# Patient Record
Sex: FEMALE | Race: WHITE | NOT HISPANIC OR LATINO | Employment: OTHER | URBAN - NONMETROPOLITAN AREA
[De-identification: names, ages, dates, MRNs, and addresses within clinical notes are randomized per-mention and may not be internally consistent; named-entity substitution may affect disease eponyms.]

---

## 2017-02-21 ENCOUNTER — TRANSCRIBE ORDERS (OUTPATIENT)
Dept: ADMINISTRATIVE | Facility: HOSPITAL | Age: 57
End: 2017-02-21

## 2017-02-21 DIAGNOSIS — R10.10 UPPER ABDOMINAL PAIN: ICD-10-CM

## 2017-02-21 DIAGNOSIS — R93.2 ABNORMAL LIVER SCAN: Primary | ICD-10-CM

## 2017-02-27 ENCOUNTER — HOSPITAL ENCOUNTER (OUTPATIENT)
Dept: CT IMAGING | Facility: HOSPITAL | Age: 57
Discharge: HOME | End: 2017-02-27
Admitting: NURSE PRACTITIONER

## 2017-02-27 DIAGNOSIS — R10.10 UPPER ABDOMINAL PAIN: ICD-10-CM

## 2017-02-27 DIAGNOSIS — R93.2 ABNORMAL LIVER SCAN: ICD-10-CM

## 2017-02-27 LAB — CREAT BLDA-MCNC: 0.7 MG/DL (ref 0.6–1.3)

## 2017-02-27 PROCEDURE — 74170 CT ABD WO CNTRST FLWD CNTRST: CPT

## 2017-02-27 PROCEDURE — 82565 ASSAY OF CREATININE: CPT

## 2017-02-27 PROCEDURE — 0 IOVERSOL 68 % SOLUTION: Performed by: NURSE PRACTITIONER

## 2017-02-27 PROCEDURE — 74170 CT ABD WO CNTRST FLWD CNTRST: CPT | Performed by: RADIOLOGY

## 2017-02-27 RX ADMIN — IOVERSOL 100 ML: 678 INJECTION INTRA-ARTERIAL; INTRAVENOUS at 09:15

## 2017-04-12 ENCOUNTER — APPOINTMENT (OUTPATIENT)
Dept: CT IMAGING | Facility: HOSPITAL | Age: 57
End: 2017-04-12

## 2017-04-12 ENCOUNTER — APPOINTMENT (OUTPATIENT)
Dept: GENERAL RADIOLOGY | Facility: HOSPITAL | Age: 57
End: 2017-04-12

## 2017-04-12 ENCOUNTER — HOSPITAL ENCOUNTER (OUTPATIENT)
Facility: HOSPITAL | Age: 57
Setting detail: OBSERVATION
Discharge: HOME | End: 2017-04-14
Attending: FAMILY MEDICINE | Admitting: FAMILY MEDICINE

## 2017-04-12 PROBLEM — R07.9 CHEST PAIN: Status: ACTIVE | Noted: 2017-04-12

## 2017-04-12 LAB
ALBUMIN SERPL-MCNC: 3.6 G/DL (ref 3.5–5)
ALBUMIN SERPL-MCNC: 3.6 G/DL (ref 3.5–5)
ALBUMIN/GLOB SERPL: 1.5 G/DL (ref 1.5–2.5)
ALP SERPL-CCNC: 135 U/L (ref 35–104)
ALP SERPL-CCNC: 135 U/L (ref 35–104)
ALT SERPL W P-5'-P-CCNC: 23 U/L (ref 10–36)
ALT SERPL W P-5'-P-CCNC: 23 U/L (ref 10–36)
ANION GAP SERPL CALCULATED.3IONS-SCNC: 3.9 MMOL/L (ref 3.6–11.2)
ANION GAP SERPL CALCULATED.3IONS-SCNC: 5.6 MMOL/L (ref 3.6–11.2)
AST SERPL-CCNC: 20 U/L (ref 10–30)
AST SERPL-CCNC: 20 U/L (ref 10–30)
BASOPHILS # BLD AUTO: 0.03 10*3/MM3 (ref 0–0.3)
BASOPHILS NFR BLD AUTO: 0.5 % (ref 0–2)
BILIRUB CONJ SERPL-MCNC: 0.1 MG/DL (ref 0–0.2)
BILIRUB INDIRECT SERPL-MCNC: 0.2 MG/DL
BILIRUB SERPL-MCNC: 0.3 MG/DL (ref 0.2–1.8)
BILIRUB SERPL-MCNC: 0.3 MG/DL (ref 0.2–1.8)
BUN BLD-MCNC: 12 MG/DL (ref 7–21)
BUN BLD-MCNC: 12 MG/DL (ref 7–21)
BUN/CREAT SERPL: 19.7 (ref 7–25)
BUN/CREAT SERPL: 19.7 (ref 7–25)
CALCIUM SPEC-SCNC: 9 MG/DL (ref 7.7–10)
CALCIUM SPEC-SCNC: 9 MG/DL (ref 7.7–10)
CHLORIDE SERPL-SCNC: 103 MMOL/L (ref 99–112)
CHLORIDE SERPL-SCNC: 104 MMOL/L (ref 99–112)
CK MB SERPL-CCNC: 0.37 NG/ML (ref 0–5)
CK MB SERPL-RTO: 0.9 % (ref 0–3)
CK SERPL-CCNC: 40 U/L (ref 24–173)
CO2 SERPL-SCNC: 26.4 MMOL/L (ref 24.3–31.9)
CO2 SERPL-SCNC: 27.1 MMOL/L (ref 24.3–31.9)
CREAT BLD-MCNC: 0.61 MG/DL (ref 0.43–1.29)
CREAT BLD-MCNC: 0.61 MG/DL (ref 0.43–1.29)
D DIMER PPP FEU-MCNC: 0.76 MG/L (FEU) (ref 0–0.5)
DEPRECATED RDW RBC AUTO: 49.8 FL (ref 37–54)
EOSINOPHIL # BLD AUTO: 0.16 10*3/MM3 (ref 0–0.7)
EOSINOPHIL NFR BLD AUTO: 2.9 % (ref 0–5)
ERYTHROCYTE [DISTWIDTH] IN BLOOD BY AUTOMATED COUNT: 14.6 % (ref 11.5–14.5)
GFR SERPL CREATININE-BSD FRML MDRD: 102 ML/MIN/1.73
GFR SERPL CREATININE-BSD FRML MDRD: 102 ML/MIN/1.73
GLOBULIN UR ELPH-MCNC: 2.4 GM/DL
GLUCOSE BLD-MCNC: 89 MG/DL (ref 70–110)
GLUCOSE BLD-MCNC: 89 MG/DL (ref 70–110)
GLUCOSE BLDC GLUCOMTR-MCNC: 63 MG/DL (ref 70–130)
GLUCOSE BLDC GLUCOMTR-MCNC: 92 MG/DL (ref 70–130)
GLUCOSE BLDC GLUCOMTR-MCNC: 98 MG/DL (ref 70–130)
HCT VFR BLD AUTO: 35.3 % (ref 37–47)
HGB BLD-MCNC: 12 G/DL (ref 12–16)
IMM GRANULOCYTES # BLD: 0.01 10*3/MM3 (ref 0–0.03)
IMM GRANULOCYTES NFR BLD: 0.2 % (ref 0–0.5)
LYMPHOCYTES # BLD AUTO: 1.78 10*3/MM3 (ref 1–3)
LYMPHOCYTES NFR BLD AUTO: 32.1 % (ref 21–51)
MCH RBC QN AUTO: 32.1 PG (ref 27–33)
MCHC RBC AUTO-ENTMCNC: 34 G/DL (ref 33–37)
MCV RBC AUTO: 94.4 FL (ref 80–94)
MONOCYTES # BLD AUTO: 0.47 10*3/MM3 (ref 0.1–0.9)
MONOCYTES NFR BLD AUTO: 8.5 % (ref 0–10)
NEUTROPHILS # BLD AUTO: 3.1 10*3/MM3 (ref 1.4–6.5)
NEUTROPHILS NFR BLD AUTO: 55.8 % (ref 30–70)
OSMOLALITY SERPL CALC.SUM OF ELEC: 269.3 MOSM/KG (ref 273–305)
PLATELET # BLD AUTO: 256 10*3/MM3 (ref 130–400)
PMV BLD AUTO: 9.4 FL (ref 6–10)
POTASSIUM BLD-SCNC: 4.1 MMOL/L (ref 3.5–5.3)
POTASSIUM BLD-SCNC: 4.2 MMOL/L (ref 3.5–5.3)
PROT SERPL-MCNC: 6 G/DL (ref 6–8)
PROT SERPL-MCNC: 6 G/DL (ref 6–8)
RBC # BLD AUTO: 3.74 10*6/MM3 (ref 4.2–5.4)
SODIUM BLD-SCNC: 135 MMOL/L (ref 135–153)
SODIUM BLD-SCNC: 135 MMOL/L (ref 135–153)
TROPONIN I SERPL-MCNC: <0.006 NG/ML
TROPONIN I SERPL-MCNC: <0.006 NG/ML
WBC NRBC COR # BLD: 5.55 10*3/MM3 (ref 4.5–12.5)

## 2017-04-12 PROCEDURE — 0 IOPAMIDOL PER 1 ML: Performed by: FAMILY MEDICINE

## 2017-04-12 PROCEDURE — 25010000002 ENOXAPARIN PER 10 MG: Performed by: FAMILY MEDICINE

## 2017-04-12 PROCEDURE — G0378 HOSPITAL OBSERVATION PER HR: HCPCS

## 2017-04-12 PROCEDURE — 80076 HEPATIC FUNCTION PANEL: CPT | Performed by: FAMILY MEDICINE

## 2017-04-12 PROCEDURE — 71275 CT ANGIOGRAPHY CHEST: CPT

## 2017-04-12 PROCEDURE — 82553 CREATINE MB FRACTION: CPT | Performed by: FAMILY MEDICINE

## 2017-04-12 PROCEDURE — 82550 ASSAY OF CK (CPK): CPT | Performed by: FAMILY MEDICINE

## 2017-04-12 PROCEDURE — 84484 ASSAY OF TROPONIN QUANT: CPT | Performed by: FAMILY MEDICINE

## 2017-04-12 PROCEDURE — 71275 CT ANGIOGRAPHY CHEST: CPT | Performed by: RADIOLOGY

## 2017-04-12 PROCEDURE — 93005 ELECTROCARDIOGRAM TRACING: CPT | Performed by: FAMILY MEDICINE

## 2017-04-12 PROCEDURE — 71020 HC CHEST PA AND LATERAL: CPT

## 2017-04-12 PROCEDURE — 85379 FIBRIN DEGRADATION QUANT: CPT | Performed by: FAMILY MEDICINE

## 2017-04-12 PROCEDURE — 85025 COMPLETE CBC W/AUTO DIFF WBC: CPT | Performed by: FAMILY MEDICINE

## 2017-04-12 PROCEDURE — 96372 THER/PROPH/DIAG INJ SC/IM: CPT

## 2017-04-12 PROCEDURE — 82962 GLUCOSE BLOOD TEST: CPT

## 2017-04-12 PROCEDURE — 71020 XR CHEST PA AND LATERAL: CPT | Performed by: RADIOLOGY

## 2017-04-12 PROCEDURE — 80053 COMPREHEN METABOLIC PANEL: CPT | Performed by: FAMILY MEDICINE

## 2017-04-12 RX ORDER — ASPIRIN 81 MG/1
81 TABLET ORAL DAILY
Status: CANCELLED | OUTPATIENT
Start: 2017-04-13

## 2017-04-12 RX ORDER — PRIMIDONE 50 MG/1
50 TABLET ORAL DAILY
Status: DISCONTINUED | OUTPATIENT
Start: 2017-04-12 | End: 2017-04-14

## 2017-04-12 RX ORDER — CELECOXIB 200 MG/1
200 CAPSULE ORAL DAILY
Status: DISCONTINUED | OUTPATIENT
Start: 2017-04-13 | End: 2017-04-14 | Stop reason: HOSPADM

## 2017-04-12 RX ORDER — NITROGLYCERIN 0.4 MG/1
0.4 TABLET SUBLINGUAL
Status: DISCONTINUED | OUTPATIENT
Start: 2017-04-12 | End: 2017-04-14 | Stop reason: HOSPADM

## 2017-04-12 RX ORDER — DULOXETIN HYDROCHLORIDE 60 MG/1
60 CAPSULE, DELAYED RELEASE ORAL EVERY 12 HOURS SCHEDULED
Status: DISCONTINUED | OUTPATIENT
Start: 2017-04-12 | End: 2017-04-14 | Stop reason: HOSPADM

## 2017-04-12 RX ORDER — DULOXETIN HYDROCHLORIDE 60 MG/1
60 CAPSULE, DELAYED RELEASE ORAL 2 TIMES DAILY
Status: CANCELLED | OUTPATIENT
Start: 2017-04-12

## 2017-04-12 RX ORDER — DEXLANSOPRAZOLE 60 MG/1
60 CAPSULE, DELAYED RELEASE ORAL DAILY
Status: CANCELLED | OUTPATIENT
Start: 2017-04-13

## 2017-04-12 RX ORDER — FOLIC ACID 1 MG/1
1 TABLET ORAL DAILY
Status: DISCONTINUED | OUTPATIENT
Start: 2017-04-13 | End: 2017-04-14 | Stop reason: HOSPADM

## 2017-04-12 RX ORDER — ASPIRIN 81 MG/1
81 TABLET ORAL DAILY
Status: DISCONTINUED | OUTPATIENT
Start: 2017-04-13 | End: 2017-04-14 | Stop reason: HOSPADM

## 2017-04-12 RX ORDER — ASCORBIC ACID 500 MG
1000 TABLET ORAL EVERY EVENING
COMMUNITY

## 2017-04-12 RX ORDER — AMITRIPTYLINE HYDROCHLORIDE 25 MG/1
25 TABLET, FILM COATED ORAL DAILY
Status: CANCELLED | OUTPATIENT
Start: 2017-04-12

## 2017-04-12 RX ORDER — MULTIVITAMIN
1 TABLET ORAL EVERY MORNING
Status: CANCELLED | OUTPATIENT
Start: 2017-04-12

## 2017-04-12 RX ORDER — NICOTINE POLACRILEX 4 MG
15 LOZENGE BUCCAL
Status: DISCONTINUED | OUTPATIENT
Start: 2017-04-12 | End: 2017-04-14 | Stop reason: HOSPADM

## 2017-04-12 RX ORDER — CYANOCOBALAMIN 1000 UG/ML
1000 INJECTION, SOLUTION INTRAMUSCULAR; SUBCUTANEOUS
COMMUNITY

## 2017-04-12 RX ORDER — CETIRIZINE HYDROCHLORIDE 10 MG/1
10 TABLET ORAL DAILY
Status: CANCELLED | OUTPATIENT
Start: 2017-04-12

## 2017-04-12 RX ORDER — LORATADINE 10 MG/1
10 TABLET ORAL NIGHTLY
COMMUNITY

## 2017-04-12 RX ORDER — CELECOXIB 200 MG/1
200 CAPSULE ORAL DAILY
Status: CANCELLED | OUTPATIENT
Start: 2017-04-12

## 2017-04-12 RX ORDER — MULTIVIT WITH MINERALS/LUTEIN
1000 TABLET ORAL DAILY
Status: DISCONTINUED | OUTPATIENT
Start: 2017-04-12 | End: 2017-04-12 | Stop reason: CLARIF

## 2017-04-12 RX ORDER — CYCLOBENZAPRINE HCL 10 MG
10 TABLET ORAL DAILY
Status: CANCELLED | OUTPATIENT
Start: 2017-04-12

## 2017-04-12 RX ORDER — DIAZEPAM 2 MG/1
2 TABLET ORAL 2 TIMES DAILY
Status: CANCELLED | OUTPATIENT
Start: 2017-04-12

## 2017-04-12 RX ORDER — LEVOTHYROXINE SODIUM 0.15 MG/1
150 TABLET ORAL
Status: DISCONTINUED | OUTPATIENT
Start: 2017-04-13 | End: 2017-04-14 | Stop reason: HOSPADM

## 2017-04-12 RX ORDER — METOPROLOL SUCCINATE 50 MG/1
50 TABLET, EXTENDED RELEASE ORAL DAILY
Status: CANCELLED | OUTPATIENT
Start: 2017-04-13

## 2017-04-12 RX ORDER — MULTIVITAMIN
1 TABLET ORAL DAILY
Status: DISCONTINUED | OUTPATIENT
Start: 2017-04-12 | End: 2017-04-14 | Stop reason: HOSPADM

## 2017-04-12 RX ORDER — FERROUS SULFATE 325(65) MG
325 TABLET ORAL
Status: DISCONTINUED | OUTPATIENT
Start: 2017-04-12 | End: 2017-04-14 | Stop reason: HOSPADM

## 2017-04-12 RX ORDER — LEVOTHYROXINE SODIUM 0.15 MG/1
150 TABLET ORAL DAILY
Status: CANCELLED | OUTPATIENT
Start: 2017-04-13

## 2017-04-12 RX ORDER — ALUMINA, MAGNESIA, AND SIMETHICONE 2400; 2400; 240 MG/30ML; MG/30ML; MG/30ML
15 SUSPENSION ORAL EVERY 6 HOURS PRN
Status: DISCONTINUED | OUTPATIENT
Start: 2017-04-12 | End: 2017-04-14 | Stop reason: HOSPADM

## 2017-04-12 RX ORDER — BUTALBITAL, ACETAMINOPHEN AND CAFFEINE 50; 325; 40 MG/1; MG/1; MG/1
1 TABLET ORAL DAILY PRN
Status: DISCONTINUED | OUTPATIENT
Start: 2017-04-12 | End: 2017-04-14 | Stop reason: HOSPADM

## 2017-04-12 RX ORDER — PANTOPRAZOLE SODIUM 40 MG/1
40 TABLET, DELAYED RELEASE ORAL
Status: DISCONTINUED | OUTPATIENT
Start: 2017-04-13 | End: 2017-04-14 | Stop reason: HOSPADM

## 2017-04-12 RX ORDER — ONDANSETRON 4 MG/1
4 TABLET, FILM COATED ORAL EVERY 6 HOURS PRN
Status: DISCONTINUED | OUTPATIENT
Start: 2017-04-12 | End: 2017-04-14 | Stop reason: HOSPADM

## 2017-04-12 RX ORDER — ONDANSETRON 2 MG/ML
4 INJECTION INTRAMUSCULAR; INTRAVENOUS EVERY 6 HOURS PRN
Status: DISCONTINUED | OUTPATIENT
Start: 2017-04-12 | End: 2017-04-14 | Stop reason: HOSPADM

## 2017-04-12 RX ORDER — FERROUS SULFATE 325(65) MG
325 TABLET ORAL DAILY
Status: CANCELLED | OUTPATIENT
Start: 2017-04-12

## 2017-04-12 RX ORDER — ONDANSETRON 4 MG/1
4 TABLET, ORALLY DISINTEGRATING ORAL EVERY 6 HOURS PRN
Status: DISCONTINUED | OUTPATIENT
Start: 2017-04-12 | End: 2017-04-14 | Stop reason: HOSPADM

## 2017-04-12 RX ORDER — ASCORBIC ACID 500 MG
1000 TABLET ORAL DAILY
Status: CANCELLED | OUTPATIENT
Start: 2017-04-12

## 2017-04-12 RX ORDER — GABAPENTIN 300 MG/1
600 CAPSULE ORAL 4 TIMES DAILY
Status: CANCELLED | OUTPATIENT
Start: 2017-04-12

## 2017-04-12 RX ORDER — OMEGA-3S/DHA/EPA/FISH OIL/D3 300MG-1000
1000 CAPSULE ORAL DAILY
Status: DISCONTINUED | OUTPATIENT
Start: 2017-04-12 | End: 2017-04-14 | Stop reason: HOSPADM

## 2017-04-12 RX ORDER — ASCORBIC ACID 500 MG
1000 TABLET ORAL DAILY
Status: DISCONTINUED | OUTPATIENT
Start: 2017-04-12 | End: 2017-04-14 | Stop reason: HOSPADM

## 2017-04-12 RX ORDER — POLYETHYLENE GLYCOL 3350 17 G/17G
17 POWDER, FOR SOLUTION ORAL DAILY
Status: DISCONTINUED | OUTPATIENT
Start: 2017-04-12 | End: 2017-04-14 | Stop reason: HOSPADM

## 2017-04-12 RX ORDER — DEXTROSE MONOHYDRATE 25 G/50ML
25 INJECTION, SOLUTION INTRAVENOUS
Status: DISCONTINUED | OUTPATIENT
Start: 2017-04-12 | End: 2017-04-14 | Stop reason: HOSPADM

## 2017-04-12 RX ORDER — VITAMIN E 268 MG
800 CAPSULE ORAL DAILY
Status: DISCONTINUED | OUTPATIENT
Start: 2017-04-12 | End: 2017-04-14 | Stop reason: HOSPADM

## 2017-04-12 RX ORDER — TOPIRAMATE 25 MG/1
50 TABLET ORAL NIGHTLY
COMMUNITY
End: 2019-10-25 | Stop reason: ALTCHOICE

## 2017-04-12 RX ORDER — TRAMADOL HYDROCHLORIDE 50 MG/1
50 TABLET ORAL EVERY 12 HOURS SCHEDULED
Status: DISCONTINUED | OUTPATIENT
Start: 2017-04-12 | End: 2017-04-14 | Stop reason: HOSPADM

## 2017-04-12 RX ORDER — DIAZEPAM 5 MG/1
2.5 TABLET ORAL EVERY 12 HOURS SCHEDULED
Status: DISCONTINUED | OUTPATIENT
Start: 2017-04-12 | End: 2017-04-14 | Stop reason: HOSPADM

## 2017-04-12 RX ORDER — SODIUM CHLORIDE 9 MG/ML
100 INJECTION, SOLUTION INTRAVENOUS CONTINUOUS
Status: DISCONTINUED | OUTPATIENT
Start: 2017-04-12 | End: 2017-04-14 | Stop reason: HOSPADM

## 2017-04-12 RX ORDER — GABAPENTIN 300 MG/1
600 CAPSULE ORAL EVERY 6 HOURS SCHEDULED
Status: DISCONTINUED | OUTPATIENT
Start: 2017-04-12 | End: 2017-04-14 | Stop reason: HOSPADM

## 2017-04-12 RX ORDER — DIAZEPAM 2 MG/1
2 TABLET ORAL EVERY 12 HOURS SCHEDULED
Status: DISCONTINUED | OUTPATIENT
Start: 2017-04-12 | End: 2017-04-12

## 2017-04-12 RX ORDER — FOLIC ACID 1 MG/1
1 TABLET ORAL DAILY
Status: CANCELLED | OUTPATIENT
Start: 2017-04-13

## 2017-04-12 RX ORDER — FAMOTIDINE 20 MG/1
20 TABLET, FILM COATED ORAL 2 TIMES DAILY
Status: CANCELLED | OUTPATIENT
Start: 2017-04-12

## 2017-04-12 RX ORDER — CETIRIZINE HYDROCHLORIDE 10 MG/1
10 TABLET ORAL DAILY
Status: DISCONTINUED | OUTPATIENT
Start: 2017-04-12 | End: 2017-04-14 | Stop reason: HOSPADM

## 2017-04-12 RX ORDER — SODIUM CHLORIDE 0.9 % (FLUSH) 0.9 %
1-10 SYRINGE (ML) INJECTION AS NEEDED
Status: DISCONTINUED | OUTPATIENT
Start: 2017-04-12 | End: 2017-04-14 | Stop reason: HOSPADM

## 2017-04-12 RX ORDER — BUTALBITAL, ACETAMINOPHEN AND CAFFEINE 50; 325; 40 MG/1; MG/1; MG/1
1 TABLET ORAL AS NEEDED
Status: CANCELLED | OUTPATIENT
Start: 2017-04-12

## 2017-04-12 RX ORDER — TRAMADOL HYDROCHLORIDE 50 MG/1
50 TABLET ORAL 2 TIMES DAILY
Status: CANCELLED | OUTPATIENT
Start: 2017-04-12

## 2017-04-12 RX ORDER — PRIMIDONE 50 MG/1
50 TABLET ORAL DAILY
Status: CANCELLED | OUTPATIENT
Start: 2017-04-12

## 2017-04-12 RX ORDER — OMEGA-3S/DHA/EPA/FISH OIL/D3 300MG-1000
1000 CAPSULE ORAL DAILY
Status: CANCELLED | OUTPATIENT
Start: 2017-04-12

## 2017-04-12 RX ORDER — CYCLOBENZAPRINE HCL 10 MG
10 TABLET ORAL DAILY
Status: DISCONTINUED | OUTPATIENT
Start: 2017-04-12 | End: 2017-04-14

## 2017-04-12 RX ORDER — AMITRIPTYLINE HYDROCHLORIDE 25 MG/1
25 TABLET, FILM COATED ORAL DAILY
Status: DISCONTINUED | OUTPATIENT
Start: 2017-04-12 | End: 2017-04-14

## 2017-04-12 RX ORDER — ACETAMINOPHEN 325 MG/1
650 TABLET ORAL EVERY 4 HOURS PRN
Status: DISCONTINUED | OUTPATIENT
Start: 2017-04-12 | End: 2017-04-14 | Stop reason: HOSPADM

## 2017-04-12 RX ORDER — MEDICAL SUPPLY, MISCELLANEOUS
EACH MISCELLANEOUS AS NEEDED
Status: DISCONTINUED | OUTPATIENT
Start: 2017-04-12 | End: 2017-04-14 | Stop reason: HOSPADM

## 2017-04-12 RX ORDER — METOPROLOL SUCCINATE 50 MG/1
50 TABLET, EXTENDED RELEASE ORAL DAILY
Status: DISCONTINUED | OUTPATIENT
Start: 2017-04-13 | End: 2017-04-14 | Stop reason: HOSPADM

## 2017-04-12 RX ORDER — MULTIVIT WITH MINERALS/LUTEIN
1000 TABLET ORAL DAILY
Status: CANCELLED | OUTPATIENT
Start: 2017-04-12

## 2017-04-12 RX ORDER — POLYETHYLENE GLYCOL 3350 17 G/17G
17 POWDER, FOR SOLUTION ORAL DAILY
Status: CANCELLED | OUTPATIENT
Start: 2017-04-12

## 2017-04-12 RX ORDER — TOPIRAMATE 25 MG/1
50 TABLET ORAL NIGHTLY
Status: DISCONTINUED | OUTPATIENT
Start: 2017-04-12 | End: 2017-04-14 | Stop reason: HOSPADM

## 2017-04-12 RX ORDER — TOPIRAMATE 25 MG/1
50 TABLET ORAL NIGHTLY
Status: CANCELLED | OUTPATIENT
Start: 2017-04-12

## 2017-04-12 RX ORDER — GABAPENTIN 600 MG/1
600 TABLET ORAL 3 TIMES DAILY
COMMUNITY

## 2017-04-12 RX ADMIN — PRIMIDONE 50 MG: 50 TABLET ORAL at 17:04

## 2017-04-12 RX ADMIN — Medication 1 TABLET: at 17:04

## 2017-04-12 RX ADMIN — SODIUM CHLORIDE 100 ML/HR: 9 INJECTION, SOLUTION INTRAVENOUS at 20:38

## 2017-04-12 RX ADMIN — DIAZEPAM 2.5 MG: 5 TABLET ORAL at 21:27

## 2017-04-12 RX ADMIN — CHOLECALCIFEROL TAB 10 MCG (400 UNIT) 1000 UNITS: 10 TAB at 17:04

## 2017-04-12 RX ADMIN — OXYCODONE HYDROCHLORIDE AND ACETAMINOPHEN 1000 MG: 500 TABLET ORAL at 17:03

## 2017-04-12 RX ADMIN — CYCLOBENZAPRINE HYDROCHLORIDE 10 MG: 10 TABLET, FILM COATED ORAL at 17:04

## 2017-04-12 RX ADMIN — TOPIRAMATE 50 MG: 25 TABLET, FILM COATED ORAL at 20:38

## 2017-04-12 RX ADMIN — TRAMADOL HYDROCHLORIDE 50 MG: 50 TABLET, FILM COATED ORAL at 20:38

## 2017-04-12 RX ADMIN — ENOXAPARIN SODIUM 40 MG: 40 INJECTION SUBCUTANEOUS at 17:03

## 2017-04-12 RX ADMIN — CETIRIZINE HYDROCHLORIDE 10 MG: 10 TABLET ORAL at 17:04

## 2017-04-12 RX ADMIN — VITAMIN E CAP 400 UNIT 800 UNITS: 400 CAP at 17:45

## 2017-04-12 RX ADMIN — FERROUS SULFATE TAB 325 MG (65 MG ELEMENTAL FE) 325 MG: 325 (65 FE) TAB at 17:04

## 2017-04-12 RX ADMIN — AMITRIPTYLINE HYDROCHLORIDE 25 MG: 25 TABLET, FILM COATED ORAL at 17:04

## 2017-04-12 RX ADMIN — SODIUM CHLORIDE 100 ML/HR: 9 INJECTION, SOLUTION INTRAVENOUS at 19:00

## 2017-04-12 RX ADMIN — POLYETHYLENE GLYCOL (3350) 17 G: 17 POWDER, FOR SOLUTION ORAL at 17:03

## 2017-04-12 RX ADMIN — CALCIUM CARBONATE-VITAMIN D TAB 500 MG-200 UNIT 500 MG: 500-200 TAB at 17:04

## 2017-04-12 RX ADMIN — IOPAMIDOL 50 ML: 755 INJECTION, SOLUTION INTRAVENOUS at 15:30

## 2017-04-12 RX ADMIN — GABAPENTIN 600 MG: 300 CAPSULE ORAL at 17:03

## 2017-04-12 RX ADMIN — DULOXETINE HYDROCHLORIDE 60 MG: 60 CAPSULE, DELAYED RELEASE ORAL at 20:38

## 2017-04-12 NOTE — H&P
Chief complaint  Chest pain / SOA    Subjective     Patient is a 56 y.o. female presents with 2 days of SOA with exertion with chest pain when takes a deep breath.  No productive cough.  No fevers / chills but does have body aches.  She c/o pain in central chest.  It comes and goes, worse with inspiration.  No hypoxia.  No swelling or PND or orthopnea.  She feels like she's not eating and drinking good. No low sugars.  Pain is an aching pain with occasional sharpness.  Nonradiating.  Nothing has made it better.      Review of Systems   On review of systems the patient denies the following unless noted above:     Constitutional:  Fevers, chills, weight change, fatigue     Eyes: Vision changes, headache, double vision, loss of vision     ENT: Runny nose, nose bleeds, ringing in ears, pain with swallowing, sore throat     Cardiovascular: Chest pains, palpitations, PND, orthopnea     Respiratory: Cough, wheezing, SOA, hemoptysis     GI:  Abdominal pain, diarrhea, constipation, change in stool caliber,    Rectal bleeding, vomiting or nausea     : Difficulty voiding, dysuria, hematuria     Musculoskeletal: Changes of any chronic joint pain, swelling     Skin: Rash, itching, easy bruisability     Neurological: Unilateral weakness, new onset numbness, speech difficulties     Psychiatric: Sadness, tearfulness, feelings of hopelessness, racing thoughts     Endocrine:  Heat or cold intolerance, mood swings, polydipsia, polyphagia,    recent hypoglycemia      History  Past Medical History:   Diagnosis Date   • Cancer    • Degenerative disc disease, lumbar    • Dupuytren's disease    • Essential hypertension    • Family history of coronary artery disease    • Fibromyalgia    • H. pylori infection    • Hiatal hernia    • Hypothyroidism    • Multiple sclerosis    • Osteoarthritis    • Psoriasis    • Psoriatic arthritis    • RA (rheumatoid arthritis)    • Sjogren's syndrome    • TMJ arthritis    • Type 2 diabetes mellitus       Past Surgical History:   Procedure Laterality Date   • BREAST LUMPECTOMY Left 11/1993   • CARDIAC CATHETERIZATION Left 09/21/2009    Normal    • CARPAL TUNNEL RELEASE  03/2012   • CERVICAL POLYPECTOMY  12/2015   • CHOLECYSTECTOMY  05/2007   • GASTRIC BYPASS  09/2007   • KNEE ARTHROPLASTY     • LUMBAR DISC SURGERY      C5-6   • REPLACEMENT TOTAL KNEE Right 06/2016   • SACRAL NERVE STIMULATOR PLACEMENT  09/2014   • THYROIDECTOMY  03/2016     Family History   Problem Relation Age of Onset   • Diabetes Mother    • Stroke Mother    • Hypertension Mother    • Heart attack Father      First one was in his 20's second 30's.    • Heart failure Father      Social History   Substance Use Topics   • Smoking status: Never Smoker   • Smokeless tobacco: Never Used   • Alcohol use No     Prescriptions Prior to Admission   Medication Sig Dispense Refill Last Dose   • amitriptyline (ELAVIL) 25 MG tablet Take 25 mg by mouth daily.   4/11/2017 at Unknown time   • aspirin 81 MG EC tablet Take 81 mg by mouth daily.   4/12/2017 at 730   • butalbital-acetaminophen-caffeine (FIORICET, ESGIC) -40 MG per tablet Take 1 tablet by mouth as needed for headaches.   Past Month at Unknown time   • calcium citrate-vitamin d (CALCITRATE) 315-250 MG-UNIT tablet tablet Take 1 tablet by mouth daily.   4/11/2017 at Unknown time   • celecoxib (CeleBREX) 200 MG capsule Take 200 mg by mouth daily.   4/12/2017 at 0730   • Certolizumab Pegol (CIMZIA) 2 X 200 MG kit Inject 400 mg under the skin Every 30 (Thirty) Days.   3/28/2017   • cholecalciferol (VITAMIN D3) 1000 UNITS tablet Take 1,000 Units by mouth daily.   4/11/2017 at Unknown time   • cyanocobalamin 1000 MCG/ML injection Inject 1,000 mcg into the shoulder, thigh, or buttocks Every 28 (Twenty-Eight) Days.   4/5/2017   • cyclobenzaprine (FLEXERIL) 10 MG tablet Take 10 mg by mouth daily.   4/11/2017 at Unknown time   • dexlansoprazole (DEXILANT) 60 MG capsule Take 60 mg by mouth daily.    4/12/2017 at 0730   • diazePAM (VALIUM) 2 MG tablet Take 2 mg by mouth 2 (Two) Times a Day.   4/12/2017 at 0730   • DULoxetine (CYMBALTA) 60 MG capsule Take 60 mg by mouth 2 (two) times a day.   4/12/2017 at 0730   • ferrous sulfate 325 (65 FE) MG tablet Take 325 mg by mouth daily.   4/11/2017 at Unknown time   • folic acid (FOLVITE) 1 MG tablet Take 1 mg by mouth daily.   4/12/2017 at 0730   • gabapentin (NEURONTIN) 600 MG tablet Take 600 mg by mouth 4 (Four) Times a Day.   4/12/2017 at 0730   • insulin detemir (LEVEMIR) 100 UNIT/ML injection Inject 25 Units under the skin Daily.   4/12/2017 at 0730   • levothyroxine (SYNTHROID, LEVOTHROID) 150 MCG tablet Take 150 mcg by mouth daily.   4/12/2017 at 0730   • linaclotide (LINZESS) 290 MCG capsule capsule Take 290 mcg by mouth every morning before breakfast.   4/12/2017 at 0730   • loratadine (CLARITIN) 10 MG tablet Take 10 mg by mouth Every Night.   4/11/2017 at Unknown time   • metoprolol succinate XL (TOPROL-XL) 50 MG 24 hr tablet Take 1 tablet by mouth Daily. 90 tablet 1 4/12/2017 at 0730   • Multiple Vitamin (MULTI VITAMIN DAILY PO) Take 1 tablet by mouth Every Morning.   4/11/2017 at Unknown time   • polyethylene glycol (MIRALAX) packet Take 17 g by mouth daily.   4/11/2017 at Unknown time   • primidone (MYSOLINE) 50 MG tablet Take 50 mg by mouth daily.   4/11/2017 at Unknown time   • ranitidine (ZANTAC) 150 MG tablet Take 150 mg by mouth 2 (two) times a day.   4/11/2017 at Unknown time   • topiramate (TOPAMAX) 25 MG tablet Take 50 mg by mouth Every Night.   4/11/2017 at Unknown time   • traMADol (ULTRAM) 50 MG tablet Take 50 mg by mouth 2 (two) times a day.   4/12/2017 at 0730   • vitamin C (ASCORBIC ACID) 500 MG tablet Take 1,000 mg by mouth Daily.   4/11/2017 at Unknown time   • vitamin E 1000 UNIT capsule Take 1,000 Units by mouth daily.   4/11/2017 at Unknown time   • methotrexate 2.5 MG tablet Take 25 mg by mouth 1 (One) Time Per Week. Takes 10 tablets 1  "day per week on Saturday 4/8/2017     Allergies:  Beta adrenergic blockers; Penicillins; Sulfa antibiotics; Codeine; Imuran [azathioprine]; and Januvia [sitagliptin]    Scheduled Meds:  amitriptyline 25 mg Oral Daily   [START ON 4/13/2017] aspirin 81 mg Oral Daily   calcium-vitamin D 500 mg Oral Daily   [START ON 4/13/2017] celecoxib 200 mg Oral Daily   cetirizine 10 mg Oral Daily   cholecalciferol 1,000 Units Oral Daily   cyclobenzaprine 10 mg Oral Daily   diazePAM 2 mg Oral Q12H   DULoxetine 60 mg Oral Q12H   enoxaparin 40 mg Subcutaneous Daily   ferrous sulfate 325 mg Oral Daily With Breakfast   [START ON 4/13/2017] folic acid 1 mg Oral Daily   gabapentin 600 mg Oral Q6H   insulin aspart 0-9 Units Subcutaneous 4x Daily AC & at Bedtime   [START ON 4/13/2017] insulin detemir 25 Units Subcutaneous Daily   [START ON 4/13/2017] levothyroxine 150 mcg Oral Q AM   [START ON 4/13/2017] linaclotide 290 mcg Oral QAM AC   [START ON 4/15/2017] methotrexate 25 mg Oral Weekly   [START ON 4/13/2017] metoprolol succinate XL 50 mg Oral Daily   multivitamin 1 tablet Oral Daily   [START ON 4/13/2017] pantoprazole 40 mg Oral Q AM   Pharmacy Meds to Bed Consult  Does not apply Daily   polyethylene glycol 17 g Oral Daily   primidone 50 mg Oral Daily   topiramate 50 mg Oral Nightly   traMADol 50 mg Oral Q12H   vitamin C 1,000 mg Oral Daily   vitamin E 800 Units Oral Daily     Continuous Infusions:   PRN Meds:.•  acetaminophen  •  aluminum-magnesium hydroxide-simethicone  •  butalbital-acetaminophen-caffeine  •  dextrose  •  dextrose  •  glucagon (human recombinant)  •  nitroglycerin  •  ondansetron **OR** ondansetron ODT **OR** ondansetron  •  sodium chloride  •  tablet cutter          Objective     Vital Signs    /59 (BP Location: Right arm, Patient Position: Sitting)  Pulse 82  Temp 98.2 °F (36.8 °C) (Oral)   Resp 18  Ht 65\" (165.1 cm)  Wt 180 lb 14.4 oz (82.1 kg)  SpO2 99%  BMI 30.1 kg/m2        Physical " Exam:   General Appearance: alert, pleasant, appears stated age, interactive and   Cooperative. Chronically ill in appearance.     Head: normocephalic, without obvious abnormality and atraumatic   Eyes: lids and lashes normal, conjunctivae and sclerae normal, no icterus, no   pallor, corneas clear and PERRLA   Ears: ears appear intact with no abnormalities noted   Nose: nares normal, septum midline, mucosa normal and no drainage   Throat: no oral lesions, no thrush, oral mucosa moist and oopharynx normal   Neck: no adenopathy, supple, trachea midline, no thyromegaly, no carotid bruit   and no JVD   Back: no kyphosis present, no scoliosis present, no skin lesions, erythema, or   scars, no tenderness to percussion or palpation and range of motion normal   Lungs: clear to auscultation, respirations regular, respirations even and    respirations unlabored. No accessory muscle use.    Heart:: regular rhythm & normal rate, normal S1, S2, no murmur, no gallop, no   rub and no click.  Chest wall with no abnormalities observed. PMI nondisplaced   Abdomen: normal bowel sounds in all quadrants, no masses, no hepatomegaly,   no splenomegaly, soft non-tender, no guarding and no rebound tenderness   Extremities: no edema, no cyanosis, no redness, no tenderness, no clubbing   Musculoskeletal: joints with full range of motion and joints, no effusion.  Pedal   pulses palpable and equal bilaterally   Skin: no bleeding, bruising or rash and no lesions noted   Lymph Nodes: no palpable adenopathy   Neurologic: Mental Status orientated to person, place, time and situation.    Speech is intelligible, Nonlabored.  Alertness alert and awake and mood/affect   normal, Cranial Nerves 2 - 12 grossly intact as examined   Sensory intact in BLE and BUE.   Motor strength  LUE is 5/5 proximally, 5/5 distally      RUE is 5/5 proximally, 5/5 distally      LLE is 5/5 @ hip flexors, quads as well as       dorsiflexion / plantar flexion      RLE is 5/5 @  hip flexors, quads as well as        dosriflexion / plantar flexion   Reflexes: Right:  2+ biceps, 2+ brachioradialis      2+ patella, 2+ achilles     Left: 2+ biceps, 2+ brachioradialis      2+ patella, 2+ achilles    Results Review:   Lab Results (last 24 hours)     Procedure Component Value Units Date/Time    CBC & Differential [[id number]] Collected:  04/12/17 1321    Specimen:  Blood Updated:  04/12/17 1329    Narrative:       The following orders were created for panel order CBC & Differential.  Procedure                               Abnormality         Status                     ---------                               -----------         ------                     CBC Auto Differential[[id number]]         Abnormal            Final result                 Please view results for these tests on the individual orders.    CBC Auto Differential [[id number]]  (Abnormal) Collected:  04/12/17 1321    Specimen:  Blood Updated:  04/12/17 1329     WBC 5.55 10*3/mm3      RBC 3.74 (L) 10*6/mm3      Hemoglobin 12.0 g/dL      Hematocrit 35.3 (L) %      MCV 94.4 (H) fL      MCH 32.1 pg      MCHC 34.0 g/dL      RDW 14.6 (H) %      RDW-SD 49.8 fl      MPV 9.4 fL      Platelets 256 10*3/mm3      Neutrophil % 55.8 %      Lymphocyte % 32.1 %      Monocyte % 8.5 %      Eosinophil % 2.9 %      Basophil % 0.5 %      Immature Grans % 0.2 %      Neutrophils, Absolute 3.10 10*3/mm3      Lymphocytes, Absolute 1.78 10*3/mm3      Monocytes, Absolute 0.47 10*3/mm3      Eosinophils, Absolute 0.16 10*3/mm3      Basophils, Absolute 0.03 10*3/mm3      Immature Grans, Absolute 0.01 10*3/mm3     D-dimer, Quantitative [[id number]]  (Abnormal) Collected:  04/12/17 1321    Specimen:  Blood Updated:  04/12/17 1346     D-Dimer, Quantitative 0.76 (C) mg/L (FEU)     Narrative:       d-Dimer assay result is to be used in conjunction with a non-high clinical pretest probability (PTP) assessment tool to exclude PE and aid in diagnosis of VTE with cutoff of 0.5  mg/L FEU.    Comprehensive Metabolic Panel [[id number]]  (Abnormal) Collected:  04/12/17 1321    Specimen:  Blood Updated:  04/12/17 1355     Glucose 89 mg/dL      BUN 12 mg/dL      Creatinine 0.61 mg/dL      Sodium 135 mmol/L      Potassium 4.2 mmol/L      Chloride 104 mmol/L      CO2 27.1 mmol/L      Calcium 9.0 mg/dL      Total Protein 6.0 g/dL      Albumin 3.60 g/dL      ALT (SGPT) 23 U/L      AST (SGOT) 20 U/L      Alkaline Phosphatase 135 (H) U/L       Note New Reference Ranges        Total Bilirubin 0.3 mg/dL      eGFR Non African Amer 102 mL/min/1.73      Globulin 2.4 gm/dL      A/G Ratio 1.5 g/dL      BUN/Creatinine Ratio 19.7     Anion Gap 3.9 mmol/L     Hepatic Function Panel [[id number]]  (Abnormal) Collected:  04/12/17 1321    Specimen:  Blood Updated:  04/12/17 1355     Total Protein 6.0 g/dL      Albumin 3.60 g/dL      ALT (SGPT) 23 U/L      AST (SGOT) 20 U/L      Alkaline Phosphatase 135 (H) U/L       Note New Reference Ranges        Total Bilirubin 0.3 mg/dL      Bilirubin, Direct 0.1 mg/dL      Bilirubin, Indirect 0.2 mg/dL     CK [[id number]]  (Normal) Collected:  04/12/17 1321    Specimen:  Blood Updated:  04/12/17 1357     Creatine Kinase 40 U/L     Osmolality, Calculated [[id number]]  (Abnormal) Collected:  04/12/17 1321    Specimen:  Blood Updated:  04/12/17 1357     Osmolality Calc 269.3 (L) mOsm/kg     CK-MB [[id number]]  (Normal) Collected:  04/12/17 1321    Specimen:  Blood Updated:  04/12/17 1403     CKMB 0.37 ng/mL     Troponin [[id number]]  (Normal) Collected:  04/12/17 1321    Specimen:  Blood Updated:  04/12/17 1403     Troponin I <0.006 ng/mL     Narrative:       Ultra Troponin I Reference Range:         <=0.039 ng/mL: Negative    0.04-0.779 ng/mL: Indeterminate Range. Suspicious of MI.  Clinical correlation required.       >=0.78  ng/mL: Consistent with myocardial injury.  Clinical correlation required.    CK-MB Index [[id number]]  (Normal) Collected:  04/12/17 1321    Specimen:  Blood Updated:   04/12/17 1403     CK-MB Index 0.9 %     Basic Metabolic Panel [[id number]]  (Normal) Collected:  04/12/17 1321    Specimen:  Blood Updated:  04/12/17 1405     Glucose 89 mg/dL      BUN 12 mg/dL      Creatinine 0.61 mg/dL      Sodium 135 mmol/L      Potassium 4.1 mmol/L      Chloride 103 mmol/L      CO2 26.4 mmol/L      Calcium 9.0 mg/dL      eGFR Non African Amer 102 mL/min/1.73      BUN/Creatinine Ratio 19.7     Anion Gap 5.6 mmol/L     Narrative:       GFR Normal >60  Chronic Kidney Disease <60  Kidney Failure <15    POC Glucose Fingerstick [[id number]]  (Abnormal) Collected:  04/12/17 1403    Specimen:  Blood Updated:  04/12/17 1406     Glucose 63 (L) mg/dL     Narrative:       Meter: [id number] : [id number] [employer]    POC Glucose Fingerstick [[id number]]  (Normal) Collected:  04/12/17 1546    Specimen:  Blood Updated:  04/12/17 1549     Glucose 98 mg/dL     Narrative:       Meter: [id number] : [id number] [employer]        Imaging Results (last 24 hours)     Procedure Component Value Units Date/Time    XR Chest PA & Lateral [[id number]] Collected:  04/12/17 1431     Updated:  04/12/17 1434    Narrative:       XR CHEST PA AND LATERAL-     REASON FOR EXAM:  chest pain     FINDINGS: The cardiac silhouette and mediastinum are normal in size and  configuration. The lungs are both well-aerated and clear. There are no  pleural effusions in the lung bases. No consolidated areas of pneumonia  are identified. The bones and soft tissues are unremarkable.       Impression:       No source for the patient's chest pain was demonstrated  radiographically.     This report was finalized on 4/12/2017 2:32 PM by Dr. David Farias II, MD.       CT Chest Pulmonary Embolism With Contrast [[id number]] Collected:  04/12/17 1508     Updated:  04/12/17 1521    Narrative:       CT CHEST PULMONARY EMBOLISM W CONTRAST-     REASON FOR EXAM: Elevated D-dimer; SOA; chest pain.     TECHNIQUE: Contrast was injected in a bolus fashion  and timed for  maximum opacification of the pulmonary arteries.  Spiral scans were  obtained from the aortic arch and extended into the lung bases.  Images  reconstructed sagittally and coronally through the pulmonary arteries.   3-D maximum intensity projection images were also acquired.     The pulmonary arteries were densely opacified during the bolus injection  of contrast. The opacification of the pulmonary arteries was  homogeneous. There were no filling defects seen in the pulmonary  arteries to suggest embolus. The aorta is normal in caliber. The lungs  were both well-aerated and clear.       Impression:       No CT angiographic findings of pulmonary embolus.         The radiation dose reduction device was utilized for each scan per the  ALARA (as low as reasonably achievable) protocol.     This report was finalized on 4/12/2017 3:18 PM by Dr. David Farias II, MD.             Assessment/Plan     Active Problems:    Chest pain    Type II Diabetes requiring insulin    Hypertension    Multiple sclerosis        Had negative stress test in October. Check D Dimer.  It's elavated. Stat CTA of pulmonary arteries.  R/o AMI.  Lovenox for DVT prophylaxis.      SS insulin. Resume home dose    Diabetic diet    Resume home blood pressure medications    IVF    CXR      Samuel Duane Kreis, MD  04/12/17  6:22 PM

## 2017-04-13 LAB
ANION GAP SERPL CALCULATED.3IONS-SCNC: 3.1 MMOL/L (ref 3.6–11.2)
BUN BLD-MCNC: 9 MG/DL (ref 7–21)
BUN/CREAT SERPL: 13.8 (ref 7–25)
CALCIUM SPEC-SCNC: 9.5 MG/DL (ref 7.7–10)
CHLORIDE SERPL-SCNC: 108 MMOL/L (ref 99–112)
CO2 SERPL-SCNC: 27.9 MMOL/L (ref 24.3–31.9)
CREAT BLD-MCNC: 0.65 MG/DL (ref 0.43–1.29)
GFR SERPL CREATININE-BSD FRML MDRD: 95 ML/MIN/1.73
GLUCOSE BLD-MCNC: 78 MG/DL (ref 70–110)
GLUCOSE BLDC GLUCOMTR-MCNC: 113 MG/DL (ref 70–130)
GLUCOSE BLDC GLUCOMTR-MCNC: 144 MG/DL (ref 70–130)
GLUCOSE BLDC GLUCOMTR-MCNC: 90 MG/DL (ref 70–130)
GLUCOSE BLDC GLUCOMTR-MCNC: 95 MG/DL (ref 70–130)
OSMOLALITY SERPL CALC.SUM OF ELEC: 275.1 MOSM/KG (ref 273–305)
POTASSIUM BLD-SCNC: 3.8 MMOL/L (ref 3.5–5.3)
SODIUM BLD-SCNC: 139 MMOL/L (ref 135–153)
TROPONIN I SERPL-MCNC: <0.006 NG/ML

## 2017-04-13 PROCEDURE — 96372 THER/PROPH/DIAG INJ SC/IM: CPT

## 2017-04-13 PROCEDURE — G0378 HOSPITAL OBSERVATION PER HR: HCPCS

## 2017-04-13 PROCEDURE — 82962 GLUCOSE BLOOD TEST: CPT

## 2017-04-13 PROCEDURE — 84484 ASSAY OF TROPONIN QUANT: CPT | Performed by: FAMILY MEDICINE

## 2017-04-13 PROCEDURE — 25010000002 ENOXAPARIN PER 10 MG: Performed by: FAMILY MEDICINE

## 2017-04-13 PROCEDURE — 99214 OFFICE O/P EST MOD 30 MIN: CPT | Performed by: INTERNAL MEDICINE

## 2017-04-13 PROCEDURE — 80048 BASIC METABOLIC PNL TOTAL CA: CPT | Performed by: FAMILY MEDICINE

## 2017-04-13 RX ADMIN — CYCLOBENZAPRINE HYDROCHLORIDE 10 MG: 10 TABLET, FILM COATED ORAL at 08:38

## 2017-04-13 RX ADMIN — GABAPENTIN 600 MG: 300 CAPSULE ORAL at 06:09

## 2017-04-13 RX ADMIN — SODIUM CHLORIDE 100 ML/HR: 9 INJECTION, SOLUTION INTRAVENOUS at 16:54

## 2017-04-13 RX ADMIN — SODIUM CHLORIDE 100 ML/HR: 9 INJECTION, SOLUTION INTRAVENOUS at 06:09

## 2017-04-13 RX ADMIN — GABAPENTIN 600 MG: 300 CAPSULE ORAL at 23:50

## 2017-04-13 RX ADMIN — CETIRIZINE HYDROCHLORIDE 10 MG: 10 TABLET ORAL at 08:38

## 2017-04-13 RX ADMIN — ASPIRIN 81 MG: 81 TABLET ORAL at 08:39

## 2017-04-13 RX ADMIN — TRAMADOL HYDROCHLORIDE 50 MG: 50 TABLET, FILM COATED ORAL at 20:33

## 2017-04-13 RX ADMIN — OXYCODONE HYDROCHLORIDE AND ACETAMINOPHEN 1000 MG: 500 TABLET ORAL at 08:38

## 2017-04-13 RX ADMIN — GABAPENTIN 600 MG: 300 CAPSULE ORAL at 12:27

## 2017-04-13 RX ADMIN — TRAMADOL HYDROCHLORIDE 50 MG: 50 TABLET, FILM COATED ORAL at 08:38

## 2017-04-13 RX ADMIN — PRIMIDONE 50 MG: 50 TABLET ORAL at 08:38

## 2017-04-13 RX ADMIN — DULOXETINE HYDROCHLORIDE 60 MG: 60 CAPSULE, DELAYED RELEASE ORAL at 08:39

## 2017-04-13 RX ADMIN — FERROUS SULFATE TAB 325 MG (65 MG ELEMENTAL FE) 325 MG: 325 (65 FE) TAB at 08:38

## 2017-04-13 RX ADMIN — Medication 1 TABLET: at 08:38

## 2017-04-13 RX ADMIN — DIAZEPAM 2.5 MG: 5 TABLET ORAL at 20:33

## 2017-04-13 RX ADMIN — TOPIRAMATE 50 MG: 25 TABLET, FILM COATED ORAL at 20:33

## 2017-04-13 RX ADMIN — POLYETHYLENE GLYCOL (3350) 17 G: 17 POWDER, FOR SOLUTION ORAL at 08:42

## 2017-04-13 RX ADMIN — METOPROLOL SUCCINATE 50 MG: 50 TABLET, FILM COATED, EXTENDED RELEASE ORAL at 08:38

## 2017-04-13 RX ADMIN — Medication: at 08:00

## 2017-04-13 RX ADMIN — CHOLECALCIFEROL TAB 10 MCG (400 UNIT) 1000 UNITS: 10 TAB at 08:39

## 2017-04-13 RX ADMIN — PANTOPRAZOLE SODIUM 40 MG: 40 TABLET, DELAYED RELEASE ORAL at 06:08

## 2017-04-13 RX ADMIN — FOLIC ACID 1 MG: 1 TABLET ORAL at 08:38

## 2017-04-13 RX ADMIN — DIAZEPAM 2.5 MG: 5 TABLET ORAL at 08:37

## 2017-04-13 RX ADMIN — ENOXAPARIN SODIUM 40 MG: 40 INJECTION SUBCUTANEOUS at 08:42

## 2017-04-13 RX ADMIN — DULOXETINE HYDROCHLORIDE 60 MG: 60 CAPSULE, DELAYED RELEASE ORAL at 20:33

## 2017-04-13 RX ADMIN — CELECOXIB 200 MG: 200 CAPSULE ORAL at 08:38

## 2017-04-13 RX ADMIN — AMITRIPTYLINE HYDROCHLORIDE 25 MG: 25 TABLET, FILM COATED ORAL at 08:39

## 2017-04-13 RX ADMIN — LEVOTHYROXINE SODIUM 150 MCG: 150 TABLET ORAL at 06:08

## 2017-04-13 RX ADMIN — GABAPENTIN 600 MG: 300 CAPSULE ORAL at 16:53

## 2017-04-13 RX ADMIN — CALCIUM CARBONATE-VITAMIN D TAB 500 MG-200 UNIT 500 MG: 500-200 TAB at 08:39

## 2017-04-13 RX ADMIN — VITAMIN E CAP 400 UNIT 800 UNITS: 400 CAP at 08:38

## 2017-04-13 NOTE — PROGRESS NOTES
Discharge Planning Assessment   [facility]     Patient Name: Lashae Benitez  MRN: [id number]  Today's Date: 4/13/2017    Admit Date: 4/12/2017          Discharge Needs Assessment       04/13/17 1350    Living Environment    Lives With spouse   She lives at home with her  Khanh.     Living Arrangements house    Quality Of Family Relationships supportive;involved;helpful    Able to Return to Prior Living Arrangements yes    Discharge Needs Assessment    Concerns To Be Addressed no discharge needs identified;denies needs/concerns at this time    Readmission Within The Last 30 Days no previous admission in last 30 days    Anticipated Changes Related to Illness none    Equipment Currently Used at Home --   She has a W/C, BSC, rolling walker, straight cane, quad cane, and shower chair but she does not currently use any of it. She also has a sacral nerve stimulator implanted.     Equipment Needed After Discharge none    Discharge Disposition home or self-care            Discharge Plan       04/13/17 2460    Case Management/Social Work Plan    Plan She lives at home with her  Khanh and plans to return home at d/c.  She has DME but does not currently use any of it.  She does not utilize HH and doesn't think she needs any.  CM will follow.     Patient/Family In Agreement With Plan yes    Additional Comments Observation with dx of chest pain.  Cardiology consulted, continue monitoring and workup.         Discharge Placement     No information found                Demographic Summary       04/13/17 1347    Referral Information    Admission Type observation    Arrived From home or self-care    Referral Source admission list    Reason For Consult discharge planning    Record Reviewed medical record    Referral Information Comments Discussion with pt. and her  Khanh.    Contact Information    Permission Granted to Share Information With ;family/designee    Primary Care Physician Information    Name   Norais            Functional Status       04/13/17 1348    Functional Status Current    Current Functional Level Comment She is up in the chair at the time of my visit.     Change in Functional Status Since Onset of Current Illness/Injury no    Functional Status Prior    Prior Functional Level Comment She is usually independent with ADL's at home.     Activity Tolerance    Current Activity Limitations none    Usual Activity Tolerance good    Current Activity Tolerance good    Cognitive/Perceptual/Developmental    Current Mental Status/Cognitive Functioning no deficits noted    Recent Changes in Mental Status/Cognitive Functioning no changes    Employment/Financial    Employment/Finance Comments She has Medicare and [employer] and denies any problems with insurance.  Gets Rx at [facility] in [place].          Legal       04/13/17 1350    Legal    Legal Comments She does not have POA/AD and requests information and pamphlet given.              Monica Morrow RN

## 2017-04-13 NOTE — CONSULTS
Referring Provider: Dr Araujo    Reason for Consultation: Chest pain    Patient Care Team:  Samuel Duane Kreis, MD as PCP - General  Samuel Duane Kreis, MD as PCP - Family Medicine  BRIANDA Dyer III as PCP - Claims Attributed - PLEASE DO NOT REMOVE    Chief complaint: chest pain    Subjective .     History of present illness:     The patient is a 56-year-old white female with a history of Sjogren's disease, multiple sclerosis, hypertension, rheumatoid arthritis, fibromyalgia, hypothyroidism and diabetes mellitus type 2 who comes to the hospital for an episode of chest pain yesterday.  According to the patient she was in her usual state of health until yesterday when she started having chest pain that she describes as lower sternal, oppressive, lasting approximately 30 minutes and resolving spontaneously.  She states this was associated with shortness of breath.  She states her pain has been occurring on and off for the last 2 days since that time with similar characteristics.  She states the intensity is 8/10 on the pain scale.  She denies any ameliorating or exacerbating factors.  She also reports frequent migraines over the last few weeks.  She is complaining of 1 at this point which is being addressed by the primary team.    Review of Systems    Review of Systems   Constitution: Positive for malaise/fatigue. Negative for chills, diaphoresis and fever.   HENT: Negative for congestion, ear pain, hearing loss and nosebleeds.    Cardiovascular: Positive for chest pain. Negative for leg swelling, orthopnea, palpitations and paroxysmal nocturnal dyspnea.   Respiratory: Positive for shortness of breath. Negative for cough, hemoptysis, sputum production and wheezing.    Endocrine: Negative for cold intolerance and heat intolerance.   Hematologic/Lymphatic: Does not bruise/bleed easily.   Skin: Negative for rash.   Musculoskeletal: Positive for back pain, joint pain and stiffness. Negative for arthritis and  myalgias.   Gastrointestinal: Negative for abdominal pain, constipation, diarrhea, hematemesis, hematochezia, melena, nausea and vomiting.   Genitourinary: Negative for dysuria and hematuria.   Neurological: Negative for dizziness, focal weakness and numbness.   Psychiatric/Behavioral: Negative for depression. The patient is not nervous/anxious.        History    Past Medical History:   Diagnosis Date   • Cancer    • Degenerative disc disease, lumbar    • Dupuytren's disease    • Essential hypertension    • Family history of coronary artery disease    • Fibromyalgia    • H. pylori infection    • Hiatal hernia    • Hypothyroidism    • Multiple sclerosis    • Osteoarthritis    • Psoriasis    • Psoriatic arthritis    • RA (rheumatoid arthritis)    • Sjogren's syndrome    • TMJ arthritis    • Type 2 diabetes mellitus         Past Surgical History:   Procedure Laterality Date   • BREAST LUMPECTOMY Left 11/1993   • CARDIAC CATHETERIZATION Left 09/21/2009    Normal    • CARPAL TUNNEL RELEASE  03/2012   • CERVICAL POLYPECTOMY  12/2015   • CHOLECYSTECTOMY  05/2007   • GASTRIC BYPASS  09/2007   • KNEE ARTHROPLASTY     • LUMBAR DISC SURGERY      C5-6   • REPLACEMENT TOTAL KNEE Right 06/2016   • SACRAL NERVE STIMULATOR PLACEMENT  09/2014   • THYROIDECTOMY  03/2016       Family History   Problem Relation Age of Onset   • Diabetes Mother    • Stroke Mother    • Hypertension Mother    • Heart attack Father      First one was in his 20's second 30's.    • Heart failure Father         Social History   Substance Use Topics   • Smoking status: Never Smoker   • Smokeless tobacco: Never Used   • Alcohol use No       Prescriptions Prior to Admission   Medication Sig Dispense Refill Last Dose   • amitriptyline (ELAVIL) 25 MG tablet Take 25 mg by mouth daily.   4/11/2017 at Unknown time   • aspirin 81 MG EC tablet Take 81 mg by mouth daily.   4/12/2017 at 730   • butalbital-acetaminophen-caffeine (FIORICET, ESGIC) -40 MG per tablet  Take 1 tablet by mouth as needed for headaches.   Past Month at Unknown time   • calcium citrate-vitamin d (CALCITRATE) 315-250 MG-UNIT tablet tablet Take 1 tablet by mouth daily.   4/11/2017 at Unknown time   • celecoxib (CeleBREX) 200 MG capsule Take 200 mg by mouth daily.   4/12/2017 at 0730   • Certolizumab Pegol (CIMZIA) 2 X 200 MG kit Inject 400 mg under the skin Every 30 (Thirty) Days.   3/28/2017   • cholecalciferol (VITAMIN D3) 1000 UNITS tablet Take 1,000 Units by mouth daily.   4/11/2017 at Unknown time   • cyanocobalamin 1000 MCG/ML injection Inject 1,000 mcg into the shoulder, thigh, or buttocks Every 28 (Twenty-Eight) Days.   4/5/2017   • cyclobenzaprine (FLEXERIL) 10 MG tablet Take 10 mg by mouth daily.   4/11/2017 at Unknown time   • dexlansoprazole (DEXILANT) 60 MG capsule Take 60 mg by mouth daily.   4/12/2017 at 0730   • diazePAM (VALIUM) 2 MG tablet Take 2 mg by mouth 2 (Two) Times a Day.   4/12/2017 at 0730   • DULoxetine (CYMBALTA) 60 MG capsule Take 60 mg by mouth 2 (two) times a day.   4/12/2017 at 0730   • ferrous sulfate 325 (65 FE) MG tablet Take 325 mg by mouth daily.   4/11/2017 at Unknown time   • folic acid (FOLVITE) 1 MG tablet Take 1 mg by mouth daily.   4/12/2017 at 0730   • gabapentin (NEURONTIN) 600 MG tablet Take 600 mg by mouth 4 (Four) Times a Day.   4/12/2017 at 0730   • insulin detemir (LEVEMIR) 100 UNIT/ML injection Inject 25 Units under the skin Daily.   4/12/2017 at 0730   • levothyroxine (SYNTHROID, LEVOTHROID) 150 MCG tablet Take 150 mcg by mouth daily.   4/12/2017 at 0730   • linaclotide (LINZESS) 290 MCG capsule capsule Take 290 mcg by mouth every morning before breakfast.   4/12/2017 at 0730   • loratadine (CLARITIN) 10 MG tablet Take 10 mg by mouth Every Night.   4/11/2017 at Unknown time   • metoprolol succinate XL (TOPROL-XL) 50 MG 24 hr tablet Take 1 tablet by mouth Daily. 90 tablet 1 4/12/2017 at 0730   • Multiple Vitamin (MULTI VITAMIN DAILY PO) Take 1 tablet by  mouth Every Morning.   4/11/2017 at Unknown time   • polyethylene glycol (MIRALAX) packet Take 17 g by mouth daily.   4/11/2017 at Unknown time   • primidone (MYSOLINE) 50 MG tablet Take 50 mg by mouth daily.   4/11/2017 at Unknown time   • ranitidine (ZANTAC) 150 MG tablet Take 150 mg by mouth 2 (two) times a day.   4/11/2017 at Unknown time   • topiramate (TOPAMAX) 25 MG tablet Take 50 mg by mouth Every Night.   4/11/2017 at Unknown time   • traMADol (ULTRAM) 50 MG tablet Take 50 mg by mouth 2 (two) times a day.   4/12/2017 at 0730   • vitamin C (ASCORBIC ACID) 500 MG tablet Take 1,000 mg by mouth Daily.   4/11/2017 at Unknown time   • vitamin E 1000 UNIT capsule Take 1,000 Units by mouth daily.   4/11/2017 at Unknown time   • methotrexate 2.5 MG tablet Take 25 mg by mouth 1 (One) Time Per Week. Takes 10 tablets 1 day per week on Saturday 4/8/2017         Scheduled Meds:        amitriptyline 25 mg Oral Daily   aspirin 81 mg Oral Daily   calcium-vitamin D 500 mg Oral Daily   celecoxib 200 mg Oral Daily   cetirizine 10 mg Oral Daily   cholecalciferol 1,000 Units Oral Daily   cyclobenzaprine 10 mg Oral Daily   diazePAM 2.5 mg Oral Q12H   DULoxetine 60 mg Oral Q12H   enoxaparin 40 mg Subcutaneous Daily   ferrous sulfate 325 mg Oral Daily With Breakfast   folic acid 1 mg Oral Daily   gabapentin 600 mg Oral Q6H   insulin aspart 0-9 Units Subcutaneous 4x Daily AC & at Bedtime   insulin detemir 25 Units Subcutaneous Daily   levothyroxine 150 mcg Oral Q AM   linaclotide 290 mcg Oral QAM AC   [START ON 4/15/2017] methotrexate 25 mg Oral Weekly   metoprolol succinate XL 50 mg Oral Daily   multivitamin 1 tablet Oral Daily   pantoprazole 40 mg Oral Q AM   Pharmacy Meds to Bed Consult  Does not apply Daily   polyethylene glycol 17 g Oral Daily   primidone 50 mg Oral Daily   topiramate 50 mg Oral Nightly   traMADol 50 mg Oral Q12H   vitamin C 1,000 mg Oral Daily   vitamin E 800 Units Oral Daily   , Continuous Infusions:   "  sodium chloride 100 mL/hr Last Rate: 100 mL/hr (04/13/17 1531)   , PRN Meds:      •  acetaminophen  •  aluminum-magnesium hydroxide-simethicone  •  butalbital-acetaminophen-caffeine  •  dextrose  •  dextrose  •  glucagon (human recombinant)  •  nitroglycerin  •  ondansetron **OR** ondansetron ODT **OR** ondansetron  •  sodium chloride  •  tablet cutter and Allergies:  Beta adrenergic blockers; Penicillins; Sulfa antibiotics; Codeine; Imuran [azathioprine]; and Januvia [sitagliptin]    Objective     Vital Sign Min/Max for last 24 hours  Temp  Min: 97.6 °F (36.4 °C)  Max: 98.9 °F (37.2 °C)   BP  Min: 103/64  Max: 125/77   Pulse  Min: 67  Max: 84   Resp  Min: 18  Max: 20   SpO2  Min: 97 %  Max: 99 %   No Data Recorded   Weight  Min: 180 lb (81.6 kg)  Max: 180 lb (81.6 kg)     Flowsheet Rows         First Filed Value    Admission Height  65\" (165.1 cm) Documented at 04/12/2017 1150    Admission Weight  180 lb 14.4 oz (82.1 kg) Documented at 04/12/2017 1150             Ejection Fraction  Lab Results   Component Value Date    EF 93 10/27/2016       Echo EF Estimated  Lab Results   Component Value Date    ECHOEFEST 65 08/29/2016       Nuclear Stress Ejection Fraction  No components found for: NUCEF    Cath Ejection Fraction Quantitative  No results found for: CATHEF    Physical Exam   Constitutional: She is oriented to person, place, and time. She appears well-developed and well-nourished.   White female laying comfortably on bed.  She is currently wearing sunglasses.   HENT:   Mouth/Throat: Oropharynx is clear and moist.   Eyes: EOM are normal. Pupils are equal, round, and reactive to light.   Neck: Neck supple. No JVD present. No tracheal deviation present. No thyromegaly present.   Cardiovascular: Normal rate, regular rhythm, S1 normal and S2 normal.  Exam reveals no gallop and no friction rub.    No murmur heard.  Pulmonary/Chest: Effort normal and breath sounds normal. No respiratory distress. She has no wheezes. " She has no rales.   Abdominal: Soft. Bowel sounds are normal. She exhibits no mass. There is tenderness (In the epigastrium).   Musculoskeletal: Normal range of motion. She exhibits no edema.   Lymphadenopathy:     She has no cervical adenopathy.   Neurological: She is alert and oriented to person, place, and time.   Skin: Skin is warm and dry. No rash noted.   Psychiatric: She has a normal mood and affect.       Results Review:   I reviewed the patient's new clinical results.  I reviewed the patient's new imaging results and agree with the interpretation.  I reviewed the patient's other test results and agree with the interpretation  I personally viewed and interpreted the patient's EKG/Telemetry data      Assessment/Plan     Active Problems:    Chest pain    1.  Chest pain: Patient with chest pain of unclear if the allergy.  She did not have any fevers or chills.  She has no white count elevation.  She has no obvious evidence of infection or dysuria.  All of this could definitely represent a viral illness I'm concerned that it may be an atypical presentation for coronary artery disease.  Especially since she has been evaluated in the past for recurrent chest pains.  Another option and said this is GI related due to heartburn or esophageal spasm.  At this point she has rule out for myocardial infarction.    2.  Hypertension: Patient with a history of hypertension that appears to be well-controlled on current regimen.  At this point will continue.    3.  Diabetes mellitus type 2: Patient with history of diabetes mellitus type 2 currently being managed by primary team.    Plan:    1.  Chest pain: Since it has now been 6 months since the previous stress test we will proceed with repeat stress this to evaluate for possible coronary obstruction.  The patient was started on PPI by the primary team.  We'll monitor for improvement.  If stress test is within normal limits she can continue workup as an outpatient.    I  discussed the patients findings and my recommendations with patient and family    Larry Cobso MD  04/13/17  3:42 PM

## 2017-04-13 NOTE — PROGRESS NOTES
"     LOS: 1 day     Chief Complaint: Chest Pain    Subjective     Interval History:   Patient states that she has continued to have chest pain at this point.  Patient states that this does not worsen with exertion however does worsen with positional movements.  Patient states that this pain radiates through and into her back.  She denies any nausea or vomiting.  She states that she has developed migraine headache overnight.  She is having no consistent shortness of breath or diaphoresis.  CTA of the chest was negative.  Cardiac enzymes are negative.      Objective     Vital Signs  /64 (BP Location: Right arm, Patient Position: Lying)  Pulse 84  Temp 98.1 °F (36.7 °C) (Oral)   Resp 18  Ht 65\" (165.1 cm)  Wt 180 lb (81.6 kg)  SpO2 99%  BMI 29.95 kg/m2  Intake & Output (last day)       04/12 0701 - 04/13 0700 04/13 0701 - 04/14 0700    P.O. 840     I.V. (mL/kg) 990 (12.1)     Total Intake(mL/kg) 1830 (22.4)     Urine (mL/kg/hr) 300     Total Output 300      Net +1530                    Physical Exam:     General Appearance:    Alert, cooperative, in no acute distress   Head:    Normocephalic, without obvious abnormality, atraumatic   Eyes:            Lids and lashes normal, conjunctivae and sclerae normal, no   icterus, no pallor, corneas clear, PERRLA   Ears:    Ears appear intact with no abnormalities noted   Throat:   No oral lesions, no thrush, oral mucosa moist   Neck:   No adenopathy, supple, trachea midline, no thyromegaly, no   carotid bruit, no JVD   Lungs:     Clear to auscultation,respirations regular, even and                  unlabored    Heart:    Regular rhythm and normal rate, normal S1 and S2, no            murmur, no gallop, no rub, no click   Chest Wall:    No abnormalities observed   Abdomen:     Normal bowel sounds, no masses, no organomegaly, soft        non-tender, non-distended, no guarding, no rebound                tenderness   Extremities:   Moves all extremities well, no edema, " no cyanosis, no             redness   Pulses:   Pulses palpable and equal bilaterally   Skin:   No bleeding, bruising or rash   Lymph nodes:   No palpable adenopathy   Neurologic:   Cranial nerves 2 - 12 grossly intact, sensation intact, DTR       present and equal bilaterally        Results Review:    Lab Results   Component Value Date    WBC 5.55 04/12/2017    HGB 12.0 04/12/2017    HCT 35.3 (L) 04/12/2017    MCV 94.4 (H) 04/12/2017     04/12/2017       Lab Results   Component Value Date    GLUCOSE 78 04/13/2017    BUN 9 04/13/2017    CREATININE 0.65 04/13/2017    EGFRIFNONA 95 04/13/2017    EGFRIFAFRI  09/22/2016      Comment:      <15 Indicative of kidney failure.    BCR 13.8 04/13/2017     04/13/2017    K 3.8 04/13/2017     04/13/2017    CO2 27.9 04/13/2017    CALCIUM 9.5 04/13/2017    ALBUMIN 3.60 04/12/2017    ALBUMIN 3.60 04/12/2017    LABIL2 1.5 04/12/2017    AST 20 04/12/2017    AST 20 04/12/2017    ALT 23 04/12/2017    ALT 23 04/12/2017     Lab Results   Component Value Date    INR 0.93 07/20/2015       Glucose   Date Value Ref Range Status   04/12/2017 92 70 - 130 mg/dL Final   04/12/2017 98 70 - 130 mg/dL Final   04/12/2017 63 (L) 70 - 130 mg/dL Final          Medication Review:     Current Facility-Administered Medications:   •  acetaminophen (TYLENOL) tablet 650 mg, 650 mg, Oral, Q4H PRN, Samuel Duane Kreis, MD  •  aluminum-magnesium hydroxide-simethicone (MAALOX MAX) 400-400-40 MG/5ML suspension 15 mL, 15 mL, Oral, Q6H PRN, Samuel Duane Kreis, MD  •  amitriptyline (ELAVIL) tablet 25 mg, 25 mg, Oral, Daily, Rylie Rodríguez [facility], 25 mg at 04/12/17 1704  •  aspirin EC tablet 81 mg, 81 mg, Oral, Daily, Rylie Rodríguez [facility]  •  butalbital-acetaminophen-caffeine (FIORICET, ESGIC) -40 MG per tablet 1 tablet, 1 tablet, Oral, Daily PRN, Rylie Rodríguez [facility]  •  calcium-vitamin D 500-200 MG-UNIT tablet 500 mg, 500 mg, Oral, Daily, Rylie Rodríguez [facility], 500 mg at 04/12/17  1704  •  celecoxib (CeleBREX) capsule 200 mg, 200 mg, Oral, Daily, Rylie L Anne, [facility]  •  cetirizine (zyrTEC) tablet 10 mg, 10 mg, Oral, Daily, Rylie L Anne, [facility], 10 mg at 04/12/17 1704  •  cholecalciferol (VITAMIN D3) tablet 1,000 Units, 1,000 Units, Oral, Daily, Rylie [facility], [facility], 1,000 Units at 04/12/17 1704  •  cyclobenzaprine (FLEXERIL) tablet 10 mg, 10 mg, Oral, Daily, Rylie L Anne, [facility], 10 mg at 04/12/17 1704  •  dextrose (D50W) solution 25 g, 25 g, Intravenous, Q15 Min PRN, Samuel Duane Kreis, MD  •  dextrose (GLUTOSE) oral gel 15 g, 15 g, Oral, Q15 Min PRN, Samuel Duane Kreis, MD  •  diazePAM (VALIUM) tablet 2.5 mg, 2.5 mg, Oral, Q12H, Samuel Duane Kreis, MD, 2.5 mg at 04/12/17 2127  •  DULoxetine (CYMBALTA) DR capsule 60 mg, 60 mg, Oral, Q12H, Rylie L Anne, [facility], 60 mg at 04/12/17 2038  •  enoxaparin (LOVENOX) syringe 40 mg, 40 mg, Subcutaneous, Daily, Samuel Duane Kreis, MD, 40 mg at 04/12/17 1703  •  ferrous sulfate tablet 325 mg, 325 mg, Oral, Daily With Breakfast, Rylie L Anne, [facility], 325 mg at 04/12/17 1704  •  folic acid (FOLVITE) tablet 1 mg, 1 mg, Oral, Daily, Rylie L Anne, [facility]  •  gabapentin (NEURONTIN) capsule 600 mg, 600 mg, Oral, Q6H, Rylie L Anne, [facility], 600 mg at 04/13/17 0609  •  glucagon (human recombinant) (GLUCAGEN DIAGNOSTIC) injection 1 mg, 1 mg, Subcutaneous, Q15 Min PRN, Samuel Duane Kreis, MD  •  insulin aspart (novoLOG) injection 0-9 Units, 0-9 Units, Subcutaneous, 4x Daily AC & at Bedtime, Samuel Duane Kreis, MD  •  insulin detemir (LEVEMIR) injection 25 Units, 25 Units, Subcutaneous, Daily, Rylie Rodríguez RPH  •  levothyroxine (SYNTHROID, LEVOTHROID) tablet 150 mcg, 150 mcg, Oral, Q AM, Rylie Rodríguez [facility], 150 mcg at 04/13/17 0608  •  linaclotide (LINZESS) capsule 290 mcg, 290 mcg, Oral, QAM AC, Rylie Rodríguez [facility]  •  [START ON 4/15/2017] methotrexate tablet 25 mg, 25 mg, Oral, Weekly, Rylie Rodríguez RPH  •  metoprolol succinate XL  (TOPROL-XL) 24 hr tablet 50 mg, 50 mg, Oral, Daily, Rylie Rodríguez, [facility]  •  multivitamin (DAILY MICHAEL) tablet 1 tablet, 1 tablet, Oral, Daily, Rylie Rodríguez [facility], 1 tablet at 04/12/17 1704  •  nitroglycerin (NITROSTAT) SL tablet 0.4 mg, 0.4 mg, Sublingual, Q5 Min PRN, Samuel Duane Kreis, MD  •  ondansetron (ZOFRAN) tablet 4 mg, 4 mg, Oral, Q6H PRN **OR** ondansetron ODT (ZOFRAN-ODT) disintegrating tablet 4 mg, 4 mg, Oral, Q6H PRN **OR** ondansetron (ZOFRAN) injection 4 mg, 4 mg, Intravenous, Q6H PRN, Samuel Duane Kreis, MD  •  pantoprazole (PROTONIX) EC tablet 40 mg, 40 mg, Oral, Q AM, Rylie Rodríguez, [facility], 40 mg at 04/13/17 0608  •  Pharmacy Meds to Bed Consult, , Does not apply, Daily, Rylie Rodríguez, [facility]  •  polyethylene glycol (MIRALAX) powder 17 g, 17 g, Oral, Daily, Rylie Rodríguez, [facility], 17 g at 04/12/17 1703  •  primidone (MYSOLINE) tablet 50 mg, 50 mg, Oral, Daily, Rylie Rodríguez, RPH, 50 mg at 04/12/17 1704  •  sodium chloride 0.9 % flush 1-10 mL, 1-10 mL, Intravenous, PRN, Samuel Duane Kreis, MD  •  sodium chloride 0.9 % infusion, 100 mL/hr, Intravenous, Continuous, Samuel Duane Kreis, MD, Last Rate: 100 mL/hr at 04/13/17 0609, 100 mL/hr at 04/13/17 0609  •  tablet cutter misc, , Does not apply, PRN, Samuel Duane Kreis, MD  •  topiramate (TOPAMAX) tablet 50 mg, 50 mg, Oral, Nightly, Rylie Rodríguez RP, 50 mg at 04/12/17 2038  •  traMADol (ULTRAM) tablet 50 mg, 50 mg, Oral, Q12H, Rylie [place], [facility], 50 mg at 04/12/17 2038  •  vitamin C (ASCORBIC ACID) tablet 1,000 mg, 1,000 mg, Oral, Daily, Rylie [place], [facility], 1,000 mg at 04/12/17 1703  •  vitamin E capsule 800 Units, 800 Units, Oral, Daily, Klarissa Landa, [facility], 800 Units at 04/12/17 1745      Assessment/Plan   Chest Pain with Atypical Features of Angina  Migraine Headache  DMII  HTN  Multiple Sclerosis  Rheumatoid Arthritis    Continue with IVF @ 100 ml/hr    Consult Cardiology    Levemir QHS + S/S insulin    Continue with  tramadol q 12 hours prn pain    CBC, BMP in the AM    BRIANDA Ko  04/13/17  7:23 AM     This patient is seen this morning by me.  She had an elevated d-dimer yesterday.  CTA of the pulmonary arteries revealed no pulmonary embolism.  She continues to have some discomfort in her chest as well as having a headache and body aches and just feeling bad all over.  Cardiac enzymes are negative.  She did have a negative stress test in October.  Her lungs are clear.  Cardiac exam reveals regular rate and regular rhythm with no murmurs, rubs or gallops.  Abdominal exam is benign.  Extremities no cyanosis, clubbing or edema.  Blood glucose less than 100.  At this point her examination and history are really nonspecific.  Because she has had some atypical chest discomfort going to ask cardiology to evaluate her.  However, I don't think the discomfort is cardiac in etiology.  I really think she has a viral illness.  If felt to be okay for discharge by cardiology will plan discharge to home today with outpatient follow-up    Samuel Duane Kreis, MD  04/13/17  8:11 AM

## 2017-04-14 ENCOUNTER — APPOINTMENT (OUTPATIENT)
Dept: NUCLEAR MEDICINE | Facility: HOSPITAL | Age: 57
End: 2017-04-14
Attending: INTERNAL MEDICINE

## 2017-04-14 ENCOUNTER — APPOINTMENT (OUTPATIENT)
Dept: CARDIOLOGY | Facility: HOSPITAL | Age: 57
End: 2017-04-14
Attending: INTERNAL MEDICINE

## 2017-04-14 VITALS
HEIGHT: 65 IN | HEART RATE: 76 BPM | TEMPERATURE: 98.1 F | RESPIRATION RATE: 18 BRPM | WEIGHT: 181.6 LBS | DIASTOLIC BLOOD PRESSURE: 80 MMHG | BODY MASS INDEX: 30.26 KG/M2 | OXYGEN SATURATION: 99 % | SYSTOLIC BLOOD PRESSURE: 148 MMHG

## 2017-04-14 LAB
ANION GAP SERPL CALCULATED.3IONS-SCNC: 4.4 MMOL/L (ref 3.6–11.2)
BH CV NUCLEAR PRIOR STUDY: 3
BH CV STRESS BP STAGE 1: NORMAL
BH CV STRESS BP STAGE 2: NORMAL
BH CV STRESS COMMENTS STAGE 1: NORMAL
BH CV STRESS COMMENTS STAGE 2: NORMAL
BH CV STRESS DOSE REGADENOSON STAGE 1: 0.4
BH CV STRESS DURATION MIN STAGE 1: 0
BH CV STRESS DURATION MIN STAGE 2: 4
BH CV STRESS DURATION SEC STAGE 1: 15
BH CV STRESS DURATION SEC STAGE 2: 0
BH CV STRESS HR STAGE 1: 119
BH CV STRESS HR STAGE 2: 86
BH CV STRESS PROTOCOL 1: NORMAL
BH CV STRESS RECOVERY BP: NORMAL MMHG
BH CV STRESS RECOVERY HR: 82 BPM
BH CV STRESS STAGE 1: 1
BH CV STRESS STAGE 2: 2
BUN BLD-MCNC: 9 MG/DL (ref 7–21)
BUN/CREAT SERPL: 18.4 (ref 7–25)
CALCIUM SPEC-SCNC: 8.8 MG/DL (ref 7.7–10)
CHLORIDE SERPL-SCNC: 108 MMOL/L (ref 99–112)
CO2 SERPL-SCNC: 26.6 MMOL/L (ref 24.3–31.9)
CREAT BLD-MCNC: 0.49 MG/DL (ref 0.43–1.29)
GFR SERPL CREATININE-BSD FRML MDRD: 131 ML/MIN/1.73
GLUCOSE BLD-MCNC: 99 MG/DL (ref 70–110)
GLUCOSE BLDC GLUCOMTR-MCNC: 93 MG/DL (ref 70–130)
GLUCOSE BLDC GLUCOMTR-MCNC: 96 MG/DL (ref 70–130)
LV EF NUC BP: 82 %
MAXIMAL PREDICTED HEART RATE: 165 BPM
OSMOLALITY SERPL CALC.SUM OF ELEC: 276.3 MOSM/KG (ref 273–305)
PERCENT MAX PREDICTED HR: 73.33 %
POTASSIUM BLD-SCNC: 3.4 MMOL/L (ref 3.5–5.3)
SODIUM BLD-SCNC: 139 MMOL/L (ref 135–153)
STRESS BASELINE BP: NORMAL MMHG
STRESS BASELINE HR: 76 BPM
STRESS PERCENT HR: 86 %
STRESS POST PEAK BP: NORMAL MMHG
STRESS POST PEAK HR: 121 BPM
STRESS TARGET HR: 140 BPM

## 2017-04-14 PROCEDURE — 78452 HT MUSCLE IMAGE SPECT MULT: CPT | Performed by: INTERNAL MEDICINE

## 2017-04-14 PROCEDURE — 96372 THER/PROPH/DIAG INJ SC/IM: CPT

## 2017-04-14 PROCEDURE — 93018 CV STRESS TEST I&R ONLY: CPT | Performed by: INTERNAL MEDICINE

## 2017-04-14 PROCEDURE — A9500 TC99M SESTAMIBI: HCPCS | Performed by: FAMILY MEDICINE

## 2017-04-14 PROCEDURE — 80048 BASIC METABOLIC PNL TOTAL CA: CPT | Performed by: FAMILY MEDICINE

## 2017-04-14 PROCEDURE — G0378 HOSPITAL OBSERVATION PER HR: HCPCS

## 2017-04-14 PROCEDURE — 25010000002 ENOXAPARIN PER 10 MG: Performed by: FAMILY MEDICINE

## 2017-04-14 PROCEDURE — 99213 OFFICE O/P EST LOW 20 MIN: CPT | Performed by: INTERNAL MEDICINE

## 2017-04-14 PROCEDURE — 25010000002 AMINOPHYLLINE PER 250 MG: Performed by: FAMILY MEDICINE

## 2017-04-14 PROCEDURE — 0 TECHNETIUM SESTAMIBI: Performed by: FAMILY MEDICINE

## 2017-04-14 PROCEDURE — 78451 HT MUSCLE IMAGE SPECT SING: CPT

## 2017-04-14 PROCEDURE — 94799 UNLISTED PULMONARY SVC/PX: CPT

## 2017-04-14 PROCEDURE — 25010000002 REGADENOSON 0.4 MG/5ML SOLUTION: Performed by: FAMILY MEDICINE

## 2017-04-14 PROCEDURE — 93017 CV STRESS TEST TRACING ONLY: CPT

## 2017-04-14 PROCEDURE — 82962 GLUCOSE BLOOD TEST: CPT

## 2017-04-14 RX ORDER — LISINOPRIL 2.5 MG/1
5 TABLET ORAL
Status: DISCONTINUED | OUTPATIENT
Start: 2017-04-14 | End: 2017-04-14 | Stop reason: HOSPADM

## 2017-04-14 RX ORDER — AMINOPHYLLINE DIHYDRATE 25 MG/ML
125 INJECTION, SOLUTION INTRAVENOUS ONCE AS NEEDED
Status: COMPLETED | OUTPATIENT
Start: 2017-04-14 | End: 2017-04-14

## 2017-04-14 RX ORDER — PRIMIDONE 50 MG/1
50 TABLET ORAL NIGHTLY
Status: DISCONTINUED | OUTPATIENT
Start: 2017-04-14 | End: 2017-04-14 | Stop reason: HOSPADM

## 2017-04-14 RX ORDER — CYCLOBENZAPRINE HCL 10 MG
10 TABLET ORAL NIGHTLY
Status: DISCONTINUED | OUTPATIENT
Start: 2017-04-14 | End: 2017-04-14 | Stop reason: HOSPADM

## 2017-04-14 RX ORDER — AMITRIPTYLINE HYDROCHLORIDE 25 MG/1
25 TABLET, FILM COATED ORAL NIGHTLY
Status: DISCONTINUED | OUTPATIENT
Start: 2017-04-14 | End: 2017-04-14 | Stop reason: HOSPADM

## 2017-04-14 RX ADMIN — REGADENOSON 0.4 MG: 0.08 INJECTION, SOLUTION INTRAVENOUS at 10:18

## 2017-04-14 RX ADMIN — OXYCODONE HYDROCHLORIDE AND ACETAMINOPHEN 1000 MG: 500 TABLET ORAL at 13:54

## 2017-04-14 RX ADMIN — METOPROLOL SUCCINATE 50 MG: 50 TABLET, FILM COATED, EXTENDED RELEASE ORAL at 14:07

## 2017-04-14 RX ADMIN — CELECOXIB 200 MG: 200 CAPSULE ORAL at 13:55

## 2017-04-14 RX ADMIN — CHOLECALCIFEROL TAB 10 MCG (400 UNIT) 1000 UNITS: 10 TAB at 13:58

## 2017-04-14 RX ADMIN — VITAMIN E CAP 400 UNIT 800 UNITS: 400 CAP at 13:54

## 2017-04-14 RX ADMIN — LEVOTHYROXINE SODIUM 150 MCG: 150 TABLET ORAL at 06:00

## 2017-04-14 RX ADMIN — POLYETHYLENE GLYCOL (3350) 17 G: 17 POWDER, FOR SOLUTION ORAL at 13:59

## 2017-04-14 RX ADMIN — Medication 1 TABLET: at 13:54

## 2017-04-14 RX ADMIN — FOLIC ACID 1 MG: 1 TABLET ORAL at 13:55

## 2017-04-14 RX ADMIN — ASPIRIN 81 MG: 81 TABLET ORAL at 13:55

## 2017-04-14 RX ADMIN — CETIRIZINE HYDROCHLORIDE 10 MG: 10 TABLET ORAL at 13:55

## 2017-04-14 RX ADMIN — CALCIUM CARBONATE-VITAMIN D TAB 500 MG-200 UNIT 500 MG: 500-200 TAB at 13:54

## 2017-04-14 RX ADMIN — FERROUS SULFATE TAB 325 MG (65 MG ELEMENTAL FE) 325 MG: 325 (65 FE) TAB at 13:56

## 2017-04-14 RX ADMIN — Medication 1 DOSE: at 08:40

## 2017-04-14 RX ADMIN — DULOXETINE HYDROCHLORIDE 60 MG: 60 CAPSULE, DELAYED RELEASE ORAL at 13:55

## 2017-04-14 RX ADMIN — DIAZEPAM 2.5 MG: 5 TABLET ORAL at 14:22

## 2017-04-14 RX ADMIN — AMINOPHYLLINE 125 MG: 25 INJECTION, SOLUTION INTRAVENOUS at 10:22

## 2017-04-14 RX ADMIN — Medication 1 DOSE: at 10:18

## 2017-04-14 RX ADMIN — GABAPENTIN 600 MG: 300 CAPSULE ORAL at 14:08

## 2017-04-14 RX ADMIN — ENOXAPARIN SODIUM 40 MG: 40 INJECTION SUBCUTANEOUS at 13:59

## 2017-04-14 RX ADMIN — SODIUM CHLORIDE 100 ML/HR: 9 INJECTION, SOLUTION INTRAVENOUS at 02:01

## 2017-04-14 NOTE — PROGRESS NOTES
LOS: 1 day   Patient Care Team:  Samuel Duane Kreis, MD as PCP - General  Samuel Duane Kreis, MD as PCP - Family Medicine  BRIANDA Dyer III as PCP - Claims Attributed - PLEASE DO NOT REMOVE      Subjective     Admission information:    The patient is a 56-year-old white female with a history of Sjogren's disease, multiple sclerosis, hypertension, rheumatoid arthritis, fibromyalgia, hypothyroidism and diabetes mellitus type 2 who comes to the hospital for an episode of chest pain yesterday. According to the patient she was in her usual state of health until yesterday when she started having chest pain that she describes as lower sternal, oppressive, lasting approximately 30 minutes and resolving spontaneously. She states this was associated with shortness of breath. She states her pain has been occurring on and off for the last 2 days since that time with similar characteristics. She states the intensity is 8/10 on the pain scale. She denies any ameliorating or exacerbating factors. She also reports frequent migraines over the last few weeks. She is complaining of 1 at this point which is being addressed by the primary team.    Interval History:     According to the patient she is doing better today.  She still complains of lower sternal/epigastric pain that appears to be more GI related.     History taken from: patient chart family    Vital Signs  Temp:  [97.2 °F (36.2 °C)-98.2 °F (36.8 °C)] 98.1 °F (36.7 °C)  Heart Rate:  [72-85] 76  Resp:  [16-18] 18  BP: (108-148)/(69-80) 148/80    Physical Exam:     Physical Exam   Constitutional: She is oriented to person, place, and time. She appears well-developed and well-nourished.   Obese white female laying comfortably on bed.   HENT:   Mouth/Throat: Oropharynx is clear and moist.   Eyes: EOM are normal. Pupils are equal, round, and reactive to light.   Neck: Neck supple. No JVD present. No tracheal deviation present. No thyromegaly present.   Cardiovascular:  Normal rate, regular rhythm, S1 normal and S2 normal.  Exam reveals no gallop and no friction rub.    No murmur heard.  Pulmonary/Chest: Effort normal and breath sounds normal. No respiratory distress. She has no wheezes. She has no rales.   Abdominal: Soft. Bowel sounds are normal. She exhibits no mass. There is tenderness.   Musculoskeletal: Normal range of motion. She exhibits no edema.   Lymphadenopathy:     She has no cervical adenopathy.   Neurological: She is alert and oriented to person, place, and time.   Skin: Skin is warm and dry. No rash noted.   Psychiatric: She has a normal mood and affect.       Results Review:     I reviewed the patient's new clinical results.  I reviewed the patient's new imaging results and agree with the interpretation.  I reviewed the patient's other test results and agree with the interpretation  I personally viewed and interpreted the patient's EKG/Telemetry data    Medication:  Scheduled Meds:  amitriptyline 25 mg Oral Nightly   aspirin 81 mg Oral Daily   calcium-vitamin D 500 mg Oral Daily   celecoxib 200 mg Oral Daily   cetirizine 10 mg Oral Daily   cholecalciferol 1,000 Units Oral Daily   cyclobenzaprine 10 mg Oral Nightly   diazePAM 2.5 mg Oral Q12H   DULoxetine 60 mg Oral Q12H   enoxaparin 40 mg Subcutaneous Daily   ferrous sulfate 325 mg Oral Daily With Breakfast   folic acid 1 mg Oral Daily   gabapentin 600 mg Oral Q6H   insulin aspart 0-9 Units Subcutaneous 4x Daily AC & at Bedtime   insulin detemir 25 Units Subcutaneous Daily   levothyroxine 150 mcg Oral Q AM   linaclotide 290 mcg Oral QAM AC   [START ON 4/15/2017] methotrexate 25 mg Oral Weekly   metoprolol succinate XL 50 mg Oral Daily   multivitamin 1 tablet Oral Daily   pantoprazole 40 mg Oral Q AM   Pharmacy Meds to Bed Consult  Does not apply Daily   polyethylene glycol 17 g Oral Daily   primidone 50 mg Oral Nightly   topiramate 50 mg Oral Nightly   traMADol 50 mg Oral Q12H   vitamin C 1,000 mg Oral Daily    vitamin E 800 Units Oral Daily     Continuous Infusions:  sodium chloride 100 mL/hr Last Rate: 100 mL/hr (04/14/17 0201)     PRN Meds:.•  acetaminophen  •  aluminum-magnesium hydroxide-simethicone  •  butalbital-acetaminophen-caffeine  •  dextrose  •  dextrose  •  glucagon (human recombinant)  •  nitroglycerin  •  ondansetron **OR** ondansetron ODT **OR** ondansetron  •  sodium chloride  •  tablet cutter    Telemetry: Sinus rhythm in the 60s to 80s.      Assessment/Plan     1. Chest pain: Patient with chest pain of unclear if the allergy. She did not have any fevers or chills. She has no white count elevation. She has no obvious evidence of infection or dysuria. All of this could definitely represent a viral illness I'm concerned that it may be an atypical presentation for coronary artery disease. Especially since she has been evaluated in the past for recurrent chest pains. Another option and said this is GI related due to heartburn or esophageal spasm. At this point she has rule out for myocardial infarction.     2. Hypertension: Patient with a history of hypertension which is mildly elevated today.     3. Diabetes mellitus type 2: Patient with history of diabetes mellitus type 2 currently being managed by primary team.     Plan:     1. Chest pain: Patient with persistent chest pain that appears to be noncardiac in nature.  She has now undergone a second stress test which noted no ischemia.  At this point her renal appears to be noncardiac in nature.  Would further evaluate for possible GI causes for other etiologies.    2.  Hypertension: Patient with mild hypertension current regimen.  At this point we'll start lisinopril 5 mg daily.      Larry Cobos MD  04/14/17  3:54 PM

## 2017-04-21 NOTE — DISCHARGE SUMMARY
Date of Admission: 4/12/2017  Date of Discharge:  4/14/2017    Discharge Diagnosis:   Chest pain with atypical features of angina  Migraine headache  Acute viral syndrome  Type 2 diabetes mellitus  Hypertension  Multiple sclerosis  Rheumatoid arthritis  Hypokalemia    Hospital Course  Patient is a 56 y.o. female presented with chest pain and feeling bad.  Ruled out for acute MI.  Evaluated by cardiology.  Underwent stress test that revealed no evidence of ischemia.  Discharge to home with outpatient follow-up     Pertinent Test Results:   Lab Results   Component Value Date    WBC 5.55 04/12/2017    HGB 12.0 04/12/2017    HCT 35.3 (L) 04/12/2017    MCV 94.4 (H) 04/12/2017     04/12/2017     Lab Results   Component Value Date    GLUCOSE 99 04/14/2017    CALCIUM 8.8 04/14/2017     04/14/2017    K 3.4 (L) 04/14/2017    CO2 26.6 04/14/2017     04/14/2017    BUN 9 04/14/2017    CREATININE 0.49 04/14/2017    EGFRIFAFRI  09/22/2016      Comment:      <15 Indicative of kidney failure.    EGFRIFNONA 131 04/14/2017    BCR 18.4 04/14/2017    ANIONGAP 4.4 04/14/2017     Lab Results   Component Value Date    ALT 23 04/12/2017    ALT 23 04/12/2017    AST 20 04/12/2017    AST 20 04/12/2017       Lab Results   Component Value Date    INR 0.93 07/20/2015                                                    Physical Exam:     General Appearance:    Alert, cooperative, in no acute distress   Head:    Normocephalic, without obvious abnormality, atraumatic   Eyes:            Lids and lashes normal, conjunctivae and sclerae normal, no   icterus, no pallor, corneas clear, PERRLA   Ears:    Ears appear intact with no abnormalities noted   Throat:   No oral lesions, no thrush, oral mucosa moist   Neck:   No adenopathy, supple, trachea midline, no thyromegaly, no   carotid bruit, no JVD   Back:     No kyphosis present, no scoliosis present, no skin lesions,      erythema or scars, no tenderness to percussion or                    palpation,   range of motion normal   Lungs:     Clear to auscultation,respirations regular, even and                  unlabored    Heart:    Regular rhythm and normal rate, normal S1 and S2, no            murmur, no gallop, no rub, no click   Chest Wall:    No abnormalities observed   Abdomen:     Normal bowel sounds, no masses, no organomegaly, soft        non-tender, non-distended, no guarding, no rebound                tenderness   Rectal:   Deferred   Extremities:   Moves all extremities well, no edema, no cyanosis, no             redness   Pulses:   Pulses palpable and equal bilaterally   Skin:   No bleeding, bruising or rash   Lymph nodes:   No palpable adenopathy   Neurologic:   Cranial nerves 2 - 12 grossly intact, sensation intact, DTR       present and equal bilaterally       Discharge Disposition  Home or Self Care    Discharge Medications   Lashae Bentiez   Home Medication Instructions MJ:[id number]    Printed on:04/21/17 1037   Medication Information                      amitriptyline (ELAVIL) 25 MG tablet  Take 25 mg by mouth daily.             aspirin 81 MG EC tablet  Take 81 mg by mouth daily.             butalbital-acetaminophen-caffeine (FIORICET, ESGIC) -40 MG per tablet  Take 1 tablet by mouth as needed for headaches.             calcium citrate-vitamin d (CALCITRATE) 315-250 MG-UNIT tablet tablet  Take 1 tablet by mouth daily.             celecoxib (CeleBREX) 200 MG capsule  Take 200 mg by mouth daily.             Certolizumab Pegol (CIMZIA) 2 X 200 MG kit  Inject 400 mg under the skin Every 30 (Thirty) Days.             cholecalciferol (VITAMIN D3) 1000 UNITS tablet  Take 1,000 Units by mouth daily.             cyanocobalamin 1000 MCG/ML injection  Inject 1,000 mcg into the shoulder, thigh, or buttocks Every 28 (Twenty-Eight) Days.             cyclobenzaprine (FLEXERIL) 10 MG tablet  Take 10 mg by mouth daily.             dexlansoprazole (DEXILANT) 60 MG capsule  Take 60 mg by mouth  daily.             diazePAM (VALIUM) 2 MG tablet  Take 2 mg by mouth 2 (Two) Times a Day.             DULoxetine (CYMBALTA) 60 MG capsule  Take 60 mg by mouth 2 (two) times a day.             ferrous sulfate 325 (65 FE) MG tablet  Take 325 mg by mouth daily.             folic acid (FOLVITE) 1 MG tablet  Take 1 mg by mouth daily.             gabapentin (NEURONTIN) 600 MG tablet  Take 600 mg by mouth 4 (Four) Times a Day.             insulin detemir (LEVEMIR) 100 UNIT/ML injection  Inject 25 Units under the skin Daily.             levothyroxine (SYNTHROID, LEVOTHROID) 150 MCG tablet  Take 150 mcg by mouth daily.             linaclotide (LINZESS) 290 MCG capsule capsule  Take 290 mcg by mouth every morning before breakfast.             loratadine (CLARITIN) 10 MG tablet  Take 10 mg by mouth Every Night.             methotrexate 2.5 MG tablet  Take 25 mg by mouth 1 (One) Time Per Week. Takes 10 tablets 1 day per week on Saturday             metoprolol succinate XL (TOPROL-XL) 50 MG 24 hr tablet  Take 1 tablet by mouth Daily.             Multiple Vitamin (MULTI VITAMIN DAILY PO)  Take 1 tablet by mouth Every Morning.             polyethylene glycol (MIRALAX) packet  Take 17 g by mouth daily.             primidone (MYSOLINE) 50 MG tablet  Take 50 mg by mouth daily.             ranitidine (ZANTAC) 150 MG tablet  Take 150 mg by mouth 2 (two) times a day.             topiramate (TOPAMAX) 25 MG tablet  Take 50 mg by mouth Every Night.             traMADol (ULTRAM) 50 MG tablet  Take 50 mg by mouth 2 (two) times a day.             vitamin C (ASCORBIC ACID) 500 MG tablet  Take 1,000 mg by mouth Daily.             vitamin E 1000 UNIT capsule  Take 1,000 Units by mouth daily.                   Discharge Diet:    Diet Orders (active)     None          Follow-up Appointments  Your Scheduled Appointments     Jun 20, 2017  8:20 AM EDT   Follow Up with Larry Cobos MD   [facility] (--)     [address] [place] [address]   [phone]           Arrive 15 minutes prior to appointment.            Additional instructions:      GAYLE 4/21/17 11:00                Additional Instructions for the Follow-ups that You Need to Schedule     Discharge Follow-Up With Specified Provider    As directed    To:  Gayle   Follow Up:  1 Week                  Samuel Duane Kreis, MD  04/21/17  10:37 AM

## 2017-06-12 RX ORDER — METOPROLOL SUCCINATE 50 MG
TABLET, EXTENDED RELEASE 24 HR ORAL
Qty: 90 TABLET | Refills: 0 | Status: SHIPPED | OUTPATIENT
Start: 2017-06-12 | End: 2017-06-20

## 2017-06-20 ENCOUNTER — OFFICE VISIT (OUTPATIENT)
Dept: CARDIOLOGY | Facility: CLINIC | Age: 57
End: 2017-06-20

## 2017-06-20 VITALS
WEIGHT: 171 LBS | BODY MASS INDEX: 28.49 KG/M2 | DIASTOLIC BLOOD PRESSURE: 67 MMHG | HEIGHT: 65 IN | SYSTOLIC BLOOD PRESSURE: 106 MMHG | RESPIRATION RATE: 16 BRPM | HEART RATE: 81 BPM

## 2017-06-20 DIAGNOSIS — I10 ESSENTIAL HYPERTENSION: ICD-10-CM

## 2017-06-20 DIAGNOSIS — R07.2 PRECORDIAL PAIN: Primary | ICD-10-CM

## 2017-06-20 PROCEDURE — 99213 OFFICE O/P EST LOW 20 MIN: CPT | Performed by: INTERNAL MEDICINE

## 2017-06-20 PROCEDURE — 93000 ELECTROCARDIOGRAM COMPLETE: CPT | Performed by: INTERNAL MEDICINE

## 2017-06-20 RX ORDER — METOPROLOL TARTRATE 50 MG/1
50 TABLET, FILM COATED ORAL 2 TIMES DAILY
Qty: 60 TABLET | Refills: 11 | Status: SHIPPED | OUTPATIENT
Start: 2017-06-20 | End: 2017-07-11 | Stop reason: SDUPTHER

## 2017-06-20 NOTE — PROGRESS NOTES
Samuel Duane Kreis, MD  Lashae Benitez  1960  12/15/2016    Patient Active Problem List   Diagnosis   • Hiatal hernia   • Essential hypertension   • Sjogren's syndrome   • Multiple sclerosis   • Psoriasis   • Fibromyalgia   • Osteoarthritis   • Psoriatic arthritis   • RA (rheumatoid arthritis)   • Degenerative disc disease, lumbar   • TMJ arthritis   • Dupuytren's disease   • H. pylori infection   • Family history of coronary artery disease   • Type 2 diabetes mellitus   • SOB (shortness of breath)   • Dizziness   • Edema   • Precordial pain   • Chest pain       Dear Samuel Duane Kreis, MD:    Subjective     Lashae Benitez is a 56 y.o. female with the above medical problems who is here today for follow-up. According to the patient she has been doing relatively well since her last visit.  She is once again complaining of intermittent chest pain that she believes is related to her fibromyalgia.  She recently underwent a stress test which showed no evidence of ischemia.  As a history of hypertension which appears to be well-controlled on current regimen.  She appears to complain of palpitations that appear to be resolved at this point.    Current Outpatient Prescriptions:   •  amitriptyline (ELAVIL) 25 MG tablet, Take 25 mg by mouth daily., Disp: , Rfl:   •  aspirin 81 MG EC tablet, Take 81 mg by mouth daily., Disp: , Rfl:   •  butalbital-acetaminophen-caffeine (FIORICET, ESGIC) -40 MG per tablet, Take 1 tablet by mouth as needed for headaches., Disp: , Rfl:   •  calcium citrate-vitamin d (CALCITRATE) 315-250 MG-UNIT tablet tablet, Take 1 tablet by mouth daily., Disp: , Rfl:   •  celecoxib (CeleBREX) 200 MG capsule, Take 200 mg by mouth daily., Disp: , Rfl:   •  Certolizumab Pegol (CIMZIA) 2 X 200 MG kit, Inject 400 mg under the skin Every 30 (Thirty) Days., Disp: , Rfl:   •  cholecalciferol (VITAMIN D3) 1000 UNITS tablet, Take 1,000 Units by mouth daily., Disp: , Rfl:   •  cyanocobalamin 1000 MCG/ML injection,  Inject 1,000 mcg into the shoulder, thigh, or buttocks Every 28 (Twenty-Eight) Days., Disp: , Rfl:   •  cyclobenzaprine (FLEXERIL) 10 MG tablet, Take 10 mg by mouth daily., Disp: , Rfl:   •  dexlansoprazole (DEXILANT) 60 MG capsule, Take 60 mg by mouth daily., Disp: , Rfl:   •  diazePAM (VALIUM) 2 MG tablet, Take 2 mg by mouth 2 (Two) Times a Day., Disp: , Rfl:   •  DULoxetine (CYMBALTA) 60 MG capsule, Take 60 mg by mouth 2 (two) times a day., Disp: , Rfl:   •  ferrous sulfate 325 (65 FE) MG tablet, Take 325 mg by mouth daily., Disp: , Rfl:   •  folic acid (FOLVITE) 1 MG tablet, Take 1 mg by mouth daily., Disp: , Rfl:   •  gabapentin (NEURONTIN) 600 MG tablet, Take 600 mg by mouth 4 (Four) Times a Day., Disp: , Rfl:   •  insulin detemir (LEVEMIR) 100 UNIT/ML injection, Inject 25 Units under the skin Daily., Disp: , Rfl:   •  levothyroxine (SYNTHROID, LEVOTHROID) 150 MCG tablet, Take 150 mcg by mouth daily., Disp: , Rfl:   •  linaclotide (LINZESS) 290 MCG capsule capsule, Take 290 mcg by mouth every morning before breakfast., Disp: , Rfl:   •  loratadine (CLARITIN) 10 MG tablet, Take 10 mg by mouth Every Night., Disp: , Rfl:   •  methotrexate 2.5 MG tablet, Take 25 mg by mouth 1 (One) Time Per Week. Takes 10 tablets 1 day per week on Saturday, Disp: , Rfl:   •  Multiple Vitamin (MULTI VITAMIN DAILY PO), Take 1 tablet by mouth Every Morning., Disp: , Rfl:   •  polyethylene glycol (MIRALAX) packet, Take 17 g by mouth daily., Disp: , Rfl:   •  primidone (MYSOLINE) 50 MG tablet, Take 50 mg by mouth daily., Disp: , Rfl:   •  ranitidine (ZANTAC) 150 MG tablet, Take 150 mg by mouth 2 (two) times a day., Disp: , Rfl:   •  topiramate (TOPAMAX) 25 MG tablet, Take 50 mg by mouth Every Night., Disp: , Rfl:   •  traMADol (ULTRAM) 50 MG tablet, Take 50 mg by mouth 2 (two) times a day., Disp: , Rfl:   •  vitamin C (ASCORBIC ACID) 500 MG tablet, Take 1,000 mg by mouth Daily., Disp: , Rfl:   •  vitamin E 1000 UNIT capsule, Take 1,000  "Units by mouth daily., Disp: , Rfl:   •  metoprolol tartrate (LOPRESSOR) 50 MG tablet, Take 1 tablet by mouth 2 (Two) Times a Day., Disp: 60 tablet, Rfl: 11    The following portions of the patient's history were reviewed and updated as appropriate: allergies, current medications, past family history, past medical history, past social history, past surgical history and problem list.    Review of Systems   Constitution: Negative for chills, diaphoresis and fever.   HENT: Negative for congestion, ear pain and hearing loss.    Eyes: Negative for blurred vision, pain and redness.   Cardiovascular: Positive for dyspnea on exertion and leg swelling. Negative for chest pain, cyanosis, irregular heartbeat, near-syncope, orthopnea, palpitations and paroxysmal nocturnal dyspnea.   Respiratory: Negative for cough, hemoptysis and shortness of breath.    Endocrine: Negative for cold intolerance and heat intolerance.   Hematologic/Lymphatic: Does not bruise/bleed easily.   Skin: Negative for rash.   Musculoskeletal: Positive for arthritis, back pain, joint pain, joint swelling and stiffness. Negative for myalgias.   Gastrointestinal: Negative for abdominal pain, constipation, diarrhea, hematemesis, hematochezia, melena, nausea and vomiting.   Genitourinary: Negative for dysuria and hematuria.   Neurological: Negative for dizziness, focal weakness, light-headedness and numbness.   Psychiatric/Behavioral: Positive for depression. The patient is nervous/anxious.        Objective   Blood pressure 106/67, pulse 81, resp. rate 16, height 65\" (165.1 cm), weight 171 lb (77.6 kg).    Physical Exam   Constitutional: She is oriented to person, place, and time. She appears well-developed and well-nourished.   WF sitting comfortably on chair.   HENT:   Mouth/Throat: Oropharynx is clear and moist.   Eyes: EOM are normal. Pupils are equal, round, and reactive to light.   Neck: Neck supple. No JVD present. No tracheal deviation present. No " thyromegaly present.   Cardiovascular: Normal rate, regular rhythm, S1 normal and S2 normal.  Exam reveals no gallop and no friction rub.    No murmur heard.  Pulmonary/Chest: Effort normal and breath sounds normal. No respiratory distress. She has no wheezes. She has no rales.   Abdominal: Soft. Bowel sounds are normal. She exhibits no mass. There is tenderness.   Musculoskeletal: Normal range of motion. She exhibits no edema.   Lymphadenopathy:     She has no cervical adenopathy.   Neurological: She is alert and oriented to person, place, and time.   Skin: Skin is warm and dry. No rash noted.   Psychiatric: She has a normal mood and affect.         ECG 12 Lead  Date/Time: 6/20/2017 9:04 AM  Performed by: JESS KEITH  Authorized by: JESS KEITH   Comparison: compared with previous ECG from 4/17/2017  Similar to previous ECG  Rhythm: sinus rhythm  Rate: normal  BPM: 70  Conduction: LAFB  ST Segments: ST segments normal  T Waves: T waves normal  QRS axis: left  Other: no other findings  Clinical impression: non-specific ECG            Assessment/Plan     1.  Hypertension: Patient with history of hypertension which currently well controlled on current regimen.  At this point will continue.    2.  Chest pain: Patient with chest pain that appears resolved at this point.  Stress is an endocardial showed no evidence of coronary artery disease.  At this point we'll continue current regimen and monitor.  We'll discontinue nitroglycerin as the patient's chest pain appears to be noncoronary in nature.        Diagnosis Plan   1. Precordial pain  ECG 12 Lead   2. Essential hypertension              Return in about 6 months (around 12/20/2017).    I appreciate the opportunity to participate in this patient's cardiovascular care.    Best Regards    Jess Linares

## 2017-07-11 RX ORDER — METOPROLOL TARTRATE 50 MG/1
50 TABLET, FILM COATED ORAL 2 TIMES DAILY
Qty: 180 TABLET | Refills: 0 | Status: SHIPPED | OUTPATIENT
Start: 2017-07-11 | End: 2017-09-24 | Stop reason: SDUPTHER

## 2017-09-25 RX ORDER — METOPROLOL TARTRATE 50 MG/1
TABLET, FILM COATED ORAL
Qty: 180 TABLET | Refills: 0 | Status: SHIPPED | OUTPATIENT
Start: 2017-09-25 | End: 2017-12-28 | Stop reason: SDUPTHER

## 2017-11-28 ENCOUNTER — EPISODE CHANGES (OUTPATIENT)
Dept: CASE MANAGEMENT | Facility: OTHER | Age: 57
End: 2017-11-28

## 2017-12-05 ENCOUNTER — TRANSCRIBE ORDERS (OUTPATIENT)
Dept: ADMINISTRATIVE | Facility: HOSPITAL | Age: 57
End: 2017-12-05

## 2017-12-05 DIAGNOSIS — K76.9 LIVER LESION: Primary | ICD-10-CM

## 2017-12-13 ENCOUNTER — HOSPITAL ENCOUNTER (OUTPATIENT)
Dept: CT IMAGING | Facility: HOSPITAL | Age: 57
Discharge: HOME | End: 2017-12-13
Admitting: NURSE PRACTITIONER

## 2017-12-13 DIAGNOSIS — K76.9 LIVER LESION: ICD-10-CM

## 2017-12-13 LAB — CREAT BLDA-MCNC: 0.7 MG/DL (ref 0.6–1.3)

## 2017-12-13 PROCEDURE — 74178 CT ABD&PLV WO CNTR FLWD CNTR: CPT

## 2017-12-13 PROCEDURE — 82565 ASSAY OF CREATININE: CPT

## 2017-12-13 PROCEDURE — 0 IOPAMIDOL 61 % SOLUTION: Performed by: NURSE PRACTITIONER

## 2017-12-13 PROCEDURE — 74178 CT ABD&PLV WO CNTR FLWD CNTR: CPT | Performed by: RADIOLOGY

## 2017-12-13 RX ADMIN — IOPAMIDOL 100 ML: 612 INJECTION, SOLUTION INTRAVENOUS at 13:45

## 2017-12-19 ENCOUNTER — OFFICE VISIT (OUTPATIENT)
Dept: CARDIOLOGY | Facility: CLINIC | Age: 57
End: 2017-12-19

## 2017-12-19 VITALS
HEIGHT: 65 IN | DIASTOLIC BLOOD PRESSURE: 76 MMHG | WEIGHT: 178 LBS | SYSTOLIC BLOOD PRESSURE: 116 MMHG | BODY MASS INDEX: 29.66 KG/M2 | RESPIRATION RATE: 16 BRPM | HEART RATE: 71 BPM

## 2017-12-19 DIAGNOSIS — R07.2 PRECORDIAL PAIN: Primary | ICD-10-CM

## 2017-12-19 DIAGNOSIS — I10 ESSENTIAL HYPERTENSION: ICD-10-CM

## 2017-12-19 DIAGNOSIS — R00.2 PALPITATIONS: ICD-10-CM

## 2017-12-19 PROCEDURE — 99213 OFFICE O/P EST LOW 20 MIN: CPT | Performed by: INTERNAL MEDICINE

## 2017-12-19 PROCEDURE — 93000 ELECTROCARDIOGRAM COMPLETE: CPT | Performed by: INTERNAL MEDICINE

## 2017-12-19 NOTE — PROGRESS NOTES
Samuel Duane Kreis, MD  Lashae Benitez  1960  12/15/2016    Patient Active Problem List   Diagnosis   • Hiatal hernia   • Essential hypertension   • Sjogren's syndrome   • Multiple sclerosis   • Psoriasis   • Fibromyalgia   • Osteoarthritis   • Psoriatic arthritis   • RA (rheumatoid arthritis)   • Degenerative disc disease, lumbar   • TMJ arthritis   • Dupuytren's disease   • H. pylori infection   • Family history of coronary artery disease   • Type 2 diabetes mellitus   • SOB (shortness of breath)   • Dizziness   • Edema   • Precordial pain   • Chest pain   • Palpitations       Dear Samuel Duane Kreis, MD:    Subjective     Lashae Benitez is a 57 y.o. female with the above medical problems who is here today for follow-up. The patient states she has been doing fairly well since her last visit.  She states she continues to have intermittent chest pains and she believes are related to her fibromyalgia.  There are unchanged from previous.  She recently underwent a stress test which showed no evidence of ischemia.  She has a history of hypertension that appears to be well-controlled on this regimen.  She stays and the previously described palpitations are now resolved.    Current Outpatient Prescriptions:   •  amitriptyline (ELAVIL) 25 MG tablet, Take 25 mg by mouth daily., Disp: , Rfl:   •  aspirin 81 MG EC tablet, Take 81 mg by mouth daily., Disp: , Rfl:   •  butalbital-acetaminophen-caffeine (FIORICET, ESGIC) -40 MG per tablet, Take 1 tablet by mouth as needed for headaches., Disp: , Rfl:   •  calcium citrate-vitamin d (CALCITRATE) 315-250 MG-UNIT tablet tablet, Take 1 tablet by mouth daily., Disp: , Rfl:   •  celecoxib (CeleBREX) 200 MG capsule, Take 200 mg by mouth daily., Disp: , Rfl:   •  cyanocobalamin 1000 MCG/ML injection, Inject 1,000 mcg into the shoulder, thigh, or buttocks Every 28 (Twenty-Eight) Days., Disp: , Rfl:   •  cyclobenzaprine (FLEXERIL) 10 MG tablet, Take 10 mg by mouth daily., Disp: , Rfl:    •  dexlansoprazole (DEXILANT) 60 MG capsule, Take 60 mg by mouth daily., Disp: , Rfl:   •  diazePAM (VALIUM) 2 MG tablet, Take 2 mg by mouth 2 (Two) Times a Day., Disp: , Rfl:   •  DULoxetine (CYMBALTA) 60 MG capsule, Take 60 mg by mouth 2 (two) times a day., Disp: , Rfl:   •  ferrous sulfate 325 (65 FE) MG tablet, Take 325 mg by mouth daily., Disp: , Rfl:   •  folic acid (FOLVITE) 1 MG tablet, Take 1 mg by mouth daily., Disp: , Rfl:   •  gabapentin (NEURONTIN) 600 MG tablet, Take 600 mg by mouth 4 (Four) Times a Day., Disp: , Rfl:   •  InFLIXimab-dyyb (INFLECTRA IV), Infuse  into a venous catheter., Disp: , Rfl:   •  insulin detemir (LEVEMIR) 100 UNIT/ML injection, Inject 25 Units under the skin Daily., Disp: , Rfl:   •  levothyroxine (SYNTHROID, LEVOTHROID) 150 MCG tablet, Take 112 mcg by mouth 2 (Two) Times a Week., Disp: , Rfl:   •  linaclotide (LINZESS) 290 MCG capsule capsule, Take 290 mcg by mouth every morning before breakfast., Disp: , Rfl:   •  loratadine (CLARITIN) 10 MG tablet, Take 10 mg by mouth Every Night., Disp: , Rfl:   •  methotrexate 2.5 MG tablet, Take 25 mg by mouth 1 (One) Time Per Week. Takes 10 tablets 1 day per week on Saturday, Disp: , Rfl:   •  metoprolol tartrate (LOPRESSOR) 50 MG tablet, TAKE 1 TABLET TWICE A DAY, Disp: 180 tablet, Rfl: 0  •  Multiple Vitamin (MULTI VITAMIN DAILY PO), Take 1 tablet by mouth Every Morning., Disp: , Rfl:   •  polyethylene glycol (MIRALAX) packet, Take 17 g by mouth daily., Disp: , Rfl:   •  primidone (MYSOLINE) 50 MG tablet, Take 50 mg by mouth daily., Disp: , Rfl:   •  ranitidine (ZANTAC) 150 MG tablet, Take 150 mg by mouth 2 (two) times a day., Disp: , Rfl:   •  topiramate (TOPAMAX) 25 MG tablet, Take 50 mg by mouth Every Night., Disp: , Rfl:   •  traMADol (ULTRAM) 50 MG tablet, Take 50 mg by mouth 2 (two) times a day., Disp: , Rfl:   •  vitamin C (ASCORBIC ACID) 500 MG tablet, Take 1,000 mg by mouth Daily., Disp: , Rfl:   •  vitamin E 1000 UNIT  "capsule, Take 1,000 Units by mouth daily., Disp: , Rfl:   •  Certolizumab Pegol (CIMZIA) 2 X 200 MG kit, Inject 400 mg under the skin Every 30 (Thirty) Days., Disp: , Rfl:   •  cholecalciferol (VITAMIN D3) 1000 UNITS tablet, Take 1,000 Units by mouth daily., Disp: , Rfl:     The following portions of the patient's history were reviewed and updated as appropriate: allergies, current medications, past family history, past medical history, past social history, past surgical history and problem list.    Review of Systems   Constitution: Positive for chills. Negative for diaphoresis and fever.   HENT: Negative for congestion, ear pain and sore throat.    Eyes: Negative for blurred vision, pain and redness.   Cardiovascular: Positive for chest pain and leg swelling. Negative for orthopnea, palpitations, paroxysmal nocturnal dyspnea and syncope.   Respiratory: Negative for cough, hemoptysis and shortness of breath.    Endocrine: Negative for cold intolerance and heat intolerance.   Hematologic/Lymphatic: Does not bruise/bleed easily.   Skin: Negative for rash.   Musculoskeletal: Positive for arthritis, back pain, joint pain, joint swelling and stiffness. Negative for myalgias.   Gastrointestinal: Negative for abdominal pain, constipation, diarrhea, nausea and vomiting.   Genitourinary: Negative for dysuria and hematuria.   Neurological: Negative for dizziness, focal weakness and numbness.   Psychiatric/Behavioral: Positive for depression. The patient is nervous/anxious.        Objective   Blood pressure 116/76, pulse 71, resp. rate 16, height 165.1 cm (65\"), weight 80.7 kg (178 lb).    Physical Exam   Constitutional: She is oriented to person, place, and time. She appears well-developed and well-nourished.   WF sitting comfortably on chair.   HENT:   Mouth/Throat: Oropharynx is clear and moist.   Eyes: EOM are normal. Pupils are equal, round, and reactive to light.   Neck: Neck supple. No JVD present. No tracheal deviation " present. No thyromegaly present.   Cardiovascular: Normal rate, regular rhythm, S1 normal and S2 normal.  Exam reveals no gallop and no friction rub.    No murmur heard.  Pulmonary/Chest: Effort normal and breath sounds normal. No respiratory distress. She has no wheezes. She has no rales.   Abdominal: Soft. Bowel sounds are normal. She exhibits no mass. There is tenderness.   Musculoskeletal: Normal range of motion. She exhibits no edema.   Lymphadenopathy:     She has no cervical adenopathy.   Neurological: She is alert and oriented to person, place, and time.   Skin: Skin is warm and dry. No rash noted.   Psychiatric: She has a normal mood and affect.         ECG 12 Lead  Date/Time: 12/19/2017 9:13 AM  Performed by: JESS KEITH  Authorized by: JESS KEITH   Comparison: compared with previous ECG from 6/20/2017  Similar to previous ECG  Rhythm: sinus rhythm  Rate: normal  BPM: 68  Conduction: 1st degree  ST Segments: ST segments normal  T Waves: T waves normal  QRS axis: left  Other: no other findings  Clinical impression: non-specific ECG            Assessment/Plan     1.  Chest pain: Patient with history of chest pain that appears to be noncardiac in nature.  It is unchanged from previous visit.  Appears to be related to history of fibromyalgia and muscle spasms.  At this point we'll continue to monitor.    2.  Hypertension: Patient with history of hypertension which currently well controlled on current regimen.  At this point will continue.    4.  Palpitations: Patient with a history of palpitations appear to be resolved at this point.  We'll continue to monitor for recurrence.     Diagnosis Plan   1. Precordial pain  ECG 12 Lead   2. Essential hypertension     3. Palpitations              Return in about 6 months (around 6/19/2018).    I appreciate the opportunity to participate in this patient's cardiovascular care.    Best Regards    Jess Linares

## 2017-12-28 RX ORDER — METOPROLOL TARTRATE 50 MG/1
TABLET, FILM COATED ORAL
Qty: 180 TABLET | Refills: 0 | Status: SHIPPED | OUTPATIENT
Start: 2017-12-28 | End: 2018-03-28 | Stop reason: SDUPTHER

## 2017-12-29 ENCOUNTER — OFFICE VISIT (OUTPATIENT)
Dept: RETAIL CLINIC | Facility: CLINIC | Age: 57
End: 2017-12-29

## 2017-12-29 VITALS
BODY MASS INDEX: 29.75 KG/M2 | WEIGHT: 178.8 LBS | RESPIRATION RATE: 20 BRPM | TEMPERATURE: 98.3 F | HEART RATE: 88 BPM | OXYGEN SATURATION: 99 %

## 2017-12-29 DIAGNOSIS — H57.12 ACUTE LEFT EYE PAIN: Primary | ICD-10-CM

## 2017-12-29 PROCEDURE — 99212 OFFICE O/P EST SF 10 MIN: CPT | Performed by: NURSE PRACTITIONER

## 2017-12-29 NOTE — PROGRESS NOTES
Subjective   Lashae Benitez is a 57 y.o. female.   Chief Complaint   Patient presents with   • eye problems      HPI Comments: 56 yo w/f, accompanied by father, presents with complaint of awoke by severe left eye pain at 4:30 this am.  She reports pain is 8/10, that it now feels like sinus area around eye is hurting.  She reports flushing with natural tears which was painful.  Eye movement causes increased pain.       The following portions of the patient's history were reviewed and updated as appropriate: allergies, current medications, past family history, past medical history, past social history, past surgical history and problem list.    Current Outpatient Prescriptions:   •  ALLERGY SERUM INJECTION, Inject  under the skin Every 14 (Fourteen) Days., Disp: , Rfl:   •  amitriptyline (ELAVIL) 25 MG tablet, Take 25 mg by mouth daily., Disp: , Rfl:   •  aspirin 81 MG EC tablet, Take 81 mg by mouth daily., Disp: , Rfl:   •  butalbital-acetaminophen-caffeine (FIORICET, ESGIC) -40 MG per tablet, Take 1 tablet by mouth as needed for headaches., Disp: , Rfl:   •  calcium citrate-vitamin d (CALCITRATE) 315-250 MG-UNIT tablet tablet, Take 1 tablet by mouth daily., Disp: , Rfl:   •  celecoxib (CeleBREX) 200 MG capsule, Take 200 mg by mouth daily., Disp: , Rfl:   •  cholecalciferol (VITAMIN D3) 1000 UNITS tablet, Take 1,000 Units by mouth daily., Disp: , Rfl:   •  cyanocobalamin 1000 MCG/ML injection, Inject 1,000 mcg into the shoulder, thigh, or buttocks Every 28 (Twenty-Eight) Days., Disp: , Rfl:   •  cyclobenzaprine (FLEXERIL) 10 MG tablet, Take 10 mg by mouth daily., Disp: , Rfl:   •  dexlansoprazole (DEXILANT) 60 MG capsule, Take 60 mg by mouth daily., Disp: , Rfl:   •  diazePAM (VALIUM) 2 MG tablet, Take 2 mg by mouth 2 (Two) Times a Day., Disp: , Rfl:   •  DULoxetine (CYMBALTA) 60 MG capsule, Take 60 mg by mouth 2 (two) times a day., Disp: , Rfl:   •  ferrous sulfate 325 (65 FE) MG tablet, Take 325 mg by mouth  daily., Disp: , Rfl:   •  folic acid (FOLVITE) 1 MG tablet, Take 1 mg by mouth daily., Disp: , Rfl:   •  gabapentin (NEURONTIN) 600 MG tablet, Take 600 mg by mouth 4 (Four) Times a Day., Disp: , Rfl:   •  InFLIXimab-dyyb (INFLECTRA IV), Infuse  into a venous catheter., Disp: , Rfl:   •  insulin detemir (LEVEMIR) 100 UNIT/ML injection, Inject 25 Units under the skin Daily., Disp: , Rfl:   •  levothyroxine (SYNTHROID, LEVOTHROID) 150 MCG tablet, Take 112 mcg by mouth 2 (Two) Times a Week., Disp: , Rfl:   •  linaclotide (LINZESS) 290 MCG capsule capsule, Take 290 mcg by mouth every morning before breakfast., Disp: , Rfl:   •  loratadine (CLARITIN) 10 MG tablet, Take 10 mg by mouth Every Night., Disp: , Rfl:   •  methotrexate 2.5 MG tablet, Take 25 mg by mouth 1 (One) Time Per Week. Takes 10 tablets 1 day per week on Saturday, Disp: , Rfl:   •  metoprolol tartrate (LOPRESSOR) 50 MG tablet, TAKE 1 TABLET TWICE A DAY, Disp: 180 tablet, Rfl: 0  •  Multiple Vitamin (MULTI VITAMIN DAILY PO), Take 1 tablet by mouth Every Morning., Disp: , Rfl:   •  OnabotulinumtoxinA (BOTOX IM), Inject  into the shoulder, thigh, or buttocks Every 3 (Three) Months., Disp: , Rfl:   •  polyethylene glycol (MIRALAX) packet, Take 17 g by mouth daily., Disp: , Rfl:   •  primidone (MYSOLINE) 50 MG tablet, Take 50 mg by mouth daily., Disp: , Rfl:   •  ranitidine (ZANTAC) 150 MG tablet, Take 150 mg by mouth 2 (two) times a day., Disp: , Rfl:   •  topiramate (TOPAMAX) 25 MG tablet, Take 50 mg by mouth Every Night., Disp: , Rfl:   •  traMADol (ULTRAM) 50 MG tablet, Take 50 mg by mouth 2 (two) times a day., Disp: , Rfl:   •  vitamin C (ASCORBIC ACID) 500 MG tablet, Take 1,000 mg by mouth Daily., Disp: , Rfl:   •  vitamin E 1000 UNIT capsule, Take 1,000 Units by mouth daily., Disp: , Rfl:     Review of Systems   Constitutional: Negative.    HENT: Negative.    Eyes: Positive for pain.   Respiratory: Negative.    Cardiovascular: Negative.     Gastrointestinal: Negative.      Pulse 88  Temp 98.3 °F (36.8 °C) (Temporal Artery )   Resp 20  Wt 81.1 kg (178 lb 12.8 oz)  SpO2 99%  BMI 29.75 kg/m2    Objective   Allergies   Allergen Reactions   • Beta Adrenergic Blockers    • Penicillins    • Sulfa Antibiotics    • Codeine    • Imuran [Azathioprine]    • Januvia [Sitagliptin]        Physical Exam   Constitutional: She appears well-developed and well-nourished.   Eyes: Conjunctivae and EOM are normal. Pupils are equal, round, and reactive to light. Right eye exhibits no discharge. Left eye exhibits no discharge.   Cardiovascular: Normal rate, regular rhythm and normal heart sounds.    Pulmonary/Chest: Effort normal and breath sounds normal.   Psychiatric: She has a normal mood and affect. Her behavior is normal.   Vitals reviewed.      Assessment/Plan   Lashae was seen today for eye problems.    Diagnoses and all orders for this visit:    Acute left eye pain      Referred to optometrist  Pt and  agree with treatment plan..         December 29, 2017 1:50 PM

## 2018-01-21 ENCOUNTER — APPOINTMENT (OUTPATIENT)
Dept: GENERAL RADIOLOGY | Facility: HOSPITAL | Age: 58
End: 2018-01-21

## 2018-01-21 ENCOUNTER — APPOINTMENT (OUTPATIENT)
Dept: CT IMAGING | Facility: HOSPITAL | Age: 58
End: 2018-01-21

## 2018-01-21 ENCOUNTER — HOSPITAL ENCOUNTER (EMERGENCY)
Facility: HOSPITAL | Age: 58
Discharge: HOME | End: 2018-01-21
Attending: EMERGENCY MEDICINE | Admitting: EMERGENCY MEDICINE

## 2018-01-21 VITALS
WEIGHT: 175 LBS | OXYGEN SATURATION: 100 % | RESPIRATION RATE: 16 BRPM | DIASTOLIC BLOOD PRESSURE: 66 MMHG | TEMPERATURE: 97.6 F | HEIGHT: 65 IN | SYSTOLIC BLOOD PRESSURE: 121 MMHG | BODY MASS INDEX: 29.16 KG/M2 | HEART RATE: 70 BPM

## 2018-01-21 DIAGNOSIS — R10.13 EPIGASTRIC ABDOMINAL PAIN: Primary | ICD-10-CM

## 2018-01-21 LAB
ALBUMIN SERPL-MCNC: 4 G/DL (ref 3.5–5)
ALBUMIN/GLOB SERPL: 1.6 G/DL (ref 1.5–2.5)
ALP SERPL-CCNC: 143 U/L (ref 35–104)
ALT SERPL W P-5'-P-CCNC: 40 U/L (ref 10–36)
ANION GAP SERPL CALCULATED.3IONS-SCNC: 4.4 MMOL/L (ref 3.6–11.2)
AST SERPL-CCNC: 26 U/L (ref 10–30)
B-HCG UR QL: NEGATIVE
BACTERIA UR QL AUTO: ABNORMAL /HPF
BASOPHILS # BLD AUTO: 0.03 10*3/MM3 (ref 0–0.3)
BASOPHILS NFR BLD AUTO: 0.5 % (ref 0–2)
BILIRUB SERPL-MCNC: 0.4 MG/DL (ref 0.2–1.8)
BILIRUB UR QL STRIP: NEGATIVE
BUN BLD-MCNC: 14 MG/DL (ref 7–21)
BUN/CREAT SERPL: 20.6 (ref 7–25)
CALCIUM SPEC-SCNC: 9.2 MG/DL (ref 7.7–10)
CHLORIDE SERPL-SCNC: 102 MMOL/L (ref 99–112)
CLARITY UR: CLEAR
CO2 SERPL-SCNC: 27.6 MMOL/L (ref 24.3–31.9)
COLOR UR: YELLOW
CREAT BLD-MCNC: 0.68 MG/DL (ref 0.43–1.29)
DEPRECATED RDW RBC AUTO: 51.6 FL (ref 37–54)
EOSINOPHIL # BLD AUTO: 0.23 10*3/MM3 (ref 0–0.7)
EOSINOPHIL NFR BLD AUTO: 4 % (ref 0–5)
ERYTHROCYTE [DISTWIDTH] IN BLOOD BY AUTOMATED COUNT: 14.4 % (ref 11.5–14.5)
GFR SERPL CREATININE-BSD FRML MDRD: 90 ML/MIN/1.73
GLOBULIN UR ELPH-MCNC: 2.5 GM/DL
GLUCOSE BLD-MCNC: 183 MG/DL (ref 70–110)
GLUCOSE UR STRIP-MCNC: NEGATIVE MG/DL
HCT VFR BLD AUTO: 39.2 % (ref 37–47)
HGB BLD-MCNC: 12.8 G/DL (ref 12–16)
HGB UR QL STRIP.AUTO: NEGATIVE
HOLD SPECIMEN: NORMAL
HOLD SPECIMEN: NORMAL
HYALINE CASTS UR QL AUTO: ABNORMAL /LPF
IMM GRANULOCYTES # BLD: 0 10*3/MM3 (ref 0–0.03)
IMM GRANULOCYTES NFR BLD: 0 % (ref 0–0.5)
KETONES UR QL STRIP: NEGATIVE
LEUKOCYTE ESTERASE UR QL STRIP.AUTO: ABNORMAL
LIPASE SERPL-CCNC: 18 U/L (ref 13–60)
LYMPHOCYTES # BLD AUTO: 1.56 10*3/MM3 (ref 1–3)
LYMPHOCYTES NFR BLD AUTO: 26.9 % (ref 21–51)
MCH RBC QN AUTO: 33 PG (ref 27–33)
MCHC RBC AUTO-ENTMCNC: 32.7 G/DL (ref 33–37)
MCV RBC AUTO: 101 FL (ref 80–94)
MONOCYTES # BLD AUTO: 0.4 10*3/MM3 (ref 0.1–0.9)
MONOCYTES NFR BLD AUTO: 6.9 % (ref 0–10)
NEUTROPHILS # BLD AUTO: 3.59 10*3/MM3 (ref 1.4–6.5)
NEUTROPHILS NFR BLD AUTO: 61.7 % (ref 30–70)
NITRITE UR QL STRIP: NEGATIVE
OSMOLALITY SERPL CALC.SUM OF ELEC: 273.4 MOSM/KG (ref 273–305)
PH UR STRIP.AUTO: 6 [PH] (ref 5–8)
PLATELET # BLD AUTO: 278 10*3/MM3 (ref 130–400)
PMV BLD AUTO: 9.4 FL (ref 6–10)
POTASSIUM BLD-SCNC: 3.7 MMOL/L (ref 3.5–5.3)
PROT SERPL-MCNC: 6.5 G/DL (ref 6–8)
PROT UR QL STRIP: NEGATIVE
RBC # BLD AUTO: 3.88 10*6/MM3 (ref 4.2–5.4)
RBC # UR: ABNORMAL /HPF
REF LAB TEST METHOD: ABNORMAL
SODIUM BLD-SCNC: 134 MMOL/L (ref 135–153)
SP GR UR STRIP: 1.01 (ref 1–1.03)
SQUAMOUS #/AREA URNS HPF: ABNORMAL /HPF
TROPONIN I SERPL-MCNC: <0.006 NG/ML
UROBILINOGEN UR QL STRIP: ABNORMAL
WBC NRBC COR # BLD: 5.81 10*3/MM3 (ref 4.5–12.5)
WBC UR QL AUTO: ABNORMAL /HPF
WHOLE BLOOD HOLD SPECIMEN: NORMAL
WHOLE BLOOD HOLD SPECIMEN: NORMAL

## 2018-01-21 PROCEDURE — 96375 TX/PRO/DX INJ NEW DRUG ADDON: CPT

## 2018-01-21 PROCEDURE — 81001 URINALYSIS AUTO W/SCOPE: CPT | Performed by: EMERGENCY MEDICINE

## 2018-01-21 PROCEDURE — 85025 COMPLETE CBC W/AUTO DIFF WBC: CPT | Performed by: EMERGENCY MEDICINE

## 2018-01-21 PROCEDURE — 71046 X-RAY EXAM CHEST 2 VIEWS: CPT | Performed by: RADIOLOGY

## 2018-01-21 PROCEDURE — 71046 X-RAY EXAM CHEST 2 VIEWS: CPT

## 2018-01-21 PROCEDURE — 87086 URINE CULTURE/COLONY COUNT: CPT | Performed by: EMERGENCY MEDICINE

## 2018-01-21 PROCEDURE — 93005 ELECTROCARDIOGRAM TRACING: CPT | Performed by: EMERGENCY MEDICINE

## 2018-01-21 PROCEDURE — 25010000002 HYDROMORPHONE PER 4 MG

## 2018-01-21 PROCEDURE — 0 IOPAMIDOL 61 % SOLUTION: Performed by: EMERGENCY MEDICINE

## 2018-01-21 PROCEDURE — 81025 URINE PREGNANCY TEST: CPT | Performed by: EMERGENCY MEDICINE

## 2018-01-21 PROCEDURE — 96374 THER/PROPH/DIAG INJ IV PUSH: CPT

## 2018-01-21 PROCEDURE — 96361 HYDRATE IV INFUSION ADD-ON: CPT

## 2018-01-21 PROCEDURE — 83690 ASSAY OF LIPASE: CPT | Performed by: EMERGENCY MEDICINE

## 2018-01-21 PROCEDURE — 84484 ASSAY OF TROPONIN QUANT: CPT | Performed by: EMERGENCY MEDICINE

## 2018-01-21 PROCEDURE — 71260 CT THORAX DX C+: CPT | Performed by: RADIOLOGY

## 2018-01-21 PROCEDURE — 80053 COMPREHEN METABOLIC PANEL: CPT | Performed by: EMERGENCY MEDICINE

## 2018-01-21 PROCEDURE — 71260 CT THORAX DX C+: CPT

## 2018-01-21 PROCEDURE — 99284 EMERGENCY DEPT VISIT MOD MDM: CPT

## 2018-01-21 PROCEDURE — 93010 ELECTROCARDIOGRAM REPORT: CPT | Performed by: INTERNAL MEDICINE

## 2018-01-21 PROCEDURE — 25010000002 ONDANSETRON PER 1 MG: Performed by: EMERGENCY MEDICINE

## 2018-01-21 RX ORDER — SODIUM CHLORIDE 0.9 % (FLUSH) 0.9 %
10 SYRINGE (ML) INJECTION AS NEEDED
Status: DISCONTINUED | OUTPATIENT
Start: 2018-01-21 | End: 2018-01-22 | Stop reason: HOSPADM

## 2018-01-21 RX ORDER — HYDROMORPHONE HCL 110MG/55ML
PATIENT CONTROLLED ANALGESIA SYRINGE INTRAVENOUS
Status: COMPLETED
Start: 2018-01-21 | End: 2018-01-21

## 2018-01-21 RX ORDER — ONDANSETRON 2 MG/ML
4 INJECTION INTRAMUSCULAR; INTRAVENOUS ONCE
Status: COMPLETED | OUTPATIENT
Start: 2018-01-21 | End: 2018-01-21

## 2018-01-21 RX ADMIN — SODIUM CHLORIDE 1000 ML: 9 INJECTION, SOLUTION INTRAVENOUS at 20:34

## 2018-01-21 RX ADMIN — IOPAMIDOL 90 ML: 612 INJECTION, SOLUTION INTRAVENOUS at 21:21

## 2018-01-21 RX ADMIN — Medication 1 MG: at 22:26

## 2018-01-21 RX ADMIN — ONDANSETRON 4 MG: 2 INJECTION, SOLUTION INTRAMUSCULAR; INTRAVENOUS at 20:33

## 2018-01-21 RX ADMIN — HYDROMORPHONE HYDROCHLORIDE 1 MG: 2 INJECTION INTRAMUSCULAR; INTRAVENOUS; SUBCUTANEOUS at 20:31

## 2018-01-21 RX ADMIN — HYDROMORPHONE HYDROCHLORIDE 1 MG: 1 INJECTION, SOLUTION INTRAMUSCULAR; INTRAVENOUS; SUBCUTANEOUS at 22:26

## 2018-01-22 NOTE — ED PROVIDER NOTES
Subjective   Patient is a 57 y.o. female presenting with abdominal pain.   Abdominal Pain   Pain location:  Epigastric  Pain quality: cramping and sharp    Pain radiates to:  Does not radiate  Pain severity:  Moderate  Onset quality:  Gradual  Progression:  Worsening  Chronicity:  Recurrent  Context: not alcohol use, not eating and not medication withdrawal    Relieved by:  Nothing  Worsened by:  Nothing  Ineffective treatments:  None tried  Associated symptoms: nausea    Associated symptoms: no chest pain, no dysuria and no fever        Review of Systems   Constitutional: Negative.  Negative for fever.   HENT: Negative.    Respiratory: Negative.    Cardiovascular: Negative.  Negative for chest pain.   Gastrointestinal: Positive for abdominal pain and nausea.   Endocrine: Negative.    Genitourinary: Negative.  Negative for dysuria.   Skin: Negative.    Neurological: Negative.    Psychiatric/Behavioral: Negative.    All other systems reviewed and are negative.      Past Medical History:   Diagnosis Date   • Acid reflux    • Allergic    • Anxiety    • Cancer    • Degenerative disc disease, lumbar    • Depression    • Dupuytren's disease    • Essential hypertension    • Family history of coronary artery disease    • Fibromyalgia    • Gallbladder abscess    • H. pylori infection    • Hiatal hernia    • Hyperlipidemia    • Hypothyroidism    • Migraines    • Multiple sclerosis    • Osteoarthritis    • Psoriasis    • Psoriatic arthritis    • RA (rheumatoid arthritis)    • Sinusitis    • Sjogren's syndrome    • Stomach ulcer    • TMJ arthritis    • Type 2 diabetes mellitus    • Urinary tract infection        Allergies   Allergen Reactions   • Beta Adrenergic Blockers    • Penicillins    • Sulfa Antibiotics    • Codeine    • Imuran [Azathioprine]    • Januvia [Sitagliptin]        Past Surgical History:   Procedure Laterality Date   • BREAST LUMPECTOMY Left 11/1993   • CARDIAC CATHETERIZATION Left 09/21/2009    Normal    •  CARPAL TUNNEL RELEASE  03/2012   • CERVICAL POLYPECTOMY  12/2015   • CHOLECYSTECTOMY  05/2007   • GASTRIC BYPASS  09/2007   • KNEE ARTHROPLASTY     • LUMBAR DISC SURGERY      C5-6   • REPLACEMENT TOTAL KNEE Right 06/2016   • SACRAL NERVE STIMULATOR PLACEMENT  09/2014   • THYROIDECTOMY  03/2016       Family History   Problem Relation Age of Onset   • Diabetes Mother    • Stroke Mother    • Hypertension Mother    • Heart attack Father      First one was in his 20's second 30's.    • Heart failure Father    • Heart disease Father    • Diabetes Sister    • Cancer Maternal Grandmother        Social History     Social History   • Marital status:      Spouse name: N/A   • Number of children: N/A   • Years of education: N/A     Social History Main Topics   • Smoking status: Never Smoker   • Smokeless tobacco: Never Used   • Alcohol use No   • Drug use: No   • Sexual activity: Defer     Other Topics Concern   • None     Social History Narrative           Objective   Physical Exam   Constitutional: She is oriented to person, place, and time. She appears well-developed and well-nourished. No distress.   HENT:   Head: Normocephalic and atraumatic.   Right Ear: External ear normal.   Left Ear: External ear normal.   Nose: Nose normal.   Eyes: Conjunctivae and EOM are normal. Pupils are equal, round, and reactive to light.   Neck: Normal range of motion. Neck supple. No JVD present. No tracheal deviation present.   Cardiovascular: Normal rate, regular rhythm and normal heart sounds.    No murmur heard.  Pulmonary/Chest: Effort normal and breath sounds normal. No respiratory distress. She has no wheezes.   Abdominal: Soft. Bowel sounds are normal. There is no tenderness.   Tender epigastric area   Musculoskeletal: Normal range of motion. She exhibits no edema or deformity.   Neurological: She is alert and oriented to person, place, and time. No cranial nerve deficit.   Skin: Skin is warm and dry. No rash noted. She is not  diaphoretic. No erythema. No pallor.   Psychiatric: She has a normal mood and affect. Her behavior is normal. Thought content normal.   Nursing note and vitals reviewed.      Procedures         ED Course  ED Course   Comment By Time   CT chest negative Colin Walker MD 01/21 2200   CXR nodule RUL Colin Walker MD 01/21 2200                  [facility]    Final diagnoses:   Epigastric abdominal pain            Colin Walker MD  01/23/18 0946

## 2018-01-22 NOTE — ED NOTES
Pt is aao x4 pt has no signs of distress breathing is equal and unlabored skin pwd     Josette Aiken, SOFIA  01/21/18 [id number]

## 2018-01-23 LAB — BACTERIA SPEC AEROBE CULT: NORMAL

## 2018-02-02 ENCOUNTER — EPISODE CHANGES (OUTPATIENT)
Dept: CASE MANAGEMENT | Facility: OTHER | Age: 58
End: 2018-02-02

## 2018-03-17 ENCOUNTER — OFFICE VISIT (OUTPATIENT)
Dept: RETAIL CLINIC | Facility: CLINIC | Age: 58
End: 2018-03-17

## 2018-03-17 VITALS
WEIGHT: 185.8 LBS | OXYGEN SATURATION: 99 % | BODY MASS INDEX: 30.92 KG/M2 | TEMPERATURE: 99.1 F | HEART RATE: 80 BPM | RESPIRATION RATE: 20 BRPM

## 2018-03-17 DIAGNOSIS — J40 BRONCHITIS: ICD-10-CM

## 2018-03-17 DIAGNOSIS — J01.10 ACUTE FRONTAL SINUSITIS, RECURRENCE NOT SPECIFIED: Primary | ICD-10-CM

## 2018-03-17 PROCEDURE — 99213 OFFICE O/P EST LOW 20 MIN: CPT | Performed by: NURSE PRACTITIONER

## 2018-03-17 RX ORDER — DOXYCYCLINE 100 MG/1
100 CAPSULE ORAL 2 TIMES DAILY
Qty: 20 CAPSULE | Refills: 0 | Status: SHIPPED | OUTPATIENT
Start: 2018-03-17 | End: 2018-03-27

## 2018-03-17 RX ORDER — BENZONATATE 100 MG/1
100 CAPSULE ORAL 3 TIMES DAILY PRN
Qty: 30 CAPSULE | Refills: 0 | Status: SHIPPED | OUTPATIENT
Start: 2018-03-17 | End: 2018-03-27

## 2018-03-17 RX ORDER — ALBUTEROL SULFATE 90 UG/1
2 AEROSOL, METERED RESPIRATORY (INHALATION) EVERY 4 HOURS PRN
Qty: 1 INHALER | Refills: 0 | Status: SHIPPED | OUTPATIENT
Start: 2018-03-17 | End: 2018-04-16

## 2018-03-17 NOTE — PROGRESS NOTES
Subjective   Lashae Benitez is a 57 y.o. female.   Chief Complaint   Patient presents with   • URI      URI    This is a new problem. The current episode started in the past 7 days. The problem has been gradually worsening. There has been no fever. Associated symptoms include congestion, coughing, headaches, rhinorrhea and sinus pain. Treatments tried: mucinex. The treatment provided mild relief.        The following portions of the patient's history were reviewed and updated as appropriate: allergies, current medications, past family history, past medical history, past social history, past surgical history and problem list.    Review of Systems   Constitutional: Positive for fatigue. Negative for fever.   HENT: Positive for congestion, postnasal drip, rhinorrhea, sinus pain, sinus pressure and voice change.    Eyes: Negative.    Respiratory: Positive for cough and chest tightness.    Gastrointestinal: Negative.    Genitourinary: Negative.    Skin: Negative.    Allergic/Immunologic: Negative.    Neurological: Positive for headaches.   Psychiatric/Behavioral: Negative.    All other systems reviewed and are negative.      Objective   Allergies   Allergen Reactions   • Beta Adrenergic Blockers    • Penicillins    • Sulfa Antibiotics    • Codeine    • Imuran [Azathioprine]    • Januvia [Sitagliptin]        Physical Exam   Constitutional: She is oriented to person, place, and time. She appears well-developed and well-nourished. She appears ill.   HENT:   Right Ear: Tympanic membrane normal.   Left Ear: Tympanic membrane normal.   Nose: Mucosal edema present. Right sinus exhibits frontal sinus tenderness. Left sinus exhibits frontal sinus tenderness.   Mouth/Throat: Posterior oropharyngeal erythema present. No oropharyngeal exudate.   Mucoid pnd   Eyes: Pupils are equal, round, and reactive to light.   Neck: Neck supple.   Cardiovascular: Normal rate and regular rhythm.    Pulmonary/Chest: Effort normal and breath sounds  normal.   Coarse breath sounds   Neurological: She is alert and oriented to person, place, and time.   Skin: Skin is warm and dry.   Psychiatric: She has a normal mood and affect.   Vitals reviewed.      Assessment/Plan   Lashae was seen today for uri.    Diagnoses and all orders for this visit:    Acute frontal sinusitis, recurrence not specified    Bronchitis    Other orders  -     doxycycline (MONODOX) 100 MG capsule; Take 1 capsule by mouth 2 (Two) Times a Day for 10 days.  -     albuterol (PROVENTIL HFA;VENTOLIN HFA) 108 (90 Base) MCG/ACT inhaler; Inhale 2 puffs Every 4 (Four) Hours As Needed for Wheezing or Shortness of Air (cough) for up to 30 days.  -     benzonatate (TESSALON) 100 MG capsule; Take 1 capsule by mouth 3 (Three) Times a Day As Needed for Cough for up to 10 days.                 This document has been electronically signed by RUIZ Salas March 17, 2018 3:29 PM

## 2018-03-17 NOTE — PATIENT INSTRUCTIONS
How to Use a Metered Dose Inhaler  A metered dose inhaler is a handheld device for taking medicine that must be breathed into the lungs (inhaled). The device can be used to deliver a variety of inhaled medicines, including:  · Quick relief or rescue medicines, such as bronchodilators.  · Controller medicines, such as corticosteroids.  The medicine is delivered by pushing down on a metal canister to release a preset amount of spray and medicine. Each device contains the amount of medicine that is needed for a preset number of uses (inhalations).  Your health care provider may recommend that you use a spacer with your inhaler to help you take the medicine more effectively. A spacer is a plastic tube with a mouthpiece on one end and an opening that connects to the inhaler on the other end. A spacer holds the medicine in a tube for a short time, which allows you to inhale more medicine.  What are the risks?  If you do not use your inhaler correctly, medicine might not reach your lungs to help you breathe.  Inhaler medicine can cause side effects, such as:  · Mouth or throat infection.  · Cough.  · Hoarseness.  · Headache.  · Nausea and vomiting.  · Lung infection (pneumonia) in people who have a lung condition called COPD.  How to use a metered dose inhaler without a spacer  1. Remove the cap from the inhaler.  2. If you are using the inhaler for the first time, shake it for 5 seconds, turn it away from your face, then release 4 puffs into the air. This is called priming.  3. Shake the inhaler for 5 seconds.  4. Position the inhaler so the top of the canister faces up.  5. Put your index finger on the top of the medicine canister. Support the bottom of the inhaler with your thumb.  6. Breathe out normally and as completely as possible, away from the inhaler.  7. Either place the inhaler between your teeth and close your lips tightly around the mouthpiece, or hold the inhaler 1-2 inches (2.5-5 cm) away from your open  mouth. Keep your tongue down out of the way. If you are unsure which technique to use, ask your health care provider.  8. Press the canister down with your index finger to release the medicine, then inhale deeply and slowly through your mouth (not your nose) until your lungs are completely filled. Inhaling should take 4-6 seconds.  9. Hold the medicine in your lungs for 5-10 seconds (10 seconds is best). This helps the medicine get into the small airways of your lungs.  10. With your lips in a tight [place] (pursed), breathe out slowly.  11. Repeat steps 3-10 until you have taken the number of puffs that your health care provider directed. Wait about 1 minute between puffs or as directed.  12. Put the cap on the inhaler.  13. If you are using a steroid inhaler, rinse your mouth with water, gargle, and spit out the water. Do not swallow the water.  How to use a metered dose inhaler with a spacer  1. Remove the cap from the inhaler.  2. If you are using the inhaler for the first time, shake it for 5 seconds, turn it away from your face, then release 4 puffs into the air. This is called priming.  3. Shake the inhaler for 5 seconds.  4. Place the open end of the spacer onto the inhaler mouthpiece.  5. Position the inhaler so the top of the canister faces up and the spacer mouthpiece faces you.  6. Put your index finger on the top of the medicine canister. Support the bottom of the inhaler and the spacer with your thumb.  7. Breathe out normally and as completely as possible, away from the spacer.  8. Place the spacer between your teeth and close your lips tightly around it. Keep your tongue down out of the way.  9. Press the canister down with your index finger to release the medicine, then inhale deeply and slowly through your mouth (not your nose) until your lungs are completely filled. Inhaling should take 4-6 seconds.  10. Hold the medicine in your lungs for 5-10 seconds (10 seconds is best). This helps the medicine  get into the small airways of your lungs.  11. With your lips in a tight [place] (pursed), breathe out slowly.  12. Repeat steps 3-11 until you have taken the number of puffs that your health care provider directed. Wait about 1 minute between puffs or as directed.  13. Remove the spacer from the inhaler and put the cap on the inhaler.  14. If you are using a steroid inhaler, rinse your mouth with water, gargle, and spit out the water. Do not swallow the water.  Follow these instructions at home:  · Take your inhaled medicine only as told by your health care provider. Do not use the inhaler more than directed by your health care provider.  · Keep all follow-up visits as told by your health care provider. This is important.  · If your inhaler has a counter, you can check it to determine how full your inhaler is. If your inhaler does not have a counter, ask your health care provider when you will need to refill your inhaler and write the refill date on a calendar or on your inhaler canister. Note that you cannot know when an inhaler is empty by shaking it.  · Follow directions on the package insert for care and cleaning of your inhaler and spacer.  Contact a health care provider if:  · Symptoms are only partially relieved with your inhaler.  · You are having trouble using your inhaler.  · You have an increase in phlegm.  · You have headaches.  Get help right away if:  · You feel little or no relief after using your inhaler.  · You have dizziness.  · You have a fast heart rate.  · You have chills or a fever.  · You have night sweats.  · There is blood in your phlegm.  Summary  · A metered dose inhaler is a handheld device for taking medicine that must be breathed into the lungs (inhaled).  · The medicine is delivered by pushing down on a metal canister to release a preset amount of spray and medicine.  · Each device contains the amount of medicine that is needed for a preset number of uses (inhalations).  This  information is not intended to replace advice given to you by your health care provider. Make sure you discuss any questions you have with your health care provider.  Document Released: 12/18/2006 Document Revised: 11/07/2017 Document Reviewed: 11/07/2017  [employer] Interactive Patient Education © 2017 [employer]    Sinusitis, Adult  Sinusitis is soreness and inflammation of your sinuses. Sinuses are hollow spaces in the bones around your face. They are located:  · Around your eyes.  · In the middle of your forehead.  · Behind your nose.  · In your cheekbones.  Your sinuses and nasal passages are lined with a stringy fluid (mucus). Mucus normally drains out of your sinuses. When your nasal tissues get inflamed or swollen, the mucus can get trapped or blocked so air cannot flow through your sinuses. This lets bacteria, viruses, and funguses grow, and that leads to infection.  Follow these instructions at home:  Medicines   · Take, use, or apply over-the-counter and prescription medicines only as told by your doctor. These may include nasal sprays.  · If you were prescribed an antibiotic medicine, take it as told by your doctor. Do not stop taking the antibiotic even if you start to feel better.  Hydrate and Humidify   · Drink enough water to keep your pee (urine) clear or pale yellow.  · Use a cool mist humidifier to keep the humidity level in your home above 50%.  · Breathe in steam for 10-15 minutes, 3-4 times a day or as told by your doctor. You can do this in the bathroom while a hot shower is running.  · Try not to spend time in cool or dry air.  Rest   · Rest as much as possible.  · Sleep with your head raised (elevated).  · Make sure to get enough sleep each night.  General instructions   · Put a warm, moist washcloth on your face 3-4 times a day or as told by your doctor. This will help with discomfort.  · Wash your hands often with soap and water. If there is no soap and water, use hand .  · Do  not smoke. Avoid being around people who are smoking (secondhand smoke).  · Keep all follow-up visits as told by your doctor. This is important.  Contact a doctor if:  · You have a fever.  · Your symptoms get worse.  · Your symptoms do not get better within 10 days.  Get help right away if:  · You have a very bad headache.  · You cannot stop throwing up (vomiting).  · You have pain or swelling around your face or eyes.  · You have trouble seeing.  · You feel confused.  · Your neck is stiff.  · You have trouble breathing.  This information is not intended to replace advice given to you by your health care provider. Make sure you discuss any questions you have with your health care provider.  Document Released: 06/05/2009 Document Revised: 08/13/2017 Document Reviewed: 10/12/2016  [employer] Interactive Patient Education © 2017 [employer]    Acute Bronchitis, Adult  Acute bronchitis is when air tubes (bronchi) in the lungs suddenly get swollen. The condition can make it hard to breathe. It can also cause these symptoms:  · A cough.  · Coughing up clear, yellow, or green mucus.  · Wheezing.  · Chest congestion.  · Shortness of breath.  · A fever.  · Body aches.  · Chills.  · A sore throat.  Follow these instructions at home:  Medicines   · Take over-the-counter and prescription medicines only as told by your doctor.  · If you were prescribed an antibiotic medicine, take it as told by your doctor. Do not stop taking the antibiotic even if you start to feel better.  General instructions   · Rest.  · Drink enough fluids to keep your pee (urine) clear or pale yellow.  · Avoid smoking and secondhand smoke. If you smoke and you need help quitting, ask your doctor. Quitting will help your lungs heal faster.  · Use an inhaler, cool mist vaporizer, or humidifier as told by your doctor.  · Keep all follow-up visits as told by your doctor. This is important.  How is this prevented?  To lower your risk of getting this condition  again:  · Wash your hands often with soap and water. If you cannot use soap and water, use hand .  · Avoid contact with people who have cold symptoms.  · Try not to touch your hands to your mouth, nose, or eyes.  · Make sure to get the flu shot every year.  Contact a doctor if:  · Your symptoms do not get better in 2 weeks.  Get help right away if:  · You cough up blood.  · You have chest pain.  · You have very bad shortness of breath.  · You become dehydrated.  · You faint (pass out) or keep feeling like you are going to pass out.  · You keep throwing up (vomiting).  · You have a very bad headache.  · Your fever or chills gets worse.  This information is not intended to replace advice given to you by your health care provider. Make sure you discuss any questions you have with your health care provider.  Document Released: 06/05/2009 Document Revised: 07/26/2017 Document Reviewed: 06/07/2017  [employer] Interactive Patient Education © 2017 [employer]

## 2018-03-28 RX ORDER — METOPROLOL TARTRATE 50 MG/1
TABLET, FILM COATED ORAL
Qty: 180 TABLET | Refills: 0 | Status: SHIPPED | OUTPATIENT
Start: 2018-03-28 | End: 2018-06-28 | Stop reason: SDUPTHER

## 2018-06-19 ENCOUNTER — OFFICE VISIT (OUTPATIENT)
Dept: CARDIOLOGY | Facility: CLINIC | Age: 58
End: 2018-06-19

## 2018-06-19 VITALS
RESPIRATION RATE: 16 BRPM | WEIGHT: 193 LBS | SYSTOLIC BLOOD PRESSURE: 113 MMHG | DIASTOLIC BLOOD PRESSURE: 74 MMHG | HEART RATE: 91 BPM | HEIGHT: 65 IN | BODY MASS INDEX: 32.15 KG/M2

## 2018-06-19 DIAGNOSIS — I10 ESSENTIAL HYPERTENSION: Primary | ICD-10-CM

## 2018-06-19 DIAGNOSIS — R60.0 BILATERAL LEG EDEMA: ICD-10-CM

## 2018-06-19 PROCEDURE — 99214 OFFICE O/P EST MOD 30 MIN: CPT | Performed by: INTERNAL MEDICINE

## 2018-06-19 PROCEDURE — 93000 ELECTROCARDIOGRAM COMPLETE: CPT | Performed by: INTERNAL MEDICINE

## 2018-06-19 RX ORDER — LEVOTHYROXINE SODIUM 137 UG/1
125 CAPSULE ORAL DAILY
COMMUNITY

## 2018-06-19 RX ORDER — POLYETHYLENE GLYCOL 3350 17 G/17G
17 POWDER, FOR SOLUTION ORAL DAILY
Status: ON HOLD | COMMUNITY
End: 2019-08-19

## 2018-06-19 NOTE — PROGRESS NOTES
Samuel Duane Kreis, MD  Lashae Benitez  1960  12/15/2016    Patient Active Problem List   Diagnosis   • Hiatal hernia   • Essential hypertension   • Sjogren's syndrome   • Multiple sclerosis   • Psoriasis   • Fibromyalgia   • Osteoarthritis   • Psoriatic arthritis   • RA (rheumatoid arthritis)   • Degenerative disc disease, lumbar   • TMJ arthritis   • Dupuytren's disease   • H. pylori infection   • Family history of coronary artery disease   • Type 2 diabetes mellitus   • SOB (shortness of breath)   • Dizziness   • Edema   • Precordial pain   • Chest pain   • Palpitations   • Bilateral leg edema       Dear Samuel Duane Kreis, MD:    Subjective     Lashae Benitez is a 57 y.o. female with the above medical problems who is here today for follow-up. According to the patient she has been doing fairly well since her last visit.  She denies any chest pain, shortness of breath, palpitations, orthopnea, PND, dizziness or syncope.  She does complain of some mild bilateral lower extremity edema and loss of balance.      Current Outpatient Prescriptions:   •  ALLERGY SERUM INJECTION, Inject  under the skin Every 14 (Fourteen) Days., Disp: , Rfl:   •  amitriptyline (ELAVIL) 25 MG tablet, Take 25 mg by mouth daily., Disp: , Rfl:   •  aspirin 81 MG EC tablet, Take 81 mg by mouth daily., Disp: , Rfl:   •  butalbital-acetaminophen-caffeine (FIORICET, ESGIC) -40 MG per tablet, Take 1 tablet by mouth as needed for headaches., Disp: , Rfl:   •  calcium citrate-vitamin d (CALCITRATE) 315-250 MG-UNIT tablet tablet, Take 1 tablet by mouth daily., Disp: , Rfl:   •  celecoxib (CeleBREX) 200 MG capsule, Take 200 mg by mouth daily., Disp: , Rfl:   •  cholecalciferol (VITAMIN D3) 1000 UNITS tablet, Take 5,000 Units by mouth Daily., Disp: , Rfl:   •  cyanocobalamin 1000 MCG/ML injection, Inject 1,000 mcg into the shoulder, thigh, or buttocks Every 28 (Twenty-Eight) Days., Disp: , Rfl:   •  cyclobenzaprine (FLEXERIL) 10 MG tablet, Take 10  mg by mouth daily., Disp: , Rfl:   •  dexlansoprazole (DEXILANT) 60 MG capsule, Take 60 mg by mouth daily., Disp: , Rfl:   •  diazePAM (VALIUM) 2 MG tablet, Take 2 mg by mouth 2 (Two) Times a Day., Disp: , Rfl:   •  DULoxetine (CYMBALTA) 60 MG capsule, Take 60 mg by mouth 2 (two) times a day., Disp: , Rfl:   •  ferrous sulfate 325 (65 FE) MG tablet, Take 325 mg by mouth daily., Disp: , Rfl:   •  folic acid (FOLVITE) 1 MG tablet, Take 1 mg by mouth daily., Disp: , Rfl:   •  gabapentin (NEURONTIN) 600 MG tablet, Take 600 mg by mouth 4 (Four) Times a Day., Disp: , Rfl:   •  InFLIXimab-dyyb (INFLECTRA IV), Infuse  into a venous catheter., Disp: , Rfl:   •  insulin detemir (LEVEMIR) 100 UNIT/ML injection, Inject 10 Units under the skin Daily., Disp: , Rfl:   •  levothyroxine sodium (TIROSINT) 137 MCG capsule, Take 137 mcg by mouth Daily., Disp: , Rfl:   •  linaclotide (LINZESS) 290 MCG capsule capsule, Take 290 mcg by mouth every morning before breakfast., Disp: , Rfl:   •  loratadine (CLARITIN) 10 MG tablet, Take 10 mg by mouth Every Night., Disp: , Rfl:   •  methotrexate 2.5 MG tablet, Take 25 mg by mouth 1 (One) Time Per Week. Takes 10 tablets 1 day per week on Saturday, Disp: , Rfl:   •  metoprolol tartrate (LOPRESSOR) 50 MG tablet, TAKE 1 TABLET TWICE A DAY, Disp: 180 tablet, Rfl: 0  •  Multiple Vitamin (MULTI VITAMIN DAILY PO), Take 1 tablet by mouth Every Morning., Disp: , Rfl:   •  OnabotulinumtoxinA (BOTOX IM), Inject  into the shoulder, thigh, or buttocks Every 3 (Three) Months., Disp: , Rfl:   •  polyethylene glycol (MIRALAX) packet, Take 17 g by mouth Daily., Disp: , Rfl:   •  primidone (MYSOLINE) 50 MG tablet, Take 50 mg by mouth daily., Disp: , Rfl:   •  ranitidine (ZANTAC) 150 MG tablet, Take 150 mg by mouth 2 (two) times a day., Disp: , Rfl:   •  topiramate (TOPAMAX) 25 MG tablet, Take 100 mg by mouth Every Night., Disp: , Rfl:   •  traMADol (ULTRAM) 50 MG tablet, Take 50 mg by mouth 2 (two) times a day.,  "Disp: , Rfl:   •  vitamin C (ASCORBIC ACID) 500 MG tablet, Take 1,000 mg by mouth Daily., Disp: , Rfl:   •  vitamin E 1000 UNIT capsule, Take 1,000 Units by mouth daily., Disp: , Rfl:   •  levothyroxine (SYNTHROID, LEVOTHROID) 150 MCG tablet, Take 112 mcg by mouth 2 (Two) Times a Week., Disp: , Rfl:     The following portions of the patient's history were reviewed and updated as appropriate: allergies, current medications, past family history, past medical history, past social history, past surgical history and problem list.    Review of Systems   Constitution: Negative for chills, diaphoresis and fever.   Cardiovascular: Positive for leg swelling. Negative for chest pain, orthopnea, palpitations and paroxysmal nocturnal dyspnea.   Respiratory: Negative for cough, hemoptysis and shortness of breath.    Endocrine: Negative for cold intolerance and heat intolerance.   Hematologic/Lymphatic: Does not bruise/bleed easily.   Skin: Negative for rash.   Musculoskeletal: Negative for myalgias.   Gastrointestinal: Negative for abdominal pain, constipation, diarrhea, nausea and vomiting.   Genitourinary: Negative for dysuria and hematuria.   Neurological: Positive for loss of balance. Negative for dizziness, focal weakness and numbness.       Objective   Blood pressure 113/74, pulse 91, resp. rate 16, height 165.1 cm (65\"), weight 87.5 kg (193 lb).    Physical Exam   Constitutional: She is oriented to person, place, and time. She appears well-developed and well-nourished.   WF sitting comfortably on chair.   HENT:   Mouth/Throat: Oropharynx is clear and moist.   Eyes: EOM are normal. Pupils are equal, round, and reactive to light.   Neck: Neck supple. No JVD present. No tracheal deviation present. No thyromegaly present.   Cardiovascular: Normal rate, regular rhythm, S1 normal and S2 normal.  Exam reveals no gallop and no friction rub.    No murmur heard.  Pulmonary/Chest: Effort normal and breath sounds normal. No " respiratory distress. She has no wheezes. She has no rales.   Abdominal: Soft. Bowel sounds are normal. She exhibits no mass. There is no tenderness.   Musculoskeletal: Normal range of motion. She exhibits no edema (Mild bilateral LE edema.).   Lymphadenopathy:     She has no cervical adenopathy.   Neurological: She is alert and oriented to person, place, and time.   Skin: Skin is warm and dry. No rash noted.   Psychiatric: She has a normal mood and affect.         ECG 12 Lead  Date/Time: 6/19/2018 9:59 AM  Performed by: JESS KEITH  Authorized by: JESS KEITH   Comparison: compared with previous ECG from 1/22/2018  Similar to previous ECG  Rhythm: sinus rhythm  Rate: normal  BPM: 89  Conduction: LAFB and 1st degree  ST Segments: ST segments normal  T Waves: T waves normal  QRS axis: left  Other: no other findings  Clinical impression: non-specific ECG            Assessment/Plan     1.  Hypertension: Patient with history of hypertension which currently well controlled on current regimen.  At this point will continue.    2.  Lower extremity edema: Patient with bilateral lower extremity edema that appears to be new from last visit.  She has not had an echocardiogram done in 2 years.  We will obtain to evaluate further for possible congestive heart failure.  We will check a BNP.       Diagnosis Plan   1. Essential hypertension  ECG 12 Lead   2. Bilateral leg edema  Adult Transthoracic Echo Complete W/ Cont if Necessary Per Protocol    BNP            Return in about 3 months (around 9/19/2018).    I appreciate the opportunity to participate in this patient's cardiovascular care.    Best Regards    Jess Linares

## 2018-06-25 ENCOUNTER — HOSPITAL ENCOUNTER (OUTPATIENT)
Dept: CARDIOLOGY | Facility: HOSPITAL | Age: 58
Discharge: HOME | End: 2018-06-25
Attending: INTERNAL MEDICINE | Admitting: INTERNAL MEDICINE

## 2018-06-25 DIAGNOSIS — R60.0 BILATERAL LEG EDEMA: ICD-10-CM

## 2018-06-25 LAB
BH CV ECHO MEAS - % IVS THICK: -19.1 %
BH CV ECHO MEAS - % LVPW THICK: 84.1 %
BH CV ECHO MEAS - ACS: 2.2 CM
BH CV ECHO MEAS - AO MAX PG: 6 MMHG
BH CV ECHO MEAS - AO MEAN PG: 2.9 MMHG
BH CV ECHO MEAS - AO ROOT AREA (BSA CORRECTED): 1.7
BH CV ECHO MEAS - AO ROOT AREA: 8.6 CM^2
BH CV ECHO MEAS - AO ROOT DIAM: 3.3 CM
BH CV ECHO MEAS - AO V2 MAX: 122.2 CM/SEC
BH CV ECHO MEAS - AO V2 MEAN: 80.3 CM/SEC
BH CV ECHO MEAS - AO V2 VTI: 24.3 CM
BH CV ECHO MEAS - BSA(HAYCOCK): 2 M^2
BH CV ECHO MEAS - BSA: 1.9 M^2
BH CV ECHO MEAS - BZI_BMI: 32.1 KILOGRAMS/M^2
BH CV ECHO MEAS - BZI_METRIC_HEIGHT: 165.1 CM
BH CV ECHO MEAS - BZI_METRIC_WEIGHT: 87.5 KG
BH CV ECHO MEAS - CONTRAST EF 4CH: 53.2 ML/M^2
BH CV ECHO MEAS - EDV(CUBED): 67.9 ML
BH CV ECHO MEAS - EDV(MOD-SP4): 47 ML
BH CV ECHO MEAS - EDV(TEICH): 73.4 ML
BH CV ECHO MEAS - EF(CUBED): 58.1 %
BH CV ECHO MEAS - EF(MOD-SP4): 53.2 %
BH CV ECHO MEAS - EF(TEICH): 50.2 %
BH CV ECHO MEAS - ESV(CUBED): 28.4 ML
BH CV ECHO MEAS - ESV(MOD-SP4): 22 ML
BH CV ECHO MEAS - ESV(TEICH): 36.5 ML
BH CV ECHO MEAS - FS: 25.2 %
BH CV ECHO MEAS - IVS/LVPW: 1.2
BH CV ECHO MEAS - IVSD: 1.3 CM
BH CV ECHO MEAS - IVSS: 1 CM
BH CV ECHO MEAS - LA DIMENSION: 3.1 CM
BH CV ECHO MEAS - LA/AO: 0.92
BH CV ECHO MEAS - LV DIASTOLIC VOL/BSA (35-75): 24.1 ML/M^2
BH CV ECHO MEAS - LV MASS(C)D: 165.1 GRAMS
BH CV ECHO MEAS - LV MASS(C)DI: 84.8 GRAMS/M^2
BH CV ECHO MEAS - LV MASS(C)S: 161 GRAMS
BH CV ECHO MEAS - LV MASS(C)SI: 82.6 GRAMS/M^2
BH CV ECHO MEAS - LV SYSTOLIC VOL/BSA (12-30): 11.3 ML/M^2
BH CV ECHO MEAS - LVIDD: 4.1 CM
BH CV ECHO MEAS - LVIDS: 3.1 CM
BH CV ECHO MEAS - LVLD AP4: 6.5 CM
BH CV ECHO MEAS - LVLS AP4: 6.4 CM
BH CV ECHO MEAS - LVOT AREA (M): 4.2 CM^2
BH CV ECHO MEAS - LVOT AREA: 4 CM^2
BH CV ECHO MEAS - LVOT DIAM: 2.3 CM
BH CV ECHO MEAS - LVPWD: 1.1 CM
BH CV ECHO MEAS - LVPWS: 2 CM
BH CV ECHO MEAS - MV A MAX VEL: 105.6 CM/SEC
BH CV ECHO MEAS - MV E MAX VEL: 83 CM/SEC
BH CV ECHO MEAS - MV E/A: 0.79
BH CV ECHO MEAS - PA ACC SLOPE: 1054 CM/SEC^2
BH CV ECHO MEAS - PA ACC TIME: 0.09 SEC
BH CV ECHO MEAS - PA PR(ACCEL): 37.8 MMHG
BH CV ECHO MEAS - RAP SYSTOLE: 10 MMHG
BH CV ECHO MEAS - RVSP: 22 MMHG
BH CV ECHO MEAS - SI(AO): 107.8 ML/M^2
BH CV ECHO MEAS - SI(CUBED): 20.3 ML/M^2
BH CV ECHO MEAS - SI(MOD-SP4): 12.8 ML/M^2
BH CV ECHO MEAS - SI(TEICH): 18.9 ML/M^2
BH CV ECHO MEAS - SV(AO): 210.1 ML
BH CV ECHO MEAS - SV(CUBED): 39.5 ML
BH CV ECHO MEAS - SV(MOD-SP4): 25 ML
BH CV ECHO MEAS - SV(TEICH): 36.9 ML
BH CV ECHO MEAS - TR MAX VEL: 173.6 CM/SEC
LV EF 2D ECHO EST: 65 %
MAXIMAL PREDICTED HEART RATE: 164 BPM
STRESS TARGET HR: 139 BPM

## 2018-06-25 PROCEDURE — 93306 TTE W/DOPPLER COMPLETE: CPT | Performed by: INTERNAL MEDICINE

## 2018-06-25 PROCEDURE — 93306 TTE W/DOPPLER COMPLETE: CPT

## 2018-06-28 RX ORDER — METOPROLOL TARTRATE 50 MG/1
TABLET, FILM COATED ORAL
Qty: 180 TABLET | Refills: 0 | Status: SHIPPED | OUTPATIENT
Start: 2018-06-28 | End: 2018-10-11 | Stop reason: SDUPTHER

## 2018-07-09 ENCOUNTER — TRANSCRIBE ORDERS (OUTPATIENT)
Dept: ADMINISTRATIVE | Facility: HOSPITAL | Age: 58
End: 2018-07-09

## 2018-07-09 DIAGNOSIS — D18.03 HEMANGIOMA OF INTRA-ABDOMINAL STRUCTURES: Primary | ICD-10-CM

## 2018-07-13 ENCOUNTER — HOSPITAL ENCOUNTER (OUTPATIENT)
Dept: CT IMAGING | Facility: HOSPITAL | Age: 58
Discharge: HOME | End: 2018-07-13
Admitting: INTERNAL MEDICINE

## 2018-07-13 DIAGNOSIS — D18.03 HEMANGIOMA OF INTRA-ABDOMINAL STRUCTURES: ICD-10-CM

## 2018-07-13 LAB — CREAT BLDA-MCNC: 0.7 MG/DL (ref 0.6–1.3)

## 2018-07-13 PROCEDURE — 74170 CT ABD WO CNTRST FLWD CNTRST: CPT

## 2018-07-13 PROCEDURE — 25010000002 IOPAMIDOL 61 % SOLUTION: Performed by: INTERNAL MEDICINE

## 2018-07-13 PROCEDURE — 74170 CT ABD WO CNTRST FLWD CNTRST: CPT | Performed by: RADIOLOGY

## 2018-07-13 PROCEDURE — 82565 ASSAY OF CREATININE: CPT

## 2018-07-13 RX ADMIN — IOPAMIDOL 100 ML: 612 INJECTION, SOLUTION INTRAVENOUS at 14:45

## 2018-10-11 RX ORDER — METOPROLOL TARTRATE 50 MG/1
50 TABLET, FILM COATED ORAL 2 TIMES DAILY
Qty: 180 TABLET | Refills: 0 | Status: SHIPPED | OUTPATIENT
Start: 2018-10-11 | End: 2019-01-12 | Stop reason: SDUPTHER

## 2018-10-18 ENCOUNTER — OFFICE VISIT (OUTPATIENT)
Dept: CARDIOLOGY | Facility: CLINIC | Age: 58
End: 2018-10-18

## 2018-10-18 VITALS
HEIGHT: 65 IN | WEIGHT: 194.8 LBS | DIASTOLIC BLOOD PRESSURE: 93 MMHG | SYSTOLIC BLOOD PRESSURE: 145 MMHG | BODY MASS INDEX: 32.46 KG/M2 | HEART RATE: 90 BPM

## 2018-10-18 DIAGNOSIS — I10 ESSENTIAL HYPERTENSION: Primary | ICD-10-CM

## 2018-10-18 DIAGNOSIS — I51.89 DIASTOLIC DYSFUNCTION: ICD-10-CM

## 2018-10-18 DIAGNOSIS — R60.0 BILATERAL LEG EDEMA: ICD-10-CM

## 2018-10-18 PROCEDURE — 99213 OFFICE O/P EST LOW 20 MIN: CPT | Performed by: NURSE PRACTITIONER

## 2018-10-18 NOTE — PROGRESS NOTES
"Kreis, Samuel Duane, MD  Lashae Benitez  1960  10/18/2018    Patient Active Problem List   Diagnosis   • Hiatal hernia   • Essential hypertension   • Sjogren's syndrome (CMS/HCC)   • Multiple sclerosis (CMS/HCC)   • Psoriasis   • Fibromyalgia   • Osteoarthritis   • Psoriatic arthritis (CMS/HCC)   • RA (rheumatoid arthritis) (CMS/HCC)   • Degenerative disc disease, lumbar   • TMJ arthritis   • Dupuytren's disease   • H. pylori infection   • Family history of coronary artery disease   • Type 2 diabetes mellitus (CMS/HCC)   • SOB (shortness of breath)   • Dizziness   • Edema   • Precordial pain   • Chest pain   • Palpitations   • Bilateral leg edema       Dear Kreis, Samuel Duane, MD:    Subjective     Chief Complaint   Patient presents with   • Chest Pain     follow up   • Results           History of Present Illness:    Lashae Benitez is a 58 y.o. female with a past medical history significant for hypertension and lower extremity edema. She underwent further evaluation with an echocardiogram. This revealed a normal EF with grade I left ventricular diastolic dysfunction, no valvular abnormality and no evidence of effusion. A BNP was also ordered, but not accomplished. She reports since her last visit she has been doing well. She occasionally has some palpitations but no dizziness, lightheadedness, or near syncope. She has had some chest wall pain which she relates to an exacerbation of fibromyalgia. The chest wall is very tender and sensitive to movement. Currently, she has no peripheral edema. Her blood pressure is elevated today. She relates this to pain from the fibromyalgia.         Allergies   Allergen Reactions   • Penicillins    • Sulfa Antibiotics    • Codeine    • Imuran [Azathioprine]    • Januvia [Sitagliptin]    • Beta Adrenergic Blockers Other (See Comments)     I called pt to ask her about the allergy to bblockers in her chart. Pt states \"too big of a dose makes her psoriasis act up\", but this current " dose of metoprolol 50 mg bid, she has been on for a long time, with no ill side effects.  JONATHAN IVY 3/28/18   :      Current Outpatient Prescriptions:   •  amitriptyline (ELAVIL) 25 MG tablet, Take 25 mg by mouth daily., Disp: , Rfl:   •  aspirin 81 MG EC tablet, Take 81 mg by mouth daily., Disp: , Rfl:   •  celecoxib (CeleBREX) 200 MG capsule, Take 200 mg by mouth daily., Disp: , Rfl:   •  cholecalciferol (VITAMIN D3) 1000 UNITS tablet, Take 5,000 Units by mouth Daily., Disp: , Rfl:   •  cyanocobalamin 1000 MCG/ML injection, Inject 1,000 mcg into the shoulder, thigh, or buttocks Every 28 (Twenty-Eight) Days., Disp: , Rfl:   •  cyclobenzaprine (FLEXERIL) 10 MG tablet, Take 10 mg by mouth daily., Disp: , Rfl:   •  dexlansoprazole (DEXILANT) 60 MG capsule, Take 60 mg by mouth daily., Disp: , Rfl:   •  DULoxetine (CYMBALTA) 60 MG capsule, Take 60 mg by mouth 2 (two) times a day., Disp: , Rfl:   •  ferrous sulfate 325 (65 FE) MG tablet, Take 325 mg by mouth daily., Disp: , Rfl:   •  gabapentin (NEURONTIN) 600 MG tablet, Take 400 mg by mouth 4 (Four) Times a Day., Disp: , Rfl:   •  InFLIXimab-dyyb (INFLECTRA IV), Infuse  into a venous catheter., Disp: , Rfl:   •  levothyroxine (SYNTHROID, LEVOTHROID) 150 MCG tablet, Take 112 mcg by mouth 2 (Two) Times a Week., Disp: , Rfl:   •  levothyroxine sodium (TIROSINT) 137 MCG capsule, Take 137 mcg by mouth Daily., Disp: , Rfl:   •  linaclotide (LINZESS) 290 MCG capsule capsule, Take 290 mcg by mouth every morning before breakfast., Disp: , Rfl:   •  loratadine (CLARITIN) 10 MG tablet, Take 10 mg by mouth Every Night., Disp: , Rfl:   •  metoprolol tartrate (LOPRESSOR) 50 MG tablet, Take 1 tablet by mouth 2 (Two) Times a Day. (Patient taking differently: Take 50 mg by mouth Daily.), Disp: 180 tablet, Rfl: 0  •  Multiple Vitamin (MULTI VITAMIN DAILY PO), Take 1 tablet by mouth Every Morning., Disp: , Rfl:   •  OnabotulinumtoxinA (BOTOX IM), Inject  into the shoulder, thigh, or buttocks  Every 3 (Three) Months., Disp: , Rfl:   •  polyethylene glycol (MIRALAX) packet, Take 17 g by mouth Daily., Disp: , Rfl:   •  primidone (MYSOLINE) 50 MG tablet, Take 50 mg by mouth daily., Disp: , Rfl:   •  ranitidine (ZANTAC) 150 MG tablet, Take 150 mg by mouth 2 (two) times a day., Disp: , Rfl:   •  topiramate (TOPAMAX) 25 MG tablet, Take 50 mg by mouth Every Night., Disp: , Rfl:   •  traMADol (ULTRAM) 50 MG tablet, Take 50 mg by mouth 2 (two) times a day., Disp: , Rfl:   •  vitamin C (ASCORBIC ACID) 500 MG tablet, Take 1,000 mg by mouth Daily., Disp: , Rfl:   •  vitamin E 1000 UNIT capsule, Take 1,000 Units by mouth daily., Disp: , Rfl:   •  ALLERGY SERUM INJECTION, Inject  under the skin Every 14 (Fourteen) Days., Disp: , Rfl:   •  butalbital-acetaminophen-caffeine (FIORICET, ESGIC) -40 MG per tablet, Take 1 tablet by mouth as needed for headaches., Disp: , Rfl:   •  calcium citrate-vitamin d (CALCITRATE) 315-250 MG-UNIT tablet tablet, Take 1 tablet by mouth daily., Disp: , Rfl:   •  diazePAM (VALIUM) 2 MG tablet, Take 2 mg by mouth 2 (Two) Times a Day., Disp: , Rfl:   •  folic acid (FOLVITE) 1 MG tablet, Take 1 mg by mouth daily., Disp: , Rfl:   •  insulin detemir (LEVEMIR) 100 UNIT/ML injection, Inject 10 Units under the skin Daily., Disp: , Rfl:   •  methotrexate 2.5 MG tablet, Take 25 mg by mouth 1 (One) Time Per Week. Takes 10 tablets 1 day per week on Saturday, Disp: , Rfl:       The following portions of the patient's history were reviewed and updated as appropriate: allergies, current medications, past family history, past medical history, past social history, past surgical history and problem list.    Social History   Substance Use Topics   • Smoking status: Never Smoker   • Smokeless tobacco: Never Used   • Alcohol use No       Review of Systems   Constitution: Negative for malaise/fatigue.   Cardiovascular: Positive for palpitations. Negative for chest pain, leg swelling and syncope.  "  Respiratory: Negative for cough, shortness of breath and wheezing.    Musculoskeletal: Positive for myalgias (chest wall pain).   Neurological: Negative for dizziness and light-headedness.       Objective   Vitals:    10/18/18 1228   BP: 145/93   BP Location: Left arm   Patient Position: Sitting   Cuff Size: Adult   Pulse: 90   Weight: 88.4 kg (194 lb 12.8 oz)   Height: 165.1 cm (65\")     Body mass index is 32.42 kg/m².        Physical Exam   Constitutional: She is oriented to person, place, and time. She appears well-developed and well-nourished.   HENT:   Head: Normocephalic and atraumatic.   Cardiovascular: Normal rate, regular rhythm and normal heart sounds.  Exam reveals no S3 and no S4.    No murmur heard.  Pulmonary/Chest: Effort normal and breath sounds normal. She has no wheezes. She has no rales.   Abdominal: Soft. Bowel sounds are normal.   Musculoskeletal: She exhibits no edema.   Neurological: She is alert and oriented to person, place, and time.   Skin: Skin is warm and dry.   Psychiatric: She has a normal mood and affect. Her behavior is normal.       Lab Results   Component Value Date     (L) 01/21/2018    K 3.7 01/21/2018     01/21/2018    CO2 27.6 01/21/2018    BUN 14 01/21/2018    CREATININE 0.70 07/13/2018    GLUCOSE 183 (H) 01/21/2018    CALCIUM 9.2 01/21/2018    AST 26 01/21/2018    ALT 40 (H) 01/21/2018    ALKPHOS 143 (H) 01/21/2018    LABIL2 1.4 (L) 08/11/2015     Lab Results   Component Value Date    CKTOTAL 40 04/12/2017     Lab Results   Component Value Date    WBC 5.81 01/21/2018    HGB 12.8 01/21/2018    HCT 39.2 01/21/2018     01/21/2018     Lab Results   Component Value Date    INR 0.93 07/20/2015     Lab Results   Component Value Date    MG 1.8 12/30/2016     Lab Results   Component Value Date    TSH 1.217 07/21/2015    CHLPL 132 07/22/2015    TRIG 85 07/22/2015    HDL 40 (L) 07/22/2015    LDL 74 07/22/2015      Lab Results   Component Value Date    BNP 16 " 07/20/2015           Procedures      Assessment/Plan    Diagnosis Plan   1. Essential hypertension     2. Bilateral leg edema     3. Diastolic dysfunction                  Recommendations:    1. I have recommended adding a beta blocker due to the uncontrolled hypertension and palpitations. She has declined. She states her home pressures are much better and she will continue to monitor.    2. We have discussed echocardiogram findings. I have encouraged her to avoid sodium and keep legs elevated. Monitor for any weight gain or shortness of breath    3. Follow up in 4 months and PRN        Return in about 4 months (around 2/18/2019) for Recheck.    As always, I appreciate very much the opportunity to participate in the cardiovascular care of your patients.      With Best Regards,    RUIZ Pitts

## 2018-11-22 ENCOUNTER — APPOINTMENT (OUTPATIENT)
Dept: CT IMAGING | Facility: HOSPITAL | Age: 58
End: 2018-11-22

## 2018-11-22 ENCOUNTER — HOSPITAL ENCOUNTER (EMERGENCY)
Facility: HOSPITAL | Age: 58
Discharge: HOME | End: 2018-11-23
Attending: FAMILY MEDICINE

## 2018-11-22 ENCOUNTER — APPOINTMENT (OUTPATIENT)
Dept: GENERAL RADIOLOGY | Facility: HOSPITAL | Age: 58
End: 2018-11-22

## 2018-11-22 DIAGNOSIS — R51.9 OCCIPITAL HEADACHE: Primary | ICD-10-CM

## 2018-11-22 LAB
ALBUMIN SERPL-MCNC: 3.8 G/DL (ref 3.5–5)
ALBUMIN/GLOB SERPL: 1.7 G/DL (ref 1.5–2.5)
ALP SERPL-CCNC: 126 U/L (ref 35–104)
ALT SERPL W P-5'-P-CCNC: 36 U/L (ref 10–36)
ANION GAP SERPL CALCULATED.3IONS-SCNC: 8 MMOL/L (ref 3.6–11.2)
APTT PPP: 25.8 SECONDS (ref 23.8–36.1)
AST SERPL-CCNC: 27 U/L (ref 10–30)
BACTERIA UR QL AUTO: NORMAL /HPF
BASOPHILS # BLD AUTO: 0.04 10*3/MM3 (ref 0–0.3)
BASOPHILS NFR BLD AUTO: 0.5 % (ref 0–2)
BILIRUB SERPL-MCNC: 0.6 MG/DL (ref 0.2–1.8)
BILIRUB UR QL STRIP: NEGATIVE
BNP SERPL-MCNC: 51 PG/ML (ref 0–100)
BUN BLD-MCNC: 8 MG/DL (ref 7–21)
BUN/CREAT SERPL: 11.8 (ref 7–25)
CALCIUM SPEC-SCNC: 8.7 MG/DL (ref 7.7–10)
CHLORIDE SERPL-SCNC: 90 MMOL/L (ref 99–112)
CLARITY UR: CLEAR
CO2 SERPL-SCNC: 26 MMOL/L (ref 24.3–31.9)
COLOR UR: YELLOW
CREAT BLD-MCNC: 0.68 MG/DL (ref 0.43–1.29)
CRP SERPL-MCNC: <0.5 MG/DL (ref 0–0.99)
DEPRECATED RDW RBC AUTO: 44.6 FL (ref 37–54)
EOSINOPHIL # BLD AUTO: 0.08 10*3/MM3 (ref 0–0.7)
EOSINOPHIL NFR BLD AUTO: 0.9 % (ref 0–5)
ERYTHROCYTE [DISTWIDTH] IN BLOOD BY AUTOMATED COUNT: 14 % (ref 11.5–14.5)
ERYTHROCYTE [SEDIMENTATION RATE] IN BLOOD: 8 MM/HR (ref 0–30)
FLUAV AG NPH QL: NEGATIVE
FLUBV AG NPH QL IA: NEGATIVE
GFR SERPL CREATININE-BSD FRML MDRD: 89 ML/MIN/1.73
GLOBULIN UR ELPH-MCNC: 2.2 GM/DL
GLUCOSE BLD-MCNC: 88 MG/DL (ref 70–110)
GLUCOSE UR STRIP-MCNC: NEGATIVE MG/DL
HCT VFR BLD AUTO: 34.3 % (ref 37–47)
HGB BLD-MCNC: 12 G/DL (ref 12–16)
HGB UR QL STRIP.AUTO: NEGATIVE
HYALINE CASTS UR QL AUTO: NORMAL /LPF
IMM GRANULOCYTES # BLD: 0.04 10*3/MM3 (ref 0–0.03)
IMM GRANULOCYTES NFR BLD: 0.5 % (ref 0–0.5)
INR PPP: 0.97 (ref 0.9–1.1)
KETONES UR QL STRIP: NEGATIVE
LEUKOCYTE ESTERASE UR QL STRIP.AUTO: ABNORMAL
LYMPHOCYTES # BLD AUTO: 2.16 10*3/MM3 (ref 1–3)
LYMPHOCYTES NFR BLD AUTO: 25.5 % (ref 21–51)
MCH RBC QN AUTO: 33.1 PG (ref 27–33)
MCHC RBC AUTO-ENTMCNC: 35 G/DL (ref 33–37)
MCV RBC AUTO: 94.5 FL (ref 80–94)
MONOCYTES # BLD AUTO: 0.54 10*3/MM3 (ref 0.1–0.9)
MONOCYTES NFR BLD AUTO: 6.4 % (ref 0–10)
NEUTROPHILS # BLD AUTO: 5.61 10*3/MM3 (ref 1.4–6.5)
NEUTROPHILS NFR BLD AUTO: 66.2 % (ref 30–70)
NITRITE UR QL STRIP: NEGATIVE
OSMOLALITY SERPL CALC.SUM OF ELEC: 247.4 MOSM/KG (ref 273–305)
PH UR STRIP.AUTO: 6 [PH] (ref 5–8)
PLATELET # BLD AUTO: 302 10*3/MM3 (ref 130–400)
PMV BLD AUTO: 8.9 FL (ref 6–10)
POTASSIUM BLD-SCNC: 3.4 MMOL/L (ref 3.5–5.3)
PROT SERPL-MCNC: 6 G/DL (ref 6–8)
PROT UR QL STRIP: NEGATIVE
PROTHROMBIN TIME: 13.1 SECONDS (ref 11–15.4)
RBC # BLD AUTO: 3.63 10*6/MM3 (ref 4.2–5.4)
RBC # UR: NORMAL /HPF
REF LAB TEST METHOD: NORMAL
SODIUM BLD-SCNC: 124 MMOL/L (ref 135–153)
SP GR UR STRIP: 1.01 (ref 1–1.03)
SQUAMOUS #/AREA URNS HPF: NORMAL /HPF
TROPONIN I SERPL-MCNC: <0.006 NG/ML
UROBILINOGEN UR QL STRIP: ABNORMAL
WBC NRBC COR # BLD: 8.47 10*3/MM3 (ref 4.5–12.5)
WBC UR QL AUTO: NORMAL /HPF

## 2018-11-22 PROCEDURE — 25010000002 DEXAMETHASONE PER 1 MG: Performed by: FAMILY MEDICINE

## 2018-11-22 PROCEDURE — 93005 ELECTROCARDIOGRAM TRACING: CPT | Performed by: FAMILY MEDICINE

## 2018-11-22 PROCEDURE — 71045 X-RAY EXAM CHEST 1 VIEW: CPT

## 2018-11-22 PROCEDURE — 70496 CT ANGIOGRAPHY HEAD: CPT

## 2018-11-22 PROCEDURE — 85025 COMPLETE CBC W/AUTO DIFF WBC: CPT | Performed by: FAMILY MEDICINE

## 2018-11-22 PROCEDURE — 70450 CT HEAD/BRAIN W/O DYE: CPT | Performed by: RADIOLOGY

## 2018-11-22 PROCEDURE — 84484 ASSAY OF TROPONIN QUANT: CPT | Performed by: FAMILY MEDICINE

## 2018-11-22 PROCEDURE — 70496 CT ANGIOGRAPHY HEAD: CPT | Performed by: RADIOLOGY

## 2018-11-22 PROCEDURE — 99285 EMERGENCY DEPT VISIT HI MDM: CPT

## 2018-11-22 PROCEDURE — 87040 BLOOD CULTURE FOR BACTERIA: CPT | Performed by: FAMILY MEDICINE

## 2018-11-22 PROCEDURE — 25010000002 KETOROLAC TROMETHAMINE PER 15 MG: Performed by: EMERGENCY MEDICINE

## 2018-11-22 PROCEDURE — 0 IOPAMIDOL PER 1 ML: Performed by: EMERGENCY MEDICINE

## 2018-11-22 PROCEDURE — 81001 URINALYSIS AUTO W/SCOPE: CPT | Performed by: FAMILY MEDICINE

## 2018-11-22 PROCEDURE — 85652 RBC SED RATE AUTOMATED: CPT | Performed by: FAMILY MEDICINE

## 2018-11-22 PROCEDURE — 86140 C-REACTIVE PROTEIN: CPT | Performed by: FAMILY MEDICINE

## 2018-11-22 PROCEDURE — 85610 PROTHROMBIN TIME: CPT | Performed by: FAMILY MEDICINE

## 2018-11-22 PROCEDURE — 96375 TX/PRO/DX INJ NEW DRUG ADDON: CPT

## 2018-11-22 PROCEDURE — 83880 ASSAY OF NATRIURETIC PEPTIDE: CPT | Performed by: FAMILY MEDICINE

## 2018-11-22 PROCEDURE — 85730 THROMBOPLASTIN TIME PARTIAL: CPT | Performed by: FAMILY MEDICINE

## 2018-11-22 PROCEDURE — 25010000002 ONDANSETRON PER 1 MG: Performed by: FAMILY MEDICINE

## 2018-11-22 PROCEDURE — 87804 INFLUENZA ASSAY W/OPTIC: CPT | Performed by: FAMILY MEDICINE

## 2018-11-22 PROCEDURE — 71045 X-RAY EXAM CHEST 1 VIEW: CPT | Performed by: RADIOLOGY

## 2018-11-22 PROCEDURE — 70450 CT HEAD/BRAIN W/O DYE: CPT

## 2018-11-22 PROCEDURE — 96374 THER/PROPH/DIAG INJ IV PUSH: CPT

## 2018-11-22 PROCEDURE — 80053 COMPREHEN METABOLIC PANEL: CPT | Performed by: FAMILY MEDICINE

## 2018-11-22 PROCEDURE — 25010000002 MORPHINE PER 10 MG: Performed by: EMERGENCY MEDICINE

## 2018-11-22 RX ORDER — SODIUM CHLORIDE 0.9 % (FLUSH) 0.9 %
10 SYRINGE (ML) INJECTION AS NEEDED
Status: DISCONTINUED | OUTPATIENT
Start: 2018-11-22 | End: 2018-11-23 | Stop reason: HOSPADM

## 2018-11-22 RX ORDER — MORPHINE SULFATE 2 MG/ML
2 INJECTION, SOLUTION INTRAMUSCULAR; INTRAVENOUS ONCE
Status: COMPLETED | OUTPATIENT
Start: 2018-11-22 | End: 2018-11-22

## 2018-11-22 RX ORDER — ONDANSETRON 2 MG/ML
4 INJECTION INTRAMUSCULAR; INTRAVENOUS ONCE
Status: COMPLETED | OUTPATIENT
Start: 2018-11-22 | End: 2018-11-22

## 2018-11-22 RX ORDER — KETOROLAC TROMETHAMINE 30 MG/ML
30 INJECTION, SOLUTION INTRAMUSCULAR; INTRAVENOUS ONCE
Status: COMPLETED | OUTPATIENT
Start: 2018-11-22 | End: 2018-11-22

## 2018-11-22 RX ADMIN — MORPHINE SULFATE 2 MG: 2 INJECTION, SOLUTION INTRAMUSCULAR; INTRAVENOUS at 21:43

## 2018-11-22 RX ADMIN — SODIUM CHLORIDE 1000 ML: 9 INJECTION, SOLUTION INTRAVENOUS at 17:51

## 2018-11-22 RX ADMIN — IOPAMIDOL 95 ML: 755 INJECTION, SOLUTION INTRAVENOUS at 23:09

## 2018-11-22 RX ADMIN — ONDANSETRON 4 MG: 2 INJECTION, SOLUTION INTRAMUSCULAR; INTRAVENOUS at 18:30

## 2018-11-22 RX ADMIN — KETOROLAC TROMETHAMINE 30 MG: 30 INJECTION, SOLUTION INTRAMUSCULAR; INTRAVENOUS at 21:40

## 2018-11-22 RX ADMIN — DEXAMETHASONE SODIUM PHOSPHATE 10 MG: 4 INJECTION, SOLUTION INTRAMUSCULAR; INTRAVENOUS at 18:50

## 2018-11-22 NOTE — ED PROVIDER NOTES
Subjective   59yo female presents to ED with migraine .  Patient reports that she has a history of migraine she sees Dr. Marrero in [place] is treated with Botox injections every 3 months as well as she has Topamax patient says this morning she was up and all of a sudden became kind of in a fall and just started not feeling well home has a little photosensitivity and extremely nauseous anytime she moves her head and has a dental ache all over her head, says that she did take some Tylenol but that was it and just hasn't been feeling any better home and decided since this was a little bit different than her normal presentation she would come to the emergency department to be evaluated and treated        History provided by:  Patient  Headache   Pain location:  Generalized  Quality:  Dull  Radiates to:  Does not radiate  Severity currently:  8/10  Severity at highest:  8/10  Onset quality:  Gradual  Timing:  Constant  Progression:  Unchanged  Chronicity:  New  Similar to prior headaches: no    Context: activity and bright light    Relieved by:  Nothing  Worsened by:  Activity and light  Ineffective treatments:  Acetaminophen  Associated symptoms: dizziness, fatigue and nausea    Associated symptoms: no abdominal pain and no fever        Review of Systems   Constitutional: Positive for fatigue. Negative for fever.   Respiratory: Negative.    Cardiovascular: Negative.  Negative for chest pain.   Gastrointestinal: Positive for nausea. Negative for abdominal pain.   Endocrine: Negative.    Genitourinary: Negative.  Negative for dysuria.   Skin: Negative.    Neurological: Positive for dizziness and headaches.   Psychiatric/Behavioral: Negative.    All other systems reviewed and are negative.      Past Medical History:   Diagnosis Date   • Acid reflux    • Allergic    • Anxiety    • Cancer (CMS/HCC)    • Chronic pain disorder    • Degenerative disc disease, lumbar    • Depression    • Dupuytren's disease    • Essential  "hypertension    • Family history of coronary artery disease    • Fibromyalgia    • Gallbladder abscess    • H. pylori infection    • Hiatal hernia    • Hyperlipidemia    • Hypothyroidism    • Migraines    • Multiple sclerosis (CMS/HCC)    • Osteoarthritis    • Psoriasis    • Psoriatic arthritis (CMS/HCC)    • RA (rheumatoid arthritis) (CMS/HCC)    • Rheumatoid arthritis (CMS/HCC)    • Sinusitis    • Sjogren's syndrome (CMS/HCC)    • Stomach ulcer    • TMJ arthritis    • Type 2 diabetes mellitus (CMS/HCC)    • Urinary tract infection        Allergies   Allergen Reactions   • Penicillins    • Sulfa Antibiotics    • Codeine    • Imuran [Azathioprine]    • Januvia [Sitagliptin]    • Beta Adrenergic Blockers Other (See Comments)     I called pt to ask her about the allergy to bblockers in her chart. Pt states \"too big of a dose makes her psoriasis act up\", but this current dose of metoprolol 50 mg bid, she has been on for a long time, with no ill side effects.  JONATHAN IVY 3/28/18       Past Surgical History:   Procedure Laterality Date   • BREAST LUMPECTOMY Left 11/1993   • CARDIAC CATHETERIZATION Left 09/21/2009    Normal    • CARPAL TUNNEL RELEASE  03/2012   • CERVICAL POLYPECTOMY  12/2015   • CHOLECYSTECTOMY  05/2007   • GASTRIC BYPASS  09/2007   • KNEE ARTHROPLASTY     • LUMBAR DISC SURGERY      C5-6   • REPLACEMENT TOTAL KNEE Right 06/2016   • SACRAL NERVE STIMULATOR PLACEMENT  09/2014   • THYROIDECTOMY  03/2016       Family History   Problem Relation Age of Onset   • Diabetes Mother    • Stroke Mother    • Hypertension Mother    • Heart attack Father         First one was in his 20's second 30's.    • Heart failure Father    • Heart disease Father    • Stroke Father    • Diabetes Sister    • Cancer Maternal Grandmother        Social History     Socioeconomic History   • Marital status:      Spouse name: Not on file   • Number of children: Not on file   • Years of education: Not on file   • Highest education " "level: Not on file   Tobacco Use   • Smoking status: Never Smoker   • Smokeless tobacco: Never Used   Substance and Sexual Activity   • Alcohol use: No   • Drug use: No   • Sexual activity: Defer           Objective   Physical Exam   Constitutional: She is oriented to person, place, and time. She appears well-developed and well-nourished.   HENT:   Head: Normocephalic and atraumatic.   Right Ear: External ear normal.   Left Ear: External ear normal.   Mouth/Throat: Oropharynx is clear and moist.   Eyes: EOM are normal. Pupils are equal, round, and reactive to light.   Neck: Neck supple.   Cardiovascular: Normal rate and regular rhythm.   Pulmonary/Chest: Effort normal and breath sounds normal.   Abdominal: Soft. Bowel sounds are normal.   Musculoskeletal: Normal range of motion.   Neurological: She is alert and oriented to person, place, and time.   Skin: Skin is warm. Capillary refill takes less than 2 seconds.   Psychiatric: She has a normal mood and affect. Her behavior is normal. Judgment and thought content normal.   Nursing note and vitals reviewed.      Procedures           ED Course  ED Course as of Nov 23 0003   Thu Nov 22, 2018   1904 CT pending ; endorsed to Z at shift change - migraine \"different\" than normal  will get results and then give pain medications  []      ED Course User Index  [MH] Caro Robles, DO      CT Angiogram Head With Contrast   ED Interpretation   CT angiography brain with IV contrast   Faxed report from [facility].   Impression: No acute findings.   Signed Dallin Kiran M.D.      CT Head Without Contrast   ED Interpretation   CT head without IV contrast   Faxed report from [facility]   Impression: Normal appearance of brain although on one image there is a hyperdensity which may represent volume averaging with the skull base but could represent an anterior communicating artery aneurysm.  There is no evidence of subarachnoid hemorrhage.  This could be " further evaluated by CTA or MRA if clinically indicated.   Signed Charles Jane M.D.      XR Chest 1 View    (Results Pending)     Labs Reviewed   COMPREHENSIVE METABOLIC PANEL - Abnormal; Notable for the following components:       Result Value    Sodium 124 (*)     Potassium 3.4 (*)     Chloride 90 (*)     Alkaline Phosphatase 126 (*)     All other components within normal limits   URINALYSIS W/ CULTURE IF INDICATED - Abnormal; Notable for the following components:    Leuk Esterase, UA Trace (*)     All other components within normal limits   CBC WITH AUTO DIFFERENTIAL - Abnormal; Notable for the following components:    RBC 3.63 (*)     Hematocrit 34.3 (*)     MCV 94.5 (*)     MCH 33.1 (*)     Immature Grans, Absolute 0.04 (*)     All other components within normal limits   OSMOLALITY, CALCULATED - Abnormal; Notable for the following components:    Osmolality Calc 247.4 (*)     All other components within normal limits   INFLUENZA ANTIGEN, RAPID - Normal   PROTIME-INR - Normal    Narrative:     Suggested INR therapeutic range for stable oral anticoagulant therapy:    Low Intensity therapy:   1.5-2.0  Moderate Intensity therapy:   2.0-3.0  High Intensity therapy:   2.5-4.0   APTT - Normal    Narrative:     PTT Heparin Therapeutic Range:  59 - 95 seconds   TROPONIN (IN-HOUSE) - Normal    Narrative:     Ultra Troponin I Reference Range:         <=0.039 ng/mL: Negative    0.04-0.779 ng/mL: Indeterminate Range. Suspicious of MI.  Clinical correlation required.       >=0.78  ng/mL: Consistent with myocardial injury.  Clinical correlation required.   BNP (IN-HOUSE) - Normal   C-REACTIVE PROTEIN - Normal   SEDIMENTATION RATE - Normal   BLOOD CULTURE   BLOOD CULTURE   URINALYSIS, MICROSCOPIC ONLY   CBC AND DIFFERENTIAL    Narrative:     The following orders were created for panel order CBC & Differential.  Procedure                               Abnormality         Status                     ---------                                -----------         ------                     CBC Auto Differential[[id number]]        Abnormal            Final result                 Please view results for these tests on the individual orders.        Medication List      START taking these medications    diflunisal 500 MG tablet tablet  Commonly known as:  DOLOBID  Take 1 tablet by mouth 2 (Two) Times a Day.     metaxalone 800 MG tablet  Commonly known as:  SKELAXIN  Take 1 tablet by mouth 3 (Three) Times a Day As Needed for Muscle Spasms.        CHANGE how you take these medications    metoprolol tartrate 50 MG tablet  Commonly known as:  LOPRESSOR  Take 1 tablet by mouth 2 (Two) Times a Day.  What changed:  when to take this        CONTINUE taking these medications    ALLERGY SERUM INJECTION     amitriptyline 25 MG tablet  Commonly known as:  ELAVIL     aspirin 81 MG EC tablet     BOTOX IM     butalbital-acetaminophen-caffeine -40 MG per tablet  Commonly known as:  FIORICET, ESGIC     calcium citrate-vitamin d 315-250 MG-UNIT tablet tablet  Commonly known as:  CALCITRATE     celecoxib 200 MG capsule  Commonly known as:  CeleBREX     cholecalciferol 1000 units tablet  Commonly known as:  VITAMIN D3     cyanocobalamin 1000 MCG/ML injection     cyclobenzaprine 10 MG tablet  Commonly known as:  FLEXERIL     dexlansoprazole 60 MG capsule  Commonly known as:  DEXILANT     diazePAM 2 MG tablet  Commonly known as:  VALIUM     DULoxetine 60 MG capsule  Commonly known as:  CYMBALTA     ferrous sulfate 325 (65 FE) MG tablet     folic acid 1 MG tablet  Commonly known as:  FOLVITE     gabapentin 600 MG tablet  Commonly known as:  NEURONTIN     INFLECTRA IV     insulin detemir 100 UNIT/ML injection  Commonly known as:  LEVEMIR     * TIROSINT 137 MCG capsule  Generic drug:  levothyroxine sodium     * levothyroxine 150 MCG tablet  Commonly known as:  SYNTHROID, LEVOTHROID     linaclotide 290 MCG capsule capsule  Commonly known as:  LINZESS     loratadine 10  MG tablet  Commonly known as:  CLARITIN     methotrexate 2.5 MG tablet     MULTI VITAMIN DAILY PO     polyethylene glycol packet  Commonly known as:  MIRALAX     primidone 50 MG tablet  Commonly known as:  MYSOLINE     raNITIdine 150 MG tablet  Commonly known as:  ZANTAC     topiramate 25 MG tablet  Commonly known as:  TOPAMAX     traMADol 50 MG tablet  Commonly known as:  ULTRAM     vitamin C 500 MG tablet  Commonly known as:  ASCORBIC ACID     vitamin E 1000 UNIT capsule         * This list has 2 medication(s) that are the same as other medications   prescribed for you. Read the directions carefully, and ask your doctor or   other care provider to review them with you.                        MDM  Number of Diagnoses or Management Options  Occipital headache: new and requires workup     Amount and/or Complexity of Data Reviewed  Clinical lab tests: reviewed  Tests in the radiology section of CPT®: ordered and reviewed  Independent visualization of images, tracings, or specimens: yes    Risk of Complications, Morbidity, and/or Mortality  Presenting problems: high  Diagnostic procedures: high  Management options: moderate    Patient Progress  Patient progress: stable        Final diagnoses:   Occipital headache            Chidi Hernandez MD  11/23/18 0003

## 2018-11-23 VITALS
DIASTOLIC BLOOD PRESSURE: 65 MMHG | WEIGHT: 195 LBS | HEIGHT: 65 IN | SYSTOLIC BLOOD PRESSURE: 132 MMHG | BODY MASS INDEX: 32.49 KG/M2 | OXYGEN SATURATION: 95 % | HEART RATE: 74 BPM | TEMPERATURE: 98 F | RESPIRATION RATE: 16 BRPM

## 2018-11-23 RX ORDER — DIFLUNISAL 500 MG/1
500 TABLET, FILM COATED ORAL 2 TIMES DAILY
Qty: 20 TABLET | Refills: 0 | Status: SHIPPED | OUTPATIENT
Start: 2018-11-23 | End: 2019-06-03

## 2018-11-23 RX ORDER — METAXALONE 800 MG/1
800 TABLET ORAL 3 TIMES DAILY PRN
Qty: 15 TABLET | Refills: 0 | Status: SHIPPED | OUTPATIENT
Start: 2018-11-23 | End: 2019-06-03

## 2018-11-23 NOTE — ED NOTES
Discussed with the patient about CT angiogram of head with contrast that was ordered by provider. The patient states that she has had contrast dye in the past with no issues. Explained all risks and benefits. The patient verbalizes understanding and is agreeable to having exam performed. The patient signed consents at this time and placed to chart     Tameka Mac RN  11/22/18 1191

## 2018-11-23 NOTE — ED NOTES
Assisted the patient to the bathroom at this time via wheelchair as requested. Patient was assisted back to bed and placed back on physiological monitoring. The patient tolerated well. Voices no needs or complaints.      Tameka Mac RN  11/22/18 [id number]

## 2018-11-27 LAB
BACTERIA SPEC AEROBE CULT: NORMAL
BACTERIA SPEC AEROBE CULT: NORMAL

## 2018-12-03 ENCOUNTER — TRANSCRIBE ORDERS (OUTPATIENT)
Dept: PHYSICAL THERAPY | Facility: HOSPITAL | Age: 58
End: 2018-12-03

## 2018-12-03 DIAGNOSIS — M79.7 FIBROMYALGIA: Primary | ICD-10-CM

## 2018-12-04 ENCOUNTER — HOSPITAL ENCOUNTER (OUTPATIENT)
Dept: PHYSICAL THERAPY | Facility: HOSPITAL | Age: 58
Setting detail: THERAPIES SERIES
Discharge: HOME | End: 2018-12-04
Attending: FAMILY MEDICINE

## 2018-12-04 DIAGNOSIS — M79.7 FIBROMYALGIA: Primary | ICD-10-CM

## 2018-12-04 PROCEDURE — 97163 PT EVAL HIGH COMPLEX 45 MIN: CPT

## 2018-12-04 PROCEDURE — G8978 MOBILITY CURRENT STATUS: HCPCS

## 2018-12-04 PROCEDURE — G8979 MOBILITY GOAL STATUS: HCPCS

## 2018-12-04 NOTE — THERAPY EVALUATION
Outpatient Physical Therapy Ortho Initial Evaluation   [facility]     Patient Name: Lashae Benitez  : 1960  MRN: [id number]  Today's Date: 2018      Visit Date: 2018    Patient Active Problem List   Diagnosis   • Hiatal hernia   • Essential hypertension   • Sjogren's syndrome (CMS/HCC)   • Multiple sclerosis (CMS/HCC)   • Psoriasis   • Fibromyalgia   • Osteoarthritis   • Psoriatic arthritis (CMS/HCC)   • RA (rheumatoid arthritis) (CMS/HCC)   • Degenerative disc disease, lumbar   • TMJ arthritis   • Dupuytren's disease   • H. pylori infection   • Family history of coronary artery disease   • Type 2 diabetes mellitus (CMS/HCC)   • SOB (shortness of breath)   • Dizziness   • Edema   • Precordial pain   • Chest pain   • Palpitations   • Bilateral leg edema        Past Medical History:   Diagnosis Date   • Acid reflux    • Allergic    • Anxiety    • Cancer (CMS/HCC)    • Chronic pain disorder    • Degenerative disc disease, lumbar    • Depression    • Dupuytren's disease    • Essential hypertension    • Family history of coronary artery disease    • Fibromyalgia    • Gallbladder abscess    • H. pylori infection    • Hiatal hernia    • Hyperlipidemia    • Hypothyroidism    • Migraines    • Multiple sclerosis (CMS/HCC)    • Osteoarthritis    • Psoriasis    • Psoriatic arthritis (CMS/HCC)    • RA (rheumatoid arthritis) (CMS/HCC)    • Rheumatoid arthritis (CMS/HCC)    • Sinusitis    • Sjogren's syndrome (CMS/HCC)    • Stomach ulcer    • TMJ arthritis    • Type 2 diabetes mellitus (CMS/HCC)    • Urinary tract infection         Past Surgical History:   Procedure Laterality Date   • BREAST LUMPECTOMY Left 1993   • CARDIAC CATHETERIZATION Left 2009    Normal    • CARPAL TUNNEL RELEASE  2012   • CERVICAL POLYPECTOMY  2015   • CHOLECYSTECTOMY  2007   • GASTRIC BYPASS  2007   • KNEE ARTHROPLASTY     • LUMBAR DISC SURGERY      C5-6   • REPLACEMENT TOTAL KNEE Right 2016   • SACRAL NERVE  STIMULATOR PLACEMENT  09/2014   • THYROIDECTOMY  03/2016       Visit Dx:     ICD-10-CM ICD-9-CM   1. Fibromyalgia M79.7 729.1       Patient History     Row Name 12/04/18 0900             History    Chief Complaint  Balance Problems;Difficulty Walking;Pain;Muscle weakness;Joint stiffness;Difficulty with daily activities;Tightness  -RT      Type of Pain  Back pain;Hip pain  -RT      Date Current Problem(s) Began  -- 30 years  -RT      Brief Description of Current Complaint  Pt was diagnosed with fibromyalgia around 30 years ago.  Pt received her disability in 2011 and has battled with fatigue and full body pain that has progressed in the past ten years.  The patient was diagonsed by MD Araujo, and was advised to start therapy for improved mobility and decreased pain.  -RT      Patient/Caregiver Goals  Relieve pain;Improve mobility;Improve strength;Return to prior level of function  -RT      Current Tobacco Use  no  -RT      Smoking Status  no  -RT      Patient's Rating of General Health  Fair  -RT      Hand Dominance  right-handed  -RT      How has patient tried to help current problem?  meds, heat, rest  -RT      Are you or can you be pregnant  No  -RT         Pain     Pain Location  Back;Hip full body  -RT      Pain at Present  6  -RT      Pain at Best  3  -RT      Pain at Worst  10  -RT      Pain Frequency  Constant/continuous  -RT      Pain Description  Aching;Burning;Stabbing;Sharp;Numbness  -RT      What Performance Factors Make the Current Problem(s) WORSE?  lifting, walking, bending, reaching  -RT      Tolerance Time- Standing  15  -RT      Tolerance Time- Sitting  30  -RT      Tolerance Time- Walking  15  -RT         Fall Risk Assessment    Any falls in the past year:  Yes  -RT      Number of falls reported in the last 12 months  2  -RT      Factors that contributed to the fall:  Lost balance  -RT         Daily Activities    Primary Language  English  -RT      Are you able to read  Yes  -RT      Are you able  to write  Yes  -RT      How does patient learn best?  Listening;Reading;Demonstration;Pictures/Video  -RT         Safety    Are you being hurt, hit, or frightened by anyone at home or in your life?  No  -RT      Are you being neglected by a caregiver  No  -RT        User Key  (r) = Recorded By, (t) = Taken By, (c) = Cosigned By    Initials Name Provider Type    RT Oswlado Lane PT Physical Therapist          PT Ortho     Row Name 12/04/18 0900       Posture/Observations    Posture/Observations Comments  left side lean; slumped posture  -RT       Sensory Screen for Light Touch- Upper Quarter Clearing    C4 (posterior shoulder)  Bilateral:;Diminished  -RT    C5 (lateral upper arm)  Bilateral:;Diminished  -RT    C6 (tip of thumb)  Bilateral:;Diminished  -RT    C7 (tip of 3rd finger)  Bilateral:;Diminished  -RT    C8 (tip of 5th finger)  Bilateral:;Diminished  -RT    T1 (medial lower arm)  Bilateral:;Diminished  -RT       Myotomal Screen- Upper Quarter Clearing    Shoulder flexion (C5)  Bilateral:;4- (Good -)  -RT    Elbow flexion/wrist extension (C6)  Bilateral:;4 (Good)  -RT    Elbow extension/wrist flexion (C7)  Bilateral:;4- (Good -)  -RT       Neural Tension Signs- Lower Quarter Clearing    Slump  Right:;Positive  -RT       Sensory Screen for Light Touch- Lower Quarter Clearing    L1 (inguinal area)  Bilateral:;Diminished  -RT    L2 (anterior mid thigh)  Bilateral:;Diminished  -RT    L3 (distal anterior thigh)  Bilateral:;Diminished  -RT    L4 (medial lower leg/foot)  Bilateral:;Diminished  -RT    L5 (lateral lower leg/great toe)  Bilateral:;Diminished  -RT    S1 (bottom of foot)  Bilateral:;Diminished  -RT       Myotomal Screen- Lower Quarter Clearing    Hip flexion (L2)  Bilateral:;4- (Good -)  -RT    Knee extension (L3)  Bilateral:;4- (Good -)  -RT    Ankle DF (L4)  Bilateral:;4- (Good -)  -RT    Knee flexion (S2)  Bilateral:;4- (Good -)  -RT       Gait/Stairs Assessment/Training    Comment (Gait/Stairs)   decreased stance on right leg; decreased stride length  -RT      User Key  (r) = Recorded By, (t) = Taken By, (c) = Cosigned By    Initials Name Provider Type    RT Oswaldo Lane, PT Physical Therapist                      Therapy Education  Given: HEP, Symptoms/condition management, Pain management, Posture/body mechanics, Fall prevention and home safety  Program: New  How Provided: Verbal, Demonstration, Written  Provided to: Patient  Level of Understanding: Teach back education performed, Verbalized     PT OP Goals     Row Name 12/04/18 1000          PT Short Term Goals    STG Date to Achieve  12/18/18  -RT     STG 1  Pt will be instructed in a HEP.  -RT     STG 1 Progress  New  -RT     STG 2  Pt will be instructed in lifting mechanics.  -RT     STG 2 Progress  New  -RT     STG 3  Pt will report pain no greater than 6/10 with ADLs.  -RT     STG 3 Progress  New  -RT        Long Term Goals    LTG Date to Achieve  01/01/19  -RT     LTG 1  Pt will improve her Mod Oswestry score by 10 points.  -RT     LTG 1 Progress  New  -RT     LTG 2  Pt will be able to stand for 45min without increased pain.  -RT     LTG 2 Progress  New  -RT     LTG 3  Pt will present with 4/5 bilateral LE gross strength.  -RT     LTG 3 Progress  New  -RT        Time Calculation    PT Goal Re-Cert Due Date  01/01/19  -RT       User Key  (r) = Recorded By, (t) = Taken By, (c) = Cosigned By    Initials Name Provider Type    RT Oswaldo Lane, PT Physical Therapist          PT Assessment/Plan     Row Name 12/04/18 1037          PT Assessment    Functional Limitations  Impaired gait;Limitations in community activities;Performance in leisure activities;Performance in self-care ADL;Performance in work activities  -RT     Impairments  Pain;Muscle strength;Poor body mechanics;Posture;Range of motion;Joint integrity;Endurance;Coordination;Gait  -RT     Assessment Comments  Pt is a 59 y/o female referred to therapy for treatment of fibromyalgia.  Pt  presents with increased pain, impaired gait and decreased mobility.  Pt will benefit from therapy services for improved strength and endurance.  -RT     Please refer to paper survey for additional self-reported information  Yes  -RT     Rehab Potential  Fair  -RT     Patient/caregiver participated in establishment of treatment plan and goals  Yes  -RT     Patient would benefit from skilled therapy intervention  Yes  -RT        PT Plan    PT Frequency  3x/week  -RT     Predicted Duration of Therapy Intervention (Therapy Eval)  4 weeks  -RT     Planned CPT's?  PT EVAL HIGH COMPLEXITY: 73919;PT THER PROC EA 15 MIN: 82164;PT RE-EVAL: 43353;PT MANUAL THERAPY EA 15 MIN: 55871;PT NEUROMUSC RE-EDUCATION EA 15 MIN: 03450;PT GAIT TRAINING EA 15 MIN: 07609;PT THER ACT EA 15 MIN: 85271;PT HOT OR COLD PACK TREAT MCARE;PT ELECTRICAL STIM UNATTEND: ;PT ULTRASOUND EA 15 MIN: 00821  -RT     Physical Therapy Interventions (Optional Details)  balance training;gait training;neuromuscular re-education;modalities;motor coordination training;manual therapy techniques;lumbar stabilization;joint mobilization;home exercise program;patient/family education;postural re-education;ROM (Range of Motion);strengthening;stretching  -RT     PT Plan Comments  Will follow for optimal gains.  -RT       User Key  (r) = Recorded By, (t) = Taken By, (c) = Cosigned By    Initials Name Provider Type    RT Oswaldo Lane, PT Physical Therapist                              Outcome Measure Options: Modtyreld Leo  Modified Oswestry  Modified Oswestry Score/Comments: 66      Time Calculation:     Therapy Suggested Charges     Code   Minutes Charges    None             Start Time: 0900  Stop Time: 1000  Time Calculation (min): 60 min     Therapy Charges for Today     Code Description Service Date Service Provider Modifiers Qty    11470381679 HC PT MOBILITY CURRENT 12/4/2018 Oswaldo Lane, PT GP, CL 1    87006415864 HC PT MOBILITY PROJECTED 12/4/2018  Oswaldo Lane, PT GP, CK 1    59984396781 HC PT EVAL HIGH COMPLEXITY 4 12/4/2018 Oswaldo Lane, PT GP 1          PT G-Codes  Outcome Measure Options: Modifed Leo  Modified Oswestry Score/Comments: 66  Functional Limitation: Mobility: Walking and moving around  Mobility: Walking and Moving Around Current Status (): At least 60 percent but less than 80 percent impaired, limited or restricted  Mobility: Walking and Moving Around Goal Status (): At least 40 percent but less than 60 percent impaired, limited or restricted         Oswaldo Lane, PT  12/4/2018

## 2018-12-06 ENCOUNTER — HOSPITAL ENCOUNTER (OUTPATIENT)
Dept: PHYSICAL THERAPY | Facility: HOSPITAL | Age: 58
Setting detail: THERAPIES SERIES
Discharge: HOME | End: 2018-12-06
Attending: FAMILY MEDICINE

## 2018-12-06 DIAGNOSIS — M79.7 FIBROMYALGIA: Primary | ICD-10-CM

## 2018-12-06 PROCEDURE — 97110 THERAPEUTIC EXERCISES: CPT

## 2018-12-06 PROCEDURE — G0283 ELEC STIM OTHER THAN WOUND: HCPCS

## 2018-12-06 NOTE — THERAPY TREATMENT NOTE
Outpatient Physical Therapy Ortho Treatment Note   [facility]     Patient Name: Lashae Benitez  : 1960  MRN: [id number]  Today's Date: 2018      Visit Date: 2018    Visit Dx:    ICD-10-CM ICD-9-CM   1. Fibromyalgia M79.7 729.1       Patient Active Problem List   Diagnosis   • Hiatal hernia   • Essential hypertension   • Sjogren's syndrome (CMS/HCC)   • Multiple sclerosis (CMS/HCC)   • Psoriasis   • Fibromyalgia   • Osteoarthritis   • Psoriatic arthritis (CMS/HCC)   • RA (rheumatoid arthritis) (CMS/HCC)   • Degenerative disc disease, lumbar   • TMJ arthritis   • Dupuytren's disease   • H. pylori infection   • Family history of coronary artery disease   • Type 2 diabetes mellitus (CMS/HCC)   • SOB (shortness of breath)   • Dizziness   • Edema   • Precordial pain   • Chest pain   • Palpitations   • Bilateral leg edema        Past Medical History:   Diagnosis Date   • Acid reflux    • Allergic    • Anxiety    • Cancer (CMS/HCC)    • Chronic pain disorder    • Degenerative disc disease, lumbar    • Depression    • Dupuytren's disease    • Essential hypertension    • Family history of coronary artery disease    • Fibromyalgia    • Gallbladder abscess    • H. pylori infection    • Hiatal hernia    • Hyperlipidemia    • Hypothyroidism    • Migraines    • Multiple sclerosis (CMS/HCC)    • Osteoarthritis    • Psoriasis    • Psoriatic arthritis (CMS/HCC)    • RA (rheumatoid arthritis) (CMS/HCC)    • Rheumatoid arthritis (CMS/HCC)    • Sinusitis    • Sjogren's syndrome (CMS/HCC)    • Stomach ulcer    • TMJ arthritis    • Type 2 diabetes mellitus (CMS/HCC)    • Urinary tract infection         Past Surgical History:   Procedure Laterality Date   • BREAST LUMPECTOMY Left 1993   • CARDIAC CATHETERIZATION Left 2009    Normal    • CARPAL TUNNEL RELEASE  2012   • CERVICAL POLYPECTOMY  2015   • CHOLECYSTECTOMY  2007   • GASTRIC BYPASS  2007   • KNEE ARTHROPLASTY     • LUMBAR DISC SURGERY      C5-6    • REPLACEMENT TOTAL KNEE Right 06/2016   • SACRAL NERVE STIMULATOR PLACEMENT  09/2014   • THYROIDECTOMY  03/2016       PT Ortho     Row Name 12/04/18 0900       Posture/Observations    Posture/Observations Comments  left side lean; slumped posture  -RT       Sensory Screen for Light Touch- Upper Quarter Clearing    C4 (posterior shoulder)  Bilateral:;Diminished  -RT    C5 (lateral upper arm)  Bilateral:;Diminished  -RT    C6 (tip of thumb)  Bilateral:;Diminished  -RT    C7 (tip of 3rd finger)  Bilateral:;Diminished  -RT    C8 (tip of 5th finger)  Bilateral:;Diminished  -RT    T1 (medial lower arm)  Bilateral:;Diminished  -RT       Myotomal Screen- Upper Quarter Clearing    Shoulder flexion (C5)  Bilateral:;4- (Good -)  -RT    Elbow flexion/wrist extension (C6)  Bilateral:;4 (Good)  -RT    Elbow extension/wrist flexion (C7)  Bilateral:;4- (Good -)  -RT       Neural Tension Signs- Lower Quarter Clearing    Slump  Right:;Positive  -RT       Sensory Screen for Light Touch- Lower Quarter Clearing    L1 (inguinal area)  Bilateral:;Diminished  -RT    L2 (anterior mid thigh)  Bilateral:;Diminished  -RT    L3 (distal anterior thigh)  Bilateral:;Diminished  -RT    L4 (medial lower leg/foot)  Bilateral:;Diminished  -RT    L5 (lateral lower leg/great toe)  Bilateral:;Diminished  -RT    S1 (bottom of foot)  Bilateral:;Diminished  -RT       Myotomal Screen- Lower Quarter Clearing    Hip flexion (L2)  Bilateral:;4- (Good -)  -RT    Knee extension (L3)  Bilateral:;4- (Good -)  -RT    Ankle DF (L4)  Bilateral:;4- (Good -)  -RT    Knee flexion (S2)  Bilateral:;4- (Good -)  -RT       Gait/Stairs Assessment/Training    Comment (Gait/Stairs)  decreased stance on right leg; decreased strid length  -RT      User Key  (r) = Recorded By, (t) = Taken By, (c) = Cosigned By    Initials Name Provider Type    RT Oswaldo Lane, PT Physical Therapist                      PT Assessment/Plan     Row Name 12/06/18 1004          PT Assessment     Assessment Comments  Tx today consisted of mh and estim to low back followed by ther ex.  Pt responded well to tx today with reports of increased fatigue and a mild increase in pain following session.  -RT        PT Plan    PT Plan Comments  Will follow progressing stability and balance.  -RT       User Key  (r) = Recorded By, (t) = Taken By, (c) = Cosigned By    Initials Name Provider Type    RT Oswaldo Lane, PT Physical Therapist          Modalities     Row Name 12/06/18 0900             Subjective Comments    Subjective Comments  Pt reports having mild pain today.  Pt has performed her exercises at home.  -RT         Moist Heat    MH Applied  Yes  -RT      Location  lumbar region  -RT      Rx Minutes  10 mins  -RT      MH Prior to Rx  Yes  -RT         ELECTRICAL STIMULATION    Attended/Unattended  Unattended  -RT      Stimulation Type  IFC  -RT      Location/Electrode Placement/Other  lumbar region  -RT        User Key  (r) = Recorded By, (t) = Taken By, (c) = Cosigned By    Initials Name Provider Type    RT Oswaldo Lane, PT Physical Therapist          Exercises     Row Name 12/06/18 0900             Subjective Comments    Subjective Comments  Pt reports having mild pain today.  Pt has performed her exercises at home.  -RT         Subjective Pain    Able to rate subjective pain?  yes  -RT      Pre-Treatment Pain Level  5  -RT      Post-Treatment Pain Level  6  -RT         Total Minutes    74400 - PT Therapeutic Exercise Minutes  25  -RT         Exercise 1    Exercise Name 1  gs 15, ltr 15, ball sq 15, bridges 15, ppt 15, tandem balance, long stepping, scap sq 15   -RT      Time 1  25  -RT        User Key  (r) = Recorded By, (t) = Taken By, (c) = Cosigned By    Initials Name Provider Type    RT Oswaldo Lane, PT Physical Therapist                             Therapy Education  Given: HEP, Symptoms/condition management, Pain management, Posture/body mechanics, Fall prevention and home safety  Program:  Reinforced  How Provided: Verbal, Demonstration, Written  Provided to: Patient  Level of Understanding: Teach back education performed, Verbalized              Time Calculation:   Start Time: 0910  Stop Time: 0950  Time Calculation (min): 40 min  Therapy Suggested Charges     Code   Minutes Charges    41579 (CPT®) Hc Pt Neuromusc Re Education Ea 15 Min      74396 (CPT®) Hc Pt Ther Proc Ea 15 Min 25 2    67152 (CPT®) Hc Gait Training Ea 15 Min      93880 (CPT®) Hc Pt Therapeutic Act Ea 15 Min      21784 (CPT®) Hc Pt Manual Therapy Ea 15 Min      08525 (CPT®) Hc Pt Ther Massage- Per 15 Min      70608 (CPT®) Hc Pt Iontophoresis Ea 15 Min      08296 (CPT®) Hc Pt Elec Stim Ea-Per 15 Min      08259 (CPT®) Hc Pt Ultrasound Ea 15 Min      26171 (CPT®) Hc Pt Self Care/Mgmt/Train Ea 15 Min      86805 (CPT®) Hc Pt Prosthetic (S) Train Initial Encounter, Each 15 Min      70618 (CPT®) Hc Orthotic(S) Mgmt/Train Initial Encounter, Each 15min      35707 (CPT®) Hc Pt Aquatic Therapy Ea 15 Min      94103 (CPT®) Hc Pt Orthotic(S)/Prosthetic(S) Encounter, Each 15 Min       (CPT®) Hc Pt Electrical Stim Unattended      Total  25 2        Therapy Charges for Today     Code Description Service Date Service Provider Modifiers Qty    13929251646 HC PT THER PROC EA 15 MIN 12/6/2018 Oswaldo Lane, PT GP 2    28879310130 HC PT ELECTRICAL STIM UNATTENDED 12/6/2018 Oswaldo Lane, PT  1                    Oswaldo Lane PT  12/6/2018

## 2018-12-10 ENCOUNTER — HOSPITAL ENCOUNTER (OUTPATIENT)
Dept: PHYSICAL THERAPY | Facility: HOSPITAL | Age: 58
Setting detail: THERAPIES SERIES
Discharge: HOME | End: 2018-12-10
Attending: FAMILY MEDICINE

## 2018-12-10 DIAGNOSIS — M79.7 FIBROMYALGIA: Primary | ICD-10-CM

## 2018-12-10 PROCEDURE — G0283 ELEC STIM OTHER THAN WOUND: HCPCS

## 2018-12-10 PROCEDURE — 97110 THERAPEUTIC EXERCISES: CPT

## 2018-12-10 NOTE — THERAPY TREATMENT NOTE
Outpatient Physical Therapy Neuro Treatment Note  [facility]     Patient Name: Lashae Benitez  : 1960  MRN: [id number]  Today's Date: 12/10/2018      Visit Date: 12/10/2018    Visit Dx:    ICD-10-CM ICD-9-CM   1. Fibromyalgia M79.7 729.1       Patient Active Problem List   Diagnosis   • Hiatal hernia   • Essential hypertension   • Sjogren's syndrome (CMS/HCC)   • Multiple sclerosis (CMS/HCC)   • Psoriasis   • Fibromyalgia   • Osteoarthritis   • Psoriatic arthritis (CMS/HCC)   • RA (rheumatoid arthritis) (CMS/formerly [facility])   • Degenerative disc disease, lumbar   • TMJ arthritis   • Dupuytren's disease   • H. pylori infection   • Family history of coronary artery disease   • Type 2 diabetes mellitus (CMS/HCC)   • SOB (shortness of breath)   • Dizziness   • Edema   • Precordial pain   • Chest pain   • Palpitations   • Bilateral leg edema                       PT Assessment/Plan     Row Name 12/10/18 1006          PT Assessment    Assessment Comments  Tx today consisted of mh and estim to back followed by ther ex and progressed balance.  Pt responded well to added reps today.  Pt demonstrated improved balance with neuro activities.  -RT        PT Plan    PT Plan Comments  Will follow progressing balance and mobility.  -RT       User Key  (r) = Recorded By, (t) = Taken By, (c) = Cosigned By    Initials Name Provider Type    RT Oswaldo Lane, PT Physical Therapist           Modalities     Row Name 12/10/18 0900             Subjective Comments    Subjective Comments  Pt reports having increased pain today.  -RT         Subjective Pain    Able to rate subjective pain?  yes  -RT      Pre-Treatment Pain Level  6  -RT         Moist Heat    MH Applied  Yes  -RT      Location  lumbar region  -RT      Rx Minutes  15 mins  -RT      MH Prior to Rx  Yes  -RT         ELECTRICAL STIMULATION    Attended/Unattended  Unattended  -RT      Stimulation Type  IFC  -RT      Location/Electrode Placement/Other  lumbar region 15min  -RT         User Key  (r) = Recorded By, (t) = Taken By, (c) = Cosigned By    Initials Name Provider Type    RT Oswaldo Lane PT Physical Therapist          Exercises     Row Name 12/10/18 0900             Subjective Comments    Subjective Comments  Pt reports having increased pain today.  -RT         Subjective Pain    Able to rate subjective pain?  yes  -RT      Pre-Treatment Pain Level  6  -RT      Post-Treatment Pain Level  5  -RT         Total Minutes    39207 - PT Therapeutic Exercise Minutes  30  -RT         Exercise 1    Exercise Name 1  gs 20, ltr 20, ball sq 20, bridges 20, ppt 20, scap sq 20, tandem standing, long stepping, tandem tapping  balloon  -RT      Time 1  30  -RT        User Key  (r) = Recorded By, (t) = Taken By, (c) = Cosigned By    Initials Name Provider Type    RT Oswaldo Lane PT Physical Therapist                            Therapy Education  Given: HEP, Symptoms/condition management, Pain management, Posture/body mechanics, Fall prevention and home safety  Program: Reinforced  How Provided: Verbal, Demonstration, Written  Provided to: Patient  Level of Understanding: Teach back education performed, Verbalized              Time Calculation:   Start Time: 0900  Stop Time: 0950  Time Calculation (min): 50 min   Therapy Suggested Charges     Code   Minutes Charges    34870 (CPT®) Hc Pt Neuromusc Re Education Ea 15 Min      97419 (CPT®) Hc Pt Ther Proc Ea 15 Min 30 2    51067 (CPT®) Hc Gait Training Ea 15 Min      08217 (CPT®) Hc Pt Therapeutic Act Ea 15 Min      33536 (CPT®) Hc Pt Manual Therapy Ea 15 Min      32433 (CPT®) Hc Pt Ther Massage- Per 15 Min      41315 (CPT®) Hc Pt Iontophoresis Ea 15 Min      98896 (CPT®) Hc Pt Elec Stim Ea-Per 15 Min      03854 (CPT®) Hc Pt Ultrasound Ea 15 Min      81132 (CPT®) Hc Pt Self Care/Mgmt/Train Ea 15 Min      08297 (CPT®) Hc Pt Prosthetic (S) Train Initial Encounter, Each 15 Min      12062 (CPT®) Hc Orthotic(S) Mgmt/Train Initial Encounter, Each 15min       15652 (CPT®) Hc Pt Aquatic Therapy Ea 15 Min      95747 (CPT®) Hc Pt Orthotic(S)/Prosthetic(S) Encounter, Each 15 Min       (CPT®) Hc Pt Electrical Stim Unattended      Total  30 2        Therapy Charges for Today     Code Description Service Date Service Provider Modifiers Qty    55312377934 HC PT THER PROC EA 15 MIN 12/10/2018 Oswaldo Lane, PT GP 2    57430581841 HC PT ELECTRICAL STIM UNATTENDED 12/10/2018 Oswaldo Lane, PT  1                    Oswaldo Lane, PT  12/10/2018

## 2018-12-11 ENCOUNTER — TRANSCRIBE ORDERS (OUTPATIENT)
Dept: ADMINISTRATIVE | Facility: HOSPITAL | Age: 58
End: 2018-12-11

## 2018-12-11 DIAGNOSIS — M54.2 NECK PAIN: Primary | ICD-10-CM

## 2018-12-12 ENCOUNTER — HOSPITAL ENCOUNTER (OUTPATIENT)
Dept: PHYSICAL THERAPY | Facility: HOSPITAL | Age: 58
Setting detail: THERAPIES SERIES
Discharge: HOME | End: 2018-12-12
Attending: FAMILY MEDICINE

## 2018-12-12 DIAGNOSIS — M79.7 FIBROMYALGIA: Primary | ICD-10-CM

## 2018-12-12 PROCEDURE — 97110 THERAPEUTIC EXERCISES: CPT | Performed by: PHYSICAL THERAPIST

## 2018-12-12 PROCEDURE — G0283 ELEC STIM OTHER THAN WOUND: HCPCS | Performed by: PHYSICAL THERAPIST

## 2018-12-12 NOTE — THERAPY TREATMENT NOTE
Outpatient Physical Therapy Ortho Treatment Note   [facility]     Patient Name: Lashae Benitez  : 1960  MRN: [id number]  Today's Date: 2018      Visit Date: 2018    Visit Dx:    ICD-10-CM ICD-9-CM   1. Fibromyalgia M79.7 729.1       Patient Active Problem List   Diagnosis   • Hiatal hernia   • Essential hypertension   • Sjogren's syndrome (CMS/HCC)   • Multiple sclerosis (CMS/HCC)   • Psoriasis   • Fibromyalgia   • Osteoarthritis   • Psoriatic arthritis (CMS/HCC)   • RA (rheumatoid arthritis) (CMS/HCC)   • Degenerative disc disease, lumbar   • TMJ arthritis   • Dupuytren's disease   • H. pylori infection   • Family history of coronary artery disease   • Type 2 diabetes mellitus (CMS/HCC)   • SOB (shortness of breath)   • Dizziness   • Edema   • Precordial pain   • Chest pain   • Palpitations   • Bilateral leg edema        Past Medical History:   Diagnosis Date   • Acid reflux    • Allergic    • Anxiety    • Cancer (CMS/HCC)    • Chronic pain disorder    • Degenerative disc disease, lumbar    • Depression    • Dupuytren's disease    • Essential hypertension    • Family history of coronary artery disease    • Fibromyalgia    • Gallbladder abscess    • H. pylori infection    • Hiatal hernia    • Hyperlipidemia    • Hypothyroidism    • Migraines    • Multiple sclerosis (CMS/HCC)    • Osteoarthritis    • Psoriasis    • Psoriatic arthritis (CMS/HCC)    • RA (rheumatoid arthritis) (CMS/HCC)    • Rheumatoid arthritis (CMS/HCC)    • Sinusitis    • Sjogren's syndrome (CMS/HCC)    • Stomach ulcer    • TMJ arthritis    • Type 2 diabetes mellitus (CMS/HCC)    • Urinary tract infection         Past Surgical History:   Procedure Laterality Date   • BREAST LUMPECTOMY Left 1993   • CARDIAC CATHETERIZATION Left 2009    Normal    • CARPAL TUNNEL RELEASE  2012   • CERVICAL POLYPECTOMY  2015   • CHOLECYSTECTOMY  2007   • GASTRIC BYPASS  2007   • KNEE ARTHROPLASTY     • LUMBAR DISC SURGERY       C5-6   • REPLACEMENT TOTAL KNEE Right 06/2016   • SACRAL NERVE STIMULATOR PLACEMENT  09/2014   • THYROIDECTOMY  03/2016       PT Ortho     Row Name 12/12/18 1000       Subjective Comments    Subjective Comments  Patient reports 5/10 pain today.  She notes that she is usually fatigued after therapy sessions.  -BE       Subjective Pain    Able to rate subjective pain?  yes  -BE    Pre-Treatment Pain Level  5  -BE    Post-Treatment Pain Level  6  -BE      User Key  (r) = Recorded By, (t) = Taken By, (c) = Cosigned By    Initials Name Provider Type    BE Patsy Shi PT Physical Therapist                      PT Assessment/Plan     Row Name 12/12/18 1022          PT Assessment    Assessment Comments  Patient session initiated with MH/ESTIM to the lumbar region, with no adverse reactions noted.  Ther ex focused on LE strengthening, core strengthening, and lumbar ROM.  Patient was provided with verbal and tactile cues to ensure correct form with exercises.  Rest breaks provided throughout session, due to fatigue.  Patient reported balance activities were more difficult today, with patient requiring intermittent UE support at // bars to maintain balance.  She will continue to be progressed per her tolerance and POC.  -BE        PT Plan    PT Plan Comments  Progress per patient's tolerance and POC.  -BE       User Key  (r) = Recorded By, (t) = Taken By, (c) = Cosigned By    Initials Name Provider Type    BE Patsy Shi PT Physical Therapist          Modalities     Row Name 12/12/18 1000             Moist Heat    MH Applied  Yes no redness following MH  -BE      Location  lumbar region  -BE      Rx Minutes  15 mins  -BE      MH Prior to Rx  Yes with ESTIM in seated position  -BE         ELECTRICAL STIMULATION    Attended/Unattended  Unattended no skin irritation following ESTIM  -BE      Stimulation Type  IFC  -BE      Max mAmp  -- per patient's tolerance  -BE      Location/Electrode Placement/Other  lumbar  region  -BE       PT E-Stim Unattended (Manual) Minutes  15  -BE        User Key  (r) = Recorded By, (t) = Taken By, (c) = Cosigned By    Initials Name Provider Type    BE Patsy Shi PT Physical Therapist          Exercises     Row Name 12/12/18 1000             Subjective Comments    Subjective Comments  Patient reports 5/10 pain today.  She notes that she is usually fatigued after therapy sessions.  -BE         Subjective Pain    Able to rate subjective pain?  yes  -BE      Pre-Treatment Pain Level  5  -BE      Post-Treatment Pain Level  6  -BE         Total Minutes    83565 - PT Therapeutic Exercise Minutes  35  -BE         Exercise 1    Exercise Name 1  GS 2x10, LTR 2x10, Ball squeeze 2x10, Bridges 2x10, PPT 2x10, Scap squeeze 2x10, Seated march 2x10, LAQ 2x10, Tandem stance with balloon tap  -BE      Cueing 1  Verbal;Tactile;Demo  -BE      Time 1  35 min  -BE        User Key  (r) = Recorded By, (t) = Taken By, (c) = Cosigned By    Initials Name Provider Type    BE Patsy Shi PT Physical Therapist                             Therapy Education  Given: HEP, Symptoms/condition management, Pain management, Posture/body mechanics, Fall prevention and home safety  Program: Reinforced  How Provided: Verbal, Demonstration, Written  Provided to: Patient  Level of Understanding: Teach back education performed, Verbalized              Time Calculation:   Start Time: 0900  Stop Time: 0959  Time Calculation (min): 59 min  Therapy Suggested Charges     Code   Minutes Charges    84757 (CPT®) Hc Pt Neuromusc Re Education Ea 15 Min      92622 (CPT®) Hc Pt Ther Proc Ea 15 Min 35 2    17272 (CPT®) Hc Gait Training Ea 15 Min      41710 (CPT®) Hc Pt Therapeutic Act Ea 15 Min      99187 (CPT®) Hc Pt Manual Therapy Ea 15 Min      07868 (CPT®) Hc Pt Ther Massage- Per 15 Min      91732 (CPT®) Hc Pt Iontophoresis Ea 15 Min      83898 (CPT®) Hc Pt Elec Stim Ea-Per 15 Min      54300 (CPT®) Hc Pt Ultrasound Ea 15  Min      56201 (CPT®) Hc Pt Self Care/Mgmt/Train Ea 15 Min      43603 (CPT®) Hc Pt Prosthetic (S) Train Initial Encounter, Each 15 Min      39248 (CPT®) Hc Orthotic(S) Mgmt/Train Initial Encounter, Each 15min      39125 (CPT®) Hc Pt Aquatic Therapy Ea 15 Min      01378 (CPT®) Hc Pt Orthotic(S)/Prosthetic(S) Encounter, Each 15 Min       (CPT®) Hc Pt Electrical Stim Unattended 15 1    Total  50 3        Therapy Charges for Today     Code Description Service Date Service Provider Modifiers Qty    22083386924 HC PT THER PROC EA 15 MIN 12/12/2018 Patsy Shi, PT GP 2    36096192545 HC PT ELECTRICAL STIM UNATTENDED 12/12/2018 Patsy Shi, PT  1                    Patsy Shi, PT  12/12/2018

## 2018-12-13 ENCOUNTER — HOSPITAL ENCOUNTER (OUTPATIENT)
Dept: MRI IMAGING | Facility: HOSPITAL | Age: 58
Discharge: HOME | End: 2018-12-13
Attending: FAMILY MEDICINE

## 2018-12-13 DIAGNOSIS — M54.2 NECK PAIN: ICD-10-CM

## 2018-12-14 ENCOUNTER — HOSPITAL ENCOUNTER (OUTPATIENT)
Dept: PHYSICAL THERAPY | Facility: HOSPITAL | Age: 58
Setting detail: THERAPIES SERIES
Discharge: HOME | End: 2018-12-14
Attending: FAMILY MEDICINE

## 2018-12-14 DIAGNOSIS — M79.7 FIBROMYALGIA: Primary | ICD-10-CM

## 2018-12-14 PROCEDURE — 97110 THERAPEUTIC EXERCISES: CPT | Performed by: PHYSICAL THERAPIST

## 2018-12-14 NOTE — THERAPY TREATMENT NOTE
Outpatient Physical Therapy Ortho Treatment Note   [facility]     Patient Name: Lashae Benitez  : 1960  MRN: [id number]  Today's Date: 2018      Visit Date: 2018    Visit Dx:    ICD-10-CM ICD-9-CM   1. Fibromyalgia M79.7 729.1       Patient Active Problem List   Diagnosis   • Hiatal hernia   • Essential hypertension   • Sjogren's syndrome (CMS/HCC)   • Multiple sclerosis (CMS/HCC)   • Psoriasis   • Fibromyalgia   • Osteoarthritis   • Psoriatic arthritis (CMS/HCC)   • RA (rheumatoid arthritis) (CMS/HCC)   • Degenerative disc disease, lumbar   • TMJ arthritis   • Dupuytren's disease   • H. pylori infection   • Family history of coronary artery disease   • Type 2 diabetes mellitus (CMS/HCC)   • SOB (shortness of breath)   • Dizziness   • Edema   • Precordial pain   • Chest pain   • Palpitations   • Bilateral leg edema        Past Medical History:   Diagnosis Date   • Acid reflux    • Allergic    • Anxiety    • Cancer (CMS/HCC)    • Chronic pain disorder    • Degenerative disc disease, lumbar    • Depression    • Dupuytren's disease    • Essential hypertension    • Family history of coronary artery disease    • Fibromyalgia    • Gallbladder abscess    • H. pylori infection    • Hiatal hernia    • Hyperlipidemia    • Hypothyroidism    • Migraines    • Multiple sclerosis (CMS/HCC)    • Osteoarthritis    • Psoriasis    • Psoriatic arthritis (CMS/HCC)    • RA (rheumatoid arthritis) (CMS/HCC)    • Rheumatoid arthritis (CMS/HCC)    • Sinusitis    • Sjogren's syndrome (CMS/HCC)    • Stomach ulcer    • TMJ arthritis    • Type 2 diabetes mellitus (CMS/HCC)    • Urinary tract infection         Past Surgical History:   Procedure Laterality Date   • BREAST LUMPECTOMY Left 1993   • CARDIAC CATHETERIZATION Left 2009    Normal    • CARPAL TUNNEL RELEASE  2012   • CERVICAL POLYPECTOMY  2015   • CHOLECYSTECTOMY  2007   • GASTRIC BYPASS  2007   • KNEE ARTHROPLASTY     • LUMBAR DISC SURGERY       C5-6   • REPLACEMENT TOTAL KNEE Right 06/2016   • SACRAL NERVE STIMULATOR PLACEMENT  09/2014   • THYROIDECTOMY  03/2016       PT Ortho     Row Name 12/14/18 0900       Subjective Comments    Subjective Comments  Pt reported 5/10 pain prior to today's session. She states she has performed HEP as instructed.  -AD       Subjective Pain    Able to rate subjective pain?  yes  -AD    Pre-Treatment Pain Level  5  -AD    Post-Treatment Pain Level  5  -AD    Row Name 12/12/18 1000       Subjective Comments    Subjective Comments  Patient reports 5/10 pain today.  She notes that she is usually fatigued after therapy sessions.  -BE       Subjective Pain    Able to rate subjective pain?  yes  -BE    Pre-Treatment Pain Level  5  -BE    Post-Treatment Pain Level  6  -BE      User Key  (r) = Recorded By, (t) = Taken By, (c) = Cosigned By    Initials Name Provider Type    AD Dalton, Ashley Claudene, PT Physical Therapist    BE Patsy Shi, PT Physical Therapist                      PT Assessment/Plan     Row Name 12/14/18 1000          PT Assessment    Assessment Comments  Today's session was initiated with moist heat combined with premod to the lumbar region, with no skin irritation observed. Therapeutic exercises were performed in the supine, seated, and standing position. CGA/min assist was required during tandem stance balloon tap for balance and safety. She tolerated the session well, with rest breaks provided as needed. She will be progressed as tolerated.  -AD       User Key  (r) = Recorded By, (t) = Taken By, (c) = Cosigned By    Initials Name Provider Type    AD Dalton, Ashley Claudene, PT Physical Therapist          Modalities     Row Name 12/14/18 0900             Moist Heat    MH Applied  Yes With lumbar premod, no skin irritation observed.  -AD      Location  lumbar region  -AD      Rx Minutes  15 mins  -AD      MH Prior to Rx  Yes  -AD         ELECTRICAL STIMULATION    Attended/Unattended  Unattended With  moist heat, no skin irritation observed.  -AD      Stimulation Type  Pre-Mod  -AD      Max mAmp  -- Per pt tolerance.  -AD      Location/Electrode Placement/Other  Lumbar region  -AD       PT E-Stim Unattended (Manual) Minutes  15  -AD        User Key  (r) = Recorded By, (t) = Taken By, (c) = Cosigned By    Initials Name Provider Type    AD Dalton, Ashley Claudene, JAMISON Physical Therapist          Exercises     Row Name 12/14/18 0900             Subjective Comments    Subjective Comments  Pt reported 5/10 pain prior to today's session. She states she has performed HEP as instructed.  -AD         Subjective Pain    Able to rate subjective pain?  yes  -AD      Pre-Treatment Pain Level  5  -AD      Post-Treatment Pain Level  5  -AD         Total Minutes    76639 - PT Therapeutic Exercise Minutes  40  -AD         Exercise 1    Exercise Name 1  GS, LTR, ball squeeze, PPT, scap squeeze, seated march, LAQ, tandem stance with balloon tap, QS, feet together with balloon tap.  -AD      Cueing 1  Verbal;Tactile;Demo  -AD      Time 1  40 minutes  -AD        User Key  (r) = Recorded By, (t) = Taken By, (c) = Cosigned By    Initials Name Provider Type    AD Dalton, Ashley Claudene, PT Physical Therapist                             Therapy Education  Given: HEP, Symptoms/condition management, Pain management, Posture/body mechanics, Fall prevention and home safety  Program: Reinforced  How Provided: Verbal, Demonstration  Provided to: Patient  Level of Understanding: Teach back education performed, Verbalized              Time Calculation:   Start Time: 0900  Stop Time: 1000  Time Calculation (min): 60 min  Therapy Suggested Charges     Code   Minutes Charges    38054 (CPT®) Hc Pt Neuromusc Re Education Ea 15 Min      96739 (CPT®) Hc Pt Ther Proc Ea 15 Min 40 3    14387 (CPT®) Hc Gait Training Ea 15 Min      09627 (CPT®) Hc Pt Therapeutic Act Ea 15 Min      86381 (CPT®) Hc Pt Manual Therapy Ea 15 Min      65413 (CPT®) Hc Pt  Ther Massage- Per 15 Min      23111 (CPT®) Hc Pt Iontophoresis Ea 15 Min      70187 (CPT®) Hc Pt Elec Stim Ea-Per 15 Min      27556 (CPT®) Hc Pt Ultrasound Ea 15 Min      04284 (CPT®) Hc Pt Self Care/Mgmt/Train Ea 15 Min      98999 (CPT®) Hc Pt Prosthetic (S) Train Initial Encounter, Each 15 Min      11725 (CPT®) Hc Orthotic(S) Mgmt/Train Initial Encounter, Each 15min      90780 (CPT®) Hc Pt Aquatic Therapy Ea 15 Min      97996 (CPT®) Hc Pt Orthotic(S)/Prosthetic(S) Encounter, Each 15 Min       (CPT®) Hc Pt Electrical Stim Unattended 15 1    Total  55 4        Therapy Charges for Today     Code Description Service Date Service Provider Modifiers Qty    46780338155 HC PT THER PROC EA 15 MIN 12/14/2018 Dalton, Ashley Claudene, PT GP 3                    Ashley Claudene Dalton, PT  12/14/2018

## 2018-12-17 ENCOUNTER — HOSPITAL ENCOUNTER (OUTPATIENT)
Dept: PHYSICAL THERAPY | Facility: HOSPITAL | Age: 58
Setting detail: THERAPIES SERIES
Discharge: HOME | End: 2018-12-17
Attending: FAMILY MEDICINE

## 2018-12-17 DIAGNOSIS — M79.7 FIBROMYALGIA: Primary | ICD-10-CM

## 2018-12-17 PROCEDURE — G0283 ELEC STIM OTHER THAN WOUND: HCPCS

## 2018-12-17 PROCEDURE — 97110 THERAPEUTIC EXERCISES: CPT

## 2018-12-17 NOTE — THERAPY TREATMENT NOTE
Outpatient Physical Therapy Ortho Treatment Note   [facility]     Patient Name: Lashae Benitez  : 1960  MRN: [id number]  Today's Date: 2018      Visit Date: 2018    Visit Dx:    ICD-10-CM ICD-9-CM   1. Fibromyalgia M79.7 729.1       Patient Active Problem List   Diagnosis   • Hiatal hernia   • Essential hypertension   • Sjogren's syndrome (CMS/HCC)   • Multiple sclerosis (CMS/HCC)   • Psoriasis   • Fibromyalgia   • Osteoarthritis   • Psoriatic arthritis (CMS/HCC)   • RA (rheumatoid arthritis) (CMS/HCC)   • Degenerative disc disease, lumbar   • TMJ arthritis   • Dupuytren's disease   • H. pylori infection   • Family history of coronary artery disease   • Type 2 diabetes mellitus (CMS/HCC)   • SOB (shortness of breath)   • Dizziness   • Edema   • Precordial pain   • Chest pain   • Palpitations   • Bilateral leg edema        Past Medical History:   Diagnosis Date   • Acid reflux    • Allergic    • Anxiety    • Cancer (CMS/HCC)    • Chronic pain disorder    • Degenerative disc disease, lumbar    • Depression    • Dupuytren's disease    • Essential hypertension    • Family history of coronary artery disease    • Fibromyalgia    • Gallbladder abscess    • H. pylori infection    • Hiatal hernia    • Hyperlipidemia    • Hypothyroidism    • Migraines    • Multiple sclerosis (CMS/HCC)    • Osteoarthritis    • Psoriasis    • Psoriatic arthritis (CMS/HCC)    • RA (rheumatoid arthritis) (CMS/HCC)    • Rheumatoid arthritis (CMS/HCC)    • Sinusitis    • Sjogren's syndrome (CMS/HCC)    • Stomach ulcer    • TMJ arthritis    • Type 2 diabetes mellitus (CMS/HCC)    • Urinary tract infection         Past Surgical History:   Procedure Laterality Date   • BREAST LUMPECTOMY Left 1993   • CARDIAC CATHETERIZATION Left 2009    Normal    • CARPAL TUNNEL RELEASE  2012   • CERVICAL POLYPECTOMY  2015   • CHOLECYSTECTOMY  2007   • GASTRIC BYPASS  2007   • KNEE ARTHROPLASTY     • LUMBAR DISC SURGERY       C5-6   • REPLACEMENT TOTAL KNEE Right 06/2016   • SACRAL NERVE STIMULATOR PLACEMENT  09/2014   • THYROIDECTOMY  03/2016                       PT Assessment/Plan     Row Name 12/17/18 1256          PT Assessment    Assessment Comments  Tx today consisted of mh and estim to back folowed by ther ex  and balance activities.  Pt responded well to tx today with noted improved balance with tandem standing.  Pt reported similar pre and post pain scores.  -RT        PT Plan    PT Plan Comments  Will follow progressing cores stability and decreased pain.  -RT       User Key  (r) = Recorded By, (t) = Taken By, (c) = Cosigned By    Initials Name Provider Type    RT Oswaldo Lane, PT Physical Therapist          Modalities     Row Name 12/17/18 1100             Subjective Comments    Subjective Comments  Pt reports her balance has been improving.  -RT         Subjective Pain    Able to rate subjective pain?  yes  -RT      Pre-Treatment Pain Level  5  -RT      Post-Treatment Pain Level  5  -RT         Moist Heat    MH Applied  Yes  -RT      Location  lumbar region  -RT      Rx Minutes  15 mins  -RT      MH Prior to Rx  Yes  -RT         ELECTRICAL STIMULATION    Attended/Unattended  Unattended  -RT      Stimulation Type  Pre-Mod  -RT      Location/Electrode Placement/Other  Lumbar region  -RT       PT E-Stim Unattended (Manual) Minutes  15  -RT        User Key  (r) = Recorded By, (t) = Taken By, (c) = Cosigned By    Initials Name Provider Type    RT Oswaldo Lane, PT Physical Therapist          Exercises     Row Name 12/17/18 1100             Subjective Comments    Subjective Comments  Pt reports her balance has been improving.  -RT         Subjective Pain    Able to rate subjective pain?  yes  -RT      Pre-Treatment Pain Level  5  -RT      Post-Treatment Pain Level  5  -RT         Total Minutes    77269 - PT Therapeutic Exercise Minutes  40  -RT         Exercise 1    Exercise Name 1  gs 25, ltr 25, ball sq 25, bridges  25, ppt 25, marches 25, laq 25, qs 25, tandem walking, tandem tap balloon, step up on foam  -RT      Cueing 1  Verbal;Tactile;Demo  -RT      Time 1  40 min  -RT        User Key  (r) = Recorded By, (t) = Taken By, (c) = Cosigned By    Initials Name Provider Type    RT Oswaldo Lane, PT Physical Therapist                             Therapy Education  Given: HEP, Symptoms/condition management, Pain management, Posture/body mechanics, Fall prevention and home safety  Program: Reinforced  How Provided: Verbal, Demonstration  Provided to: Patient  Level of Understanding: Teach back education performed, Verbalized              Time Calculation:   Start Time: 0850  Stop Time: 0950  Time Calculation (min): 60 min  Therapy Suggested Charges     Code   Minutes Charges    26025 (CPT®) Hc Pt Neuromusc Re Education Ea 15 Min      31970 (CPT®) Hc Pt Ther Proc Ea 15 Min 40 3    11860 (CPT®) Hc Gait Training Ea 15 Min      37443 (CPT®) Hc Pt Therapeutic Act Ea 15 Min      07093 (CPT®) Hc Pt Manual Therapy Ea 15 Min      81747 (CPT®) Hc Pt Ther Massage- Per 15 Min      57316 (CPT®) Hc Pt Iontophoresis Ea 15 Min      88639 (CPT®) Hc Pt Elec Stim Ea-Per 15 Min      08900 (CPT®) Hc Pt Ultrasound Ea 15 Min      71416 (CPT®) Hc Pt Self Care/Mgmt/Train Ea 15 Min      87199 (CPT®) Hc Pt Prosthetic (S) Train Initial Encounter, Each 15 Min      00486 (CPT®) Hc Orthotic(S) Mgmt/Train Initial Encounter, Each 15min      41969 (CPT®) Hc Pt Aquatic Therapy Ea 15 Min      03499 (CPT®) Hc Pt Orthotic(S)/Prosthetic(S) Encounter, Each 15 Min       (CPT®) Hc Pt Electrical Stim Unattended 15 1    Total  55 4        Therapy Charges for Today     Code Description Service Date Service Provider Modifiers Qty    63085379120 HC PT THER PROC EA 15 MIN 12/17/2018 Oswaldo Lane, PT GP 3    94611270593 HC PT ELECTRICAL STIM UNATTENDED 12/17/2018 Oswaldo Lane, PT  1                    Oswaldo Lane PT  12/17/2018

## 2018-12-19 ENCOUNTER — HOSPITAL ENCOUNTER (OUTPATIENT)
Dept: PHYSICAL THERAPY | Facility: HOSPITAL | Age: 58
Setting detail: THERAPIES SERIES
Discharge: HOME | End: 2018-12-19
Attending: FAMILY MEDICINE

## 2018-12-19 DIAGNOSIS — M79.7 FIBROMYALGIA: Primary | ICD-10-CM

## 2018-12-19 PROCEDURE — 97110 THERAPEUTIC EXERCISES: CPT | Performed by: PHYSICAL THERAPIST

## 2018-12-19 PROCEDURE — G0283 ELEC STIM OTHER THAN WOUND: HCPCS | Performed by: PHYSICAL THERAPIST

## 2018-12-19 NOTE — THERAPY TREATMENT NOTE
Outpatient Physical Therapy Ortho Treatment Note   [facility]     Patient Name: Lashae Benitez  : 1960  MRN: [id number]  Today's Date: 2018      Visit Date: 2018    Visit Dx:    ICD-10-CM ICD-9-CM   1. Fibromyalgia M79.7 729.1       Patient Active Problem List   Diagnosis   • Hiatal hernia   • Essential hypertension   • Sjogren's syndrome (CMS/HCC)   • Multiple sclerosis (CMS/HCC)   • Psoriasis   • Fibromyalgia   • Osteoarthritis   • Psoriatic arthritis (CMS/HCC)   • RA (rheumatoid arthritis) (CMS/HCC)   • Degenerative disc disease, lumbar   • TMJ arthritis   • Dupuytren's disease   • H. pylori infection   • Family history of coronary artery disease   • Type 2 diabetes mellitus (CMS/HCC)   • SOB (shortness of breath)   • Dizziness   • Edema   • Precordial pain   • Chest pain   • Palpitations   • Bilateral leg edema        Past Medical History:   Diagnosis Date   • Acid reflux    • Allergic    • Anxiety    • Cancer (CMS/HCC)    • Chronic pain disorder    • Degenerative disc disease, lumbar    • Depression    • Dupuytren's disease    • Essential hypertension    • Family history of coronary artery disease    • Fibromyalgia    • Gallbladder abscess    • H. pylori infection    • Hiatal hernia    • Hyperlipidemia    • Hypothyroidism    • Migraines    • Multiple sclerosis (CMS/HCC)    • Osteoarthritis    • Psoriasis    • Psoriatic arthritis (CMS/HCC)    • RA (rheumatoid arthritis) (CMS/HCC)    • Rheumatoid arthritis (CMS/HCC)    • Sinusitis    • Sjogren's syndrome (CMS/HCC)    • Stomach ulcer    • TMJ arthritis    • Type 2 diabetes mellitus (CMS/HCC)    • Urinary tract infection         Past Surgical History:   Procedure Laterality Date   • BREAST LUMPECTOMY Left 1993   • CARDIAC CATHETERIZATION Left 2009    Normal    • CARPAL TUNNEL RELEASE  2012   • CERVICAL POLYPECTOMY  2015   • CHOLECYSTECTOMY  2007   • GASTRIC BYPASS  2007   • KNEE ARTHROPLASTY     • LUMBAR DISC SURGERY       "C5-6   • REPLACEMENT TOTAL KNEE Right 06/2016   • SACRAL NERVE STIMULATOR PLACEMENT  09/2014   • THYROIDECTOMY  03/2016       PT Ortho     Row Name 12/19/18 0900       Subjective Comments    Subjective Comments  Pt reported low back \"spasms\" prior to today's session which she states has increased her low back pain.  -AD       Subjective Pain    Able to rate subjective pain?  yes  -AD    Pre-Treatment Pain Level  5  -AD      User Key  (r) = Recorded By, (t) = Taken By, (c) = Cosigned By    Initials Name Provider Type    AD Dalton, Ashley Claudene, PT Physical Therapist                      PT Assessment/Plan     Row Name 12/19/18 1008          PT Assessment    Assessment Comments  Today's session was initiated with moist heat combined with premod to the lumbar region, with no skin irritation observed. Therapeutic exercises were then performed to address bilateral lower extremity strength, lumbar stability, and balance. She tolerated the session well, with no reports of increased pain. SBA was required during balance activities. She will be progressed as tolerated.  -AD        PT Plan    PT Plan Comments  Progress as tolerated per POC.  -AD       User Key  (r) = Recorded By, (t) = Taken By, (c) = Cosigned By    Initials Name Provider Type    AD Dalton, Ashley Claudene, PT Physical Therapist          Modalities     Row Name 12/19/18 0900             Moist Heat    MH Applied  Yes With premod, no skin irritation observed.  -AD      Location  Lumbar region  -AD      Rx Minutes  15 mins  -AD      MH Prior to Rx  Yes  -AD         ELECTRICAL STIMULATION    Attended/Unattended  Unattended With MH, no skin irritation observed.  -AD      Stimulation Type  Pre-Mod  -AD      Max mAmp  -- Per pt tolerance.  -AD      Location/Electrode Placement/Other  Lumbar region  -AD       PT E-Stim Unattended (Manual) Minutes  15  -AD        User Key  (r) = Recorded By, (t) = Taken By, (c) = Cosigned By    Initials Name Provider Type    " "AD Dalton, Ashley Claudene, JAMISON Physical Therapist          Exercises     Row Name 12/19/18 0900             Subjective Comments    Subjective Comments  Pt reported low back \"spasms\" prior to today's session which she states has increased her low back pain.  -AD         Subjective Pain    Able to rate subjective pain?  yes  -AD      Pre-Treatment Pain Level  5  -AD      Post-Treatment Pain Level  5  -AD         Total Minutes    87910 - PT Therapeutic Exercise Minutes  25  -AD         Exercise 1    Exercise Name 1  GS, LTR, ball squeeze, bridges, PPT, seated march, SAQ, LAQ, QS, tandem stance, balloon tap, scapular squeeze.  -AD      Cueing 1  Verbal;Tactile;Demo  -AD      Time 1  25 minutes  -AD        User Key  (r) = Recorded By, (t) = Taken By, (c) = Cosigned By    Initials Name Provider Type    AD Dalton, Ashley Claudene, JAMISON Physical Therapist                             Therapy Education  Given: HEP, Symptoms/condition management, Pain management, Posture/body mechanics, Fall prevention and home safety  Program: Reinforced  How Provided: Verbal, Demonstration  Provided to: Patient  Level of Understanding: Teach back education performed, Verbalized              Time Calculation:   Start Time: 0900  Stop Time: 0953  Time Calculation (min): 53 min  Therapy Suggested Charges     Code   Minutes Charges    77835 (CPT®) Hc Pt Neuromusc Re Education Ea 15 Min      45047 (CPT®) Hc Pt Ther Proc Ea 15 Min 25 2    28949 (CPT®) Hc Gait Training Ea 15 Min      90381 (CPT®) Hc Pt Therapeutic Act Ea 15 Min      29501 (CPT®) Hc Pt Manual Therapy Ea 15 Min      26069 (CPT®) Hc Pt Ther Massage- Per 15 Min      82032 (CPT®) Hc Pt Iontophoresis Ea 15 Min      23616 (CPT®) Hc Pt Elec Stim Ea-Per 15 Min      70852 (CPT®) Hc Pt Ultrasound Ea 15 Min      46211 (CPT®) Hc Pt Self Care/Mgmt/Train Ea 15 Min      94975 (CPT®) Hc Pt Prosthetic (S) Train Initial Encounter, Each 15 Min      40088 (CPT®) Hc Orthotic(S) Mgmt/Train Initial " Encounter, Each 15min      41442 (CPT®) Hc Pt Aquatic Therapy Ea 15 Min      24568 (CPT®) Hc Pt Orthotic(S)/Prosthetic(S) Encounter, Each 15 Min       (CPT®) Hc Pt Electrical Stim Unattended 15 1    Total  40 3        Therapy Charges for Today     Code Description Service Date Service Provider Modifiers Qty    84998852366 HC PT THER PROC EA 15 MIN 12/19/2018 Dalton, Ashley Claudene, PT GP 2    64228125918 HC PT ELECTRICAL STIM UNATTENDED 12/19/2018 Dalton, Ashley Claudene, PT  1                    Ashley Claudene Dalton, PT  12/19/2018

## 2018-12-24 PROCEDURE — 99285 EMERGENCY DEPT VISIT HI MDM: CPT

## 2018-12-25 ENCOUNTER — APPOINTMENT (OUTPATIENT)
Dept: GENERAL RADIOLOGY | Facility: HOSPITAL | Age: 58
End: 2018-12-25

## 2018-12-25 ENCOUNTER — HOSPITAL ENCOUNTER (EMERGENCY)
Facility: HOSPITAL | Age: 58
Discharge: HOME | End: 2018-12-25
Attending: FAMILY MEDICINE

## 2018-12-25 VITALS
HEIGHT: 65 IN | HEART RATE: 84 BPM | OXYGEN SATURATION: 96 % | RESPIRATION RATE: 18 BRPM | SYSTOLIC BLOOD PRESSURE: 152 MMHG | TEMPERATURE: 98 F | DIASTOLIC BLOOD PRESSURE: 97 MMHG | BODY MASS INDEX: 32.49 KG/M2 | WEIGHT: 195 LBS

## 2018-12-25 DIAGNOSIS — S62.102A CLOSED FRACTURE OF LEFT WRIST, INITIAL ENCOUNTER: Primary | ICD-10-CM

## 2018-12-25 PROCEDURE — 73090 X-RAY EXAM OF FOREARM: CPT

## 2018-12-25 PROCEDURE — 73060 X-RAY EXAM OF HUMERUS: CPT | Performed by: RADIOLOGY

## 2018-12-25 PROCEDURE — 25010000002 ONDANSETRON PER 1 MG: Performed by: FAMILY MEDICINE

## 2018-12-25 PROCEDURE — 25010000002 PROPOFOL 10 MG/ML EMULSION: Performed by: NURSE PRACTITIONER

## 2018-12-25 PROCEDURE — 25010000002 BUTORPHANOL PER 1 MG: Performed by: FAMILY MEDICINE

## 2018-12-25 PROCEDURE — 73110 X-RAY EXAM OF WRIST: CPT

## 2018-12-25 PROCEDURE — 96374 THER/PROPH/DIAG INJ IV PUSH: CPT

## 2018-12-25 PROCEDURE — 73090 X-RAY EXAM OF FOREARM: CPT | Performed by: RADIOLOGY

## 2018-12-25 PROCEDURE — 73030 X-RAY EXAM OF SHOULDER: CPT | Performed by: RADIOLOGY

## 2018-12-25 PROCEDURE — 96375 TX/PRO/DX INJ NEW DRUG ADDON: CPT

## 2018-12-25 PROCEDURE — 96361 HYDRATE IV INFUSION ADD-ON: CPT

## 2018-12-25 PROCEDURE — 73110 X-RAY EXAM OF WRIST: CPT | Performed by: RADIOLOGY

## 2018-12-25 PROCEDURE — 73060 X-RAY EXAM OF HUMERUS: CPT

## 2018-12-25 PROCEDURE — 73030 X-RAY EXAM OF SHOULDER: CPT

## 2018-12-25 RX ORDER — SODIUM CHLORIDE 0.9 % (FLUSH) 0.9 %
10 SYRINGE (ML) INJECTION AS NEEDED
Status: DISCONTINUED | OUTPATIENT
Start: 2018-12-25 | End: 2018-12-25 | Stop reason: HOSPADM

## 2018-12-25 RX ORDER — ONDANSETRON 2 MG/ML
4 INJECTION INTRAMUSCULAR; INTRAVENOUS ONCE
Status: COMPLETED | OUTPATIENT
Start: 2018-12-25 | End: 2018-12-25

## 2018-12-25 RX ORDER — HYDROCODONE BITARTRATE AND ACETAMINOPHEN 5; 325 MG/1; MG/1
1 TABLET ORAL EVERY 6 HOURS PRN
Qty: 10 TABLET | Refills: 0 | Status: SHIPPED | OUTPATIENT
Start: 2018-12-25 | End: 2019-06-03

## 2018-12-25 RX ORDER — PROPOFOL 10 MG/ML
150 VIAL (ML) INTRAVENOUS ONCE
Status: COMPLETED | OUTPATIENT
Start: 2018-12-25 | End: 2018-12-25

## 2018-12-25 RX ORDER — HYDROCODONE BITARTRATE AND ACETAMINOPHEN 7.5; 325 MG/1; MG/1
1 TABLET ORAL EVERY 8 HOURS PRN
Status: DISCONTINUED | OUTPATIENT
Start: 2018-12-25 | End: 2018-12-25 | Stop reason: HOSPADM

## 2018-12-25 RX ADMIN — BUTORPHANOL TARTRATE 1 MG: 2 INJECTION, SOLUTION INTRAMUSCULAR; INTRAVENOUS at 01:03

## 2018-12-25 RX ADMIN — HYDROCODONE BITARTRATE AND ACETAMINOPHEN 1 TABLET: 7.5; 325 TABLET ORAL at 03:02

## 2018-12-25 RX ADMIN — SODIUM CHLORIDE 500 ML: 9 INJECTION, SOLUTION INTRAVENOUS at 01:01

## 2018-12-25 RX ADMIN — PROPOFOL 90 MG: 10 INJECTION, EMULSION INTRAVENOUS at 01:33

## 2018-12-25 RX ADMIN — ONDANSETRON 4 MG: 2 INJECTION, SOLUTION INTRAMUSCULAR; INTRAVENOUS at 01:02

## 2019-01-03 ENCOUNTER — DOCUMENTATION (OUTPATIENT)
Dept: PHYSICAL THERAPY | Facility: HOSPITAL | Age: 59
End: 2019-01-03

## 2019-01-03 DIAGNOSIS — M79.7 FIBROMYALGIA: Primary | ICD-10-CM

## 2019-01-03 NOTE — THERAPY DISCHARGE NOTE
Outpatient Physical Therapy Discharge Summary         Patient Name: Lashae Benitez  : 1960  MRN: [id number]    Today's Date: 1/3/2019    Visit Dx:    ICD-10-CM ICD-9-CM   1. Fibromyalgia M79.7 729.1           OP PT Discharge Summary  Date of Discharge: 19  Reason for Discharge: Change in medical status  Outcomes Achieved: Unable to tolerate or actively participate in therapy  Discharge Instructions/Additional Comments: Pt suffered a fall at home that resulted in a wrist fracture and patient will be discahrged at this time.      Time Calculation:        Therapy Suggested Charges     Code   Minutes Charges    None                       Oswaldo SOTELO Domingo, PT  1/3/2019

## 2019-01-14 RX ORDER — METOPROLOL TARTRATE 50 MG/1
TABLET, FILM COATED ORAL
Qty: 180 TABLET | Refills: 0 | Status: SHIPPED | OUTPATIENT
Start: 2019-01-14 | End: 2019-02-21 | Stop reason: ALTCHOICE

## 2019-01-14 NOTE — TELEPHONE ENCOUNTER
Called pt and she stated that usually her BP is controlled but not lately due to her falling and breaking her arm. She stated that she will start taking her Metoprolol BID instead of QD.

## 2019-01-14 NOTE — TELEPHONE ENCOUNTER
Called pt to see how she is taking her Metoprolol due to her last visit they had stated she was taking Metoprolol Tart 50 mg QD instead of BID. She stated that she has been taking it QD not BID. Is okay to refill her Metoprolol and change the instructions?

## 2019-02-08 ENCOUNTER — TRANSCRIBE ORDERS (OUTPATIENT)
Dept: PHYSICAL THERAPY | Facility: HOSPITAL | Age: 59
End: 2019-02-08

## 2019-02-08 DIAGNOSIS — S52.502D CLOSED FRACTURE OF DISTAL END OF LEFT RADIUS WITH ROUTINE HEALING, UNSPECIFIED FRACTURE MORPHOLOGY, SUBSEQUENT ENCOUNTER: Primary | ICD-10-CM

## 2019-02-11 ENCOUNTER — HOSPITAL ENCOUNTER (OUTPATIENT)
Dept: OCCUPATIONAL THERAPY | Facility: HOSPITAL | Age: 59
Setting detail: THERAPIES SERIES
Discharge: HOME | End: 2019-02-11
Attending: FAMILY MEDICINE

## 2019-02-11 DIAGNOSIS — S52.592S OTHER CLOSED FRACTURE OF DISTAL END OF LEFT RADIUS, SEQUELA: Primary | ICD-10-CM

## 2019-02-11 PROCEDURE — 97167 OT EVAL HIGH COMPLEX 60 MIN: CPT | Performed by: OCCUPATIONAL THERAPIST

## 2019-02-11 NOTE — THERAPY EVALUATION
Outpatient Occupational Therapy Hand Initial Evaluation    [place]     Patient Name: Lashae Benitez  : 1960  MRN: [id number]  Today's Date: 2019       Visit Date: 2019    Patient Active Problem List   Diagnosis   • Hiatal hernia   • Essential hypertension   • Sjogren's syndrome (CMS/HCC)   • Multiple sclerosis (CMS/HCC)   • Psoriasis   • Fibromyalgia   • Osteoarthritis   • Psoriatic arthritis (CMS/HCC)   • RA (rheumatoid arthritis) (CMS/HCC)   • Degenerative disc disease, lumbar   • TMJ arthritis   • Dupuytren's disease   • H. pylori infection   • Family history of coronary artery disease   • Type 2 diabetes mellitus (CMS/HCC)   • SOB (shortness of breath)   • Dizziness   • Edema   • Precordial pain   • Chest pain   • Palpitations   • Bilateral leg edema        Past Medical History:   Diagnosis Date   • Acid reflux    • Allergic    • Anxiety    • Cancer (CMS/HCC)    • Chronic pain disorder    • Degenerative disc disease, lumbar    • Depression    • Dupuytren's disease    • Essential hypertension    • Family history of coronary artery disease    • Fibromyalgia    • Gallbladder abscess    • H. pylori infection    • Hiatal hernia    • Hyperlipidemia    • Hypothyroidism    • Migraines    • Multiple sclerosis (CMS/HCC)    • Osteoarthritis    • Psoriasis    • Psoriatic arthritis (CMS/HCC)    • RA (rheumatoid arthritis) (CMS/HCC)    • Rheumatoid arthritis (CMS/HCC)    • Sinusitis    • Sjogren's syndrome (CMS/HCC)    • Stomach ulcer    • TMJ arthritis    • Type 2 diabetes mellitus (CMS/HCC)    • Urinary tract infection         Past Surgical History:   Procedure Laterality Date   • BREAST LUMPECTOMY Left 1993   • CARDIAC CATHETERIZATION Left 2009    Normal    • CARPAL TUNNEL RELEASE  2012   • CERVICAL POLYPECTOMY  2015   • CHOLECYSTECTOMY  2007   • GASTRIC BYPASS  2007   • KNEE ARTHROPLASTY     • LUMBAR DISC SURGERY      C5-6   • REPLACEMENT TOTAL KNEE Right 2016   • SACRAL NERVE  STIMULATOR PLACEMENT  09/2014   • THYROIDECTOMY  03/2016         Visit Dx:    ICD-10-CM ICD-9-CM   1. Other closed fracture of distal end of left radius, sequela S52.592S 905.2       Patient History     Row Name 02/11/19 1300             History    Chief Complaint  Joint stiffness;Muscle weakness  -      Date Current Problem(s) Began  12/24/18  -      Brief Description of Current Complaint  patient states she fell and fractured left wrist on 12/24/18.  Patient reports surgery on 12/27 with plates and screws/pins. She was placed in cast which was removed approximately 3 weeks ago and currently utilizes brace at this time.    -      Patient/Caregiver Goals  Return to prior level of function  -      Smoking Status  no  -      Patient's Rating of General Health  Fair  -         Pain     Pain Location  Wrist  -      Pain at Present  5  -      Pain at Best  5  -      Pain at Worst  6  -AH      Difficulties with ADL's?  -- difficulty pulling up pants, buttons,zippers etc  -         Fall Risk Assessment    Any falls in the past year:  Yes  -      Number of falls reported in the last 12 months  2  -      Factors that contributed to the fall:  Lost balance  -         Daily Activities    Primary Language  English  -      Are you able to read  Yes  -      Are you able to write  Yes  -      How does patient learn best?  Listening;Demonstration  -      Pt Participated in POC and Goals  Yes  -         Safety    Are you being hurt, hit, or frightened by anyone at home or in your life?  No  -AH      Are you being neglected by a caregiver  No  -        User Key  (r) = Recorded By, (t) = Taken By, (c) = Cosigned By    Initials Name Provider Type     Lilian Negrete, OT Occupational Therapist             Hand Therapy (last 24 hours)      Hand Eval     Row Name 02/11/19 1300              Strength Right    Right  Test 1  45  -AH      Right  Test 2  45  -AH      Right  Test 3   45  -AH       Strength Average Right  45  -AH          Strength Left    Left  Test 1  1  -AH      Left  Test 2  0  -AH      Left  Test 3  1  -AH       Strength Average Left  0.67  -AH         Right Hand Strength - Pinch (lbs)    Lateral  8 lbs  -AH         Left Hand Strength - Pinch (lbs)    Lateral  3 lbs  -AH        User Key  (r) = Recorded By, (t) = Taken By, (c) = Cosigned By    Initials Name Provider Type    Lilian Woodall, OT Occupational Therapist        OT Ortho     Row Name 02/11/19 1300             General ROM    LT Upper Ext  -- wrist flex/ext 26/30  -      GENERAL ROM COMMENTS  forearm pro/sup 2/3 range  -         MMT (Manual Muscle Testing)    General MMT Comments  3/5 left wrist and hand  -         Orthotic/Prosthetic Management    Orthosis Location  wrist hand orthosis  -      Additional Documentation  -- left wrist brace   -        User Key  (r) = Recorded By, (t) = Taken By, (c) = Cosigned By    Initials Name Provider Type    Lilian Woodall OT Occupational Therapist        OT Neuro     Row Name 02/11/19 1300             Sensation    Sensation WNL?  WFL  -         Coordination    Box and Blocks Left  38  -AH      Box and Blocks Right  49  -AH      9-Hole Peg Left  33  -AH      9-Hole Peg Right  22  -AH         General ROM    Left Hand  -- 2/3 grasp, wfl active extension,  thumb 2/3 flex  -        User Key  (r) = Recorded By, (t) = Taken By, (c) = Cosigned By    Initials Name Provider Type    Lilian Woodall OT Occupational Therapist              Therapy Education  Education Details: (ROM)  Given: HEP, Symptoms/condition management  Program: New  How Provided: Verbal, Demonstration, Written  Provided to: Patient  Level of Understanding: Teach back education performed, Demonstrated, Verbalized        OT Goals     Row Name 02/11/19 1300          OT Short Term Goals    STG Date to Achieve  03/11/19  -     STG 1  patient will increase  left wrist ROM 10-15degrees.  -     STG 2  patient will increase left  3- lbs  -     STG 3  patient will increase left hand/thumb ROM to complete full grasp  -     STG 4  patient will report decreased pain 3 or below left.  -        Long Term Goals    LTG Date to Achieve  04/11/19  -     LTG 1  patient will be independent with HEP provided  -     LTG 2  patient will improve left ROM to WFL wrist and hand  -     LTG 3  patient will increase left strength throughout to increase ADL  -     LTG 4  patient will increase left coordination to increase self care independence  -     LTG 5  patient will report decreased pain left hand and wrist.  -       User Key  (r) = Recorded By, (t) = Taken By, (c) = Cosigned By    Initials Name Provider Type     Lilian Negrete, OT Occupational Therapist          OT Assessment/Plan     Row Name 02/11/19 1331          OT Assessment    Impairments  Coordination;Edema;Endurance;Range of motion;Muscle strength;Pain  -     Assessment Comments  patient presents with decreased left strength, coordination, ROM, complaints of pain and edema   -     Please refer to paper survey for additional self-reported information  Yes  -     OT Rehab Potential  Good  -     Patient/caregiver participated in establishment of treatment plan and goals  Yes  -     Patient would benefit from skilled therapy intervention  Yes  -        OT Plan    OT Frequency  2x/week  -     Predicted Duration of Therapy Intervention (Therapy Eval)  4 weeks  -     Planned CPT's?  OT EVAL HIGH COMPLEXITY: 86916;OT THER ACT EA 15 MIN: 22824HD;OT THER PROC EA 15 MIN: 43591CK;OT HOT/COLD PACK;OT CARE PLAN EA 15 MIN  -     Planned Therapy Interventions (Optional Details)  home exercise program;motor coordination training;strengthening;stretching;patient/family education  -     OT Plan Comments  OT as planned  -       User Key  (r) = Recorded By, (t) = Taken By, (c) = Cosigned By     Initials Name Provider Type     Lilian Negrete OT Occupational Therapist              9 Hole Peg  9-Hole Peg Left: 33  9-Hole Peg Right: 22  Box and Blocks  Box and Blocks Left: 38  Box and Blocks Right: 49         Time Calculation:   OT Start Time: 1000  OT Stop Time: 1055  OT Time Calculation (min): 55 min     Therapy Suggested Charges     Code   Minutes Charges    None             Therapy Charges for Today     Code Description Service Date Service Provider Modifiers Qty    91401442559 HC OT EVAL HIGH COMPLEXITY 4 2/11/2019 Lilian Negrete OT GO 1                 Lilian Negrete OT  2/11/2019

## 2019-02-12 ENCOUNTER — TRANSCRIBE ORDERS (OUTPATIENT)
Dept: PHYSICAL THERAPY | Facility: HOSPITAL | Age: 59
End: 2019-02-12

## 2019-02-12 DIAGNOSIS — S52.592D OTHER CLOSED FRACTURE OF DISTAL END OF LEFT RADIUS WITH ROUTINE HEALING, SUBSEQUENT ENCOUNTER: Primary | ICD-10-CM

## 2019-02-13 ENCOUNTER — HOSPITAL ENCOUNTER (OUTPATIENT)
Dept: OCCUPATIONAL THERAPY | Facility: HOSPITAL | Age: 59
Setting detail: THERAPIES SERIES
Discharge: HOME | End: 2019-02-13
Attending: FAMILY MEDICINE

## 2019-02-13 DIAGNOSIS — S52.592S OTHER CLOSED FRACTURE OF DISTAL END OF LEFT RADIUS, SEQUELA: Primary | ICD-10-CM

## 2019-02-13 PROCEDURE — 97530 THERAPEUTIC ACTIVITIES: CPT | Performed by: OCCUPATIONAL THERAPIST

## 2019-02-13 PROCEDURE — 97018 PARAFFIN BATH THERAPY: CPT | Performed by: OCCUPATIONAL THERAPIST

## 2019-02-13 NOTE — THERAPY TREATMENT NOTE
Outpatient Occupational Therapy Hand Treatment Note   [facility]     Patient Name: Lashae Benitez  : 1960  MRN: [id number]  Today's Date: 2019         Visit Date: 2019  Patient Active Problem List   Diagnosis   • Hiatal hernia   • Essential hypertension   • Sjogren's syndrome (CMS/HCC)   • Multiple sclerosis (CMS/HCC)   • Psoriasis   • Fibromyalgia   • Osteoarthritis   • Psoriatic arthritis (CMS/HCC)   • RA (rheumatoid arthritis) (CMS/[facility])   • Degenerative disc disease, lumbar   • TMJ arthritis   • Dupuytren's disease   • H. pylori infection   • Family history of coronary artery disease   • Type 2 diabetes mellitus (CMS/HCC)   • SOB (shortness of breath)   • Dizziness   • Edema   • Precordial pain   • Chest pain   • Palpitations   • Bilateral leg edema         Visit Dx:    ICD-10-CM ICD-9-CM   1. Other closed fracture of distal end of left radius, sequela S52.592S 905.2                   OT Assessment/Plan     Row Name 19 1114          OT Assessment    Assessment Comments  patient tolerated therapy well with rest breaks provided, patient demonstrating increased wrist ROM  -AH       User Key  (r) = Recorded By, (t) = Taken By, (c) = Cosigned By    Initials Name Provider Type    Lilian Woodall, OT Occupational Therapist          Modalities     Row Name 19 1100             Subjective Pain    Able to rate subjective pain?  yes  -AH      Pre-Treatment Pain Level  6  -AH      Post-Treatment Pain Level  6  -AH         Moist Heat    MH Applied  Yes  -AH      Location  left hand and wrist  -AH         Parrafin    [place] [address] Location  left hand and wrist  -AH      Rx Minutes  10  -AH        User Key  (r) = Recorded By, (t) = Taken By, (c) = Cosigned By    Initials Name Provider Type    Lilian Woodall, OT Occupational Therapist          OT Exercises     Row Name 19 1100             Exercise 1    Exercise Name 1  massage  -AH         Exercise 2    Exercise Name 2  PROM and  stretch  -         Exercise 3    Exercise Name 3  AROM with wrist rolls x2   -         Exercise 4    Exercise Name 4  prehension board left hand  -         Exercise 5    Exercise Name 5  yellow digiflex  -        User Key  (r) = Recorded By, (t) = Taken By, (c) = Cosigned By    Initials Name Provider Type     Lilian Negrete OT Occupational Therapist                                  Time Calculation:   OT Start Time: 0900  OT Stop Time: 1000  OT Time Calculation (min): 60 min     Therapy Suggested Charges     Code   Minutes Charges    None             Therapy Charges for Today     Code Description Service Date Service Provider Modifiers Qty    02383714349  OT PARAFFIN BATH 2/13/2019 Lilian Negrete OT GO 1    [id number]  OT THERAPEUTIC ACT EA 15 MIN 2/13/2019 Lilian Negrete OT GO 3                  Lilian Negrete OT  2/13/2019

## 2019-02-18 ENCOUNTER — HOSPITAL ENCOUNTER (OUTPATIENT)
Dept: OCCUPATIONAL THERAPY | Facility: HOSPITAL | Age: 59
Setting detail: THERAPIES SERIES
Discharge: HOME | End: 2019-02-18
Attending: FAMILY MEDICINE

## 2019-02-18 DIAGNOSIS — S52.592S OTHER CLOSED FRACTURE OF DISTAL END OF LEFT RADIUS, SEQUELA: Primary | ICD-10-CM

## 2019-02-18 PROCEDURE — 97530 THERAPEUTIC ACTIVITIES: CPT | Performed by: OCCUPATIONAL THERAPIST

## 2019-02-18 NOTE — THERAPY TREATMENT NOTE
Outpatient Occupational Therapy Hand Treatment Note   [facility]     Patient Name: Lashae Benitez  : 1960  MRN: [id number]  Today's Date: 2019         Visit Date: 2019  Patient Active Problem List   Diagnosis   • Hiatal hernia   • Essential hypertension   • Sjogren's syndrome (CMS/HCC)   • Multiple sclerosis (CMS/HCC)   • Psoriasis   • Fibromyalgia   • Osteoarthritis   • Psoriatic arthritis (CMS/HCC)   • RA (rheumatoid arthritis) (CMS/[facility])   • Degenerative disc disease, lumbar   • TMJ arthritis   • Dupuytren's disease   • H. pylori infection   • Family history of coronary artery disease   • Type 2 diabetes mellitus (CMS/HCC)   • SOB (shortness of breath)   • Dizziness   • Edema   • Precordial pain   • Chest pain   • Palpitations   • Bilateral leg edema         Visit Dx:    ICD-10-CM ICD-9-CM   1. Other closed fracture of distal end of left radius, sequela S52.592S 905.2                   OT Assessment/Plan     Row Name 19 1124          OT Assessment    Assessment Comments  patient continue to demonstrate increasing left hand ROM  -AH        OT Plan    OT Plan Comments  continue OT  -AH       User Key  (r) = Recorded By, (t) = Taken By, (c) = Cosigned By    Initials Name Provider Type    Lilian Woodall, OT Occupational Therapist          Modalities     Row Name 19 1100             Subjective Comments    Subjective Comments  I think therapy is helping my wrist and hand so far  -AH         Subjective Pain    Able to rate subjective pain?  yes  -AH      Pre-Treatment Pain Level  5  -AH      Post-Treatment Pain Level  5  -AH         Moist Heat    MH Applied  Yes  -AH      Location  left hand  and wrist  -AH      Rx Minutes  15 mins  -AH      MH Prior to Rx  Yes  -AH        User Key  (r) = Recorded By, (t) = Taken By, (c) = Cosigned By    Initials Name Provider Type    Lilian Woodall, OT Occupational Therapist          OT Exercises     Row Name 19 1100              Exercise 1    Exercise Name 1  massage  -AH         Exercise 2    Exercise Name 2  RPOM and stretch  -AH         Exercise 3    Exercise Name 3  AROM with wrist rolls x2  -AH         Exercise 4    Exercise Name 4  prehension board left   -AH         Exercise 5    Exercise Name 5  red digiflex left hand  -AH        User Key  (r) = Recorded By, (t) = Taken By, (c) = Cosigned By    Initials Name Provider Type     Lilian Negrete, OT Occupational Therapist                                  Time Calculation:   OT Start Time: 0900  OT Stop Time: 1000  OT Time Calculation (min): 60 min     Therapy Suggested Charges     Code   Minutes Charges    None             Therapy Charges for Today     Code Description Service Date Service Provider Modifiers Qty    26971742456  OT THERAPEUTIC ACT EA 15 MIN 2/18/2019 Lilian Negrete, OT GO 3                  Lilian Negrete OT  2/18/2019

## 2019-02-21 ENCOUNTER — OFFICE VISIT (OUTPATIENT)
Dept: CARDIOLOGY | Facility: CLINIC | Age: 59
End: 2019-02-21

## 2019-02-21 VITALS
SYSTOLIC BLOOD PRESSURE: 130 MMHG | WEIGHT: 203 LBS | OXYGEN SATURATION: 97 % | RESPIRATION RATE: 18 BRPM | BODY MASS INDEX: 33.82 KG/M2 | HEART RATE: 87 BPM | HEIGHT: 65 IN | DIASTOLIC BLOOD PRESSURE: 85 MMHG

## 2019-02-21 DIAGNOSIS — R60.0 BILATERAL LEG EDEMA: ICD-10-CM

## 2019-02-21 DIAGNOSIS — I51.89 DIASTOLIC DYSFUNCTION: ICD-10-CM

## 2019-02-21 DIAGNOSIS — R00.2 PALPITATIONS: ICD-10-CM

## 2019-02-21 DIAGNOSIS — I10 ESSENTIAL HYPERTENSION: Primary | ICD-10-CM

## 2019-02-21 PROCEDURE — 99213 OFFICE O/P EST LOW 20 MIN: CPT | Performed by: NURSE PRACTITIONER

## 2019-02-21 RX ORDER — METOPROLOL SUCCINATE 100 MG/1
100 TABLET, EXTENDED RELEASE ORAL DAILY
Qty: 90 TABLET | Refills: 3 | Status: SHIPPED | OUTPATIENT
Start: 2019-02-21 | End: 2020-03-05

## 2019-02-21 NOTE — PROGRESS NOTES
"Kreis, Samuel Duane, MD  Lashae Benitez  1960  02/21/2019    Patient Active Problem List   Diagnosis   • Hiatal hernia   • Essential hypertension   • Sjogren's syndrome (CMS/HCC)   • Multiple sclerosis (CMS/HCC)   • Psoriasis   • Fibromyalgia   • Osteoarthritis   • Psoriatic arthritis (CMS/HCC)   • RA (rheumatoid arthritis) (CMS/HCC)   • Degenerative disc disease, lumbar   • TMJ arthritis   • Dupuytren's disease   • H. pylori infection   • Family history of coronary artery disease   • Type 2 diabetes mellitus (CMS/HCC)   • SOB (shortness of breath)   • Dizziness   • Edema   • Precordial pain   • Chest pain   • Palpitations   • Bilateral leg edema       Dear Kreis, Samuel Duane, MD:    Subjective       History of Present Illness:    Chief Complaint   Patient presents with   • Essential hypertension       Lashae Benitez is a pleasant 58 y.o. female with a past medical history significant for hypertension and lower extremity edema. She presents today for routine cardiology follow up. She reports she has been doing well. She denies chest pain, shortness of breath, palpitations, dizziness, and lightheadedness. She reports some mild chronic LE edema. Her blood pressure has been well controlled on metoprolol. However, she has been forgetting her evening dose at times. She recently fractured her left arm but reports she is recovering well after surgery.      Allergies   Allergen Reactions   • Penicillins    • Sulfa Antibiotics    • Codeine    • Imuran [Azathioprine]    • Januvia [Sitagliptin]    • Beta Adrenergic Blockers Other (See Comments)     I called pt to ask her about the allergy to bblockers in her chart. Pt states \"too big of a dose makes her psoriasis act up\", but this current dose of metoprolol 50 mg bid, she has been on for a long time, with no ill side effects.  CATA,CMA 3/28/18   :      Current Outpatient Medications:   •  ALLERGY SERUM INJECTION, Inject  under the skin Every 14 (Fourteen) Days., Disp: , Rfl:   • "  amitriptyline (ELAVIL) 25 MG tablet, Take 25 mg by mouth daily., Disp: , Rfl:   •  aspirin 81 MG EC tablet, Take 81 mg by mouth daily., Disp: , Rfl:   •  calcium citrate-vitamin d (CALCITRATE) 315-250 MG-UNIT tablet tablet, Take 1 tablet by mouth daily., Disp: , Rfl:   •  celecoxib (CeleBREX) 200 MG capsule, Take 200 mg by mouth daily., Disp: , Rfl:   •  cholecalciferol (VITAMIN D3) 1000 UNITS tablet, Take 5,000 Units by mouth Daily., Disp: , Rfl:   •  cyanocobalamin 1000 MCG/ML injection, Inject 1,000 mcg into the shoulder, thigh, or buttocks Every 28 (Twenty-Eight) Days., Disp: , Rfl:   •  cyclobenzaprine (FLEXERIL) 10 MG tablet, Take 10 mg by mouth daily., Disp: , Rfl:   •  dexlansoprazole (DEXILANT) 60 MG capsule, Take 60 mg by mouth daily., Disp: , Rfl:   •  diazePAM (VALIUM) 2 MG tablet, Take 2 mg by mouth 2 (Two) Times a Day., Disp: , Rfl:   •  diflunisal (DOLOBID) 500 MG tablet tablet, Take 1 tablet by mouth 2 (Two) Times a Day., Disp: 20 tablet, Rfl: 0  •  DULoxetine (CYMBALTA) 60 MG capsule, Take 90 mg by mouth 2 (Two) Times a Day., Disp: , Rfl:   •  ferrous sulfate 325 (65 FE) MG tablet, Take 325 mg by mouth daily., Disp: , Rfl:   •  folic acid (FOLVITE) 1 MG tablet, Take 1 mg by mouth daily., Disp: , Rfl:   •  gabapentin (NEURONTIN) 600 MG tablet, Take 400 mg by mouth 4 (Four) Times a Day., Disp: , Rfl:   •  InFLIXimab-dyyb (INFLECTRA IV), Infuse  into a venous catheter., Disp: , Rfl:   •  insulin detemir (LEVEMIR) 100 UNIT/ML injection, Inject 10 Units under the skin Daily., Disp: , Rfl:   •  levothyroxine (SYNTHROID, LEVOTHROID) 150 MCG tablet, Take 112 mcg by mouth 2 (Two) Times a Week., Disp: , Rfl:   •  linaclotide (LINZESS) 290 MCG capsule capsule, Take 290 mcg by mouth every morning before breakfast., Disp: , Rfl:   •  loratadine (CLARITIN) 10 MG tablet, Take 10 mg by mouth Every Night., Disp: , Rfl:   •  Multiple Vitamin (MULTI VITAMIN DAILY PO), Take 1 tablet by mouth Every Morning., Disp: ,  Rfl:   •  OnabotulinumtoxinA (BOTOX IM), Inject  into the shoulder, thigh, or buttocks Every 3 (Three) Months., Disp: , Rfl:   •  primidone (MYSOLINE) 50 MG tablet, Take 50 mg by mouth daily., Disp: , Rfl:   •  vitamin C (ASCORBIC ACID) 500 MG tablet, Take 1,000 mg by mouth Daily., Disp: , Rfl:   •  vitamin E 1000 UNIT capsule, Take 1,000 Units by mouth daily., Disp: , Rfl:   •  butalbital-acetaminophen-caffeine (FIORICET, ESGIC) -40 MG per tablet, Take 1 tablet by mouth as needed for headaches., Disp: , Rfl:   •  HYDROcodone-acetaminophen (NORCO) 5-325 MG per tablet, Take 1 tablet by mouth Every 6 (Six) Hours As Needed for Moderate Pain ., Disp: 10 tablet, Rfl: 0  •  levothyroxine sodium (TIROSINT) 137 MCG capsule, Take 137 mcg by mouth Daily., Disp: , Rfl:   •  metaxalone (SKELAXIN) 800 MG tablet, Take 1 tablet by mouth 3 (Three) Times a Day As Needed for Muscle Spasms., Disp: 15 tablet, Rfl: 0  •  methotrexate 2.5 MG tablet, Take 25 mg by mouth 1 (One) Time Per Week. Takes 10 tablets 1 day per week on Saturday, Disp: , Rfl:   •  metoprolol succinate XL (TOPROL-XL) 100 MG 24 hr tablet, Take 1 tablet by mouth Daily., Disp: 90 tablet, Rfl: 3  •  polyethylene glycol (MIRALAX) packet, Take 17 g by mouth Daily., Disp: , Rfl:   •  ranitidine (ZANTAC) 150 MG tablet, Take 150 mg by mouth 2 (two) times a day., Disp: , Rfl:   •  topiramate (TOPAMAX) 25 MG tablet, Take 50 mg by mouth Every Night., Disp: , Rfl:   •  traMADol (ULTRAM) 50 MG tablet, Take 50 mg by mouth 2 (two) times a day., Disp: , Rfl:       The following portions of the patient's history were reviewed and updated as appropriate: allergies, current medications, past family history, past medical history, past social history, past surgical history and problem list.    Social History     Tobacco Use   • Smoking status: Never Smoker   • Smokeless tobacco: Never Used   Substance Use Topics   • Alcohol use: No   • Drug use: No       Review of Systems  "  Constitution: Negative for weakness and malaise/fatigue.   Cardiovascular: Positive for leg swelling. Negative for chest pain, dyspnea on exertion, palpitations and syncope.   Respiratory: Negative for cough, shortness of breath and wheezing.    Neurological: Negative for dizziness and light-headedness.       Objective   Vitals:    02/21/19 1109   BP: 130/85   BP Location: Left arm   Patient Position: Sitting   Cuff Size: Adult   Pulse: 87   Resp: 18   SpO2: 97%   Weight: 92.1 kg (203 lb)   Height: 165.1 cm (65\")     Body mass index is 33.78 kg/m².        Physical Exam   Constitutional: She is oriented to person, place, and time. She appears well-developed and well-nourished.   HENT:   Head: Normocephalic and atraumatic.   Neck: No JVD present.   Cardiovascular: Normal rate, regular rhythm and normal heart sounds. Exam reveals no S3 and no S4.   No murmur heard.  Pulmonary/Chest: Effort normal and breath sounds normal. She has no wheezes. She has no rales.   Abdominal: Soft. Bowel sounds are normal.   Musculoskeletal: She exhibits no edema.   Brace noted left arm   Neurological: She is alert and oriented to person, place, and time.   Skin: Skin is warm and dry.   Psychiatric: She has a normal mood and affect. Her behavior is normal.       Lab Results   Component Value Date     (C) 11/22/2018    K 3.4 (L) 11/22/2018    CL 90 (L) 11/22/2018    CO2 26.0 11/22/2018    BUN 8 11/22/2018    CREATININE 0.68 11/22/2018    GLUCOSE 88 11/22/2018    CALCIUM 8.7 11/22/2018    AST 27 11/22/2018    ALT 36 11/22/2018    ALKPHOS 126 (H) 11/22/2018    LABIL2 1.4 (L) 08/11/2015       Lab Results   Component Value Date    WBC 8.47 11/22/2018    HGB 12.0 11/22/2018    HCT 34.3 (L) 11/22/2018     11/22/2018     Lab Results   Component Value Date    INR 0.97 11/22/2018    INR 0.93 07/20/2015         Lab Results   Component Value Date    BNP 51.0 11/22/2018       During this visit the following were done:  Labs Reviewed [x]  "   Labs Ordered []    Radiology Reports Reviewed [x]    Radiology Ordered []    PCP Records Reviewed []    Referring Provider Records Reviewed []    ER Records Reviewed []    Hospital Records Reviewed []    History Obtained From Family []    Radiology Images Reviewed []    Other Reviewed []    Records Requested []       Procedures      Assessment/Plan    Diagnosis Plan   1. Essential hypertension  metoprolol succinate XL (TOPROL-XL) 100 MG 24 hr tablet   2. Bilateral leg edema     3. Diastolic dysfunction     4. Palpitations                  Recommendations:  1. Since she is having trouble remembering her evening dose of metoprolol, will change to metoprolol succinate 100 mg daily    2. I have encouraged her to avoid excess sodium in her diet and given her literature today    3. Follow up in 6 months and PRN      Return in about 6 months (around 8/21/2019) for Recheck.    As always, I appreciate very much the opportunity to participate in the cardiovascular care of your patients.      With Best Regards,    RUIZ Pitts

## 2019-02-21 NOTE — PATIENT INSTRUCTIONS
"DASH Eating Plan  DASH stands for \"Dietary Approaches to Stop Hypertension.\" The DASH eating plan is a healthy eating plan that has been shown to reduce high blood pressure (hypertension). It may also reduce your risk for type 2 diabetes, heart disease, and stroke. The DASH eating plan may also help with weight loss.  What are tips for following this plan?  General guidelines  · Avoid eating more than 2,300 mg (milligrams) of salt (sodium) a day. If you have hypertension, you may need to reduce your sodium intake to 1,500 mg a day.  · Limit alcohol intake to no more than 1 drink a day for nonpregnant women and 2 drinks a day for men. One drink equals 12 oz of beer, 5 oz of wine, or 1½ oz of hard liquor.  · Work with your health care provider to maintain a healthy body weight or to lose weight. Ask what an ideal weight is for you.  · Get at least 30 minutes of exercise that causes your heart to beat faster (aerobic exercise) most days of the week. Activities may include walking, swimming, or biking.  · Work with your health care provider or diet and nutrition specialist (dietitian) to adjust your eating plan to your individual calorie needs.  Reading food labels  · Check food labels for the amount of sodium per serving. Choose foods with less than 5 percent of the Daily Value of sodium. Generally, foods with less than 300 mg of sodium per serving fit into this eating plan.  · To find whole grains, look for the word \"whole\" as the first word in the ingredient list.  Shopping  · Buy products labeled as \"low-sodium\" or \"no salt added.\"  · Buy fresh foods. Avoid canned foods and premade or frozen meals.  Cooking  · Avoid adding salt when cooking. Use salt-free seasonings or herbs instead of table salt or sea salt. Check with your health care provider or pharmacist before using salt substitutes.  · Do not forrester foods. Cook foods using healthy methods such as baking, boiling, grilling, and broiling instead.  · Cook with " heart-healthy oils, such as olive, canola, soybean, or sunflower oil.  Meal planning    · Eat a balanced diet that includes:  ? 5 or more servings of fruits and vegetables each day. At each meal, try to fill half of your plate with fruits and vegetables.  ? Up to 6-8 servings of whole grains each day.  ? Less than 6 oz of lean meat, poultry, or fish each day. A 3-oz serving of meat is about the same size as a deck of cards. One egg equals 1 oz.  ? 2 servings of low-fat dairy each day.  ? A serving of nuts, seeds, or beans 5 times each week.  ? Heart-healthy fats. Healthy fats called Omega-3 fatty acids are found in foods such as flaxseeds and coldwater fish, like sardines, salmon, and mackerel.  · Limit how much you eat of the following:  ? Canned or prepackaged foods.  ? Food that is high in trans fat, such as fried foods.  ? Food that is high in saturated fat, such as fatty meat.  ? Sweets, desserts, sugary drinks, and other foods with added sugar.  ? Full-fat dairy products.  · Do not salt foods before eating.  · Try to eat at least 2 vegetarian meals each week.  · Eat more home-cooked food and less restaurant, buffet, and fast food.  · When eating at a restaurant, ask that your food be prepared with less salt or no salt, if possible.  What foods are recommended?  The items listed may not be a complete list. Talk with your dietitian about what dietary choices are best for you.  Grains  Whole-grain or whole-wheat bread. Whole-grain or whole-wheat pasta. Brown rice. Oatmeal. Quinoa. Bulgur. Whole-grain and low-sodium cereals. Patricia bread. Low-fat, low-sodium crackers. Whole-wheat flour tortillas.  Vegetables  Fresh or frozen vegetables (raw, steamed, roasted, or grilled). Low-sodium or reduced-sodium tomato and vegetable juice. Low-sodium or reduced-sodium tomato sauce and tomato paste. Low-sodium or reduced-sodium canned vegetables.  Fruits  All fresh, dried, or frozen fruit. Canned fruit in natural juice (without  added sugar).  Meat and other protein foods  Skinless chicken or turkey. Ground chicken or turkey. Pork with fat trimmed off. Fish and seafood. Egg whites. Dried beans, peas, or lentils. Unsalted nuts, nut butters, and seeds. Unsalted canned beans. Lean cuts of beef with fat trimmed off. Low-sodium, lean deli meat.  Dairy  Low-fat (1%) or fat-free (skim) milk. Fat-free, low-fat, or reduced-fat cheeses. Nonfat, low-sodium ricotta or cottage cheese. Low-fat or nonfat yogurt. Low-fat, low-sodium cheese.  Fats and oils  Soft margarine without trans fats. Vegetable oil. Low-fat, reduced-fat, or light mayonnaise and salad dressings (reduced-sodium). Canola, safflower, olive, soybean, and sunflower oils. Avocado.  Seasoning and other foods  Herbs. Spices. Seasoning mixes without salt. Unsalted popcorn and pretzels. Fat-free sweets.  What foods are not recommended?  The items listed may not be a complete list. Talk with your dietitian about what dietary choices are best for you.  Grains  Baked goods made with fat, such as croissants, muffins, or some breads. Dry pasta or rice meal packs.  Vegetables  Creamed or fried vegetables. Vegetables in a cheese sauce. Regular canned vegetables (not low-sodium or reduced-sodium). Regular canned tomato sauce and paste (not low-sodium or reduced-sodium). Regular tomato and vegetable juice (not low-sodium or reduced-sodium). Pickles. Olives.  Fruits  Canned fruit in a light or heavy syrup. Fried fruit. Fruit in cream or butter sauce.  Meat and other protein foods  Fatty cuts of meat. Ribs. Fried meat. Cadena. Sausage. Bologna and other processed lunch meats. Salami. Fatback. Hotdogs. Bratwurst. Salted nuts and seeds. Canned beans with added salt. Canned or smoked fish. Whole eggs or egg yolks. Chicken or turkey with skin.  Dairy  Whole or 2% milk, cream, and half-and-half. Whole or full-fat cream cheese. Whole-fat or sweetened yogurt. Full-fat cheese. Nondairy creamers. Whipped toppings.  Processed cheese and cheese spreads.  Fats and oils  Butter. Stick margarine. Lard. Shortening. Ghee. Cadena fat. Tropical oils, such as coconut, palm kernel, or palm oil.  Seasoning and other foods  Salted popcorn and pretzels. Onion salt, garlic salt, seasoned salt, table salt, and sea salt. Worcestershire sauce. Tartar sauce. Barbecue sauce. Teriyaki sauce. Soy sauce, including reduced-sodium. Steak sauce. Canned and packaged gravies. Fish sauce. Oyster sauce. Cocktail sauce. Horseradish that you find on the shelf. Ketchup. Mustard. Meat flavorings and tenderizers. Bouillon cubes. Hot sauce and Tabasco sauce. Premade or packaged marinades. Premade or packaged taco seasonings. Relishes. Regular salad dressings.  Where to find more information:  · [employer]: www.nhlbi.nih.gov  · American Heart Association: www.heart.org  Summary  · The DASH eating plan is a healthy eating plan that has been shown to reduce high blood pressure (hypertension). It may also reduce your risk for type 2 diabetes, heart disease, and stroke.  · With the DASH eating plan, you should limit salt (sodium) intake to 2,300 mg a day. If you have hypertension, you may need to reduce your sodium intake to 1,500 mg a day.  · When on the DASH eating plan, aim to eat more fresh fruits and vegetables, whole grains, lean proteins, low-fat dairy, and heart-healthy fats.  · Work with your health care provider or diet and nutrition specialist (dietitian) to adjust your eating plan to your individual calorie needs.  This information is not intended to replace advice given to you by your health care provider. Make sure you discuss any questions you have with your health care provider.  Document Released: 12/06/2012 Document Revised: 12/11/2017 Document Reviewed: 12/11/2017  [employer] Interactive Patient Education © 2018 [employer]

## 2019-02-22 ENCOUNTER — HOSPITAL ENCOUNTER (OUTPATIENT)
Dept: OCCUPATIONAL THERAPY | Facility: HOSPITAL | Age: 59
Setting detail: THERAPIES SERIES
Discharge: HOME | End: 2019-02-22
Attending: FAMILY MEDICINE

## 2019-02-22 DIAGNOSIS — S52.592S OTHER CLOSED FRACTURE OF DISTAL END OF LEFT RADIUS, SEQUELA: Primary | ICD-10-CM

## 2019-02-22 PROCEDURE — 97530 THERAPEUTIC ACTIVITIES: CPT | Performed by: OCCUPATIONAL THERAPIST

## 2019-02-22 PROCEDURE — 97018 PARAFFIN BATH THERAPY: CPT | Performed by: OCCUPATIONAL THERAPIST

## 2019-02-22 NOTE — THERAPY TREATMENT NOTE
Outpatient Occupational Therapy Hand Treatment Note   [facility]     Patient Name: Lashae Benitez  : 1960  MRN: [id number]  Today's Date: 2019         Visit Date: 2019  Patient Active Problem List   Diagnosis   • Hiatal hernia   • Essential hypertension   • Sjogren's syndrome (CMS/HCC)   • Multiple sclerosis (CMS/HCC)   • Psoriasis   • Fibromyalgia   • Osteoarthritis   • Psoriatic arthritis (CMS/HCC)   • RA (rheumatoid arthritis) (CMS/[facility])   • Degenerative disc disease, lumbar   • TMJ arthritis   • Dupuytren's disease   • H. pylori infection   • Family history of coronary artery disease   • Type 2 diabetes mellitus (CMS/[facility])   • SOB (shortness of breath)   • Dizziness   • Edema   • Precordial pain   • Chest pain   • Palpitations   • Bilateral leg edema         Visit Dx:    ICD-10-CM ICD-9-CM   1. Other closed fracture of distal end of left radius, sequela S52.592S 905.2                   OT Assessment/Plan     Row Name 19 1126          OT Assessment    Assessment Comments  patient continues to demonstrate increasing ROM and strength right hand  -AH        OT Plan    OT Plan Comments  continue OT  -AH       User Key  (r) = Recorded By, (t) = Taken By, (c) = Cosigned By    Initials Name Provider Type    Lilian Woodall, OT Occupational Therapist          Modalities     Row Name 19 1100             Moist Heat    MH Applied  Yes  -AH      Location  left hand and wrist  -AH      Rx Minutes  15 mins  -AH      MH Prior to Rx  Yes  -AH        User Key  (r) = Recorded By, (t) = Taken By, (c) = Cosigned By    Initials Name Provider Type    Lilian Woodall, OT Occupational Therapist          OT Exercises     Row Name 19 1100             Exercise 1    Exercise Name 1  massage  -AH         Exercise 2    Exercise Name 2  PROM and stretch  -AH         Exercise 3    Exercise Name 3  AROM with wrist rolls x2  -AH         Exercise 4    Exercise Name 4  prehension board x2  -AH          Exercise 5    Exercise Name 5  red digiflex   -        User Key  (r) = Recorded By, (t) = Taken By, (c) = Cosigned By    Initials Name Provider Type     Lilian Negrete, MARKUS Occupational Therapist                                  Time Calculation:   OT Start Time: 0900  OT Stop Time: 1000  OT Time Calculation (min): 60 min     Therapy Suggested Charges     Code   Minutes Charges    None             Therapy Charges for Today     Code Description Service Date Service Provider Modifiers Qty    [id number]  OT THERAPEUTIC ACT EA 15 MIN 2/22/2019 Lilian Negrete, OT GO 3    [id number]  OT PARAFFIN BATH 2/22/2019 Lilian Negrete, MARKUS GO 1                  Lilian Negrete OT  2/22/2019

## 2019-02-26 ENCOUNTER — HOSPITAL ENCOUNTER (OUTPATIENT)
Dept: OCCUPATIONAL THERAPY | Facility: HOSPITAL | Age: 59
Setting detail: THERAPIES SERIES
Discharge: HOME | End: 2019-02-26
Attending: FAMILY MEDICINE

## 2019-02-26 DIAGNOSIS — S52.592S OTHER CLOSED FRACTURE OF DISTAL END OF LEFT RADIUS, SEQUELA: Primary | ICD-10-CM

## 2019-02-26 PROCEDURE — 97018 PARAFFIN BATH THERAPY: CPT | Performed by: OCCUPATIONAL THERAPIST

## 2019-02-26 PROCEDURE — 97530 THERAPEUTIC ACTIVITIES: CPT | Performed by: OCCUPATIONAL THERAPIST

## 2019-02-26 NOTE — THERAPY TREATMENT NOTE
Outpatient Occupational Therapy Hand Treatment Note   [facility]     Patient Name: Lashae Benitez  : 1960  MRN: [id number]  Today's Date: 2019         Visit Date: 2019  Patient Active Problem List   Diagnosis   • Hiatal hernia   • Essential hypertension   • Sjogren's syndrome (CMS/HCC)   • Multiple sclerosis (CMS/HCC)   • Psoriasis   • Fibromyalgia   • Osteoarthritis   • Psoriatic arthritis (CMS/HCC)   • RA (rheumatoid arthritis) (CMS/HCC)   • Degenerative disc disease, lumbar   • TMJ arthritis   • Dupuytren's disease   • H. pylori infection   • Family history of coronary artery disease   • Type 2 diabetes mellitus (CMS/HCC)   • SOB (shortness of breath)   • Dizziness   • Edema   • Precordial pain   • Chest pain   • Palpitations   • Bilateral leg edema         Visit Dx:    ICD-10-CM ICD-9-CM   1. Other closed fracture of distal end of left radius, sequela S52.592S 905.2          Hand Therapy (last 24 hours)      Hand Eval     Row Name 19 1700              Strength Left    Left  Test 1  26  -AH      Left  Test 2  25  -AH      Left  Test 3  22  -AH       Strength Average Left  24.33  -        User Key  (r) = Recorded By, (t) = Taken By, (c) = Cosigned By    Initials Name Provider Type    Lilian Woodall, OT Occupational Therapist        OT Ortho     Row Name 19 170             General ROM    LT Upper Ext  -- wrist flex/ext 53/52  -        User Key  (r) = Recorded By, (t) = Taken By, (c) = Cosigned By    Initials Name Provider Type    Lilian Woodall, OT Occupational Therapist                OT Assessment/Plan     Row Name 19 172          OT Assessment    Assessment Comments  patient demonstrates increasing left wrist ROM and strength.   -       User Key  (r) = Recorded By, (t) = Taken By, (c) = Cosigned By    Initials Name Provider Type    Lilian Woodall, OT Occupational Therapist          Modalities     Row Name 19 1703              Moist Heat    MH Applied  Yes  -AH      Location  left hand and wrist  -AH      Rx Minutes  15 mins  -AH      MH Prior to Rx  Yes  -AH         Parrafin    Paraffin [id number] Location  left hand and wrist  -AH      Rx Minutes  15  -AH        User Key  (r) = Recorded By, (t) = Taken By, (c) = Cosigned By    Initials Name Provider Type    Lilian Woodall, MARKUS Occupational Therapist          OT Exercises     Row Name 02/26/19 1700             Subjective Comments    Subjective Comments  patient states her hand and wrist are doing much better  -AH         Exercise 1    Exercise Name 1  mssage  -AH         Exercise 2    Exercise Name 2  PROM and stretch  -AH         Exercise 3    Exercise Name 3  AROM with wrist andhand  -AH         Exercise 4    Exercise Name 4  prehension board  -AH         Exercise 5    Exercise Name 5  red digiflex  -AH         Exercise 6    Exercise Name 6  graded pins x1  -AH        User Key  (r) = Recorded By, (t) = Taken By, (c) = Cosigned By    Initials Name Provider Type    Lilian Woodall, OT Occupational Therapist                  Therapy Education  Education Details: (theraputty)  Given: HEP  Program: New  How Provided: Verbal, Demonstration  Provided to: Patient  Level of Understanding: Demonstrated               Time Calculation:   OT Start Time: 0800  OT Stop Time: 0900  OT Time Calculation (min): 60 min     Therapy Suggested Charges     Code   Minutes Charges    None             Therapy Charges for Today     Code Description Service Date Service Provider Modifiers Qty    [id number]  OT THERAPEUTIC ACT EA 15 MIN 2/26/2019 Lilian Negrete, OT GO 3    [id number]  OT PARAFFIN BATH 2/26/2019 Lilian Negrete, OT GO 1                  Lilian Negrete OT  2/26/2019

## 2019-02-28 ENCOUNTER — HOSPITAL ENCOUNTER (OUTPATIENT)
Dept: OCCUPATIONAL THERAPY | Facility: HOSPITAL | Age: 59
Setting detail: THERAPIES SERIES
Discharge: HOME | End: 2019-02-28
Attending: FAMILY MEDICINE

## 2019-02-28 DIAGNOSIS — S52.592S OTHER CLOSED FRACTURE OF DISTAL END OF LEFT RADIUS, SEQUELA: Primary | ICD-10-CM

## 2019-02-28 PROCEDURE — 97018 PARAFFIN BATH THERAPY: CPT | Performed by: OCCUPATIONAL THERAPIST

## 2019-02-28 PROCEDURE — 97530 THERAPEUTIC ACTIVITIES: CPT | Performed by: OCCUPATIONAL THERAPIST

## 2019-03-04 ENCOUNTER — HOSPITAL ENCOUNTER (OUTPATIENT)
Dept: OCCUPATIONAL THERAPY | Facility: HOSPITAL | Age: 59
Setting detail: THERAPIES SERIES
Discharge: HOME | End: 2019-03-04
Attending: FAMILY MEDICINE

## 2019-03-04 DIAGNOSIS — S52.592S OTHER CLOSED FRACTURE OF DISTAL END OF LEFT RADIUS, SEQUELA: Primary | ICD-10-CM

## 2019-03-04 PROCEDURE — 97018 PARAFFIN BATH THERAPY: CPT | Performed by: OCCUPATIONAL THERAPIST

## 2019-03-04 PROCEDURE — 97530 THERAPEUTIC ACTIVITIES: CPT | Performed by: OCCUPATIONAL THERAPIST

## 2019-03-04 NOTE — THERAPY TREATMENT NOTE
Outpatient Occupational Therapy Hand Treatment Note   [facility]     Patient Name: Lashae Benitez  : 1960  MRN: [id number]  Today's Date: 3/4/2019         Visit Date: 2019  Patient Active Problem List   Diagnosis   • Hiatal hernia   • Essential hypertension   • Sjogren's syndrome (CMS/HCC)   • Multiple sclerosis (CMS/HCC)   • Psoriasis   • Fibromyalgia   • Osteoarthritis   • Psoriatic arthritis (CMS/HCC)   • RA (rheumatoid arthritis) (CMS/[facility])   • Degenerative disc disease, lumbar   • TMJ arthritis   • Dupuytren's disease   • H. pylori infection   • Family history of coronary artery disease   • Type 2 diabetes mellitus (CMS/[facility])   • SOB (shortness of breath)   • Dizziness   • Edema   • Precordial pain   • Chest pain   • Palpitations   • Bilateral leg edema         Visit Dx:    ICD-10-CM ICD-9-CM   1. Other closed fracture of distal end of left radius, sequela S52.592S 905.2                   OT Assessment/Plan     Row Name 19 1626          OT Assessment    Assessment Comments  patient continues to demonstrate increasing left ROM and strength  -AH        OT Plan    OT Plan Comments  continue OT  -AH       User Key  (r) = Recorded By, (t) = Taken By, (c) = Cosigned By    Initials Name Provider Type    Lilian Woodall, OT Occupational Therapist          Modalities     Row Name 19 1500             Moist Heat    MH Applied  Yes  -AH      Location  left hand and wrist  -AH      Rx Minutes  15 mins  -AH      MH Prior to Rx  Yes  -AH         Parrafin    Paraffin 88857 Location  left hand and wrist  -AH      Rx Minutes  15  -AH        User Key  (r) = Recorded By, (t) = Taken By, (c) = Cosigned By    Initials Name Provider Type    Lilian Woodall, OT Occupational Therapist          OT Exercises     Row Name 19 1600             Subjective Comments    Subjective Comments  patient states her MD appt went well, she states she is to continue therapy  -AH         Exercise 1    Exercise  Name 1  massage  -AH         Exercise 2    Exercise Name 2  PROM and stretch  -AH         Exercise 3    Exercise Name 3  AROM with wrist and hand  -AH         Exercise 4    Exercise Name 4  prehension board left   -AH         Exercise 5    Exercise Name 5  graded pins, red yellow and green  -AH         Exercise 6    Exercise Name 6  green digiflex  -AH         Exercise 7    Exercise Name 7  small bean prehension activity  -AH        User Key  (r) = Recorded By, (t) = Taken By, (c) = Cosigned By    Initials Name Provider Type     Lilian Negrete OT Occupational Therapist                                  Time Calculation:   OT Start Time: 0900  OT Stop Time: 1000  OT Time Calculation (min): 60 min     Therapy Suggested Charges     Code   Minutes Charges    None             Therapy Charges for Today     Code Description Service Date Service Provider Modifiers Qty    12291932632  OT THERAPEUTIC ACT EA 15 MIN 3/4/2019 Lilian Negrete, OT GO 3    [id number]  OT PARAFFIN BATH 3/4/2019 Lilian Negrete OT GO 1                  Lilian Negrete OT  3/4/2019

## 2019-03-06 ENCOUNTER — HOSPITAL ENCOUNTER (OUTPATIENT)
Dept: OCCUPATIONAL THERAPY | Facility: HOSPITAL | Age: 59
Setting detail: THERAPIES SERIES
Discharge: HOME | End: 2019-03-06
Attending: FAMILY MEDICINE

## 2019-03-06 DIAGNOSIS — S52.592S OTHER CLOSED FRACTURE OF DISTAL END OF LEFT RADIUS, SEQUELA: Primary | ICD-10-CM

## 2019-03-06 PROCEDURE — 97530 THERAPEUTIC ACTIVITIES: CPT | Performed by: OCCUPATIONAL THERAPIST

## 2019-03-06 NOTE — THERAPY TREATMENT NOTE
Outpatient Occupational Therapy Hand Treatment Note   [facility]     Patient Name: Lashae Benitez  : 1960  MRN: [id number]  Today's Date: 3/6/2019         Visit Date: 2019  Patient Active Problem List   Diagnosis   • Hiatal hernia   • Essential hypertension   • Sjogren's syndrome (CMS/HCC)   • Multiple sclerosis (CMS/HCC)   • Psoriasis   • Fibromyalgia   • Osteoarthritis   • Psoriatic arthritis (CMS/HCC)   • RA (rheumatoid arthritis) (CMS/[facility])   • Degenerative disc disease, lumbar   • TMJ arthritis   • Dupuytren's disease   • H. pylori infection   • Family history of coronary artery disease   • Type 2 diabetes mellitus (CMS/[facility])   • SOB (shortness of breath)   • Dizziness   • Edema   • Precordial pain   • Chest pain   • Palpitations   • Bilateral leg edema         Visit Dx:    ICD-10-CM ICD-9-CM   1. Other closed fracture of distal end of left radius, sequela S52.592S 905.2                   OT Assessment/Plan     Row Name 19 1313          OT Assessment    Assessment Comments  patient continues to demonstrate increasing strength left hand  -AH        OT Plan    OT Plan Comments  continue OT  -AH       User Key  (r) = Recorded By, (t) = Taken By, (c) = Cosigned By    Initials Name Provider Type    Lilian Woodall, OT Occupational Therapist          Modalities     Row Name 19 1300             Moist Heat    MH Applied  Yes  -AH      Location  left hand and wrist  -AH      Rx Minutes  15 mins  -AH      MH Prior to Rx  Yes  -AH         Parrafin    Paraffin 17544 Location  left hand and wrist  -AH      Rx Minutes  15  -AH        User Key  (r) = Recorded By, (t) = Taken By, (c) = Cosigned By    Initials Name Provider Type    Lilian Woodall, OT Occupational Therapist          OT Exercises     Row Name 19 1300             Exercise 1    Exercise Name 1  massage  -AH         Exercise 2    Exercise Name 2  PROM and stretch  -AH         Exercise 3    Exercise Name 3  AROM with wrist  and hand  -         Exercise 4    Exercise Name 4  prehension board left  -AH         Exercise 5    Exercise Name 5  graded pins , all colors x3  -         Exercise 6    Exercise Name 6  green digiflex left  strength  -        User Key  (r) = Recorded By, (t) = Taken By, (c) = Cosigned By    Initials Name Provider Type     Lilian Negrete OT Occupational Therapist                                  Time Calculation:   OT Start Time: 1000  OT Stop Time: 1100  OT Time Calculation (min): 60 min     Therapy Suggested Charges     Code   Minutes Charges    None             Therapy Charges for Today     Code Description Service Date Service Provider Modifiers Qty    [id number]  OT THERAPEUTIC ACT EA 15 MIN 3/6/2019 Lilian Negrete, MARKUS GO 4                  Lilian Negrete OT  3/6/2019

## 2019-03-11 ENCOUNTER — HOSPITAL ENCOUNTER (OUTPATIENT)
Dept: OCCUPATIONAL THERAPY | Facility: HOSPITAL | Age: 59
Setting detail: THERAPIES SERIES
Discharge: HOME | End: 2019-03-11
Attending: FAMILY MEDICINE

## 2019-03-11 DIAGNOSIS — S52.592S OTHER CLOSED FRACTURE OF DISTAL END OF LEFT RADIUS, SEQUELA: Primary | ICD-10-CM

## 2019-03-11 PROCEDURE — 97530 THERAPEUTIC ACTIVITIES: CPT | Performed by: OCCUPATIONAL THERAPIST

## 2019-03-11 PROCEDURE — 97018 PARAFFIN BATH THERAPY: CPT | Performed by: OCCUPATIONAL THERAPIST

## 2019-03-11 NOTE — THERAPY TREATMENT NOTE
Outpatient Occupational Therapy Hand Treatment Note   [facility]     Patient Name: Lashae Benitez  : 1960  MRN: [id number]  Today's Date: 3/11/2019         Visit Date: 2019  Patient Active Problem List   Diagnosis   • Hiatal hernia   • Essential hypertension   • Sjogren's syndrome (CMS/HCC)   • Multiple sclerosis (CMS/HCC)   • Psoriasis   • Fibromyalgia   • Osteoarthritis   • Psoriatic arthritis (CMS/HCC)   • RA (rheumatoid arthritis) (CMS/[facility])   • Degenerative disc disease, lumbar   • TMJ arthritis   • Dupuytren's disease   • H. pylori infection   • Family history of coronary artery disease   • Type 2 diabetes mellitus (CMS/[facility])   • SOB (shortness of breath)   • Dizziness   • Edema   • Precordial pain   • Chest pain   • Palpitations   • Bilateral leg edema         Visit Dx:    ICD-10-CM ICD-9-CM   1. Other closed fracture of distal end of left radius, sequela S52.592S 905.2                   OT Assessment/Plan     Row Name 19 1643          OT Assessment    Assessment Comments  patient continues to demonstrate increasing ROM and strength  -AH        OT Plan    OT Plan Comments  continue OT  -AH       User Key  (r) = Recorded By, (t) = Taken By, (c) = Cosigned By    Initials Name Provider Type    Lilian Woodall, OT Occupational Therapist          Modalities     Row Name 19 1500             Moist Heat    MH Applied  Yes  -AH      Location  left hand and wrist  -AH      Rx Minutes  15 mins  -AH      MH Prior to Rx  Yes  -AH         Parrafin    Paraffin [id number] Location  left hand and wrist  -AH      Rx Minutes  15  -AH        User Key  (r) = Recorded By, (t) = Taken By, (c) = Cosigned By    Initials Name Provider Type    Lilian Woodall, OT Occupational Therapist          OT Exercises     Row Name 19 1600             Exercise 1    Exercise Name 1  massage  -AH         Exercise 2    Exercise Name 2  PROM and stretch  -AH         Exercise 3    Exercise Name 3  AROM with wrist  and hand  -AH         Exercise 4    Exercise Name 4  prehension board  left  -AH         Exercise 5    Exercise Name 5  graded pins, all colors left UE  -AH         Exercise 6    Exercise Name 6  green digiflex left  -AH        User Key  (r) = Recorded By, (t) = Taken By, (c) = Cosigned By    Initials Name Provider Type     Lilian Negrete OT Occupational Therapist                                  Time Calculation:   OT Start Time: 1100  OT Stop Time: 1200  OT Time Calculation (min): 60 min     Therapy Suggested Charges     Code   Minutes Charges    None             Therapy Charges for Today     Code Description Service Date Service Provider Modifiers Qty    18098752803  OT THERAPEUTIC ACT EA 15 MIN 3/11/2019 Lilian Negrete, OT GO 3    [id number]  OT PARAFFIN BATH 3/11/2019 Lilian Negrete, MARKUS GO 1                  Lilian Negrete OT  3/11/2019

## 2019-03-13 ENCOUNTER — HOSPITAL ENCOUNTER (OUTPATIENT)
Dept: OCCUPATIONAL THERAPY | Facility: HOSPITAL | Age: 59
Setting detail: THERAPIES SERIES
Discharge: HOME | End: 2019-03-13
Attending: FAMILY MEDICINE

## 2019-03-13 DIAGNOSIS — S52.592S OTHER CLOSED FRACTURE OF DISTAL END OF LEFT RADIUS, SEQUELA: Primary | ICD-10-CM

## 2019-03-13 PROCEDURE — 97530 THERAPEUTIC ACTIVITIES: CPT | Performed by: OCCUPATIONAL THERAPIST

## 2019-03-13 NOTE — THERAPY TREATMENT NOTE
Outpatient Occupational Therapy Ortho Treatment Note   [facility]     Patient Name: Lashae Benitez  : 1960  MRN: [id number]  Today's Date: 3/13/2019        Visit Date: 2019    Patient Active Problem List   Diagnosis   • Hiatal hernia   • Essential hypertension   • Sjogren's syndrome (CMS/HCC)   • Multiple sclerosis (CMS/HCC)   • Psoriasis   • Fibromyalgia   • Osteoarthritis   • Psoriatic arthritis (CMS/HCC)   • RA (rheumatoid arthritis) (CMS/HCC)   • Degenerative disc disease, lumbar   • TMJ arthritis   • Dupuytren's disease   • H. pylori infection   • Family history of coronary artery disease   • Type 2 diabetes mellitus (CMS/HCC)   • SOB (shortness of breath)   • Dizziness   • Edema   • Precordial pain   • Chest pain   • Palpitations   • Bilateral leg edema        Past Medical History:   Diagnosis Date   • Acid reflux    • Allergic    • Anxiety    • Cancer (CMS/HCC)    • Chronic pain disorder    • Degenerative disc disease, lumbar    • Depression    • Dupuytren's disease    • Essential hypertension    • Family history of coronary artery disease    • Fibromyalgia    • Gallbladder abscess    • H. pylori infection    • Hiatal hernia    • Hyperlipidemia    • Hypothyroidism    • Migraines    • Multiple sclerosis (CMS/HCC)    • Osteoarthritis    • Psoriasis    • Psoriatic arthritis (CMS/HCC)    • RA (rheumatoid arthritis) (CMS/HCC)    • Rheumatoid arthritis (CMS/HCC)    • Sinusitis    • Sjogren's syndrome (CMS/HCC)    • Stomach ulcer    • TMJ arthritis    • Type 2 diabetes mellitus (CMS/HCC)    • Urinary tract infection         Past Surgical History:   Procedure Laterality Date   • BREAST LUMPECTOMY Left 1993   • CARDIAC CATHETERIZATION Left 2009    Normal    • CARPAL TUNNEL RELEASE  2012   • CERVICAL POLYPECTOMY  2015   • CHOLECYSTECTOMY  2007   • GASTRIC BYPASS  2007   • KNEE ARTHROPLASTY     • LUMBAR DISC SURGERY      C5-6   • REPLACEMENT TOTAL KNEE Right 2016   • SACRAL NERVE  STIMULATOR PLACEMENT  09/2014   • THYROIDECTOMY  03/2016         Visit Dx:    ICD-10-CM ICD-9-CM   1. Other closed fracture of distal end of left radius, sequela S52.592S 905.2                        OT Assessment/Plan     Row Name 03/13/19 1645          OT Assessment    Assessment Comments  patient improving with strength and ROM left hand  -AH        OT Plan    OT Plan Comments  continue OT  -AH       User Key  (r) = Recorded By, (t) = Taken By, (c) = Cosigned By    Initials Name Provider Type    Lilian Woodall, OT Occupational Therapist               Modalities     Row Name 03/13/19 1500             Moist Heat    MH Applied  Yes  -AH      Location  left hand and wrist  -AH      Rx Minutes  15 mins  -AH      MH Prior to Rx  Yes  -AH         Parrafin    Paraffin [address]150 Location  left hand and wrist  -AH      Rx Minutes  15  -AH        User Key  (r) = Recorded By, (t) = Taken By, (c) = Cosigned By    Initials Name Provider Type    Lilian Woodall, OT Occupational Therapist          OT Exercises     Row Name 03/13/19 1600             Exercise 1    Exercise Name 1  massage  -AH         Exercise 2    Exercise Name 2  PROM and stretch  -AH         Exercise 3    Exercise Name 3  AROm wrist and  hand  -AH         Exercise 4    Exercise Name 4  prehension with small beans  -AH         Exercise 5    Exercise Name 5  graded pins, all colors needed  -AH         Exercise 6    Exercise Name 6  blue digiflex left hand  -AH        User Key  (r) = Recorded By, (t) = Taken By, (c) = Cosigned By    Initials Name Provider Type    Lilian Woodall, OT Occupational Therapist                          Time Calculation:   OT Start Time: 0900  OT Stop Time: 1000  OT Time Calculation (min): 60 min     Therapy Suggested Charges     Code   Minutes Charges    None             Therapy Charges for Today     Code Description Service Date Service Provider Modifiers Qty    72872324833  OT THERAPEUTIC ACT EA 15 MIN  3/13/2019 Penelope, Lilian Corbett, OT GO 4                    Lilian Negrete, OT  3/13/2019

## 2019-03-18 ENCOUNTER — HOSPITAL ENCOUNTER (OUTPATIENT)
Dept: OCCUPATIONAL THERAPY | Facility: HOSPITAL | Age: 59
Setting detail: THERAPIES SERIES
Discharge: HOME | End: 2019-03-18
Attending: FAMILY MEDICINE

## 2019-03-18 DIAGNOSIS — S52.592S OTHER CLOSED FRACTURE OF DISTAL END OF LEFT RADIUS, SEQUELA: Primary | ICD-10-CM

## 2019-03-18 PROCEDURE — 97530 THERAPEUTIC ACTIVITIES: CPT

## 2019-03-18 NOTE — THERAPY TREATMENT NOTE
Outpatient Occupational Therapy Ortho Treatment Note   [facility]     Patient Name: Lashae Benitez  : 1960  MRN: [id number]  Today's Date: 3/18/2019        Visit Date: 2019    Patient Active Problem List   Diagnosis   • Hiatal hernia   • Essential hypertension   • Sjogren's syndrome (CMS/HCC)   • Multiple sclerosis (CMS/HCC)   • Psoriasis   • Fibromyalgia   • Osteoarthritis   • Psoriatic arthritis (CMS/HCC)   • RA (rheumatoid arthritis) (CMS/HCC)   • Degenerative disc disease, lumbar   • TMJ arthritis   • Dupuytren's disease   • H. pylori infection   • Family history of coronary artery disease   • Type 2 diabetes mellitus (CMS/HCC)   • SOB (shortness of breath)   • Dizziness   • Edema   • Precordial pain   • Chest pain   • Palpitations   • Bilateral leg edema        Past Medical History:   Diagnosis Date   • Acid reflux    • Allergic    • Anxiety    • Cancer (CMS/HCC)    • Chronic pain disorder    • Degenerative disc disease, lumbar    • Depression    • Dupuytren's disease    • Essential hypertension    • Family history of coronary artery disease    • Fibromyalgia    • Gallbladder abscess    • H. pylori infection    • Hiatal hernia    • Hyperlipidemia    • Hypothyroidism    • Migraines    • Multiple sclerosis (CMS/HCC)    • Osteoarthritis    • Psoriasis    • Psoriatic arthritis (CMS/HCC)    • RA (rheumatoid arthritis) (CMS/HCC)    • Rheumatoid arthritis (CMS/HCC)    • Sinusitis    • Sjogren's syndrome (CMS/HCC)    • Stomach ulcer    • TMJ arthritis    • Type 2 diabetes mellitus (CMS/HCC)    • Urinary tract infection         Past Surgical History:   Procedure Laterality Date   • BREAST LUMPECTOMY Left 1993   • CARDIAC CATHETERIZATION Left 2009    Normal    • CARPAL TUNNEL RELEASE  2012   • CERVICAL POLYPECTOMY  2015   • CHOLECYSTECTOMY  2007   • GASTRIC BYPASS  2007   • KNEE ARTHROPLASTY     • LUMBAR DISC SURGERY      C5-6   • REPLACEMENT TOTAL KNEE Right 2016   • SACRAL NERVE  STIMULATOR PLACEMENT  09/2014   • THYROIDECTOMY  03/2016         Visit Dx:    ICD-10-CM ICD-9-CM   1. Other closed fracture of distal end of left radius, sequela S52.592S 905.2                        OT Assessment/Plan     Row Name 03/18/19 1359          OT Assessment    Assessment Comments  Pt. seen this date x4 units for L wrist pain, ROM, and strengthening. Paraffin x15 minutes and massage to L wrist and hand. Pt. performed graded clothespins X3 w/ LUE, BUE wrist rolls X2, yellow theraputty w/ LUE, and small peg board w/ LUE for FMC. Pt. tolerated tx well w/ minimal rest breaks needed. Continue tx per current POC.   -AB       User Key  (r) = Recorded By, (t) = Taken By, (c) = Cosigned By    Initials Name Provider Type    Fely Manrique, MARKUS Occupational Therapist               Modalities     Row Name 03/18/19 1300             Moist Heat    MH Applied  Yes  -AB      Location  left hand and wrist  -AB      Rx Minutes  15 mins  -AB      MH Prior to Rx  Yes  -AB        User Key  (r) = Recorded By, (t) = Taken By, (c) = Cosigned By    Initials Name Provider Type    Fely Manrique, OT Occupational Therapist          OT Exercises     Row Name 03/18/19 1300             Exercise 1    Exercise Name 1  massage  -AB         Exercise 2    Exercise Name 2  PROM and stretch  -AB         Exercise 3    Exercise Name 3  AROm wrist and  hand  -AB         Exercise 4    Exercise Name 4  prehension with small beans  -AB         Exercise 5    Exercise Name 5  graded pins, all colors needed  -AB         Exercise 6    Exercise Name 6  blue digiflex left hand  -AB        User Key  (r) = Recorded By, (t) = Taken By, (c) = Cosigned By    Initials Name Provider Type    Fely Manrique, OT Occupational Therapist                          Time Calculation:   OT Start Time: 0855  OT Stop Time: 1000  OT Time Calculation (min): 65 min  OT Non-Billable Time (min): 15 min  Total Timed Code Minutes- OT: 65 minute(s)          Therapy Charges for Today     Code Description Service Date Service Provider Modifiers Qty    [id number]  OT THERAPEUTIC ACT EA 15 MIN 3/18/2019 Fely Brennan, MARKUS GO 4                    Fely Brennan OT  3/18/2019

## 2019-05-13 ENCOUNTER — DOCUMENTATION (OUTPATIENT)
Dept: OCCUPATIONAL THERAPY | Facility: HOSPITAL | Age: 59
End: 2019-05-13

## 2019-05-13 NOTE — THERAPY DISCHARGE NOTE
Outpatient Occupational Therapy Hand Initial Eval/Discharge        Patient Name: Lashae Benitez  : 1960  MRN: [id number]  Today's Date: 2019      Visit Date: 2019    Patient Active Problem List   Diagnosis   • Hiatal hernia   • Essential hypertension   • Sjogren's syndrome (CMS/HCC)   • Multiple sclerosis (CMS/HCC)   • Psoriasis   • Fibromyalgia   • Osteoarthritis   • Psoriatic arthritis (CMS/HCC)   • RA (rheumatoid arthritis) (CMS/HCC)   • Degenerative disc disease, lumbar   • TMJ arthritis   • Dupuytren's disease   • H. pylori infection   • Family history of coronary artery disease   • Type 2 diabetes mellitus (CMS/HCC)   • SOB (shortness of breath)   • Dizziness   • Edema   • Precordial pain   • Chest pain   • Palpitations   • Bilateral leg edema        Past Medical History:   Diagnosis Date   • Acid reflux    • Allergic    • Anxiety    • Cancer (CMS/HCC)    • Chronic pain disorder    • Degenerative disc disease, lumbar    • Depression    • Dupuytren's disease    • Essential hypertension    • Family history of coronary artery disease    • Fibromyalgia    • Gallbladder abscess    • H. pylori infection    • Hiatal hernia    • Hyperlipidemia    • Hypothyroidism    • Migraines    • Multiple sclerosis (CMS/HCC)    • Osteoarthritis    • Psoriasis    • Psoriatic arthritis (CMS/HCC)    • RA (rheumatoid arthritis) (CMS/HCC)    • Rheumatoid arthritis (CMS/HCC)    • Sinusitis    • Sjogren's syndrome (CMS/HCC)    • Stomach ulcer    • TMJ arthritis    • Type 2 diabetes mellitus (CMS/HCC)    • Urinary tract infection         Past Surgical History:   Procedure Laterality Date   • BREAST LUMPECTOMY Left 1993   • CARDIAC CATHETERIZATION Left 2009    Normal    • CARPAL TUNNEL RELEASE  2012   • CERVICAL POLYPECTOMY  2015   • CHOLECYSTECTOMY  2007   • GASTRIC BYPASS  2007   • KNEE ARTHROPLASTY     • LUMBAR DISC SURGERY      C5-6   • REPLACEMENT TOTAL KNEE Right 2016   • SACRAL NERVE STIMULATOR  PLACEMENT  09/2014   • THYROIDECTOMY  03/2016         Visit Dx:  No diagnosis found.                              OT Goals     Row Name 05/13/19 1000          OT Short Term Goals    STG 1 Progress  Partially Met  -AH     STG 2 Progress  Partially Met  -AH     STG 3 Progress  Partially Met  -AH     STG 4 Progress  Partially Met  -AH        Long Term Goals    LTG 1 Progress  Partially Met  -AH     LTG 2 Progress  Partially Met  -AH     LTG 3 Progress  Partially Met  -AH     LTG 4 Progress  Partially Met  -AH     LTG 5 Progress  Partially Met  -AH       User Key  (r) = Recorded By, (t) = Taken By, (c) = Cosigned By    Initials Name Provider Type    Lilian Woodall, MARKUS Occupational Therapist                            Time Calculation:                  OP OT Discharge Summary  Date of Discharge: 05/01/19  Reason for Discharge: Maximum functional potential achieved  Outcomes Achieved: Patient able to partially acheive established goals  Discharge Destination: Home with home program  Discharge Instructions: pt canceled last 2 scheduled sessions      Lilian Negrete OT  5/13/2019

## 2019-06-03 ENCOUNTER — OFFICE VISIT (OUTPATIENT)
Dept: RETAIL CLINIC | Facility: CLINIC | Age: 59
End: 2019-06-03

## 2019-06-03 VITALS
BODY MASS INDEX: 34.78 KG/M2 | RESPIRATION RATE: 20 BRPM | OXYGEN SATURATION: 98 % | SYSTOLIC BLOOD PRESSURE: 150 MMHG | WEIGHT: 209 LBS | HEART RATE: 78 BPM | DIASTOLIC BLOOD PRESSURE: 90 MMHG | TEMPERATURE: 97.4 F

## 2019-06-03 DIAGNOSIS — H66.91 OTITIS OF RIGHT EAR: ICD-10-CM

## 2019-06-03 DIAGNOSIS — J01.00 ACUTE MAXILLARY SINUSITIS, RECURRENCE NOT SPECIFIED: Primary | ICD-10-CM

## 2019-06-03 PROCEDURE — 99213 OFFICE O/P EST LOW 20 MIN: CPT | Performed by: NURSE PRACTITIONER

## 2019-06-03 RX ORDER — ALENDRONATE SODIUM 70 MG/1
70 TABLET ORAL WEEKLY
COMMUNITY
Start: 2019-03-28

## 2019-06-03 RX ORDER — PREDNISONE 10 MG/1
TABLET ORAL
Qty: 21 TABLET | Refills: 0 | Status: ON HOLD | OUTPATIENT
Start: 2019-06-03 | End: 2019-08-19

## 2019-06-03 RX ORDER — GALCANEZUMAB 120 MG/ML
120 INJECTION, SOLUTION SUBCUTANEOUS
Refills: 3 | COMMUNITY
Start: 2019-05-29

## 2019-06-03 RX ORDER — DOXYCYCLINE 100 MG/1
100 CAPSULE ORAL 2 TIMES DAILY
Qty: 20 CAPSULE | Refills: 0 | Status: SHIPPED | OUTPATIENT
Start: 2019-06-03 | End: 2019-06-13

## 2019-06-03 RX ORDER — BROMPHENIRAMINE MALEATE, PSEUDOEPHEDRINE HYDROCHLORIDE, AND DEXTROMETHORPHAN HYDROBROMIDE 2; 30; 10 MG/5ML; MG/5ML; MG/5ML
10 SYRUP ORAL 4 TIMES DAILY PRN
Qty: 150 ML | Refills: 0 | Status: ON HOLD | OUTPATIENT
Start: 2019-06-03 | End: 2019-08-19

## 2019-06-03 NOTE — PATIENT INSTRUCTIONS
Otitis Media, Adult  Otitis media means that the middle ear is red and swollen (inflamed) and full of fluid. The condition usually goes away on its own.  Follow these instructions at home:  · Take over-the-counter and prescription medicines only as told by your doctor.  · If you were prescribed an antibiotic medicine, take it as told by your doctor. Do not stop taking the antibiotic even if you start to feel better.  · Keep all follow-up visits as told by your doctor. This is important.  Contact a doctor if:  · You have bleeding from your nose.  · There is a lump on your neck.  · You are not getting better in 5 days.  · You feel worse instead of better.  Get help right away if:  · You have pain that is not helped with medicine.  · You have swelling, redness, or pain around your ear.  · You get a stiff neck.  · You cannot move part of your face (paralyzed).  · You notice that the bone behind your ear hurts when you touch it.  · You get a very bad headache.  Summary  · Otitis media means that the middle ear is red, swollen, and full of fluid.  · This condition usually goes away on its own. In some cases, treatment may be needed.  · If you were prescribed an antibiotic medicine, take it as told by your doctor.  This information is not intended to replace advice given to you by your health care provider. Make sure you discuss any questions you have with your health care provider.  Document Released: 06/05/2009 Document Revised: 01/08/2018 Document Reviewed: 01/08/2018  [employer] Interactive Patient Education © 2019 [employer]

## 2019-06-03 NOTE — PROGRESS NOTES
"Subjective   Lashae Benitez is a 58 y.o. female.   No chief complaint on file.     Sinusitis   This is a new problem. The current episode started 1 to 4 weeks ago (two weeks). The problem has been gradually worsening since onset. There has been no fever. Associated symptoms include congestion, coughing, ear pain, headaches, a hoarse voice, sinus pressure and sneezing. Treatments tried: mucinex dm. The treatment provided no relief.   Earache    There is pain in the right ear. This is a new problem. The current episode started in the past 7 days. The problem occurs constantly. The problem has been gradually worsening. Associated symptoms include coughing, headaches and rhinorrhea. Pertinent negatives include no ear discharge. Associated symptoms comments: dizziness. She has tried nothing for the symptoms. The treatment provided no relief.        The following portions of the patient's history were reviewed and updated as appropriate: allergies, current medications, past family history, past medical history, past social history, past surgical history and problem list.    Review of Systems   Constitutional: Negative.  Negative for fever.   HENT: Positive for congestion, ear pain, hoarse voice, postnasal drip, rhinorrhea, sinus pressure, sinus pain, sneezing and voice change. Negative for ear discharge.    Eyes: Negative.    Respiratory: Positive for cough. Negative for wheezing.    Gastrointestinal: Negative.    Genitourinary: Negative.    Skin: Negative.    Allergic/Immunologic: Negative.    Neurological: Positive for dizziness and headaches.   Psychiatric/Behavioral: Negative.    All other systems reviewed and are negative.      Objective   Allergies   Allergen Reactions   • Penicillins    • Sulfa Antibiotics    • Codeine    • Imuran [Azathioprine]    • Januvia [Sitagliptin]    • Beta Adrenergic Blockers Other (See Comments)     I called pt to ask her about the allergy to bblockers in her chart. Pt states \"too big of a " "dose makes her psoriasis act up\", but this current dose of metoprolol 50 mg bid, she has been on for a long time, with no ill side effects.  JONATHAN IVY 3/28/18       Physical Exam   Constitutional: She is oriented to person, place, and time. She appears well-developed and well-nourished.   HENT:   Right Ear: Tympanic membrane is erythematous. A middle ear effusion is present.   Left Ear: Tympanic membrane normal.   Nose: Mucosal edema and sinus tenderness present. Right sinus exhibits maxillary sinus tenderness. Left sinus exhibits maxillary sinus tenderness.   Mouth/Throat: Posterior oropharyngeal erythema present. No oropharyngeal exudate.   Mucoid PND, hoarse   Eyes: Pupils are equal, round, and reactive to light.   Neck: Neck supple.   Cardiovascular: Normal rate and regular rhythm.   Pulmonary/Chest: Effort normal and breath sounds normal.   Lymphadenopathy:     She has no cervical adenopathy.   Neurological: She is alert and oriented to person, place, and time.   Skin: Skin is warm and dry. No rash noted.   Psychiatric: She has a normal mood and affect.   Vitals reviewed.      Assessment/Plan   Diagnoses and all orders for this visit:    Acute maxillary sinusitis, recurrence not specified    Otitis of right ear    Other orders  -     predniSONE (DELTASONE) 10 MG tablet; Take as directed per 6 day pred bhakti  -     doxycycline (MONODOX) 100 MG capsule; Take 1 capsule by mouth 2 (Two) Times a Day for 10 days.  -     brompheniramine-pseudoephedrine-DM 30-2-10 MG/5ML syrup; Take 10 mL by mouth 4 (Four) Times a Day As Needed for Congestion, Cough or Allergies.                 This document has been electronically signed by RUIZ Salas Brynn 3, 2019 1:21 PM   "

## 2019-08-19 ENCOUNTER — APPOINTMENT (OUTPATIENT)
Dept: GENERAL RADIOLOGY | Facility: HOSPITAL | Age: 59
End: 2019-08-19

## 2019-08-19 ENCOUNTER — HOSPITAL ENCOUNTER (OUTPATIENT)
Facility: HOSPITAL | Age: 59
Setting detail: OBSERVATION
Discharge: HOME | End: 2019-08-20
Attending: FAMILY MEDICINE | Admitting: FAMILY MEDICINE

## 2019-08-19 LAB
ALBUMIN SERPL-MCNC: 3.93 G/DL (ref 3.5–5.2)
ALBUMIN/GLOB SERPL: 1.5 G/DL
ALP SERPL-CCNC: 145 U/L (ref 39–117)
ALT SERPL W P-5'-P-CCNC: 18 U/L (ref 1–33)
ANION GAP SERPL CALCULATED.3IONS-SCNC: 8.4 MMOL/L (ref 5–15)
AST SERPL-CCNC: 16 U/L (ref 1–32)
BASOPHILS # BLD AUTO: 0.03 10*3/MM3 (ref 0–0.2)
BASOPHILS NFR BLD AUTO: 0.5 % (ref 0–1.5)
BILIRUB SERPL-MCNC: 0.3 MG/DL (ref 0.2–1.2)
BUN BLD-MCNC: 10 MG/DL (ref 6–20)
BUN/CREAT SERPL: 15.9 (ref 7–25)
CALCIUM SPEC-SCNC: 8.9 MG/DL (ref 8.6–10.5)
CHLORIDE SERPL-SCNC: 99 MMOL/L (ref 98–107)
CO2 SERPL-SCNC: 25.6 MMOL/L (ref 22–29)
CREAT BLD-MCNC: 0.63 MG/DL (ref 0.57–1)
DEPRECATED RDW RBC AUTO: 49.4 FL (ref 37–54)
EOSINOPHIL # BLD AUTO: 0.1 10*3/MM3 (ref 0–0.4)
EOSINOPHIL NFR BLD AUTO: 1.7 % (ref 0.3–6.2)
ERYTHROCYTE [DISTWIDTH] IN BLOOD BY AUTOMATED COUNT: 14.9 % (ref 12.3–15.4)
GFR SERPL CREATININE-BSD FRML MDRD: 97 ML/MIN/1.73
GLOBULIN UR ELPH-MCNC: 2.6 GM/DL
GLUCOSE BLD-MCNC: 113 MG/DL (ref 65–99)
GLUCOSE BLDC GLUCOMTR-MCNC: 117 MG/DL (ref 70–130)
GLUCOSE BLDC GLUCOMTR-MCNC: 130 MG/DL (ref 70–130)
GLUCOSE BLDC GLUCOMTR-MCNC: 134 MG/DL (ref 70–130)
HCT VFR BLD AUTO: 36.9 % (ref 34–46.6)
HGB BLD-MCNC: 12.3 G/DL (ref 12–15.9)
IMM GRANULOCYTES # BLD AUTO: 0 10*3/MM3 (ref 0–0.05)
IMM GRANULOCYTES NFR BLD AUTO: 0 % (ref 0–0.5)
LYMPHOCYTES # BLD AUTO: 1.86 10*3/MM3 (ref 0.7–3.1)
LYMPHOCYTES NFR BLD AUTO: 31.1 % (ref 19.6–45.3)
MCH RBC QN AUTO: 32.2 PG (ref 26.6–33)
MCHC RBC AUTO-ENTMCNC: 33.3 G/DL (ref 31.5–35.7)
MCV RBC AUTO: 96.6 FL (ref 79–97)
MONOCYTES # BLD AUTO: 0.41 10*3/MM3 (ref 0.1–0.9)
MONOCYTES NFR BLD AUTO: 6.9 % (ref 5–12)
NEUTROPHILS # BLD AUTO: 3.58 10*3/MM3 (ref 1.7–7)
NEUTROPHILS NFR BLD AUTO: 59.8 % (ref 42.7–76)
PLATELET # BLD AUTO: 326 10*3/MM3 (ref 140–450)
PMV BLD AUTO: 9.2 FL (ref 6–12)
POTASSIUM BLD-SCNC: 4.3 MMOL/L (ref 3.5–5.2)
PROT SERPL-MCNC: 6.5 G/DL (ref 6–8.5)
RBC # BLD AUTO: 3.82 10*6/MM3 (ref 3.77–5.28)
SODIUM BLD-SCNC: 133 MMOL/L (ref 136–145)
TROPONIN T SERPL-MCNC: <0.01 NG/ML (ref 0–0.03)
TROPONIN T SERPL-MCNC: <0.01 NG/ML (ref 0–0.03)
WBC NRBC COR # BLD: 5.98 10*3/MM3 (ref 3.4–10.8)

## 2019-08-19 PROCEDURE — 85025 COMPLETE CBC W/AUTO DIFF WBC: CPT | Performed by: FAMILY MEDICINE

## 2019-08-19 PROCEDURE — 84484 ASSAY OF TROPONIN QUANT: CPT | Performed by: FAMILY MEDICINE

## 2019-08-19 PROCEDURE — G0378 HOSPITAL OBSERVATION PER HR: HCPCS

## 2019-08-19 PROCEDURE — 71046 X-RAY EXAM CHEST 2 VIEWS: CPT

## 2019-08-19 PROCEDURE — 82962 GLUCOSE BLOOD TEST: CPT

## 2019-08-19 PROCEDURE — 25010000002 ENOXAPARIN PER 10 MG: Performed by: FAMILY MEDICINE

## 2019-08-19 PROCEDURE — 80053 COMPREHEN METABOLIC PANEL: CPT | Performed by: FAMILY MEDICINE

## 2019-08-19 PROCEDURE — G0379 DIRECT REFER HOSPITAL OBSERV: HCPCS

## 2019-08-19 PROCEDURE — 96372 THER/PROPH/DIAG INJ SC/IM: CPT

## 2019-08-19 RX ORDER — ASPIRIN 81 MG/1
81 TABLET ORAL DAILY
Status: DISCONTINUED | OUTPATIENT
Start: 2019-08-20 | End: 2019-08-20 | Stop reason: HOSPADM

## 2019-08-19 RX ORDER — PRIMIDONE 50 MG/1
50 TABLET ORAL NIGHTLY
Status: DISCONTINUED | OUTPATIENT
Start: 2019-08-19 | End: 2019-08-20 | Stop reason: HOSPADM

## 2019-08-19 RX ORDER — FAMOTIDINE 20 MG/1
20 TABLET, FILM COATED ORAL 2 TIMES DAILY
Status: DISCONTINUED | OUTPATIENT
Start: 2019-08-19 | End: 2019-08-20 | Stop reason: HOSPADM

## 2019-08-19 RX ORDER — LISINOPRIL 10 MG/1
10 TABLET ORAL EVERY EVENING
Status: DISCONTINUED | OUTPATIENT
Start: 2019-08-19 | End: 2019-08-20 | Stop reason: HOSPADM

## 2019-08-19 RX ORDER — DIAZEPAM 2 MG/1
1 TABLET ORAL 2 TIMES DAILY
Status: DISCONTINUED | OUTPATIENT
Start: 2019-08-19 | End: 2019-08-20 | Stop reason: HOSPADM

## 2019-08-19 RX ORDER — FOLIC ACID 1 MG/1
1 TABLET ORAL DAILY
Status: DISCONTINUED | OUTPATIENT
Start: 2019-08-20 | End: 2019-08-20 | Stop reason: HOSPADM

## 2019-08-19 RX ORDER — GABAPENTIN 300 MG/1
300 CAPSULE ORAL 4 TIMES DAILY
Status: DISCONTINUED | OUTPATIENT
Start: 2019-08-19 | End: 2019-08-20 | Stop reason: HOSPADM

## 2019-08-19 RX ORDER — CELECOXIB 200 MG/1
200 CAPSULE ORAL DAILY
Status: DISCONTINUED | OUTPATIENT
Start: 2019-08-20 | End: 2019-08-20 | Stop reason: HOSPADM

## 2019-08-19 RX ORDER — DEXTROSE MONOHYDRATE 25 G/50ML
25 INJECTION, SOLUTION INTRAVENOUS
Status: DISCONTINUED | OUTPATIENT
Start: 2019-08-19 | End: 2019-08-20 | Stop reason: HOSPADM

## 2019-08-19 RX ORDER — DIAZEPAM 5 MG/1
2.5 TABLET ORAL 2 TIMES DAILY
Status: DISCONTINUED | OUTPATIENT
Start: 2019-08-19 | End: 2019-08-19

## 2019-08-19 RX ORDER — BUTALBITAL, ACETAMINOPHEN AND CAFFEINE 50; 325; 40 MG/1; MG/1; MG/1
1 TABLET ORAL AS NEEDED
Status: DISCONTINUED | OUTPATIENT
Start: 2019-08-19 | End: 2019-08-20 | Stop reason: HOSPADM

## 2019-08-19 RX ORDER — DULOXETIN HYDROCHLORIDE 60 MG/1
60 CAPSULE, DELAYED RELEASE ORAL NIGHTLY
COMMUNITY
End: 2020-08-18 | Stop reason: HOSPADM

## 2019-08-19 RX ORDER — CYCLOBENZAPRINE HCL 10 MG
10 TABLET ORAL EVERY EVENING
Status: DISCONTINUED | OUTPATIENT
Start: 2019-08-19 | End: 2019-08-20 | Stop reason: HOSPADM

## 2019-08-19 RX ORDER — QUINIDINE GLUCONATE 324 MG
240 TABLET, EXTENDED RELEASE ORAL EVERY MORNING
COMMUNITY

## 2019-08-19 RX ORDER — SODIUM CHLORIDE 0.9 % (FLUSH) 0.9 %
3-10 SYRINGE (ML) INJECTION AS NEEDED
Status: DISCONTINUED | OUTPATIENT
Start: 2019-08-19 | End: 2019-08-20 | Stop reason: HOSPADM

## 2019-08-19 RX ORDER — MULTIVITAMIN
1 TABLET ORAL EVERY EVENING
COMMUNITY

## 2019-08-19 RX ORDER — ASCORBIC ACID 500 MG
1000 TABLET ORAL EVERY EVENING
Status: DISCONTINUED | OUTPATIENT
Start: 2019-08-19 | End: 2019-08-20 | Stop reason: HOSPADM

## 2019-08-19 RX ORDER — DULOXETIN HYDROCHLORIDE 60 MG/1
60 CAPSULE, DELAYED RELEASE ORAL EVERY EVENING
Status: DISCONTINUED | OUTPATIENT
Start: 2019-08-19 | End: 2019-08-20 | Stop reason: HOSPADM

## 2019-08-19 RX ORDER — SODIUM CHLORIDE 0.9 % (FLUSH) 0.9 %
3 SYRINGE (ML) INJECTION EVERY 12 HOURS SCHEDULED
Status: DISCONTINUED | OUTPATIENT
Start: 2019-08-19 | End: 2019-08-20 | Stop reason: HOSPADM

## 2019-08-19 RX ORDER — PANTOPRAZOLE SODIUM 40 MG/1
40 TABLET, DELAYED RELEASE ORAL
Status: DISCONTINUED | OUTPATIENT
Start: 2019-08-19 | End: 2019-08-20 | Stop reason: HOSPADM

## 2019-08-19 RX ORDER — TOPIRAMATE 25 MG/1
50 TABLET ORAL NIGHTLY
Status: DISCONTINUED | OUTPATIENT
Start: 2019-08-19 | End: 2019-08-20 | Stop reason: HOSPADM

## 2019-08-19 RX ORDER — NICOTINE POLACRILEX 4 MG
15 LOZENGE BUCCAL
Status: DISCONTINUED | OUTPATIENT
Start: 2019-08-19 | End: 2019-08-20 | Stop reason: HOSPADM

## 2019-08-19 RX ORDER — MULTIVITAMIN
1 TABLET ORAL EVERY EVENING
Status: DISCONTINUED | OUTPATIENT
Start: 2019-08-19 | End: 2019-08-20 | Stop reason: HOSPADM

## 2019-08-19 RX ORDER — VITAMIN E 268 MG
800 CAPSULE ORAL EVERY EVENING
Status: DISCONTINUED | OUTPATIENT
Start: 2019-08-19 | End: 2019-08-20 | Stop reason: HOSPADM

## 2019-08-19 RX ORDER — AMITRIPTYLINE HYDROCHLORIDE 25 MG/1
25 TABLET, FILM COATED ORAL NIGHTLY
Status: DISCONTINUED | OUTPATIENT
Start: 2019-08-19 | End: 2019-08-20 | Stop reason: HOSPADM

## 2019-08-19 RX ORDER — LEVOTHYROXINE SODIUM 137 UG/1
137 CAPSULE ORAL DAILY
Status: DISCONTINUED | OUTPATIENT
Start: 2019-08-20 | End: 2019-08-20 | Stop reason: HOSPADM

## 2019-08-19 RX ORDER — LISINOPRIL 10 MG/1
10 TABLET ORAL EVERY EVENING
COMMUNITY
End: 2019-10-25 | Stop reason: SDUPTHER

## 2019-08-19 RX ORDER — CETIRIZINE HYDROCHLORIDE 10 MG/1
10 TABLET ORAL NIGHTLY
Status: DISCONTINUED | OUTPATIENT
Start: 2019-08-19 | End: 2019-08-20 | Stop reason: HOSPADM

## 2019-08-19 RX ORDER — METOPROLOL SUCCINATE 50 MG/1
100 TABLET, EXTENDED RELEASE ORAL DAILY
Status: DISCONTINUED | OUTPATIENT
Start: 2019-08-20 | End: 2019-08-20 | Stop reason: HOSPADM

## 2019-08-19 RX ORDER — FERROUS SULFATE 325(65) MG
325 TABLET ORAL
Status: DISCONTINUED | OUTPATIENT
Start: 2019-08-20 | End: 2019-08-20 | Stop reason: HOSPADM

## 2019-08-19 RX ADMIN — SODIUM CHLORIDE, PRESERVATIVE FREE 3 ML: 5 INJECTION INTRAVENOUS at 16:37

## 2019-08-19 RX ADMIN — CETIRIZINE HYDROCHLORIDE 10 MG: 10 TABLET, FILM COATED ORAL at 21:30

## 2019-08-19 RX ADMIN — PANTOPRAZOLE SODIUM 40 MG: 40 TABLET, DELAYED RELEASE ORAL at 16:37

## 2019-08-19 RX ADMIN — GABAPENTIN 300 MG: 300 CAPSULE ORAL at 18:24

## 2019-08-19 RX ADMIN — ENOXAPARIN SODIUM 40 MG: 40 INJECTION SUBCUTANEOUS at 16:37

## 2019-08-19 RX ADMIN — AMITRIPTYLINE HYDROCHLORIDE 25 MG: 25 TABLET, FILM COATED ORAL at 21:30

## 2019-08-19 RX ADMIN — Medication 1 TABLET: at 16:37

## 2019-08-19 RX ADMIN — FAMOTIDINE 20 MG: 20 TABLET, FILM COATED ORAL at 21:30

## 2019-08-19 RX ADMIN — TOPIRAMATE 50 MG: 25 TABLET, FILM COATED ORAL at 21:29

## 2019-08-19 RX ADMIN — CYCLOBENZAPRINE HYDROCHLORIDE 10 MG: 10 TABLET, FILM COATED ORAL at 16:37

## 2019-08-19 RX ADMIN — PRIMIDONE 50 MG: 50 TABLET ORAL at 21:30

## 2019-08-19 RX ADMIN — Medication 800 UNITS: at 16:37

## 2019-08-19 RX ADMIN — DULOXETINE HYDROCHLORIDE 60 MG: 60 CAPSULE, DELAYED RELEASE ORAL at 16:37

## 2019-08-19 RX ADMIN — OXYCODONE HYDROCHLORIDE AND ACETAMINOPHEN 1000 MG: 500 TABLET ORAL at 16:37

## 2019-08-19 RX ADMIN — LISINOPRIL 10 MG: 10 TABLET ORAL at 16:37

## 2019-08-19 RX ADMIN — SODIUM CHLORIDE, PRESERVATIVE FREE 3 ML: 5 INJECTION INTRAVENOUS at 21:30

## 2019-08-19 RX ADMIN — GABAPENTIN 300 MG: 300 CAPSULE ORAL at 21:30

## 2019-08-19 NOTE — H&P
Chief complaint chest pain    Subjective     Patient is a 58 y.o. female presents to my office for a routine follow-up during the visit complains of a 4 to 5-day history of left-sided, sharp and intermittent left-sided chest discomfort radiating through to her shoulder blade that comes and goes sometimes at rest and sometimes when she is active.  Not associated with shortness of air.  She denies any productive cough.  She is having the discomfort when seen by me in the office and the pain was not reproducible on examination.  She has no swelling.  No PND or orthopnea    Review of Systems   On review of systems the patient denies the following unless noted above:     Constitutional:  Fevers, chills, weight change, fatigue     Eyes: Vision changes, headache, double vision, loss of vision     ENT: Runny nose, nose bleeds, ringing in ears, pain with swallowing, sore throat     Cardiovascular: Refer to HPI     Respiratory: Cough, wheezing, SOA, hemoptysis     GI:  Abdominal pain, diarrhea, constipation, change in stool caliber,    Rectal bleeding, vomiting or nausea     : Difficulty voiding, dysuria, hematuria     Musculoskeletal: Changes of any chronic joint pain, swelling     Skin: Rash, itching, easy bruisability     Neurological: Unilateral weakness, new onset numbness, speech difficulties     Psychiatric: Sadness, tearfulness, feelings of hopelessness, racing thoughts     Endocrine:  Heat or cold intolerance, mood swings, polydipsia, polyphagia,    recent hypoglycemia      History  Past Medical History:   Diagnosis Date   • Acid reflux    • Allergic    • Anxiety    • Cancer (CMS/HCC)    • Chronic pain disorder    • Degenerative disc disease, lumbar    • Depression    • Dupuytren's disease    • Essential hypertension    • Family history of coronary artery disease    • Fibromyalgia    • Gallbladder abscess    • H. pylori infection    • Hiatal hernia    • Hyperlipidemia    • Hypothyroidism    • Migraines    •  Multiple sclerosis (CMS/HCC)    • Osteoarthritis    • Psoriasis    • Psoriatic arthritis (CMS/HCC)    • RA (rheumatoid arthritis) (CMS/HCC)    • Rheumatoid arthritis (CMS/HCC)    • Sinusitis    • Sjogren's syndrome (CMS/HCC)    • Stomach ulcer    • TMJ arthritis    • Type 2 diabetes mellitus (CMS/HCC)    • Urinary tract infection      Past Surgical History:   Procedure Laterality Date   • BREAST LUMPECTOMY Left 11/1993   • CARDIAC CATHETERIZATION Left 09/21/2009    Normal    • CARPAL TUNNEL RELEASE  03/2012   • CERVICAL POLYPECTOMY  12/2015   • CHOLECYSTECTOMY  05/2007   • GASTRIC BYPASS  09/2007   • KNEE ARTHROPLASTY     • LUMBAR DISC SURGERY      C5-6   • REPLACEMENT TOTAL KNEE Right 06/2016   • SACRAL NERVE STIMULATOR PLACEMENT  09/2014   • THYROIDECTOMY  03/2016     Family History   Problem Relation Age of Onset   • Diabetes Mother    • Stroke Mother    • Hypertension Mother    • Heart attack Father         First one was in his 20's second 30's.    • Heart failure Father    • Heart disease Father    • Stroke Father    • Diabetes Sister    • Cancer Maternal Grandmother      Social History     Tobacco Use   • Smoking status: Never Smoker   • Smokeless tobacco: Never Used   Substance Use Topics   • Alcohol use: No   • Drug use: No     Medications Prior to Admission   Medication Sig Dispense Refill Last Dose   • ALLERGY SERUM INJECTION Inject  under the skin into the appropriate area as directed 1 (One) Time Per Week.   8/19/2019 at Unknown time   • amitriptyline (ELAVIL) 25 MG tablet Take 25 mg by mouth Every Night.   8/18/2019 at Unknown time   • aspirin 81 MG tablet Take 1 tablet by mouth.   8/19/2019 at 0730   • calcium citrate-vitamin d (CALCITRATE) 315-250 MG-UNIT tablet tablet Take 1 tablet by mouth Every Evening.   8/18/2019 at Unknown time   • celecoxib (CeleBREX) 200 MG capsule Take 200 mg by mouth daily.   8/19/2019 at 0730   • cholecalciferol (VITAMIN D3) 1000 UNITS tablet Take 5,000 Units by mouth Every  Evening.   8/18/2019 at Unknown time   • cyclobenzaprine (FLEXERIL) 10 MG tablet Take 10 mg by mouth Every Evening.   8/18/2019 at Unknown time   • dexlansoprazole (DEXILANT) 60 MG capsule Take 60 mg by mouth daily.   8/19/2019 at 0730   • diazePAM (VALIUM) 2 MG tablet Take 1 mg by mouth 2 (Two) Times a Day.   8/19/2019 at 0730   • DULoxetine (CYMBALTA) 30 MG capsule Take 90 mg by mouth Every Morning.   8/19/2019 at 0730   • DULoxetine (CYMBALTA) 30 MG capsule Take 60 mg by mouth Every Evening.   8/18/2019 at Unknown time   • ferrous gluconate (FERGON) 240 (27 FE) MG tablet Take 240 mg by mouth Every Morning.   8/19/2019 at 0730   • folic acid (FOLVITE) 1 MG tablet Take 1 mg by mouth daily.   8/19/2019 at 0730   • gabapentin (NEURONTIN) 600 MG tablet Take 600 mg by mouth 4 (Four) Times a Day.   8/19/2019 at 0730   • inFLIXimab-dyyb (INFLECTRA) 100 MG reconstituted solution Infuse 800 mg into a venous catheter Every 2 (Two) Months.   7/1/2019   • insulin detemir (LEVEMIR) 100 UNIT/ML injection Inject 25 Units under the skin into the appropriate area as directed Daily.   8/19/2019 at 0730   • levothyroxine sodium (TIROSINT) 137 MCG capsule Take 137 mcg by mouth Daily.   8/19/2019 at 0730   • linaclotide (LINZESS) 290 MCG capsule capsule Take 290 mcg by mouth every morning before breakfast.   8/19/2019 at 0730   • lisinopril (PRINIVIL,ZESTRIL) 10 MG tablet Take 10 mg by mouth Every Evening.   8/18/2019 at Unknown time   • loratadine (CLARITIN) 10 MG tablet Take 10 mg by mouth Every Night.   8/18/2019 at Unknown time   • metoprolol succinate XL (TOPROL-XL) 100 MG 24 hr tablet Take 1 tablet by mouth Daily. 90 tablet 3 8/19/2019 at 0730   • multivitamin (DAILY MICHAEL) tablet tablet Take 1 tablet by mouth Every Evening.   8/18/2019 at Unknown time   • primidone (MYSOLINE) 50 MG tablet Take 50 mg by mouth Every Night.   8/18/2019 at Unknown time   • ranitidine (ZANTAC) 150 MG tablet Take 150 mg by mouth 2 (two) times a day.    8/19/2019 at 0730   • topiramate (TOPAMAX) 25 MG tablet Take 50 mg by mouth Every Night.   8/18/2019 at Unknown time   • vitamin C (ASCORBIC ACID) 500 MG tablet Take 1,000 mg by mouth Every Evening.   8/18/2019 at Unknown time   • vitamin E 1000 UNIT capsule Take 1,000 Units by mouth Every Evening.   8/18/2019 at Unknown time   • alendronate (FOSAMAX) 70 MG tablet Take 70 mg by mouth 1 (One) Time Per Week.   8/14/2019   • butalbital-acetaminophen-caffeine (FIORICET, ESGIC) -40 MG per tablet Take 1 tablet by mouth as needed for headaches.   More than a month at Unknown time   • cyanocobalamin 1000 MCG/ML injection Inject 1,000 mcg into the shoulder, thigh, or buttocks Every 28 (Twenty-Eight) Days.   8/12/2019   • EMGALITY 120 MG/ML prefilled syringe INJECT 1 ONCE EVERY MONTH  3 7/30/2019   • methotrexate 2.5 MG tablet Take 25 mg by mouth 1 (One) Time Per Week. Prior to [facility] [place] Admission, Patient was on: Takes 10 tablets on Saturday 8/17/2019   • OnabotulinumtoxinA (BOTOX IM) Inject  into the shoulder, thigh, or buttocks Every 3 (Three) Months.   5/30/2019     Allergies:  Penicillins; Sulfa antibiotics; Sulfasalazine; Codeine; Imuran [azathioprine]; Januvia [sitagliptin]; and Beta adrenergic blockers    Scheduled Meds:  amitriptyline 25 mg Oral Nightly   [START ON 8/20/2019] aspirin 81 mg Oral Daily   calcium carb-cholecalciferol 1 tablet Oral Q PM   [START ON 8/20/2019] celecoxib 200 mg Oral Daily   cetirizine 10 mg Oral Nightly   cyclobenzaprine 10 mg Oral Q PM   diazePAM 1 mg Oral BID   DULoxetine 60 mg Oral Q PM   [START ON 8/20/2019] DULoxetine 90 mg Oral QAM   enoxaparin 40 mg Subcutaneous Q24H   famotidine 20 mg Oral BID   [START ON 8/20/2019] ferrous sulfate 325 mg Oral Q AM   [START ON 8/20/2019] folic acid 1 mg Oral Daily   gabapentin 300 mg Oral 4x Daily   insulin aspart 0-9 Units Subcutaneous 4x Daily AC & at Bedtime   [START ON 8/20/2019] insulin detemir 25 Units Subcutaneous Daily   [START ON  "8/20/2019] levothyroxine sodium 137 mcg Oral Daily   [START ON 8/20/2019] linaclotide 290 mcg Oral QAM AC   lisinopril 10 mg Oral Q PM   [START ON 8/20/2019] metoprolol succinate  mg Oral Daily   multivitamin 1 tablet Oral Q PM   pantoprazole 40 mg Oral BID AC   primidone 50 mg Oral Nightly   sodium chloride 3 mL Intravenous Q12H   topiramate 50 mg Oral Nightly   vitamin C 1,000 mg Oral Q PM   vitamin d 5,000 Units Oral Q PM   vitamin E 800 Units Oral Q PM     Continuous Infusions:   PRN Meds:.butalbital-acetaminophen-caffeine  •  dextrose  •  dextrose  •  glucagon (human recombinant)  •  sodium chloride          Objective     Vital Signs    /93 (BP Location: Right arm, Patient Position: Sitting)   Pulse 89   Resp 18   Ht 165.1 cm (65\")   Wt 91.6 kg (202 lb)   SpO2 98%   BMI 33.61 kg/m²         Physical Exam:   General Appearance: alert, pleasant, appears stated age, interactive and   cooperative.  Chronically ill in appearance   Head: normocephalic, without obvious abnormality and atraumatic   Eyes: lids and lashes normal, conjunctivae and sclerae normal, no icterus, no   pallor, corneas clear and PERRLA   Ears: ears appear intact with no abnormalities noted   Nose: nares normal, septum midline, mucosa normal and no drainage   Throat: no oral lesions, no thrush, oral mucosa moist and oopharynx normal   Neck: no adenopathy, supple, trachea midline, no thyromegaly, no carotid bruit   and no JVD   Back: no kyphosis present, no scoliosis present, no skin lesions, erythema, or   scars, no tenderness to percussion or palpation and range of motion normal   Lungs: clear to auscultation, respirations regular, respirations even and    respirations unlabored. No accessory muscle use.    Heart:: regular rhythm & normal rate, normal S1, S2, no murmur, no gallop, no   rub and no click.  Chest wall with no abnormalities observed. PMI nondisplaced   Abdomen: normal bowel sounds in all quadrants, no masses, no " hepatomegaly,   no splenomegaly, soft non-tender, no guarding and no rebound tenderness   Extremities: no edema, no cyanosis, no redness, no tenderness, no clubbing   Musculoskeletal: joints with full range of motion and joints, no effusion.  Pedal   pulses palpable and equal bilaterally   Skin: no bleeding, bruising or rash and no lesions noted   Lymph Nodes: no palpable adenopathy   Neurologic: Mental Status orientated to person, place, time and situation.    Speech is intelligible, Nonlabored.  Alertness alert and awake and mood/affect   normal, Cranial Nerves 2 - 12 grossly intact as examined   Sensory intact in BLE and BUE.   Motor strength  LUE is 5/5 proximally, 5/5 distally      RUE is 5/5 proximally, 5/5 distally      LLE is 5/5 @ hip flexors, quads as well as       dorsiflexion / plantar flexion      RLE is 5/5 @ hip flexors, quads as well as        dosriflexion / plantar flexion   Reflexes: Right:  2+ biceps, 2+ brachioradialis      2+ patella, 2+ achilles     Left: 2+ biceps, 2+ brachioradialis      2+ patella, 2+ achilles    Results Review:   Lab Results (last 24 hours)     Procedure Component Value Units Date/Time    Troponin [[id number]]  (Normal) Collected:  08/19/19 1436    Specimen:  Blood Updated:  08/19/19 1544     Troponin T <0.010 ng/mL     Narrative:       Troponin T Reference Range:  <= 0.03 ng/mL-   Negative for AMI  >0.03 ng/mL-     Abnormal for myocardial necrosis.  Clinicians would have to utilize clinical acumen, EKG, Troponin and serial changes to determine if it is an Acute Myocardial Infarction or myocardial injury due to an underlying chronic condition.     Comprehensive Metabolic Panel [[id number]]  (Abnormal) Collected:  08/19/19 1436    Specimen:  Blood Updated:  08/19/19 1534     Glucose 113 mg/dL      BUN 10 mg/dL      Creatinine 0.63 mg/dL      Sodium 133 mmol/L      Potassium 4.3 mmol/L      Chloride 99 mmol/L      CO2 25.6 mmol/L      Calcium 8.9 mg/dL      Total Protein 6.5  g/dL      Albumin 3.93 g/dL      ALT (SGPT) 18 U/L      AST (SGOT) 16 U/L      Alkaline Phosphatase 145 U/L      Total Bilirubin 0.3 mg/dL      eGFR Non African Amer 97 mL/min/1.73      Globulin 2.6 gm/dL      A/G Ratio 1.5 g/dL      BUN/Creatinine Ratio 15.9     Anion Gap 8.4 mmol/L     Narrative:       GFR Normal >60  Chronic Kidney Disease <60  Kidney Failure <15    CBC & Differential [[id number]] Collected:  08/19/19 1436    Specimen:  Blood Updated:  08/19/19 1514    Narrative:       The following orders were created for panel order CBC & Differential.  Procedure                               Abnormality         Status                     ---------                               -----------         ------                     CBC Auto Differential[[id number]]        Normal              Final result                 Please view results for these tests on the individual orders.    CBC Auto Differential [[id number]]  (Normal) Collected:  08/19/19 1436    Specimen:  Blood Updated:  08/19/19 1514     WBC 5.98 10*3/mm3      RBC 3.82 10*6/mm3      Hemoglobin 12.3 g/dL      Hematocrit 36.9 %      MCV 96.6 fL      MCH 32.2 pg      MCHC 33.3 g/dL      RDW 14.9 %      RDW-SD 49.4 fl      MPV 9.2 fL      Platelets 326 10*3/mm3      Neutrophil % 59.8 %      Lymphocyte % 31.1 %      Monocyte % 6.9 %      Eosinophil % 1.7 %      Basophil % 0.5 %      Immature Grans % 0.0 %      Neutrophils, Absolute 3.58 10*3/mm3      Lymphocytes, Absolute 1.86 10*3/mm3      Monocytes, Absolute 0.41 10*3/mm3      Eosinophils, Absolute 0.10 10*3/mm3      Basophils, Absolute 0.03 10*3/mm3      Immature Grans, Absolute 0.00 10*3/mm3     CBC & Differential [[id number]] Collected:  08/19/19 1436    Specimen:  Blood Updated:  08/19/19 1507    Narrative:       The following orders were created for panel order CBC & Differential.  Procedure                               Abnormality         Status                     ---------                                -----------         ------                     CBC Auto Differential[[id number]]                            In process                   Please view results for these tests on the individual orders.    CBC Auto Differential [[id number]] Collected:  08/19/19 1436    Specimen:  Blood Updated:  08/19/19 1507    POC Glucose Once [[id number]]  (Normal) Collected:  08/19/19 1251    Specimen:  Blood Updated:  08/19/19 1300     Glucose 130 mg/dL         Imaging Results (last 24 hours)     ** No results found for the last 24 hours. **          Assessment/Plan     Chest pain  Type 2 diabetes mellitus  Sodium 130 with probable water intoxication  Hypertension  Hyperlipidemia    Admit to observation.  Rule out for acute MI.  If rules out for acute MI hold n.p.o. after midnight and plan stress test tomorrow.  I do not think she will be able to walk on a treadmill due to her psoriatic arthritis and MS.    Sliding scale insulin.  Accu-Cheks q. before meals and at bedtime    Aspirin 81 mg daily.  Continue blood pressure monitoring.  She has a history of statin intolerance    Echocardiogram.  Chest x-ray        Samuel Duane Kreis, MD  08/19/19  3:50 PM

## 2019-08-19 NOTE — NURSING NOTE
Walking rounds and skin assessment completed with SOFIA Obregon. See Skin assessment flowsheet for findings.

## 2019-08-19 NOTE — NURSING NOTE
Orders for stress test noted, patient states she had tea around noon, due to caffeine intake will do stress tomorrow per protocol.

## 2019-08-19 NOTE — PLAN OF CARE
Problem: Pain, Chronic (Adult)  Goal: Identify Related Risk Factors and Signs and Symptoms  Outcome: Ongoing (interventions implemented as appropriate)    Goal: Acceptable Pain/Comfort Level and Functional Ability  Outcome: Ongoing (interventions implemented as appropriate)      Problem: Cardiac Output Decreased (Adult)  Goal: Identify Related Risk Factors and Signs and Symptoms  Outcome: Ongoing (interventions implemented as appropriate)    Goal: Effective Tissue Perfusion  Outcome: Ongoing (interventions implemented as appropriate)      Problem: Diabetes, Type 2 (Adult)  Goal: Signs and Symptoms of Listed Potential Problems Will be Absent, Minimized or Managed (Diabetes, Type 2)  Outcome: Ongoing (interventions implemented as appropriate)

## 2019-08-19 NOTE — PLAN OF CARE
Problem: Pain, Chronic (Adult)  Goal: Identify Related Risk Factors and Signs and Symptoms  Outcome: Ongoing (interventions implemented as appropriate)    Goal: Acceptable Pain/Comfort Level and Functional Ability  Outcome: Ongoing (interventions implemented as appropriate)      Problem: Cardiac Output Decreased (Adult)  Goal: Identify Related Risk Factors and Signs and Symptoms  Outcome: Ongoing (interventions implemented as appropriate)    Goal: Effective Tissue Perfusion  Outcome: Ongoing (interventions implemented as appropriate)      Problem: Diabetes, Type 2 (Adult)  Goal: Signs and Symptoms of Listed Potential Problems Will be Absent, Minimized or Managed (Diabetes, Type 2)  Outcome: Ongoing (interventions implemented as appropriate)      Problem: Patient Care Overview  Goal: Plan of Care Review  Outcome: Ongoing (interventions implemented as appropriate)    Goal: Individualization and Mutuality  Outcome: Ongoing (interventions implemented as appropriate)    Goal: Discharge Needs Assessment  Outcome: Ongoing (interventions implemented as appropriate)    Goal: Interprofessional Rounds/Family Conf  Outcome: Ongoing (interventions implemented as appropriate)

## 2019-08-20 ENCOUNTER — APPOINTMENT (OUTPATIENT)
Dept: NUCLEAR MEDICINE | Facility: HOSPITAL | Age: 59
End: 2019-08-20

## 2019-08-20 ENCOUNTER — APPOINTMENT (OUTPATIENT)
Dept: CARDIOLOGY | Facility: HOSPITAL | Age: 59
End: 2019-08-20

## 2019-08-20 VITALS
DIASTOLIC BLOOD PRESSURE: 66 MMHG | SYSTOLIC BLOOD PRESSURE: 110 MMHG | TEMPERATURE: 98 F | WEIGHT: 202 LBS | BODY MASS INDEX: 33.66 KG/M2 | RESPIRATION RATE: 18 BRPM | HEIGHT: 65 IN | OXYGEN SATURATION: 97 % | HEART RATE: 73 BPM

## 2019-08-20 LAB
ANION GAP SERPL CALCULATED.3IONS-SCNC: 7.9 MMOL/L (ref 5–15)
BASOPHILS # BLD AUTO: 0.06 10*3/MM3 (ref 0–0.2)
BASOPHILS NFR BLD AUTO: 1 % (ref 0–1.5)
BH CV ECHO MEAS - ACS: 1.9 CM
BH CV ECHO MEAS - AO ROOT AREA (BSA CORRECTED): 1.7
BH CV ECHO MEAS - AO ROOT AREA: 9.3 CM^2
BH CV ECHO MEAS - AO ROOT DIAM: 3.5 CM
BH CV ECHO MEAS - BSA(HAYCOCK): 2.1 M^2
BH CV ECHO MEAS - BSA: 2 M^2
BH CV ECHO MEAS - BZI_BMI: 33.6 KILOGRAMS/M^2
BH CV ECHO MEAS - BZI_METRIC_HEIGHT: 165.1 CM
BH CV ECHO MEAS - BZI_METRIC_WEIGHT: 91.6 KG
BH CV ECHO MEAS - EDV(CUBED): 30.1 ML
BH CV ECHO MEAS - EDV(MOD-SP4): 57 ML
BH CV ECHO MEAS - EDV(TEICH): 38.2 ML
BH CV ECHO MEAS - EF(CUBED): 50.5 %
BH CV ECHO MEAS - EF(MOD-SP4): 64.9 %
BH CV ECHO MEAS - EF(TEICH): 43.9 %
BH CV ECHO MEAS - ESV(CUBED): 14.9 ML
BH CV ECHO MEAS - ESV(MOD-SP4): 20 ML
BH CV ECHO MEAS - ESV(TEICH): 21.4 ML
BH CV ECHO MEAS - FS: 20.9 %
BH CV ECHO MEAS - IVS/LVPW: 1.8
BH CV ECHO MEAS - IVSD: 1.7 CM
BH CV ECHO MEAS - LA DIMENSION: 4.4 CM
BH CV ECHO MEAS - LA/AO: 1.3
BH CV ECHO MEAS - LV DIASTOLIC VOL/BSA (35-75): 28.7 ML/M^2
BH CV ECHO MEAS - LV MASS(C)D: 135.7 GRAMS
BH CV ECHO MEAS - LV MASS(C)DI: 68.3 GRAMS/M^2
BH CV ECHO MEAS - LV SYSTOLIC VOL/BSA (12-30): 10.1 ML/M^2
BH CV ECHO MEAS - LVIDD: 3.1 CM
BH CV ECHO MEAS - LVIDS: 2.5 CM
BH CV ECHO MEAS - LVLD AP4: 7.5 CM
BH CV ECHO MEAS - LVLS AP4: 6.1 CM
BH CV ECHO MEAS - LVOT AREA (M): 3.1 CM^2
BH CV ECHO MEAS - LVOT AREA: 3.1 CM^2
BH CV ECHO MEAS - LVOT DIAM: 2 CM
BH CV ECHO MEAS - LVPWD: 0.94 CM
BH CV ECHO MEAS - MV A MAX VEL: 88.8 CM/SEC
BH CV ECHO MEAS - MV E MAX VEL: 65.2 CM/SEC
BH CV ECHO MEAS - MV E/A: 0.73
BH CV ECHO MEAS - PA ACC SLOPE: 2152 CM/SEC^2
BH CV ECHO MEAS - PA ACC TIME: 0.04 SEC
BH CV ECHO MEAS - PA PR(ACCEL): 60.1 MMHG
BH CV ECHO MEAS - RVDD: 3.3 CM
BH CV ECHO MEAS - SI(CUBED): 7.6 ML/M^2
BH CV ECHO MEAS - SI(MOD-SP4): 18.6 ML/M^2
BH CV ECHO MEAS - SI(TEICH): 8.4 ML/M^2
BH CV ECHO MEAS - SV(CUBED): 15.2 ML
BH CV ECHO MEAS - SV(MOD-SP4): 37 ML
BH CV ECHO MEAS - SV(TEICH): 16.8 ML
BH CV NUCLEAR PRIOR STUDY: 3
BH CV STRESS BP STAGE 1: NORMAL
BH CV STRESS BP STAGE 2: NORMAL
BH CV STRESS COMMENTS STAGE 1: NORMAL
BH CV STRESS COMMENTS STAGE 2: NORMAL
BH CV STRESS DOSE REGADENOSON STAGE 1: 0.4
BH CV STRESS DURATION MIN STAGE 1: 0
BH CV STRESS DURATION MIN STAGE 2: 4
BH CV STRESS DURATION SEC STAGE 1: 10
BH CV STRESS DURATION SEC STAGE 2: 0
BH CV STRESS HR STAGE 1: 103
BH CV STRESS HR STAGE 2: 114
BH CV STRESS PROTOCOL 1: NORMAL
BH CV STRESS RECOVERY BP: NORMAL MMHG
BH CV STRESS RECOVERY HR: 85 BPM
BH CV STRESS STAGE 1: 1
BH CV STRESS STAGE 2: 2
BUN BLD-MCNC: 10 MG/DL (ref 6–20)
BUN/CREAT SERPL: 14.3 (ref 7–25)
CALCIUM SPEC-SCNC: 9 MG/DL (ref 8.6–10.5)
CHLORIDE SERPL-SCNC: 98 MMOL/L (ref 98–107)
CO2 SERPL-SCNC: 23.1 MMOL/L (ref 22–29)
CORTIS SERPL-MCNC: 2.9 MCG/DL
CREAT BLD-MCNC: 0.7 MG/DL (ref 0.57–1)
DEPRECATED RDW RBC AUTO: 52.4 FL (ref 37–54)
EOSINOPHIL # BLD AUTO: 0.18 10*3/MM3 (ref 0–0.4)
EOSINOPHIL NFR BLD AUTO: 3 % (ref 0.3–6.2)
ERYTHROCYTE [DISTWIDTH] IN BLOOD BY AUTOMATED COUNT: 15.2 % (ref 12.3–15.4)
GFR SERPL CREATININE-BSD FRML MDRD: 86 ML/MIN/1.73
GLUCOSE BLD-MCNC: 144 MG/DL (ref 65–99)
GLUCOSE BLDC GLUCOMTR-MCNC: 156 MG/DL (ref 70–130)
HCT VFR BLD AUTO: 36.1 % (ref 34–46.6)
HGB BLD-MCNC: 11.8 G/DL (ref 12–15.9)
IMM GRANULOCYTES # BLD AUTO: 0.01 10*3/MM3 (ref 0–0.05)
IMM GRANULOCYTES NFR BLD AUTO: 0.2 % (ref 0–0.5)
LV EF NUC BP: 81 %
LYMPHOCYTES # BLD AUTO: 2.52 10*3/MM3 (ref 0.7–3.1)
LYMPHOCYTES NFR BLD AUTO: 42 % (ref 19.6–45.3)
MAXIMAL PREDICTED HEART RATE: 163 BPM
MAXIMAL PREDICTED HEART RATE: 163 BPM
MCH RBC QN AUTO: 32.4 PG (ref 26.6–33)
MCHC RBC AUTO-ENTMCNC: 32.7 G/DL (ref 31.5–35.7)
MCV RBC AUTO: 99.2 FL (ref 79–97)
MONOCYTES # BLD AUTO: 0.44 10*3/MM3 (ref 0.1–0.9)
MONOCYTES NFR BLD AUTO: 7.3 % (ref 5–12)
NEUTROPHILS # BLD AUTO: 2.79 10*3/MM3 (ref 1.7–7)
NEUTROPHILS NFR BLD AUTO: 46.5 % (ref 42.7–76)
PERCENT MAX PREDICTED HR: 69.94 %
PLATELET # BLD AUTO: 291 10*3/MM3 (ref 140–450)
PMV BLD AUTO: 8.8 FL (ref 6–12)
POTASSIUM BLD-SCNC: 3.9 MMOL/L (ref 3.5–5.2)
RBC # BLD AUTO: 3.64 10*6/MM3 (ref 3.77–5.28)
SODIUM BLD-SCNC: 129 MMOL/L (ref 136–145)
STRESS BASELINE BP: NORMAL MMHG
STRESS BASELINE HR: 79 BPM
STRESS PERCENT HR: 82 %
STRESS POST PEAK BP: NORMAL MMHG
STRESS POST PEAK HR: 114 BPM
STRESS TARGET HR: 139 BPM
STRESS TARGET HR: 139 BPM
TROPONIN T SERPL-MCNC: <0.01 NG/ML (ref 0–0.03)
TSH SERPL DL<=0.05 MIU/L-ACNC: 0.3 MIU/ML (ref 0.27–4.2)
WBC NRBC COR # BLD: 6 10*3/MM3 (ref 3.4–10.8)

## 2019-08-20 PROCEDURE — G0378 HOSPITAL OBSERVATION PER HR: HCPCS

## 2019-08-20 PROCEDURE — 80048 BASIC METABOLIC PNL TOTAL CA: CPT | Performed by: FAMILY MEDICINE

## 2019-08-20 PROCEDURE — 78452 HT MUSCLE IMAGE SPECT MULT: CPT | Performed by: INTERNAL MEDICINE

## 2019-08-20 PROCEDURE — 78452 HT MUSCLE IMAGE SPECT MULT: CPT

## 2019-08-20 PROCEDURE — 84443 ASSAY THYROID STIM HORMONE: CPT | Performed by: FAMILY MEDICINE

## 2019-08-20 PROCEDURE — A9500 TC99M SESTAMIBI: HCPCS | Performed by: FAMILY MEDICINE

## 2019-08-20 PROCEDURE — 25010000002 REGADENOSON 0.4 MG/5ML SOLUTION: Performed by: FAMILY MEDICINE

## 2019-08-20 PROCEDURE — 63710000001 INSULIN DETEMIR PER 5 UNITS: Performed by: FAMILY MEDICINE

## 2019-08-20 PROCEDURE — 82533 TOTAL CORTISOL: CPT | Performed by: FAMILY MEDICINE

## 2019-08-20 PROCEDURE — 93306 TTE W/DOPPLER COMPLETE: CPT | Performed by: INTERNAL MEDICINE

## 2019-08-20 PROCEDURE — 93306 TTE W/DOPPLER COMPLETE: CPT

## 2019-08-20 PROCEDURE — 85025 COMPLETE CBC W/AUTO DIFF WBC: CPT | Performed by: FAMILY MEDICINE

## 2019-08-20 PROCEDURE — 93018 CV STRESS TEST I&R ONLY: CPT | Performed by: INTERNAL MEDICINE

## 2019-08-20 PROCEDURE — 0 TECHNETIUM SESTAMIBI: Performed by: FAMILY MEDICINE

## 2019-08-20 PROCEDURE — 84484 ASSAY OF TROPONIN QUANT: CPT | Performed by: FAMILY MEDICINE

## 2019-08-20 PROCEDURE — 82962 GLUCOSE BLOOD TEST: CPT

## 2019-08-20 PROCEDURE — G0108 DIAB MANAGE TRN  PER INDIV: HCPCS

## 2019-08-20 PROCEDURE — 93017 CV STRESS TEST TRACING ONLY: CPT

## 2019-08-20 RX ADMIN — DULOXETINE HYDROCHLORIDE 60 MG: 60 CAPSULE, DELAYED RELEASE ORAL at 17:00

## 2019-08-20 RX ADMIN — SODIUM CHLORIDE, PRESERVATIVE FREE 3 ML: 5 INJECTION INTRAVENOUS at 08:28

## 2019-08-20 RX ADMIN — LISINOPRIL 10 MG: 10 TABLET ORAL at 17:00

## 2019-08-20 RX ADMIN — TECHNETIUM TC 99M SESTAMIBI 1 DOSE: 1 INJECTION INTRAVENOUS at 10:18

## 2019-08-20 RX ADMIN — GABAPENTIN 300 MG: 300 CAPSULE ORAL at 08:26

## 2019-08-20 RX ADMIN — ASPIRIN 81 MG: 81 TABLET, COATED ORAL at 08:30

## 2019-08-20 RX ADMIN — Medication 1 TABLET: at 17:00

## 2019-08-20 RX ADMIN — FAMOTIDINE 20 MG: 20 TABLET, FILM COATED ORAL at 08:30

## 2019-08-20 RX ADMIN — Medication 800 UNITS: at 17:00

## 2019-08-20 RX ADMIN — LEVOTHYROXINE SODIUM 137 MCG: 137 CAPSULE ORAL at 08:25

## 2019-08-20 RX ADMIN — FOLIC ACID 1 MG: 1 TABLET ORAL at 08:30

## 2019-08-20 RX ADMIN — DIAZEPAM 1 MG: 2 TABLET ORAL at 08:26

## 2019-08-20 RX ADMIN — OXYCODONE HYDROCHLORIDE AND ACETAMINOPHEN 1000 MG: 500 TABLET ORAL at 17:00

## 2019-08-20 RX ADMIN — REGADENOSON 0.4 MG: 0.08 INJECTION, SOLUTION INTRAVENOUS at 10:18

## 2019-08-20 RX ADMIN — PANTOPRAZOLE SODIUM 40 MG: 40 TABLET, DELAYED RELEASE ORAL at 17:00

## 2019-08-20 RX ADMIN — GABAPENTIN 300 MG: 300 CAPSULE ORAL at 12:21

## 2019-08-20 RX ADMIN — CYCLOBENZAPRINE HYDROCHLORIDE 10 MG: 10 TABLET, FILM COATED ORAL at 17:00

## 2019-08-20 RX ADMIN — CELECOXIB 200 MG: 200 CAPSULE ORAL at 08:26

## 2019-08-20 RX ADMIN — INSULIN DETEMIR 25 UNITS: 100 INJECTION, SOLUTION SUBCUTANEOUS at 08:31

## 2019-08-20 RX ADMIN — TECHNETIUM TC 99M SESTAMIBI 1 DOSE: 1 INJECTION INTRAVENOUS at 08:55

## 2019-08-20 NOTE — CONSULTS
Patient resting comfortably in bed, alert and oriented.  Patient reports that her blood glucose rarely runs above 150.  Counseled patient on diabetes basic handout which discusses exactly what diabetes is and how it affects the body.  Counseled patient on complications of hyperglycemia and ways to prevent it.  Counseled patient on hypoglycemia and treatment by using the rule of 15.  Counseled patient on the importance of checking blood glucose levels frequently and keeping a log to take to all MD appointments.  Counseled patient on how to care for themselves during sick day.  Stressed the importance of healthy eating with a limit of carbohydrates to 180 per day.  Counseled on importance of complying with medications as ordered by provider.  Counseled patient to seek medical attention if blood glucose above 300.  Counseled on importance of daily exercise. Patient verbalizes understanding of all information given. Encouraged patient to attend an outpatient diabetes smart class or attend diabetes support  group.  Left time and dates of classes with patient along with my name and contact information, will continue to monitor patient as needed.

## 2019-08-20 NOTE — PROGRESS NOTES
".       LOS: 0 days     Chief Complaint: Chest pain    Subjective     Interval History:   No major changes in last 24 hours.  Has had no progressive chest pain at this point.  She is scheduled for stress test, echocardiogram this morning.  No shortness of breath.  Sodium noted at 129 down from 133.  Vital signs have remained stable.  Negative cardiac enzymes. CXR negative for acute findings. Laboratory values are stable.      Objective     Vital Signs  /58 (BP Location: Right arm, Patient Position: Lying)   Pulse 68   Temp 98.1 °F (36.7 °C) (Oral)   Resp 18   Ht 165.1 cm (65\")   Wt 91.6 kg (202 lb 0 oz)   SpO2 97%   BMI 33.61 kg/m²    Intake & Output (last day)       08/19 0701 - 08/20 0700 08/20 0701 - 08/21 0700    P.O. 710     Total Intake(mL/kg) 710 (7.8)     Net +710           Urine Unmeasured Occurrence 3 x             Physical Exam:     General Appearance:    Alert, cooperative, in no acute distress, chronically ill in appearance   Head:    Normocephalic, without obvious abnormality, atraumatic   Eyes:            Lids and lashes normal, conjunctivae and sclerae normal, no   icterus, no pallor, corneas clear, PERRLA   Ears:    Ears appear intact with no abnormalities noted   Throat:   No oral lesions, no thrush, oral mucosa moist   Neck:   No adenopathy, supple, trachea midline, no thyromegaly, no   carotid bruit, no JVD   Lungs:     Clear to auscultation,respirations regular, even and                  unlabored    Heart:    Regular rhythm and normal rate, normal S1 and S2, no            murmur, no gallop, no rub, no click   Chest Wall:    No abnormalities observed   Abdomen:     Normal bowel sounds, no masses, no organomegaly, soft        non-tender, non-distended, no guarding, no rebound                tenderness   Extremities:   Moves all extremities well, no edema, no cyanosis, no             redness   Pulses:   Pulses palpable and equal bilaterally   Skin:   No bleeding, bruising or rash "   Lymph nodes:   No palpable adenopathy   Neurologic:   Cranial nerves 2 - 12 grossly intact, sensation intact, DTR       present and equal bilaterally        Results Review:    Lab Results   Component Value Date    WBC 6.00 08/20/2019    HGB 11.8 (L) 08/20/2019    HCT 36.1 08/20/2019    MCV 99.2 (H) 08/20/2019     08/20/2019       Lab Results   Component Value Date    GLUCOSE 144 (H) 08/20/2019    BUN 10 08/20/2019    CREATININE 0.70 08/20/2019    EGFRIFNONA 86 08/20/2019    EGFRIFAFRI  09/22/2016      Comment:      <15 Indicative of kidney failure.    BCR 14.3 08/20/2019     (L) 08/20/2019    K 3.9 08/20/2019    CL 98 08/20/2019    CO2 23.1 08/20/2019    CALCIUM 9.0 08/20/2019    ALBUMIN 3.93 08/19/2019    LABIL2 1.4 (L) 08/11/2015    AST 16 08/19/2019    ALT 18 08/19/2019     Lab Results   Component Value Date    INR 0.97 11/22/2018    INR 0.93 07/20/2015       Glucose   Date Value Ref Range Status   08/20/2019 156 (H) 70 - 130 mg/dL Final   08/19/2019 134 (H) 70 - 130 mg/dL Final   08/19/2019 117 70 - 130 mg/dL Final   08/19/2019 130 70 - 130 mg/dL Final          Medication Review:     Current Facility-Administered Medications:   •  amitriptyline (ELAVIL) tablet 25 mg, 25 mg, Oral, Nightly, Kreis, Samuel Duane, MD, 25 mg at 08/19/19 2130  •  aspirin EC tablet 81 mg, 81 mg, Oral, Daily, Kreis, Samuel Duane, MD  •  butalbital-acetaminophen-caffeine (FIORICET, ESGIC) -40 MG per tablet 1 tablet, 1 tablet, Oral, PRN, Kreis, Samuel Duane, MD  •  calcium carb-cholecalciferol 600-800 MG-UNIT tablet 1 tablet, 1 tablet, Oral, Q PM, Kreis, Samuel Duane, MD, 1 tablet at 08/19/19 1637  •  celecoxib (CeleBREX) capsule 200 mg, 200 mg, Oral, Daily, Kreis, Samuel Duane, MD  •  cetirizine (zyrTEC) tablet 10 mg, 10 mg, Oral, Nightly, Kreis, Samuel Duane, MD, 10 mg at 08/19/19 2130  •  cyclobenzaprine (FLEXERIL) tablet 10 mg, 10 mg, Oral, Q PM, Kreis, Samuel Duane, MD, 10 mg at 08/19/19 1637  •  dextrose (D50W)  25 g/ 50mL Intravenous Solution 25 g, 25 g, Intravenous, Q15 Min PRN, Kreis, Samuel Duane, MD  •  dextrose (GLUTOSE) oral gel 15 g, 15 g, Oral, Q15 Min PRN, Kreis, Samuel Duane, MD  •  diazePAM (VALIUM) tablet 1 mg, 1 mg, Oral, BID, Kreis, Samuel Duane, MD  •  DULoxetine (CYMBALTA) DR capsule 60 mg, 60 mg, Oral, Q PM, Kreis, Samuel Duane, MD, 60 mg at 08/19/19 1637  •  DULoxetine (CYMBALTA) DR capsule 90 mg, 90 mg, Oral, QAM, Kreis, Samuel Duane, MD  •  enoxaparin (LOVENOX) syringe 40 mg, 40 mg, Subcutaneous, Q24H, Kreis, Samuel Duane, MD, 40 mg at 08/19/19 1637  •  famotidine (PEPCID) tablet 20 mg, 20 mg, Oral, BID, Kreis, Samuel Duane, MD, 20 mg at 08/19/19 2130  •  ferrous sulfate tablet 325 mg, 325 mg, Oral, Q AM, Kreis, Samuel Duane, MD  •  folic acid (FOLVITE) tablet 1 mg, 1 mg, Oral, Daily, Kreis, Samuel Duane, MD  •  gabapentin (NEURONTIN) capsule 300 mg, 300 mg, Oral, 4x Daily, Kreis, Samuel Duane, MD, 300 mg at 08/19/19 2130  •  glucagon (human recombinant) (GLUCAGEN DIAGNOSTIC) injection 1 mg, 1 mg, Subcutaneous, PRN, Kreis, Samuel Duane, MD  •  insulin aspart (novoLOG) injection 0-9 Units, 0-9 Units, Subcutaneous, 4x Daily AC & at Bedtime, Kreis, Samuel Duane, MD  •  insulin detemir (LEVEMIR) injection 25 Units, 25 Units, Subcutaneous, Daily, Kreis, Samuel Duane, MD  •  levothyroxine sodium (TIROSINT) capsule 137 mcg, 137 mcg, Oral, Daily, Kreis, Samuel Duane, MD  •  linaclotide (LINZESS) capsule 290 mcg, 290 mcg, Oral, QAM AC, Kreis, Samuel Duane, MD  •  lisinopril (PRINIVIL,ZESTRIL) tablet 10 mg, 10 mg, Oral, Q PM, Kreis, Samuel Duane, MD, 10 mg at 08/19/19 1637  •  metoprolol succinate XL (TOPROL-XL) 24 hr tablet 100 mg, 100 mg, Oral, Daily, Kreis, Samuel Duane, MD  •  multivitamin (DAILY MICHAEL) tablet 1 tablet, 1 tablet, Oral, Q PM, Kreis, Samuel Duane, MD, 1 tablet at 08/19/19 1637  •  pantoprazole (PROTONIX) EC tablet 40 mg, 40 mg, Oral, BID AC, Kreis, Samuel Duane, MD, 40 mg at 08/19/19 1637  •   primidone (MYSOLINE) tablet 50 mg, 50 mg, Oral, Nightly, Kreis, Samuel Duane, MD, 50 mg at 08/19/19 2130  •  sodium chloride 0.9 % flush 3 mL, 3 mL, Intravenous, Q12H, Kreis, Samuel Duane, MD, 3 mL at 08/19/19 2130  •  sodium chloride 0.9 % flush 3-10 mL, 3-10 mL, Intravenous, PRN, Kreis, Samuel Duane, MD  •  topiramate (TOPAMAX) tablet 50 mg, 50 mg, Oral, Nightly, Kreis, Samuel Duane, MD, 50 mg at 08/19/19 2129  •  vitamin C (ASCORBIC ACID) tablet 1,000 mg, 1,000 mg, Oral, Q PM, Kreis, Samuel Duane, MD, 1,000 mg at 08/19/19 1637  •  vitamin d (CHOLECALIFEROL) capsule 5,000 Units, 5,000 Units, Oral, Q PM, Kreis, Samuel Duane, MD  •  vitamin E capsule 800 Units, 800 Units, Oral, Q PM, Kreis, Samuel Duane, MD, 800 Units at 08/19/19 1637      Assessment/Plan   Chest pain  Type 2 diabetes mellitus  Hyponatremia  Hypertension  Hyperlipidemia  Multiple Sclerosis  Psoriatic Arthritis    Continue with observation unit monitoring    Levemir 25 units subcu daily + low-dose sliding scale insulin for blood glucose regulation    Lovenox 40 mg daily for DVT prophylaxis    Stress test + echocardiogram pending this morning    CBC, BMP daily    Discharge disposition pending cardiac testing today    BRIANDA Ko  08/20/19  8:14 AM    She continues to have some left-sided chest pain.  It has not progressed.  She denies any shortness of air.  She is seen by me and I agree with the above note.  On examination her lungs are clear.  No chest wall tenderness.  Plan echo and stress test.  If stress is negative plan discharge to home.  She does have hyponatremia.  Fluid restrict to 800 mL daily.  Check cortisol level and TSH    Samuel Duane Kreis, MD  08/20/19  9:37 AM

## 2019-08-20 NOTE — DISCHARGE SUMMARY
Date of Admission: 8/19/2019  Date of Discharge:  8/20/2019    Discharge Diagnosis:     Chest pain  Type 2 diabetes mellitus  Hyponatremia  Hypertension  Hyperlipidemia  Multiple Sclerosis  Psoriatic Arthritis  Hyponatremia with low cortisol    Hospital Course  Patient is a 58 y.o. female presented with chest pain. Noted to have hyponatremia with sodium 129.  Cortisol low @ 2.9.  She's not been on steroids for about 2 months.  She ruled out for AMI and had negative echo / stress test.  Chest pain felt to to be chest wall related pain.  She is discharged at this time with outpatient follow up    She is to restrict fluid intake to 1000 ml daily and will have BMP and Cortisol level at discharge.  If cortisol level is <5 we'll plan outpatient cosyntropin stimulation test    Procedures Performed  Study Result     CR Chest 2 Vws     INDICATION:    58-year-old female with chest pain.     COMPARISON:    11/22/2018     FINDINGS:   PA and lateral views of the chest.  Postop changes in the neck. Stable cardiac silhouette. Low lung volumes. Bronchovascular crowding. No effusion dense consolidation or pneumothorax. Postop changes upper abdomen.       IMPRESSION:     1. Low-volume image, otherwise negative chest.     Signer Name: Nikos Dunn MD   Signed: 8/20/2019 7:01 AM   Workstation Name: [facility]    [facility]     Stress Test:  Interpretation Summary     · Left ventricular ejection fraction is hyperdynamic (Calculated EF > 70%).  · Myocardial perfusion imaging indicates a normal myocardial perfusion study with no evidence of ischemia.  · Impressions are consistent with a low risk study.  · Findings consistent with a normal ECG stress test.       Echocardiogram    Interpretation Summary     · Normal left ventricular cavity size and wall thickness noted. All left ventricular wall segments contract normally.  · Left ventricular diastolic dysfunction (grade I) consistent with impaired  relaxation.  · Estimated EF appears to be in the range of 61 - 65%.  · The aortic valve is structurally normal. Trace aortic valve regurgitation is present. No aortic valve stenosis is present.  · The mitral valve is normal in structure. No mitral valve regurgitation is present. No significant mitral valve stenosis is present.  · The tricuspid valve is normal. No evidence of tricuspid valve stenosis is present. No tricuspid valve regurgitation is present.  · There is no evidence of pericardial effusion.          Pertinent Test Results:                                            Physical Exam:     General Appearance:    Alert, cooperative, in no acute distress   Head:    Normocephalic, without obvious abnormality, atraumatic   Eyes:            Lids and lashes normal, conjunctivae and sclerae normal, no   icterus, no pallor, corneas clear, PERRLA   Ears:    Ears appear intact with no abnormalities noted   Throat:   No oral lesions, no thrush, oral mucosa moist   Neck:   No adenopathy, supple, trachea midline, no thyromegaly, no   carotid bruit, no JVD   Back:     No kyphosis present, no scoliosis present, no skin lesions,      erythema or scars, no tenderness to percussion or                   palpation,   range of motion normal   Lungs:     Clear to auscultation,respirations regular, even and                  unlabored    Heart:    Regular rhythm and normal rate, normal S1 and S2, no            murmur, no gallop, no rub, no click   Chest Wall:    No abnormalities observed   Abdomen:     Normal bowel sounds, no masses, no organomegaly, soft        non-tender, non-distended, no guarding, no rebound                tenderness   Rectal:   Deferred   Extremities:   Moves all extremities well, no edema, no cyanosis, no             redness   Pulses:   Pulses palpable and equal bilaterally   Skin:   No bleeding, bruising or rash   Lymph nodes:   No palpable adenopathy   Neurologic:   Cranial nerves 2 - 12 grossly intact,  sensation intact, DTR       present and equal bilaterally       Discharge Disposition  Home or Self Care    Discharge Medications     Discharge Medications      Continue These Medications      Instructions Start Date   alendronate 70 MG tablet  Commonly known as:  FOSAMAX  Notes to patient:  AS PREVIOUSLY TAKEN    70 mg, Oral, Weekly      ALLERGY SERUM INJECTION  Notes to patient:  RESUME AS PREVIOUSLY TAKEN   Subcutaneous, Weekly      amitriptyline 25 MG tablet  Commonly known as:  ELAVIL   25 mg, Oral, Nightly      aspirin 81 MG tablet   1 tablet, Oral      BOTOX IM  Notes to patient:  AS DIRECTED   Intramuscular, Every 3 Months      butalbital-acetaminophen-caffeine -40 MG per tablet  Commonly known as:  FIORICET, ESGIC   1 tablet, Oral, As Needed      calcium citrate-vitamin d 315-250 MG-UNIT tablet tablet  Commonly known as:  CALCITRATE   1 tablet, Oral, Every Evening      celecoxib 200 MG capsule  Commonly known as:  CeleBREX   200 mg, Oral, Daily      cholecalciferol 1000 units tablet  Commonly known as:  VITAMIN D3   5,000 Units, Oral, Every Evening      cyanocobalamin 1000 MCG/ML injection  Notes to patient:  AS DIRECTED   1,000 mcg, Intramuscular, Every 28 Days      cyclobenzaprine 10 MG tablet  Commonly known as:  FLEXERIL   10 mg, Oral, Every Evening      dexlansoprazole 60 MG capsule  Commonly known as:  DEXILANT   60 mg, Oral, Daily      diazePAM 2 MG tablet  Commonly known as:  VALIUM   1 mg, Oral, 2 Times Daily      DULoxetine 30 MG capsule  Commonly known as:  CYMBALTA   90 mg, Oral, Every Morning      DULoxetine 30 MG capsule  Commonly known as:  CYMBALTA   60 mg, Oral, Every Evening      EMGALITY 120 MG/ML prefilled syringe  Generic drug:  [id number]   INJECT 1 ONCE EVERY MONTH      ferrous gluconate 240 (27 FE) MG tablet  Commonly known as:  FERGON   240 mg, Oral, Every Morning      folic acid 1 MG tablet  Commonly known as:  FOLVITE   1 mg, Oral, Daily      gabapentin 600 MG  tablet  Commonly known as:  NEURONTIN   600 mg, Oral, 4 Times Daily      INFLECTRA 100 MG reconstituted solution  Generic drug:  inFLIXimab-dyyb  Notes to patient:  AS DIRECTED   800 mg, Intravenous, Every 2 Months      insulin detemir 100 UNIT/ML injection  Commonly known as:  LEVEMIR   25 Units, Subcutaneous, Daily      linaclotide 290 MCG capsule capsule  Commonly known as:  LINZESS   290 mcg, Oral, Every Morning Before Breakfast      lisinopril 10 MG tablet  Commonly known as:  PRINIVIL,ZESTRIL   10 mg, Oral, Every Evening      loratadine 10 MG tablet  Commonly known as:  CLARITIN   10 mg, Oral, Nightly      methotrexate 2.5 MG tablet  Notes to patient:  AS DIRECTED   25 mg, Oral, Weekly, Prior to [facility] Admission, Patient was on: Takes 10 tablets on Saturday      metoprolol succinate  MG 24 hr tablet  Commonly known as:  TOPROL-XL   100 mg, Oral, Daily      multivitamin tablet tablet   1 tablet, Oral, Every Evening      primidone 50 MG tablet  Commonly known as:  MYSOLINE   50 mg, Oral, Nightly      raNITIdine 150 MG tablet  Commonly known as:  ZANTAC   150 mg, Oral, 2 Times Daily      TIROSINT 137 MCG capsule  Generic drug:  levothyroxine sodium   137 mcg, Oral, Daily      topiramate 25 MG tablet  Commonly known as:  TOPAMAX   50 mg, Oral, Nightly      vitamin C 500 MG tablet  Commonly known as:  ASCORBIC ACID   1,000 mg, Oral, Every Evening      vitamin E 1000 UNIT capsule   1,000 Units, Oral, Every Evening               Discharge Diet:    Diet Orders (active) (From admission, onward)    Start     Ordered    08/20/19 1000  Diet Regular; Consistent Carbohydrate, Daily Fluid Restriction; Other; 800  Diet Effective 1000      08/20/19 0937          Follow-up Appointments  Your Scheduled Appointments    Aug 21, 2019  9:00 AM EDT  Follow Up with RUIZ Pitts  [facility] (--) [address] [id number] [phone]   Arrive 15 minutes prior to appointment.         Additional Instructions for the Follow-ups that You Need to Schedule     Discharge Follow-up with PCP   As directed       Currently Documented PCP:    Kreis, Samuel Duane, MD    PCP Phone Number:    [phone]     Follow Up Details:  1 week                Samuel Duane Kreis, MD  08/20/19  4:30 PM

## 2019-08-21 ENCOUNTER — OFFICE VISIT (OUTPATIENT)
Dept: CARDIOLOGY | Facility: CLINIC | Age: 59
End: 2019-08-21

## 2019-08-21 VITALS
BODY MASS INDEX: 33.76 KG/M2 | DIASTOLIC BLOOD PRESSURE: 75 MMHG | SYSTOLIC BLOOD PRESSURE: 115 MMHG | WEIGHT: 202.6 LBS | HEIGHT: 65 IN | HEART RATE: 97 BPM | RESPIRATION RATE: 18 BRPM | OXYGEN SATURATION: 99 %

## 2019-08-21 DIAGNOSIS — R07.2 PRECORDIAL PAIN: ICD-10-CM

## 2019-08-21 DIAGNOSIS — I10 ESSENTIAL HYPERTENSION: Primary | ICD-10-CM

## 2019-08-21 DIAGNOSIS — E11.9 TYPE 2 DIABETES MELLITUS WITHOUT COMPLICATION, WITHOUT LONG-TERM CURRENT USE OF INSULIN (HCC): ICD-10-CM

## 2019-08-21 PROCEDURE — 99213 OFFICE O/P EST LOW 20 MIN: CPT | Performed by: NURSE PRACTITIONER

## 2019-08-21 PROCEDURE — 93000 ELECTROCARDIOGRAM COMPLETE: CPT | Performed by: NURSE PRACTITIONER

## 2019-08-21 RX ORDER — ISOSORBIDE MONONITRATE 30 MG/1
30 TABLET, EXTENDED RELEASE ORAL DAILY
Qty: 30 TABLET | Refills: 1 | Status: SHIPPED | OUTPATIENT
Start: 2019-08-21 | End: 2019-09-18 | Stop reason: SINTOL

## 2019-08-21 NOTE — PROGRESS NOTES
Kreis, Samuel Duane, MD  Lashae Benitez  1960  08/21/2019    Patient Active Problem List   Diagnosis   • Hiatal hernia   • Essential hypertension   • Sjogren's syndrome (CMS/HCC)   • Multiple sclerosis (CMS/HCC)   • Psoriasis   • Fibromyalgia   • Osteoarthritis   • Psoriatic arthritis (CMS/HCC)   • RA (rheumatoid arthritis) (CMS/HCC)   • Degenerative disc disease, lumbar   • TMJ arthritis   • Dupuytren's disease   • H. pylori infection   • Family history of coronary artery disease   • Type 2 diabetes mellitus (CMS/HCC)   • SOB (shortness of breath)   • Dizziness   • Edema   • Precordial pain   • Chest pain   • Palpitations   • Bilateral leg edema       Dear Kreis, Samuel Duane, MD:    Subjective     Chief Complaint   Patient presents with   • Hypertension   • Hospital follow up     [facility]: had a stress and echo in the hospital           History of Present Illness:    Lashae Benitez is a 58 y.o. female with a history of hypertension.  She was admitted to [facility] 2 days ago due to pain in her left chest wall radiating into the left shoulder.  She was noted to be hyponatremic with abnormal cortisol levels.  Her PCP did order a stress test which was negative for ischemia and echocardiogram which revealed normal EF with grade 1 diastolic dysfunction no significant valvular abnormalities.  Troponins were negative.  She was discharged home to follow-up with her PCP as an outpatient.  Today, she presents for her routine cardiology follow-up.  She reports she has had this constant pain in the left chest wall for several weeks.  She believes it is possibly an exacerbation of her rheumatoid arthritis and fibromyalgia.  However, the pain does not seem to be related to movement.  She does notice the pain is worse when she takes a deep breath.          Allergies   Allergen Reactions   • Penicillins Swelling   • Sulfa Antibiotics Swelling   • Sulfasalazine Unknown (See Comments)   • Codeine    • Imuran [Azathioprine]   "  • Januvia [Sitagliptin]    • Beta Adrenergic Blockers Other (See Comments)     I called pt to ask her about the allergy to bblockers in her chart. Pt states \"too big of a dose makes her psoriasis act up\", but this current dose of metoprolol 50 mg bid, she has been on for a long time, with no ill side effects.  CATA,CMA 3/28/18   :      Current Outpatient Medications:   •  alendronate (FOSAMAX) 70 MG tablet, Take 70 mg by mouth 1 (One) Time Per Week., Disp: , Rfl:   •  ALLERGY SERUM INJECTION, Inject  under the skin into the appropriate area as directed 1 (One) Time Per Week., Disp: , Rfl:   •  amitriptyline (ELAVIL) 25 MG tablet, Take 25 mg by mouth Every Night., Disp: , Rfl:   •  aspirin 81 MG tablet, Take 1 tablet by mouth., Disp: , Rfl:   •  butalbital-acetaminophen-caffeine (FIORICET, ESGIC) -40 MG per tablet, Take 1 tablet by mouth as needed for headaches., Disp: , Rfl:   •  calcium citrate-vitamin d (CALCITRATE) 315-250 MG-UNIT tablet tablet, Take 1 tablet by mouth Every Evening., Disp: , Rfl:   •  celecoxib (CeleBREX) 200 MG capsule, Take 200 mg by mouth daily., Disp: , Rfl:   •  cholecalciferol (VITAMIN D3) 1000 UNITS tablet, Take 5,000 Units by mouth Every Evening., Disp: , Rfl:   •  cyanocobalamin 1000 MCG/ML injection, Inject 1,000 mcg into the shoulder, thigh, or buttocks Every 28 (Twenty-Eight) Days., Disp: , Rfl:   •  cyclobenzaprine (FLEXERIL) 10 MG tablet, Take 10 mg by mouth Every Evening., Disp: , Rfl:   •  dexlansoprazole (DEXILANT) 60 MG capsule, Take 60 mg by mouth daily., Disp: , Rfl:   •  diazePAM (VALIUM) 2 MG tablet, Take 1 mg by mouth 2 (Two) Times a Day., Disp: , Rfl:   •  DULoxetine (CYMBALTA) 30 MG capsule, Take 90 mg by mouth Every Morning., Disp: , Rfl:   •  DULoxetine (CYMBALTA) 30 MG capsule, Take 60 mg by mouth Every Evening., Disp: , Rfl:   •  EMGALITY 120 MG/ML prefilled syringe, INJECT 1 ONCE EVERY MONTH, Disp: , Rfl: 3  •  ferrous gluconate (FERGON) 240 (27 FE) MG tablet, " Take 240 mg by mouth Every Morning., Disp: , Rfl:   •  folic acid (FOLVITE) 1 MG tablet, Take 1 mg by mouth daily., Disp: , Rfl:   •  gabapentin (NEURONTIN) 600 MG tablet, Take 600 mg by mouth 4 (Four) Times a Day., Disp: , Rfl:   •  inFLIXimab-dyyb (INFLECTRA) 100 MG reconstituted solution, Infuse 800 mg into a venous catheter Every 2 (Two) Months., Disp: , Rfl:   •  insulin detemir (LEVEMIR) 100 UNIT/ML injection, Inject 25 Units under the skin into the appropriate area as directed Daily., Disp: , Rfl:   •  levothyroxine sodium (TIROSINT) 137 MCG capsule, Take 137 mcg by mouth Daily., Disp: , Rfl:   •  linaclotide (LINZESS) 290 MCG capsule capsule, Take 290 mcg by mouth every morning before breakfast., Disp: , Rfl:   •  lisinopril (PRINIVIL,ZESTRIL) 10 MG tablet, Take 10 mg by mouth Every Evening., Disp: , Rfl:   •  loratadine (CLARITIN) 10 MG tablet, Take 10 mg by mouth Every Night., Disp: , Rfl:   •  methotrexate 2.5 MG tablet, Take 25 mg by mouth 1 (One) Time Per Week. Prior to [facility] [place] Admission, Patient was on: Takes 10 tablets on Saturday, Disp: , Rfl:   •  metoprolol succinate XL (TOPROL-XL) 100 MG 24 hr tablet, Take 1 tablet by mouth Daily., Disp: 90 tablet, Rfl: 3  •  multivitamin (DAILY MICHAEL) tablet tablet, Take 1 tablet by mouth Every Evening., Disp: , Rfl:   •  OnabotulinumtoxinA (BOTOX IM), Inject  into the shoulder, thigh, or buttocks Every 3 (Three) Months., Disp: , Rfl:   •  primidone (MYSOLINE) 50 MG tablet, Take 50 mg by mouth Every Night., Disp: , Rfl:   •  ranitidine (ZANTAC) 150 MG tablet, Take 150 mg by mouth 2 (two) times a day., Disp: , Rfl:   •  topiramate (TOPAMAX) 25 MG tablet, Take 50 mg by mouth Every Night., Disp: , Rfl:   •  vitamin C (ASCORBIC ACID) 500 MG tablet, Take 1,000 mg by mouth Every Evening., Disp: , Rfl:   •  vitamin E 1000 UNIT capsule, Take 1,000 Units by mouth Every Evening., Disp: , Rfl:   •  isosorbide mononitrate (IMDUR) 30 MG 24 hr tablet, Take 1 tablet by mouth  "Daily., Disp: 30 tablet, Rfl: 1  No current facility-administered medications for this visit.       The following portions of the patient's history were reviewed and updated as appropriate: allergies, current medications, past family history, past medical history, past social history, past surgical history and problem list.    Social History     Tobacco Use   • Smoking status: Never Smoker   • Smokeless tobacco: Never Used   Substance Use Topics   • Alcohol use: No   • Drug use: No       Review of Systems   Constitution: Negative for weakness and malaise/fatigue.   Cardiovascular: Positive for chest pain. Negative for dyspnea on exertion, irregular heartbeat, leg swelling, near-syncope, orthopnea, palpitations, paroxysmal nocturnal dyspnea and syncope.   Respiratory: Negative for cough, shortness of breath and wheezing.    Musculoskeletal: Positive for arthritis and myalgias.   Neurological: Negative for dizziness and light-headedness.       Objective   Vitals:    08/21/19 0833   BP: 115/75   BP Location: Right arm   Patient Position: Sitting   Cuff Size: Adult   Pulse: 97   Resp: 18   SpO2: 99%   Weight: 91.9 kg (202 lb 9.6 oz)   Height: 165.1 cm (65\")     Body mass index is 33.71 kg/m².        Physical Exam   Constitutional: She is oriented to person, place, and time. She appears well-developed and well-nourished.   HENT:   Head: Normocephalic and atraumatic.   Cardiovascular: Normal rate, regular rhythm and normal heart sounds. Exam reveals no S3 and no S4.   No murmur heard.  Pulmonary/Chest: Effort normal and breath sounds normal. She has no wheezes. She has no rales.   Abdominal: Soft. Bowel sounds are normal.   Musculoskeletal: She exhibits no edema.   Neurological: She is alert and oriented to person, place, and time.   Skin: Skin is warm and dry.   Psychiatric: She has a normal mood and affect. Her behavior is normal.       Lab Results   Component Value Date     (L) 08/20/2019    K 3.9 08/20/2019    " CL 98 08/20/2019    CO2 23.1 08/20/2019    BUN 10 08/20/2019    CREATININE 0.70 08/20/2019    GLUCOSE 144 (H) 08/20/2019    CALCIUM 9.0 08/20/2019    AST 16 08/19/2019    ALT 18 08/19/2019    ALKPHOS 145 (H) 08/19/2019    LABIL2 1.4 (L) 08/11/2015     Lab Results   Component Value Date    CKTOTAL 40 04/12/2017     Lab Results   Component Value Date    WBC 6.00 08/20/2019    HGB 11.8 (L) 08/20/2019    HCT 36.1 08/20/2019     08/20/2019     Lab Results   Component Value Date    INR 0.97 11/22/2018    INR 0.93 07/20/2015     Lab Results   Component Value Date    MG 1.8 12/30/2016     Lab Results   Component Value Date    TSH 0.303 08/20/2019    CHLPL 132 07/22/2015    TRIG 85 07/22/2015    HDL 40 (L) 07/22/2015    LDL 74 07/22/2015      Lab Results   Component Value Date    BNP 51.0 11/22/2018             ECG 12 Lead  Date/Time: 8/21/2019 8:31 AM  Performed by: Brittney Coley APRN  Authorized by: Brittney Coley APRN   Comparison: compared with previous ECG   Similar to previous ECG  Rhythm: sinus rhythm  BPM: 85  Conduction: 1st degree AV block              Assessment/Plan    Diagnosis Plan   1. Essential hypertension  ECG 12 Lead   2. Type 2 diabetes mellitus without complication, without long-term current use of insulin (CMS/[facility])  ECG 12 Lead   3. Precordial pain  ECG 12 Lead    isosorbide mononitrate (IMDUR) 30 MG 24 hr tablet                Recommendations:    1. Since she has persistent pains with multiple cardiac risk factors, will start Imdur 30 mg daily.    2. Continue with low dose aspirin for now, metoprolol, and lisinopril. She is intolerant to statins.    3. Follow up in 4 weeks and PRN.    Return in about 4 weeks (around 9/18/2019) for Recheck.    As always, I appreciate very much the opportunity to participate in the cardiovascular care of your patients.      With Best Regards,    RUIZ Pitts

## 2019-08-26 ENCOUNTER — TRANSCRIBE ORDERS (OUTPATIENT)
Dept: INFUSION THERAPY | Facility: HOSPITAL | Age: 59
End: 2019-08-26

## 2019-08-26 DIAGNOSIS — R79.89 LOW SERUM CORTISOL LEVEL: Primary | ICD-10-CM

## 2019-08-30 ENCOUNTER — HOSPITAL ENCOUNTER (OUTPATIENT)
Dept: INFUSION THERAPY | Facility: HOSPITAL | Age: 59
Discharge: HOME | End: 2019-08-30
Admitting: FAMILY MEDICINE

## 2019-08-30 VITALS
DIASTOLIC BLOOD PRESSURE: 92 MMHG | SYSTOLIC BLOOD PRESSURE: 154 MMHG | HEART RATE: 83 BPM | RESPIRATION RATE: 18 BRPM | TEMPERATURE: 97.5 F

## 2019-08-30 DIAGNOSIS — E27.40 ISOLATED CORTICOTROPIN DEFICIENCY (HCC): Primary | ICD-10-CM

## 2019-08-30 LAB
CORTIS SERPL-MCNC: 14.59 MCG/DL
CORTIS SERPL-MCNC: 31.32 MCG/DL
CORTIS SERPL-MCNC: 38.01 MCG/DL

## 2019-08-30 PROCEDURE — 25010000002 COSYNTROPIN PER 0.25 MG: Performed by: FAMILY MEDICINE

## 2019-08-30 PROCEDURE — 96374 THER/PROPH/DIAG INJ IV PUSH: CPT

## 2019-08-30 PROCEDURE — 82533 TOTAL CORTISOL: CPT | Performed by: FAMILY MEDICINE

## 2019-08-30 PROCEDURE — 36415 COLL VENOUS BLD VENIPUNCTURE: CPT

## 2019-08-30 RX ORDER — SODIUM CHLORIDE 9 MG/ML
250 INJECTION, SOLUTION INTRAVENOUS ONCE
Status: CANCELLED | OUTPATIENT
Start: 2019-08-30

## 2019-08-30 RX ORDER — COSYNTROPIN 0.25 MG/ML
0.25 INJECTION, POWDER, FOR SOLUTION INTRAMUSCULAR; INTRAVENOUS ONCE
Status: COMPLETED | OUTPATIENT
Start: 2019-08-30 | End: 2019-08-30

## 2019-08-30 RX ORDER — COSYNTROPIN 0.25 MG/ML
0.25 INJECTION, POWDER, FOR SOLUTION INTRAMUSCULAR; INTRAVENOUS ONCE
Status: CANCELLED | OUTPATIENT
Start: 2019-08-30

## 2019-08-30 RX ORDER — SODIUM CHLORIDE 9 MG/ML
250 INJECTION, SOLUTION INTRAVENOUS ONCE
OUTPATIENT
Start: 2019-08-30

## 2019-08-30 RX ADMIN — COSYNTROPIN 0.25 MG: 0.25 INJECTION, POWDER, LYOPHILIZED, FOR SOLUTION INTRAMUSCULAR; INTRAVENOUS at 10:35

## 2019-09-18 ENCOUNTER — OFFICE VISIT (OUTPATIENT)
Dept: CARDIOLOGY | Facility: CLINIC | Age: 59
End: 2019-09-18

## 2019-09-18 VITALS
BODY MASS INDEX: 32.63 KG/M2 | OXYGEN SATURATION: 98 % | HEIGHT: 67 IN | HEART RATE: 88 BPM | RESPIRATION RATE: 16 BRPM | WEIGHT: 207.9 LBS | SYSTOLIC BLOOD PRESSURE: 131 MMHG | DIASTOLIC BLOOD PRESSURE: 85 MMHG

## 2019-09-18 DIAGNOSIS — Z82.49 FAMILY HISTORY OF CORONARY ARTERY DISEASE: ICD-10-CM

## 2019-09-18 DIAGNOSIS — I10 ESSENTIAL HYPERTENSION: Primary | ICD-10-CM

## 2019-09-18 DIAGNOSIS — E11.9 TYPE 2 DIABETES MELLITUS WITHOUT COMPLICATION, WITHOUT LONG-TERM CURRENT USE OF INSULIN (HCC): ICD-10-CM

## 2019-09-18 DIAGNOSIS — R07.2 PRECORDIAL PAIN: ICD-10-CM

## 2019-09-18 DIAGNOSIS — I51.89 DIASTOLIC DYSFUNCTION: ICD-10-CM

## 2019-09-18 PROCEDURE — 99214 OFFICE O/P EST MOD 30 MIN: CPT | Performed by: NURSE PRACTITIONER

## 2019-09-18 NOTE — PROGRESS NOTES
Kreis, Samuel Duane, MD  Lashae Benitez  1960  09/18/2019    Patient Active Problem List   Diagnosis   • Hiatal hernia   • Essential hypertension   • Sjogren's syndrome (CMS/HCC)   • Multiple sclerosis (CMS/HCC)   • Psoriasis   • Fibromyalgia   • Osteoarthritis   • Psoriatic arthritis (CMS/HCC)   • RA (rheumatoid arthritis) (CMS/HCC)   • Degenerative disc disease, lumbar   • TMJ arthritis   • Dupuytren's disease   • H. pylori infection   • Family history of coronary artery disease   • Type 2 diabetes mellitus (CMS/HCC)   • SOB (shortness of breath)   • Dizziness   • Edema   • Precordial pain   • Chest pain   • Palpitations   • Bilateral leg edema   • Isolated corticotropin deficiency (CMS/HCC)       Dear Kreis, Samuel Duane, MD:    Subjective     Chief Complaint   Patient presents with   • Hypertension   • Chest Pain   • Follow-up     med change   • medications     verbal           History of Present Illness:    Lashae Benitez is a 58 y.o. female with a history of hypertension, diabetes mellitus type 2, multiple sclerosis, rheumatoid arthritis, and recent chest pains.  She was admitted to [facility] and evaluated with a Lexiscan stress test which was negative for ischemia.  An echocardiogram revealed a normal EF with grade 1 diastolic dysfunction and no significant valvular abnormality.  She continued to have chest pains and was prescribed Imdur.  However, she reports this caused severe headaches.  Therefore, she discontinued the medication.  She continues to have her mid chest pains.  These typically occur at least every day.  They are not related to exertion.  The chest pains are usually brought on by stress.  She describes as as a sharp pain in her left chest wall that is sometimes associated with shortness of breath.  She denies any palpitations, dizziness, lightheadedness, or syncope.  She denies dye allergy or history of chronic kidney disease.          Allergies   Allergen Reactions   • Penicillins  "Swelling   • Sulfa Antibiotics Swelling   • Sulfasalazine Unknown (See Comments)   • Codeine    • Imuran [Azathioprine]    • Januvia [Sitagliptin]    • Beta Adrenergic Blockers Other (See Comments)     I called pt to ask her about the allergy to bblockers in her chart. Pt states \"too big of a dose makes her psoriasis act up\", but this current dose of metoprolol 50 mg bid, she has been on for a long time, with no ill side effects.  CATA,CMA 3/28/18   :      Current Outpatient Medications:   •  alendronate (FOSAMAX) 70 MG tablet, Take 70 mg by mouth 1 (One) Time Per Week., Disp: , Rfl:   •  ALLERGY SERUM INJECTION, Inject  under the skin into the appropriate area as directed 1 (One) Time Per Week., Disp: , Rfl:   •  amitriptyline (ELAVIL) 25 MG tablet, Take 25 mg by mouth Every Night., Disp: , Rfl:   •  aspirin 81 MG tablet, Take 1 tablet by mouth., Disp: , Rfl:   •  butalbital-acetaminophen-caffeine (FIORICET, ESGIC) -40 MG per tablet, Take 1 tablet by mouth as needed for headaches., Disp: , Rfl:   •  calcium citrate-vitamin d (CALCITRATE) 315-250 MG-UNIT tablet tablet, Take 1 tablet by mouth Every Evening., Disp: , Rfl:   •  celecoxib (CeleBREX) 200 MG capsule, Take 200 mg by mouth daily., Disp: , Rfl:   •  cholecalciferol (VITAMIN D3) 1000 UNITS tablet, Take 5,000 Units by mouth Every Evening., Disp: , Rfl:   •  cyanocobalamin 1000 MCG/ML injection, Inject 1,000 mcg into the shoulder, thigh, or buttocks Every 28 (Twenty-Eight) Days., Disp: , Rfl:   •  cyclobenzaprine (FLEXERIL) 10 MG tablet, Take 10 mg by mouth Every Evening., Disp: , Rfl:   •  dexlansoprazole (DEXILANT) 60 MG capsule, Take 60 mg by mouth daily., Disp: , Rfl:   •  diazePAM (VALIUM) 2 MG tablet, Take 1 mg by mouth 2 (Two) Times a Day., Disp: , Rfl:   •  DULoxetine (CYMBALTA) 30 MG capsule, Take 90 mg by mouth Every Morning., Disp: , Rfl:   •  DULoxetine (CYMBALTA) 30 MG capsule, Take 60 mg by mouth Every Evening., Disp: , Rfl:   •  EMGALITY 120 " MG/ML prefilled syringe, INJECT 1 ONCE EVERY MONTH, Disp: , Rfl: 3  •  ferrous gluconate (FERGON) 240 (27 FE) MG tablet, Take 240 mg by mouth Every Morning., Disp: , Rfl:   •  folic acid (FOLVITE) 1 MG tablet, Take 1 mg by mouth daily., Disp: , Rfl:   •  gabapentin (NEURONTIN) 600 MG tablet, Take 600 mg by mouth 4 (Four) Times a Day., Disp: , Rfl:   •  inFLIXimab-dyyb (INFLECTRA) 100 MG reconstituted solution, Infuse 800 mg into a venous catheter Every 2 (Two) Months., Disp: , Rfl:   •  insulin detemir (LEVEMIR) 100 UNIT/ML injection, Inject 25 Units under the skin into the appropriate area as directed Daily., Disp: , Rfl:   •  levothyroxine sodium (TIROSINT) 137 MCG capsule, Take 137 mcg by mouth Daily., Disp: , Rfl:   •  linaclotide (LINZESS) 290 MCG capsule capsule, Take 290 mcg by mouth every morning before breakfast., Disp: , Rfl:   •  lisinopril (PRINIVIL,ZESTRIL) 10 MG tablet, Take 10 mg by mouth Every Evening., Disp: , Rfl:   •  loratadine (CLARITIN) 10 MG tablet, Take 10 mg by mouth Every Night., Disp: , Rfl:   •  methotrexate 2.5 MG tablet, Take 25 mg by mouth 1 (One) Time Per Week. Prior to [facility] Admission, Patient was on: Takes 10 tablets on Saturday, Disp: , Rfl:   •  metoprolol succinate XL (TOPROL-XL) 100 MG 24 hr tablet, Take 1 tablet by mouth Daily., Disp: 90 tablet, Rfl: 3  •  multivitamin (DAILY MICHAEL) tablet tablet, Take 1 tablet by mouth Every Evening., Disp: , Rfl:   •  OnabotulinumtoxinA (BOTOX IM), Inject  into the shoulder, thigh, or buttocks Every 3 (Three) Months., Disp: , Rfl:   •  primidone (MYSOLINE) 50 MG tablet, Take 50 mg by mouth Every Night., Disp: , Rfl:   •  ranitidine (ZANTAC) 150 MG tablet, Take 150 mg by mouth 2 (two) times a day., Disp: , Rfl:   •  topiramate (TOPAMAX) 25 MG tablet, Take 50 mg by mouth Every Night., Disp: , Rfl:   •  vitamin C (ASCORBIC ACID) 500 MG tablet, Take 1,000 mg by mouth Every Evening., Disp: , Rfl:   •  vitamin E 1000 UNIT capsule, Take 1,000 Units  "by mouth Every Evening., Disp: , Rfl:       The following portions of the patient's history were reviewed and updated as appropriate: allergies, current medications, past family history, past medical history, past social history, past surgical history and problem list.    Social History     Tobacco Use   • Smoking status: Never Smoker   • Smokeless tobacco: Never Used   Substance Use Topics   • Alcohol use: No   • Drug use: No       Review of Systems   Constitution: Negative for weakness and malaise/fatigue.   Cardiovascular: Positive for chest pain. Negative for dyspnea on exertion, irregular heartbeat, leg swelling, near-syncope, orthopnea, palpitations, paroxysmal nocturnal dyspnea and syncope.   Respiratory: Negative for cough, shortness of breath and wheezing.    Neurological: Negative for dizziness and light-headedness.       Objective   Vitals:    09/18/19 0849   BP: 131/85   BP Location: Left arm   Patient Position: Sitting   Pulse: 88   Resp: 16   SpO2: 98%   Weight: 94.3 kg (207 lb 14.4 oz)   Height: 170.2 cm (67\")     Body mass index is 32.56 kg/m².        Physical Exam   Constitutional: She is oriented to person, place, and time. She appears well-developed and well-nourished.   HENT:   Head: Normocephalic and atraumatic.   Cardiovascular: Normal rate, regular rhythm and normal heart sounds. Exam reveals no S3 and no S4.   No murmur heard.  Pulmonary/Chest: Effort normal and breath sounds normal. She has no wheezes. She has no rales.   Abdominal: Soft. Bowel sounds are normal.   Musculoskeletal: She exhibits no edema.   Neurological: She is alert and oriented to person, place, and time.   Skin: Skin is warm and dry.   Psychiatric: She has a normal mood and affect. Her behavior is normal.       Lab Results   Component Value Date     (L) 08/20/2019    K 3.9 08/20/2019    CL 98 08/20/2019    CO2 23.1 08/20/2019    BUN 10 08/20/2019    CREATININE 0.70 08/20/2019    GLUCOSE 144 (H) 08/20/2019    CALCIUM " 9.0 08/20/2019    AST 16 08/19/2019    ALT 18 08/19/2019    ALKPHOS 145 (H) 08/19/2019    LABIL2 1.4 (L) 08/11/2015       Lab Results   Component Value Date    WBC 6.00 08/20/2019    HGB 11.8 (L) 08/20/2019    HCT 36.1 08/20/2019     08/20/2019           Procedures      Assessment/Plan    Diagnosis Plan   1. Essential hypertension  CT Angiogram Coronary   2. Type 2 diabetes mellitus without complication, without long-term current use of insulin (CMS/[facility])  CT Angiogram Coronary   3. Precordial pain  CT Angiogram Coronary   4. Diastolic dysfunction     5. Family history of coronary artery disease  CT Angiogram Coronary                Recommendations:    1.  Since she has had persistent chest pains despite medical management with recent negative stress test and multiple risk factors for coronary artery disease, will evaluate further with CT coronary angiogram.    2.  I am unable to prescribe Ranexa due to interaction with the primidone she is currently taking.    3.  Continue low-dose aspirin, and metoprolol.    4. follow-up in 6 weeks and if needed.      Return in about 6 weeks (around 10/30/2019) for Recheck.    As always, I appreciate very much the opportunity to participate in the cardiovascular care of your patients.      With Best Regards,    RUIZ Pitts

## 2019-10-03 ENCOUNTER — TELEPHONE (OUTPATIENT)
Dept: CARDIOLOGY | Facility: CLINIC | Age: 59
End: 2019-10-03

## 2019-10-03 ENCOUNTER — HOSPITAL ENCOUNTER (OUTPATIENT)
Dept: CT IMAGING | Facility: HOSPITAL | Age: 59
Discharge: HOME | End: 2019-10-03
Admitting: RADIOLOGY

## 2019-10-03 VITALS
DIASTOLIC BLOOD PRESSURE: 73 MMHG | WEIGHT: 205 LBS | HEART RATE: 88 BPM | RESPIRATION RATE: 16 BRPM | BODY MASS INDEX: 34.16 KG/M2 | OXYGEN SATURATION: 99 % | SYSTOLIC BLOOD PRESSURE: 115 MMHG | HEIGHT: 65 IN

## 2019-10-03 DIAGNOSIS — R07.2 PRECORDIAL PAIN: ICD-10-CM

## 2019-10-03 DIAGNOSIS — E11.9 TYPE 2 DIABETES MELLITUS WITHOUT COMPLICATION, WITHOUT LONG-TERM CURRENT USE OF INSULIN (HCC): ICD-10-CM

## 2019-10-03 DIAGNOSIS — Z82.49 FAMILY HISTORY OF CORONARY ARTERY DISEASE: ICD-10-CM

## 2019-10-03 DIAGNOSIS — I10 ESSENTIAL HYPERTENSION: ICD-10-CM

## 2019-10-03 PROCEDURE — 82565 ASSAY OF CREATININE: CPT

## 2019-10-03 PROCEDURE — 75571 CT HRT W/O DYE W/CA TEST: CPT | Performed by: RADIOLOGY

## 2019-10-03 PROCEDURE — 75571 CT HRT W/O DYE W/CA TEST: CPT

## 2019-10-03 RX ORDER — METOPROLOL TARTRATE 50 MG/1
50 TABLET, FILM COATED ORAL ONCE AS NEEDED
Status: DISCONTINUED | OUTPATIENT
Start: 2019-10-03 | End: 2019-10-04 | Stop reason: HOSPADM

## 2019-10-03 RX ORDER — SODIUM CHLORIDE 0.9 % (FLUSH) 0.9 %
10 SYRINGE (ML) INJECTION AS NEEDED
Status: DISCONTINUED | OUTPATIENT
Start: 2019-10-03 | End: 2019-10-04 | Stop reason: HOSPADM

## 2019-10-03 RX ORDER — LIDOCAINE HYDROCHLORIDE 10 MG/ML
5 INJECTION, SOLUTION EPIDURAL; INFILTRATION; INTRACAUDAL; PERINEURAL AS NEEDED
Status: DISCONTINUED | OUTPATIENT
Start: 2019-10-03 | End: 2019-10-04 | Stop reason: HOSPADM

## 2019-10-03 RX ORDER — NITROGLYCERIN 0.4 MG/1
0.4 TABLET SUBLINGUAL
Status: DISCONTINUED | OUTPATIENT
Start: 2019-10-03 | End: 2019-10-04 | Stop reason: HOSPADM

## 2019-10-03 RX ORDER — SODIUM CHLORIDE 0.9 % (FLUSH) 0.9 %
3 SYRINGE (ML) INJECTION EVERY 12 HOURS SCHEDULED
Status: DISCONTINUED | OUTPATIENT
Start: 2019-10-03 | End: 2019-10-04 | Stop reason: HOSPADM

## 2019-10-03 RX ORDER — METOPROLOL TARTRATE 100 MG/1
100 TABLET ORAL ONCE AS NEEDED
Status: DISCONTINUED | OUTPATIENT
Start: 2019-10-03 | End: 2019-10-04 | Stop reason: HOSPADM

## 2019-10-03 RX ADMIN — METOPROLOL TARTRATE 5 MG: 5 INJECTION, SOLUTION INTRAVENOUS at 09:05

## 2019-10-03 RX ADMIN — METOPROLOL TARTRATE 5 MG: 5 INJECTION, SOLUTION INTRAVENOUS at 09:10

## 2019-10-03 RX ADMIN — METOPROLOL TARTRATE 5 MG: 5 INJECTION, SOLUTION INTRAVENOUS at 09:16

## 2019-10-03 NOTE — NURSING NOTE
Dr. Kemp notified of patients VS after doses of Metoprolol IV. Patient had calcium scoring but CTA Coronary cancelled per Dr. Kemp because unable to obtain HR required for accurate scan. Patient aware.

## 2019-10-03 NOTE — TELEPHONE ENCOUNTER
Patient called and said she had a CT angiogram and they was trying to get her heart rate down enough to finish the test. They gave her lisinopril three times and it never would go down enough to do the test. She wanted to let Brittney know about this.     Thanks!

## 2019-10-04 ENCOUNTER — APPOINTMENT (OUTPATIENT)
Dept: CT IMAGING | Facility: HOSPITAL | Age: 59
End: 2019-10-04

## 2019-10-04 LAB — CREAT BLDA-MCNC: 0.8 MG/DL (ref 0.6–1.3)

## 2019-10-04 NOTE — TELEPHONE ENCOUNTER
Ok. It looks like they completed a calcium score. I will let her know the results when available. Thanks.

## 2019-10-05 NOTE — NURSING NOTE
Problem: PHYSICAL THERAPY ADULT  Goal: Performs mobility at highest level of function for planned discharge setting  See evaluation for individualized goals  Description  Treatment/Interventions: Functional transfer training, LE strengthening/ROM, Elevations, Therapeutic exercise, Endurance training, Patient/family training, Equipment eval/education, Bed mobility, Gait training, Compensatory technique education, Spoke to nursing, OT, Family  Equipment Recommended: Walker(pt reports he owns RW)       See flowsheet documentation for full assessment, interventions and recommendations  10/5/2019 1636 by Annetta Rodriguez PTA  Outcome: Progressing  Note:   Prognosis: Good  Problem List: Decreased strength, Decreased range of motion, Decreased endurance, Impaired balance, Decreased mobility, Pain, Decreased skin integrity  Assessment: Pt seated at edge of bed upon arrival  reporting pain better 6/10 at rest   PT demonstrates impaired ability to perform transfers and ambulation due to decreased balance, L le hip and thigh pain decreased strength, difficulty lifting and advancing L le, dec weightshifting, and dec activity tolerance  Pt requiring physical assistance to lift and advance L le  Pt able to ambualte short distances of 5' x1, 3' x1 forward and 3' x1 backward with increased time, cues and assistance  Pt remained seated out of bed in chair post PT session with scd's to b/l le's and ice to L hip and anterior thigh groin  Pt will continue to benefit from skilled inpt PT for bed mobility, transfer and gait training, strengthening/ ROM,  and stair training  Will focus on bed mobility, transfers and ambualtion next 1-2 sessions  Anticipate pt to progress well once pain controlled and be able to d/c to home with family support and OPPT  Recommendation: Outpatient PT, Home with family support     PT - OK to Discharge: No    See flowsheet documentation for full assessment  Procedure started. No distress noted. HR in 80s.

## 2019-10-25 ENCOUNTER — OFFICE VISIT (OUTPATIENT)
Dept: CARDIOLOGY | Facility: CLINIC | Age: 59
End: 2019-10-25

## 2019-10-25 VITALS
SYSTOLIC BLOOD PRESSURE: 149 MMHG | BODY MASS INDEX: 34.49 KG/M2 | HEART RATE: 89 BPM | OXYGEN SATURATION: 100 % | HEIGHT: 65 IN | WEIGHT: 207 LBS | DIASTOLIC BLOOD PRESSURE: 90 MMHG

## 2019-10-25 DIAGNOSIS — R06.02 SOB (SHORTNESS OF BREATH): ICD-10-CM

## 2019-10-25 DIAGNOSIS — E11.9 TYPE 2 DIABETES MELLITUS WITHOUT COMPLICATION, WITHOUT LONG-TERM CURRENT USE OF INSULIN (HCC): ICD-10-CM

## 2019-10-25 DIAGNOSIS — I10 ESSENTIAL HYPERTENSION: Primary | ICD-10-CM

## 2019-10-25 DIAGNOSIS — R07.2 PRECORDIAL PAIN: ICD-10-CM

## 2019-10-25 PROCEDURE — 99213 OFFICE O/P EST LOW 20 MIN: CPT | Performed by: NURSE PRACTITIONER

## 2019-10-25 RX ORDER — LISINOPRIL 20 MG/1
20 TABLET ORAL EVERY EVENING
Qty: 30 TABLET | Refills: 5 | Status: SHIPPED | OUTPATIENT
Start: 2019-10-25 | End: 2020-04-06

## 2019-10-25 NOTE — PROGRESS NOTES
Add 65574 Cpt? (Important Note: In 2017 The Use Of 18222 Is Being Tracked By Cms To Determine Future Global Period Reimbursement For Global Periods): yes Kreis, Samuel Duane, MD  Lashae Benitez  1960  10/25/2019    Patient Active Problem List   Diagnosis   • Hiatal hernia   • Essential hypertension   • Sjogren's syndrome (CMS/HCC)   • Multiple sclerosis (CMS/HCC)   • Psoriasis   • Fibromyalgia   • Osteoarthritis   • Psoriatic arthritis (CMS/HCC)   • RA (rheumatoid arthritis) (CMS/HCC)   • Degenerative disc disease, lumbar   • TMJ arthritis   • Dupuytren's disease   • H. pylori infection   • Family history of coronary artery disease   • Type 2 diabetes mellitus (CMS/HCC)   • SOB (shortness of breath)   • Dizziness   • Edema   • Precordial pain   • Chest pain   • Palpitations   • Bilateral leg edema   • Isolated corticotropin deficiency (CMS/HCC)       Dear Kreis, Samuel Duane, MD:    Subjective     Chief Complaint   Patient presents with   • Follow-up   • Med Management     med list.    • Edema     legs.    • Dizziness   • Palpitations           History of Present Illness:    Lashae Benitez is a 59 y.o. female with a past medical history significant for hypertension, diabetes mellitus type 2, multiple sclerosis, rheumatoid arthritis, and recent chest pains.  She was admitted to [facility] and evaluated with Lexiscan stress test which was negative for ischemia and echocardiogram which revealed normal EF with grade 1 diastolic dysfunction.  She continued to have chest pain and was prescribed Imdur but was unable to tolerate due to headaches.  A CT coronary angiogram was ordered to further evaluate her symptoms.  However, study was unable to be performed due to inability to lower her heart rate to an acceptable range.  A CT cardiac calcium score was obtained.  This was 24.5.  She presents today for follow-up.  Reports she has been doing better.  She denies any further chest pains.  She has had some rib pain after falling and fracturing 2 of her ribs.  She continues to have shortness of breath.  The recent CT did show changes consistent with chronic interstitial  "lung disease.          Allergies   Allergen Reactions   • Penicillins Swelling   • Sulfa Antibiotics Swelling   • Sulfasalazine Unknown (See Comments)   • Codeine    • Imuran [Azathioprine]    • Januvia [Sitagliptin]    • Beta Adrenergic Blockers Other (See Comments)     I called pt to ask her about the allergy to bblockers in her chart. Pt states \"too big of a dose makes her psoriasis act up\", but this current dose of metoprolol 50 mg bid, she has been on for a long time, with no ill side effects.  CATA,CMA 3/28/18   :      Current Outpatient Medications:   •  alendronate (FOSAMAX) 70 MG tablet, Take 70 mg by mouth 1 (One) Time Per Week., Disp: , Rfl:   •  ALLERGY SERUM INJECTION, Inject  under the skin into the appropriate area as directed 1 (One) Time Per Week., Disp: , Rfl:   •  amitriptyline (ELAVIL) 25 MG tablet, Take 25 mg by mouth Every Night., Disp: , Rfl:   •  aspirin 81 MG tablet, Take 1 tablet by mouth., Disp: , Rfl:   •  butalbital-acetaminophen-caffeine (FIORICET, ESGIC) -40 MG per tablet, Take 1 tablet by mouth as needed for headaches., Disp: , Rfl:   •  calcium citrate-vitamin d (CALCITRATE) 315-250 MG-UNIT tablet tablet, Take 1 tablet by mouth Every Evening., Disp: , Rfl:   •  celecoxib (CeleBREX) 200 MG capsule, Take 200 mg by mouth daily., Disp: , Rfl:   •  cholecalciferol (VITAMIN D3) 1000 UNITS tablet, Take 5,000 Units by mouth Every Evening., Disp: , Rfl:   •  cyanocobalamin 1000 MCG/ML injection, Inject 1,000 mcg into the shoulder, thigh, or buttocks Every 28 (Twenty-Eight) Days., Disp: , Rfl:   •  cyclobenzaprine (FLEXERIL) 10 MG tablet, Take 10 mg by mouth Every Evening., Disp: , Rfl:   •  dexlansoprazole (DEXILANT) 60 MG capsule, Take 60 mg by mouth daily., Disp: , Rfl:   •  DIAZEPAM PO, Take 0.5 mg by mouth 2 (Two) Times a Day. 1/2 tab of the morning and 1/2 tab of the evening., Disp: , Rfl:   •  DULoxetine (CYMBALTA) 60 MG capsule, Take 60 mg by mouth Every Night., Disp: , Rfl:   •  " Detail Level: Detailed DULOXETINE HCL PO, Take 90 mg by mouth Every Morning., Disp: , Rfl:   •  EMGALITY 120 MG/ML prefilled syringe, INJECT 1 ONCE EVERY MONTH, Disp: , Rfl: 3  •  ferrous gluconate (FERGON) 240 (27 FE) MG tablet, Take 240 mg by mouth Every Morning., Disp: , Rfl:   •  folic acid (FOLVITE) 1 MG tablet, Take 1 mg by mouth daily., Disp: , Rfl:   •  gabapentin (NEURONTIN) 600 MG tablet, Take 600 mg by mouth 4 (Four) Times a Day., Disp: , Rfl:   •  inFLIXimab-dyyb (INFLECTRA) 100 MG reconstituted solution, Infuse 800 mg into a venous catheter Every 2 (Two) Months., Disp: , Rfl:   •  insulin detemir (LEVEMIR) 100 UNIT/ML injection, Inject 25 Units under the skin into the appropriate area as directed Daily., Disp: , Rfl:   •  levothyroxine sodium (TIROSINT) 137 MCG capsule, Take 137 mcg by mouth Daily., Disp: , Rfl:   •  linaclotide (LINZESS) 290 MCG capsule capsule, Take 290 mcg by mouth every morning before breakfast., Disp: , Rfl:   •  lisinopril (PRINIVIL,ZESTRIL) 20 MG tablet, Take 1 tablet by mouth Every Evening., Disp: 30 tablet, Rfl: 5  •  loratadine (CLARITIN) 10 MG tablet, Take 10 mg by mouth Every Night., Disp: , Rfl:   •  methotrexate 2.5 MG tablet, Take 25 mg by mouth 1 (One) Time Per Week. Prior to [facility] Admission, Patient was on: Takes 10 tablets on Saturday, Disp: , Rfl:   •  metoprolol succinate XL (TOPROL-XL) 100 MG 24 hr tablet, Take 1 tablet by mouth Daily., Disp: 90 tablet, Rfl: 3  •  multivitamin (DAILY MICHAEL) tablet tablet, Take 1 tablet by mouth Every Evening., Disp: , Rfl:   •  OnabotulinumtoxinA (BOTOX IM), Inject  into the shoulder, thigh, or buttocks Every 3 (Three) Months., Disp: , Rfl:   •  primidone (MYSOLINE) 50 MG tablet, Take 50 mg by mouth Every Night., Disp: , Rfl:   •  ranitidine (ZANTAC) 150 MG tablet, Take 150 mg by mouth 2 (two) times a day., Disp: , Rfl:   •  vitamin C (ASCORBIC ACID) 500 MG tablet, Take 1,000 mg by mouth Every Evening., Disp: , Rfl:   •  vitamin E 1000 UNIT capsule, Take  "1,000 Units by mouth Every Evening., Disp: , Rfl:       The following portions of the patient's history were reviewed and updated as appropriate: allergies, current medications, past family history, past medical history, past social history, past surgical history and problem list.    Social History     Tobacco Use   • Smoking status: Never Smoker   • Smokeless tobacco: Never Used   Substance Use Topics   • Alcohol use: No   • Drug use: No       Review of Systems   Constitution: Negative for weakness and malaise/fatigue.   Cardiovascular: Negative for chest pain, dyspnea on exertion, irregular heartbeat, leg swelling, near-syncope, orthopnea, palpitations, paroxysmal nocturnal dyspnea and syncope.   Respiratory: Positive for shortness of breath. Negative for cough and wheezing.    Neurological: Negative for dizziness and light-headedness.       Objective   Vitals:    10/25/19 0841   BP: 149/90   BP Location: Left arm   Patient Position: Sitting   Cuff Size: Adult   Pulse: 89   SpO2: 100%   Weight: 93.9 kg (207 lb)   Height: 165.1 cm (65\")     Body mass index is 34.45 kg/m².        Physical Exam   Constitutional: She is oriented to person, place, and time. She appears well-developed and well-nourished.   HENT:   Head: Normocephalic and atraumatic.   Cardiovascular: Normal rate, regular rhythm and normal heart sounds. Exam reveals no S3 and no S4.   No murmur heard.  Pulmonary/Chest: Effort normal and breath sounds normal. She has no wheezes. She has no rales.   Abdominal: Soft. Bowel sounds are normal.   Musculoskeletal: She exhibits no edema.   Neurological: She is alert and oriented to person, place, and time.   Skin: Skin is warm and dry.   Psychiatric: She has a normal mood and affect. Her behavior is normal.       Lab Results   Component Value Date     (L) 08/20/2019    K 3.9 08/20/2019    CL 98 08/20/2019    CO2 23.1 08/20/2019    BUN 10 08/20/2019    CREATININE 0.80 10/03/2019    GLUCOSE 144 (H) " 08/20/2019    CALCIUM 9.0 08/20/2019    AST 16 08/19/2019    ALT 18 08/19/2019    ALKPHOS 145 (H) 08/19/2019    LABIL2 1.4 (L) 08/11/2015     Lab Results   Component Value Date    CKTOTAL 40 04/12/2017     Lab Results   Component Value Date    WBC 6.00 08/20/2019    HGB 11.8 (L) 08/20/2019    HCT 36.1 08/20/2019     08/20/2019     Lab Results   Component Value Date    INR 0.97 11/22/2018    INR 0.93 07/20/2015     Lab Results   Component Value Date    MG 1.8 12/30/2016     Lab Results   Component Value Date    TSH 0.303 08/20/2019    CHLPL 132 07/22/2015    TRIG 85 07/22/2015    HDL 40 (L) 07/22/2015    LDL 74 07/22/2015      Lab Results   Component Value Date    BNP 51.0 11/22/2018           Procedures      Assessment/Plan    Diagnosis Plan   1. Essential hypertension  lisinopril (PRINIVIL,ZESTRIL) 20 MG tablet   2. SOB (shortness of breath)  Full Pulmonary Function Test With Bronchodilator   3. Type 2 diabetes mellitus without complication, without long-term current use of insulin (CMS/[facility])     4. Precordial pain, improved.  Full Pulmonary Function Test With Bronchodilator                Recommendations:    1. Will evaluate her shortness of breath and CT findings with pulmonary function tests.    2. Since she has had no further chest pains with recent negative lexiscan sestamibi study and CT calcium score consistent with only mild disease, will continue to monitor for now.    3. Follow up in 4 months and PRN.        Return in about 4 months (around 2/25/2020) for Recheck.    As always, I appreciate very much the opportunity to participate in the cardiovascular care of your patients.      With Best Regards,    RUIZ Pitts

## 2019-11-05 ENCOUNTER — HOSPITAL ENCOUNTER (OUTPATIENT)
Dept: RESPIRATORY THERAPY | Facility: HOSPITAL | Age: 59
Discharge: HOME | End: 2019-11-05
Admitting: NURSE PRACTITIONER

## 2019-11-05 DIAGNOSIS — R07.2 PRECORDIAL PAIN: ICD-10-CM

## 2019-11-05 DIAGNOSIS — R06.02 SOB (SHORTNESS OF BREATH): ICD-10-CM

## 2019-11-05 PROCEDURE — 94727 GAS DIL/WSHOT DETER LNG VOL: CPT

## 2019-11-05 PROCEDURE — 94729 DIFFUSING CAPACITY: CPT

## 2019-11-05 PROCEDURE — 94640 AIRWAY INHALATION TREATMENT: CPT

## 2019-11-05 PROCEDURE — 94060 EVALUATION OF WHEEZING: CPT | Performed by: INTERNAL MEDICINE

## 2019-11-05 PROCEDURE — 94729 DIFFUSING CAPACITY: CPT | Performed by: INTERNAL MEDICINE

## 2019-11-05 PROCEDURE — 94727 GAS DIL/WSHOT DETER LNG VOL: CPT | Performed by: INTERNAL MEDICINE

## 2019-11-05 PROCEDURE — 94060 EVALUATION OF WHEEZING: CPT

## 2019-11-05 RX ORDER — ALBUTEROL SULFATE 2.5 MG/3ML
2.5 SOLUTION RESPIRATORY (INHALATION) ONCE
Status: COMPLETED | OUTPATIENT
Start: 2019-11-05 | End: 2019-11-05

## 2019-11-05 RX ADMIN — ALBUTEROL SULFATE 2.5 MG: 2.5 SOLUTION RESPIRATORY (INHALATION) at 09:27

## 2020-01-23 ENCOUNTER — TRANSCRIBE ORDERS (OUTPATIENT)
Dept: PHYSICAL THERAPY | Facility: CLINIC | Age: 60
End: 2020-01-23

## 2020-01-23 DIAGNOSIS — R29.6 FALLS FREQUENTLY: Primary | ICD-10-CM

## 2020-02-03 ENCOUNTER — TREATMENT (OUTPATIENT)
Dept: PHYSICAL THERAPY | Facility: CLINIC | Age: 60
End: 2020-02-03

## 2020-02-03 DIAGNOSIS — R29.6 FREQUENT FALLS: Primary | ICD-10-CM

## 2020-02-03 PROCEDURE — 97163 PT EVAL HIGH COMPLEX 45 MIN: CPT | Performed by: PHYSICAL THERAPIST

## 2020-02-03 PROCEDURE — 97116 GAIT TRAINING THERAPY: CPT | Performed by: PHYSICAL THERAPIST

## 2020-02-03 NOTE — PROGRESS NOTES
"  Physical Therapy Initial Evaluation and Plan of Care      Patient: Lashae Benitez   : 1960  Diagnosis/ICD-10 Code:  Frequent falls [R29.6]  Referring practitioner: RUIZ Lemons  Date of Initial Visit: 2/3/2020  Today's Date: 2/3/2020  Patient seen for 1 sessions             Subjective Evaluation    History of Present Illness  Onset date: several years.  Mechanism of injury: Patient reports that she has been suffering from falls for several years, but notes that she has been suffering falls more frequently over the past 3-4 months.  Patient reports that she falls approximately 1-2x/week, but notes that she stumbles 4-5x/daily.  Patient reports that she has a straight cane, but does not use the assistive device.  She also notes that her legs feel weak.  She reports that she has diabetes and sometimes has \"numbness and pins/needles\" in her feet, which makes it difficult to walk.      Patient Occupation: Disabled Quality of life: good    Pain  Current pain ratin  At best pain ratin  At worst pain ratin  Location: \"everywhere\" due to fibromyalgia and arthritis    Social Support  Lives in: multiple-level home (has a basement, but does not go downstairs. 3 steps to enter house, no handrail. Tub/shower with grab bars.)  Lives with: spouse    Treatments  Previous treatment: physical therapy (in 2018)  Patient Goals  Patient goals for therapy: improved balance and increased strength             Objective       Neurological Testing     Sensation     Lumbar   Left   Intact: light touch    Right   Intact: light touch    Strength/Myotome Testing     Left Hip   Planes of Motion   Flexion: 4-    Right Hip   Planes of Motion   Flexion: 4-    Left Knee   Flexion: 4  Extension: 4    Right Knee   Flexion: 4  Extension: 4    Left Ankle/Foot   Dorsiflexion: 4  Plantar flexion: 4    Right Ankle/Foot   Dorsiflexion: 4  Plantar flexion: 4         Assessment & Plan     Assessment  Impairments: abnormal " gait, activity intolerance, impaired balance, impaired physical strength, lacks appropriate home exercise program, pain with function and safety issue  Assessment details: Patient is a 59 year old female who comes to physical therapy for frequent falls. The patient presents with increased pain, decreased balance, impaired gait, and decreased LE strength. Patient reports that she falls 1-2x/week and stumbles several times daily.  Patient scored 34/56 on the Kaplan Balance Assessment, which correlates with patient having increased fall risk.  Patient will benefit from skilled PT, so that patient can achieve maximum level of function.     Prognosis: good  Functional Limitations: walking and standing  Goals  Plan Goals: Short Term Goals: 4 weeks  1. Pt will perform bilateral SLS for 5 seconds for improved balance.  2. Pt will score at least 38/56 on the Kaplan Balance Scale for decreased risk of fall.  3. Pt will perform TUG in less than 12 seconds for improved community involvement.    Long Term Goals: 12 weeks  1. Pt will perform bilateral SLS for 10 seconds for improved balance.  2. Pt will score at least 44/56 on the Kaplan Balance Scale for decreased risk of fall.  3. Pt will perform TUG in less than 10 seconds for improved community involvement.  4. LE strength will improve to at least 4+/5 for improved safety when ambulating.  5. Pt will perform sit<>stand transfers with minimal use of upper extremities to allow for improved independence.      Plan  Therapy options: will be seen for skilled physical therapy services  Planned modality interventions: cryotherapy and thermotherapy (hydrocollator packs)  Planned therapy interventions: manual therapy, balance/weight-bearing training, neuromuscular re-education, spinal/joint mobilization, soft tissue mobilization, flexibility, functional ROM exercises, strengthening, gait training, stretching, home exercise program, therapeutic activities, IADL retraining and transfer  training  Duration in visits: 20  Duration in weeks: 12  Treatment plan discussed with: patient  Plan details: High Evaluation   60782  Re-evaluation   95179      Therapeutic exercise  08892  Therapeutic activity    98038  Neuromuscular re-education   57090  Manual therapy   18837  Gait training  06926      Moist heat/cryotherapy 44253             Visit Diagnoses:    ICD-10-CM ICD-9-CM   1. Frequent falls R29.6 V15.88       Timed:  Manual Therapy:         mins  89477;  Therapeutic Exercise:         mins  69577;     Neuromuscular Renetta:        mins  32392;    Therapeutic Activity:          mins  77840;     Gait Training:      10     mins  86300;     Ultrasound:          mins  52880;    Electrical Stimulation:         mins  47887 ( );    Untimed:  Electrical Stimulation:         mins  18133 ( );  Mechanical Traction:         mins  29367;     Timed Treatment:   10   mins   Total Treatment:     55   mins    PT SIGNATURE: Patsy Shi, PT   DATE TREATMENT INITIATED: 2/3/2020    Initial Certification  Certification Period: 5/3/2020  I certify that the therapy services are furnished while this patient is under my care.  The services outlined above are required by this patient, and will be reviewed every 90 days.     PHYSICIAN: Nany Wiggins      DATE:     Please sign and return via fax to [phone].. Thank you, [facility].

## 2020-02-06 ENCOUNTER — TREATMENT (OUTPATIENT)
Dept: PHYSICAL THERAPY | Facility: CLINIC | Age: 60
End: 2020-02-06

## 2020-02-06 DIAGNOSIS — R29.6 FREQUENT FALLS: Primary | ICD-10-CM

## 2020-02-06 PROCEDURE — 97110 THERAPEUTIC EXERCISES: CPT | Performed by: PHYSICAL THERAPIST

## 2020-02-06 PROCEDURE — 97112 NEUROMUSCULAR REEDUCATION: CPT | Performed by: PHYSICAL THERAPIST

## 2020-02-06 NOTE — PROGRESS NOTES
Patient: Lashae Benitez   : 1960  Referring practitioner: RUIZ Lemons  Date of Initial Visit: Type: THERAPY  Noted: 2/3/2020  Today's Date: 2020  Patient seen for 2 sessions           Subjective:  Patient reports doing fairly well today w/ no complaints stated prior to tx.      Objective   See Exercise, Manual, and Modality Logs for complete treatment.       Assessment/Plan:  Patient responded well to today's treatment with rest breaks required secondary to fatigue.  Session initiated w/ seated and supine LE strengthening activities f/b static and dynamic balance activities w/ UE involvement.  See list for details.  Pt required contact guard to min assist to decrease fall risk.  Pt noted w/ increased difficulty performing balance activities on uneven surfaces.  Pt continues to benefit from skilled services to address goals, and deficits.  Continue w/ PT's POC.      Visit Diagnoses:    ICD-10-CM ICD-9-CM   1. Frequent falls R29.6 V15.88       Progress per Plan of Care and Progress strengthening /stabilization /functional activity           Timed:  Manual Therapy:         mins  44861;  Therapeutic Exercise:     20    mins  40890;     Neuromuscular Renetta:  25      mins  48005;    Therapeutic Activity:          mins  33566;     Gait Training:           mins  28987;     Ultrasound:          mins  92678;    Electrical Stimulation:         mins  44232 ( );    Untimed:  Electrical Stimulation:         mins  78731 ( );  Mechanical Traction:         mins  83023;     Timed Treatment:   45   mins   Total Treatment:   45     mins  Shelley Dunn. ERNESTINE Sin  Physical Therapist

## 2020-02-10 ENCOUNTER — TREATMENT (OUTPATIENT)
Dept: PHYSICAL THERAPY | Facility: CLINIC | Age: 60
End: 2020-02-10

## 2020-02-10 DIAGNOSIS — R29.6 FREQUENT FALLS: Primary | ICD-10-CM

## 2020-02-10 PROCEDURE — 97112 NEUROMUSCULAR REEDUCATION: CPT | Performed by: PHYSICAL THERAPIST

## 2020-02-10 PROCEDURE — 97110 THERAPEUTIC EXERCISES: CPT | Performed by: PHYSICAL THERAPIST

## 2020-02-10 NOTE — PROGRESS NOTES
Physical Therapy Daily Progress Note      Patient: Lashae Benitez   : 1960  Referring practitioner: RUIZ Lemons  Date of Initial Visit: Type: THERAPY  Noted: 2/3/2020  Today's Date: 2/10/2020  Patient seen for 3 sessions             Subjective Evaluation    History of Present Illness    Subjective comment: Patient reports that her back has been hurting her for several days; she notes that she is going to contact her PCP.Pain  Current pain ratin           Objective   See Exercise, Manual, and Modality Logs for complete treatment.       Assessment & Plan     Assessment  Assessment details: Patient tolerated today's session well with rest breaks provided as necessary. Patient performed supine and seated exercises, with focus on lower extremity strengthening.  Patient required upper extremity support during balance activities.  Patient will continue to be progressed per her tolerance and POC.        Visit Diagnoses:    ICD-10-CM ICD-9-CM   1. Frequent falls R29.6 V15.88       Progress per Plan of Care and Progress strengthening /stabilization /functional activity           Timed:  Manual Therapy:         mins  72658;  Therapeutic Exercise:    20     mins  70384;     Neuromuscular Renetta:    20    mins  46211;    Therapeutic Activity:          mins  47168;     Gait Training:           mins  78716;     Ultrasound:          mins  21367;    Electrical Stimulation:         mins  99745 ( );    Untimed:  Electrical Stimulation:         mins  46189 ( );  Mechanical Traction:         mins  53105;     Timed Treatment:   40   mins   Total Treatment:     40   mins  Patsy Shi, PT  Physical Therapist

## 2020-02-13 ENCOUNTER — TREATMENT (OUTPATIENT)
Dept: PHYSICAL THERAPY | Facility: CLINIC | Age: 60
End: 2020-02-13

## 2020-02-13 DIAGNOSIS — R29.6 FREQUENT FALLS: Primary | ICD-10-CM

## 2020-02-13 PROCEDURE — 97112 NEUROMUSCULAR REEDUCATION: CPT | Performed by: PHYSICAL THERAPIST

## 2020-02-13 PROCEDURE — 97110 THERAPEUTIC EXERCISES: CPT | Performed by: PHYSICAL THERAPIST

## 2020-02-13 NOTE — PROGRESS NOTES
Physical Therapy Daily Progress Note      Patient: Lashae Benitez   : 1960  Referring practitioner: RUIZ Lemons  Date of Initial Visit: Type: THERAPY  Noted: 2/3/2020  Today's Date: 2020  Patient seen for 4 sessions             Subjective Evaluation    History of Present Illness    Subjective comment: Patient reports that she continues to be experiening low back pain.  She notes that she saw MD on Monday and received a steroid shot for her back.Pain  Current pain ratin           Objective   See Exercise, Manual, and Modality Logs for complete treatment.       Assessment & Plan     Assessment  Assessment details: Patient tolerated today's session well, with rest breaks provided as necessary.  There ex progressed to include increased repetitions and the addition of new exercises.  Cues were provided to ensure correct form with exercises.  Patient will continue to be progressed per her tolerance and POC.        Visit Diagnoses:    ICD-10-CM ICD-9-CM   1. Frequent falls R29.6 V15.88       Progress per Plan of Care and Progress strengthening /stabilization /functional activity           Timed:  Manual Therapy:         mins  46045;  Therapeutic Exercise:    31     mins  25440;     Neuromuscular Renetta:    15    mins  35078;    Therapeutic Activity:          mins  25015;     Gait Training:           mins  19249;     Ultrasound:          mins  83416;    Electrical Stimulation:         mins  04645 ( );    Untimed:  Electrical Stimulation:         mins  93390 ( );  Mechanical Traction:         mins  70389;     Timed Treatment:   46   mins   Total Treatment:     46   mins  Patsy Shi, PT  Physical Therapist

## 2020-02-17 ENCOUNTER — TREATMENT (OUTPATIENT)
Dept: PHYSICAL THERAPY | Facility: CLINIC | Age: 60
End: 2020-02-17

## 2020-02-17 DIAGNOSIS — R29.6 FREQUENT FALLS: Primary | ICD-10-CM

## 2020-02-17 PROCEDURE — 97110 THERAPEUTIC EXERCISES: CPT | Performed by: PHYSICAL THERAPIST

## 2020-02-17 NOTE — PROGRESS NOTES
Physical Therapy Daily Progress Note      Patient: Lashae Benitez   : 1960  Referring practitioner: RUIZ Lemons  Date of Initial Visit: Type: THERAPY  Noted: 2/3/2020  Today's Date: 2020  Patient seen for 5 sessions             Subjective Evaluation    History of Present Illness    Subjective comment: Patient reports that she does not feel well today.  She states that she thinks she is having a fibromyalgia flare-up.  Pain  Current pain ratin           Objective   See Exercise, Manual, and Modality Logs for complete treatment.       Assessment & Plan     Assessment  Assessment details: Therapy session consisted of there ex in supine and seated position.  Cues were provided to ensure correct form with exercises.  Patient required rest breaks throughout session due to fatigue.  Following mat exercises, patient requested to end session due to not feeling well.          Visit Diagnoses:    ICD-10-CM ICD-9-CM   1. Frequent falls R29.6 V15.88       Progress per Plan of Care and Progress strengthening /stabilization /functional activity           Timed:  Manual Therapy:         mins  29317;  Therapeutic Exercise:    32     mins  37067;     Neuromuscular Renetta:        mins  12491;    Therapeutic Activity:          mins  09283;     Gait Training:           mins  07965;     Ultrasound:          mins  09022;    Electrical Stimulation:         mins  68699 ( );    Untimed:  Electrical Stimulation:         mins  18539 ( );  Mechanical Traction:         mins  20981;     Timed Treatment:   32   mins   Total Treatment:     32   mins  Patsy Shi, PT  Physical Therapist

## 2020-02-20 ENCOUNTER — TREATMENT (OUTPATIENT)
Dept: PHYSICAL THERAPY | Facility: CLINIC | Age: 60
End: 2020-02-20

## 2020-02-20 DIAGNOSIS — R29.6 FREQUENT FALLS: Primary | ICD-10-CM

## 2020-02-20 PROCEDURE — 97110 THERAPEUTIC EXERCISES: CPT | Performed by: PHYSICAL THERAPIST

## 2020-02-20 PROCEDURE — 97530 THERAPEUTIC ACTIVITIES: CPT | Performed by: PHYSICAL THERAPIST

## 2020-02-20 NOTE — PROGRESS NOTES
Physical Therapy Daily Progress Note      Patient: Lashae Benitez   : 1960  Referring practitioner: RUIZ Lemons  Date of Initial Visit: Type: THERAPY  Noted: 2/3/2020  Today's Date: 2020  Patient seen for 6 sessions             Subjective Evaluation    History of Present Illness    Subjective comment: Patient notes that she is feeling better today.         Objective   See Exercise, Manual, and Modality Logs for complete treatment.       Assessment/Plan    Visit Diagnoses:    ICD-10-CM ICD-9-CM   1. Frequent falls R29.6 V15.88       Progress per Plan of Care and Progress strengthening /stabilization /functional activity           Timed:  Manual Therapy:         mins  54023;  Therapeutic Exercise:    36     mins  16001;     Neuromuscular Renetta:        mins  41566;    Therapeutic Activity:    12      mins  07491;     Gait Training:           mins  59666;     Ultrasound:          mins  81913;    Electrical Stimulation:         mins  02968 ( );    Untimed:  Electrical Stimulation:         mins  51212 ( );  Mechanical Traction:         mins  46155;     Timed Treatment:   48   mins   Total Treatment:    48    mins  Patsy Shi, PT  Physical Therapist

## 2020-02-24 ENCOUNTER — TREATMENT (OUTPATIENT)
Dept: PHYSICAL THERAPY | Facility: CLINIC | Age: 60
End: 2020-02-24

## 2020-02-24 DIAGNOSIS — R29.6 FREQUENT FALLS: Primary | ICD-10-CM

## 2020-02-24 PROCEDURE — 97112 NEUROMUSCULAR REEDUCATION: CPT | Performed by: PHYSICAL THERAPIST

## 2020-02-24 PROCEDURE — 97530 THERAPEUTIC ACTIVITIES: CPT | Performed by: PHYSICAL THERAPIST

## 2020-02-24 PROCEDURE — 97110 THERAPEUTIC EXERCISES: CPT | Performed by: PHYSICAL THERAPIST

## 2020-02-24 NOTE — PROGRESS NOTES
Physical Therapy Daily Progress Note      Patient: Lashae Benitez   : 1960  Referring practitioner: RUIZ Lemons  Date of Initial Visit: Type: THERAPY  Noted: 2/3/2020  Today's Date: 2020  Patient seen for 7 sessions             Subjective Evaluation    History of Present Illness    Subjective comment: Patient reports that she feels like her legs are getting stronger.       Objective   See Exercise, Manual, and Modality Logs for complete treatment.       Assessment & Plan     Assessment  Assessment details: Patient tolerated today's session well, with rest breaks provided as necessary.  There ex progressed to include weight with seated march and LAQ.  Patient required min assist for balance activities to prevent loss of balance.  Patient will continue to be progressed per her tolerance and POC.        Visit Diagnoses:    ICD-10-CM ICD-9-CM   1. Frequent falls R29.6 V15.88       Progress per Plan of Care and Progress strengthening /stabilization /functional activity           Timed:  Manual Therapy:         mins  70215;  Therapeutic Exercise:     35    mins  33523;     Neuromuscular Renetta:   10     mins  57467;    Therapeutic Activity:      10    mins  46832;     Gait Training:           mins  85440;     Ultrasound:          mins  16080;    Electrical Stimulation:         mins  64348 ( );    Untimed:  Electrical Stimulation:         mins  20287 ( );  Mechanical Traction:         mins  71515;     Timed Treatment:   55   mins   Total Treatment:    55    mins  Patsy Shi, PT  Physical Therapist

## 2020-02-25 ENCOUNTER — OFFICE VISIT (OUTPATIENT)
Dept: CARDIOLOGY | Facility: CLINIC | Age: 60
End: 2020-02-25

## 2020-02-25 VITALS
SYSTOLIC BLOOD PRESSURE: 115 MMHG | WEIGHT: 207 LBS | HEIGHT: 65 IN | DIASTOLIC BLOOD PRESSURE: 73 MMHG | BODY MASS INDEX: 34.49 KG/M2 | RESPIRATION RATE: 16 BRPM | HEART RATE: 96 BPM

## 2020-02-25 DIAGNOSIS — I10 ESSENTIAL HYPERTENSION: Primary | ICD-10-CM

## 2020-02-25 DIAGNOSIS — R06.02 SOB (SHORTNESS OF BREATH): ICD-10-CM

## 2020-02-25 DIAGNOSIS — R07.2 PRECORDIAL PAIN: ICD-10-CM

## 2020-02-25 DIAGNOSIS — E11.9 TYPE 2 DIABETES MELLITUS WITHOUT COMPLICATION, WITHOUT LONG-TERM CURRENT USE OF INSULIN (HCC): ICD-10-CM

## 2020-02-25 PROCEDURE — 99213 OFFICE O/P EST LOW 20 MIN: CPT | Performed by: NURSE PRACTITIONER

## 2020-02-25 NOTE — PROGRESS NOTES
"Kreis, Samuel Duane, MD  Lashae Benitez  1960  02/25/2020    Patient Active Problem List   Diagnosis   • Hiatal hernia   • Essential hypertension   • Sjogren's syndrome (CMS/HCC)   • Multiple sclerosis (CMS/HCC)   • Psoriasis   • Fibromyalgia   • Osteoarthritis   • Psoriatic arthritis (CMS/HCC)   • RA (rheumatoid arthritis) (CMS/HCC)   • Degenerative disc disease, lumbar   • TMJ arthritis   • Dupuytren's disease   • H. pylori infection   • Family history of coronary artery disease   • Type 2 diabetes mellitus (CMS/HCC)   • SOB (shortness of breath)   • Dizziness   • Edema   • Precordial pain   • Chest pain   • Palpitations   • Bilateral leg edema   • Isolated corticotropin deficiency (CMS/HCC)       Dear Kreis, Samuel Duane, MD:    Subjective     Chief Complaint   Patient presents with   • Hypertension           History of Present Illness:    Lashae Benitez is a 59 y.o. female with a history of hypertension, diabetes mellitus type 2, MS, rheumatoid arthritis, and chronic chest pains. She presents today for routine cardiology follow up. Reports she is doing well. She has occasional mild palpitations. She denies any compressive chest pains, shortness of breath, dizziness, or lightheadedness. Her BP has been well controlled. PFT was normal. CT of the chest did reveal chronic interstitial changes.          Allergies   Allergen Reactions   • Penicillins Swelling   • Sulfa Antibiotics Swelling   • Sulfasalazine Unknown (See Comments)   • Codeine    • Imuran [Azathioprine]    • Januvia [Sitagliptin]    • Beta Adrenergic Blockers Other (See Comments)     I called pt to ask her about the allergy to bblockers in her chart. Pt states \"too big of a dose makes her psoriasis act up\", but this current dose of metoprolol 50 mg bid, she has been on for a long time, with no ill side effects.  CATA,CMA 3/28/18   :      Current Outpatient Medications:   •  abatacept (ORENCIA) 250 MG injection, Infuse  into a venous catheter., Disp: , Rfl: "   •  alendronate (FOSAMAX) 70 MG tablet, Take 70 mg by mouth 1 (One) Time Per Week., Disp: , Rfl:   •  ALLERGY SERUM INJECTION, Inject  under the skin into the appropriate area as directed 1 (One) Time Per Week., Disp: , Rfl:   •  amitriptyline (ELAVIL) 25 MG tablet, Take 25 mg by mouth Every Night., Disp: , Rfl:   •  aspirin 81 MG tablet, Take 1 tablet by mouth., Disp: , Rfl:   •  butalbital-acetaminophen-caffeine (FIORICET, ESGIC) -40 MG per tablet, Take 1 tablet by mouth as needed for headaches., Disp: , Rfl:   •  calcium citrate-vitamin d (CALCITRATE) 315-250 MG-UNIT tablet tablet, Take 1 tablet by mouth Every Evening., Disp: , Rfl:   •  celecoxib (CeleBREX) 200 MG capsule, Take 200 mg by mouth daily., Disp: , Rfl:   •  cholecalciferol (VITAMIN D3) 1000 UNITS tablet, Take 5,000 Units by mouth Every Evening., Disp: , Rfl:   •  cyanocobalamin 1000 MCG/ML injection, Inject 1,000 mcg into the shoulder, thigh, or buttocks Every 28 (Twenty-Eight) Days., Disp: , Rfl:   •  cyclobenzaprine (FLEXERIL) 10 MG tablet, Take 10 mg by mouth Every Evening., Disp: , Rfl:   •  dexlansoprazole (DEXILANT) 60 MG capsule, Take 60 mg by mouth daily., Disp: , Rfl:   •  DIAZEPAM PO, Take 0.5 mg by mouth 2 (Two) Times a Day. 1/2 tab of the morning and 1/2 tab of the evening., Disp: , Rfl:   •  DULoxetine (CYMBALTA) 60 MG capsule, Take 60 mg by mouth Every Night., Disp: , Rfl:   •  DULOXETINE HCL PO, Take 90 mg by mouth Every Morning., Disp: , Rfl:   •  EMGALITY 120 MG/ML prefilled syringe, INJECT 1 ONCE EVERY MONTH, Disp: , Rfl: 3  •  ferrous gluconate (FERGON) 240 (27 FE) MG tablet, Take 240 mg by mouth Every Morning., Disp: , Rfl:   •  folic acid (FOLVITE) 1 MG tablet, Take 1 mg by mouth daily., Disp: , Rfl:   •  gabapentin (NEURONTIN) 600 MG tablet, Take 600 mg by mouth 4 (Four) Times a Day., Disp: , Rfl:   •  insulin detemir (LEVEMIR) 100 UNIT/ML injection, Inject 25 Units under the skin into the appropriate area as directed  Daily., Disp: , Rfl:   •  levothyroxine sodium (TIROSINT) 137 MCG capsule, Take 137 mcg by mouth Daily., Disp: , Rfl:   •  linaclotide (LINZESS) 290 MCG capsule capsule, Take 290 mcg by mouth every morning before breakfast., Disp: , Rfl:   •  lisinopril (PRINIVIL,ZESTRIL) 20 MG tablet, Take 1 tablet by mouth Every Evening., Disp: 30 tablet, Rfl: 5  •  loratadine (CLARITIN) 10 MG tablet, Take 10 mg by mouth Every Night., Disp: , Rfl:   •  methotrexate 2.5 MG tablet, Take 25 mg by mouth 1 (One) Time Per Week. Prior to [facility] Admission, Patient was on: Takes 10 tablets on Saturday, Disp: , Rfl:   •  metoprolol succinate XL (TOPROL-XL) 100 MG 24 hr tablet, Take 1 tablet by mouth Daily., Disp: 90 tablet, Rfl: 3  •  multivitamin (DAILY MICHAEL) tablet tablet, Take 1 tablet by mouth Every Evening., Disp: , Rfl:   •  OnabotulinumtoxinA (BOTOX IM), Inject  into the shoulder, thigh, or buttocks Every 3 (Three) Months., Disp: , Rfl:   •  primidone (MYSOLINE) 50 MG tablet, Take 50 mg by mouth Every Night., Disp: , Rfl:   •  ranitidine (ZANTAC) 150 MG tablet, Take 150 mg by mouth 2 (two) times a day., Disp: , Rfl:   •  vitamin C (ASCORBIC ACID) 500 MG tablet, Take 1,000 mg by mouth Every Evening., Disp: , Rfl:   •  vitamin E 1000 UNIT capsule, Take 1,000 Units by mouth Every Evening., Disp: , Rfl:       The following portions of the patient's history were reviewed and updated as appropriate: allergies, current medications, past family history, past medical history, past social history, past surgical history and problem list.    Social History     Tobacco Use   • Smoking status: Never Smoker   • Smokeless tobacco: Never Used   Substance Use Topics   • Alcohol use: No   • Drug use: No       Review of Systems   Constitution: Positive for malaise/fatigue. Negative for decreased appetite.   Cardiovascular: Negative for chest pain, dyspnea on exertion, irregular heartbeat, leg swelling, near-syncope, orthopnea, palpitations, paroxysmal  "nocturnal dyspnea and syncope.   Respiratory: Positive for shortness of breath. Negative for cough and wheezing.    Neurological: Negative for dizziness, light-headedness and weakness.       Objective   Vitals:    02/25/20 0856   BP: 115/73   BP Location: Right arm   Pulse: 96   Resp: 16   Weight: 93.9 kg (207 lb)   Height: 165.1 cm (65\")     Body mass index is 34.45 kg/m².        Physical Exam   Constitutional: She is oriented to person, place, and time. She appears well-developed and well-nourished.   HENT:   Head: Normocephalic and atraumatic.   Cardiovascular: Normal rate, regular rhythm and normal heart sounds. Exam reveals no S3 and no S4.   No murmur heard.  Pulmonary/Chest: Effort normal and breath sounds normal. She has no wheezes. She has no rales.   Abdominal: Soft. Bowel sounds are normal.   Musculoskeletal: She exhibits no edema.   Neurological: She is alert and oriented to person, place, and time.   Skin: Skin is warm and dry.   Psychiatric: She has a normal mood and affect. Her behavior is normal.       Lab Results   Component Value Date     (L) 08/20/2019    K 3.9 08/20/2019    CL 98 08/20/2019    CO2 23.1 08/20/2019    BUN 10 08/20/2019    CREATININE 0.80 10/03/2019    GLUCOSE 144 (H) 08/20/2019    CALCIUM 9.0 08/20/2019    AST 16 08/19/2019    ALT 18 08/19/2019    ALKPHOS 145 (H) 08/19/2019    LABIL2 1.4 (L) 08/11/2015     Lab Results   Component Value Date    CKTOTAL 40 04/12/2017     Lab Results   Component Value Date    WBC 6.00 08/20/2019    HGB 11.8 (L) 08/20/2019    HCT 36.1 08/20/2019     08/20/2019     Lab Results   Component Value Date    INR 0.97 11/22/2018    INR 0.93 07/20/2015     Lab Results   Component Value Date    MG 1.8 12/30/2016     Lab Results   Component Value Date    TSH 0.303 08/20/2019    CHLPL 132 07/22/2015    TRIG 85 07/22/2015    HDL 40 (L) 07/22/2015    LDL 74 07/22/2015      Lab Results   Component Value Date    BNP 51.0 11/22/2018 "           Procedures      Assessment/Plan    Diagnosis Plan   1. Essential hypertension     2. Type 2 diabetes mellitus without complication, without long-term current use of insulin (CMS/[facility])     3. SOB (shortness of breath)     4. Precordial pain, improved.                  Recommendations:    1. Continue with low dose aspirin, metoprolol, lisinopril.    2. PFT reviewed with the patient. Since the CT revealed some interstitial changes with her persistent shortness of breath, will refer to pulmonology. Cardiac workup has been unremarkable thus far.    3. Follow up in 6 months and PRN.        Return in about 6 months (around 8/25/2020) for Recheck.    As always, I appreciate very much the opportunity to participate in the cardiovascular care of your patients.      With Best Regards,    RUIZ Pitts

## 2020-02-26 ENCOUNTER — TREATMENT (OUTPATIENT)
Dept: PHYSICAL THERAPY | Facility: CLINIC | Age: 60
End: 2020-02-26

## 2020-02-26 DIAGNOSIS — R29.6 FREQUENT FALLS: Primary | ICD-10-CM

## 2020-02-26 PROCEDURE — 97112 NEUROMUSCULAR REEDUCATION: CPT | Performed by: PHYSICAL THERAPIST

## 2020-02-26 PROCEDURE — 97530 THERAPEUTIC ACTIVITIES: CPT | Performed by: PHYSICAL THERAPIST

## 2020-02-26 PROCEDURE — 97110 THERAPEUTIC EXERCISES: CPT | Performed by: PHYSICAL THERAPIST

## 2020-02-26 NOTE — PROGRESS NOTES
Physical Therapy Daily Progress Note      Patient: Lashae Benitez   : 1960  Referring practitioner: RUIZ Lemons  Date of Initial Visit: Type: THERAPY  Noted: 2/3/2020  Today's Date: 2020  Patient seen for 8 sessions           Subjective Evaluation    History of Present Illness    Subjective comment: Patient reports no new complaints today.       Objective   See Exercise, Manual, and Modality Logs for complete treatment.       Assessment & Plan     Assessment  Assessment details: Therapy session consisted of there ex and neuromuscular re-education.  There ex performed in supine, seated, and standing positions; exercises focused on LE strengthening.  Patient required cues to prevent circumduction when stepping over cones.  Rest breaks were provided as necessary throughout session due to fatigue.  She will continue to be progressed per her tolerance and POC.        Visit Diagnoses:    ICD-10-CM ICD-9-CM   1. Frequent falls R29.6 V15.88       Progress per Plan of Care and Progress strengthening /stabilization /functional activity           Timed:  Manual Therapy:         mins  63728;  Therapeutic Exercise:   36      mins  10352;     Neuromuscular Renetta:    10    mins  50926;    Therapeutic Activity:     10     mins  72993;     Gait Training:           mins  59413;     Ultrasound:          mins  50886;    Electrical Stimulation:         mins  45681 ( );    Untimed:  Electrical Stimulation:         mins  95249 ( );  Mechanical Traction:         mins  28499;     Timed Treatment:   56   mins   Total Treatment:     56   mins  Patsy Shi, PT  Physical Therapist

## 2020-03-02 ENCOUNTER — TREATMENT (OUTPATIENT)
Dept: PHYSICAL THERAPY | Facility: CLINIC | Age: 60
End: 2020-03-02

## 2020-03-02 DIAGNOSIS — R29.6 FREQUENT FALLS: Primary | ICD-10-CM

## 2020-03-02 PROCEDURE — 97110 THERAPEUTIC EXERCISES: CPT | Performed by: PHYSICAL THERAPIST

## 2020-03-02 PROCEDURE — 97112 NEUROMUSCULAR REEDUCATION: CPT | Performed by: PHYSICAL THERAPIST

## 2020-03-02 NOTE — PROGRESS NOTES
Progress Note      Patient: Lashae Benitez   : 1960  Diagnosis/ICD-10 Code:  Frequent falls [R29.6]  Referring practitioner: RUIZ Lemons  Date of Initial Visit: Type: THERAPY  Noted: 2/3/2020  Today's Date: 3/2/2020  Patient seen for 9 sessions      Subjective:   Clinical Progress: improved  Home Program Compliance: Yes      Subjective Evaluation    History of Present Illness    Subjective comment: Patient reports that she feels like her legs are getting stronger and her balance is improving.  She reports that she would like to continue with PT.Pain  Current pain ratin         Objective       Strength/Myotome Testing     Left Hip   Planes of Motion   Flexion: 4    Right Hip   Planes of Motion   Flexion: 4-    Left Knee   Flexion: 4+  Extension: 4+    Right Knee   Flexion: 4+  Extension: 4    Left Ankle/Foot   Dorsiflexion: 4+  Plantar flexion: 4+    Right Ankle/Foot   Dorsiflexion: 4+  Plantar flexion: 4+     Assessment & Plan     Assessment  Impairments: abnormal gait, activity intolerance, impaired balance, impaired physical strength, lacks appropriate home exercise program, pain with function and safety issue  Assessment details: Patient has been attending physical therapy for frequent falls; she has attended therapy for 9 sessions dating from 2/3/2020 to 3/2/2020. The patient has shown improvements in LE strength and balance.  TAYLOR Balance Assessment improved from 34/56 to 43/56.  TUG improved by an average of 1.5 seconds.  Patient reports that she continues to lose her balance and stumbles, but notes that she has had no falls to the floor in approximately 2-3 weeks. Patient will continue to benefit from skilled PT, so that patient can achieve maximum level of function.     Prognosis: good  Functional Limitations: walking and standing  Goals  Plan Goals: Short Term Goals: 4 weeks  1. Pt will perform bilateral SLS for 5 seconds for improved balance.  Ongoing, progressing  2. Pt will score at  least 38/56 on the Kaplan Balance Scale for decreased risk of fall.  Met-43/56  3. Pt will perform TUG in less than 12 seconds for improved community involvement.  Ongoing, progressing-14 seconds    Long Term Goals: 12 weeks  1. Pt will perform bilateral SLS for 10 seconds for improved balance.  Ongoing, progressing  2. Pt will score at least 44/56 on the Kaplan Balance Scale for decreased risk of fall.  Ongoing, progressing  3. Pt will perform TUG in less than 10 seconds for improved community involvement.  Ongoing, progressing  4. LE strength will improve to at least 4+/5 for improved safety when ambulating.  Ongoing, progressing  5. Pt will perform sit<>stand transfers with minimal use of upper extremities to allow for improved independence.  Met      Plan  Therapy options: will be seen for skilled physical therapy services  Planned modality interventions: cryotherapy and thermotherapy (hydrocollator packs)  Planned therapy interventions: manual therapy, balance/weight-bearing training, neuromuscular re-education, spinal/joint mobilization, soft tissue mobilization, flexibility, functional ROM exercises, strengthening, gait training, stretching, home exercise program, therapeutic activities, IADL retraining and transfer training  Duration in visits: 20  Duration in weeks: 12  Treatment plan discussed with: patient  Plan details: Re-evaluation   30944    Therapeutic exercise  56156  Therapeutic activity    66470  Neuromuscular re-education   96948  Manual therapy   70470  Gait training  39774    Moist heat/cryotherapy 21397             Visit Diagnoses:    ICD-10-CM ICD-9-CM   1. Frequent falls R29.6 V15.88       Progress toward previous goals: Partially Met      Recommendations: Continue as planned  Timeframe: 1 month  Prognosis to achieve goals: good    PT Signature: Patsy Shi, PT      Based upon review of the patient's progress and continued therapy plan, it is my medical opinion that Lashae Eli should  continue physical therapy treatment at [facility]  [address]  [address]  [place] [address] [phone].    Signature: __________________________________  Nany Wiggins    Timed:  Manual Therapy:         mins  59269;  Therapeutic Exercise:    25     mins  54584;     Neuromuscular Renetta:  30      mins  86240;    Therapeutic Activity:          mins  83474;     Gait Training:           mins  26259;     Ultrasound:          mins  08312;    Electrical Stimulation:         mins  27103 ( );    Untimed:  Electrical Stimulation:         mins  72345 ( );  Mechanical Traction:         mins  27747;     Timed Treatment:   55   mins   Total Treatment:     55   mins

## 2020-03-05 DIAGNOSIS — I10 ESSENTIAL HYPERTENSION: ICD-10-CM

## 2020-03-05 RX ORDER — METOPROLOL SUCCINATE 100 MG
TABLET, EXTENDED RELEASE 24 HR ORAL
Qty: 90 TABLET | Refills: 0 | Status: SHIPPED | OUTPATIENT
Start: 2020-03-05 | End: 2020-05-11

## 2020-03-09 ENCOUNTER — TREATMENT (OUTPATIENT)
Dept: PHYSICAL THERAPY | Facility: CLINIC | Age: 60
End: 2020-03-09

## 2020-03-09 DIAGNOSIS — R29.6 FREQUENT FALLS: Primary | ICD-10-CM

## 2020-03-09 PROCEDURE — 97112 NEUROMUSCULAR REEDUCATION: CPT | Performed by: PHYSICAL THERAPIST

## 2020-03-09 PROCEDURE — 97530 THERAPEUTIC ACTIVITIES: CPT | Performed by: PHYSICAL THERAPIST

## 2020-03-09 PROCEDURE — 97110 THERAPEUTIC EXERCISES: CPT | Performed by: PHYSICAL THERAPIST

## 2020-03-09 NOTE — PROGRESS NOTES
Physical Therapy Daily Progress Note      Patient: Lashae Benitez   : 1960  Referring practitioner: RUIZ Lemons  Date of Initial Visit: Type: THERAPY  Noted: 2/3/2020  Today's Date: 3/9/2020  Patient seen for 10 sessions             Subjective Evaluation    History of Present Illness    Subjective comment: She notes that she feels like she has problems with going backwards/       Objective   See Exercise, Manual, and Modality Logs for complete treatment.       Assessment & Plan     Assessment  Assessment details: Patient tolerated today's session well, with rest breaks provided as necessary.  There ex progressed to include increased weights with seated march and LAQ.  Patient required assistance to maintain balance when walking balance and when standing on a slope.  Patient will continue to be progressed per her tolerance and POC.        Visit Diagnoses:    ICD-10-CM ICD-9-CM   1. Frequent falls R29.6 V15.88       Progress per Plan of Care and Progress strengthening /stabilization /functional activity           Timed:  Manual Therapy:         mins  85238;  Therapeutic Exercise:    25     mins  11140;     Neuromuscular eRnetta:    20    mins  41783;    Therapeutic Activity:     10     mins  09408;     Gait Training:           mins  31242;     Ultrasound:          mins  93451;    Electrical Stimulation:         mins  62691 ( );    Untimed:  Electrical Stimulation:         mins  37401 ( );  Mechanical Traction:         mins  21761;     Timed Treatment:  55    mins   Total Treatment:     55   mins  Patsy Shi, PT  Physical Therapist

## 2020-03-11 ENCOUNTER — TREATMENT (OUTPATIENT)
Dept: PHYSICAL THERAPY | Facility: CLINIC | Age: 60
End: 2020-03-11

## 2020-03-11 DIAGNOSIS — R29.6 FREQUENT FALLS: Primary | ICD-10-CM

## 2020-03-11 PROCEDURE — 97112 NEUROMUSCULAR REEDUCATION: CPT | Performed by: PHYSICAL THERAPIST

## 2020-03-11 PROCEDURE — 97110 THERAPEUTIC EXERCISES: CPT | Performed by: PHYSICAL THERAPIST

## 2020-03-11 PROCEDURE — 97530 THERAPEUTIC ACTIVITIES: CPT | Performed by: PHYSICAL THERAPIST

## 2020-03-11 NOTE — PROGRESS NOTES
Physical Therapy Daily Progress Note      Patient: Lashae Benitez   : 1960  Referring practitioner: RUIZ Lemons  Date of Initial Visit: Type: THERAPY  Noted: 2/3/2020  Today's Date: 3/11/2020  Patient seen for 11 sessions           Subjective Evaluation    History of Present Illness    Subjective comment: Patient reports that she was very fatigued after her last session.       Objective   See Exercise, Manual, and Modality Logs for complete treatment.       Assessment & Plan     Assessment  Assessment details: Patient tolerated today's session well, with rest breaks provided as necessary.  There ex addressed LE strengthening, with patient in seated and standing position.  During balance activities, patient had greatest difficulty with tandem stance and backward stepping pattern.  Patient will continue to be progressed per her tolerance and POC.        Visit Diagnoses:    ICD-10-CM ICD-9-CM   1. Frequent falls R29.6 V15.88       Progress per Plan of Care and Progress strengthening /stabilization /functional activity           Timed:  Manual Therapy:         mins  48230;  Therapeutic Exercise:    20     mins  93602;     Neuromuscular Renetta:    26    mins  12429;    Therapeutic Activity:     10     mins  23241;     Gait Training:           mins  23760;     Ultrasound:          mins  38135;    Electrical Stimulation:         mins  19120 ( );    Untimed:  Electrical Stimulation:         mins  60098 ( );  Mechanical Traction:         mins  67134;     Timed Treatment:   56   mins   Total Treatment:     56   mins  Patsy Shi, PT  Physical Therapist

## 2020-03-16 ENCOUNTER — TREATMENT (OUTPATIENT)
Dept: PHYSICAL THERAPY | Facility: CLINIC | Age: 60
End: 2020-03-16

## 2020-03-16 DIAGNOSIS — R29.6 FREQUENT FALLS: Primary | ICD-10-CM

## 2020-03-16 PROCEDURE — 97112 NEUROMUSCULAR REEDUCATION: CPT | Performed by: PHYSICAL THERAPIST

## 2020-03-16 PROCEDURE — 97110 THERAPEUTIC EXERCISES: CPT | Performed by: PHYSICAL THERAPIST

## 2020-03-16 PROCEDURE — 97116 GAIT TRAINING THERAPY: CPT | Performed by: PHYSICAL THERAPIST

## 2020-03-16 NOTE — PROGRESS NOTES
Physical Therapy Daily Progress Note      Patient: Lashae Benitez   : 1960  Referring practitioner: RUIZ Lemons  Date of Initial Visit: Type: THERAPY  Noted: 2/3/2020  Today's Date: 3/16/2020  Patient seen for 12 sessions         Subjective Evaluation    History of Present Illness    Subjective comment: Patient reports doing well this morning w/ no new complaints noted.         Objective   See Exercise, Manual, and Modality Logs for complete treatment.       Assessment & Plan     Assessment  Assessment details: Patient responded well to today's session w/ rest breaks provided as needed secondary fatigue.  Treatment initiated w/ seated and supine mat strengthening activities to address strength deficits f/b neuro re-ed, and gait activities as listed.  Pt noted w/ increased difficulty performing step ups w/ no UE assist secondary to decreased balance.  Pt continues to benefit from therapy services to address goals, decrease fall risk, and achieve maximum level of function.  Continue w/ PT's POC.         Visit Diagnoses:    ICD-10-CM ICD-9-CM   1. Frequent falls R29.6 V15.88       Progress per Plan of Care and Progress strengthening /stabilization /functional activity           Timed:  Manual Therapy:         mins  32417;  Therapeutic Exercise:     25    mins  53172;     Neuromuscular Renetta:   20     mins  87255;    Therapeutic Activity:          mins  86872;     Gait Training:     10      mins  80669;     Ultrasound:          mins  94205;    Electrical Stimulation:         mins  13091 ( );    Untimed:  Electrical Stimulation:         mins  30821 ( );  Mechanical Traction:         mins  06925;     Timed Treatment: 55     mins   Total Treatment:     55   mins  Shelley Sin PTA  Physical Therapist

## 2020-03-18 ENCOUNTER — TREATMENT (OUTPATIENT)
Dept: PHYSICAL THERAPY | Facility: CLINIC | Age: 60
End: 2020-03-18

## 2020-03-18 DIAGNOSIS — R29.6 FREQUENT FALLS: Primary | ICD-10-CM

## 2020-03-18 PROCEDURE — 97112 NEUROMUSCULAR REEDUCATION: CPT | Performed by: PHYSICAL THERAPIST

## 2020-03-18 PROCEDURE — 97110 THERAPEUTIC EXERCISES: CPT | Performed by: PHYSICAL THERAPIST

## 2020-03-18 NOTE — PROGRESS NOTES
Physical Therapy Daily Progress Note      Patient: Lashae Benitez   : 1960  Referring practitioner: RUIZ Lemons  Date of Initial Visit: Type: THERAPY  Noted: 2/3/2020  Today's Date: 3/18/2020  Patient seen for 13 sessions             Subjective Evaluation    History of Present Illness    Subjective comment: Patient notes taht she continues to feel like her balance is decreased.Pain  Current pain ratin           Objective   See Exercise, Manual, and Modality Logs for complete treatment.       Assessment & Plan     Assessment  Assessment details: Therapy session consisted of there ex and balance activities.  Rest breaks were provided as necessary.  Patient fatigued with standing exercises.  Patient showed greatest difficulty with cone taps and side stepping over cones; assistance required to prevent loss of balance.  Patient will continue to be progressed per her tolerance and POC.        Visit Diagnoses:    ICD-10-CM ICD-9-CM   1. Frequent falls R29.6 V15.88       Progress per Plan of Care and Progress strengthening /stabilization /functional activity           Timed:  Manual Therapy:         mins  33827;  Therapeutic Exercise:    35     mins  85445;     Neuromuscular Renetta:  20      mins  62171;    Therapeutic Activity:          mins  04127;     Gait Training:           mins  54701;     Ultrasound:          mins  44837;    Electrical Stimulation:         mins  94569 ( );    Untimed:  Electrical Stimulation:         mins  09180 ( );  Mechanical Traction:         mins  17145;     Timed Treatment:   55   mins   Total Treatment:     55   mins  Patsy Shi, PT  Physical Therapist

## 2020-04-05 DIAGNOSIS — I10 ESSENTIAL HYPERTENSION: ICD-10-CM

## 2020-04-06 RX ORDER — LISINOPRIL 20 MG/1
20 TABLET ORAL EVERY EVENING
Qty: 90 TABLET | Refills: 0 | Status: SHIPPED | OUTPATIENT
Start: 2020-04-06 | End: 2020-06-15

## 2020-05-11 DIAGNOSIS — I10 ESSENTIAL HYPERTENSION: ICD-10-CM

## 2020-05-11 RX ORDER — METOPROLOL SUCCINATE 100 MG
TABLET, EXTENDED RELEASE 24 HR ORAL
Qty: 90 TABLET | Refills: 1 | Status: SHIPPED | OUTPATIENT
Start: 2020-05-11 | End: 2020-11-09

## 2020-05-20 ENCOUNTER — LAB (OUTPATIENT)
Dept: LAB | Facility: HOSPITAL | Age: 60
End: 2020-05-20

## 2020-05-20 ENCOUNTER — TRANSCRIBE ORDERS (OUTPATIENT)
Dept: ADMINISTRATIVE | Facility: HOSPITAL | Age: 60
End: 2020-05-20

## 2020-05-20 DIAGNOSIS — E78.2 MIXED HYPERLIPIDEMIA: ICD-10-CM

## 2020-05-20 DIAGNOSIS — E53.8 DEFICIENCY OF OTHER SPECIFIED B GROUP VITAMINS: ICD-10-CM

## 2020-05-20 DIAGNOSIS — E87.1 HYPO-OSMOLALITY AND HYPONATREMIA: ICD-10-CM

## 2020-05-20 DIAGNOSIS — E53.8 DEFICIENCY OF OTHER SPECIFIED B GROUP VITAMINS: Primary | ICD-10-CM

## 2020-05-20 DIAGNOSIS — I10 ESSENTIAL HYPERTENSION, MALIGNANT: ICD-10-CM

## 2020-05-20 LAB
ANION GAP SERPL CALCULATED.3IONS-SCNC: 10.9 MMOL/L (ref 5–15)
BUN BLD-MCNC: 7 MG/DL (ref 6–20)
BUN/CREAT SERPL: 8.6 (ref 7–25)
CALCIUM SPEC-SCNC: 9.2 MG/DL (ref 8.6–10.5)
CHLORIDE SERPL-SCNC: 93 MMOL/L (ref 98–107)
CO2 SERPL-SCNC: 25.1 MMOL/L (ref 22–29)
CREAT BLD-MCNC: 0.81 MG/DL (ref 0.57–1)
GFR SERPL CREATININE-BSD FRML MDRD: 73 ML/MIN/1.73
GLUCOSE BLD-MCNC: 219 MG/DL (ref 65–99)
OSMOLALITY UR: 205 MOSM/KG
POTASSIUM BLD-SCNC: 4.5 MMOL/L (ref 3.5–5.2)
SODIUM BLD-SCNC: 129 MMOL/L (ref 136–145)
SODIUM UR-SCNC: 30 MMOL/L

## 2020-05-20 PROCEDURE — 36415 COLL VENOUS BLD VENIPUNCTURE: CPT | Performed by: FAMILY MEDICINE

## 2020-05-20 PROCEDURE — 80048 BASIC METABOLIC PNL TOTAL CA: CPT | Performed by: FAMILY MEDICINE

## 2020-05-20 PROCEDURE — 84300 ASSAY OF URINE SODIUM: CPT

## 2020-05-20 PROCEDURE — 83935 ASSAY OF URINE OSMOLALITY: CPT

## 2020-05-26 ENCOUNTER — APPOINTMENT (OUTPATIENT)
Dept: LAB | Facility: HOSPITAL | Age: 60
End: 2020-05-26

## 2020-05-26 ENCOUNTER — TRANSCRIBE ORDERS (OUTPATIENT)
Dept: ADMINISTRATIVE | Facility: HOSPITAL | Age: 60
End: 2020-05-26

## 2020-05-26 DIAGNOSIS — E87.1 HYPOSMOLALITY SYNDROME: Primary | ICD-10-CM

## 2020-05-26 LAB
ANION GAP SERPL CALCULATED.3IONS-SCNC: 9.1 MMOL/L (ref 5–15)
BUN BLD-MCNC: 10 MG/DL (ref 6–20)
BUN/CREAT SERPL: 12.3 (ref 7–25)
CALCIUM SPEC-SCNC: 9 MG/DL (ref 8.6–10.5)
CHLORIDE SERPL-SCNC: 91 MMOL/L (ref 98–107)
CO2 SERPL-SCNC: 25.9 MMOL/L (ref 22–29)
CREAT BLD-MCNC: 0.81 MG/DL (ref 0.57–1)
GFR SERPL CREATININE-BSD FRML MDRD: 73 ML/MIN/1.73
GLUCOSE BLD-MCNC: 242 MG/DL (ref 65–99)
POTASSIUM BLD-SCNC: 4.5 MMOL/L (ref 3.5–5.2)
SODIUM BLD-SCNC: 126 MMOL/L (ref 136–145)

## 2020-05-26 PROCEDURE — 36415 COLL VENOUS BLD VENIPUNCTURE: CPT | Performed by: FAMILY MEDICINE

## 2020-05-26 PROCEDURE — 80048 BASIC METABOLIC PNL TOTAL CA: CPT | Performed by: FAMILY MEDICINE

## 2020-06-12 DIAGNOSIS — I10 ESSENTIAL HYPERTENSION: ICD-10-CM

## 2020-06-15 RX ORDER — LISINOPRIL 20 MG/1
TABLET ORAL
Qty: 90 TABLET | Refills: 0 | Status: ON HOLD | OUTPATIENT
Start: 2020-06-15 | End: 2020-08-07

## 2020-08-07 ENCOUNTER — APPOINTMENT (OUTPATIENT)
Dept: GENERAL RADIOLOGY | Facility: HOSPITAL | Age: 60
End: 2020-08-07

## 2020-08-07 ENCOUNTER — TELEPHONE (OUTPATIENT)
Dept: CARDIOLOGY | Facility: CLINIC | Age: 60
End: 2020-08-07

## 2020-08-07 ENCOUNTER — APPOINTMENT (OUTPATIENT)
Dept: CT IMAGING | Facility: HOSPITAL | Age: 60
End: 2020-08-07

## 2020-08-07 ENCOUNTER — HOSPITAL ENCOUNTER (INPATIENT)
Facility: HOSPITAL | Age: 60
LOS: 2 days | Discharge: HOME | End: 2020-08-09
Attending: EMERGENCY MEDICINE | Admitting: INTERNAL MEDICINE

## 2020-08-07 DIAGNOSIS — I16.0 HYPERTENSIVE URGENCY: Primary | ICD-10-CM

## 2020-08-07 LAB
ALBUMIN SERPL-MCNC: 3.91 G/DL (ref 3.5–5.2)
ALBUMIN/GLOB SERPL: 1.7 G/DL
ALP SERPL-CCNC: 111 U/L (ref 39–117)
ALT SERPL W P-5'-P-CCNC: 16 U/L (ref 1–33)
ANION GAP SERPL CALCULATED.3IONS-SCNC: 13.4 MMOL/L (ref 5–15)
AST SERPL-CCNC: 13 U/L (ref 1–32)
BACTERIA UR QL AUTO: NORMAL /HPF
BASOPHILS # BLD AUTO: 0.04 10*3/MM3 (ref 0–0.2)
BASOPHILS NFR BLD AUTO: 0.6 % (ref 0–1.5)
BILIRUB SERPL-MCNC: 0.4 MG/DL (ref 0–1.2)
BILIRUB UR QL STRIP: NEGATIVE
BUN SERPL-MCNC: 9 MG/DL (ref 6–20)
BUN/CREAT SERPL: 12.2 (ref 7–25)
CALCIUM SPEC-SCNC: 8.9 MG/DL (ref 8.6–10.5)
CHLORIDE SERPL-SCNC: 96 MMOL/L (ref 98–107)
CLARITY UR: CLEAR
CO2 SERPL-SCNC: 25.6 MMOL/L (ref 22–29)
COLOR UR: YELLOW
CREAT SERPL-MCNC: 0.74 MG/DL (ref 0.57–1)
CRP SERPL-MCNC: 0.09 MG/DL (ref 0–0.5)
D DIMER PPP FEU-MCNC: 0.31 MCGFEU/ML (ref 0–0.5)
DEPRECATED RDW RBC AUTO: 50.8 FL (ref 37–54)
EOSINOPHIL # BLD AUTO: 0.17 10*3/MM3 (ref 0–0.4)
EOSINOPHIL NFR BLD AUTO: 2.4 % (ref 0.3–6.2)
ERYTHROCYTE [DISTWIDTH] IN BLOOD BY AUTOMATED COUNT: 14.7 % (ref 12.3–15.4)
GFR SERPL CREATININE-BSD FRML MDRD: 81 ML/MIN/1.73
GLOBULIN UR ELPH-MCNC: 2.3 GM/DL
GLUCOSE SERPL-MCNC: 229 MG/DL (ref 65–99)
GLUCOSE UR STRIP-MCNC: NEGATIVE MG/DL
HCT VFR BLD AUTO: 33.5 % (ref 34–46.6)
HGB BLD-MCNC: 11.3 G/DL (ref 12–15.9)
HGB UR QL STRIP.AUTO: NEGATIVE
HOLD SPECIMEN: NORMAL
HOLD SPECIMEN: NORMAL
HYALINE CASTS UR QL AUTO: NORMAL /LPF
IMM GRANULOCYTES # BLD AUTO: 0.02 10*3/MM3 (ref 0–0.05)
IMM GRANULOCYTES NFR BLD AUTO: 0.3 % (ref 0–0.5)
KETONES UR QL STRIP: NEGATIVE
LEUKOCYTE ESTERASE UR QL STRIP.AUTO: ABNORMAL
LIPASE SERPL-CCNC: 14 U/L (ref 13–60)
LYMPHOCYTES # BLD AUTO: 1.51 10*3/MM3 (ref 0.7–3.1)
LYMPHOCYTES NFR BLD AUTO: 21.4 % (ref 19.6–45.3)
MAGNESIUM SERPL-MCNC: 1.8 MG/DL (ref 1.6–2.6)
MCH RBC QN AUTO: 32 PG (ref 26.6–33)
MCHC RBC AUTO-ENTMCNC: 33.7 G/DL (ref 31.5–35.7)
MCV RBC AUTO: 94.9 FL (ref 79–97)
MONOCYTES # BLD AUTO: 0.59 10*3/MM3 (ref 0.1–0.9)
MONOCYTES NFR BLD AUTO: 8.3 % (ref 5–12)
NEUTROPHILS NFR BLD AUTO: 4.74 10*3/MM3 (ref 1.7–7)
NEUTROPHILS NFR BLD AUTO: 67 % (ref 42.7–76)
NITRITE UR QL STRIP: NEGATIVE
NRBC BLD AUTO-RTO: 0 /100 WBC (ref 0–0.2)
NT-PROBNP SERPL-MCNC: 546.7 PG/ML (ref 0–900)
PH UR STRIP.AUTO: 7 [PH] (ref 5–8)
PLATELET # BLD AUTO: 258 10*3/MM3 (ref 140–450)
PMV BLD AUTO: 9 FL (ref 6–12)
POTASSIUM SERPL-SCNC: 3.1 MMOL/L (ref 3.5–5.2)
PROT SERPL-MCNC: 6.2 G/DL (ref 6–8.5)
PROT UR QL STRIP: NEGATIVE
RBC # BLD AUTO: 3.53 10*6/MM3 (ref 3.77–5.28)
RBC # UR: NORMAL /HPF
REF LAB TEST METHOD: NORMAL
SODIUM SERPL-SCNC: 135 MMOL/L (ref 136–145)
SP GR UR STRIP: 1.01 (ref 1–1.03)
SQUAMOUS #/AREA URNS HPF: NORMAL /HPF
T4 FREE SERPL-MCNC: 1.64 NG/DL (ref 0.93–1.7)
TROPONIN T SERPL-MCNC: <0.01 NG/ML (ref 0–0.03)
TSH SERPL DL<=0.05 MIU/L-ACNC: 0.03 UIU/ML (ref 0.27–4.2)
UROBILINOGEN UR QL STRIP: ABNORMAL
WBC # BLD AUTO: 7.07 10*3/MM3 (ref 3.4–10.8)
WBC UR QL AUTO: NORMAL /HPF
WHOLE BLOOD HOLD SPECIMEN: NORMAL
WHOLE BLOOD HOLD SPECIMEN: NORMAL

## 2020-08-07 PROCEDURE — 99223 1ST HOSP IP/OBS HIGH 75: CPT | Performed by: INTERNAL MEDICINE

## 2020-08-07 PROCEDURE — 83735 ASSAY OF MAGNESIUM: CPT | Performed by: EMERGENCY MEDICINE

## 2020-08-07 PROCEDURE — 86140 C-REACTIVE PROTEIN: CPT | Performed by: EMERGENCY MEDICINE

## 2020-08-07 PROCEDURE — 83690 ASSAY OF LIPASE: CPT | Performed by: EMERGENCY MEDICINE

## 2020-08-07 PROCEDURE — 80053 COMPREHEN METABOLIC PANEL: CPT | Performed by: EMERGENCY MEDICINE

## 2020-08-07 PROCEDURE — 84439 ASSAY OF FREE THYROXINE: CPT | Performed by: EMERGENCY MEDICINE

## 2020-08-07 PROCEDURE — P9612 CATHETERIZE FOR URINE SPEC: HCPCS

## 2020-08-07 PROCEDURE — 84484 ASSAY OF TROPONIN QUANT: CPT | Performed by: EMERGENCY MEDICINE

## 2020-08-07 PROCEDURE — 25010000002 ENOXAPARIN PER 10 MG: Performed by: INTERNAL MEDICINE

## 2020-08-07 PROCEDURE — 93005 ELECTROCARDIOGRAM TRACING: CPT | Performed by: EMERGENCY MEDICINE

## 2020-08-07 PROCEDURE — 70450 CT HEAD/BRAIN W/O DYE: CPT | Performed by: RADIOLOGY

## 2020-08-07 PROCEDURE — 71045 X-RAY EXAM CHEST 1 VIEW: CPT

## 2020-08-07 PROCEDURE — 85379 FIBRIN DEGRADATION QUANT: CPT | Performed by: EMERGENCY MEDICINE

## 2020-08-07 PROCEDURE — 70450 CT HEAD/BRAIN W/O DYE: CPT

## 2020-08-07 PROCEDURE — 81001 URINALYSIS AUTO W/SCOPE: CPT | Performed by: EMERGENCY MEDICINE

## 2020-08-07 PROCEDURE — 25010000002 ONDANSETRON PER 1 MG: Performed by: EMERGENCY MEDICINE

## 2020-08-07 PROCEDURE — 71045 X-RAY EXAM CHEST 1 VIEW: CPT | Performed by: RADIOLOGY

## 2020-08-07 PROCEDURE — 85025 COMPLETE CBC W/AUTO DIFF WBC: CPT | Performed by: EMERGENCY MEDICINE

## 2020-08-07 PROCEDURE — 84443 ASSAY THYROID STIM HORMONE: CPT | Performed by: EMERGENCY MEDICINE

## 2020-08-07 PROCEDURE — 25010000002 HYDROMORPHONE PER 4 MG: Performed by: EMERGENCY MEDICINE

## 2020-08-07 PROCEDURE — 99284 EMERGENCY DEPT VISIT MOD MDM: CPT

## 2020-08-07 PROCEDURE — 83880 ASSAY OF NATRIURETIC PEPTIDE: CPT | Performed by: EMERGENCY MEDICINE

## 2020-08-07 RX ORDER — SODIUM CHLORIDE 0.9 % (FLUSH) 0.9 %
10 SYRINGE (ML) INJECTION AS NEEDED
Status: DISCONTINUED | OUTPATIENT
Start: 2020-08-07 | End: 2020-08-09 | Stop reason: HOSPADM

## 2020-08-07 RX ORDER — AMLODIPINE BESYLATE 5 MG/1
5 TABLET ORAL DAILY
COMMUNITY
End: 2020-08-09 | Stop reason: HOSPADM

## 2020-08-07 RX ORDER — DIAZEPAM 2 MG/1
2 TABLET ORAL DAILY PRN
COMMUNITY

## 2020-08-07 RX ORDER — CYANOCOBALAMIN 1000 UG/ML
1000 INJECTION, SOLUTION INTRAMUSCULAR; SUBCUTANEOUS
Status: CANCELLED | OUTPATIENT
Start: 2020-08-07

## 2020-08-07 RX ORDER — BUTALBITAL, ACETAMINOPHEN AND CAFFEINE 50; 325; 40 MG/1; MG/1; MG/1
1 TABLET ORAL AS NEEDED
Status: CANCELLED | OUTPATIENT
Start: 2020-08-07

## 2020-08-07 RX ORDER — DULOXETIN HYDROCHLORIDE 30 MG/1
90 CAPSULE, DELAYED RELEASE ORAL DAILY
COMMUNITY
End: 2020-08-09 | Stop reason: HOSPADM

## 2020-08-07 RX ORDER — LISINOPRIL 40 MG/1
40 TABLET ORAL NIGHTLY
COMMUNITY
End: 2020-08-18 | Stop reason: HOSPADM

## 2020-08-07 RX ORDER — POTASSIUM CHLORIDE 20 MEQ/1
40 TABLET, EXTENDED RELEASE ORAL ONCE
Status: COMPLETED | OUTPATIENT
Start: 2020-08-07 | End: 2020-08-07

## 2020-08-07 RX ORDER — ONDANSETRON 2 MG/ML
4 INJECTION INTRAMUSCULAR; INTRAVENOUS ONCE
Status: COMPLETED | OUTPATIENT
Start: 2020-08-07 | End: 2020-08-07

## 2020-08-07 RX ORDER — DIAZEPAM 5 MG/1
5 TABLET ORAL 2 TIMES DAILY PRN
Status: CANCELLED | OUTPATIENT
Start: 2020-08-07

## 2020-08-07 RX ORDER — CELECOXIB 200 MG/1
200 CAPSULE ORAL DAILY
Status: CANCELLED | OUTPATIENT
Start: 2020-08-08

## 2020-08-07 RX ORDER — LABETALOL HYDROCHLORIDE 5 MG/ML
10 INJECTION, SOLUTION INTRAVENOUS ONCE
Status: DISCONTINUED | OUTPATIENT
Start: 2020-08-07 | End: 2020-08-07

## 2020-08-07 RX ORDER — GABAPENTIN 300 MG/1
600 CAPSULE ORAL 4 TIMES DAILY
Status: CANCELLED | OUTPATIENT
Start: 2020-08-07

## 2020-08-07 RX ORDER — HYDROMORPHONE HYDROCHLORIDE 1 MG/ML
0.5 INJECTION, SOLUTION INTRAMUSCULAR; INTRAVENOUS; SUBCUTANEOUS ONCE
Status: COMPLETED | OUTPATIENT
Start: 2020-08-07 | End: 2020-08-07

## 2020-08-07 RX ORDER — OMEPRAZOLE 40 MG/1
40 CAPSULE, DELAYED RELEASE ORAL DAILY
COMMUNITY

## 2020-08-07 RX ORDER — DIAZEPAM 5 MG/1
5 TABLET ORAL 2 TIMES DAILY PRN
Status: ON HOLD | COMMUNITY
End: 2020-08-07

## 2020-08-07 RX ORDER — LABETALOL HYDROCHLORIDE 5 MG/ML
20 INJECTION, SOLUTION INTRAVENOUS ONCE
Status: COMPLETED | OUTPATIENT
Start: 2020-08-07 | End: 2020-08-07

## 2020-08-07 RX ORDER — SODIUM CHLORIDE 0.9 % (FLUSH) 0.9 %
10 SYRINGE (ML) INJECTION EVERY 12 HOURS SCHEDULED
Status: DISCONTINUED | OUTPATIENT
Start: 2020-08-07 | End: 2020-08-09 | Stop reason: HOSPADM

## 2020-08-07 RX ADMIN — ONDANSETRON 4 MG: 2 INJECTION INTRAMUSCULAR; INTRAVENOUS at 15:47

## 2020-08-07 RX ADMIN — SODIUM CHLORIDE, PRESERVATIVE FREE 10 ML: 5 INJECTION INTRAVENOUS at 20:18

## 2020-08-07 RX ADMIN — LABETALOL HYDROCHLORIDE 20 MG: 5 INJECTION, SOLUTION INTRAVENOUS at 16:58

## 2020-08-07 RX ADMIN — HYDROMORPHONE HYDROCHLORIDE 0.5 MG: 1 INJECTION, SOLUTION INTRAMUSCULAR; INTRAVENOUS; SUBCUTANEOUS at 15:47

## 2020-08-07 RX ADMIN — ENOXAPARIN SODIUM 40 MG: 100 INJECTION SUBCUTANEOUS at 20:17

## 2020-08-07 RX ADMIN — POTASSIUM CHLORIDE 40 MEQ: 1500 TABLET, EXTENDED RELEASE ORAL at 18:18

## 2020-08-07 NOTE — TELEPHONE ENCOUNTER
Please call patients cell [phone] regarding her blood pressure is running high.    8-7-20 11:00 am 186/102                 2:00pm 192/103    Patient advised to consider going to the emergency room for blood pressure.

## 2020-08-07 NOTE — H&P
[facility] HOSPITALIST HISTORY AND PHYSICAL    Patient Identification:  Name:  Lashae Benitez  Age:  59 y.o.  Sex:  female  :  1960  MRN:  [id number]   Visit Number:  [id number]  Primary Care Physician:  Kreis, Samuel Duane, MD       Chief complaint: Persistent High Blood Pressure    History of presenting illness: Ms. Benitez is an 59 y.o. female who presented to [facility] emergency department on 2020 with a chief complaint of persistent high blood pressure.  Patient has a past medical history remarkable for anxiety, essential hypertension, hypothyroidism status post thyroidectomy for thyroid cancer, hyperlipidemia, autoimmune disorder and insulin-dependent type 2 diabetes mellitus.  Patient states she went for a routine follow-up with her primary care provider approximately 10 days ago.  Patient did have right lower extremity swelling and edema for which she was being seen.  At that time, patient had a blood pressure checked which was markedly elevated.  Patient reports being on Toprol- mg p.o. daily as well as lisinopril 20 mg p.o. daily.  She had her lisinopril increased to 40 mg at that time.  Patient did have a follow-up with her primary cardiologist in the past few days where and her blood pressure was still elevated.  Patient did have amlodipine 5 mg p.o. daily added to her antihypertensive regimen.  Unfortunately, patient persisted to have elevated blood pressure and when she called to tell her primary cardiologist this, she was recommended to follow-up in the emergency department.  Initial evaluation in the emergency department did consist of basic laboratory work as well as physical exam and vital signs.  Initial vital signs found patient's blood pressure 204/110, respirations 18, heart rate 78 and temperature 98.2 °F.  Patient did have CBC and CMP performed all of which were essentially within normal limits.  Patient did receive multiple rounds of antihypertensive  medication in the emergency department including labetalol.  Unfortunately, patient continues to have refractory hypertension.  As such, patient will be admitted for further treatment and evaluation of hypertensive urgency.  -------------------------------------------------------------------------------------------------------------------  Past Medical History:   Diagnosis Date   • Acid reflux    • Allergic    • Anxiety    • Cancer (CMS/[facility])     thyroid, skin   • Chronic pain disorder    • Degenerative disc disease, lumbar    • Depression    • Dupuytren's disease    • Essential hypertension    • Family history of coronary artery disease    • Fibromyalgia    • Gallbladder abscess    • H. pylori infection    • Hiatal hernia    • Hyperlipidemia    • Hypothyroidism    • Migraines     migraines   • Multiple sclerosis (CMS/[facility])    • Osteoarthritis    • Psoriasis    • Psoriatic arthritis (CMS/[facility])    • RA (rheumatoid arthritis) (CMS/[facility])    • Rheumatoid arthritis (CMS/[facility])    • Sinusitis    • Sjogren's syndrome (CMS/[facility])    • Stomach ulcer    • TMJ arthritis    • Type 2 diabetes mellitus (CMS/[facility])    • Urinary tract infection      Past Surgical History:   Procedure Laterality Date   • BREAST LUMPECTOMY Left 11/1993   • CARDIAC CATHETERIZATION Left 09/21/2009    Normal    • CARPAL TUNNEL RELEASE  03/2012   • CERVICAL POLYPECTOMY  12/2015   • CHOLECYSTECTOMY  05/2007   • GASTRIC BYPASS  09/2007   • KNEE ARTHROPLASTY     • LUMBAR DISC SURGERY      C5-6   • REPLACEMENT TOTAL KNEE Right 06/2016   • SACRAL NERVE STIMULATOR PLACEMENT  09/2014   • THYROIDECTOMY  03/2016     Family History   Problem Relation Age of Onset   • Diabetes Mother    • Stroke Mother    • Hypertension Mother    • Heart attack Father         First one was in his 20's second 30's.    • Heart failure Father    • Heart disease Father    • Stroke Father    • Diabetes Sister    • Cancer Maternal Grandmother      Social History     Socioeconomic History   •  Marital status:      Spouse name: Not on file   • Number of children: Not on file   • Years of education: Not on file   • Highest education level: Not on file   Tobacco Use   • Smoking status: Never Smoker   • Smokeless tobacco: Never Used   Substance and Sexual Activity   • Alcohol use: No   • Drug use: No   • Sexual activity: Defer     ---------------------------------------------------------------------------------------------------------------------   Allergies:  Penicillins; Sulfa antibiotics; Sulfasalazine; Codeine; Imuran [azathioprine]; Januvia [sitagliptin]; and Beta adrenergic blockers  ---------------------------------------------------------------------------------------------------------------------   Prior to Admission Medications     Prescriptions Last Dose Informant Patient Reported? Taking?    abatacept (ORENCIA) 250 MG injection   Yes No    Infuse  into a venous catheter.    alendronate (FOSAMAX) 70 MG tablet  Self Yes No    Take 70 mg by mouth 1 (One) Time Per Week.    ALLERGY SERUM INJECTION  Self Yes No    Inject  under the skin into the appropriate area as directed 1 (One) Time Per Week.    amitriptyline (ELAVIL) 25 MG tablet  Self Yes No    Take 25 mg by mouth Every Night.    aspirin 81 MG tablet  Self Yes No    Take 1 tablet by mouth.    butalbital-acetaminophen-caffeine (FIORICET, ESGIC) -40 MG per tablet  Self Yes No    Take 1 tablet by mouth as needed for headaches.    calcium citrate-vitamin d (CALCITRATE) 315-250 MG-UNIT tablet tablet  Self Yes No    Take 1 tablet by mouth Every Evening.    celecoxib (CeleBREX) 200 MG capsule  Self Yes No    Take 200 mg by mouth daily.    cholecalciferol (VITAMIN D3) 1000 UNITS tablet  Self Yes No    Take 5,000 Units by mouth Every Evening.    cyanocobalamin 1000 MCG/ML injection  Self Yes No    Inject 1,000 mcg into the shoulder, thigh, or buttocks Every 28 (Twenty-Eight) Days.    cyclobenzaprine (FLEXERIL) 10 MG tablet  Self Yes No    Take  10 mg by mouth Every Evening.    dexlansoprazole (DEXILANT) 60 MG capsule  Self Yes No    Take 60 mg by mouth daily.    DIAZEPAM PO  Self Yes No    Take 0.5 mg by mouth 2 (Two) Times a Day. 1/2 tab of the morning and 1/2 tab of the evening.    DULoxetine (CYMBALTA) 60 MG capsule  Self Yes No    Take 60 mg by mouth Every Night.    DULOXETINE HCL PO  Self Yes No    Take 90 mg by mouth Every Morning.    EMGALITY 120 MG/ML prefilled syringe  Self Yes No    INJECT 1 ONCE EVERY MONTH    ferrous gluconate (FERGON) 240 (27 FE) MG tablet  Self Yes No    Take 240 mg by mouth Every Morning.    folic acid (FOLVITE) 1 MG tablet  Self Yes No    Take 1 mg by mouth daily.    gabapentin (NEURONTIN) 600 MG tablet  Self Yes No    Take 600 mg by mouth 4 (Four) Times a Day.    insulin detemir (LEVEMIR) 100 UNIT/ML injection  Self Yes No    Inject 25 Units under the skin into the appropriate area as directed Daily.    levothyroxine sodium (TIROSINT) 137 MCG capsule  Self Yes No    Take 137 mcg by mouth Daily.    linaclotide (LINZESS) 290 MCG capsule capsule  Self Yes No    Take 290 mcg by mouth every morning before breakfast.    lisinopril (PRINIVIL,ZESTRIL) 20 MG tablet   No No    TAKE 1 TABLET EVERY EVENING    loratadine (CLARITIN) 10 MG tablet  Self Yes No    Take 10 mg by mouth Every Night.    methotrexate 2.5 MG tablet  Self Yes No    Take 25 mg by mouth 1 (One) Time Per Week. Prior to [facility] Admission, Patient was on: Takes 10 tablets on Saturday    multivitamin (DAILY MICHAEL) tablet tablet  Self Yes No    Take 1 tablet by mouth Every Evening.    OnabotulinumtoxinA (BOTOX IM)  Self Yes No    Inject  into the shoulder, thigh, or buttocks Every 3 (Three) Months.    primidone (MYSOLINE) 50 MG tablet  Self Yes No    Take 50 mg by mouth Every Night.    ranitidine (ZANTAC) 150 MG tablet  Self Yes No    Take 150 mg by mouth 2 (two) times a day.    TOPROL  MG 24 hr tablet   No No    TAKE 1 TABLET DAILY    vitamin C (ASCORBIC ACID) 500  MG tablet  Self Yes No    Take 1,000 mg by mouth Every Evening.    vitamin E 1000 UNIT capsule  Self Yes No    Take 1,000 Units by mouth Every Evening.        Hospital Scheduled Meds:       niCARdipine 5-15 mg/hr     ---------------------------------------------------------------------------------------------------------------------   Review of Systems   On review of systems the patient denies the following unless noted above:     Constitutional:  Fevers, chills, weight change, fatigue     Eyes: Vision changes, headache, double vision, loss of vision     ENT: Runny nose, nose bleeds, ringing in ears, pain with swallowing, sore throat     Cardiovascular: Chest pains, palpitations, PND, orthopnea     Respiratory: Cough, wheezing, SOA, hemoptysis     GI:  Abdominal pain, diarrhea, constipation, change in stool caliber,    Rectal bleeding, vomiting or nausea     : Difficulty voiding, dysuria, hematuria     Musculoskeletal: Changes of any chronic joint pain, swelling     Skin: Rash, itching, easy bruisability     Neurological: Unilateral weakness, new onset numbness, speech difficulties     Psychiatric: Sadness, tearfulness, feelings of hopelessness, racing thoughts     Endocrine:  Heat or cold intolerance, mood swings, polydipsia, polyphagia,    recent hypoglycemia  --------------------------------------------------------------------------------------------------------------------  Vital Signs:  Temp:  [98 °F (36.7 °C)-98.2 °F (36.8 °C)] 98 °F (36.7 °C)  Heart Rate:  [71-93] 75  Resp:  [18] 18  BP: (144-204)/() 144/75      08/07/20  1450   Weight: 93 kg (205 lb)     Body mass index is 34.11 kg/m².  ---------------------------------------------------------------------------------------------------------------------   Physical exam:  Constitutional: Obese  female in no apparent distress.     HENT:  Head:  Normocephalic and atraumatic.  Mouth:  Moist mucous membranes.    Eyes:  Conjunctivae and EOM are  normal.  Pupils are equal, round, and reactive to light.  No scleral icterus.    Neck:  Neck supple. No thyromegaly.  No JVD present.    Cardiovascular:  Regular rate and rhythm with no murmurs, rubs, clicks or gallops appreciated.  Pulmonary/Chest:  Clear to auscultation bilaterally with no crackles, wheezes or rhonchi appreciated.  Abdominal:  Soft. Nontender. Nondistended  Bowel sounds are normal in all four quadrants. No organomegally appreciated.   Musculoskeletal:  5/5 muscle strength bilateral upper and lower extermties with equal muscle tone and bulk. No edema, no tenderness, and no deformity.  No red or swollen joints anywhere.    Neurological:  Alert and oriented to person, place, and time. Cranial nerves II-XII intact with no focal defecits.  No facial droop.  No slurred speech.   Skin:  Warm and dry to palpation with no rashes or lesions appreciated.  Peripheral vascular:  2+ radial and pedal pulses in bilateral upper and lower extremities.  Psychiatric:  Alert and oriented x3, demonstrates appropriate judgement and insight.  --------------------------------------------------------------------------------------------------------  ---------------------------------------------------------------------------------------------------------------------   Results from last 7 days   Lab Units 08/07/20  1543   CRP mg/dL 0.09   WBC 10*3/mm3 7.07   HEMOGLOBIN g/dL 11.3*   HEMATOCRIT % 33.5*   MCV fL 94.9   MCHC g/dL 33.7   PLATELETS 10*3/mm3 258         Results from last 7 days   Lab Units 08/07/20  1543   SODIUM mmol/L 135*   POTASSIUM mmol/L 3.1*   MAGNESIUM mg/dL 1.8   CHLORIDE mmol/L 96*   CO2 mmol/L 25.6   BUN mg/dL 9   CREATININE mg/dL 0.74   EGFR IF NONAFRICN AM mL/min/1.73 81   CALCIUM mg/dL 8.9   GLUCOSE mg/dL 229*   ALBUMIN g/dL 3.91   BILIRUBIN mg/dL 0.4   ALK PHOS U/L 111   AST (SGOT) U/L 13   ALT (SGPT) U/L 16   Estimated Creatinine Clearance: 93.4 mL/min (by C-G formula based on SCr of 0.74 mg/dL).  No  results found for: AMMONIA  Results from last 7 days   Lab Units 08/07/20  1543   TROPONIN T ng/mL <0.010     Results from last 7 days   Lab Units 08/07/20  1543   PROBNP pg/mL 546.7     No results found for: HGBA1C  Lab Results   Component Value Date    TSH 0.025 (L) 08/07/2020    FREET4 1.64 08/07/2020     Lab Results   Component Value Date    PREGTESTUR Negative 01/21/2018     Pain Management Panel     Pain Management Panel Latest Ref Rng & Units 12/26/2016    AMPHETAMINES SCREEN, URINE Negative Negative    BARBITURATES SCREEN Negative Negative    BENZODIAZEPINE SCREEN, URINE Negative Positive(A)    COCAINE SCREEN, URINE Negative Negative    METHADONE SCREEN, URINE Negative Negative        No results found for: BLOODCX  No results found for: URINECX  No results found for: WOUNDCX  No results found for: STOOLCX      ---------------------------------------------------------------------------------------------------------------------  Imaging Results (Last 7 Days)     Procedure Component Value Units Date/Time    XR Chest 1 View [[id number]] Collected:  08/07/20 1558     Updated:  08/07/20 1609    Narrative:       XR CHEST 1 VW-     CLINICAL INDICATION: soa        COMPARISON: 08/19/2019      TECHNIQUE: Single frontal view of the chest.     FINDINGS:     There is no focal alveolar infiltrate or effusion.  The cardiac silhouette is normal. The pulmonary vasculature is  unremarkable.  There is no evidence of an acute osseous abnormality.   There are no suspicious-appearing parenchymal soft tissue nodules.          Impression:       No evidence of active or acute cardiopulmonary disease on today's chest  radiograph.     This report was finalized on 8/7/2020 3:58 PM by Dr. Burt Aranda MD.       CT Head Without Contrast [[id number]] Collected:  08/07/20 1604     Updated:  08/07/20 1607    Narrative:       CT HEAD WO CONTRAST-     CLINICAL INDICATION: HA, dizziness        COMPARISON: 11/22/2018      TECHNIQUE: Axial images  of the brain were obtained with out intravenous  contrast.  Reformatted images were created in the sagittal and coronal  planes.     DOSE:     Radiation dose reduction techniques were utilized per ALARA protocol.  Automated exposure control was initiated through either or [employer] or  [employer] software packages by  protocol.           FINDINGS:   Today's study shows no mass, hemorrhage, or midline shift.   The ventricles, cisterns, and sulci are unremarkable. There is no  hydrocephalus.   There is no evidence of acute ischemia.  I do not see epidural or subdural hematoma.  The gray-white differentiation is appropriate.   The bone window setting images show no destructive calvarial lesion or  acute calvarial fracture.   The posterior fossa is unremarkable.          Impression:       No acute intracranial pathology. Nothing is seen on this exam to  specifically account for the patient's symptoms.     This report was finalized on 8/7/2020 4:05 PM by Dr. Burt Aranda MD.             I have personally reviewed the radiology images and read the final radiology report.  ---------------------------------------------------------------------------------------------------------------------  Assessment and Plan:    1.  Hypertensive urgency -patient with multiple rounds of antihypertensive medications in the emergency department.  However, patient still with blood pressure 175/110.  Will admit to progressive care unit and placed on telemetry.  Will start Cardene drip and continue home antihypertensive medications at this time.  If patient persists with refractory hypertension, will consider cardiology consultation.    2.  Hyperlipidemia -statin    3.  Hypothyroidism -Synthroid    4. Autoimmune Disorder - Patient with reported history of possible Sjogren's Syndrome, RA, Psoriatic Arthritis and Fibromyalgia.  Continue supportive care.    Kane Haddad,   08/07/20  18:30

## 2020-08-08 LAB
ANION GAP SERPL CALCULATED.3IONS-SCNC: 13 MMOL/L (ref 5–15)
BUN SERPL-MCNC: 7 MG/DL (ref 6–20)
BUN/CREAT SERPL: 9.9 (ref 7–25)
CALCIUM SPEC-SCNC: 8.6 MG/DL (ref 8.6–10.5)
CHLORIDE SERPL-SCNC: 101 MMOL/L (ref 98–107)
CO2 SERPL-SCNC: 26 MMOL/L (ref 22–29)
CREAT SERPL-MCNC: 0.71 MG/DL (ref 0.57–1)
DEPRECATED RDW RBC AUTO: 53.5 FL (ref 37–54)
ERYTHROCYTE [DISTWIDTH] IN BLOOD BY AUTOMATED COUNT: 15.3 % (ref 12.3–15.4)
GFR SERPL CREATININE-BSD FRML MDRD: 85 ML/MIN/1.73
GLUCOSE SERPL-MCNC: 200 MG/DL (ref 65–99)
HCT VFR BLD AUTO: 33.5 % (ref 34–46.6)
HGB BLD-MCNC: 11.2 G/DL (ref 12–15.9)
MCH RBC QN AUTO: 32 PG (ref 26.6–33)
MCHC RBC AUTO-ENTMCNC: 33.4 G/DL (ref 31.5–35.7)
MCV RBC AUTO: 95.7 FL (ref 79–97)
PLATELET # BLD AUTO: 266 10*3/MM3 (ref 140–450)
PMV BLD AUTO: 9.3 FL (ref 6–12)
POTASSIUM SERPL-SCNC: 3.4 MMOL/L (ref 3.5–5.2)
RBC # BLD AUTO: 3.5 10*6/MM3 (ref 3.77–5.28)
SODIUM SERPL-SCNC: 140 MMOL/L (ref 136–145)
WBC # BLD AUTO: 7.09 10*3/MM3 (ref 3.4–10.8)

## 2020-08-08 PROCEDURE — 25010000002 CYANOCOBALAMIN PER 1000 MCG: Performed by: INTERNAL MEDICINE

## 2020-08-08 PROCEDURE — 25010000002 ENOXAPARIN PER 10 MG: Performed by: INTERNAL MEDICINE

## 2020-08-08 PROCEDURE — 63710000001 INSULIN DETEMIR PER 5 UNITS: Performed by: INTERNAL MEDICINE

## 2020-08-08 PROCEDURE — 63710000001 METHOTREXATE PER 2.5 MG: Performed by: INTERNAL MEDICINE

## 2020-08-08 PROCEDURE — 80048 BASIC METABOLIC PNL TOTAL CA: CPT | Performed by: INTERNAL MEDICINE

## 2020-08-08 PROCEDURE — 85027 COMPLETE CBC AUTOMATED: CPT | Performed by: INTERNAL MEDICINE

## 2020-08-08 PROCEDURE — 99232 SBSQ HOSP IP/OBS MODERATE 35: CPT | Performed by: INTERNAL MEDICINE

## 2020-08-08 RX ORDER — AMITRIPTYLINE HYDROCHLORIDE 25 MG/1
25 TABLET, FILM COATED ORAL NIGHTLY
Status: DISCONTINUED | OUTPATIENT
Start: 2020-08-08 | End: 2020-08-09 | Stop reason: HOSPADM

## 2020-08-08 RX ORDER — GABAPENTIN 300 MG/1
600 CAPSULE ORAL EVERY 8 HOURS SCHEDULED
Status: DISCONTINUED | OUTPATIENT
Start: 2020-08-08 | End: 2020-08-09 | Stop reason: HOSPADM

## 2020-08-08 RX ORDER — FERROUS SULFATE 325(65) MG
325 TABLET ORAL EVERY MORNING
Status: DISCONTINUED | OUTPATIENT
Start: 2020-08-08 | End: 2020-08-09 | Stop reason: HOSPADM

## 2020-08-08 RX ORDER — DULOXETIN HYDROCHLORIDE 60 MG/1
60 CAPSULE, DELAYED RELEASE ORAL NIGHTLY
Status: DISCONTINUED | OUTPATIENT
Start: 2020-08-08 | End: 2020-08-09 | Stop reason: HOSPADM

## 2020-08-08 RX ORDER — CHLORTHALIDONE 50 MG/1
25 TABLET ORAL DAILY
Status: DISCONTINUED | OUTPATIENT
Start: 2020-08-08 | End: 2020-08-09 | Stop reason: HOSPADM

## 2020-08-08 RX ORDER — FOLIC ACID 1 MG/1
1 TABLET ORAL DAILY
Status: DISCONTINUED | OUTPATIENT
Start: 2020-08-08 | End: 2020-08-09 | Stop reason: HOSPADM

## 2020-08-08 RX ORDER — CYANOCOBALAMIN 1000 UG/ML
1000 INJECTION, SOLUTION INTRAMUSCULAR; SUBCUTANEOUS
Status: DISCONTINUED | OUTPATIENT
Start: 2020-08-08 | End: 2020-08-09 | Stop reason: HOSPADM

## 2020-08-08 RX ORDER — FAMOTIDINE 20 MG/1
20 TABLET, FILM COATED ORAL 2 TIMES DAILY
Status: DISCONTINUED | OUTPATIENT
Start: 2020-08-08 | End: 2020-08-09 | Stop reason: HOSPADM

## 2020-08-08 RX ORDER — ACETAMINOPHEN 325 MG/1
650 TABLET ORAL EVERY 6 HOURS PRN
Status: DISCONTINUED | OUTPATIENT
Start: 2020-08-08 | End: 2020-08-09 | Stop reason: HOSPADM

## 2020-08-08 RX ORDER — PANTOPRAZOLE SODIUM 40 MG/1
40 TABLET, DELAYED RELEASE ORAL EVERY MORNING
Status: DISCONTINUED | OUTPATIENT
Start: 2020-08-08 | End: 2020-08-09 | Stop reason: HOSPADM

## 2020-08-08 RX ORDER — CYCLOBENZAPRINE HCL 10 MG
10 TABLET ORAL EVERY EVENING
Status: DISCONTINUED | OUTPATIENT
Start: 2020-08-08 | End: 2020-08-09 | Stop reason: HOSPADM

## 2020-08-08 RX ORDER — DIAZEPAM 5 MG/1
2.5 TABLET ORAL DAILY PRN
Status: DISCONTINUED | OUTPATIENT
Start: 2020-08-08 | End: 2020-08-09 | Stop reason: HOSPADM

## 2020-08-08 RX ORDER — AMLODIPINE BESYLATE 10 MG/1
10 TABLET ORAL ONCE
Status: COMPLETED | OUTPATIENT
Start: 2020-08-08 | End: 2020-08-08

## 2020-08-08 RX ORDER — MULTIVITAMIN
1 TABLET ORAL EVERY EVENING
Status: DISCONTINUED | OUTPATIENT
Start: 2020-08-08 | End: 2020-08-09 | Stop reason: HOSPADM

## 2020-08-08 RX ORDER — LISINOPRIL 10 MG/1
40 TABLET ORAL NIGHTLY
Status: DISCONTINUED | OUTPATIENT
Start: 2020-08-08 | End: 2020-08-09 | Stop reason: HOSPADM

## 2020-08-08 RX ORDER — PRIMIDONE 50 MG/1
50 TABLET ORAL NIGHTLY
Status: DISCONTINUED | OUTPATIENT
Start: 2020-08-08 | End: 2020-08-09 | Stop reason: HOSPADM

## 2020-08-08 RX ORDER — ASCORBIC ACID 500 MG
1000 TABLET ORAL EVERY EVENING
Status: DISCONTINUED | OUTPATIENT
Start: 2020-08-08 | End: 2020-08-09 | Stop reason: HOSPADM

## 2020-08-08 RX ORDER — AMLODIPINE BESYLATE 10 MG/1
10 TABLET ORAL DAILY
Status: DISCONTINUED | OUTPATIENT
Start: 2020-08-09 | End: 2020-08-09 | Stop reason: HOSPADM

## 2020-08-08 RX ORDER — VITAMIN E 268 MG
800 CAPSULE ORAL EVERY EVENING
Status: DISCONTINUED | OUTPATIENT
Start: 2020-08-08 | End: 2020-08-09 | Stop reason: HOSPADM

## 2020-08-08 RX ORDER — METOPROLOL SUCCINATE 50 MG/1
100 TABLET, EXTENDED RELEASE ORAL DAILY
Status: DISCONTINUED | OUTPATIENT
Start: 2020-08-08 | End: 2020-08-09 | Stop reason: HOSPADM

## 2020-08-08 RX ORDER — ASPIRIN 81 MG/1
81 TABLET ORAL DAILY
Status: DISCONTINUED | OUTPATIENT
Start: 2020-08-08 | End: 2020-08-09 | Stop reason: HOSPADM

## 2020-08-08 RX ORDER — CETIRIZINE HYDROCHLORIDE 10 MG/1
10 TABLET ORAL DAILY
Status: DISCONTINUED | OUTPATIENT
Start: 2020-08-08 | End: 2020-08-09 | Stop reason: HOSPADM

## 2020-08-08 RX ORDER — AMLODIPINE BESYLATE 5 MG/1
5 TABLET ORAL DAILY
Status: DISCONTINUED | OUTPATIENT
Start: 2020-08-08 | End: 2020-08-08

## 2020-08-08 RX ADMIN — GABAPENTIN 600 MG: 300 CAPSULE ORAL at 15:36

## 2020-08-08 RX ADMIN — APREMILAST 30 MG: 30 TABLET, FILM COATED ORAL at 21:02

## 2020-08-08 RX ADMIN — PRIMIDONE 50 MG: 50 TABLET ORAL at 21:01

## 2020-08-08 RX ADMIN — ACETAMINOPHEN 650 MG: 325 TABLET ORAL at 17:48

## 2020-08-08 RX ADMIN — LEVOTHYROXINE SODIUM 137.5 MCG: 25 TABLET ORAL at 09:59

## 2020-08-08 RX ADMIN — Medication 800 UNITS: at 17:48

## 2020-08-08 RX ADMIN — GABAPENTIN 600 MG: 300 CAPSULE ORAL at 09:58

## 2020-08-08 RX ADMIN — CYANOCOBALAMIN 1000 MCG: 1000 INJECTION, SOLUTION INTRAMUSCULAR; SUBCUTANEOUS at 10:00

## 2020-08-08 RX ADMIN — SODIUM CHLORIDE, PRESERVATIVE FREE 10 ML: 5 INJECTION INTRAVENOUS at 21:02

## 2020-08-08 RX ADMIN — METHOTREXATE 25 MG: 2.5 TABLET ORAL at 10:01

## 2020-08-08 RX ADMIN — AMLODIPINE BESYLATE 10 MG: 10 TABLET ORAL at 11:26

## 2020-08-08 RX ADMIN — PANTOPRAZOLE SODIUM 40 MG: 40 TABLET, DELAYED RELEASE ORAL at 09:59

## 2020-08-08 RX ADMIN — ACETAMINOPHEN 650 MG: 325 TABLET ORAL at 11:26

## 2020-08-08 RX ADMIN — CETIRIZINE HYDROCHLORIDE 10 MG: 10 TABLET, FILM COATED ORAL at 09:58

## 2020-08-08 RX ADMIN — INSULIN DETEMIR 25 UNITS: 100 INJECTION, SOLUTION SUBCUTANEOUS at 10:09

## 2020-08-08 RX ADMIN — Medication 1 TABLET: at 21:01

## 2020-08-08 RX ADMIN — ENOXAPARIN SODIUM 40 MG: 100 INJECTION SUBCUTANEOUS at 21:01

## 2020-08-08 RX ADMIN — OXYCODONE HYDROCHLORIDE AND ACETAMINOPHEN 1000 MG: 500 TABLET ORAL at 17:48

## 2020-08-08 RX ADMIN — DULOXETINE HYDROCHLORIDE 90 MG: 60 CAPSULE, DELAYED RELEASE ORAL at 09:58

## 2020-08-08 RX ADMIN — FAMOTIDINE 20 MG: 20 TABLET ORAL at 09:58

## 2020-08-08 RX ADMIN — FERROUS SULFATE TAB 325 MG (65 MG ELEMENTAL FE) 325 MG: 325 (65 FE) TAB at 10:00

## 2020-08-08 RX ADMIN — CHLORTHALIDONE 25 MG: 50 TABLET ORAL at 11:26

## 2020-08-08 RX ADMIN — FAMOTIDINE 20 MG: 20 TABLET ORAL at 21:01

## 2020-08-08 RX ADMIN — DULOXETINE HYDROCHLORIDE 60 MG: 60 CAPSULE, DELAYED RELEASE ORAL at 21:01

## 2020-08-08 RX ADMIN — FOLIC ACID 1 MG: 1 TABLET ORAL at 09:58

## 2020-08-08 RX ADMIN — GABAPENTIN 600 MG: 300 CAPSULE ORAL at 21:01

## 2020-08-08 RX ADMIN — Medication 5000 UNITS: at 09:58

## 2020-08-08 RX ADMIN — CYCLOBENZAPRINE HYDROCHLORIDE 10 MG: 10 TABLET, FILM COATED ORAL at 17:48

## 2020-08-08 RX ADMIN — LISINOPRIL 40 MG: 10 TABLET ORAL at 21:01

## 2020-08-08 RX ADMIN — AMITRIPTYLINE HYDROCHLORIDE 25 MG: 25 TABLET, FILM COATED ORAL at 21:01

## 2020-08-08 RX ADMIN — SODIUM CHLORIDE 5 MG/HR: 9 INJECTION, SOLUTION INTRAVENOUS at 11:26

## 2020-08-08 RX ADMIN — SODIUM CHLORIDE, PRESERVATIVE FREE 10 ML: 5 INJECTION INTRAVENOUS at 10:02

## 2020-08-08 RX ADMIN — Medication 1 TABLET: at 17:48

## 2020-08-08 RX ADMIN — ASPIRIN 81 MG: 81 TABLET, COATED ORAL at 09:59

## 2020-08-08 RX ADMIN — METOPROLOL SUCCINATE 100 MG: 50 TABLET, EXTENDED RELEASE ORAL at 09:58

## 2020-08-08 NOTE — PLAN OF CARE
Problem: Pain, Chronic (Adult)  Goal: Identify Related Risk Factors and Signs and Symptoms  Description  Related risk factors and signs and symptoms are identified upon initiation of Human Response Clinical Practice Guideline (CPG).  Outcome: Ongoing (interventions implemented as appropriate)  Note:   VSS. Patient resting well in bed. Pressures within normal range. Up to bedside.   Goal: Acceptable Pain/Comfort Level and Functional Ability  Description  Patient will demonstrate the desired outcomes by discharge/transition of care.  Outcome: Ongoing (interventions implemented as appropriate)  Flowsheets (Taken 8/8/2020 0444)  Acceptable Pain/Comfort Level and Functional Ability: making progress toward outcome  Intervention: Manage Persistent Pain Analgesia  Description  Match analgesia type to biological mechanism/pain severity  Optimize starting dose for rapid/effective control and titrate to patient response  Provide dosing at regular intervals and calculated PRN dosing for breakthrough pain  Monitor for signs of substance tolerance (increased dose/amount to reach desired effect, decreased effect with same dose/amount)  Avoid abrupt withdrawal of medications capable of causing physical  Provide opioid rotation/switching with difficult to manage pain  Anticipate need to manage drug-induced side effects  Flowsheets  Taken 8/8/2020 0444  Bowel Intervention: adequate fluid intake promoted;commode/bedpan at bedside  Taken 8/7/2020 2200  Medication Review/Management: medications reviewed  Intervention: Support Psychosocial Response to Persistent Pain  Description  Evaluate for barriers to effective pain management (e.g., behavioral, sociocultural, financial)  Acknowledge emotional response, provide empathetic listening/presence  Acknowledge personal expertise in pain self-management  Explore/promote coping strategies  Utilize an interprofessional team approach using biophysical/spiritual/cultural focus  Encourage  social support and interaction  Explore/encourage use of complementary therapy  Flowsheets  Taken 8/8/2020 0230  Supportive Measures: active listening utilized  Taken 8/8/2020 0444  Diversional Activities: music;smartphone;television  Intervention: Mutually Develop/Implement Persistent Pain Management Plan  Description  Mutually establish pain management plan and goals with the patient/family/caregiver  Determine acceptable level of discomfort that will allow for maximal function  Consider previous experience with pain/past successful treatment  Review/revisit plan regularly  Allow patient choices in care and multimodal pain management  Implement techniques to stimulate/block pain quiroz (e.g., pressure, massage, TENS)  Promote calm environment/limit noise/noxious stimuli  Flowsheets (Taken 8/8/2020 0230)  Sensory Stimulation Regulation: care clustered     Problem: Patient Care Overview  Goal: Plan of Care Review  Outcome: Ongoing (interventions implemented as appropriate)  Flowsheets  Taken 8/8/2020 0444  Progress: improving  Taken 8/8/2020 0230  Plan of Care Reviewed With: patient  Goal: Individualization and Mutuality  Outcome: Ongoing (interventions implemented as appropriate)  Goal: Discharge Needs Assessment  Outcome: Ongoing (interventions implemented as appropriate)  Flowsheets  Taken 8/7/2020 1900 by Sinai Sanders, RN  Equipment Currently Used at Home: cane, straight;glucometer;other (see comments) (BP cuff)  Transportation Anticipated: family or friend will provide;car, drives self  Patient/Family Anticipated Services at Transition: none  Patient/Family Anticipates Transition to: home with family  Taken 8/8/2020 0444 by Ayala Ball, RN  Concerns to be Addressed: no discharge needs identified  Readmission Within the Last 30 Days: no previous admission in last 30 days  Goal: Interprofessional Rounds/Family Conf  Outcome: Ongoing (interventions implemented as appropriate)     Problem: Fall Risk  (Adult)  Goal: Identify Related Risk Factors and Signs and Symptoms  Description  Related risk factors and signs and symptoms are identified upon initiation of Human Response Clinical Practice Guideline (CPG).  Outcome: Ongoing (interventions implemented as appropriate)  Flowsheets (Taken 8/8/2020 0444)  Related Risk Factors (Fall Risk): bladder function altered; environment unfamiliar  Goal: Absence of Fall  Description  Patient will demonstrate the desired outcomes by discharge/transition of care.  Outcome: Ongoing (interventions implemented as appropriate)  Flowsheets (Taken 8/8/2020 0444)  Absence of Fall: making progress toward outcome  Intervention: Monitor/Assist with Self Care  Description  Provide a safe, barrier-free environment that encourages independent activity  Keep care area uncluttered  Keep needed items within reach (e.g., call light, personal items)  Promote use of personal vision/auditory aids (e.g., glasses, hearing aids)  Assess assistance level required for safe/effective self-care  Encourage functional activity performance with appropriate level of assistance based upon level of ability  Flowsheets (Taken 8/8/2020 0230)  Activity Assistance Provided: independent  Intervention: Reduce Risk/Promote Restraint Free Environment  Description  Reassess fall risk frequently and with change in status/transfer to another level of care  Communicate fall/injury risk to interprofessional health care team  Determine need for increased observation, or bed/chair alarms  Assess equipment /environmental modification needs (e.g., low bed, signage, nonskid footwear)  Define behavior and activity limits to patient/family  Perform regular intentional rounding to assess need for position change, pain assessment, personal needs  Flowsheets  Taken 8/8/2020 0400 by Sinai Sanders RN  Safety Promotion/Fall Prevention: fall prevention program maintained  Environmental Safety Modification: assistive device/personal  items within reach  Taken 8/8/2020 0230 by Ayala Ball RN  Safety/Security Measures: bed alarm refused  Intervention: Review Medications/Identify Contributors to Fall Risk  Description  Regularly review medication contribution to fall risk  Consider risk related to polypharmacy and age  Balance adequate pain management with potential for oversedation  Schedule medication administration times to minimize fall risk (e.g., avoid diuretics in pm)  High-risk medications related to falls include; narcotics, sedatives, diuretics, laxatives, hypnotic agents, insulin/oral hypoglycemics, regional blocks, recent anesthesia/sedation and cardiovascular drugs  Flowsheets (Taken 8/7/2020 2200)  Medication Review/Management: medications reviewed

## 2020-08-08 NOTE — PLAN OF CARE
Problem: Pain, Chronic (Adult)  Goal: Identify Related Risk Factors and Signs and Symptoms  Outcome: Ongoing (interventions implemented as appropriate)  Goal: Acceptable Pain/Comfort Level and Functional Ability  Outcome: Ongoing (interventions implemented as appropriate)     Problem: Patient Care Overview  Goal: Plan of Care Review  Outcome: Ongoing (interventions implemented as appropriate)  Goal: Individualization and Mutuality  Outcome: Ongoing (interventions implemented as appropriate)  Goal: Discharge Needs Assessment  Outcome: Ongoing (interventions implemented as appropriate)  Goal: Interprofessional Rounds/Family Conf  Outcome: Ongoing (interventions implemented as appropriate)     Problem: Fall Risk (Adult)  Goal: Identify Related Risk Factors and Signs and Symptoms  Outcome: Ongoing (interventions implemented as appropriate)  Goal: Absence of Fall  Outcome: Ongoing (interventions implemented as appropriate)

## 2020-08-08 NOTE — ED PROVIDER NOTES
Subjective   Patient is a 59-year-old female who presents with a chief complaint of pressure.  She states that her blood pressure has been running very high for a week.  She has been to her PCP regarding this, her lisinopril has been increased from 20 mg daily to 40 mg daily, Norvasc 5 mg daily was added.  Her Toprol-XL was kept at 100 mg daily.  Her blood pressure continues to run very high.  She has had some lightheadedness, dizziness, headaches, some substernal chest discomfort, mild shortness of breath.  The symptoms have been severe, but they have been persistent.  She denies other symptoms or complaints.          Review of Systems   Constitutional: Negative for chills, diaphoresis and fever.   HENT: Negative for ear pain, sore throat and trouble swallowing.    Eyes: Negative for photophobia and pain.   Respiratory: Positive for shortness of breath. Negative for wheezing.    Cardiovascular: Positive for chest pain. Negative for palpitations.   Gastrointestinal: Negative for abdominal distention, abdominal pain, blood in stool, diarrhea, nausea and vomiting.   Endocrine: Negative for polydipsia and polyphagia.   Genitourinary: Negative for difficulty urinating and flank pain.   Musculoskeletal: Negative for back pain, neck pain and neck stiffness.   Skin: Negative for color change and pallor.   Neurological: Positive for dizziness, light-headedness and headaches. Negative for seizures, syncope, speech difficulty, weakness and numbness.   Psychiatric/Behavioral: Negative for confusion.   All other systems reviewed and are negative.      Past Medical History:   Diagnosis Date   • Acid reflux    • Allergic    • Anxiety    • Cancer (CMS/HCC)     thyroid, skin   • Chronic pain disorder    • Degenerative disc disease, lumbar    • Depression    • Dupuytren's disease    • Essential hypertension    • Family history of coronary artery disease    • Fibromyalgia    • Gallbladder abscess    • H. pylori infection    • Hiatal  "hernia    • Hyperlipidemia    • Hypothyroidism    • Migraines     migraines   • Multiple sclerosis (CMS/HCC)    • Osteoarthritis    • Psoriasis    • Psoriatic arthritis (CMS/HCC)    • RA (rheumatoid arthritis) (CMS/HCC)    • Rheumatoid arthritis (CMS/HCC)    • Sinusitis    • Sjogren's syndrome (CMS/HCC)    • Stomach ulcer    • TMJ arthritis    • Type 2 diabetes mellitus (CMS/HCC)    • Urinary tract infection        Allergies   Allergen Reactions   • Codeine Angioedema   • Imuran [Azathioprine] Other (See Comments)     Has pancreatis attacks with med   • Januvia [Sitagliptin] Other (See Comments)     Has pancreatis attacks with med    • Penicillins Angioedema   • Sulfa Antibiotics Angioedema   • Sulfasalazine Unknown (See Comments)   • Beta Adrenergic Blockers Other (See Comments)     I called pt to ask her about the allergy to bblockers in her chart. Pt states \"too big of a dose makes her psoriasis act up\", but this current dose of metoprolol 50 mg bid, she has been on for a long time, with no ill side effects.  CATA,CMA 3/28/18       Past Surgical History:   Procedure Laterality Date   • BREAST LUMPECTOMY Left 11/1993   • CARDIAC CATHETERIZATION Left 09/21/2009    Normal    • CARPAL TUNNEL RELEASE  03/2012   • CERVICAL POLYPECTOMY  12/2015   • CHOLECYSTECTOMY  05/2007   • COLONOSCOPY     • ENDOSCOPY     • GASTRIC BYPASS  09/2007   • JOINT REPLACEMENT     • KNEE ARTHROPLASTY     • LUMBAR DISC SURGERY      C5-6   • REPLACEMENT TOTAL KNEE Right 06/2016   • SACRAL NERVE STIMULATOR PLACEMENT  09/2014   • THYROIDECTOMY  03/2016       Family History   Problem Relation Age of Onset   • Diabetes Mother    • Stroke Mother    • Hypertension Mother    • Heart attack Father         First one was in his 20's second 30's.    • Heart failure Father    • Heart disease Father    • Stroke Father    • Diabetes Sister    • Cancer Maternal Grandmother        Social History     Socioeconomic History   • Marital status:      Spouse " name: Not on file   • Number of children: Not on file   • Years of education: Not on file   • Highest education level: Not on file   Tobacco Use   • Smoking status: Never Smoker   • Smokeless tobacco: Never Used   Substance and Sexual Activity   • Alcohol use: No   • Drug use: No   • Sexual activity: Defer           Objective   Physical Exam   Constitutional: She is oriented to person, place, and time. She appears well-developed and well-nourished. No distress.   HENT:   Head: Normocephalic and atraumatic.   Eyes: Pupils are equal, round, and reactive to light. EOM are normal. No scleral icterus.   Neck: Normal range of motion. Neck supple. No neck rigidity. No tracheal deviation present.   Cardiovascular: Normal rate, regular rhythm and intact distal pulses.   Pulmonary/Chest: Effort normal and breath sounds normal. No respiratory distress. She exhibits no tenderness.   Abdominal: Soft. Bowel sounds are normal. There is no tenderness. There is no rebound and no guarding.   Musculoskeletal: Normal range of motion. She exhibits no tenderness.   Neurological: She is alert and oriented to person, place, and time. She has normal strength. No sensory deficit. She exhibits normal muscle tone. Coordination normal. GCS eye subscore is 4. GCS verbal subscore is 5. GCS motor subscore is 6.   Skin: Skin is warm and dry. She is not diaphoretic. No cyanosis. No pallor.   Psychiatric: She has a normal mood and affect. Her behavior is normal.   Nursing note and vitals reviewed.      Procedures           ED Course  ED Course as of Aug 08 1206   Fri Aug 07, 2020   1738 Patient appears to be resting comfortably, in no apparent distress.  Current vitals show sinus rhythm with a rate of 76 on the monitor, blood pressure 171/90 most recently.  I discussed the case with Dr. Haddad.  He is admitting patient to the hospitalist service.    [CM]      ED Course User Index  [CM] Antoine Pacheco MD                                            MDM    Final diagnoses:   Hypertensive urgency           Please note that portions of this note were completed with a voice recognition program.          Antoine Pacheco MD  08/08/20 9630

## 2020-08-08 NOTE — PROGRESS NOTES
"    [facility] HOSPITALIST PROGRESS NOTE     Patient Identification:  Name:  Lashae Benitez  Age:  59 y.o.  Sex:  female  :  1960  MRN:  [id number]  Visit Number:  [id number]  Primary Care Provider:  Kreis, Samuel Duane, MD    Length of stay:  1    Chief complaint:  Headache    Subjective:    Patient reports she continues to have a \"severe\" headache.  She is wearing sunglasses in a dark room.  However, patient does not appear to be too uncomfortable.  She is smiling when talking.  She does appear to have a great deal of anxiety as she has intermittent tremors while talking with a wavering voice.  Patient's  is at bedside who confirms patient does have history of anxiety and she has been quite anxious in the past months secondary to fear of COVID-19.  Otherwise, patient denies any other new symptoms.  ----------------------------------------------------------------------------------------------------------------------  Current Hospital Meds:    amitriptyline 25 mg Oral Nightly   [START ON 2020] amLODIPine 10 mg Oral Daily   Apremilast 30 mg Oral BID   aspirin 81 mg Oral Daily   calcium carb-cholecalciferol 1 tablet Oral Nightly   cetirizine 10 mg Oral Daily   chlorthalidone 25 mg Oral Daily   cyanocobalamin 1,000 mcg Intramuscular Q28 Days   cyclobenzaprine 10 mg Oral Q PM   DULoxetine 60 mg Oral Nightly   DULoxetine 90 mg Oral Daily   enoxaparin 40 mg Subcutaneous Q24H   famotidine 20 mg Oral BID   ferrous sulfate 325 mg Oral QAM   folic acid 1 mg Oral Daily   gabapentin 600 mg Oral Q8H   insulin detemir 25 Units Subcutaneous Daily   levothyroxine 137.5 mcg Oral Q AM   linaclotide 290 mcg Oral QAM AC   lisinopril 40 mg Oral Nightly   methotrexate 25 mg Oral Weekly   metoprolol succinate  mg Oral Daily   multivitamin 1 tablet Oral Q PM   pantoprazole 40 mg Oral QAM   primidone 50 mg Oral Nightly   sodium chloride 10 mL Intravenous Q12H   vitamin C 1,000 mg Oral Q PM   vitamin D3 " 5,000 Units Oral Daily   vitamin E 800 Units Oral Q PM       niCARdipine 5-15 mg/hr Last Rate: 5 mg/hr (08/08/20 1126)     ----------------------------------------------------------------------------------------------------------------------  Vital Signs:  Temp:  [98 °F (36.7 °C)-98.3 °F (36.8 °C)] 98.1 °F (36.7 °C)  Heart Rate:  [67-93] 80  Resp:  [12-23] 14  BP: (143-189)/() 144/100      08/07/20  1450 08/07/20  1900 08/08/20  0400   Weight: 93 kg (205 lb) 95.8 kg (211 lb 4.8 oz) 95.7 kg (211 lb)     Body mass index is 35.11 kg/m².    Intake/Output Summary (Last 24 hours) at 8/8/2020 1617  Last data filed at 8/8/2020 0800  Gross per 24 hour   Intake 460 ml   Output 775 ml   Net -315 ml     Diet Regular  ----------------------------------------------------------------------------------------------------------------------  Physical exam:  Constitutional: Well-nourished  female in no apparent distress.     HENT:  Head:  Normocephalic and atraumatic.  Mouth:  Moist mucous membranes.    Eyes:  Conjunctivae and EOM are normal.  Pupils are equal, round, and reactive to light.  No scleral icterus.    Neck:  Neck supple. No thyromegaly.  No JVD present.    Cardiovascular:  Regular rate and rhythm with no murmurs, rubs, clicks or gallops appreciated.  Pulmonary/Chest:  Clear to auscultation bilaterally with no crackles, wheezes or rhonchi appreciated.  Abdominal:  Soft. Nontender. Nondistended  Bowel sounds are normal in all four quadrants. No organomegally appreciated.   Musculoskeletal:  5/5 muscle strength bilateral upper and lower extremities with equal muscle tone and bulk. No edema, no tenderness, and no deformity.  No red or swollen joints anywhere.    Neurological:  Alert and oriented to person, place, and time. Cranial nerves II-XII intact with no focal deficits.  No facial droop.  No slurred speech.   Skin:  Warm and dry to palpation with no rashes or lesions appreciated.  Peripheral vascular:  2+  radial and pedal pulses in bilateral upper and lower extremities.  Psychiatric:  Alert and oriented x3, demonstrates appropriate judgement and insight.  ----------------------------------------------------------------------------------------------------------------------  Tele:    ----------------------------------------------------------------------------------------------------------------------  Results from last 7 days   Lab Units 08/07/20  1543   TROPONIN T ng/mL <0.010     Results from last 7 days   Lab Units 08/08/20  0026 08/07/20  1543   CRP mg/dL  --  0.09   WBC 10*3/mm3 7.09 7.07   HEMOGLOBIN g/dL 11.2* 11.3*   HEMATOCRIT % 33.5* 33.5*   MCV fL 95.7 94.9   MCHC g/dL 33.4 33.7   PLATELETS 10*3/mm3 266 258         Results from last 7 days   Lab Units 08/08/20  0026 08/07/20  1543   SODIUM mmol/L 140 135*   POTASSIUM mmol/L 3.4* 3.1*   MAGNESIUM mg/dL  --  1.8   CHLORIDE mmol/L 101 96*   CO2 mmol/L 26.0 25.6   BUN mg/dL 7 9   CREATININE mg/dL 0.71 0.74   EGFR IF NONAFRICN AM mL/min/1.73 85 81   CALCIUM mg/dL 8.6 8.9   GLUCOSE mg/dL 200* 229*   ALBUMIN g/dL  --  3.91   BILIRUBIN mg/dL  --  0.4   ALK PHOS U/L  --  111   AST (SGOT) U/L  --  13   ALT (SGPT) U/L  --  16   Estimated Creatinine Clearance: 98.9 mL/min (by C-G formula based on SCr of 0.71 mg/dL).    No results found for: AMMONIA      No results found for: BLOODCX  No results found for: URINECX  No results found for: WOUNDCX  No results found for: STOOLCX    I have personally looked at the labs and they are summarized above.  ----------------------------------------------------------------------------------------------------------------------  Imaging Results (Last 24 Hours)     ** No results found for the last 24 hours. **        ----------------------------------------------------------------------------------------------------------------------  Assessment and Plan:    1.  Hypertensive urgency -patient still with blood pressure 170s/110s.  However,  note was made Cardene was not started overnight.  Have started Cardene, will continue increased dose of amlodipine at 10 mg p.o. daily.  Have also added chlorthalidone 25 mg p.o. daily.  We will continue to monitor closely, will decrease Cardene drip as blood pressure improves.  If no improvement in blood pressure in the next 24 hours, will consult cardiology.     2.  Hyperlipidemia -statin     3.  Hypothyroidism -Synthroid     4. Autoimmune Disorder - Patient with reported history of possible Sjogren's Syndrome, RA, Psoriatic Arthritis and Fibromyalgia.  Continue supportive care.            Kane Haddad,   08/08/20  16:17

## 2020-08-09 VITALS
HEIGHT: 65 IN | SYSTOLIC BLOOD PRESSURE: 143 MMHG | WEIGHT: 212.3 LBS | DIASTOLIC BLOOD PRESSURE: 83 MMHG | RESPIRATION RATE: 20 BRPM | BODY MASS INDEX: 35.37 KG/M2 | TEMPERATURE: 98.6 F | OXYGEN SATURATION: 98 % | HEART RATE: 104 BPM

## 2020-08-09 PROBLEM — R00.2 PALPITATIONS: Status: RESOLVED | Noted: 2017-12-19 | Resolved: 2020-08-09

## 2020-08-09 PROBLEM — R07.9 CHEST PAIN: Status: RESOLVED | Noted: 2017-04-12 | Resolved: 2020-08-09

## 2020-08-09 PROBLEM — I16.0 HYPERTENSIVE URGENCY: Status: RESOLVED | Noted: 2020-08-07 | Resolved: 2020-08-09

## 2020-08-09 LAB
ANION GAP SERPL CALCULATED.3IONS-SCNC: 12.1 MMOL/L (ref 5–15)
BUN SERPL-MCNC: 8 MG/DL (ref 6–20)
BUN/CREAT SERPL: 11.3 (ref 7–25)
CALCIUM SPEC-SCNC: 9.3 MG/DL (ref 8.6–10.5)
CHLORIDE SERPL-SCNC: 100 MMOL/L (ref 98–107)
CO2 SERPL-SCNC: 25.9 MMOL/L (ref 22–29)
CREAT SERPL-MCNC: 0.71 MG/DL (ref 0.57–1)
DEPRECATED RDW RBC AUTO: 54.8 FL (ref 37–54)
ERYTHROCYTE [DISTWIDTH] IN BLOOD BY AUTOMATED COUNT: 15.3 % (ref 12.3–15.4)
GFR SERPL CREATININE-BSD FRML MDRD: 85 ML/MIN/1.73
GLUCOSE BLDC GLUCOMTR-MCNC: 209 MG/DL (ref 70–130)
GLUCOSE SERPL-MCNC: 161 MG/DL (ref 65–99)
HCT VFR BLD AUTO: 41.1 % (ref 34–46.6)
HGB BLD-MCNC: 12.8 G/DL (ref 12–15.9)
MCH RBC QN AUTO: 30.7 PG (ref 26.6–33)
MCHC RBC AUTO-ENTMCNC: 31.1 G/DL (ref 31.5–35.7)
MCV RBC AUTO: 98.6 FL (ref 79–97)
PLATELET # BLD AUTO: 295 10*3/MM3 (ref 140–450)
PMV BLD AUTO: 9.9 FL (ref 6–12)
POTASSIUM SERPL-SCNC: 3.3 MMOL/L (ref 3.5–5.2)
RBC # BLD AUTO: 4.17 10*6/MM3 (ref 3.77–5.28)
SODIUM SERPL-SCNC: 138 MMOL/L (ref 136–145)
WBC # BLD AUTO: 8.75 10*3/MM3 (ref 3.4–10.8)

## 2020-08-09 PROCEDURE — 63710000001 INSULIN DETEMIR PER 5 UNITS: Performed by: INTERNAL MEDICINE

## 2020-08-09 PROCEDURE — 82962 GLUCOSE BLOOD TEST: CPT

## 2020-08-09 PROCEDURE — 80048 BASIC METABOLIC PNL TOTAL CA: CPT | Performed by: INTERNAL MEDICINE

## 2020-08-09 PROCEDURE — 85027 COMPLETE CBC AUTOMATED: CPT | Performed by: INTERNAL MEDICINE

## 2020-08-09 PROCEDURE — 99239 HOSP IP/OBS DSCHRG MGMT >30: CPT | Performed by: INTERNAL MEDICINE

## 2020-08-09 RX ORDER — CHLORTHALIDONE 25 MG/1
25 TABLET ORAL DAILY
Qty: 30 TABLET | Refills: 1 | Status: ON HOLD | OUTPATIENT
Start: 2020-08-10 | End: 2020-08-14

## 2020-08-09 RX ORDER — AMLODIPINE BESYLATE 10 MG/1
10 TABLET ORAL DAILY
Qty: 30 TABLET | Refills: 1 | Status: SHIPPED | OUTPATIENT
Start: 2020-08-10 | End: 2020-08-18 | Stop reason: HOSPADM

## 2020-08-09 RX ADMIN — APREMILAST 30 MG: 30 TABLET, FILM COATED ORAL at 08:49

## 2020-08-09 RX ADMIN — FOLIC ACID 1 MG: 1 TABLET ORAL at 08:48

## 2020-08-09 RX ADMIN — FERROUS SULFATE TAB 325 MG (65 MG ELEMENTAL FE) 325 MG: 325 (65 FE) TAB at 06:31

## 2020-08-09 RX ADMIN — ASPIRIN 81 MG: 81 TABLET, COATED ORAL at 08:48

## 2020-08-09 RX ADMIN — GABAPENTIN 600 MG: 300 CAPSULE ORAL at 06:31

## 2020-08-09 RX ADMIN — DULOXETINE HYDROCHLORIDE 90 MG: 60 CAPSULE, DELAYED RELEASE ORAL at 08:49

## 2020-08-09 RX ADMIN — AMLODIPINE BESYLATE 10 MG: 10 TABLET ORAL at 08:49

## 2020-08-09 RX ADMIN — PANTOPRAZOLE SODIUM 40 MG: 40 TABLET, DELAYED RELEASE ORAL at 06:31

## 2020-08-09 RX ADMIN — LEVOTHYROXINE SODIUM 137.5 MCG: 25 TABLET ORAL at 06:31

## 2020-08-09 RX ADMIN — FAMOTIDINE 20 MG: 20 TABLET ORAL at 08:49

## 2020-08-09 RX ADMIN — CHLORTHALIDONE 25 MG: 50 TABLET ORAL at 08:48

## 2020-08-09 RX ADMIN — METOPROLOL SUCCINATE 100 MG: 50 TABLET, EXTENDED RELEASE ORAL at 08:48

## 2020-08-09 RX ADMIN — Medication 5000 UNITS: at 08:48

## 2020-08-09 RX ADMIN — SODIUM CHLORIDE, PRESERVATIVE FREE 10 ML: 5 INJECTION INTRAVENOUS at 08:50

## 2020-08-09 RX ADMIN — CETIRIZINE HYDROCHLORIDE 10 MG: 10 TABLET, FILM COATED ORAL at 08:48

## 2020-08-09 RX ADMIN — INSULIN DETEMIR 25 UNITS: 100 INJECTION, SOLUTION SUBCUTANEOUS at 08:48

## 2020-08-09 NOTE — PLAN OF CARE
Problem: Patient Care Overview  Goal: Plan of Care Review  Outcome: Ongoing (interventions implemented as appropriate)  Flowsheets  Taken 8/9/2020 0251  Progress: improving  Taken 8/9/2020 0200  Plan of Care Reviewed With: patient  Note:   IVAN dAen was put on hold at 0200, continuing to monitor BP. Patient reported having a headache at beginning of shift, PRN pain meds given. Will continue to monitor.

## 2020-08-09 NOTE — DISCHARGE SUMMARY
[facility] HOSPITALIST MEDICINE DISCHARGE SUMMARY    Patient Identification:  Name:  Lashae Benietz  Age:  59 y.o.  Sex:  female  :  1960  MRN:  [id number]  Visit Number:  [id number]    Date of Admission: 2020  Date of Discharge:  2020     PCP: Kreis, Samuel Duane, MD    DISCHARGE DIAGNOSIS   1.  Hypertensive urgency  2.  Hyperlipidemia  3.  Hypothyroidism  4.  Autoimmune disorder      CONSULTS  None      PROCEDURES PERFORMED   None      HOSPITAL COURSE  Ms. Benitez is a 59 y.o. female who presented to [facility] ED on 2020 with a chief complaint of headache with persistent high blood pressure.  Patient has a past medical history remarkable for anxiety, depression, essential hypertension, hypothyroidism, hyperlipidemia, insulin-dependent type 2 diabetes mellitus and an autoimmune disorder of unspecified type.  Patient states she went to her primary care provider approximately 10 days prior to evaluation in the emergency department for a routine follow-up.  Patient did have some right lower extremity swelling at that time for which she was being seen.  However, during that checkup she was noted to have a markedly elevated blood pressure.  Patient did have her home dose of lisinopril increased from 20 mg to 40 mg.  Patient went home and then followed up with her primary cardiologist several days later where and she had persistent elevated blood pressure.  Patient had Norvasc 5 mg p.o. daily started at that time.  However, secondary to persistently elevated blood pressures she did present to the emergency department for further treatment and evaluation.  Initial evaluation in the emergency department did consist of basic laboratory work as well as physical exam and vital signs.Initial vital signs found patient's blood pressure 204/110, respirations 18, heart rate 78 and temperature 98.2 °F.  Patient did have CBC and CMP performed all of which were essentially within normal  limits.  Patient did receive multiple rounds of antihypertensive medication in the emergency department including labetalol.  Unfortunately, patient continued to have refractory hypertension.  As such, patient was admitted for further treatment and evaluation of hypertensive urgency.    Patient was admitted to our progressive care unit as an inpatient for hypertensive urgency.  She was started on a Cardene drip and patient's home dose of amlodipine was increased to 10 mg.  Unfortunately, patient had persistent elevated blood pressures over the first 24 hours of hospitalization.  As such, patient did have chlorthalidone 25 mg p.o. added to her overall antihypertensive regimen.  This did appear to help as patient's blood pressure did lower and was eventually able to be titrated off of her Cardene drip.  Patient has had Cardene drip discontinued for approximately 12 hours and she maintains a systolic blood pressure in the 140s range.  As such, this is felt to be a dramatic improvement compared to initial presentation.  Have recommended patient follow-up with her primary care provider within 2 weeks of discharge for blood pressure check/follow-up at that time.  Patient will be provided a prescription for increased dose of amlodipine as well as new prescription for chlorthalidone.  With this in mind, it is felt patient has reached maximum medical benefit of current hospitalization and will be discharged home in stable condition today.  The beforementioned plan was thoroughly discussed with the patient and she expressed understanding and willingness to proceed with the beforementioned plan.    VITAL SIGNS:      08/07/20  1900 08/08/20  0400 08/09/20  0400   Weight: 95.8 kg (211 lb 4.8 oz) 95.7 kg (211 lb) 96.3 kg (212 lb 4.8 oz)     Body mass index is 35.33 kg/m².    PHYSICAL EXAM:  Constitutional: Well-nourished  female in no apparent distress.     HENT:  Head:  Normocephalic and atraumatic.  Mouth:  Moist  mucous membranes.    Eyes:  Conjunctivae and EOM are normal.  Pupils are equal, round, and reactive to light.  No scleral icterus.    Neck:  Neck supple. No thyromegaly.  No JVD present.    Cardiovascular:  Regular rate and rhythm with no murmurs, rubs, clicks or gallops appreciated.  Pulmonary/Chest:  Clear to auscultation bilaterally with no crackles, wheezes or rhonchi appreciated.  Abdominal:  Soft. Nontender. Nondistended  Bowel sounds are normal in all four quadrants. No organomegally appreciated.   Musculoskeletal:  5/5 muscle strength bilateral upper and lower extremities with equal muscle tone and bulk. No edema, no tenderness, and no deformity.  No red or swollen joints anywhere.    Neurological:  Alert and oriented to person, place, and time. Cranial nerves II-XII intact with no focal deficits.  No facial droop.  No slurred speech.   Skin:  Warm and dry to palpation with no rashes or lesions appreciated.  Peripheral vascular:  2+ radial and pedal pulses in bilateral upper and lower extremities.  Psychiatric:  Alert and oriented x3, demonstrates appropriate judgement and insight.    DISCHARGE DISPOSITION   Stable    DISCHARGE MEDICATIONS:     Discharge Medications      New Medications      Instructions Start Date   chlorthalidone 25 MG tablet  Commonly known as:  HYGROTON   25 mg, Oral, Daily   Start Date:  August 10, 2020        Changes to Medications      Instructions Start Date   amLODIPine 10 MG tablet  Commonly known as:  NORVASC  What changed:    · medication strength  · how much to take   10 mg, Oral, Daily   Start Date:  August 10, 2020     DULoxetine 60 MG capsule  Commonly known as:  CYMBALTA  What changed:  Another medication with the same name was removed. Continue taking this medication, and follow the directions you see here.   60 mg, Oral, Nightly         Continue These Medications      Instructions Start Date   alendronate 70 MG tablet  Commonly known as:  FOSAMAX   70 mg, Oral, Weekly,  Wednesday      amitriptyline 25 MG tablet  Commonly known as:  ELAVIL   25 mg, Oral, Nightly      Apremilast 30 MG tablet   30 mg, Oral, 2 times daily      aspirin 81 MG tablet   1 tablet, Oral      BOTOX IM   Intramuscular, Every 3 Months      calcium citrate-vitamin d 315-250 MG-UNIT tablet tablet  Commonly known as:  CALCITRATE   1 tablet, Oral, Every Evening      celecoxib 200 MG capsule  Commonly known as:  CeleBREX   200 mg, Oral, Daily      cyanocobalamin 1000 MCG/ML injection   1,000 mcg, Intramuscular, Every 28 Days      cyclobenzaprine 10 MG tablet  Commonly known as:  FLEXERIL   10 mg, Oral, Every Evening      diazePAM 2 MG tablet  Commonly known as:  VALIUM   2 mg, Oral, Daily PRN      Emgality 120 MG/ML prefilled syringe  Generic drug:  galcanezumab-gnlm   120 mg, Subcutaneous, Every 30 Days      Enstilar 0.005-0.064 % foam  Generic drug:  Calcipotriene-Betameth Diprop   1 spray, Apply externally, Daily      ferrous gluconate 240 (27 FE) MG tablet  Commonly known as:  FERGON   240 mg, Oral, Every Morning      folic acid 1 MG tablet  Commonly known as:  FOLVITE   1 mg, Oral, Daily      gabapentin 600 MG tablet  Commonly known as:  NEURONTIN   600 mg, Oral, 3 Times Daily      insulin detemir 100 UNIT/ML injection  Commonly known as:  LEVEMIR   25 Units, Subcutaneous, Daily      linaclotide 290 MCG capsule capsule  Commonly known as:  LINZESS   290 mcg, Oral, Every Morning Before Breakfast      lisinopril 40 MG tablet  Commonly known as:  PRINIVIL,ZESTRIL   40 mg, Oral, Nightly      loratadine 10 MG tablet  Commonly known as:  CLARITIN   10 mg, Oral, Nightly      methotrexate 2.5 MG tablet   25 mg, Oral, Weekly, Prior to [facility] Admission, Patient was on: Takes 10 tablets on Saturday      multivitamin tablet tablet   1 tablet, Oral, Every Evening      omeprazole 40 MG capsule  Commonly known as:  priLOSEC   40 mg, Oral, Daily      Orencia 250 MG injection  Generic drug:  abatacept   750 mg, Intravenous,  Every 28 Days      primidone 50 MG tablet  Commonly known as:  MYSOLINE   50 mg, Oral, Nightly      raNITIdine 150 MG tablet  Commonly known as:  ZANTAC   150 mg, Oral, 2 Times Daily      Tirosint 137 MCG capsule  Generic drug:  levothyroxine sodium   137 mcg, Oral, Daily, Takes an extra dose at the first of each month.      Toprol  MG 24 hr tablet  Generic drug:  metoprolol succinate XL   TAKE 1 TABLET DAILY      vitamin C 500 MG tablet  Commonly known as:  ASCORBIC ACID   1,000 mg, Oral, Every Evening      vitamin D3 125 MCG (5000 UT) capsule capsule   5,000 Units, Oral, Daily      vitamin E 1000 UNIT capsule   1,000 Units, Oral, Every Evening               Your Scheduled Appointments    Aug 25, 2020  8:30 AM EDT  Follow Up with RUIZ Pitts  [facility] CARDIOLOGY (--) [address] [place] [address] [phone]   Arrive 15 minutes prior to appointment.            Additional Instructions for the Follow-ups that You Need to Schedule     Discharge Follow-up with PCP   As directed       Currently Documented PCP:    Kreis, Samuel Duane, MD    PCP Phone Number:    [phone]     Follow Up Details:  Please follow up with pcp in 1 week for BP check           Follow-up Information     Kreis, Samuel Duane, MD .    Specialty:  Family Medicine  Why:  Please follow up with pcp in 1 week for BP check  Contact information:  [address] [place] [address] [address] [place] [address] [phone]                   TEST  RESULTS PENDING AT DISCHARGE       Kane Haddad DO  08/09/20  12:44    Please note that this discharge summary required more than 30 minutes to complete.    Please send a copy of this dictation to the following providers:  Kreis, Samuel Duane, MD

## 2020-08-10 ENCOUNTER — READMISSION MANAGEMENT (OUTPATIENT)
Dept: CALL CENTER | Facility: HOSPITAL | Age: 60
End: 2020-08-10

## 2020-08-10 NOTE — OUTREACH NOTE
Prep Survey      Responses   [facility] patient discharged from?  [facility]   Is LACE score < 7 ?  No   Eligibility  Readm Mgmt   Discharge diagnosis  HTN   COVID-19 Test Status  Not tested   Does the patient have one of the following disease processes/diagnoses(primary or secondary)?  Other   Does the patient have Home health ordered?  No   Is there a DME ordered?  No   Comments regarding appointments  see AVS for apmts   Medication alerts for this patient  see AVS   Prep survey completed?  Yes          Vee Ku RN

## 2020-08-14 ENCOUNTER — HOSPITAL ENCOUNTER (INPATIENT)
Facility: HOSPITAL | Age: 60
LOS: 4 days | Discharge: HOME | End: 2020-08-18
Attending: FAMILY MEDICINE | Admitting: INTERNAL MEDICINE

## 2020-08-14 ENCOUNTER — READMISSION MANAGEMENT (OUTPATIENT)
Dept: CALL CENTER | Facility: HOSPITAL | Age: 60
End: 2020-08-14

## 2020-08-14 ENCOUNTER — EPISODE CHANGES (OUTPATIENT)
Dept: CASE MANAGEMENT | Facility: OTHER | Age: 60
End: 2020-08-14

## 2020-08-14 DIAGNOSIS — E87.1 HYPONATREMIA: Primary | ICD-10-CM

## 2020-08-14 LAB
ALBUMIN SERPL-MCNC: 4.53 G/DL (ref 3.5–5.2)
ALBUMIN/GLOB SERPL: 1.5 G/DL
ALP SERPL-CCNC: 128 U/L (ref 39–117)
ALT SERPL W P-5'-P-CCNC: 26 U/L (ref 1–33)
ANION GAP SERPL CALCULATED.3IONS-SCNC: 12.4 MMOL/L (ref 5–15)
ANION GAP SERPL CALCULATED.3IONS-SCNC: 14.3 MMOL/L (ref 5–15)
AST SERPL-CCNC: 24 U/L (ref 1–32)
BASOPHILS # BLD AUTO: 0.04 10*3/MM3 (ref 0–0.2)
BASOPHILS NFR BLD AUTO: 0.3 % (ref 0–1.5)
BILIRUB SERPL-MCNC: 0.7 MG/DL (ref 0–1.2)
BILIRUB UR QL STRIP: NEGATIVE
BUN SERPL-MCNC: 12 MG/DL (ref 6–20)
BUN SERPL-MCNC: 13 MG/DL (ref 6–20)
BUN/CREAT SERPL: 14.9 (ref 7–25)
BUN/CREAT SERPL: 16.9 (ref 7–25)
CALCIUM SPEC-SCNC: 8.3 MG/DL (ref 8.6–10.5)
CALCIUM SPEC-SCNC: 9.5 MG/DL (ref 8.6–10.5)
CHLORIDE SERPL-SCNC: 73 MMOL/L (ref 98–107)
CHLORIDE SERPL-SCNC: 79 MMOL/L (ref 98–107)
CHLORIDE UR-SCNC: <20 MMOL/L
CLARITY UR: CLEAR
CO2 SERPL-SCNC: 19.6 MMOL/L (ref 22–29)
CO2 SERPL-SCNC: 24.7 MMOL/L (ref 22–29)
COLOR UR: YELLOW
CORTIS SERPL-MCNC: 15.26 MCG/DL
CREAT SERPL-MCNC: 0.71 MG/DL (ref 0.57–1)
CREAT SERPL-MCNC: 0.87 MG/DL (ref 0.57–1)
DEPRECATED RDW RBC AUTO: 43.1 FL (ref 37–54)
EOSINOPHIL # BLD AUTO: 0.05 10*3/MM3 (ref 0–0.4)
EOSINOPHIL NFR BLD AUTO: 0.4 % (ref 0.3–6.2)
ERYTHROCYTE [DISTWIDTH] IN BLOOD BY AUTOMATED COUNT: 13.5 % (ref 12.3–15.4)
GFR SERPL CREATININE-BSD FRML MDRD: 67 ML/MIN/1.73
GFR SERPL CREATININE-BSD FRML MDRD: 85 ML/MIN/1.73
GLOBULIN UR ELPH-MCNC: 3 GM/DL
GLUCOSE SERPL-MCNC: 154 MG/DL (ref 65–99)
GLUCOSE SERPL-MCNC: 216 MG/DL (ref 65–99)
GLUCOSE UR STRIP-MCNC: NEGATIVE MG/DL
HCT VFR BLD AUTO: 40.8 % (ref 34–46.6)
HGB BLD-MCNC: 14.5 G/DL (ref 12–15.9)
HGB UR QL STRIP.AUTO: NEGATIVE
IMM GRANULOCYTES # BLD AUTO: 0.08 10*3/MM3 (ref 0–0.05)
IMM GRANULOCYTES NFR BLD AUTO: 0.6 % (ref 0–0.5)
KETONES UR QL STRIP: ABNORMAL
LEUKOCYTE ESTERASE UR QL STRIP.AUTO: NEGATIVE
LYMPHOCYTES # BLD AUTO: 1.5 10*3/MM3 (ref 0.7–3.1)
LYMPHOCYTES NFR BLD AUTO: 11.5 % (ref 19.6–45.3)
MCH RBC QN AUTO: 31.2 PG (ref 26.6–33)
MCHC RBC AUTO-ENTMCNC: 35.5 G/DL (ref 31.5–35.7)
MCV RBC AUTO: 87.7 FL (ref 79–97)
MONOCYTES # BLD AUTO: 0.74 10*3/MM3 (ref 0.1–0.9)
MONOCYTES NFR BLD AUTO: 5.7 % (ref 5–12)
NEUTROPHILS NFR BLD AUTO: 10.62 10*3/MM3 (ref 1.7–7)
NEUTROPHILS NFR BLD AUTO: 81.5 % (ref 42.7–76)
NITRITE UR QL STRIP: NEGATIVE
NRBC BLD AUTO-RTO: 0 /100 WBC (ref 0–0.2)
OSMOLALITY SERPL: 246 MOSM/KG (ref 275–300)
PH UR STRIP.AUTO: 5.5 [PH] (ref 5–8)
PLATELET # BLD AUTO: 379 10*3/MM3 (ref 140–450)
PMV BLD AUTO: 9.6 FL (ref 6–12)
POTASSIUM SERPL-SCNC: 3.5 MMOL/L (ref 3.5–5.2)
POTASSIUM SERPL-SCNC: 3.6 MMOL/L (ref 3.5–5.2)
POTASSIUM UR-SCNC: 3.5 MMOL/L
PROT SERPL-MCNC: 7.5 G/DL (ref 6–8.5)
PROT UR QL STRIP: NEGATIVE
RBC # BLD AUTO: 4.65 10*6/MM3 (ref 3.77–5.28)
SODIUM SERPL-SCNC: 111 MMOL/L (ref 136–145)
SODIUM SERPL-SCNC: 112 MMOL/L (ref 136–145)
SODIUM SERPL-SCNC: 118 MMOL/L (ref 136–145)
SODIUM SERPL-SCNC: 121 MMOL/L (ref 136–145)
SODIUM UR-SCNC: 22 MMOL/L
SP GR UR STRIP: 1.01 (ref 1–1.03)
TROPONIN T SERPL-MCNC: <0.01 NG/ML (ref 0–0.03)
TSH SERPL DL<=0.05 MIU/L-ACNC: 0.06 UIU/ML (ref 0.27–4.2)
UROBILINOGEN UR QL STRIP: ABNORMAL
WBC # BLD AUTO: 13.03 10*3/MM3 (ref 3.4–10.8)

## 2020-08-14 PROCEDURE — 83930 ASSAY OF BLOOD OSMOLALITY: CPT | Performed by: PHYSICIAN ASSISTANT

## 2020-08-14 PROCEDURE — 84295 ASSAY OF SERUM SODIUM: CPT | Performed by: INTERNAL MEDICINE

## 2020-08-14 PROCEDURE — 81003 URINALYSIS AUTO W/O SCOPE: CPT | Performed by: PHYSICIAN ASSISTANT

## 2020-08-14 PROCEDURE — 99284 EMERGENCY DEPT VISIT MOD MDM: CPT

## 2020-08-14 PROCEDURE — 85025 COMPLETE CBC W/AUTO DIFF WBC: CPT | Performed by: PHYSICIAN ASSISTANT

## 2020-08-14 PROCEDURE — 84484 ASSAY OF TROPONIN QUANT: CPT | Performed by: PHYSICIAN ASSISTANT

## 2020-08-14 PROCEDURE — 99223 1ST HOSP IP/OBS HIGH 75: CPT | Performed by: INTERNAL MEDICINE

## 2020-08-14 PROCEDURE — 84133 ASSAY OF URINE POTASSIUM: CPT | Performed by: INTERNAL MEDICINE

## 2020-08-14 PROCEDURE — 93005 ELECTROCARDIOGRAM TRACING: CPT | Performed by: PHYSICIAN ASSISTANT

## 2020-08-14 PROCEDURE — 82533 TOTAL CORTISOL: CPT | Performed by: PHYSICIAN ASSISTANT

## 2020-08-14 PROCEDURE — 80053 COMPREHEN METABOLIC PANEL: CPT | Performed by: PHYSICIAN ASSISTANT

## 2020-08-14 PROCEDURE — 83935 ASSAY OF URINE OSMOLALITY: CPT | Performed by: INTERNAL MEDICINE

## 2020-08-14 PROCEDURE — 25010000002 ENOXAPARIN PER 10 MG: Performed by: INTERNAL MEDICINE

## 2020-08-14 PROCEDURE — 84443 ASSAY THYROID STIM HORMONE: CPT | Performed by: PHYSICIAN ASSISTANT

## 2020-08-14 PROCEDURE — 82436 ASSAY OF URINE CHLORIDE: CPT | Performed by: INTERNAL MEDICINE

## 2020-08-14 PROCEDURE — 84300 ASSAY OF URINE SODIUM: CPT | Performed by: INTERNAL MEDICINE

## 2020-08-14 PROCEDURE — 93010 ELECTROCARDIOGRAM REPORT: CPT | Performed by: SPECIALIST

## 2020-08-14 RX ORDER — SODIUM CHLORIDE 0.9 % (FLUSH) 0.9 %
10 SYRINGE (ML) INJECTION EVERY 12 HOURS SCHEDULED
Status: DISCONTINUED | OUTPATIENT
Start: 2020-08-14 | End: 2020-08-18 | Stop reason: HOSPADM

## 2020-08-14 RX ORDER — FOLIC ACID 1 MG/1
1 TABLET ORAL DAILY
Status: DISCONTINUED | OUTPATIENT
Start: 2020-08-15 | End: 2020-08-18 | Stop reason: HOSPADM

## 2020-08-14 RX ORDER — DULOXETIN HYDROCHLORIDE 60 MG/1
60 CAPSULE, DELAYED RELEASE ORAL NIGHTLY
Status: DISCONTINUED | OUTPATIENT
Start: 2020-08-14 | End: 2020-08-15

## 2020-08-14 RX ORDER — GABAPENTIN 300 MG/1
600 CAPSULE ORAL EVERY 8 HOURS SCHEDULED
Status: DISCONTINUED | OUTPATIENT
Start: 2020-08-14 | End: 2020-08-18 | Stop reason: HOSPADM

## 2020-08-14 RX ORDER — SODIUM CHLORIDE 0.9 % (FLUSH) 0.9 %
10 SYRINGE (ML) INJECTION AS NEEDED
Status: DISCONTINUED | OUTPATIENT
Start: 2020-08-14 | End: 2020-08-18 | Stop reason: HOSPADM

## 2020-08-14 RX ORDER — 3% SODIUM CHLORIDE 3 G/100ML
200 INJECTION, SOLUTION INTRAVENOUS ONCE
Status: COMPLETED | OUTPATIENT
Start: 2020-08-14 | End: 2020-08-14

## 2020-08-14 RX ORDER — BISACODYL 10 MG
10 SUPPOSITORY, RECTAL RECTAL DAILY PRN
Status: DISCONTINUED | OUTPATIENT
Start: 2020-08-14 | End: 2020-08-18 | Stop reason: HOSPADM

## 2020-08-14 RX ORDER — AMOXICILLIN 250 MG
2 CAPSULE ORAL 2 TIMES DAILY PRN
Status: DISCONTINUED | OUTPATIENT
Start: 2020-08-14 | End: 2020-08-18 | Stop reason: HOSPADM

## 2020-08-14 RX ORDER — ALUMINA, MAGNESIA, AND SIMETHICONE 2400; 2400; 240 MG/30ML; MG/30ML; MG/30ML
15 SUSPENSION ORAL EVERY 6 HOURS PRN
Status: DISCONTINUED | OUTPATIENT
Start: 2020-08-14 | End: 2020-08-18 | Stop reason: HOSPADM

## 2020-08-14 RX ORDER — BISACODYL 5 MG/1
5 TABLET, DELAYED RELEASE ORAL DAILY PRN
Status: DISCONTINUED | OUTPATIENT
Start: 2020-08-14 | End: 2020-08-18 | Stop reason: HOSPADM

## 2020-08-14 RX ORDER — FAMOTIDINE 20 MG/1
20 TABLET, FILM COATED ORAL 2 TIMES DAILY
Status: CANCELLED | OUTPATIENT
Start: 2020-08-14

## 2020-08-14 RX ORDER — CYCLOBENZAPRINE HCL 10 MG
10 TABLET ORAL EVERY EVENING
Status: CANCELLED | OUTPATIENT
Start: 2020-08-14

## 2020-08-14 RX ORDER — PRIMIDONE 50 MG/1
50 TABLET ORAL NIGHTLY
Status: DISCONTINUED | OUTPATIENT
Start: 2020-08-14 | End: 2020-08-18 | Stop reason: HOSPADM

## 2020-08-14 RX ORDER — AMLODIPINE BESYLATE 10 MG/1
10 TABLET ORAL DAILY
Status: CANCELLED | OUTPATIENT
Start: 2020-08-15

## 2020-08-14 RX ORDER — AMITRIPTYLINE HYDROCHLORIDE 25 MG/1
25 TABLET, FILM COATED ORAL NIGHTLY
Status: CANCELLED | OUTPATIENT
Start: 2020-08-14

## 2020-08-14 RX ORDER — DEXTROSE MONOHYDRATE 50 MG/ML
500 INJECTION, SOLUTION INTRAVENOUS ONCE
Status: COMPLETED | OUTPATIENT
Start: 2020-08-15 | End: 2020-08-15

## 2020-08-14 RX ORDER — CYCLOBENZAPRINE HCL 10 MG
10 TABLET ORAL EVERY EVENING
Status: DISCONTINUED | OUTPATIENT
Start: 2020-08-14 | End: 2020-08-18 | Stop reason: HOSPADM

## 2020-08-14 RX ORDER — ONDANSETRON 2 MG/ML
4 INJECTION INTRAMUSCULAR; INTRAVENOUS EVERY 6 HOURS PRN
Status: DISCONTINUED | OUTPATIENT
Start: 2020-08-14 | End: 2020-08-18 | Stop reason: HOSPADM

## 2020-08-14 RX ORDER — MULTIVITAMIN
1 TABLET ORAL EVERY EVENING
Status: CANCELLED | OUTPATIENT
Start: 2020-08-14

## 2020-08-14 RX ORDER — DIAZEPAM 5 MG/1
2.5 TABLET ORAL DAILY PRN
Status: DISCONTINUED | OUTPATIENT
Start: 2020-08-14 | End: 2020-08-18 | Stop reason: HOSPADM

## 2020-08-14 RX ORDER — FERROUS SULFATE 325(65) MG
325 TABLET ORAL DAILY
Status: CANCELLED | OUTPATIENT
Start: 2020-08-15

## 2020-08-14 RX ORDER — DULOXETIN HYDROCHLORIDE 30 MG/1
90 CAPSULE, DELAYED RELEASE ORAL EVERY MORNING
COMMUNITY

## 2020-08-14 RX ORDER — FOLIC ACID 1 MG/1
1 TABLET ORAL DAILY
Status: CANCELLED | OUTPATIENT
Start: 2020-08-15

## 2020-08-14 RX ORDER — PRIMIDONE 50 MG/1
50 TABLET ORAL NIGHTLY
Status: CANCELLED | OUTPATIENT
Start: 2020-08-14

## 2020-08-14 RX ORDER — PANTOPRAZOLE SODIUM 40 MG/1
40 TABLET, DELAYED RELEASE ORAL EVERY MORNING
Status: CANCELLED | OUTPATIENT
Start: 2020-08-15

## 2020-08-14 RX ORDER — ONDANSETRON 4 MG/1
4 TABLET, FILM COATED ORAL EVERY 6 HOURS PRN
Status: DISCONTINUED | OUTPATIENT
Start: 2020-08-14 | End: 2020-08-18 | Stop reason: HOSPADM

## 2020-08-14 RX ORDER — LISINOPRIL 10 MG/1
40 TABLET ORAL NIGHTLY
Status: CANCELLED | OUTPATIENT
Start: 2020-08-14

## 2020-08-14 RX ORDER — GABAPENTIN 300 MG/1
600 CAPSULE ORAL 3 TIMES DAILY
Status: CANCELLED | OUTPATIENT
Start: 2020-08-14

## 2020-08-14 RX ORDER — DIAZEPAM 2 MG/1
2 TABLET ORAL DAILY PRN
Status: CANCELLED | OUTPATIENT
Start: 2020-08-14

## 2020-08-14 RX ORDER — DULOXETIN HYDROCHLORIDE 60 MG/1
60 CAPSULE, DELAYED RELEASE ORAL NIGHTLY
Status: CANCELLED | OUTPATIENT
Start: 2020-08-14

## 2020-08-14 RX ADMIN — SODIUM CHLORIDE 1000 ML: 9 INJECTION, SOLUTION INTRAVENOUS at 13:34

## 2020-08-14 RX ADMIN — SODIUM CHLORIDE, PRESERVATIVE FREE 10 ML: 5 INJECTION INTRAVENOUS at 22:15

## 2020-08-14 RX ADMIN — GABAPENTIN 600 MG: 300 CAPSULE ORAL at 22:15

## 2020-08-14 RX ADMIN — PRIMIDONE 50 MG: 50 TABLET ORAL at 22:15

## 2020-08-14 RX ADMIN — DULOXETINE HYDROCHLORIDE 60 MG: 60 CAPSULE, DELAYED RELEASE ORAL at 22:15

## 2020-08-14 RX ADMIN — SODIUM CHLORIDE 200 ML: 3 INJECTION, SOLUTION INTRAVENOUS at 17:41

## 2020-08-14 RX ADMIN — ENOXAPARIN SODIUM 40 MG: 40 INJECTION SUBCUTANEOUS at 18:37

## 2020-08-14 RX ADMIN — CYCLOBENZAPRINE HYDROCHLORIDE 10 MG: 10 TABLET, FILM COATED ORAL at 22:15

## 2020-08-14 NOTE — PROGRESS NOTES
Discharge Planning Assessment   [facility]     Patient Name: Lashae Benitez  MRN: [id number]  Today's Date: 8/14/2020    Admit Date: 8/14/2020    Discharge Needs Assessment     Row Name 08/14/20 1419       Living Environment    Lives With  spouse    Name(s) of Who Lives With Patient  lives with spouse Khanh Benitez    Current Living Arrangements  home/apartment/condo    Primary Care Provided by  self    Family Caregiver if Needed  spouse    Quality of Family Relationships  helpful;involved;supportive    Able to Return to Prior Arrangements  yes       Transition Planning    Patient/Family Anticipates Transition to  home with family    Patient/Family Anticipated Services at Transition  none    Transportation Anticipated  family or friend will provide       Discharge Needs Assessment    Readmission Within the Last 30 Days  current reason for admission unrelated to previous admission    Concerns to be Addressed  no discharge needs identified;denies needs/concerns at this time    Equipment Currently Used at Home  cane, straight    Anticipated Changes Related to Illness  none    Equipment Needed After Discharge  none        Discharge Plan     Row Name 08/14/20 1420       Plan    Plan  Pt lives at home with spouse Khanh Benitez who is at bedside and plans to return home upon discharge, family to provide transportation. Pcp is Dr Araujo, she uses [facility] in [place] and has Medicare a+b and . Denies any issues with meds or copays. Pt does not have a poa or advanced directive. She is independent with adl's and uses a cane for ambulation, she does not use home o2 or home health. No needs identifed at this time. Pt was discharged 8/9/20 from admission for HTN.     Patient/Family in Agreement with Plan  yes        Destination      Coordination has not been started for this encounter.      Durable Medical Equipment      Coordination has not been started for this encounter.      Dialysis/Infusion      Coordination has not  been started for this encounter.      Home Medical Care      Coordination has not been started for this encounter.      Therapy      Coordination has not been started for this encounter.      Community Resources      Coordination has not been started for this encounter.          Demographic Summary     Row Name 08/14/20 1415       General Information    Admission Type  inpatient    Arrived From  home    Referral Source  emergency department    Reason for Consult  discharge planning    Preferred Language  English        Functional Status     Row Name 08/14/20 1416       Functional Status    Usual Activity Tolerance  good    Current Activity Tolerance  good       Mental Status    General Appearance WDL  WDL        Psychosocial     Row Name 08/14/20 1416       Behavior WDL    Behavior WDL  WDL       Emotion Mood WDL    Emotion/Mood/Affect WDL  WDL       Speech WDL    Speech WDL  WDL        Abuse/Neglect    No documentation.       Legal    No documentation.       Substance Abuse    No documentation.       Patient Forms    No documentation.           Gill Kay RN

## 2020-08-14 NOTE — H&P
[facility]    Patient Identification:  Name:  Lashae Benitez  Age:  59 y.o.  Sex:  female  :  1960  MRN:  [id number]   Visit Number:  [id number]  Primary Care Physician:  Kreis, Samuel Duane, MD         Chief complaint: Generalized weakness, low sodium    History of presenting illness:    Lashae Benitez is a 59 y.o. female with PMH significant for DM type II, Sjogren's syndrome, rheumatoid arthritis, hypothyroidism, hyperlipidemia, fibromyalgia, essential hypertension, degenerative disc disease of the lumbar spine and history of thyroidectomy presented to the emergency department complaints of feeling weak and fall.    Patient states that she has been feeling weak for last 1 week or so.  Patient states that she had elevated blood pressure and was started on chlorthalidone since last 1 week.  She states that she has been feeling increasingly weak since last 1 week.  Patient states that she gets a little woozy when she gets up.  Patient denies any vomiting.  She denies any chest pain or palpitations.  She denies any diarrhea or constipation.  Patient denies any cough or sputum production.  She denies any fevers or chills.  Patient states that yesterday she went to see her primary care physician and her labs were drawn.  She was called and told by her primary care physician today to come to the emergency department because her sodium was low.  Patient states that today she got up and got dizzy and lightheaded and had a fall but did not have an syncope.  She did not have loss of bowel or bladder control.      In ER, patient was found to be febrile, vital signs stable.  Blood glucose was 216, sodium 112, chloride 73 and rest of CMP was within normal limits.  Cortisol was 15.26 and TSH was 0.063.  WBC was 13 with 81% neutrophils and rest of CBC was within normal limits.  Urinalysis was unremarkable.       Review of systems:  Constitutional: Patient denies any fevers,  chills,  weight loss or night sweats.  Complaining of generalized weakness and lethargy.  Vision and hearing: Patient denies any acute changes in vision and hearing.  Respiratory: Patient denies any cough, hemoptysis, dyspnea.  Cardiovascular: Patient denies any chest pain, palpitations.  GI: Patient denies any nausea, vomiting, hematemesis, melena and hematochezia  Genitourinary: Patient denies any dysuria, frequency, urgency or hematuria.  Neurologic: Patient denies any seizures, syncope.  Complaining of dizziness and lightheadedness as per HPI.  Psychiatry: Patient denies any delusions, hallucinations, suicidal ideation.  Musculoskeletal: Patient denies any gait problems, joint swelling, joint pains or back pain.      Past Medical History:   Diagnosis Date   • Acid reflux    • Allergic    • Anxiety    • Cancer (CMS/HCC)     thyroid, skin   • Chronic pain disorder    • Degenerative disc disease, lumbar    • Depression    • Dupuytren's disease    • Essential hypertension    • Family history of coronary artery disease    • Fibromyalgia    • Gallbladder abscess    • H. pylori infection    • Hiatal hernia    • Hyperlipidemia    • Hypothyroidism    • Migraines     migraines   • Multiple sclerosis (CMS/HCC)    • Osteoarthritis    • Psoriasis    • Psoriatic arthritis (CMS/HCC)    • RA (rheumatoid arthritis) (CMS/HCC)    • Rheumatoid arthritis (CMS/HCC)    • Sinusitis    • Sjogren's syndrome (CMS/HCC)    • Stomach ulcer    • TMJ arthritis    • Type 2 diabetes mellitus (CMS/HCC)    • Urinary tract infection      Past Surgical History:   Procedure Laterality Date   • BREAST LUMPECTOMY Left 11/1993   • CARDIAC CATHETERIZATION Left 09/21/2009    Normal    • CARPAL TUNNEL RELEASE  03/2012   • CERVICAL POLYPECTOMY  12/2015   • CHOLECYSTECTOMY  05/2007   • COLONOSCOPY     • ENDOSCOPY     • GASTRIC BYPASS  09/2007   • JOINT REPLACEMENT     • KNEE ARTHROPLASTY     • LUMBAR DISC SURGERY      C5-6   • REPLACEMENT TOTAL KNEE Right  06/2016   • SACRAL NERVE STIMULATOR PLACEMENT  09/2014   • THYROIDECTOMY  03/2016     Family History   Problem Relation Age of Onset   • Diabetes Mother    • Stroke Mother    • Hypertension Mother    • Heart attack Father         First one was in his 20's second 30's.    • Heart failure Father    • Heart disease Father    • Stroke Father    • Diabetes Sister    • Cancer Maternal Grandmother      Social History     Socioeconomic History   • Marital status:      Spouse name: Not on file   • Number of children: Not on file   • Years of education: Not on file   • Highest education level: Not on file   Tobacco Use   • Smoking status: Never Smoker   • Smokeless tobacco: Never Used   Substance and Sexual Activity   • Alcohol use: No   • Drug use: No   • Sexual activity: Defer     ---------------------------------------------------------------------------------------------------------------------   Allergies:  Codeine; Imuran [azathioprine]; Januvia [sitagliptin]; Penicillins; Sulfa antibiotics; Sulfasalazine; and Beta adrenergic blockers  ---------------------------------------------------------------------------------------------------------------------   Prior to Admission Medications     Prescriptions Last Dose Informant Patient Reported? Taking?    abatacept (ORENCIA) 250 MG injection  Self Yes No    Infuse 750 mg into a venous catheter Every 28 (Twenty-Eight) Days.    alendronate (FOSAMAX) 70 MG tablet  Self Yes No    Take 70 mg by mouth 1 (One) Time Per Week. Wednesday    amitriptyline (ELAVIL) 25 MG tablet  Self Yes No    Take 25 mg by mouth Every Night.    amLODIPine (NORVASC) 10 MG tablet   No No    Take 1 tablet by mouth Daily.    Apremilast 30 MG tablet  Self Yes No    Take 30 mg by mouth 2 (two) times a day.    aspirin 81 MG tablet  Self Yes No    Take 1 tablet by mouth.    Calcipotriene-Betameth Diprop (Enstilar) 0.005-0.064 % foam  Self Yes No    Apply 1 spray topically Daily.    calcium  citrate-vitamin d (CALCITRATE) 315-250 MG-UNIT tablet tablet  Self Yes No    Take 1 tablet by mouth Every Evening.    celecoxib (CeleBREX) 200 MG capsule  Self Yes No    Take 200 mg by mouth daily.    chlorthalidone (HYGROTON) 25 MG tablet   No No    Take 1 tablet by mouth Daily.    Cholecalciferol (VITAMIN D3) 125 MCG (5000 UT) capsule capsule  Self Yes No    Take 5,000 Units by mouth Daily.    cyanocobalamin 1000 MCG/ML injection  Self Yes No    Inject 1,000 mcg into the shoulder, thigh, or buttocks Every 28 (Twenty-Eight) Days.    cyclobenzaprine (FLEXERIL) 10 MG tablet  Self Yes No    Take 10 mg by mouth Every Evening.    diazePAM (VALIUM) 2 MG tablet   Yes No    Take 2 mg by mouth Daily As Needed for Anxiety.    DULoxetine (CYMBALTA) 60 MG capsule  Self Yes No    Take 60 mg by mouth Every Night.    EMGALITY 120 MG/ML prefilled syringe  Self Yes No    Inject 120 mg under the skin into the appropriate area as directed Every 30 (Thirty) Days.    ferrous gluconate (FERGON) 240 (27 FE) MG tablet  Self Yes No    Take 240 mg by mouth Every Morning.    folic acid (FOLVITE) 1 MG tablet  Self Yes No    Take 1 mg by mouth daily.    gabapentin (NEURONTIN) 600 MG tablet  DANIEL Yes No    Take 600 mg by mouth 3 (Three) Times a Day.    insulin detemir (LEVEMIR) 100 UNIT/ML injection  Self Yes No    Inject 25 Units under the skin into the appropriate area as directed Daily.    levothyroxine sodium (TIROSINT) 137 MCG capsule  Self Yes No    Take 137 mcg by mouth Daily. Takes an extra dose at the first of each month.    linaclotide (LINZESS) 290 MCG capsule capsule  Self Yes No    Take 290 mcg by mouth every morning before breakfast.    lisinopril (PRINIVIL,ZESTRIL) 40 MG tablet  Self Yes No    Take 40 mg by mouth Every Night.    loratadine (CLARITIN) 10 MG tablet  Self Yes No    Take 10 mg by mouth Every Night.    methotrexate 2.5 MG tablet  Self Yes No    Take 25 mg by mouth 1 (One) Time Per Week. Prior to [facility] Admission,  Patient was on: Takes 10 tablets on Saturday    multivitamin (DAILY MICHAEL) tablet tablet  Self Yes No    Take 1 tablet by mouth Every Evening.    omeprazole (priLOSEC) 40 MG capsule  Self Yes No    Take 40 mg by mouth Daily.    OnabotulinumtoxinA (BOTOX IM)  Self Yes No    Inject  into the shoulder, thigh, or buttocks Every 3 (Three) Months.    primidone (MYSOLINE) 50 MG tablet  Self Yes No    Take 50 mg by mouth Every Night.    ranitidine (ZANTAC) 150 MG tablet  Self Yes No    Take 150 mg by mouth 2 (two) times a day.    TOPROL  MG 24 hr tablet  Self No No    TAKE 1 TABLET DAILY    vitamin C (ASCORBIC ACID) 500 MG tablet  Self Yes No    Take 1,000 mg by mouth Every Evening.    vitamin E 1000 UNIT capsule  Self Yes No    Take 1,000 Units by mouth Every Evening.        Hospital Scheduled Meds:        ---------------------------------------------------------------------------------------------------------------------   Vital Signs:  Temp:  [98.8 °F (37.1 °C)] 98.8 °F (37.1 °C)  Heart Rate:  [68-93] 68  Resp:  [17-20] 17  BP: (108-134)/(69-79) 129/79      08/14/20  1200   Weight: 87.1 kg (192 lb)     Body mass index is 31.95 kg/m².  ---------------------------------------------------------------------------------------------------------------------   Physical Exam:  Constitutional:  Well-developed and well-nourished.     HENT:  Head:  Normocephalic and atraumatic.  Mouth:  Moist mucous membranes.    Eyes:  Pupils are equal, round, and reactive to light.   Neck:  Neck supple.  No JVD present.    Cardiovascular:  Regular rate and rhythm. S1+S2. No murmur, rubs or gallops.   Lungs/Chest: Clear to auscultation bilaterally.   Abdomen:  Soft. Nontender. No viscera palpable.  Bowel sounds audible.   Musculoskeletal: No deformity or joint swelling.   Peripheral vascular: Bilateral dorsalis pedis palpable. No edema.   Neurological:  Alert and oriented to person, place, and time.  Cranial nerves grossly intact. Strength  bilaterally symmetrical in upper and lower extremities.   Skin:  Skin is warm and dry. No rash noted. No pallor.  ---------------------------------------------------------------------------------------------------------------------  EKG: Sinus rhythm with first-degree AV block    ---------------------------------------------------------------------------------------------------------------------   Results from last 7 days   Lab Units 08/14/20  1232 08/09/20  0455 08/08/20  0026 08/07/20  1543   CRP mg/dL  --   --   --  0.09   WBC 10*3/mm3 13.03* 8.75 7.09 7.07   HEMOGLOBIN g/dL 14.5 12.8 11.2* 11.3*   HEMATOCRIT % 40.8 41.1 33.5* 33.5*   MCV fL 87.7 98.6* 95.7 94.9   MCHC g/dL 35.5 31.1* 33.4 33.7   PLATELETS 10*3/mm3 379 295 266 258         Results from last 7 days   Lab Units 08/14/20  1234 08/09/20  0455 08/08/20  0026 08/07/20  1543   SODIUM mmol/L 112* 138 140 135*   POTASSIUM mmol/L 3.6 3.3* 3.4* 3.1*   MAGNESIUM mg/dL  --   --   --  1.8   CHLORIDE mmol/L 73* 100 101 96*   CO2 mmol/L 24.7 25.9 26.0 25.6   BUN mg/dL 13 8 7 9   CREATININE mg/dL 0.87 0.71 0.71 0.74   EGFR IF NONAFRICN AM mL/min/1.73 67 85 85 81   CALCIUM mg/dL 9.5 9.3 8.6 8.9   GLUCOSE mg/dL 216* 161* 200* 229*   ALBUMIN g/dL 4.53  --   --  3.91   BILIRUBIN mg/dL 0.7  --   --  0.4   ALK PHOS U/L 128*  --   --  111   AST (SGOT) U/L 24  --   --  13   ALT (SGPT) U/L 26  --   --  16   Estimated Creatinine Clearance: 76.8 mL/min (by C-G formula based on SCr of 0.87 mg/dL).  No results found for: AMMONIA  Results from last 7 days   Lab Units 08/14/20  1234 08/07/20  1543   TROPONIN T ng/mL <0.010 <0.010     Results from last 7 days   Lab Units 08/07/20  1543   PROBNP pg/mL 546.7     No results found for: HGBA1C  Lab Results   Component Value Date    TSH 0.063 (L) 08/14/2020    FREET4 1.64 08/07/2020     Lab Results   Component Value Date    PREGTESTUR Negative 01/21/2018     Pain Management Panel     Pain Management Panel Latest Ref Rng & Units  12/26/2016    AMPHETAMINES SCREEN, URINE Negative Negative    BARBITURATES SCREEN Negative Negative    BENZODIAZEPINE SCREEN, URINE Negative Positive(A)    COCAINE SCREEN, URINE Negative Negative    METHADONE SCREEN, URINE Negative Negative        No results found for: BLOODCX  No results found for: URINECX  No results found for: WOUNDCX  No results found for: STOOLCX      ---------------------------------------------------------------------------------------------------------------------  Imaging Results (Last 7 Days)     ** No results found for the last 168 hours. **          I have personally reviewed the radiology images and read the final radiology report.  ---------------------------------------------------------------------------------------------------------------------  Assessment and Plan:    -Severe hyponatremia  Likely multifactorial.  Patient was recently started on chlorthalidone which could be causing hyponatremia.  She was given 1 L of normal saline in the emergency department and repeat sodium level was still low at 111.  I discussed with Dr. Chan from nephrology who will review patient's chart and start patient on fluids.  We will closely monitor patient sodium level.  We will hold amitriptyline and chlorthalidone.    -Essential hypertension  Blood pressure is well controlled at this time.  We will continue to monitor blood pressure and add antihypertensives as needed.    -Dizziness  May be second grade 2 dehydration and hyponatremia.  Will check orthostatic vitals.    -Rheumatoid arthritis  Continue methotrexate.    -Hypothyroidism  TSH is low and I will decrease her levothyroxine to 125 mcg daily.  Patient will need repeat TSH check in 3 to 4 weeks.    -Fibromyalgia  Continue Cymbalta and gabapentin.    Activity: Up with assist  Nutrition: Regular diet  Fluids: IVF per nephrology recommendations.  DVT prophylaxis: Lovenox  GI prophylaxis: Protonix    I discussed the plan of care with patient,  family and nursing staff.  Questions answered.    Angelica Agrawal MD  08/14/20  15:34

## 2020-08-14 NOTE — ED NOTES
Falls bracelet [laced on patients arm instructed to as for assistance with ambulation  she voiced understanding       Bettina Holly RN  08/14/20 0081     Yes

## 2020-08-14 NOTE — PROGRESS NOTES
Pharmacy to Dose Lovenox     Will start patient on Lovenox 40 mg daily SQ for DVT prophylaxis and CrCl = 77 ml/min.     Thanks,  Pascale Ku, PharmD

## 2020-08-14 NOTE — OUTREACH NOTE
Medical Week 1 Survey      Responses   [facility] patient discharged from?  [facility]   COVID-19 Test Status  Not tested   Does the patient have one of the following disease processes/diagnoses(primary or secondary)?  Other   Is there a successful TCM telephone encounter documented?  No   Week 1 attempt successful?  No   Revoke  Readmitted          Carol Nathan RN

## 2020-08-14 NOTE — ED NOTES
Attempted to call report to PCU. Nurse unavailable at this time. Will return call for report.      Valerie Stark RN  08/14/20 1609

## 2020-08-14 NOTE — ED PROVIDER NOTES
Subjective   59-year white female presents secondary to abnormal lab.  Patient states that she she was recently placed on chlorthalidone.  She had routine labs through her primary care yesterday and her sodium was 117.  She has a history of hyponatremia in the past.  Is noted that her sodium was normal last week.  States that she has had some dizziness.  She has had some nausea and has had fatigue.  No seizure.  No abdominal pain.  She states her blood pressures been running good.  No chest pain pressure tightness squeezing.  No palpitations.  No other complaints at this time.          Review of Systems   Constitutional: Positive for fatigue. Negative for fever.   HENT: Negative.    Respiratory: Negative.    Cardiovascular: Negative.  Negative for chest pain.   Gastrointestinal: Negative.  Negative for abdominal pain.   Endocrine: Negative.    Genitourinary: Negative.  Negative for dysuria.   Skin: Negative.    Neurological: Negative.    Psychiatric/Behavioral: Negative.    All other systems reviewed and are negative.      Past Medical History:   Diagnosis Date   • Acid reflux    • Allergic    • Anxiety    • Cancer (CMS/HCC)     thyroid, skin   • Chronic pain disorder    • Degenerative disc disease, lumbar    • Depression    • Dupuytren's disease    • Essential hypertension    • Family history of coronary artery disease    • Fibromyalgia    • Gallbladder abscess    • H. pylori infection    • Hiatal hernia    • Hyperlipidemia    • Hypothyroidism    • Migraines     migraines   • Multiple sclerosis (CMS/HCC)    • Osteoarthritis    • Psoriasis    • Psoriatic arthritis (CMS/HCC)    • RA (rheumatoid arthritis) (CMS/HCC)    • Rheumatoid arthritis (CMS/HCC)    • Sinusitis    • Sjogren's syndrome (CMS/HCC)    • Stomach ulcer    • TMJ arthritis    • Type 2 diabetes mellitus (CMS/HCC)    • Urinary tract infection        Allergies   Allergen Reactions   • Codeine Angioedema   • Imuran [Azathioprine] Other (See Comments)     Has  "pancreatis attacks with med   • Januvia [Sitagliptin] Other (See Comments)     Has pancreatis attacks with med    • Penicillins Angioedema   • Sulfa Antibiotics Angioedema   • Sulfasalazine Unknown (See Comments)   • Beta Adrenergic Blockers Other (See Comments)     I called pt to ask her about the allergy to bblockers in her chart. Pt states \"too big of a dose makes her psoriasis act up\", but this current dose of metoprolol 50 mg bid, she has been on for a long time, with no ill side effects.  JONATHAN IVY 3/28/18       Past Surgical History:   Procedure Laterality Date   • BREAST LUMPECTOMY Left 11/1993   • CARDIAC CATHETERIZATION Left 09/21/2009    Normal    • CARPAL TUNNEL RELEASE  03/2012   • CERVICAL POLYPECTOMY  12/2015   • CHOLECYSTECTOMY  05/2007   • COLONOSCOPY     • ENDOSCOPY     • GASTRIC BYPASS  09/2007   • JOINT REPLACEMENT     • KNEE ARTHROPLASTY     • LUMBAR DISC SURGERY      C5-6   • REPLACEMENT TOTAL KNEE Right 06/2016   • SACRAL NERVE STIMULATOR PLACEMENT  09/2014   • THYROIDECTOMY  03/2016       Family History   Problem Relation Age of Onset   • Diabetes Mother    • Stroke Mother    • Hypertension Mother    • Heart attack Father         First one was in his 20's second 30's.    • Heart failure Father    • Heart disease Father    • Stroke Father    • Diabetes Sister    • Cancer Maternal Grandmother        Social History     Socioeconomic History   • Marital status:      Spouse name: Not on file   • Number of children: Not on file   • Years of education: Not on file   • Highest education level: Not on file   Tobacco Use   • Smoking status: Never Smoker   • Smokeless tobacco: Never Used   Substance and Sexual Activity   • Alcohol use: No   • Drug use: No   • Sexual activity: Defer           Objective   Physical Exam   Constitutional: She is oriented to person, place, and time. She appears well-developed and well-nourished. No distress.   HENT:   Head: Normocephalic and atraumatic.   Right Ear: " External ear normal.   Left Ear: External ear normal.   Nose: Nose normal.   Eyes: Pupils are equal, round, and reactive to light. Conjunctivae and EOM are normal.   Neck: Normal range of motion. Neck supple. No JVD present. No tracheal deviation present.   Cardiovascular: Normal rate, regular rhythm and normal heart sounds.   No murmur heard.  Pulmonary/Chest: Effort normal and breath sounds normal. No respiratory distress. She has no wheezes.   Abdominal: Soft. Bowel sounds are normal. There is no tenderness.   Musculoskeletal: Normal range of motion. She exhibits no edema or deformity.   Neurological: She is alert and oriented to person, place, and time. No cranial nerve deficit.   Skin: Skin is warm and dry. No rash noted. She is not diaphoretic. No erythema. No pallor.   Psychiatric: She has a normal mood and affect. Her behavior is normal. Thought content normal.   Nursing note and vitals reviewed.      Procedures           ED Course  ED Course as of Aug 14 1556   Fri Aug 14, 2020   1257 Normal sinus rhythm first-degree AV block.  QTC of 467.  Nonspecific ST changes per Dr. Julian   ECG 12 Lead [JI]   1327 D/w Dr Chan- 1 L saline. Repeat Na afterwards. Hospitalist paged.     [JI]      ED Course User Index  [JI] Tommy Sanchez PA                                           MDM  Number of Diagnoses or Management Options  Hyponatremia: new and requires workup     Amount and/or Complexity of Data Reviewed  Clinical lab tests: reviewed and ordered  Tests in the medicine section of CPT®: reviewed and ordered  Discuss the patient with other providers: yes    Risk of Complications, Morbidity, and/or Mortality  Presenting problems: moderate        Final diagnoses:   Hyponatremia            Tommy Sanchez PA  08/14/20 5060

## 2020-08-14 NOTE — ED NOTES
Pt voiced fell at home this morning she hit a couple of boxes  She voiced hit her chin on the carpet      Bettina Holly RN  08/14/20 8551

## 2020-08-14 NOTE — CONSULTS
Nephrology Consult Note    Referring Provider: Angelica Agrawal  Reason for Consultation: Hyponatremia    Subjective       History of present illness:  Lashae Benitez is a 59 y.o. female who presented to [facility] emergency department with chief complaint of abnormal lab results with low sodium. Pt is ksnown to have essential hypertension and and has been recently started on chlorthlidone for uncontrolled BP. She was seen by her PCP yesterday with routine labs and was notified to go to ED because her sodium was reported to be 115. Pt has been feeling some dizziness for past few days, denied any headache, visual changes or weakness of any part of the body. She has been drinking little bit more more water than usual. Pt also takes celebrix 200mg daily.  Otherwise, Patient denies hematuria, dysuria, difficulty passing urine. No prior history of renal stones. No family history of renal disease    History  Past Medical History:   Diagnosis Date   • Acid reflux    • Allergic    • Anxiety    • Cancer (CMS/HCC)     thyroid, skin   • Chronic pain disorder    • Degenerative disc disease, lumbar    • Depression    • Dupuytren's disease    • Essential hypertension    • Family history of coronary artery disease    • Fibromyalgia    • Gallbladder abscess    • H. pylori infection    • Hiatal hernia    • Hyperlipidemia    • Hypothyroidism    • Migraines     migraines   • Multiple sclerosis (CMS/HCC)    • Osteoarthritis    • Psoriasis    • Psoriatic arthritis (CMS/HCC)    • RA (rheumatoid arthritis) (CMS/HCC)    • Rheumatoid arthritis (CMS/HCC)    • Sinusitis    • Sjogren's syndrome (CMS/HCC)    • Stomach ulcer    • TMJ arthritis    • Type 2 diabetes mellitus (CMS/HCC)    • Urinary tract infection    , Past Surgical History:   Procedure Laterality Date   • BREAST LUMPECTOMY Left 11/1993   • CARDIAC CATHETERIZATION Left 09/21/2009    Normal    • CARPAL TUNNEL RELEASE  03/2012   • CERVICAL POLYPECTOMY  12/2015   • CHOLECYSTECTOMY   05/2007   • COLONOSCOPY     • ENDOSCOPY     • GASTRIC BYPASS  09/2007   • JOINT REPLACEMENT     • KNEE ARTHROPLASTY     • LUMBAR DISC SURGERY      C5-6   • REPLACEMENT TOTAL KNEE Right 06/2016   • SACRAL NERVE STIMULATOR PLACEMENT  09/2014   • THYROIDECTOMY  03/2016   , Family History   Problem Relation Age of Onset   • Diabetes Mother    • Stroke Mother    • Hypertension Mother    • Heart attack Father         First one was in his 20's second 30's.    • Heart failure Father    • Heart disease Father    • Stroke Father    • Diabetes Sister    • Cancer Maternal Grandmother    , Social History     Tobacco Use   • Smoking status: Never Smoker   • Smokeless tobacco: Never Used   Substance Use Topics   • Alcohol use: No   • Drug use: No   , Medications Prior to Admission   Medication Sig Dispense Refill Last Dose   • abatacept (ORENCIA) 250 MG injection Infuse 750 mg into a venous catheter Every 28 (Twenty-Eight) Days.   7/27/2020 at AM   • alendronate (FOSAMAX) 70 MG tablet Take 70 mg by mouth 1 (One) Time Per Week. Wednesday 8/5/2020 at AM   • amitriptyline (ELAVIL) 25 MG tablet Take 25 mg by mouth Every Night.   8/6/2020 at Unknown time   • amLODIPine (NORVASC) 10 MG tablet Take 1 tablet by mouth Daily. 30 tablet 1    • Apremilast 30 MG tablet Take 30 mg by mouth 2 (two) times a day.   8/7/2020 at AM   • aspirin 81 MG tablet Take 1 tablet by mouth.   8/7/2020 at Unknown time   • Calcipotriene-Betameth Diprop (Enstilar) 0.005-0.064 % foam Apply 1 spray topically Daily.   8/7/2020 at Unknown time   • calcium citrate-vitamin d (CALCITRATE) 315-250 MG-UNIT tablet tablet Take 1 tablet by mouth Every Evening.   8/6/2020 at Unknown time   • celecoxib (CeleBREX) 200 MG capsule Take 200 mg by mouth daily.   8/7/2020 at Unknown time   • Cholecalciferol (VITAMIN D3) 125 MCG (5000 UT) capsule capsule Take 5,000 Units by mouth Daily.   8/7/2020 at Unknown time   • cyanocobalamin 1000 MCG/ML injection Inject 1,000 mcg into the  shoulder, thigh, or buttocks Every 28 (Twenty-Eight) Days.   7/1/2020 at AM   • cyclobenzaprine (FLEXERIL) 10 MG tablet Take 10 mg by mouth Every Evening.   8/6/2020 at Unknown time   • diazePAM (VALIUM) 2 MG tablet Take 2 mg by mouth Daily As Needed for Anxiety.   Unknown at Unknown time   • DULoxetine (CYMBALTA) 60 MG capsule Take 60 mg by mouth Every Night.   8/6/2020 at Unknown time   • EMGALITY 120 MG/ML prefilled syringe Inject 120 mg under the skin into the appropriate area as directed Every 30 (Thirty) Days.  3 Unknown at Unknown time   • ferrous gluconate (FERGON) 240 (27 FE) MG tablet Take 240 mg by mouth Every Morning.   8/7/2020 at Unknown time   • folic acid (FOLVITE) 1 MG tablet Take 1 mg by mouth daily.   8/7/2020 at Unknown time   • gabapentin (NEURONTIN) 600 MG tablet Take 600 mg by mouth 3 (Three) Times a Day.   8/7/2020 at AM   • insulin detemir (LEVEMIR) 100 UNIT/ML injection Inject 25 Units under the skin into the appropriate area as directed Daily.   8/7/2020 at Unknown time   • levothyroxine sodium (TIROSINT) 137 MCG capsule Take 137 mcg by mouth Daily. Takes an extra dose at the first of each month.   8/7/2020 at Unknown time   • linaclotide (LINZESS) 290 MCG capsule capsule Take 290 mcg by mouth every morning before breakfast.   8/7/2020 at Unknown time   • lisinopril (PRINIVIL,ZESTRIL) 40 MG tablet Take 40 mg by mouth Every Night.   8/6/2020 at PM   • loratadine (CLARITIN) 10 MG tablet Take 10 mg by mouth Every Night.   8/6/2020 at Unknown time   • methotrexate 2.5 MG tablet Take 25 mg by mouth 1 (One) Time Per Week. Prior to [facility] Admission, Patient was on: Takes 10 tablets on Saturday 8/1/2020 at AM   • multivitamin (DAILY MICHAEL) tablet tablet Take 1 tablet by mouth Every Evening.   8/6/2020 at Unknown time   • omeprazole (priLOSEC) 40 MG capsule Take 40 mg by mouth Daily.   8/7/2020 at Unknown time   • OnabotulinumtoxinA (BOTOX IM) Inject  into the shoulder, thigh, or buttocks Every 3  (Three) Months.   Unknown at Unknown time   • primidone (MYSOLINE) 50 MG tablet Take 50 mg by mouth Every Night.   8/6/2020 at Unknown time   • ranitidine (ZANTAC) 150 MG tablet Take 150 mg by mouth 2 (two) times a day.   8/7/2020 at AM   • TOPROL  MG 24 hr tablet TAKE 1 TABLET DAILY 90 tablet 1 8/7/2020 at Unknown time   • vitamin C (ASCORBIC ACID) 500 MG tablet Take 1,000 mg by mouth Every Evening.   8/6/2020 at Unknown time   • vitamin E 1000 UNIT capsule Take 1,000 Units by mouth Every Evening.   8/6/2020 at Unknown time   , Scheduled Meds:    enoxaparin 40 mg Subcutaneous Q24H   sodium chloride 10 mL Intravenous Q12H   , Continuous Infusions:   , PRN Meds:  •  aluminum-magnesium hydroxide-simethicone  •  bisacodyl  •  bisacodyl  •  magnesium hydroxide  •  ondansetron **OR** ondansetron  •  sennosides-docusate  •  [COMPLETED] Insert peripheral IV **AND** sodium chloride  •  sodium chloride and Allergies:  Codeine; Imuran [azathioprine]; Januvia [sitagliptin]; Penicillins; Sulfa antibiotics; Sulfasalazine; and Beta adrenergic blockers    Review of Systems  More than 10 point review of systems was done. Pertinent items are noted in HPI, all other systems reviewed and negative    Objective     Vital Signs  Temp:  [98.8 °F (37.1 °C)] 98.8 °F (37.1 °C)  Heart Rate:  [68-93] 68  Resp:  [17-20] 17  BP: (108-134)/(69-79) 129/79    No intake/output data recorded.  No intake/output data recorded.    Physical Examination:  General Appearance: not in acute distress  Head: Normocephalic, without obvious abnormality and atraumatic  Neck: No adenopathy, suppple, no carotid bruit and No JVD  Lungs: Clear to auscultation, respirations regular and unlabored  Heart: Regular rhythm & normal rate, normal S1, S2, no murmur, no gallop, no rub   Abdomen: Normal bowel sounds, no masses and soft non-tender  Extremities: Moves extremities well, no redness and No edema  Pulses: Palpable and equal bilaterally  Neurologic: Orientated  to person, place, time, grossly no focal deficitis    Laboratory Data :      WBC WBC   Date Value Ref Range Status   08/14/2020 13.03 (H) 3.40 - 10.80 10*3/mm3 Final      HGB Hemoglobin   Date Value Ref Range Status   08/14/2020 14.5 12.0 - 15.9 g/dL Final      HCT Hematocrit   Date Value Ref Range Status   08/14/2020 40.8 34.0 - 46.6 % Final      Platlets No results found for: LABPLAT   MCV MCV   Date Value Ref Range Status   08/14/2020 87.7 79.0 - 97.0 fL Final          Sodium Sodium   Date Value Ref Range Status   08/14/2020 111 (C) 136 - 145 mmol/L Final   08/14/2020 112 (C) 136 - 145 mmol/L Final      Potassium Potassium   Date Value Ref Range Status   08/14/2020 3.5 3.5 - 5.2 mmol/L Final   08/14/2020 3.6 3.5 - 5.2 mmol/L Final      Chloride Chloride   Date Value Ref Range Status   08/14/2020 79 (L) 98 - 107 mmol/L Final   08/14/2020 73 (L) 98 - 107 mmol/L Final      CO2 CO2   Date Value Ref Range Status   08/14/2020 19.6 (L) 22.0 - 29.0 mmol/L Final   08/14/2020 24.7 22.0 - 29.0 mmol/L Final      BUN BUN   Date Value Ref Range Status   08/14/2020 12 6 - 20 mg/dL Final   08/14/2020 13 6 - 20 mg/dL Final      Creatinine Creatinine   Date Value Ref Range Status   08/14/2020 0.71 0.57 - 1.00 mg/dL Final   08/14/2020 0.87 0.57 - 1.00 mg/dL Final      Calcium Calcium   Date Value Ref Range Status   08/14/2020 8.3 (L) 8.6 - 10.5 mg/dL Final   08/14/2020 9.5 8.6 - 10.5 mg/dL Final      PO4 No results found for: CAPO4   Albumin Albumin   Date Value Ref Range Status   08/14/2020 4.53 3.50 - 5.20 g/dL Final      Magnesium No results found for: MG   Uric Acid No results found for: URICACID     Radiology results :     Imaging Results (Last 72 Hours)     ** No results found for the last 72 hours. **            Medications:        enoxaparin 40 mg Subcutaneous Q24H   sodium chloride 10 mL Intravenous Q12H          Assessment/Plan       Hyponatremia      1. Sever hyponatremia likely chronic.   2. Essential hypertension  2.  Hypothyroidism  4. Dizziness     Hyponatremia likely chronic and likely symptomatic.   Reported sodium yesterday 115, admitted with sodium 112.  Received 1L bolus, repeat sodium 111.   -will give 200ml of 3% saline over 2 hours and repeat sodium after  -send U lytes with U osm  -check sodium [id number].   -will decide maintenance fluid after hypertonic saline infusion    Thanks Dr Agrawal for the consult. Nephrology will follow the patient.   I discussed the patient's findings and my recommendations with Dr. Tanja Chan MD  08/14/20  16:44

## 2020-08-14 NOTE — PLAN OF CARE
Problem: Fall Risk (Adult)  Goal: Identify Related Risk Factors and Signs and Symptoms  Outcome: Ongoing (interventions implemented as appropriate)  Flowsheets (Taken 8/14/2020 1099)  Related Risk Factors (Fall Risk): fatigue/slow reaction; gait/mobility problems; fear of falling  Note:   Pt continues to feel dizzy when standing up.  Instructed pt to call for assistance when needing to go to restroom.  Will continue to monitor.   Goal: Absence of Fall  Outcome: Ongoing (interventions implemented as appropriate)     Problem: Patient Care Overview  Goal: Plan of Care Review  Outcome: Ongoing (interventions implemented as appropriate)  Goal: Individualization and Mutuality  Outcome: Ongoing (interventions implemented as appropriate)  Goal: Discharge Needs Assessment  Outcome: Ongoing (interventions implemented as appropriate)  Goal: Interprofessional Rounds/Family Conf  Outcome: Ongoing (interventions implemented as appropriate)     Problem: Skin Injury Risk (Adult)  Goal: Identify Related Risk Factors and Signs and Symptoms  Outcome: Ongoing (interventions implemented as appropriate)  Goal: Skin Health and Integrity  Outcome: Ongoing (interventions implemented as appropriate)

## 2020-08-15 LAB
ALBUMIN SERPL-MCNC: 3.95 G/DL (ref 3.5–5.2)
ALBUMIN/GLOB SERPL: 1.5 G/DL
ALP SERPL-CCNC: 106 U/L (ref 39–117)
ALT SERPL W P-5'-P-CCNC: 22 U/L (ref 1–33)
ANION GAP SERPL CALCULATED.3IONS-SCNC: 12.1 MMOL/L (ref 5–15)
ANION GAP SERPL CALCULATED.3IONS-SCNC: 12.5 MMOL/L (ref 5–15)
ANION GAP SERPL CALCULATED.3IONS-SCNC: 14.2 MMOL/L (ref 5–15)
AST SERPL-CCNC: 18 U/L (ref 1–32)
BILIRUB SERPL-MCNC: 0.5 MG/DL (ref 0–1.2)
BUN SERPL-MCNC: 10 MG/DL (ref 6–20)
BUN SERPL-MCNC: 10 MG/DL (ref 6–20)
BUN SERPL-MCNC: 12 MG/DL (ref 6–20)
BUN/CREAT SERPL: 12.5 (ref 7–25)
BUN/CREAT SERPL: 14.9 (ref 7–25)
BUN/CREAT SERPL: 16.4 (ref 7–25)
CALCIUM SPEC-SCNC: 8.4 MG/DL (ref 8.6–10.5)
CALCIUM SPEC-SCNC: 8.5 MG/DL (ref 8.6–10.5)
CALCIUM SPEC-SCNC: 8.6 MG/DL (ref 8.6–10.5)
CHLORIDE SERPL-SCNC: 81 MMOL/L (ref 98–107)
CHLORIDE SERPL-SCNC: 81 MMOL/L (ref 98–107)
CHLORIDE SERPL-SCNC: 84 MMOL/L (ref 98–107)
CO2 SERPL-SCNC: 21.8 MMOL/L (ref 22–29)
CO2 SERPL-SCNC: 23.5 MMOL/L (ref 22–29)
CO2 SERPL-SCNC: 23.9 MMOL/L (ref 22–29)
CREAT SERPL-MCNC: 0.67 MG/DL (ref 0.57–1)
CREAT SERPL-MCNC: 0.73 MG/DL (ref 0.57–1)
CREAT SERPL-MCNC: 0.8 MG/DL (ref 0.57–1)
GFR SERPL CREATININE-BSD FRML MDRD: 74 ML/MIN/1.73
GFR SERPL CREATININE-BSD FRML MDRD: 82 ML/MIN/1.73
GFR SERPL CREATININE-BSD FRML MDRD: 90 ML/MIN/1.73
GLOBULIN UR ELPH-MCNC: 2.6 GM/DL
GLUCOSE SERPL-MCNC: 234 MG/DL (ref 65–99)
GLUCOSE SERPL-MCNC: 236 MG/DL (ref 65–99)
GLUCOSE SERPL-MCNC: 246 MG/DL (ref 65–99)
OSMOLALITY UR: 108 MOSM/KG
POTASSIUM SERPL-SCNC: 3.3 MMOL/L (ref 3.5–5.2)
POTASSIUM SERPL-SCNC: 3.5 MMOL/L (ref 3.5–5.2)
POTASSIUM SERPL-SCNC: 3.6 MMOL/L (ref 3.5–5.2)
PROT SERPL-MCNC: 6.5 G/DL (ref 6–8.5)
SODIUM SERPL-SCNC: 117 MMOL/L (ref 136–145)
SODIUM SERPL-SCNC: 117 MMOL/L (ref 136–145)
SODIUM SERPL-SCNC: 119 MMOL/L (ref 136–145)
SODIUM SERPL-SCNC: 120 MMOL/L (ref 136–145)

## 2020-08-15 PROCEDURE — 25010000002 ENOXAPARIN PER 10 MG: Performed by: INTERNAL MEDICINE

## 2020-08-15 PROCEDURE — 80053 COMPREHEN METABOLIC PANEL: CPT | Performed by: INTERNAL MEDICINE

## 2020-08-15 PROCEDURE — 25010000002 DESMOPRESSIN PER 1 MCG: Performed by: INTERNAL MEDICINE

## 2020-08-15 PROCEDURE — 84295 ASSAY OF SERUM SODIUM: CPT | Performed by: INTERNAL MEDICINE

## 2020-08-15 PROCEDURE — 63710000001 METHOTREXATE PER 2.5 MG: Performed by: INTERNAL MEDICINE

## 2020-08-15 PROCEDURE — 94799 UNLISTED PULMONARY SVC/PX: CPT

## 2020-08-15 PROCEDURE — 99232 SBSQ HOSP IP/OBS MODERATE 35: CPT | Performed by: INTERNAL MEDICINE

## 2020-08-15 RX ORDER — ASCORBIC ACID 500 MG
1000 TABLET ORAL EVERY EVENING
Status: CANCELLED | OUTPATIENT
Start: 2020-08-15

## 2020-08-15 RX ORDER — DEXTROSE MONOHYDRATE 50 MG/ML
500 INJECTION, SOLUTION INTRAVENOUS ONCE
Status: COMPLETED | OUTPATIENT
Start: 2020-08-15 | End: 2020-08-15

## 2020-08-15 RX ORDER — SODIUM CHLORIDE 1000 MG
2 TABLET, SOLUBLE MISCELLANEOUS ONCE
Status: COMPLETED | OUTPATIENT
Start: 2020-08-15 | End: 2020-08-15

## 2020-08-15 RX ORDER — MULTIVIT WITH MINERALS/LUTEIN
1000 TABLET ORAL EVERY EVENING
Status: CANCELLED | OUTPATIENT
Start: 2020-08-15

## 2020-08-15 RX ORDER — ASPIRIN 81 MG/1
81 TABLET ORAL DAILY
Status: CANCELLED | OUTPATIENT
Start: 2020-08-15

## 2020-08-15 RX ORDER — CYANOCOBALAMIN 1000 UG/ML
1000 INJECTION, SOLUTION INTRAMUSCULAR; SUBCUTANEOUS
Status: DISCONTINUED | OUTPATIENT
Start: 2020-08-15 | End: 2020-08-18 | Stop reason: HOSPADM

## 2020-08-15 RX ORDER — LEVOTHYROXINE SODIUM 0.12 MG/1
125 TABLET ORAL
Status: DISCONTINUED | OUTPATIENT
Start: 2020-08-16 | End: 2020-08-17

## 2020-08-15 RX ORDER — CETIRIZINE HYDROCHLORIDE 10 MG/1
10 TABLET ORAL NIGHTLY
Status: CANCELLED | OUTPATIENT
Start: 2020-08-15

## 2020-08-15 RX ORDER — ASPIRIN 81 MG/1
81 TABLET ORAL DAILY
Status: DISCONTINUED | OUTPATIENT
Start: 2020-08-15 | End: 2020-08-18 | Stop reason: HOSPADM

## 2020-08-15 RX ORDER — CELECOXIB 200 MG/1
200 CAPSULE ORAL DAILY
Status: CANCELLED | OUTPATIENT
Start: 2020-08-15

## 2020-08-15 RX ORDER — METOPROLOL SUCCINATE 50 MG/1
100 TABLET, EXTENDED RELEASE ORAL DAILY
Status: CANCELLED | OUTPATIENT
Start: 2020-08-15

## 2020-08-15 RX ADMIN — DEXTROSE MONOHYDRATE 500 ML: 50 INJECTION, SOLUTION INTRAVENOUS at 00:48

## 2020-08-15 RX ADMIN — FOLIC ACID 1 MG: 1 TABLET ORAL at 08:20

## 2020-08-15 RX ADMIN — ENOXAPARIN SODIUM 40 MG: 40 INJECTION SUBCUTANEOUS at 18:26

## 2020-08-15 RX ADMIN — DEXTROSE MONOHYDRATE 500 ML: 50 INJECTION, SOLUTION INTRAVENOUS at 06:54

## 2020-08-15 RX ADMIN — GABAPENTIN 600 MG: 300 CAPSULE ORAL at 21:19

## 2020-08-15 RX ADMIN — Medication 5000 UNITS: at 14:34

## 2020-08-15 RX ADMIN — GABAPENTIN 600 MG: 300 CAPSULE ORAL at 14:30

## 2020-08-15 RX ADMIN — PRIMIDONE 50 MG: 50 TABLET ORAL at 21:19

## 2020-08-15 RX ADMIN — ASPIRIN 81 MG: 81 TABLET, COATED ORAL at 14:34

## 2020-08-15 RX ADMIN — SODIUM CHLORIDE TAB 1 GM 2 G: 1 TAB at 19:50

## 2020-08-15 RX ADMIN — GABAPENTIN 600 MG: 300 CAPSULE ORAL at 06:00

## 2020-08-15 RX ADMIN — METHOTREXATE SODIUM 25 MG: 2.5 TABLET ORAL at 08:20

## 2020-08-15 RX ADMIN — DULOXETINE HYDROCHLORIDE 90 MG: 60 CAPSULE, DELAYED RELEASE ORAL at 06:01

## 2020-08-15 RX ADMIN — SODIUM CHLORIDE, PRESERVATIVE FREE 10 ML: 5 INJECTION INTRAVENOUS at 21:19

## 2020-08-15 RX ADMIN — Medication 1 TABLET: at 14:34

## 2020-08-15 RX ADMIN — DESMOPRESSIN ACETATE 1 MCG: 4 INJECTION INTRAVENOUS at 00:18

## 2020-08-15 RX ADMIN — SODIUM CHLORIDE TAB 1 GM 2 G: 1 TAB at 18:21

## 2020-08-15 RX ADMIN — CYCLOBENZAPRINE HYDROCHLORIDE 10 MG: 10 TABLET, FILM COATED ORAL at 18:26

## 2020-08-15 NOTE — PROGRESS NOTES
Interval History:     Patient Complaints: Patient is feeling better.  She is still feeling weak.  She has not attempted to walk.  When I asked the patient she said that she is not on any antidepressants or anti-inflammatory pills or diuretics.  I note from a medication list that she is on Celebrex in the ambulatory setting and she is on a very high dose of duloxetine.  I also note from Dr. Agrawal's note that she had recently been started on chlorthalidone.    The patient drinks a gallon or more of liquids a day.  This is mostly water but it includes at least 32 ounces of iced tea.        Vital Signs  Temp:  [98.2 °F (36.8 °C)-98.5 °F (36.9 °C)] 98.2 °F (36.8 °C)  Heart Rate:  [] 108  Resp:  [14-26] 26  BP: ()/(59-82) 82/59    Physical Exam:    General:           No distress      HEENT:  No pallor               Neck:  No JVD       Lungs:    CTA   Heart:   RRR,  no rub       Abdomen:   Normal bowel sounds, soft non-tender, non-distended, no guarding       Extremities:  No edema       Skin: No petechiae, no rash       Neurologic: Cranial nerves grossly intact,  moves all extremities.        Results Review:    I reviewed the patient's new clinical results.    Lab Results (last 24 hours)     Procedure Component Value Units Date/Time    Comprehensive Metabolic Panel [[id number]]  (Abnormal) Collected:  08/15/20 1202    Specimen:  Blood Updated:  08/15/20 1256     Glucose 234 mg/dL      BUN 10 mg/dL      Creatinine 0.67 mg/dL      Sodium 117 mmol/L      Potassium 3.5 mmol/L      Chloride 81 mmol/L      CO2 23.5 mmol/L      Calcium 8.4 mg/dL      Total Protein 6.5 g/dL      Albumin 3.95 g/dL      ALT (SGPT) 22 U/L      AST (SGOT) 18 U/L      Alkaline Phosphatase 106 U/L      Total Bilirubin 0.5 mg/dL      eGFR Non African Amer 90 mL/min/1.73      Globulin 2.6 gm/dL      A/G Ratio 1.5 g/dL      BUN/Creatinine Ratio 14.9     Anion Gap 12.5 mmol/L     Narrative:       GFR Normal >60  Chronic Kidney Disease  <60  Kidney Failure <15      Sodium [[id number]]  (Abnormal) Collected:  08/15/20 0454    Specimen:  Blood Updated:  08/15/20 0614     Sodium 119 mmol/L     Sodium [[id number]]  (Abnormal) Collected:  08/14/20 2243    Specimen:  Blood Updated:  08/14/20 2259     Sodium 121 mmol/L     Chloride, Urine, Random - Urine, Clean Catch [[id number]] Collected:  08/14/20 2220    Specimen:  Urine, Clean Catch Updated:  08/14/20 2242     Chloride, Urine <20 mmol/L     Narrative:       Reference intervals for random urine have not been established.  Clinical usage is dependent upon physician's interpretation in combination with other laboratory tests.       Sodium, Urine, Random - Urine, Clean Catch [[id number]] Collected:  08/14/20 2220    Specimen:  Urine, Clean Catch Updated:  08/14/20 2242     Sodium, Urine 22 mmol/L     Narrative:       Reference intervals for random urine have not been established.  Clinical usage is dependent upon physician's interpretation in combination with other laboratory tests.       Potassium, Urine, Random - Urine, Clean Catch [[id number]] Collected:  08/14/20 2220    Specimen:  Urine, Clean Catch Updated:  08/14/20 2241     Potassium, Urine 3.5 mmol/L     Narrative:       Reference intervals for random urine have not been established.  Clinical usage is dependent upon physician's interpretation in combination with other laboratory tests.       Osmolality, Urine - Urine, Clean Catch [[id number]] Collected:  08/14/20 2220    Specimen:  Urine, Clean Catch Updated:  08/14/20 2236    Osmolality, Serum [[id number]]  (Abnormal) Collected:  08/14/20 1234    Specimen:  Blood Updated:  08/14/20 2120     Osmolality 246 mOsm/kg     Sodium [[id number]]  (Abnormal) Collected:  08/14/20 1911    Specimen:  Blood Updated:  08/14/20 1943     Sodium 118 mmol/L     Basic Metabolic Panel [[id number]]  (Abnormal) Collected:  08/14/20 1520    Specimen:  Blood Updated:  08/14/20 1612     Glucose 154 mg/dL      BUN 12 mg/dL       Creatinine 0.71 mg/dL      Sodium 111 mmol/L      Potassium 3.5 mmol/L      Chloride 79 mmol/L      CO2 19.6 mmol/L      Calcium 8.3 mg/dL      eGFR Non African Amer 85 mL/min/1.73      BUN/Creatinine Ratio 16.9     Anion Gap 12.4 mmol/L     Narrative:       GFR Normal >60  Chronic Kidney Disease <60  Kidney Failure <15      Comprehensive Metabolic Panel [[id number]]  (Abnormal) Collected:  08/14/20 1234    Specimen:  Blood Updated:  08/14/20 1319     Glucose 216 mg/dL      BUN 13 mg/dL      Creatinine 0.87 mg/dL      Sodium 112 mmol/L      Potassium 3.6 mmol/L      Chloride 73 mmol/L      CO2 24.7 mmol/L      Calcium 9.5 mg/dL      Total Protein 7.5 g/dL      Albumin 4.53 g/dL      ALT (SGPT) 26 U/L      AST (SGOT) 24 U/L      Alkaline Phosphatase 128 U/L      Total Bilirubin 0.7 mg/dL      eGFR Non African Amer 67 mL/min/1.73      Globulin 3.0 gm/dL      A/G Ratio 1.5 g/dL      BUN/Creatinine Ratio 14.9     Anion Gap 14.3 mmol/L     Narrative:       GFR Normal >60  Chronic Kidney Disease <60  Kidney Failure <15            Imaging Results (Last 24 Hours)     ** No results found for the last 24 hours. **          Assessment and Plan:    1.  Hyponatremia  2.  Polydipsia  3.  Diabetes 2  4.  Hypothyroidism  5.  Hypertension    Her sodium corrected very rapidly to 121 so we had to give her dextrose and desmopressin to bring it down.  It is now down to 117 which represents a correction of approximately 5-6 over 24 hours which is acceptable.  She is awake and alert so there is no urgency in giving her hypertonic saline again.  Her cortisol levels are okay and her TSH is actually suppressed.  She is not on chlorthalidone or Celebrex at this time.  I am going to stop her nighttime dose of duloxetine as she is on such a high dose.  I have talked to her and her  and explained to them the consequences of hyponatremia and the consequences of rapid correction.  I will advance her diet and put her on p.o. fluid restriction  and have told her she needs to be fluid restricted when she goes home.  I will recheck her sodium at 5 PM today.    Steffen Daniel MD  08/15/20  13:17

## 2020-08-15 NOTE — PLAN OF CARE
Problem: Fall Risk (Adult)  Goal: Identify Related Risk Factors and Signs and Symptoms  Outcome: Ongoing (interventions implemented as appropriate)  Goal: Absence of Fall  Outcome: Ongoing (interventions implemented as appropriate)     Problem: Patient Care Overview  Goal: Plan of Care Review  Outcome: Ongoing (interventions implemented as appropriate)  Goal: Individualization and Mutuality  Outcome: Ongoing (interventions implemented as appropriate)  Goal: Discharge Needs Assessment  Outcome: Ongoing (interventions implemented as appropriate)  Goal: Interprofessional Rounds/Family Conf  Outcome: Ongoing (interventions implemented as appropriate)     Problem: Skin Injury Risk (Adult)  Goal: Identify Related Risk Factors and Signs and Symptoms  Outcome: Ongoing (interventions implemented as appropriate)  Goal: Skin Health and Integrity  Outcome: Ongoing (interventions implemented as appropriate)     Problem: Pain, Chronic (Adult)  Goal: Identify Related Risk Factors and Signs and Symptoms  Outcome: Ongoing (interventions implemented as appropriate)  Goal: Acceptable Pain/Comfort Level and Functional Ability  Outcome: Ongoing (interventions implemented as appropriate)

## 2020-08-15 NOTE — PROGRESS NOTES
[facility] HOSPITALIST PROGRESS NOTE     Patient Identification:  Name:  Lashae Benitez  Age:  59 y.o.  Sex:  female  :  1960  MRN:  [id number]  Visit Number:  [id number]  Primary Care Provider:  Kreis, Samuel Duane, MD    Length of stay:  1    Chief complaint: Follow-up for hyponatremia        Subjective:    Patient seen with nursing staff.  No family present at bedside.  Patient is lying comfortably in bed and states that she is feeling better today.  Patient states that she still gets dizzy when she gets up but is improved since yesterday.  Patient denies any nausea or vomiting.  She denies any chest pain or palpitations.         Current Hospital Meds:    cyclobenzaprine 10 mg Oral Q PM   DULoxetine 60 mg Oral Nightly   DULoxetine 90 mg Oral QAM   enoxaparin 40 mg Subcutaneous Q24H   folic acid 1 mg Oral Daily   gabapentin 600 mg Oral Q8H   methotrexate 25 mg Oral Weekly   primidone 50 mg Oral Nightly   sodium chloride 10 mL Intravenous Q12H        ----------------------------------------------------------------------------------------------------------------------  Vital Signs:  Temp:  [98.4 °F (36.9 °C)-98.8 °F (37.1 °C)] 98.5 °F (36.9 °C)  Heart Rate:  [67-98] 98  Resp:  [14-22] 18  BP: (108-135)/(68-82) 135/80      20  1200 20  1445 08/15/20  0400   Weight: 87.1 kg (192 lb) 88.3 kg (194 lb 9.6 oz) 88.4 kg (194 lb 14.4 oz)     Body mass index is 32.43 kg/m².    Intake/Output Summary (Last 24 hours) at 8/15/2020 1158  Last data filed at 8/15/2020 0600  Gross per 24 hour   Intake 747.49 ml   Output 1000 ml   Net -252.51 ml     NPO Diet  ----------------------------------------------------------------------------------------------------------------------  Physical exam:  Constitutional:  Well-developed and well-nourished.     HENT:  Head:  Normocephalic and atraumatic.  Mouth:  Moist mucous membranes.    Eyes:  Conjunctivae and EOM are normal.  Pupils are equal, round, and  reactive to light.   Neck:  Neck supple.  No JVD present.    Cardiovascular:  Regular rate and rhythm. S1+S2. No murmur, rubs or gallops.   Pulmonary/Chest: Clear to auscultation bilaterally.   Abdominal:  Soft. Non-tender. No viscera palpable.  Bowel sounds audible.   Musculoskeletal: No deformity or joint swelling.   Peripheral vascular: Bilateral dorsalis pedis palpable. No edema.   Neurological:  Alert and oriented to person, place, and time.  Cranial nerves grossly intact. Strength bilaterally symmetrical in upper and lower extremities.   Skin:  Skin is warm and dry. No rash noted. No pallor.   ----------------------------------------------------------------------------------------------------------------------  Tele:    ----------------------------------------------------------------------------------------------------------------------  Results from last 7 days   Lab Units 08/14/20  1234   TROPONIN T ng/mL <0.010     Results from last 7 days   Lab Units 08/14/20  1232 08/09/20  0455   WBC 10*3/mm3 13.03* 8.75   HEMOGLOBIN g/dL 14.5 12.8   HEMATOCRIT % 40.8 41.1   MCV fL 87.7 98.6*   MCHC g/dL 35.5 31.1*   PLATELETS 10*3/mm3 379 295         Results from last 7 days   Lab Units 08/15/20  0454 08/14/20  2243 08/14/20  1911 08/14/20  1520 08/14/20  1234 08/09/20  0455   SODIUM mmol/L 119* 121* 118* 111* 112* 138   POTASSIUM mmol/L  --   --   --  3.5 3.6 3.3*   CHLORIDE mmol/L  --   --   --  79* 73* 100   CO2 mmol/L  --   --   --  19.6* 24.7 25.9   BUN mg/dL  --   --   --  12 13 8   CREATININE mg/dL  --   --   --  0.71 0.87 0.71   EGFR IF NONAFRICN AM mL/min/1.73  --   --   --  85 67 85   CALCIUM mg/dL  --   --   --  8.3* 9.5 9.3   GLUCOSE mg/dL  --   --   --  154* 216* 161*   ALBUMIN g/dL  --   --   --   --  4.53  --    BILIRUBIN mg/dL  --   --   --   --  0.7  --    ALK PHOS U/L  --   --   --   --  128*  --    AST (SGOT) U/L  --   --   --   --  24  --    ALT (SGPT) U/L  --   --   --   --  26  --    Estimated  Creatinine Clearance: 94.9 mL/min (by C-G formula based on SCr of 0.71 mg/dL).    No results found for: AMMONIA      No results found for: BLOODCX  No results found for: URINECX  No results found for: WOUNDCX  No results found for: STOOLCX    I have personally looked at the labs and they are summarized above.  ----------------------------------------------------------------------------------------------------------------------  Imaging Results (Last 24 Hours)     ** No results found for the last 24 hours. **        ----------------------------------------------------------------------------------------------------------------------  Assessment and Plan:    -Severe hyponatremia  Sodium level is improved to 119.  Nephrology on board and managing IV fluids.  We will continue to monitor sodium closely per nephrology recommendations.  Appreciate help from nephrology.  I will hold her amitriptyline and chlorthalidone.    -Essential hypertension  Blood pressure is controlled at this time.  Chlorthalidone discontinued.  Continue to monitor blood pressure and adjust antihypertensives as needed.    -Hypothyroidism  TSH was low.  Patient states that she has been on current dose for last 6 months.  I will decrease the dose to 125 mcg daily.  Patient will need recheck of her thyroid profile in 3 to 4 weeks.  Discussed with the patient, she understands and is agreeable.    -Rheumatoid arthritis  Continue methotrexate.  We will continue to monitor CMP.    -Fibromyalgia  Continue Cymbalta and gabapentin.    -Diabetes mellitus  Monitor blood glucose with Accu-Cheks and treat hyperglycemia with sliding scale insulin.    -GERD  Continue PPI.    Activity: Up with assist  Nutrition: Regular diet  DVT prophylaxis: Enoxaparin subcu    The patient is high risk due to: Severe hyponatremia, hypothyroidism, essential hypertension, fibromyalgia, rheumatoid arthritis, diabetes mellitus    I discussed the patient's findings and my  recommendations with patient and nursing staff.    Angelica Agrawal MD  08/15/20  11:58

## 2020-08-15 NOTE — PLAN OF CARE
"  Problem: Patient Care Overview  Goal: Plan of Care Review  Outcome: Ongoing (interventions implemented as appropriate)  Flowsheets  Taken 8/15/2020 0428  Progress: improving  Outcome Summary: patient is resting in bed, states\" when I get up I get dizzy\". Safety measures are in place, VSS on room air, will continue to monitor.  Taken 8/15/2020 0200  Plan of Care Reviewed With: patient     "

## 2020-08-16 LAB
ALBUMIN SERPL-MCNC: 3.73 G/DL (ref 3.5–5.2)
ALBUMIN/GLOB SERPL: 1.5 G/DL
ALP SERPL-CCNC: 100 U/L (ref 39–117)
ALT SERPL W P-5'-P-CCNC: 19 U/L (ref 1–33)
ANION GAP SERPL CALCULATED.3IONS-SCNC: 11.1 MMOL/L (ref 5–15)
ANION GAP SERPL CALCULATED.3IONS-SCNC: 13.1 MMOL/L (ref 5–15)
ANION GAP SERPL CALCULATED.3IONS-SCNC: 13.9 MMOL/L (ref 5–15)
ANION GAP SERPL CALCULATED.3IONS-SCNC: 9.7 MMOL/L (ref 5–15)
AST SERPL-CCNC: 15 U/L (ref 1–32)
BASOPHILS # BLD AUTO: 0.06 10*3/MM3 (ref 0–0.2)
BASOPHILS NFR BLD AUTO: 0.5 % (ref 0–1.5)
BILIRUB SERPL-MCNC: 0.4 MG/DL (ref 0–1.2)
BUN SERPL-MCNC: 10 MG/DL (ref 6–20)
BUN SERPL-MCNC: 11 MG/DL (ref 6–20)
BUN SERPL-MCNC: 11 MG/DL (ref 6–20)
BUN SERPL-MCNC: 12 MG/DL (ref 6–20)
BUN/CREAT SERPL: 13 (ref 7–25)
BUN/CREAT SERPL: 13.9 (ref 7–25)
BUN/CREAT SERPL: 13.9 (ref 7–25)
BUN/CREAT SERPL: 15.1 (ref 7–25)
CALCIUM SPEC-SCNC: 8.5 MG/DL (ref 8.6–10.5)
CALCIUM SPEC-SCNC: 8.5 MG/DL (ref 8.6–10.5)
CALCIUM SPEC-SCNC: 8.8 MG/DL (ref 8.6–10.5)
CALCIUM SPEC-SCNC: 8.9 MG/DL (ref 8.6–10.5)
CHLORIDE SERPL-SCNC: 84 MMOL/L (ref 98–107)
CHLORIDE SERPL-SCNC: 85 MMOL/L (ref 98–107)
CHLORIDE SERPL-SCNC: 87 MMOL/L (ref 98–107)
CHLORIDE SERPL-SCNC: 90 MMOL/L (ref 98–107)
CO2 SERPL-SCNC: 20.9 MMOL/L (ref 22–29)
CO2 SERPL-SCNC: 21.1 MMOL/L (ref 22–29)
CO2 SERPL-SCNC: 22.9 MMOL/L (ref 22–29)
CO2 SERPL-SCNC: 25.3 MMOL/L (ref 22–29)
CREAT SERPL-MCNC: 0.72 MG/DL (ref 0.57–1)
CREAT SERPL-MCNC: 0.73 MG/DL (ref 0.57–1)
CREAT SERPL-MCNC: 0.79 MG/DL (ref 0.57–1)
CREAT SERPL-MCNC: 0.92 MG/DL (ref 0.57–1)
DEPRECATED RDW RBC AUTO: 47 FL (ref 37–54)
EOSINOPHIL # BLD AUTO: 0.09 10*3/MM3 (ref 0–0.4)
EOSINOPHIL NFR BLD AUTO: 0.7 % (ref 0.3–6.2)
ERYTHROCYTE [DISTWIDTH] IN BLOOD BY AUTOMATED COUNT: 14 % (ref 12.3–15.4)
GFR SERPL CREATININE-BSD FRML MDRD: 63 ML/MIN/1.73
GFR SERPL CREATININE-BSD FRML MDRD: 75 ML/MIN/1.73
GFR SERPL CREATININE-BSD FRML MDRD: 82 ML/MIN/1.73
GFR SERPL CREATININE-BSD FRML MDRD: 83 ML/MIN/1.73
GLOBULIN UR ELPH-MCNC: 2.6 GM/DL
GLUCOSE BLDC GLUCOMTR-MCNC: 189 MG/DL (ref 70–130)
GLUCOSE BLDC GLUCOMTR-MCNC: 222 MG/DL (ref 70–130)
GLUCOSE BLDC GLUCOMTR-MCNC: 241 MG/DL (ref 70–130)
GLUCOSE BLDC GLUCOMTR-MCNC: 242 MG/DL (ref 70–130)
GLUCOSE SERPL-MCNC: 226 MG/DL (ref 65–99)
GLUCOSE SERPL-MCNC: 233 MG/DL (ref 65–99)
GLUCOSE SERPL-MCNC: 238 MG/DL (ref 65–99)
GLUCOSE SERPL-MCNC: 240 MG/DL (ref 65–99)
HCT VFR BLD AUTO: 38.3 % (ref 34–46.6)
HGB BLD-MCNC: 12.9 G/DL (ref 12–15.9)
IMM GRANULOCYTES # BLD AUTO: 0.07 10*3/MM3 (ref 0–0.05)
IMM GRANULOCYTES NFR BLD AUTO: 0.6 % (ref 0–0.5)
LYMPHOCYTES # BLD AUTO: 1.78 10*3/MM3 (ref 0.7–3.1)
LYMPHOCYTES NFR BLD AUTO: 14.3 % (ref 19.6–45.3)
MCH RBC QN AUTO: 30.9 PG (ref 26.6–33)
MCHC RBC AUTO-ENTMCNC: 33.7 G/DL (ref 31.5–35.7)
MCV RBC AUTO: 91.8 FL (ref 79–97)
MONOCYTES # BLD AUTO: 0.92 10*3/MM3 (ref 0.1–0.9)
MONOCYTES NFR BLD AUTO: 7.4 % (ref 5–12)
NEUTROPHILS NFR BLD AUTO: 76.5 % (ref 42.7–76)
NEUTROPHILS NFR BLD AUTO: 9.57 10*3/MM3 (ref 1.7–7)
NRBC BLD AUTO-RTO: 0 /100 WBC (ref 0–0.2)
PLATELET # BLD AUTO: 313 10*3/MM3 (ref 140–450)
PMV BLD AUTO: 9.9 FL (ref 6–12)
POTASSIUM SERPL-SCNC: 3.4 MMOL/L (ref 3.5–5.2)
POTASSIUM SERPL-SCNC: 3.6 MMOL/L (ref 3.5–5.2)
POTASSIUM SERPL-SCNC: 3.6 MMOL/L (ref 3.5–5.2)
POTASSIUM SERPL-SCNC: 4.3 MMOL/L (ref 3.5–5.2)
PROT SERPL-MCNC: 6.3 G/DL (ref 6–8.5)
RBC # BLD AUTO: 4.17 10*6/MM3 (ref 3.77–5.28)
SODIUM SERPL-SCNC: 119 MMOL/L (ref 136–145)
SODIUM SERPL-SCNC: 121 MMOL/L (ref 136–145)
SODIUM SERPL-SCNC: 122 MMOL/L (ref 136–145)
SODIUM SERPL-SCNC: 122 MMOL/L (ref 136–145)
WBC # BLD AUTO: 12.49 10*3/MM3 (ref 3.4–10.8)

## 2020-08-16 PROCEDURE — 99232 SBSQ HOSP IP/OBS MODERATE 35: CPT | Performed by: INTERNAL MEDICINE

## 2020-08-16 PROCEDURE — 25010000002 ENOXAPARIN PER 10 MG: Performed by: INTERNAL MEDICINE

## 2020-08-16 PROCEDURE — 85025 COMPLETE CBC W/AUTO DIFF WBC: CPT | Performed by: INTERNAL MEDICINE

## 2020-08-16 PROCEDURE — 63710000001 INSULIN ASPART PER 5 UNITS: Performed by: INTERNAL MEDICINE

## 2020-08-16 PROCEDURE — 80053 COMPREHEN METABOLIC PANEL: CPT | Performed by: INTERNAL MEDICINE

## 2020-08-16 PROCEDURE — 82962 GLUCOSE BLOOD TEST: CPT

## 2020-08-16 PROCEDURE — 94799 UNLISTED PULMONARY SVC/PX: CPT

## 2020-08-16 RX ORDER — SODIUM CHLORIDE 1000 MG
2 TABLET, SOLUBLE MISCELLANEOUS
Status: COMPLETED | OUTPATIENT
Start: 2020-08-16 | End: 2020-08-17

## 2020-08-16 RX ORDER — DEXTROSE MONOHYDRATE 25 G/50ML
25 INJECTION, SOLUTION INTRAVENOUS
Status: DISCONTINUED | OUTPATIENT
Start: 2020-08-16 | End: 2020-08-18 | Stop reason: HOSPADM

## 2020-08-16 RX ORDER — NICOTINE POLACRILEX 4 MG
15 LOZENGE BUCCAL
Status: DISCONTINUED | OUTPATIENT
Start: 2020-08-16 | End: 2020-08-18 | Stop reason: HOSPADM

## 2020-08-16 RX ORDER — SODIUM CHLORIDE 1000 MG
2 TABLET, SOLUBLE MISCELLANEOUS ONCE
Status: COMPLETED | OUTPATIENT
Start: 2020-08-16 | End: 2020-08-16

## 2020-08-16 RX ORDER — POTASSIUM CHLORIDE 20 MEQ/1
40 TABLET, EXTENDED RELEASE ORAL ONCE
Status: COMPLETED | OUTPATIENT
Start: 2020-08-17 | End: 2020-08-17

## 2020-08-16 RX ADMIN — APREMILAST 30 MG: 30 TABLET, FILM COATED ORAL at 21:35

## 2020-08-16 RX ADMIN — GABAPENTIN 600 MG: 300 CAPSULE ORAL at 13:55

## 2020-08-16 RX ADMIN — SODIUM CHLORIDE TAB 1 GM 2 G: 1 TAB at 16:32

## 2020-08-16 RX ADMIN — Medication 5000 UNITS: at 08:41

## 2020-08-16 RX ADMIN — PRIMIDONE 50 MG: 50 TABLET ORAL at 21:34

## 2020-08-16 RX ADMIN — LEVOTHYROXINE SODIUM 125 MCG: 125 TABLET ORAL at 06:17

## 2020-08-16 RX ADMIN — GABAPENTIN 600 MG: 300 CAPSULE ORAL at 06:17

## 2020-08-16 RX ADMIN — CYCLOBENZAPRINE HYDROCHLORIDE 10 MG: 10 TABLET, FILM COATED ORAL at 16:37

## 2020-08-16 RX ADMIN — SODIUM CHLORIDE TAB 1 GM 2 G: 1 TAB at 18:04

## 2020-08-16 RX ADMIN — INSULIN ASPART 3 UNITS: 100 INJECTION, SOLUTION INTRAVENOUS; SUBCUTANEOUS at 11:52

## 2020-08-16 RX ADMIN — DULOXETINE HYDROCHLORIDE 90 MG: 60 CAPSULE, DELAYED RELEASE ORAL at 06:17

## 2020-08-16 RX ADMIN — SODIUM CHLORIDE TAB 1 GM 2 G: 1 TAB at 11:52

## 2020-08-16 RX ADMIN — GABAPENTIN 600 MG: 300 CAPSULE ORAL at 21:34

## 2020-08-16 RX ADMIN — SODIUM CHLORIDE, PRESERVATIVE FREE 10 ML: 5 INJECTION INTRAVENOUS at 08:42

## 2020-08-16 RX ADMIN — SODIUM CHLORIDE, PRESERVATIVE FREE 10 ML: 5 INJECTION INTRAVENOUS at 21:35

## 2020-08-16 RX ADMIN — INSULIN ASPART 2 UNITS: 100 INJECTION, SOLUTION INTRAVENOUS; SUBCUTANEOUS at 18:04

## 2020-08-16 RX ADMIN — Medication 1 TABLET: at 08:41

## 2020-08-16 RX ADMIN — INSULIN ASPART 3 UNITS: 100 INJECTION, SOLUTION INTRAVENOUS; SUBCUTANEOUS at 08:41

## 2020-08-16 RX ADMIN — METOPROLOL TARTRATE 25 MG: 25 TABLET, FILM COATED ORAL at 14:01

## 2020-08-16 RX ADMIN — ENOXAPARIN SODIUM 40 MG: 40 INJECTION SUBCUTANEOUS at 18:04

## 2020-08-16 RX ADMIN — ASPIRIN 81 MG: 81 TABLET, COATED ORAL at 08:41

## 2020-08-16 RX ADMIN — FOLIC ACID 1 MG: 1 TABLET ORAL at 08:41

## 2020-08-16 NOTE — PROGRESS NOTES
[facility] HOSPITALIST PROGRESS NOTE     Patient Identification:  Name:  Lashae Benitez  Age:  59 y.o.  Sex:  female  :  1960  MRN:  [id number]  Visit Number:  [id number]  Primary Care Provider:  Kreis, Samuel Duane, MD    Length of stay:  2    Chief complaint: Follow-up for hyponatremia        Subjective:    Patient seen with nursing staff.  Family is present at bedside.  Patient is lying comfortably in bed.  She states that she is feeling better.  Patient denies any nausea, vomiting, dizziness or lightheadedness.  She states that she still gets dizzy when she gets up and walks some.  Patient denies any chest pain or palpitations.  Denies any other complaints.           Current Hospital Meds:    aspirin 81 mg Oral Daily   calcium carb-cholecalciferol 1 tablet Oral Daily   cyanocobalamin 1,000 mcg Intramuscular Q28 Days   cyclobenzaprine 10 mg Oral Q PM   DULoxetine 90 mg Oral QAM   enoxaparin 40 mg Subcutaneous Q24H   folic acid 1 mg Oral Daily   gabapentin 600 mg Oral Q8H   insulin aspart 0-7 Units Subcutaneous TID AC   levothyroxine 125 mcg Oral Q AM   methotrexate 25 mg Oral Weekly   primidone 50 mg Oral Nightly   sodium chloride 10 mL Intravenous Q12H   sodium chloride 2 g Oral TID With Meals   vitamin D3 5,000 Units Oral Daily        ----------------------------------------------------------------------------------------------------------------------  Vital Signs:  Temp:  [97.6 °F (36.4 °C)-98.7 °F (37.1 °C)] 97.6 °F (36.4 °C)  Heart Rate:  [] 110  Resp:  [12-26] 16  BP: ()/(59-93) 134/85      20  1200 20  1445 08/15/20  0400   Weight: 87.1 kg (192 lb) 88.3 kg (194 lb 9.6 oz) 88.4 kg (194 lb 14.4 oz)     Body mass index is 32.43 kg/m².    Intake/Output Summary (Last 24 hours) at 2020 1127  Last data filed at 2020 0730  Gross per 24 hour   Intake 940 ml   Output 1100 ml   Net -160 ml     Diet Regular; Consistent Carbohydrate, Daily Fluid Restriction; 1000  mL Fluid Per Day  ----------------------------------------------------------------------------------------------------------------------  Physical exam:  Constitutional:  Well-developed and well-nourished.     HENT:  Head:  Normocephalic and atraumatic.  Mouth:  Moist mucous membranes.    Eyes:  Conjunctivae and EOM are normal.  Pupils are equal, round, and reactive to light.   Neck:  Neck supple.  No JVD present.    Cardiovascular:  Regular rate and rhythm. S1+S2. No murmur, rubs or gallops.   Pulmonary/Chest: Clear to auscultation bilaterally.   Abdominal:  Soft. Non-tender. No viscera palpable.  Bowel sounds audible.   Musculoskeletal: No deformity or joint swelling.   Peripheral vascular: Bilateral dorsalis pedis palpable. No edema.   Neurological:  Alert and oriented to person, place, and time.  Cranial nerves grossly intact. Strength bilaterally symmetrical in upper and lower extremities.   Skin:  Skin is warm and dry. No rash noted. No pallor.   ----------------------------------------------------------------------------------------------------------------------  Tele:    ----------------------------------------------------------------------------------------------------------------------  Results from last 7 days   Lab Units 08/14/20  1234   TROPONIN T ng/mL <0.010     Results from last 7 days   Lab Units 08/16/20  0254 08/14/20  1232   WBC 10*3/mm3 12.49* 13.03*   HEMOGLOBIN g/dL 12.9 14.5   HEMATOCRIT % 38.3 40.8   MCV fL 91.8 87.7   MCHC g/dL 33.7 35.5   PLATELETS 10*3/mm3 313 379         Results from last 7 days   Lab Units 08/16/20  0418 08/16/20  0254 08/15/20  2128  08/15/20  1202  08/14/20  1234   SODIUM mmol/L 121* 119* 120*   < > 117*   < > 112*   POTASSIUM mmol/L 4.3 3.6 3.3*   < > 3.5   < > 3.6   CHLORIDE mmol/L 85* 84* 84*   < > 81*   < > 73*   CO2 mmol/L 22.9 21.1* 21.8*   < > 23.5   < > 24.7   BUN mg/dL 10 11 12   < > 10   < > 13   CREATININE mg/dL 0.72 0.73 0.73   < > 0.67   < > 0.87   EGFR  IF NONAFRICN AM mL/min/1.73 83 82 82   < > 90   < > 67   CALCIUM mg/dL 8.5* 8.5* 8.5*   < > 8.4*   < > 9.5   GLUCOSE mg/dL 233* 238* 246*   < > 234*   < > 216*   ALBUMIN g/dL  --  3.73  --   --  3.95  --  4.53   BILIRUBIN mg/dL  --  0.4  --   --  0.5  --  0.7   ALK PHOS U/L  --  100  --   --  106  --  128*   AST (SGOT) U/L  --  15  --   --  18  --  24   ALT (SGPT) U/L  --  19  --   --  22  --  26    < > = values in this interval not displayed.   Estimated Creatinine Clearance: 93.6 mL/min (by C-G formula based on SCr of 0.72 mg/dL).    No results found for: AMMONIA      No results found for: BLOODCX  No results found for: URINECX  No results found for: WOUNDCX  No results found for: STOOLCX    I have personally looked at the labs and they are summarized above.  ----------------------------------------------------------------------------------------------------------------------  Imaging Results (Last 24 Hours)     ** No results found for the last 24 hours. **        ----------------------------------------------------------------------------------------------------------------------  Assessment and Plan:    -Severe hyponatremia  Sodium level is improving up to 122 today.  Nephrology following and managing IV fluids.  Continue on fluid restriction per recommendations of nephrology.  Continue to hold amitriptyline and chlorthalidone.  Continue to monitor electrolytes closely.      -Essential hypertension  Blood pressure is controlled at this time.  Chlorthalidone discontinued.  Continue to monitor blood pressure and adjust antihypertensives as needed.    -Hypothyroidism  TSH was low.  Patient states that she has been on current dose for last 6 months.  Levothyroxine decreased to 125 mcg daily..  Patient will need recheck of her thyroid profile in 3 to 4 weeks.  Discussed with the patient, she understands and is agreeable.    -Rheumatoid arthritis  Continue methotrexate.  We will continue to monitor  CMP.    -Fibromyalgia  Continue Cymbalta and gabapentin.    -Diabetes mellitus  Monitor blood glucose with Accu-Cheks and treat hyperglycemia with sliding scale insulin.    -GERD  Continue PPI.    Activity: Up with assist  Nutrition: Regular diet  DVT prophylaxis: Enoxaparin subcu    The patient is high risk due to: Severe hyponatremia, hypothyroidism, essential hypertension, fibromyalgia, rheumatoid arthritis, diabetes mellitus    I discussed the patient's findings and my recommendations with patient and nursing staff.    Angelica Agrawal MD  08/16/20  11:27

## 2020-08-16 NOTE — PLAN OF CARE
Problem: Patient Care Overview  Goal: Plan of Care Review  Outcome: Ongoing (interventions implemented as appropriate)  Flowsheets (Taken 8/16/2020 4432)  Progress: improving  Plan of Care Reviewed With: patient; spouse  Outcome Summary: Na slightly improved. Pt ambulated in room without difficulty. Pt compliant with 1000cc fluid restriction. Pt started on low dose SSI, ac/hs accu checks. No complaints, will continue to monitor.

## 2020-08-16 NOTE — PLAN OF CARE
Problem: Patient Care Overview  Goal: Plan of Care Review  Outcome: Ongoing (interventions implemented as appropriate)  Flowsheets (Taken 8/16/2020 0429)  Progress: improving  Plan of Care Reviewed With: patient  Outcome Summary: Patient continues to get dizzy when standing and unsteady on her feet when ambulating. Sodium is gradually increasing and being followed by nephrology. Will continue to monitor.

## 2020-08-16 NOTE — PROGRESS NOTES
Interval History:     Patient Complaints: Patient is feeling better.  No nausea.  No headache.  No shortness of breath.  No chest pain.  No vomiting or diarrhea.        Vital Signs  Temp:  [97.6 °F (36.4 °C)-98.7 °F (37.1 °C)] 97.6 °F (36.4 °C)  Heart Rate:  [] 122  Resp:  [12-20] 16  BP: ()/(62-93) 131/93    Physical Exam:    General:           No distress      HEENT:  No pallor               Neck:  No JVD           Heart:   RRR,  no rub               Extremities:  No edema               Neurologic:  Awake alert        Results Review:    I reviewed the patient's new clinical results.    Lab Results (last 24 hours)     Procedure Component Value Units Date/Time    POC Glucose Once [[id number]]  (Abnormal) Collected:  08/16/20 1147    Specimen:  Blood Updated:  08/16/20 1154     Glucose 242 mg/dL     POC Glucose Once [[id number]]  (Abnormal) Collected:  08/16/20 0840    Specimen:  Blood Updated:  08/16/20 0848     Glucose 241 mg/dL     Basic Metabolic Panel [[id number]]  (Abnormal) Collected:  08/16/20 0418    Specimen:  Blood Updated:  08/16/20 0451     Glucose 233 mg/dL      BUN 10 mg/dL      Creatinine 0.72 mg/dL      Sodium 121 mmol/L      Potassium 4.3 mmol/L      Chloride 85 mmol/L      CO2 22.9 mmol/L      Calcium 8.5 mg/dL      eGFR Non African Amer 83 mL/min/1.73      BUN/Creatinine Ratio 13.9     Anion Gap 13.1 mmol/L     Narrative:       GFR Normal >60  Chronic Kidney Disease <60  Kidney Failure <15      Comprehensive Metabolic Panel [[id number]]  (Abnormal) Collected:  08/16/20 0254    Specimen:  Blood Updated:  08/16/20 0347     Glucose 238 mg/dL      BUN 11 mg/dL      Creatinine 0.73 mg/dL      Sodium 119 mmol/L      Potassium 3.6 mmol/L      Chloride 84 mmol/L      CO2 21.1 mmol/L      Calcium 8.5 mg/dL      Total Protein 6.3 g/dL      Albumin 3.73 g/dL      ALT (SGPT) 19 U/L      AST (SGOT) 15 U/L      Alkaline Phosphatase 100 U/L      Total Bilirubin 0.4 mg/dL      eGFR Non African Amer 82  mL/min/1.73      Globulin 2.6 gm/dL      A/G Ratio 1.5 g/dL      BUN/Creatinine Ratio 15.1     Anion Gap 13.9 mmol/L     Narrative:       GFR Normal >60  Chronic Kidney Disease <60  Kidney Failure <15      CBC & Differential [[id number]] Collected:  08/16/20 0254    Specimen:  Blood Updated:  08/16/20 0345    Narrative:       The following orders were created for panel order CBC & Differential.  Procedure                               Abnormality         Status                     ---------                               -----------         ------                     CBC Auto Differential[[id number]]        Abnormal            Final result                 Please view results for these tests on the individual orders.    CBC Auto Differential [[id number]]  (Abnormal) Collected:  08/16/20 0254    Specimen:  Blood Updated:  08/16/20 0345     WBC 12.49 10*3/mm3      RBC 4.17 10*6/mm3      Hemoglobin 12.9 g/dL      Hematocrit 38.3 %      MCV 91.8 fL      MCH 30.9 pg      MCHC 33.7 g/dL      RDW 14.0 %      RDW-SD 47.0 fl      MPV 9.9 fL      Platelets 313 10*3/mm3      Neutrophil % 76.5 %      Lymphocyte % 14.3 %      Monocyte % 7.4 %      Eosinophil % 0.7 %      Basophil % 0.5 %      Immature Grans % 0.6 %      Neutrophils, Absolute 9.57 10*3/mm3      Lymphocytes, Absolute 1.78 10*3/mm3      Monocytes, Absolute 0.92 10*3/mm3      Eosinophils, Absolute 0.09 10*3/mm3      Basophils, Absolute 0.06 10*3/mm3      Immature Grans, Absolute 0.07 10*3/mm3      nRBC 0.0 /100 WBC     Basic Metabolic Panel [[id number]]  (Abnormal) Collected:  08/15/20 2128    Specimen:  Blood Updated:  08/15/20 2152     Glucose 246 mg/dL      BUN 12 mg/dL      Creatinine 0.73 mg/dL      Sodium 120 mmol/L      Potassium 3.3 mmol/L      Comment: Specimen hemolyzed.  Results may be affected.        Chloride 84 mmol/L      CO2 21.8 mmol/L      Calcium 8.5 mg/dL      eGFR Non African Amer 82 mL/min/1.73      BUN/Creatinine Ratio 16.4     Anion Gap 14.2 mmol/L      Narrative:       GFR Normal >60  Chronic Kidney Disease <60  Kidney Failure <15      Basic Metabolic Panel [[id number]]  (Abnormal) Collected:  08/15/20 1644    Specimen:  Blood Updated:  08/15/20 1730     Glucose 236 mg/dL      BUN 10 mg/dL      Creatinine 0.80 mg/dL      Sodium 117 mmol/L      Potassium 3.6 mmol/L      Chloride 81 mmol/L      CO2 23.9 mmol/L      Calcium 8.6 mg/dL      eGFR Non African Amer 74 mL/min/1.73      BUN/Creatinine Ratio 12.5     Anion Gap 12.1 mmol/L     Narrative:       GFR Normal >60  Chronic Kidney Disease <60  Kidney Failure <15      Osmolality, Urine - Urine, Clean Catch [[id number]] Collected:  08/14/20 2220    Specimen:  Urine, Clean Catch Updated:  08/15/20 1409     Osmolality, Urine 108 mOsm/kg     Narrative:       Osmo Normal Reference Ranges:    Random:  mOsm/kg H2O, depending on fluid intake.  Random: >850 mOsm/kg H20, after 12 hour fluid restriction.    24 Hour: 300-900 mOsm/kg H2O.          Imaging Results (Last 24 Hours)     ** No results found for the last 24 hours. **          Assessment and Plan:       1.  Hyponatremia  2.  Polydipsia  3.  Diabetes 2  4.  Hypothyroidism  5.  Hypertension    The patient confirms she does take Celebrex.  The patient confirms that she had took chlorthalidone through Thursday morning before it was stopped.  She acknowledges that she is on high dose Cymbalta.  This probably explains why her sodium is proving to be a little difficult to correct.    It is a little better with sodium chloride tablets administered yesterday and I am going to give her 3 more doses today.  I will leave her on fluid restriction.  I will check a BMP at 3 PM today.  Discussed plan with the patient and     Steffen Daniel MD  08/16/20  13:03

## 2020-08-17 LAB
ALBUMIN SERPL-MCNC: 3.7 G/DL (ref 3.5–5.2)
ALBUMIN/GLOB SERPL: 1.4 G/DL
ALP SERPL-CCNC: 96 U/L (ref 39–117)
ALT SERPL W P-5'-P-CCNC: 21 U/L (ref 1–33)
ANION GAP SERPL CALCULATED.3IONS-SCNC: 14.4 MMOL/L (ref 5–15)
ANION GAP SERPL CALCULATED.3IONS-SCNC: 9.5 MMOL/L (ref 5–15)
AST SERPL-CCNC: 16 U/L (ref 1–32)
BASOPHILS # BLD AUTO: 0.08 10*3/MM3 (ref 0–0.2)
BASOPHILS NFR BLD AUTO: 0.8 % (ref 0–1.5)
BILIRUB SERPL-MCNC: 0.4 MG/DL (ref 0–1.2)
BUN SERPL-MCNC: 10 MG/DL (ref 6–20)
BUN SERPL-MCNC: 13 MG/DL (ref 6–20)
BUN/CREAT SERPL: 12.5 (ref 7–25)
BUN/CREAT SERPL: 13.2 (ref 7–25)
CALCIUM SPEC-SCNC: 8.5 MG/DL (ref 8.6–10.5)
CALCIUM SPEC-SCNC: 8.9 MG/DL (ref 8.6–10.5)
CHLORIDE SERPL-SCNC: 90 MMOL/L (ref 98–107)
CHLORIDE SERPL-SCNC: 94 MMOL/L (ref 98–107)
CO2 SERPL-SCNC: 21.6 MMOL/L (ref 22–29)
CO2 SERPL-SCNC: 22.5 MMOL/L (ref 22–29)
CREAT SERPL-MCNC: 0.76 MG/DL (ref 0.57–1)
CREAT SERPL-MCNC: 1.04 MG/DL (ref 0.57–1)
DEPRECATED RDW RBC AUTO: 50.2 FL (ref 37–54)
EOSINOPHIL # BLD AUTO: 0.13 10*3/MM3 (ref 0–0.4)
EOSINOPHIL NFR BLD AUTO: 1.3 % (ref 0.3–6.2)
ERYTHROCYTE [DISTWIDTH] IN BLOOD BY AUTOMATED COUNT: 14.4 % (ref 12.3–15.4)
GFR SERPL CREATININE-BSD FRML MDRD: 54 ML/MIN/1.73
GFR SERPL CREATININE-BSD FRML MDRD: 78 ML/MIN/1.73
GLOBULIN UR ELPH-MCNC: 2.6 GM/DL
GLUCOSE BLDC GLUCOMTR-MCNC: 182 MG/DL (ref 70–130)
GLUCOSE BLDC GLUCOMTR-MCNC: 213 MG/DL (ref 70–130)
GLUCOSE BLDC GLUCOMTR-MCNC: 217 MG/DL (ref 70–130)
GLUCOSE BLDC GLUCOMTR-MCNC: 218 MG/DL (ref 70–130)
GLUCOSE SERPL-MCNC: 220 MG/DL (ref 65–99)
GLUCOSE SERPL-MCNC: 267 MG/DL (ref 65–99)
HBA1C MFR BLD: 8.1 % (ref 4.8–5.6)
HCT VFR BLD AUTO: 39.8 % (ref 34–46.6)
HGB BLD-MCNC: 13.1 G/DL (ref 12–15.9)
IMM GRANULOCYTES # BLD AUTO: 0.03 10*3/MM3 (ref 0–0.05)
IMM GRANULOCYTES NFR BLD AUTO: 0.3 % (ref 0–0.5)
LYMPHOCYTES # BLD AUTO: 2.44 10*3/MM3 (ref 0.7–3.1)
LYMPHOCYTES NFR BLD AUTO: 25.1 % (ref 19.6–45.3)
MCH RBC QN AUTO: 31.4 PG (ref 26.6–33)
MCHC RBC AUTO-ENTMCNC: 32.9 G/DL (ref 31.5–35.7)
MCV RBC AUTO: 95.4 FL (ref 79–97)
MONOCYTES # BLD AUTO: 0.89 10*3/MM3 (ref 0.1–0.9)
MONOCYTES NFR BLD AUTO: 9.2 % (ref 5–12)
NEUTROPHILS NFR BLD AUTO: 6.15 10*3/MM3 (ref 1.7–7)
NEUTROPHILS NFR BLD AUTO: 63.3 % (ref 42.7–76)
NRBC BLD AUTO-RTO: 0 /100 WBC (ref 0–0.2)
PLATELET # BLD AUTO: 312 10*3/MM3 (ref 140–450)
PMV BLD AUTO: 9.8 FL (ref 6–12)
POTASSIUM SERPL-SCNC: 4.1 MMOL/L (ref 3.5–5.2)
POTASSIUM SERPL-SCNC: 4.1 MMOL/L (ref 3.5–5.2)
PROT SERPL-MCNC: 6.3 G/DL (ref 6–8.5)
RBC # BLD AUTO: 4.17 10*6/MM3 (ref 3.77–5.28)
SODIUM SERPL-SCNC: 126 MMOL/L (ref 136–145)
SODIUM SERPL-SCNC: 126 MMOL/L (ref 136–145)
WBC # BLD AUTO: 9.72 10*3/MM3 (ref 3.4–10.8)

## 2020-08-17 PROCEDURE — 85025 COMPLETE CBC W/AUTO DIFF WBC: CPT | Performed by: INTERNAL MEDICINE

## 2020-08-17 PROCEDURE — 82962 GLUCOSE BLOOD TEST: CPT

## 2020-08-17 PROCEDURE — 63710000001 INSULIN ASPART PER 5 UNITS: Performed by: INTERNAL MEDICINE

## 2020-08-17 PROCEDURE — 94799 UNLISTED PULMONARY SVC/PX: CPT

## 2020-08-17 PROCEDURE — 83036 HEMOGLOBIN GLYCOSYLATED A1C: CPT | Performed by: INTERNAL MEDICINE

## 2020-08-17 PROCEDURE — 80053 COMPREHEN METABOLIC PANEL: CPT | Performed by: INTERNAL MEDICINE

## 2020-08-17 PROCEDURE — 99232 SBSQ HOSP IP/OBS MODERATE 35: CPT | Performed by: INTERNAL MEDICINE

## 2020-08-17 PROCEDURE — 25010000002 ENOXAPARIN PER 10 MG: Performed by: INTERNAL MEDICINE

## 2020-08-17 PROCEDURE — 63710000001 INSULIN DETEMIR PER 5 UNITS: Performed by: INTERNAL MEDICINE

## 2020-08-17 RX ORDER — METOPROLOL TARTRATE 50 MG/1
50 TABLET, FILM COATED ORAL EVERY 12 HOURS SCHEDULED
Status: DISCONTINUED | OUTPATIENT
Start: 2020-08-17 | End: 2020-08-18 | Stop reason: HOSPADM

## 2020-08-17 RX ORDER — SODIUM CHLORIDE 1000 MG
2 TABLET, SOLUBLE MISCELLANEOUS
Status: DISCONTINUED | OUTPATIENT
Start: 2020-08-17 | End: 2020-08-18 | Stop reason: HOSPADM

## 2020-08-17 RX ADMIN — SODIUM CHLORIDE TAB 1 GM 2 G: 1 TAB at 08:03

## 2020-08-17 RX ADMIN — INSULIN ASPART 3 UNITS: 100 INJECTION, SOLUTION INTRAVENOUS; SUBCUTANEOUS at 08:03

## 2020-08-17 RX ADMIN — POTASSIUM CHLORIDE 40 MEQ: 1500 TABLET, EXTENDED RELEASE ORAL at 00:24

## 2020-08-17 RX ADMIN — GABAPENTIN 600 MG: 300 CAPSULE ORAL at 21:34

## 2020-08-17 RX ADMIN — SODIUM CHLORIDE TAB 1 GM 2 G: 1 TAB at 18:30

## 2020-08-17 RX ADMIN — Medication 5000 UNITS: at 08:03

## 2020-08-17 RX ADMIN — APREMILAST 30 MG: 30 TABLET, FILM COATED ORAL at 08:03

## 2020-08-17 RX ADMIN — Medication 1 TABLET: at 08:03

## 2020-08-17 RX ADMIN — GABAPENTIN 600 MG: 300 CAPSULE ORAL at 13:12

## 2020-08-17 RX ADMIN — ENOXAPARIN SODIUM 40 MG: 40 INJECTION SUBCUTANEOUS at 18:29

## 2020-08-17 RX ADMIN — ASPIRIN 81 MG: 81 TABLET, COATED ORAL at 08:03

## 2020-08-17 RX ADMIN — FOLIC ACID 1 MG: 1 TABLET ORAL at 08:03

## 2020-08-17 RX ADMIN — APREMILAST 30 MG: 30 TABLET, FILM COATED ORAL at 21:34

## 2020-08-17 RX ADMIN — INSULIN ASPART 3 UNITS: 100 INJECTION, SOLUTION INTRAVENOUS; SUBCUTANEOUS at 18:29

## 2020-08-17 RX ADMIN — LEVOTHYROXINE SODIUM 125 MCG: 125 TABLET ORAL at 06:12

## 2020-08-17 RX ADMIN — INSULIN ASPART 3 UNITS: 100 INJECTION, SOLUTION INTRAVENOUS; SUBCUTANEOUS at 13:11

## 2020-08-17 RX ADMIN — SODIUM CHLORIDE TAB 1 GM 2 G: 1 TAB at 13:14

## 2020-08-17 RX ADMIN — SODIUM CHLORIDE, PRESERVATIVE FREE 10 ML: 5 INJECTION INTRAVENOUS at 08:04

## 2020-08-17 RX ADMIN — SODIUM CHLORIDE, PRESERVATIVE FREE 10 ML: 5 INJECTION INTRAVENOUS at 21:34

## 2020-08-17 RX ADMIN — INSULIN DETEMIR 15 UNITS: 100 INJECTION, SOLUTION SUBCUTANEOUS at 13:14

## 2020-08-17 RX ADMIN — DULOXETINE HYDROCHLORIDE 90 MG: 60 CAPSULE, DELAYED RELEASE ORAL at 06:12

## 2020-08-17 RX ADMIN — CYCLOBENZAPRINE HYDROCHLORIDE 10 MG: 10 TABLET, FILM COATED ORAL at 18:30

## 2020-08-17 RX ADMIN — METOPROLOL TARTRATE 25 MG: 25 TABLET, FILM COATED ORAL at 08:03

## 2020-08-17 RX ADMIN — PRIMIDONE 50 MG: 50 TABLET ORAL at 21:34

## 2020-08-17 RX ADMIN — METOPROLOL TARTRATE 50 MG: 50 TABLET, FILM COATED ORAL at 21:34

## 2020-08-17 RX ADMIN — GABAPENTIN 600 MG: 300 CAPSULE ORAL at 06:12

## 2020-08-17 NOTE — PROGRESS NOTES
Nephrology Progress Note      Subjective     Patient feels fine, no complaints. Has been doing water restriction    Objective       Vital signs :     Temp:  [97.6 °F (36.4 °C)-98.8 °F (37.1 °C)] 98.8 °F (37.1 °C)  Heart Rate:  [102-125] 102  Resp:  [12-20] 18  BP: ()/(72-93) 128/78      Intake/Output Summary (Last 24 hours) at 8/17/2020 0916  Last data filed at 8/17/2020 0600  Gross per 24 hour   Intake 600 ml   Output 1600 ml   Net -1000 ml       Physical Exam:    General Appearance : not in acute distress  Lungs : clear to auscultation, respirations regular  Heart :  regular rhythm & normal rate, normal S1, S2 and no murmur, no rub  Abdomen : normal bowel sounds, no masses, no hepatomegaly, no splenomegaly, soft non-tender and no guarding  Extremities : moves extremities well, no edema, no cyanosis and no redness  Skin :  no bleeding, bruising or rash  Neurologic :   orientated to person, place, time and situation, Grossly no focal deficits    Laboratory Data :     Albumin Albumin   Date Value Ref Range Status   08/17/2020 3.70 3.50 - 5.20 g/dL Final   08/16/2020 3.73 3.50 - 5.20 g/dL Final   08/15/2020 3.95 3.50 - 5.20 g/dL Final   08/14/2020 4.53 3.50 - 5.20 g/dL Final      Magnesium No results found for: MG       PTH               No results found for: PTH    CBC and coagulation:  Results from last 7 days   Lab Units 08/17/20  0147 08/16/20  0254 08/14/20  1232   WBC 10*3/mm3 9.72 12.49* 13.03*   HEMOGLOBIN g/dL 13.1 12.9 14.5   HEMATOCRIT % 39.8 38.3 40.8   MCV fL 95.4 91.8 87.7   MCHC g/dL 32.9 33.7 35.5   PLATELETS 10*3/mm3 312 313 379     Acid/base balance:      Renal and electrolytes:  Results from last 7 days   Lab Units 08/17/20  0416 08/16/20  1956 08/16/20  1523 08/16/20  0418 08/16/20  0254   SODIUM mmol/L 126* 122* 122* 121* 119*   POTASSIUM mmol/L 4.1 3.4* 3.6 4.3 3.6   CHLORIDE mmol/L 90* 90* 87* 85* 84*   CO2 mmol/L 21.6* 20.9* 25.3 22.9 21.1*   BUN mg/dL 10 12 11 10 11   CREATININE mg/dL  0.76 0.92 0.79 0.72 0.73   EGFR IF NONAFRICN AM mL/min/1.73 78 63 75 83 82   CALCIUM mg/dL 8.5* 8.8 8.9 8.5* 8.5*     Estimated Creatinine Clearance: 88.9 mL/min (by C-G formula based on SCr of 0.76 mg/dL).    Liver and pancreatic function:  Results from last 7 days   Lab Units 08/17/20  0416 08/16/20  0254 08/15/20  1202   ALBUMIN g/dL 3.70 3.73 3.95   BILIRUBIN mg/dL 0.4 0.4 0.5   ALK PHOS U/L 96 100 106   AST (SGOT) U/L 16 15 18   ALT (SGPT) U/L 21 19 22         Cardiac:      Liver and pancreatic function:  Results from last 7 days   Lab Units 08/17/20  0416 08/16/20  0254 08/15/20  1202   ALBUMIN g/dL 3.70 3.73 3.95   BILIRUBIN mg/dL 0.4 0.4 0.5   ALK PHOS U/L 96 100 106   AST (SGOT) U/L 16 15 18   ALT (SGPT) U/L 21 19 22       Medications :       Apremilast 30 mg Oral BID   aspirin 81 mg Oral Daily   calcium carb-cholecalciferol 1 tablet Oral Daily   cyanocobalamin 1,000 mcg Intramuscular Q28 Days   cyclobenzaprine 10 mg Oral Q PM   DULoxetine 90 mg Oral QAM   enoxaparin 40 mg Subcutaneous Q24H   folic acid 1 mg Oral Daily   gabapentin 600 mg Oral Q8H   insulin aspart 0-7 Units Subcutaneous TID AC   levothyroxine 125 mcg Oral Q AM   methotrexate 25 mg Oral Weekly   metoprolol tartrate 25 mg Oral Q12H   primidone 50 mg Oral Nightly   sodium chloride 10 mL Intravenous Q12H   vitamin D3 5,000 Units Oral Daily            Assessment/Plan        1.  Hyponatremia  2.  Polydipsia  3.  Diabetes 2  4.  Hypothyroidism  5.  Hypertension    Sodium improved to 126, will continue sodium chloride 2G tid along with water restriction. Will repeat BMP this evening, and if Sodium >130 she can be discharged and I will see her in clinic in 1 wks with BMP  No celebrax and chlorthalidone on discharge.       Angelica Chan MD  08/17/20  09:16

## 2020-08-17 NOTE — PLAN OF CARE
Problem: Fall Risk (Adult)  Goal: Identify Related Risk Factors and Signs and Symptoms  Outcome: Ongoing (interventions implemented as appropriate)  Note:   Pt continues to improve daily.  Sodium increased to 126.  Pt states less dizziness today.  Educated pt to call out for assistance when needing to go to restroom d/t being dizzy at times.  Pt verbalized understanding.  Will continue to monitor.    Goal: Absence of Fall  Outcome: Ongoing (interventions implemented as appropriate)     Problem: Patient Care Overview  Goal: Plan of Care Review  Outcome: Ongoing (interventions implemented as appropriate)  Goal: Individualization and Mutuality  Outcome: Ongoing (interventions implemented as appropriate)  Goal: Discharge Needs Assessment  Outcome: Ongoing (interventions implemented as appropriate)  Goal: Interprofessional Rounds/Family Conf  Outcome: Ongoing (interventions implemented as appropriate)     Problem: Skin Injury Risk (Adult)  Goal: Identify Related Risk Factors and Signs and Symptoms  Outcome: Ongoing (interventions implemented as appropriate)  Goal: Skin Health and Integrity  Outcome: Ongoing (interventions implemented as appropriate)     Problem: Pain, Chronic (Adult)  Goal: Identify Related Risk Factors and Signs and Symptoms  Outcome: Ongoing (interventions implemented as appropriate)  Goal: Acceptable Pain/Comfort Level and Functional Ability  Outcome: Ongoing (interventions implemented as appropriate)

## 2020-08-17 NOTE — PROGRESS NOTES
Assisted By: Melissa RN/     CC: Follow-up on hyponatremia    Interview History/HPI: Patient states she still feels a little lightheaded when she is up for a while but feels much better than admission, she denies any chest pain, she is tolerating her diet without nausea and vomiting.  She states her urine output has been good no dysuria.  She does not think she has any edema.       Vitals:    08/17/20 0902   BP: 138/90   Pulse: 112   Resp: 16   Temp:    SpO2: 98%         Intake/Output Summary (Last 24 hours) at 8/17/2020 1044  Last data filed at 8/17/2020 0600  Gross per 24 hour   Intake 600 ml   Output 1600 ml   Net -1000 ml       EXAM: Temperature is 98, lungs have bilateral breath sounds are clear no rhonchi rales or wheezing heard, heart regular rate and rhythm without murmur gallop, no edema mood is good skin warm and dry speech is normal neurologically nonfocal      EKG: Sinus with a first-degree AV block, image reviewed    Tele: Sinus tachycardia 120    LABS:   Lab Results (last 48 hours)     Procedure Component Value Units Date/Time    Hemoglobin A1c [[id number]] Collected:  08/17/20 0147    Specimen:  Blood Updated:  08/17/20 1041    POC Glucose Once [[id number]]  (Abnormal) Collected:  08/17/20 0611    Specimen:  Blood Updated:  08/17/20 0617     Glucose 217 mg/dL     Comprehensive Metabolic Panel [[id number]]  (Abnormal) Collected:  08/17/20 0416    Specimen:  Blood Updated:  08/17/20 0451     Glucose 220 mg/dL      BUN 10 mg/dL      Creatinine 0.76 mg/dL      Sodium 126 mmol/L      Potassium 4.1 mmol/L      Chloride 90 mmol/L      CO2 21.6 mmol/L      Calcium 8.5 mg/dL      Total Protein 6.3 g/dL      Albumin 3.70 g/dL      ALT (SGPT) 21 U/L      AST (SGOT) 16 U/L      Alkaline Phosphatase 96 U/L      Total Bilirubin 0.4 mg/dL      eGFR Non African Amer 78 mL/min/1.73      Globulin 2.6 gm/dL      A/G Ratio 1.4 g/dL      BUN/Creatinine Ratio 13.2     Anion Gap 14.4 mmol/L     Narrative:       GFR Normal  >60  Chronic Kidney Disease <60  Kidney Failure <15      CBC & Differential [[id number]] Collected:  08/17/20 0147    Specimen:  Blood Updated:  08/17/20 0251    Narrative:       The following orders were created for panel order CBC & Differential.  Procedure                               Abnormality         Status                     ---------                               -----------         ------                     CBC Auto Differential[[id number]]        Normal              Final result                 Please view results for these tests on the individual orders.    CBC Auto Differential [[id number]]  (Normal) Collected:  08/17/20 0147    Specimen:  Blood Updated:  08/17/20 0251     WBC 9.72 10*3/mm3      RBC 4.17 10*6/mm3      Hemoglobin 13.1 g/dL      Hematocrit 39.8 %      MCV 95.4 fL      MCH 31.4 pg      MCHC 32.9 g/dL      RDW 14.4 %      RDW-SD 50.2 fl      MPV 9.8 fL      Platelets 312 10*3/mm3      Neutrophil % 63.3 %      Lymphocyte % 25.1 %      Monocyte % 9.2 %      Eosinophil % 1.3 %      Basophil % 0.8 %      Immature Grans % 0.3 %      Neutrophils, Absolute 6.15 10*3/mm3      Lymphocytes, Absolute 2.44 10*3/mm3      Monocytes, Absolute 0.89 10*3/mm3      Eosinophils, Absolute 0.13 10*3/mm3      Basophils, Absolute 0.08 10*3/mm3      Immature Grans, Absolute 0.03 10*3/mm3      nRBC 0.0 /100 WBC     POC Glucose Once [[id number]]  (Abnormal) Collected:  08/16/20 2152    Specimen:  Blood Updated:  08/16/20 2158     Glucose 222 mg/dL     Basic Metabolic Panel [[id number]]  (Abnormal) Collected:  08/16/20 1956    Specimen:  Blood Updated:  08/16/20 2029     Glucose 240 mg/dL      BUN 12 mg/dL      Creatinine 0.92 mg/dL      Sodium 122 mmol/L      Potassium 3.4 mmol/L      Chloride 90 mmol/L      CO2 20.9 mmol/L      Calcium 8.8 mg/dL      eGFR Non African Amer 63 mL/min/1.73      BUN/Creatinine Ratio 13.0     Anion Gap 11.1 mmol/L     Narrative:       GFR Normal >60  Chronic Kidney Disease <60  Kidney  Failure <15      POC Glucose Once [[id number]]  (Abnormal) Collected:  08/16/20 1711    Specimen:  Blood Updated:  08/16/20 1727     Glucose 189 mg/dL     Basic Metabolic Panel [[id number]]  (Abnormal) Collected:  08/16/20 1523    Specimen:  Blood Updated:  08/16/20 1548     Glucose 226 mg/dL      BUN 11 mg/dL      Creatinine 0.79 mg/dL      Sodium 122 mmol/L      Potassium 3.6 mmol/L      Chloride 87 mmol/L      CO2 25.3 mmol/L      Calcium 8.9 mg/dL      eGFR Non African Amer 75 mL/min/1.73      BUN/Creatinine Ratio 13.9     Anion Gap 9.7 mmol/L     Narrative:       GFR Normal >60  Chronic Kidney Disease <60  Kidney Failure <15      POC Glucose Once [[id number]]  (Abnormal) Collected:  08/16/20 1147    Specimen:  Blood Updated:  08/16/20 1154     Glucose 242 mg/dL     POC Glucose Once [[id number]]  (Abnormal) Collected:  08/16/20 0840    Specimen:  Blood Updated:  08/16/20 0848     Glucose 241 mg/dL     Basic Metabolic Panel [[id number]]  (Abnormal) Collected:  08/16/20 0418    Specimen:  Blood Updated:  08/16/20 0451     Glucose 233 mg/dL      BUN 10 mg/dL      Creatinine 0.72 mg/dL      Sodium 121 mmol/L      Potassium 4.3 mmol/L      Chloride 85 mmol/L      CO2 22.9 mmol/L      Calcium 8.5 mg/dL      eGFR Non African Amer 83 mL/min/1.73      BUN/Creatinine Ratio 13.9     Anion Gap 13.1 mmol/L     Narrative:       GFR Normal >60  Chronic Kidney Disease <60  Kidney Failure <15      Comprehensive Metabolic Panel [[id number]]  (Abnormal) Collected:  08/16/20 0254    Specimen:  Blood Updated:  08/16/20 0347     Glucose 238 mg/dL      BUN 11 mg/dL      Creatinine 0.73 mg/dL      Sodium 119 mmol/L      Potassium 3.6 mmol/L      Chloride 84 mmol/L      CO2 21.1 mmol/L      Calcium 8.5 mg/dL      Total Protein 6.3 g/dL      Albumin 3.73 g/dL      ALT (SGPT) 19 U/L      AST (SGOT) 15 U/L      Alkaline Phosphatase 100 U/L      Total Bilirubin 0.4 mg/dL      eGFR Non African Amer 82 mL/min/1.73      Globulin 2.6 gm/dL      A/G  Ratio 1.5 g/dL      BUN/Creatinine Ratio 15.1     Anion Gap 13.9 mmol/L     Narrative:       GFR Normal >60  Chronic Kidney Disease <60  Kidney Failure <15      CBC & Differential [[id number]] Collected:  08/16/20 0254    Specimen:  Blood Updated:  08/16/20 0345    Narrative:       The following orders were created for panel order CBC & Differential.  Procedure                               Abnormality         Status                     ---------                               -----------         ------                     CBC Auto Differential[[id number]]        Abnormal            Final result                 Please view results for these tests on the individual orders.    CBC Auto Differential [[id number]]  (Abnormal) Collected:  08/16/20 0254    Specimen:  Blood Updated:  08/16/20 0345     WBC 12.49 10*3/mm3      RBC 4.17 10*6/mm3      Hemoglobin 12.9 g/dL      Hematocrit 38.3 %      MCV 91.8 fL      MCH 30.9 pg      MCHC 33.7 g/dL      RDW 14.0 %      RDW-SD 47.0 fl      MPV 9.9 fL      Platelets 313 10*3/mm3      Neutrophil % 76.5 %      Lymphocyte % 14.3 %      Monocyte % 7.4 %      Eosinophil % 0.7 %      Basophil % 0.5 %      Immature Grans % 0.6 %      Neutrophils, Absolute 9.57 10*3/mm3      Lymphocytes, Absolute 1.78 10*3/mm3      Monocytes, Absolute 0.92 10*3/mm3      Eosinophils, Absolute 0.09 10*3/mm3      Basophils, Absolute 0.06 10*3/mm3      Immature Grans, Absolute 0.07 10*3/mm3      nRBC 0.0 /100 WBC     Basic Metabolic Panel [[id number]]  (Abnormal) Collected:  08/15/20 2128    Specimen:  Blood Updated:  08/15/20 2152     Glucose 246 mg/dL      BUN 12 mg/dL      Creatinine 0.73 mg/dL      Sodium 120 mmol/L      Potassium 3.3 mmol/L      Comment: Specimen hemolyzed.  Results may be affected.        Chloride 84 mmol/L      CO2 21.8 mmol/L      Calcium 8.5 mg/dL      eGFR Non African Amer 82 mL/min/1.73      BUN/Creatinine Ratio 16.4     Anion Gap 14.2 mmol/L     Narrative:       GFR Normal >60  Chronic  Kidney Disease <60  Kidney Failure <15      Basic Metabolic Panel [[id number]]  (Abnormal) Collected:  08/15/20 1644    Specimen:  Blood Updated:  08/15/20 1730     Glucose 236 mg/dL      BUN 10 mg/dL      Creatinine 0.80 mg/dL      Sodium 117 mmol/L      Potassium 3.6 mmol/L      Chloride 81 mmol/L      CO2 23.9 mmol/L      Calcium 8.6 mg/dL      eGFR Non African Amer 74 mL/min/1.73      BUN/Creatinine Ratio 12.5     Anion Gap 12.1 mmol/L     Narrative:       GFR Normal >60  Chronic Kidney Disease <60  Kidney Failure <15      Osmolality, Urine - Urine, Clean Catch [[id number]] Collected:  08/14/20 2220    Specimen:  Urine, Clean Catch Updated:  08/15/20 1409     Osmolality, Urine 108 mOsm/kg     Narrative:       Osmo Normal Reference Ranges:    Random:  mOsm/kg H2O, depending on fluid intake.  Random: >850 mOsm/kg H20, after 12 hour fluid restriction.    24 Hour: 300-900 mOsm/kg H2O.    Comprehensive Metabolic Panel [[id number]]  (Abnormal) Collected:  08/15/20 1202    Specimen:  Blood Updated:  08/15/20 1256     Glucose 234 mg/dL      BUN 10 mg/dL      Creatinine 0.67 mg/dL      Sodium 117 mmol/L      Potassium 3.5 mmol/L      Chloride 81 mmol/L      CO2 23.5 mmol/L      Calcium 8.4 mg/dL      Total Protein 6.5 g/dL      Albumin 3.95 g/dL      ALT (SGPT) 22 U/L      AST (SGOT) 18 U/L      Alkaline Phosphatase 106 U/L      Total Bilirubin 0.5 mg/dL      eGFR Non African Amer 90 mL/min/1.73      Globulin 2.6 gm/dL      A/G Ratio 1.5 g/dL      BUN/Creatinine Ratio 14.9     Anion Gap 12.5 mmol/L     Narrative:       GFR Normal >60  Chronic Kidney Disease <60  Kidney Failure <15            Radiology:    Imaging Results (Last 72 Hours)     ** No results found for the last 72 hours. **      CT head negative    Chest x-ray negative    Results for orders placed during the hospital encounter of 08/19/19   Adult Transthoracic Echo Complete W/ Cont if Necessary Per Protocol    Narrative · Normal left ventricular cavity  size and wall thickness noted. All left   ventricular wall segments contract normally.  · Left ventricular diastolic dysfunction (grade I) consistent with   impaired relaxation.  · Estimated EF appears to be in the range of 61 - 65%.  · The aortic valve is structurally normal. Trace aortic valve   regurgitation is present. No aortic valve stenosis is present.  · The mitral valve is normal in structure. No mitral valve regurgitation   is present. No significant mitral valve stenosis is present.  · The tricuspid valve is normal. No evidence of tricuspid valve stenosis   is present. No tricuspid valve regurgitation is present.  · There is no evidence of pericardial effusion.      Echocardiogram showed normal ejection fraction of about a year ago, stress was negative at that time.    Assessment/Plan:   Hyponatremia, probably medication induced, improving with salt tablets and fluid restriction, discussed with nephrology, if sodium 130 or greater this afternoon she can go home, I have ordered a 3 PM BMP and this is to be called and they will make further decisions based on that.    Tachycardia, patient was on 100 of Toprol at home, have increased her metoprolol to 50 mg twice daily which would be equivalent to her home dose, will follow, noted her TSH is low but she has had a history of thyroid cancer, most likely this is being kept suppressed therefore I did not make any adjustments to this.    Diabetes, Levemir has been added back, A1c added to previous labs, follow    DVT prophylaxis, subcu Lovenox    Psoriasis, on apremilast as well as methotrexate.    Hypothyroidism status post thyroidectomy, continue Synthroid at home dose, dose was decreased here due to low TSH but this is most likely being kept suppressed, the pathology of the thyroid cancer is unknown but I am going to continue current dose and let primary further evaluate.

## 2020-08-17 NOTE — NURSING NOTE
Notified Dr. Carrero of patient Na level 126, also pt heart rate continues to remain between 112-130.  See mar for order for Lopressor to be increased with first dose starting at 2100.

## 2020-08-17 NOTE — PLAN OF CARE
Problem: Patient Care Overview  Goal: Plan of Care Review  Outcome: Ongoing (interventions implemented as appropriate)  Flowsheets  Taken 8/17/2020 0528  Progress: improving  Outcome Summary: Sodium is currently at 122, Dr. Daniel will evaluate this morning. Pt sat in bedside chair and ambulated to bethroom without difficulty. No complaints, will continue to monitor.  Taken 8/16/2020 2000  Plan of Care Reviewed With: patient

## 2020-08-18 VITALS
DIASTOLIC BLOOD PRESSURE: 84 MMHG | WEIGHT: 194.8 LBS | HEIGHT: 65 IN | HEART RATE: 97 BPM | TEMPERATURE: 97.6 F | BODY MASS INDEX: 32.46 KG/M2 | SYSTOLIC BLOOD PRESSURE: 131 MMHG | OXYGEN SATURATION: 100 % | RESPIRATION RATE: 23 BRPM

## 2020-08-18 LAB
ALBUMIN SERPL-MCNC: 3.71 G/DL (ref 3.5–5.2)
ALBUMIN/GLOB SERPL: 1.4 G/DL
ALP SERPL-CCNC: 92 U/L (ref 39–117)
ALT SERPL W P-5'-P-CCNC: 20 U/L (ref 1–33)
ANION GAP SERPL CALCULATED.3IONS-SCNC: 8.9 MMOL/L (ref 5–15)
AST SERPL-CCNC: 14 U/L (ref 1–32)
BASOPHILS # BLD AUTO: 0.07 10*3/MM3 (ref 0–0.2)
BASOPHILS NFR BLD AUTO: 0.8 % (ref 0–1.5)
BILIRUB SERPL-MCNC: 0.3 MG/DL (ref 0–1.2)
BUN SERPL-MCNC: 14 MG/DL (ref 6–20)
BUN/CREAT SERPL: 17.1 (ref 7–25)
CALCIUM SPEC-SCNC: 9 MG/DL (ref 8.6–10.5)
CHLORIDE SERPL-SCNC: 96 MMOL/L (ref 98–107)
CO2 SERPL-SCNC: 24.1 MMOL/L (ref 22–29)
CREAT SERPL-MCNC: 0.82 MG/DL (ref 0.57–1)
DEPRECATED RDW RBC AUTO: 49.8 FL (ref 37–54)
EOSINOPHIL # BLD AUTO: 0.15 10*3/MM3 (ref 0–0.4)
EOSINOPHIL NFR BLD AUTO: 1.7 % (ref 0.3–6.2)
ERYTHROCYTE [DISTWIDTH] IN BLOOD BY AUTOMATED COUNT: 14.4 % (ref 12.3–15.4)
GFR SERPL CREATININE-BSD FRML MDRD: 72 ML/MIN/1.73
GLOBULIN UR ELPH-MCNC: 2.7 GM/DL
GLUCOSE BLDC GLUCOMTR-MCNC: 179 MG/DL (ref 70–130)
GLUCOSE SERPL-MCNC: 167 MG/DL (ref 65–99)
HCT VFR BLD AUTO: 37 % (ref 34–46.6)
HGB BLD-MCNC: 12.2 G/DL (ref 12–15.9)
IMM GRANULOCYTES # BLD AUTO: 0.04 10*3/MM3 (ref 0–0.05)
IMM GRANULOCYTES NFR BLD AUTO: 0.5 % (ref 0–0.5)
LYMPHOCYTES # BLD AUTO: 2.34 10*3/MM3 (ref 0.7–3.1)
LYMPHOCYTES NFR BLD AUTO: 27 % (ref 19.6–45.3)
MCH RBC QN AUTO: 31 PG (ref 26.6–33)
MCHC RBC AUTO-ENTMCNC: 33 G/DL (ref 31.5–35.7)
MCV RBC AUTO: 94.1 FL (ref 79–97)
MONOCYTES # BLD AUTO: 0.83 10*3/MM3 (ref 0.1–0.9)
MONOCYTES NFR BLD AUTO: 9.6 % (ref 5–12)
NEUTROPHILS NFR BLD AUTO: 5.25 10*3/MM3 (ref 1.7–7)
NEUTROPHILS NFR BLD AUTO: 60.4 % (ref 42.7–76)
NRBC BLD AUTO-RTO: 0 /100 WBC (ref 0–0.2)
PLATELET # BLD AUTO: 342 10*3/MM3 (ref 140–450)
PMV BLD AUTO: 10 FL (ref 6–12)
POTASSIUM SERPL-SCNC: 3.5 MMOL/L (ref 3.5–5.2)
PROT SERPL-MCNC: 6.4 G/DL (ref 6–8.5)
RBC # BLD AUTO: 3.93 10*6/MM3 (ref 3.77–5.28)
SODIUM SERPL-SCNC: 129 MMOL/L (ref 136–145)
WBC # BLD AUTO: 8.68 10*3/MM3 (ref 3.4–10.8)

## 2020-08-18 PROCEDURE — 94799 UNLISTED PULMONARY SVC/PX: CPT

## 2020-08-18 PROCEDURE — 99239 HOSP IP/OBS DSCHRG MGMT >30: CPT | Performed by: INTERNAL MEDICINE

## 2020-08-18 PROCEDURE — 85025 COMPLETE CBC W/AUTO DIFF WBC: CPT | Performed by: INTERNAL MEDICINE

## 2020-08-18 PROCEDURE — 63710000001 INSULIN ASPART PER 5 UNITS: Performed by: INTERNAL MEDICINE

## 2020-08-18 PROCEDURE — 80053 COMPREHEN METABOLIC PANEL: CPT | Performed by: INTERNAL MEDICINE

## 2020-08-18 PROCEDURE — 82962 GLUCOSE BLOOD TEST: CPT

## 2020-08-18 PROCEDURE — 63710000001 INSULIN DETEMIR PER 5 UNITS: Performed by: INTERNAL MEDICINE

## 2020-08-18 RX ORDER — SODIUM CHLORIDE 1000 MG
2 TABLET, SOLUBLE MISCELLANEOUS
Qty: 78 TABLET | Refills: 0 | Status: SHIPPED | OUTPATIENT
Start: 2020-08-18 | End: 2020-08-18

## 2020-08-18 RX ORDER — SODIUM CHLORIDE 1000 MG
2 TABLET, SOLUBLE MISCELLANEOUS
Qty: 78 TABLET | Refills: 0 | Status: SHIPPED | OUTPATIENT
Start: 2020-08-18 | End: 2020-08-31

## 2020-08-18 RX ADMIN — INSULIN DETEMIR 15 UNITS: 100 INJECTION, SOLUTION SUBCUTANEOUS at 06:04

## 2020-08-18 RX ADMIN — INSULIN ASPART 2 UNITS: 100 INJECTION, SOLUTION INTRAVENOUS; SUBCUTANEOUS at 08:52

## 2020-08-18 RX ADMIN — SODIUM CHLORIDE TAB 1 GM 2 G: 1 TAB at 09:04

## 2020-08-18 RX ADMIN — APREMILAST 30 MG: 30 TABLET, FILM COATED ORAL at 08:53

## 2020-08-18 RX ADMIN — DULOXETINE HYDROCHLORIDE 90 MG: 60 CAPSULE, DELAYED RELEASE ORAL at 08:54

## 2020-08-18 RX ADMIN — SODIUM CHLORIDE, PRESERVATIVE FREE 10 ML: 5 INJECTION INTRAVENOUS at 08:54

## 2020-08-18 RX ADMIN — FOLIC ACID 1 MG: 1 TABLET ORAL at 08:54

## 2020-08-18 RX ADMIN — Medication 1 TABLET: at 08:54

## 2020-08-18 RX ADMIN — GABAPENTIN 600 MG: 300 CAPSULE ORAL at 06:01

## 2020-08-18 RX ADMIN — LEVOTHYROXINE SODIUM 137.5 MCG: 25 TABLET ORAL at 06:01

## 2020-08-18 RX ADMIN — Medication 5000 UNITS: at 08:53

## 2020-08-18 RX ADMIN — METOPROLOL TARTRATE 50 MG: 50 TABLET, FILM COATED ORAL at 08:53

## 2020-08-18 RX ADMIN — ASPIRIN 81 MG: 81 TABLET, COATED ORAL at 08:54

## 2020-08-18 NOTE — PLAN OF CARE
Problem: Patient Care Overview  Goal: Plan of Care Review  Outcome: Ongoing (interventions implemented as appropriate)  Flowsheets  Taken 8/18/2020 0458  Progress: improving  Outcome Summary: Pt is ambulating with cane in room without difficulty, sodium is 129. No complaints, will continue to monitor.  Taken 8/17/2020 2000  Plan of Care Reviewed With: patient

## 2020-08-18 NOTE — DISCHARGE SUMMARY
Date of admission: 8/14/2020  Date of discharge: 8/18/2020    Principal diagnosis: Severe hyponatremia  Secondary diagnosis:  Psoriasis  Hypertension  History of thyroid cancer status post thyroidectomy on chronic Synthroid therapy.  TSH low here but patient may be being kept suppressed due to her thyroid cancer therefore medication was not changed in dosing, will leave this to primary care discretion.  Degenerative joint disease of her lumbar spine  Fibromyalgia  Multiple sclerosis  Rheumatoid arthritis  Diabetes type 2 insulin requiring    Consultants:  Nephrology /María    Procedures:  CT head without contrast negative for acute findings  Chest x-ray negative  (Noted low risk stress test and EF being normal 1 year ago)    Exam: Assisted by Melissa BAIRES RN PCU.  Patient has eaten her meal, she feels good, no chest pain no shortness of breath heart rate has been improved  Vital signs: 126/79, 98% saturated on room air respiratory 15 pulse 70 temperature 97.6 mood is good skin warm and dry lungs have bilateral breath sounds are clear no rhonchi rales or wheezing heard, heart is a regular rate and rhythm without murmur rub or gallop.  Abdomen soft extremities without edema    Hospital course: Patient was admitted with severe hyponatremia and dizziness.  Sodium was brought up slowly with nephrology assistance.  Patient has been on sodium chloride tablets and fluid restriction and is progressing well with this.  Sodium is 129 at this time and I have discussed with nephrology, will discharge the patient on sodium chloride tablets and fluid restriction and and follow-up in 1 week.  Patient's dizziness improved with the correction of the sodium as well.  Patient was on very high-dose Cymbalta of which was decreased to just once a day and Celebrex and chlorthalidone are being held on discharge.  Patient's blood pressure is overall controlled on metoprolol alone therefore other medications were stopped on discharge.   Glucose is coming under better control, hemoglobin A1c was just over 8, will leave further adjustment to primary care provider.  As above TSH was low free T4 normal however she has had thyroid cancer, details about pathology I do not have therefore medication was not changed, TSH may be being suppressed purposefully and again we will leave this to primary care provider discretion.  She did have some tachycardia yesterday but she was on a lower dose beta-blocker than home, this was adjusted and tachycardia has been improved.    Condition: Stable/improved    Disposition: Home    Diet: Constant carbohydrate cardiac    Follow-up:   1 week   1 week    Medications:  Fosamax 70 mg weekly  Apremilast 30 mg twice daily  Aspirin 81 mg a day  Calcium/vitamin D daily  Vitamin B12 1000 mcg every 28 days  Flexeril 10 mg every evening  Valium 2 mg daily as needed  Cymbalta 90 mg every morning  Emgality 120 mg as directed every 30 days  Enstilar topically for home dose  Iron 240 mg daily  Folic acid 1 mg a day  Gabapentin 600 mg 3 times a day  Insulin Levemir 25 units in the skin daily  Linzess home dose  Claritin 10 mg a day  Methotrexate 25 mg weekly  Multivitamin a day  Prilosec 40 mg daily  Orencia home dose every 28 days  Primidone 50 mg every night  Zantac 150 twice daily  Sodium tablets 2 tablets (2 g) 3 times a day, 2 weeks given  Tirosint home dose  Toprol- mg daily  Vitamin C 1000 mg daily  Vitamin D home dose  Vitamin D home dose    Greater than 30-minute discharge

## 2020-08-18 NOTE — PROGRESS NOTES
Nephrology Progress Note      Subjective     Patient feels fine, no complaints. Has been doing water restriction    Objective       Vital signs :     Temp:  [97.6 °F (36.4 °C)-98.2 °F (36.8 °C)] 97.6 °F (36.4 °C)  Heart Rate:  [] 70  Resp:  [12-22] 15  BP: ()/(66-91) 126/79      Intake/Output Summary (Last 24 hours) at 8/18/2020 0838  Last data filed at 8/18/2020 0200  Gross per 24 hour   Intake 720 ml   Output 600 ml   Net 120 ml       Physical Exam:    General Appearance : not in acute distress  Lungs : clear to auscultation, respirations regular  Heart :  regular rhythm & normal rate, normal S1, S2 and no murmur, no rub  Abdomen : normal bowel sounds, no masses, no hepatomegaly, no splenomegaly, soft non-tender and no guarding  Extremities : moves extremities well, no edema, no cyanosis and no redness  Skin :  no bleeding, bruising or rash  Neurologic :   orientated to person, place, time and situation, Grossly no focal deficits    Laboratory Data :     Albumin Albumin   Date Value Ref Range Status   08/18/2020 3.71 3.50 - 5.20 g/dL Final   08/17/2020 3.70 3.50 - 5.20 g/dL Final   08/16/2020 3.73 3.50 - 5.20 g/dL Final   08/15/2020 3.95 3.50 - 5.20 g/dL Final      Magnesium No results found for: MG       PTH               No results found for: PTH    CBC and coagulation:  Results from last 7 days   Lab Units 08/18/20  0055 08/17/20  0147 08/16/20  0254   WBC 10*3/mm3 8.68 9.72 12.49*   HEMOGLOBIN g/dL 12.2 13.1 12.9   HEMATOCRIT % 37.0 39.8 38.3   MCV fL 94.1 95.4 91.8   MCHC g/dL 33.0 32.9 33.7   PLATELETS 10*3/mm3 342 312 313     Acid/base balance:      Renal and electrolytes:  Results from last 7 days   Lab Units 08/18/20  0055 08/17/20  1507 08/17/20  0416 08/16/20  1956 08/16/20  1523   SODIUM mmol/L 129* 126* 126* 122* 122*   POTASSIUM mmol/L 3.5 4.1 4.1 3.4* 3.6   CHLORIDE mmol/L 96* 94* 90* 90* 87*   CO2 mmol/L 24.1 22.5 21.6* 20.9* 25.3   BUN mg/dL 14 13 10 12 11   CREATININE mg/dL 0.82 1.04*  0.76 0.92 0.79   EGFR IF NONAFRICN AM mL/min/1.73 72 54* 78 63 75   CALCIUM mg/dL 9.0 8.9 8.5* 8.8 8.9     Estimated Creatinine Clearance: 82.2 mL/min (by C-G formula based on SCr of 0.82 mg/dL).    Liver and pancreatic function:  Results from last 7 days   Lab Units 08/18/20 0055 08/17/20 0416 08/16/20  0254   ALBUMIN g/dL 3.71 3.70 3.73   BILIRUBIN mg/dL 0.3 0.4 0.4   ALK PHOS U/L 92 96 100   AST (SGOT) U/L 14 16 15   ALT (SGPT) U/L 20 21 19         Cardiac:      Liver and pancreatic function:  Results from last 7 days   Lab Units 08/18/20 0055 08/17/20 0416 08/16/20  0254   ALBUMIN g/dL 3.71 3.70 3.73   BILIRUBIN mg/dL 0.3 0.4 0.4   ALK PHOS U/L 92 96 100   AST (SGOT) U/L 14 16 15   ALT (SGPT) U/L 20 21 19       Medications :       Apremilast 30 mg Oral BID   aspirin 81 mg Oral Daily   calcium carb-cholecalciferol 1 tablet Oral Daily   cyanocobalamin 1,000 mcg Intramuscular Q28 Days   cyclobenzaprine 10 mg Oral Q PM   DULoxetine 90 mg Oral QAM   enoxaparin 40 mg Subcutaneous Q24H   folic acid 1 mg Oral Daily   gabapentin 600 mg Oral Q8H   insulin aspart 0-7 Units Subcutaneous TID AC   insulin detemir 15 Units Subcutaneous QAM   levothyroxine 137.5 mcg Oral Q AM   methotrexate 25 mg Oral Weekly   metoprolol tartrate 50 mg Oral Q12H   primidone 50 mg Oral Nightly   sodium chloride 10 mL Intravenous Q12H   sodium chloride 2 g Oral TID With Meals   vitamin D3 5,000 Units Oral Daily            Assessment/Plan        1.  Hyponatremia  2.  Polydipsia  3.  Diabetes 2  4.  Hypothyroidism  5.  Hypertension    Sodium improved to 129, pt is for discharge home today and I will see her in clinic in 1 wk with BMP done 1 done before. Educated, counseled the patient and her  in details about gravity of hyponatremia, its implications necessity of follow up, answered all questions.   -continue of free water restriction  -continue on salt tab        Angelica Chan MD  08/18/20  08:38

## 2020-08-18 NOTE — PROGRESS NOTES
Continued Stay Note  GUANA[facility] Portillo     Patient Name: Lashae Benitez  MRN: [id number]  Today's Date: 8/18/2020    Admit Date: 8/14/2020    Discharge Plan     Row Name 08/18/20 0946       Plan    Plan  Plans to return home at discharge. Independent. Family will provide dc transportation. CM to follow.        Discharge Codes    No documentation.       Expected Discharge Date and Time     Expected Discharge Date Expected Discharge Time    Aug 18, 2020             Izzy Yancey RN

## 2020-08-19 ENCOUNTER — READMISSION MANAGEMENT (OUTPATIENT)
Dept: CALL CENTER | Facility: HOSPITAL | Age: 60
End: 2020-08-19

## 2020-08-19 NOTE — OUTREACH NOTE
Prep Survey      Responses   [facility] patient discharged from?  [facility]   Is LACE score < 7 ?  No   Eligibility  Readm Mgmt   Discharge diagnosis  hyponatremia   COVID-19 Test Status  Not tested   Does the patient have one of the following disease processes/diagnoses(primary or secondary)?  Other   Does the patient have Home health ordered?  No   Is there a DME ordered?  No   Comments regarding appointments  see AVS for apmts   Medication alerts for this patient  see AVS   Prep survey completed?  Yes          Vee Ku RN

## 2020-08-21 ENCOUNTER — READMISSION MANAGEMENT (OUTPATIENT)
Dept: CALL CENTER | Facility: HOSPITAL | Age: 60
End: 2020-08-21

## 2020-08-21 NOTE — OUTREACH NOTE
Medical Week 1 Survey      Responses   [facility] patient discharged from?  [facility]   COVID-19 Test Status  Not tested   Does the patient have one of the following disease processes/diagnoses(primary or secondary)?  Other   Is there a successful TCM telephone encounter documented?  No   Week 1 attempt successful?  Yes   Call start time  1651   Call end time  1652   Discharge diagnosis  hyponatremia   Meds reviewed with patient/caregiver?  Yes   Is the patient having any side effects they believe may be caused by any medication additions or changes?  No   Does the patient have all medications ordered at discharge?  Yes   Is the patient taking all medications as directed (includes completed medication regime)?  Yes   Comments regarding appointments  f/u with Cardiologist on 8/25   Does the patient have a primary care provider?   Yes   Does the patient have an appointment with their PCP within 7 days of discharge?  N/A   Has the patient kept scheduled appointments due by today?  N/A   Psychosocial issues?  No   Did the patient receive a copy of their discharge instructions?  Yes   Nursing interventions  Reviewed instructions with patient   What is the patient's perception of their health status since discharge?  Improving   Is the patient/caregiver able to teach back signs and symptoms related to disease process for when to call PCP?  Yes   Is the patient/caregiver able to teach back signs and symptoms related to disease process for when to call 911?  Yes   Is the patient/caregiver able to teach back the hierarchy of who to call/visit for symptoms/problems? PCP, Specialist, Home health nurse, Urgent Care, ED, 911  Yes   Week 1 call completed?  Yes   Wrap up additional comments  Patient says she is doing great, no questions or concerns at this time.          Mary Chua RN

## 2020-08-25 ENCOUNTER — TRANSCRIBE ORDERS (OUTPATIENT)
Dept: ADMINISTRATIVE | Facility: HOSPITAL | Age: 60
End: 2020-08-25

## 2020-08-25 ENCOUNTER — OFFICE VISIT (OUTPATIENT)
Dept: CARDIOLOGY | Facility: CLINIC | Age: 60
End: 2020-08-25

## 2020-08-25 ENCOUNTER — LAB (OUTPATIENT)
Dept: LAB | Facility: HOSPITAL | Age: 60
End: 2020-08-25

## 2020-08-25 VITALS
SYSTOLIC BLOOD PRESSURE: 118 MMHG | TEMPERATURE: 98.7 F | OXYGEN SATURATION: 99 % | BODY MASS INDEX: 32.39 KG/M2 | WEIGHT: 194.4 LBS | DIASTOLIC BLOOD PRESSURE: 70 MMHG | RESPIRATION RATE: 16 BRPM | HEART RATE: 85 BPM | HEIGHT: 65 IN

## 2020-08-25 DIAGNOSIS — R00.2 PALPITATIONS: ICD-10-CM

## 2020-08-25 DIAGNOSIS — J84.9 CHRONIC INTERSTITIAL LUNG DISEASE (HCC): ICD-10-CM

## 2020-08-25 DIAGNOSIS — E11.9 TYPE 2 DIABETES MELLITUS WITHOUT COMPLICATION, WITHOUT LONG-TERM CURRENT USE OF INSULIN (HCC): ICD-10-CM

## 2020-08-25 DIAGNOSIS — I10 ESSENTIAL HYPERTENSION: Primary | ICD-10-CM

## 2020-08-25 DIAGNOSIS — E87.1 HYPONATREMIA: ICD-10-CM

## 2020-08-25 DIAGNOSIS — R06.02 SOB (SHORTNESS OF BREATH): ICD-10-CM

## 2020-08-25 DIAGNOSIS — E87.1 HYPONATREMIA: Primary | ICD-10-CM

## 2020-08-25 PROCEDURE — 99214 OFFICE O/P EST MOD 30 MIN: CPT | Performed by: NURSE PRACTITIONER

## 2020-08-25 PROCEDURE — 36415 COLL VENOUS BLD VENIPUNCTURE: CPT

## 2020-08-25 PROCEDURE — 80048 BASIC METABOLIC PNL TOTAL CA: CPT

## 2020-08-25 PROCEDURE — 0296T PR EXT ECG > 48HR TO 21 DAY RCRD W/CONECT INTL RCRD: CPT | Performed by: INTERNAL MEDICINE

## 2020-08-25 RX ORDER — BUTALBITAL, ACETAMINOPHEN AND CAFFEINE 50; 325; 40 MG/1; MG/1; MG/1
1 TABLET ORAL EVERY 4 HOURS PRN
COMMUNITY

## 2020-08-25 RX ORDER — AMLODIPINE BESYLATE 5 MG/1
5 TABLET ORAL DAILY
COMMUNITY
End: 2020-10-21

## 2020-08-25 RX ORDER — AMITRIPTYLINE HYDROCHLORIDE 25 MG/1
25 TABLET, FILM COATED ORAL NIGHTLY
COMMUNITY

## 2020-08-25 NOTE — PROGRESS NOTES
Kreis, Samuel Duane, MD  Lashae Benitez  1960  08/25/2020    Patient Active Problem List   Diagnosis   • Hiatal hernia   • Essential hypertension   • Sjogren's syndrome (CMS/HCC)   • Multiple sclerosis (CMS/HCC)   • Psoriasis   • Fibromyalgia   • Osteoarthritis   • Psoriatic arthritis (CMS/HCC)   • RA (rheumatoid arthritis) (CMS/HCC)   • Degenerative disc disease, lumbar   • TMJ arthritis   • Dupuytren's disease   • H. pylori infection   • Family history of coronary artery disease   • Type 2 diabetes mellitus (CMS/HCC)   • Edema   • Bilateral leg edema   • Isolated corticotropin deficiency (CMS/HCC)   • Hyponatremia       Dear Kreis, Samuel Duane, MD:    Subjective     Chief Complaint   Patient presents with   • Follow-up     6 month   • Med Management     list   • Hypertension     in hospital for this           History of Present Illness:    Lashae Benitez is a 59 y.o. female with a past medical history significant for hypertension, diabetes mellitus type 2, MS, rheumatoid arthritis, and chronic chest pains.  She presents today for routine cardiology follow-up.  Since her last visit, she was admitted to [facility] for hypertensive urgency.  Medications were adjusted and she did well.  She was discharged home on chlorthalidone.  However, she was noted to be severely hyponatremic and had to be admitted to the facility again for management of this.  Since discharge home, she is feeling better.  She denies any chest pains.  She has occasional shortness of breath which has been chronic for her.  A CT coronary angiogram did reveal incidental findings of chronic interstitial lung changes but PFT was unremarkable.  She also reports her palpitations have been worse.  She feels her heart is racing and fluttering.  She will often get dizzy and lightheaded when this occurs.  She denies syncope.        Allergies   Allergen Reactions   • Codeine Angioedema   • Imuran [Azathioprine] Other (See Comments)     Has  "pancreatis attacks with med   • Januvia [Sitagliptin] Other (See Comments)     Has pancreatis attacks with med    • Penicillins Angioedema   • Sulfa Antibiotics Angioedema   • Sulfasalazine Unknown (See Comments)   • Beta Adrenergic Blockers Other (See Comments)     I called pt to ask her about the allergy to bblockers in her chart. Pt states \"too big of a dose makes her psoriasis act up\", but this current dose of metoprolol 50 mg bid, she has been on for a long time, with no ill side effects.  CATA,CMA 3/28/18   :      Current Outpatient Medications:   •  abatacept (ORENCIA) 250 MG injection, Infuse 750 mg into a venous catheter Every 28 (Twenty-Eight) Days., Disp: , Rfl:   •  alendronate (FOSAMAX) 70 MG tablet, Take 70 mg by mouth 1 (One) Time Per Week. Wednesday, Disp: , Rfl:   •  amitriptyline (ELAVIL) 25 MG tablet, Take 25 mg by mouth Every Night., Disp: , Rfl:   •  amLODIPine (NORVASC) 5 MG tablet, Take 5 mg by mouth Daily., Disp: , Rfl:   •  aspirin 81 MG tablet, Take 1 tablet by mouth., Disp: , Rfl:   •  butalbital-acetaminophen-caffeine (Esgic) -40 MG per tablet, Take 1 tablet by mouth Every 4 (Four) Hours As Needed for Headache., Disp: , Rfl:   •  Calcipotriene-Betameth Diprop (Enstilar) 0.005-0.064 % foam, Apply 1 spray topically Daily., Disp: , Rfl:   •  calcium citrate-vitamin d (CALCITRATE) 315-250 MG-UNIT tablet tablet, Take 1 tablet by mouth Every Evening., Disp: , Rfl:   •  Cholecalciferol (VITAMIN D3) 125 MCG (5000 UT) capsule capsule, Take 5,000 Units by mouth Daily., Disp: , Rfl:   •  cyanocobalamin 1000 MCG/ML injection, Inject 1,000 mcg into the shoulder, thigh, or buttocks Every 28 (Twenty-Eight) Days., Disp: , Rfl:   •  cyclobenzaprine (FLEXERIL) 10 MG tablet, Take 10 mg by mouth Every Evening., Disp: , Rfl:   •  diazePAM (VALIUM) 2 MG tablet, Take 2 mg by mouth Daily As Needed for Anxiety., Disp: , Rfl:   •  DULoxetine (CYMBALTA) 30 MG capsule, Take 90 mg by mouth Every Morning., Disp: , " Rfl:   •  EMGALITY 120 MG/ML prefilled syringe, Inject 120 mg under the skin into the appropriate area as directed Every 30 (Thirty) Days., Disp: , Rfl: 3  •  ferrous gluconate (FERGON) 240 (27 FE) MG tablet, Take 240 mg by mouth Every Morning., Disp: , Rfl:   •  folic acid (FOLVITE) 1 MG tablet, Take 1 mg by mouth daily., Disp: , Rfl:   •  gabapentin (NEURONTIN) 600 MG tablet, Take 600 mg by mouth 3 (Three) Times a Day., Disp: , Rfl:   •  insulin detemir (LEVEMIR) 100 UNIT/ML injection, Inject 25 Units under the skin into the appropriate area as directed Daily., Disp: , Rfl:   •  levothyroxine sodium (TIROSINT) 137 MCG capsule, Take 137 mcg by mouth Daily. Takes an extra dose at the first of each month., Disp: , Rfl:   •  linaclotide (LINZESS) 290 MCG capsule capsule, Take 290 mcg by mouth every morning before breakfast., Disp: , Rfl:   •  loratadine (CLARITIN) 10 MG tablet, Take 10 mg by mouth Every Night., Disp: , Rfl:   •  methotrexate 2.5 MG tablet, Take 25 mg by mouth 1 (One) Time Per Week. Prior to [facility] Admission, Patient was on: Takes 10 tablets on Saturday, Disp: , Rfl:   •  multivitamin (DAILY MICHAEL) tablet tablet, Take 1 tablet by mouth Every Evening., Disp: , Rfl:   •  omeprazole (priLOSEC) 40 MG capsule, Take 40 mg by mouth Daily., Disp: , Rfl:   •  primidone (MYSOLINE) 50 MG tablet, Take 50 mg by mouth Every Night., Disp: , Rfl:   •  sodium chloride 1 g tablet, Take 2 tablets by mouth 3 (Three) Times a Day With Meals for 39 doses., Disp: 78 tablet, Rfl: 0  •  TOPROL  MG 24 hr tablet, TAKE 1 TABLET DAILY, Disp: 90 tablet, Rfl: 1  •  vitamin C (ASCORBIC ACID) 500 MG tablet, Take 1,000 mg by mouth Every Evening., Disp: , Rfl:   •  vitamin E 1000 UNIT capsule, Take 1,000 Units by mouth Every Evening., Disp: , Rfl:       The following portions of the patient's history were reviewed and updated as appropriate: allergies, current medications, past family history, past medical history, past social  "history, past surgical history and problem list.    Social History     Tobacco Use   • Smoking status: Never Smoker   • Smokeless tobacco: Never Used   Substance Use Topics   • Alcohol use: No   • Drug use: No       Review of Systems   Constitution: Negative for decreased appetite and malaise/fatigue.   Cardiovascular: Positive for palpitations. Negative for chest pain, dyspnea on exertion, irregular heartbeat, leg swelling, near-syncope, orthopnea, paroxysmal nocturnal dyspnea and syncope.   Respiratory: Positive for shortness of breath. Negative for cough and wheezing.    Neurological: Positive for dizziness. Negative for light-headedness and weakness.       Objective   Vitals:    08/25/20 0847   BP: 118/70   BP Location: Left arm   Patient Position: Sitting   Cuff Size: Adult   Pulse: 85   Resp: 16   Temp: 98.7 °F (37.1 °C)   TempSrc: Temporal   SpO2: 99%   Weight: 88.2 kg (194 lb 6.4 oz)   Height: 165.1 cm (65\")     Body mass index is 32.35 kg/m².        Physical Exam   Constitutional: She is oriented to person, place, and time. She appears well-developed and well-nourished.   HENT:   Head: Normocephalic and atraumatic.   Cardiovascular: Normal rate, regular rhythm and normal heart sounds. Exam reveals no S3 and no S4.   No murmur heard.  Pulmonary/Chest: Effort normal and breath sounds normal. She has no wheezes. She has no rales.   Abdominal: Soft. Bowel sounds are normal.   Musculoskeletal: She exhibits no edema.   Neurological: She is alert and oriented to person, place, and time.   Skin: Skin is warm and dry.   Psychiatric: She has a normal mood and affect. Her behavior is normal.       Lab Results   Component Value Date     (L) 08/18/2020    K 3.5 08/18/2020    CL 96 (L) 08/18/2020    CO2 24.1 08/18/2020    BUN 14 08/18/2020    CREATININE 0.82 08/18/2020    GLUCOSE 167 (H) 08/18/2020    CALCIUM 9.0 08/18/2020    AST 14 08/18/2020    ALT 20 08/18/2020    ALKPHOS 92 08/18/2020    LABIL2 1.4 (L) " 08/11/2015     Lab Results   Component Value Date    CKTOTAL 40 04/12/2017     Lab Results   Component Value Date    WBC 8.68 08/18/2020    HGB 12.2 08/18/2020    HCT 37.0 08/18/2020     08/18/2020     Lab Results   Component Value Date    INR 0.97 11/22/2018    INR 0.93 07/20/2015     Lab Results   Component Value Date    MG 1.8 08/07/2020     Lab Results   Component Value Date    TSH 0.063 (L) 08/14/2020    CHLPL 132 07/22/2015    TRIG 85 07/22/2015    HDL 40 (L) 07/22/2015    LDL 74 07/22/2015      Lab Results   Component Value Date    BNP 51.0 11/22/2018           Procedures      Assessment/Plan    Diagnosis Plan   1. Essential hypertension     2. Type 2 diabetes mellitus without complication, without long-term current use of insulin (CMS/[facility])     3. SOB (shortness of breath)  Ambulatory Referral to Pulmonology   4. Palpitations  Holter Monitor - 72 Hour Up To 21 Days   5. Chronic interstitial lung disease (CMS/[facility])  Ambulatory Referral to Pulmonology                Recommendations:    1.  We will evaluate her palpitations further with a 14-day Holter monitor.      2.  We will not make any changes to her antihypertensive today as her blood pressure is very well controlled.    3.  We will place referral to pulmonology for evaluation of the lung changes on CT scan since she does have a history of autoimmune disorders.    4.  Follow-up in 5 weeks or sooner if needed.      Return in about 5 weeks (around 9/29/2020) for Recheck.    As always, I appreciate very much the opportunity to participate in the cardiovascular care of your patients.      With Best Regards,    RUIZ Pitts

## 2020-08-26 LAB
ANION GAP SERPL CALCULATED.3IONS-SCNC: 9.2 MMOL/L (ref 5–15)
BUN SERPL-MCNC: 7 MG/DL (ref 6–20)
BUN/CREAT SERPL: 9.9 (ref 7–25)
CALCIUM SPEC-SCNC: 9.2 MG/DL (ref 8.6–10.5)
CHLORIDE SERPL-SCNC: 102 MMOL/L (ref 98–107)
CO2 SERPL-SCNC: 24.8 MMOL/L (ref 22–29)
CREAT SERPL-MCNC: 0.71 MG/DL (ref 0.57–1)
GFR SERPL CREATININE-BSD FRML MDRD: 85 ML/MIN/1.73
GLUCOSE SERPL-MCNC: 134 MG/DL (ref 65–99)
POTASSIUM SERPL-SCNC: 3.4 MMOL/L (ref 3.5–5.2)
SODIUM SERPL-SCNC: 136 MMOL/L (ref 136–145)

## 2020-08-28 ENCOUNTER — READMISSION MANAGEMENT (OUTPATIENT)
Dept: CALL CENTER | Facility: HOSPITAL | Age: 60
End: 2020-08-28

## 2020-08-28 NOTE — OUTREACH NOTE
Medical Week 2 Survey      Responses   [facility] patient discharged from?  [facility]   COVID-19 Test Status  Not tested   Does the patient have one of the following disease processes/diagnoses(primary or secondary)?  Other   Week 2 attempt successful?  Yes   Call start time  0924   Call end time  0927   Medication alerts for this patient  no change in medication   Meds reviewed with patient/caregiver?  Yes   Is the patient taking all medications as directed (includes completed medication regime)?  Yes   Comments regarding appointments  saw renal yesterday, has had an increase in fluid intake   Has the patient kept scheduled appointments due by today?  Yes   Pulse Ox monitoring  None   What is the patient's perception of their health status since discharge?  Improving [remains fatigued]   Week 2 Call Completed?  Yes   Wrap up additional comments   feels she is improving but remains fatigued,  on fluid restriction but was increased yesterday by renal          Beverly Middleton RN

## 2020-09-04 ENCOUNTER — READMISSION MANAGEMENT (OUTPATIENT)
Dept: CALL CENTER | Facility: HOSPITAL | Age: 60
End: 2020-09-04

## 2020-09-04 NOTE — OUTREACH NOTE
Medical Week 3 Survey      Responses   [facility] patient discharged from?  [facility]   COVID-19 Test Status  Not tested   Does the patient have one of the following disease processes/diagnoses(primary or secondary)?  Other   Week 3 attempt successful?  Yes   Call start time  1023   Call end time  1026   Meds reviewed with patient/caregiver?  Yes   Is the patient taking all medications as directed (includes completed medication regime)?  Yes   Has the patient kept scheduled appointments due by today?  Yes   Pulse Ox monitoring  None   What is the patient's perception of their health status since discharge?  Improving   Week 3 Call Completed?  Yes   Wrap up additional comments  Still has times she feels weak and shaky.            Rhea Andrew, RN

## 2020-09-13 DIAGNOSIS — I10 ESSENTIAL HYPERTENSION: ICD-10-CM

## 2020-09-14 RX ORDER — LISINOPRIL 20 MG/1
TABLET ORAL
Qty: 90 TABLET | Refills: 3 | OUTPATIENT
Start: 2020-09-14

## 2020-09-15 ENCOUNTER — READMISSION MANAGEMENT (OUTPATIENT)
Dept: CALL CENTER | Facility: HOSPITAL | Age: 60
End: 2020-09-15

## 2020-09-15 NOTE — OUTREACH NOTE
Medical Week 4 Survey      Responses   [facility] patient discharged from?  [facility]   Does the patient have one of the following disease processes/diagnoses(primary or secondary)?  Other   Week 4 attempt successful?  Yes   Call start time  1245   Call end time  1245   Discharge diagnosis  hyponatremia   Medication alerts for this patient  no change in medication   Meds reviewed with patient/caregiver?  Yes   Is the patient having any side effects they believe may be caused by any medication additions or changes?  No   Is the patient taking all medications as directed (includes completed medication regime)?  Yes   Has the patient kept scheduled appointments due by today?  Yes   Is the patient still receiving Home Health Services?  N/A   Pulse Ox monitoring  None   Psychosocial issues?  No   What is the patient's perception of their health status since discharge?  Improving   Is the patient/caregiver able to teach back signs and symptoms related to disease process for when to call PCP?  Yes   Is the patient/caregiver able to teach back signs and symptoms related to disease process for when to call 911?  Yes   Is the patient/caregiver able to teach back the hierarchy of who to call/visit for symptoms/problems? PCP, Specialist, Home health nurse, Urgent Care, ED, 911  Yes   Week 4 Call Completed?  Yes   Would the patient like one additional call?  No   Graduated  Yes [She says she still feels weak at times. ]   Did the patient feel the follow up calls were helpful during their recovery period?  Yes   Was the number of calls appropriate?  Yes   Does the patient have an Advance Directive or Living Will?  No          Anum Cabrera RN

## 2020-09-18 ENCOUNTER — TREATMENT (OUTPATIENT)
Dept: CARDIOLOGY | Facility: CLINIC | Age: 60
End: 2020-09-18

## 2020-09-18 ENCOUNTER — TELEPHONE (OUTPATIENT)
Dept: CARDIOLOGY | Facility: CLINIC | Age: 60
End: 2020-09-18

## 2020-09-18 DIAGNOSIS — R00.2 PALPITATIONS: ICD-10-CM

## 2020-09-18 PROCEDURE — 0298T PR EXT ECG > 48HR TO 21 DAY REVIEW AND INTERPRETATN: CPT | Performed by: INTERNAL MEDICINE

## 2020-09-21 ENCOUNTER — OFFICE VISIT (OUTPATIENT)
Dept: CARDIOLOGY | Facility: CLINIC | Age: 60
End: 2020-09-21

## 2020-09-21 ENCOUNTER — LAB (OUTPATIENT)
Dept: LAB | Facility: HOSPITAL | Age: 60
End: 2020-09-21

## 2020-09-21 VITALS
SYSTOLIC BLOOD PRESSURE: 125 MMHG | OXYGEN SATURATION: 99 % | BODY MASS INDEX: 31.49 KG/M2 | HEIGHT: 65 IN | RESPIRATION RATE: 14 BRPM | WEIGHT: 189 LBS | DIASTOLIC BLOOD PRESSURE: 84 MMHG | TEMPERATURE: 97.9 F | HEART RATE: 89 BPM

## 2020-09-21 DIAGNOSIS — E11.9 TYPE 2 DIABETES MELLITUS WITHOUT COMPLICATION, WITHOUT LONG-TERM CURRENT USE OF INSULIN (HCC): ICD-10-CM

## 2020-09-21 DIAGNOSIS — I48.0 PAROXYSMAL ATRIAL FIBRILLATION (HCC): Primary | ICD-10-CM

## 2020-09-21 DIAGNOSIS — I10 ESSENTIAL HYPERTENSION: ICD-10-CM

## 2020-09-21 DIAGNOSIS — I48.0 PAROXYSMAL ATRIAL FIBRILLATION (HCC): ICD-10-CM

## 2020-09-21 LAB
ANION GAP SERPL CALCULATED.3IONS-SCNC: 7.1 MMOL/L (ref 5–15)
BUN SERPL-MCNC: 5 MG/DL (ref 6–20)
BUN/CREAT SERPL: 7.4 (ref 7–25)
CALCIUM SPEC-SCNC: 9.4 MG/DL (ref 8.6–10.5)
CHLORIDE SERPL-SCNC: 103 MMOL/L (ref 98–107)
CO2 SERPL-SCNC: 30.9 MMOL/L (ref 22–29)
CREAT SERPL-MCNC: 0.68 MG/DL (ref 0.57–1)
GFR SERPL CREATININE-BSD FRML MDRD: 89 ML/MIN/1.73
GLUCOSE SERPL-MCNC: 117 MG/DL (ref 65–99)
MAGNESIUM SERPL-MCNC: 2 MG/DL (ref 1.6–2.6)
POTASSIUM SERPL-SCNC: 3.6 MMOL/L (ref 3.5–5.2)
SODIUM SERPL-SCNC: 141 MMOL/L (ref 136–145)

## 2020-09-21 PROCEDURE — 36415 COLL VENOUS BLD VENIPUNCTURE: CPT

## 2020-09-21 PROCEDURE — 83735 ASSAY OF MAGNESIUM: CPT

## 2020-09-21 PROCEDURE — 99214 OFFICE O/P EST MOD 30 MIN: CPT | Performed by: INTERNAL MEDICINE

## 2020-09-21 PROCEDURE — 80048 BASIC METABOLIC PNL TOTAL CA: CPT

## 2020-09-21 RX ORDER — FLECAINIDE ACETATE 50 MG/1
50 TABLET ORAL 2 TIMES DAILY
Qty: 60 TABLET | Refills: 5 | Status: SHIPPED | OUTPATIENT
Start: 2020-09-21 | End: 2021-02-01 | Stop reason: SDUPTHER

## 2020-09-21 RX ORDER — LISINOPRIL 20 MG/1
20 TABLET ORAL DAILY
COMMUNITY

## 2020-09-22 PROCEDURE — 93000 ELECTROCARDIOGRAM COMPLETE: CPT | Performed by: INTERNAL MEDICINE

## 2020-09-24 ENCOUNTER — CLINICAL SUPPORT (OUTPATIENT)
Dept: CARDIOLOGY | Facility: CLINIC | Age: 60
End: 2020-09-24

## 2020-09-24 DIAGNOSIS — I48.0 PAROXYSMAL ATRIAL FIBRILLATION (HCC): Primary | ICD-10-CM

## 2020-09-24 PROCEDURE — 93000 ELECTROCARDIOGRAM COMPLETE: CPT | Performed by: INTERNAL MEDICINE

## 2020-10-20 ENCOUNTER — LAB (OUTPATIENT)
Dept: LAB | Facility: HOSPITAL | Age: 60
End: 2020-10-20

## 2020-10-20 ENCOUNTER — TRANSCRIBE ORDERS (OUTPATIENT)
Dept: ADMINISTRATIVE | Facility: HOSPITAL | Age: 60
End: 2020-10-20

## 2020-10-20 DIAGNOSIS — C73 MALIGNANT NEOPLASM OF THYROID GLAND (HCC): ICD-10-CM

## 2020-10-20 DIAGNOSIS — C73 MALIGNANT NEOPLASM OF THYROID GLAND (HCC): Primary | ICD-10-CM

## 2020-10-20 PROCEDURE — 36415 COLL VENOUS BLD VENIPUNCTURE: CPT

## 2020-10-20 PROCEDURE — 86800 THYROGLOBULIN ANTIBODY: CPT

## 2020-10-20 PROCEDURE — 84443 ASSAY THYROID STIM HORMONE: CPT

## 2020-10-20 PROCEDURE — 84432 ASSAY OF THYROGLOBULIN: CPT

## 2020-10-21 ENCOUNTER — OFFICE VISIT (OUTPATIENT)
Dept: CARDIOLOGY | Facility: CLINIC | Age: 60
End: 2020-10-21

## 2020-10-21 VITALS
WEIGHT: 185.2 LBS | TEMPERATURE: 99.6 F | HEIGHT: 65 IN | BODY MASS INDEX: 30.86 KG/M2 | DIASTOLIC BLOOD PRESSURE: 85 MMHG | SYSTOLIC BLOOD PRESSURE: 142 MMHG | HEART RATE: 71 BPM

## 2020-10-21 DIAGNOSIS — E11.9 TYPE 2 DIABETES MELLITUS WITHOUT COMPLICATION, WITHOUT LONG-TERM CURRENT USE OF INSULIN (HCC): ICD-10-CM

## 2020-10-21 DIAGNOSIS — I10 ESSENTIAL HYPERTENSION: ICD-10-CM

## 2020-10-21 DIAGNOSIS — I48.0 PAROXYSMAL ATRIAL FIBRILLATION (HCC): Primary | ICD-10-CM

## 2020-10-21 LAB
THYROGLOB AB SERPL-ACNC: <1 IU/ML (ref 0–0.9)
THYROGLOB AB SERPL-ACNC: <1 IU/ML (ref 0–0.9)
THYROGLOB SERPL-MCNC: 0.3 NG/ML (ref 1.5–38.5)
TSH SERPL DL<=0.05 MIU/L-ACNC: 0.01 UIU/ML (ref 0.27–4.2)

## 2020-10-21 PROCEDURE — 99214 OFFICE O/P EST MOD 30 MIN: CPT | Performed by: INTERNAL MEDICINE

## 2020-10-21 RX ORDER — AMLODIPINE BESYLATE 10 MG/1
10 TABLET ORAL DAILY
Qty: 30 TABLET | Refills: 5 | Status: SHIPPED | COMMUNITY
Start: 2020-10-21 | End: 2020-10-21 | Stop reason: SDUPTHER

## 2020-10-21 RX ORDER — RANITIDINE 150 MG/1
150 CAPSULE ORAL 2 TIMES DAILY
COMMUNITY
End: 2021-11-02 | Stop reason: ALTCHOICE

## 2020-10-21 RX ORDER — APREMILAST 30 MG/1
TABLET, FILM COATED ORAL 2 TIMES DAILY
COMMUNITY

## 2020-10-21 RX ORDER — AMLODIPINE BESYLATE 10 MG/1
10 TABLET ORAL DAILY
Qty: 30 TABLET | Refills: 5 | Status: SHIPPED | OUTPATIENT
Start: 2020-10-21 | End: 2020-10-26 | Stop reason: SDUPTHER

## 2020-10-21 RX ORDER — ASPIRIN 81 MG/1
81 TABLET ORAL DAILY
COMMUNITY
End: 2021-02-01

## 2020-10-21 NOTE — PROGRESS NOTES
Kreis, Samuel Duane, MD  Lashae Benitez  1960  10/21/2020    Patient Active Problem List   Diagnosis   • Hiatal hernia   • Essential hypertension   • Sjogren's syndrome (CMS/HCC)   • Multiple sclerosis (CMS/HCC)   • Psoriasis   • Fibromyalgia   • Osteoarthritis   • Psoriatic arthritis (CMS/HCC)   • RA (rheumatoid arthritis) (CMS/HCC)   • Degenerative disc disease, lumbar   • TMJ arthritis   • Dupuytren's disease   • H. pylori infection   • Family history of coronary artery disease   • Type 2 diabetes mellitus (CMS/HCC)   • Edema   • Bilateral leg edema   • Isolated corticotropin deficiency (CMS/HCC)   • Hyponatremia   • Paroxysmal atrial fibrillation (CMS/HCC)       Dear Kreis, Samuel Duane, MD:    Subjective     Lashae Benitez is a 60 y.o. female with the problems as listed above, presents    Chief Complaint: Follow-up of paroxysmal atrial fibrillation.     History of Present Illness: Ms. Benitez is a pleasant 60-year-old  female with recently diagnosed paroxysmal atrial fibrillation noted on the Holter monitor, was initiated on flecainide therapy along with Toprol-XL.  She is on Xarelto for stroke prevention.  She is here for regular cardiology follow-up.  On today's visit she says she still has some intermittent palpitations but not as bad as before.  She denies any major bleeding problems other than bruising on her arms.  She brought blood pressure readings from home which tends to run mostly in the 150s and 160s systolic and 80s to 90s diastolic especially in the morning hours between 8 AM and 10 AM.  She says she takes Toprol-XL in the morning and amlodipine and lisinopril in the evening.      Allergies   Allergen Reactions   • Codeine Angioedema   • Imuran [Azathioprine] Other (See Comments)     Has pancreatis attacks with med   • Januvia [Sitagliptin] Other (See Comments)     Has pancreatis attacks with med    • Penicillins Angioedema   • Sulfa Antibiotics Angioedema   • Sulfasalazine Unknown (See  "Comments)   • Beta Adrenergic Blockers Other (See Comments)     I called pt to ask her about the allergy to bblockers in her chart. Pt states \"too big of a dose makes her psoriasis act up\", but this current dose of metoprolol 50 mg bid, she has been on for a long time, with no ill side effects.  JONATHAN IVY 3/28/18       Current Outpatient Medications:   •  abatacept (ORENCIA) 250 MG injection, Infuse 750 mg into a venous catheter Every 28 (Twenty-Eight) Days., Disp: , Rfl:   •  alendronate (FOSAMAX) 70 MG tablet, Take 70 mg by mouth 1 (One) Time Per Week. Wednesday, Disp: , Rfl:   •  amitriptyline (ELAVIL) 25 MG tablet, Take 25 mg by mouth Every Night., Disp: , Rfl:   •  amLODIPine (NORVASC) 10 MG tablet, Take 1 tablet by mouth Daily., Disp: 30 tablet, Rfl: 5  •  Apremilast (Otezla) 30 MG tablet, Take  by mouth 2 (two) times a day., Disp: , Rfl:   •  aspirin 81 MG EC tablet, Take 81 mg by mouth Daily., Disp: , Rfl:   •  butalbital-acetaminophen-caffeine (Esgic) -40 MG per tablet, Take 1 tablet by mouth Every 4 (Four) Hours As Needed for Headache., Disp: , Rfl:   •  Calcipotriene-Betameth Diprop (Enstilar) 0.005-0.064 % foam, Apply 1 spray topically Daily., Disp: , Rfl:   •  calcium citrate-vitamin d (CALCITRATE) 315-250 MG-UNIT tablet tablet, Take 1 tablet by mouth Every Evening., Disp: , Rfl:   •  Cholecalciferol (VITAMIN D3) 125 MCG (5000 UT) capsule capsule, Take 5,000 Units by mouth Daily., Disp: , Rfl:   •  cyanocobalamin 1000 MCG/ML injection, Inject 1,000 mcg into the shoulder, thigh, or buttocks Every 28 (Twenty-Eight) Days., Disp: , Rfl:   •  cyclobenzaprine (FLEXERIL) 10 MG tablet, Take 10 mg by mouth Every Evening., Disp: , Rfl:   •  diazePAM (VALIUM) 2 MG tablet, Take 2 mg by mouth Daily As Needed for Anxiety., Disp: , Rfl:   •  DULoxetine (CYMBALTA) 30 MG capsule, Take 90 mg by mouth Every Morning., Disp: , Rfl:   •  EMGALITY 120 MG/ML prefilled syringe, Inject 120 mg under the skin into the " appropriate area as directed Every 30 (Thirty) Days., Disp: , Rfl: 3  •  ferrous gluconate (FERGON) 240 (27 FE) MG tablet, Take 240 mg by mouth Every Morning., Disp: , Rfl:   •  flecainide (TAMBOCOR) 50 MG tablet, Take 1 tablet by mouth 2 (Two) Times a Day., Disp: 60 tablet, Rfl: 5  •  folic acid (FOLVITE) 1 MG tablet, Take 1 mg by mouth daily., Disp: , Rfl:   •  gabapentin (NEURONTIN) 600 MG tablet, Take 600 mg by mouth 3 (Three) Times a Day., Disp: , Rfl:   •  insulin detemir (LEVEMIR) 100 UNIT/ML injection, Inject 25 Units under the skin into the appropriate area as directed Daily., Disp: , Rfl:   •  levothyroxine sodium (TIROSINT) 137 MCG capsule, Take 137 mcg by mouth Daily. Takes an extra dose at the first of each month., Disp: , Rfl:   •  linaclotide (LINZESS) 290 MCG capsule capsule, Take 290 mcg by mouth every morning before breakfast., Disp: , Rfl:   •  lisinopril (PRINIVIL,ZESTRIL) 20 MG tablet, Take 20 mg by mouth Daily., Disp: , Rfl:   •  loratadine (CLARITIN) 10 MG tablet, Take 10 mg by mouth Every Night., Disp: , Rfl:   •  methotrexate 2.5 MG tablet, Take 25 mg by mouth 1 (One) Time Per Week. Prior to [facility] Admission, Patient was on: Takes 10 tablets on Saturday, Disp: , Rfl:   •  multivitamin (DAILY MICHAEL) tablet tablet, Take 1 tablet by mouth Every Evening., Disp: , Rfl:   •  omeprazole (priLOSEC) 40 MG capsule, Take 40 mg by mouth Daily., Disp: , Rfl:   •  OnabotulinumtoxinA (BOTOX IJ), Inject  as directed. Q 3 M FOTR MIGRAINES, Disp: , Rfl:   •  primidone (MYSOLINE) 50 MG tablet, Take 50 mg by mouth Every Night., Disp: , Rfl:   •  raNITIdine (ZANTAC) 150 MG capsule, Take 150 mg by mouth 2 (Two) Times a Day., Disp: , Rfl:   •  rivaroxaban (Xarelto) 20 MG tablet, Take 1 tablet by mouth Daily., Disp: 30 tablet, Rfl: 5  •  TOPROL  MG 24 hr tablet, TAKE 1 TABLET DAILY, Disp: 90 tablet, Rfl: 1  •  vitamin C (ASCORBIC ACID) 500 MG tablet, Take 1,000 mg by mouth Every Evening., Disp: , Rfl:   •   "vitamin E 1000 UNIT capsule, Take 1,000 Units by mouth Every Evening., Disp: , Rfl:       The following portions of the patient's history were reviewed and updated as appropriate: allergies, current medications, past family history, past medical history, past social history, past surgical history and problem list.    Social History     Tobacco Use   • Smoking status: Never Smoker   • Smokeless tobacco: Never Used   Substance Use Topics   • Alcohol use: No   • Drug use: No       Review of Systems   Constitution: Negative for chills and fever.   HENT: Negative for nosebleeds and sore throat.    Cardiovascular: Positive for palpitations.   Respiratory: Negative for cough, hemoptysis and wheezing.    Gastrointestinal: Negative for abdominal pain, hematemesis, hematochezia, melena, nausea and vomiting.   Genitourinary: Negative for dysuria and hematuria.   Neurological: Negative for headaches.       Objective   Vitals:    10/21/20 1224   BP: 142/85   Pulse: 71   Temp: 99.6 °F (37.6 °C)   Weight: 84 kg (185 lb 3.2 oz)   Height: 165.1 cm (65\")     Body mass index is 30.82 kg/m².    Vitals signs reviewed.   Constitutional:       Appearance: Well-developed.   Eyes:      Conjunctiva/sclera: Conjunctivae normal.   HENT:      Head: Normocephalic.   Neck:      Musculoskeletal: Normal range of motion and neck supple.      Thyroid: No thyromegaly.      Vascular: No JVD.      Trachea: No tracheal deviation.   Pulmonary:      Effort: No respiratory distress.      Breath sounds: Normal breath sounds. No wheezing. No rales.   Cardiovascular:      Normal rate. Regular rhythm.      No gallop.   Abdominal:      General: Bowel sounds are normal.      Palpations: Abdomen is soft. There is no abdominal mass.      Tenderness: There is no abdominal tenderness.   Skin:     General: Skin is warm and dry.   Neurological:      Mental Status: Alert and oriented to person, place, and time.      Cranial Nerves: No cranial nerve deficit.         Lab " Results   Component Value Date     09/21/2020    K 3.6 09/21/2020     09/21/2020    CO2 30.9 (H) 09/21/2020    BUN 5 (L) 09/21/2020    CREATININE 0.68 09/21/2020    GLUCOSE 117 (H) 09/21/2020    CALCIUM 9.4 09/21/2020    AST 14 08/18/2020    ALT 20 08/18/2020    ALKPHOS 92 08/18/2020    LABIL2 1.4 (L) 08/11/2015     Lab Results   Component Value Date    CKTOTAL 40 04/12/2017     Lab Results   Component Value Date    WBC 8.68 08/18/2020    HGB 12.2 08/18/2020    HCT 37.0 08/18/2020     08/18/2020     Lab Results   Component Value Date    INR 0.97 11/22/2018    INR 0.93 07/20/2015     Lab Results   Component Value Date    MG 2.0 09/21/2020     Lab Results   Component Value Date    TSH 0.015 (L) 10/20/2020    CHLPL 132 07/22/2015    TRIG 85 07/22/2015    HDL 40 (L) 07/22/2015    LDL 74 07/22/2015      Lab Results   Component Value Date    BNP 51.0 11/22/2018      []        Assessment/Plan :   Diagnosis Plan   1. Paroxysmal atrial fibrillation seems to be in sinus rhythm now.     2. Essential hypertension, uncontrolled.    3. Type 2 diabetes mellitus without complication, without long-term current use of insulin (CMS/[facility])       Recommendations:  1. Since her blood pressures are running high at home in the mornings, we will increase the evening amlodipine to 10 mg and continue with lisinopril at 20 mg for now in the evening.  2. Continue with Toprol- mg in the morning along with flecainide 50 mg p.o. twice daily.  3. I have discussed with her about avoiding salt rich foods such as canned vegetables, canned soups, all chips, pickles and processed meats.  Patient expressed understanding.  4. Continue to keep a check on the blood pressure at home twice a day and follow-up with Dr. Araujo    Return in about 3 months (around 1/21/2021) for or sooner if needed.    As always, Shamar  I appreciate very much the opportunity to participate in the cardiovascular care of your patients. Please do not hesitate to  call me with any questions with regards to Lashae Benitez's evaluation and management.       With Best Regards,        Eric Burns MD, FACC    Please note that portions of this note were completed with a voice recognition program.

## 2020-10-26 LAB — THYROGLOB SERPL-MCNC: <2 NG/ML

## 2020-10-26 RX ORDER — AMLODIPINE BESYLATE 10 MG/1
10 TABLET ORAL DAILY
Qty: 90 TABLET | Refills: 0 | Status: SHIPPED | OUTPATIENT
Start: 2020-10-26 | End: 2020-11-03 | Stop reason: SDUPTHER

## 2020-11-05 RX ORDER — AMLODIPINE BESYLATE 10 MG/1
10 TABLET ORAL DAILY
Qty: 90 TABLET | Refills: 0 | Status: SHIPPED | OUTPATIENT
Start: 2020-11-05 | End: 2020-12-15 | Stop reason: DRUGHIGH

## 2020-11-07 DIAGNOSIS — I10 ESSENTIAL HYPERTENSION: ICD-10-CM

## 2020-11-09 RX ORDER — METOPROLOL SUCCINATE 100 MG/1
TABLET, EXTENDED RELEASE ORAL
Qty: 90 TABLET | Refills: 3 | Status: SHIPPED | OUTPATIENT
Start: 2020-11-09

## 2020-11-23 ENCOUNTER — LAB (OUTPATIENT)
Dept: LAB | Facility: HOSPITAL | Age: 60
End: 2020-11-23

## 2020-11-23 ENCOUNTER — TRANSCRIBE ORDERS (OUTPATIENT)
Dept: ADMINISTRATIVE | Facility: HOSPITAL | Age: 60
End: 2020-11-23

## 2020-11-23 DIAGNOSIS — E87.1 HYPONATREMIA: Primary | ICD-10-CM

## 2020-11-23 DIAGNOSIS — E87.1 HYPONATREMIA: ICD-10-CM

## 2020-11-23 LAB
ALBUMIN SERPL-MCNC: 4.1 G/DL (ref 3.5–5.2)
ALBUMIN/GLOB SERPL: 1.6 G/DL
ALP SERPL-CCNC: 114 U/L (ref 39–117)
ALT SERPL W P-5'-P-CCNC: 18 U/L (ref 1–33)
ANION GAP SERPL CALCULATED.3IONS-SCNC: 9.3 MMOL/L (ref 5–15)
AST SERPL-CCNC: 20 U/L (ref 1–32)
BILIRUB SERPL-MCNC: 0.4 MG/DL (ref 0–1.2)
BUN SERPL-MCNC: 6 MG/DL (ref 6–20)
BUN/CREAT SERPL: 8.6 (ref 7–25)
CALCIUM SPEC-SCNC: 9.9 MG/DL (ref 8.6–10.5)
CHLORIDE SERPL-SCNC: 99 MMOL/L (ref 98–107)
CO2 SERPL-SCNC: 26.7 MMOL/L (ref 22–29)
CREAT SERPL-MCNC: 0.7 MG/DL (ref 0.57–1)
GFR SERPL CREATININE-BSD FRML MDRD: 86 ML/MIN/1.73
GLOBULIN UR ELPH-MCNC: 2.5 GM/DL
GLUCOSE SERPL-MCNC: 211 MG/DL (ref 65–99)
POTASSIUM SERPL-SCNC: 3.6 MMOL/L (ref 3.5–5.2)
PROT SERPL-MCNC: 6.6 G/DL (ref 6–8.5)
SODIUM SERPL-SCNC: 135 MMOL/L (ref 136–145)

## 2020-11-23 PROCEDURE — 36415 COLL VENOUS BLD VENIPUNCTURE: CPT

## 2020-11-23 PROCEDURE — 80053 COMPREHEN METABOLIC PANEL: CPT

## 2020-12-02 ENCOUNTER — OFFICE VISIT (OUTPATIENT)
Dept: PULMONOLOGY | Facility: CLINIC | Age: 60
End: 2020-12-02

## 2020-12-02 VITALS
HEART RATE: 74 BPM | DIASTOLIC BLOOD PRESSURE: 76 MMHG | BODY MASS INDEX: 29.16 KG/M2 | OXYGEN SATURATION: 98 % | WEIGHT: 175 LBS | TEMPERATURE: 98 F | HEIGHT: 65 IN | SYSTOLIC BLOOD PRESSURE: 132 MMHG

## 2020-12-02 DIAGNOSIS — R06.02 SOB (SHORTNESS OF BREATH) ON EXERTION: Primary | ICD-10-CM

## 2020-12-02 PROCEDURE — 99203 OFFICE O/P NEW LOW 30 MIN: CPT | Performed by: INTERNAL MEDICINE

## 2020-12-02 RX ORDER — RIMEGEPANT SULFATE 75 MG/75MG
TABLET, ORALLY DISINTEGRATING ORAL
COMMUNITY
Start: 2020-10-23

## 2020-12-02 NOTE — PROGRESS NOTES
Chief complaint:   Chief Complaint   Patient presents with   • Interstitial Lung Disease       History of present illness:   Ms. Benitez is a very pleasant 60-year-old female with a past medical history of rheumatoid arthritis, MS and paroxysmal atrial fibrillation.  She presents to pulmonary outpatient clinic due to concern of ILD as her PFT showed mild decrease in DLCO.  Patient does not use supplemental oxygen at rest or on exertion.  She reports gradual worsening of her shortness of breath on exertion.  Symptom of shortness of breath are present some time.  She follows with cardiology for chest pain and paroxysmal atrial fibrillation.  She denies any wheezing.  She denies any fever chills night sweats or weight loss.  She denies any symptom suggestive of orthopnea or paroxysmal nocturnal dyspnea.  Shortness of breath increased with activity and decreased on rest.  She denies any history of blood clot in the leg or in the lung.  Currently she is on p.o. anticoagulation.  He does complain of heartburn and bloating.  She is a lifelong non-smoker.  He has a family history of COPD but denies any family history of lung cancer or lung fibrosis.  She works as a .  Her last TB skin test was negative.  She is currently on no inhalers.    Review of Systems:   General ROS: negative for chills, fatigue or fever  Psychological ROS: negative for anxiety or depression  ENT ROS: negative for headaches, visual changes or vocal changes  Respiratory ROS: Negative for cough.  Positive shortness of breath  Cardiovascular ROS: Negative for chest pain.  Positive for dyspnea on exertion  Gastrointestinal ROS: no abdominal pain, change in bowel habits, or black or bloody stools  Musculoskeletal ROS: negative for joint pain, joint stiffness or joint swelling  Neurological ROS: no TIA or stroke symptoms  Heme- no bleeding, no lumps and bumps in armpit  Skin- no bruises, no rash    Past medical history, past surgical history, social  history and family history:  •  has a past medical history of Acid reflux, Allergic, Anxiety, Cancer (CMS/[facility]), Chronic pain disorder, Degenerative disc disease, lumbar, Depression, Dupuytren's disease, Essential hypertension, Family history of coronary artery disease, Fibromyalgia, Gallbladder abscess, H. pylori infection, Hiatal hernia, Hyperlipidemia, Hypothyroidism, Migraines, Multiple sclerosis (CMS/[facility]), Osteoarthritis, Psoriasis, Psoriatic arthritis (CMS/[facility]), RA (rheumatoid arthritis) (CMS/[facility]), Rheumatoid arthritis (CMS/[facility]), Sinusitis, Sjogren's syndrome (CMS/[facility]), Stomach ulcer, TMJ arthritis, Type 2 diabetes mellitus (CMS/[facility]), and Urinary tract infection.  •  has a past surgical history that includes Breast lumpectomy (Left, 11/1993); Cholecystectomy (05/2007); Gastric bypass (09/2007); Lumbar disc surgery; Carpal tunnel release (03/2012); Sacral nerve stimulator placement (09/2014); Cervical polypectomy (12/2015); Thyroidectomy (03/2016); Replacement total knee (Right, 06/2016); Knee Arthroplasty; Cardiac catheterization (Left, 09/21/2009); Colonoscopy; Joint replacement; and Esophagogastroduodenoscopy.  •  reports that she has never smoked. She has never used smokeless tobacco. She reports that she does not drink alcohol or use drugs.  • family history includes Cancer in her maternal grandmother; Diabetes in her mother and sister; Heart attack in her father; Heart disease in her father; Heart failure in her father; Hypertension in her mother; Stroke in her father and mother.     Medication and allergies:  • Active medication:  Current Outpatient Medications on File Prior to Visit   Medication Sig   • abatacept (ORENCIA) 250 MG injection Infuse 750 mg into a venous catheter Every 28 (Twenty-Eight) Days.   • alendronate (FOSAMAX) 70 MG tablet Take 70 mg by mouth 1 (One) Time Per Week. Wednesday   • amitriptyline (ELAVIL) 25 MG tablet Take 25 mg by mouth Every Night.   • amLODIPine (NORVASC) 10 MG tablet  Take 1 tablet by mouth Daily.   • Apremilast (Otezla) 30 MG tablet Take  by mouth 2 (two) times a day.   • aspirin 81 MG EC tablet Take 81 mg by mouth Daily.   • butalbital-acetaminophen-caffeine (Esgic) -40 MG per tablet Take 1 tablet by mouth Every 4 (Four) Hours As Needed for Headache.   • Calcipotriene-Betameth Diprop (Enstilar) 0.005-0.064 % foam Apply 1 spray topically Daily.   • calcium citrate-vitamin d (CALCITRATE) 315-250 MG-UNIT tablet tablet Take 1 tablet by mouth Every Evening.   • Cholecalciferol (VITAMIN D3) 125 MCG (5000 UT) capsule capsule Take 5,000 Units by mouth Daily.   • cyanocobalamin 1000 MCG/ML injection Inject 1,000 mcg into the shoulder, thigh, or buttocks Every 28 (Twenty-Eight) Days.   • cyclobenzaprine (FLEXERIL) 10 MG tablet Take 10 mg by mouth Every Evening.   • diazePAM (VALIUM) 2 MG tablet Take 2 mg by mouth Daily As Needed for Anxiety.   • DULoxetine (CYMBALTA) 30 MG capsule Take 90 mg by mouth Every Morning.   • EMGALITY 120 MG/ML prefilled syringe Inject 120 mg under the skin into the appropriate area as directed Every 30 (Thirty) Days.   • ferrous gluconate (FERGON) 240 (27 FE) MG tablet Take 240 mg by mouth Every Morning.   • flecainide (TAMBOCOR) 50 MG tablet Take 1 tablet by mouth 2 (Two) Times a Day.   • folic acid (FOLVITE) 1 MG tablet Take 1 mg by mouth daily.   • gabapentin (NEURONTIN) 600 MG tablet Take 600 mg by mouth 3 (Three) Times a Day.   • insulin detemir (LEVEMIR) 100 UNIT/ML injection Inject 25 Units under the skin into the appropriate area as directed Daily.   • levothyroxine sodium (TIROSINT) 137 MCG capsule Take 125 mcg by mouth Daily. Takes an extra dose at the first of each month.   • linaclotide (LINZESS) 290 MCG capsule capsule Take 290 mcg by mouth every morning before breakfast.   • lisinopril (PRINIVIL,ZESTRIL) 20 MG tablet Take 20 mg by mouth Daily.   • loratadine (CLARITIN) 10 MG tablet Take 10 mg by mouth Every Night.   • methotrexate 2.5 MG  "tablet Take 25 mg by mouth 1 (One) Time Per Week. Prior to [facility] Admission, Patient was on: Takes 10 tablets on Saturday   • metoprolol succinate XL (TOPROL-XL) 100 MG 24 hr tablet TAKE 1 TABLET DAILY   • multivitamin (DAILY MICHAEL) tablet tablet Take 1 tablet by mouth Every Evening.   • omeprazole (priLOSEC) 40 MG capsule Take 40 mg by mouth Daily.   • OnabotulinumtoxinA (BOTOX IJ) Inject  as directed. Q 3 M FOTR MIGRAINES   • primidone (MYSOLINE) 50 MG tablet Take 50 mg by mouth Every Night.   • raNITIdine (ZANTAC) 150 MG capsule Take 150 mg by mouth 2 (Two) Times a Day.   • rimegepant 75 MG dispersible tablet DISSOLVE ONE TABLET UNDER THE TONGUE AT ONSET OF MIGRAINE HEADACHE. MAX DOSE ONE TABLET IN 24 HOURS   • rivaroxaban (Xarelto) 20 MG tablet Take 1 tablet by mouth Daily.   • vitamin C (ASCORBIC ACID) 500 MG tablet Take 1,000 mg by mouth Every Evening.   • vitamin E 1000 UNIT capsule Take 1,000 Units by mouth Every Evening.     No current facility-administered medications on file prior to visit.         Allergies:    Codeine, Imuran [azathioprine], Januvia [sitagliptin], Penicillins, Sulfa antibiotics, Sulfasalazine, and Beta adrenergic blockers      Vital Signs    height is 165.1 cm (65\") and weight is 79.4 kg (175 lb). Her temporal temperature is 98 °F (36.7 °C). Her blood pressure is 132/76 and her pulse is 74. Her oxygen saturation is 98%.      Physical Exam:  Constitutional:  oriented to person, place, and time. No distress.   Head: Normocephalic and atraumatic.   Right Ear and left Ear : External ear normal.   Nose: Nose normal.   Eyes: Pupils are equal, round, and reactive to light.  No scleral icterus.   Neck: Normal range of motion. Neck supple.    Cardiovascular: Normal rate, regular rhythm, normal heart sounds. No murmur heard.  Pulmonary: Bilateral air entry equal.  No wheezing.  No crackles.  No rhonchi.  Abdominal: Soft. Bowel sounds are normal. There is no tenderness.   Musculoskeletal: Normal " range of motion. Exhibits no deformity.   Neurological: alert and oriented to person, place, and time. No cranial nerve deficit. Coordination normal.   Skin: Skin is warm and dry. No erythema.   Psychiatric:Normal mood and affect.   behavior is normal.     Result review:   • I have reviewed the images of the CT scan of the chest that were performed in year 18. 2018.  No pleural effusion.  No parenchymal soft tissue nodules or masses.  No interstitial thickness noted.  I have reviewed the chest x-ray me that overall on 8/7/2020 which showed no evidence of cardiopulmonary disease.  I have reviewed the pulmonary function test that were performed in year 2019 which showed no obstruction.  Lung volumes are normal.  Mild decrease in DLCO.      Assessment and plan:   Diagnosis Plan   1. SOB (shortness of breath) on exertion   her shortness of breath is multifactorial.  Hypertension, paroxysmal A. fib, rheumatoid arthritis, and muscular deconditioning.  CT scan of the chest and chest x-ray are not consistent with  diffuse alveolar interstitial disease.  She follows with cardiology, nephrology and rheumatology.  If her symptoms get worse, then I will proceed with HRCT to rule out follicular bronchiolitis in the setting of RA.            Follow up as needed.       Thank you for involving us in the care of the patient.  Reji Sebastian MD  Pulmonary and Critical Care Medicine

## 2020-12-14 ENCOUNTER — TELEPHONE (OUTPATIENT)
Dept: CARDIOLOGY | Facility: CLINIC | Age: 60
End: 2020-12-14

## 2020-12-15 DIAGNOSIS — I10 ESSENTIAL HYPERTENSION: Primary | ICD-10-CM

## 2020-12-15 RX ORDER — AMLODIPINE BESYLATE 5 MG/1
7.5 TABLET ORAL DAILY
Qty: 45 TABLET | Refills: 5 | Status: SHIPPED | OUTPATIENT
Start: 2020-12-15 | End: 2021-07-26

## 2021-01-06 ENCOUNTER — TELEPHONE (OUTPATIENT)
Dept: CARDIOLOGY | Facility: CLINIC | Age: 61
End: 2021-01-06

## 2021-01-07 NOTE — TELEPHONE ENCOUNTER
Tried several times to reach pharmacy-as no direct info was provided-I was unable to under stand what this message was referring to as we sent the last RX to [facility]

## 2021-01-12 ENCOUNTER — LAB (OUTPATIENT)
Dept: LAB | Facility: HOSPITAL | Age: 61
End: 2021-01-12

## 2021-01-12 ENCOUNTER — TRANSCRIBE ORDERS (OUTPATIENT)
Dept: ADMINISTRATIVE | Facility: HOSPITAL | Age: 61
End: 2021-01-12

## 2021-01-12 DIAGNOSIS — E11.42 TYPE 2 DIABETES MELLITUS WITH POLYNEUROPATHY (HCC): ICD-10-CM

## 2021-01-12 DIAGNOSIS — E87.1 HYPOSMOLALITY SYNDROME: ICD-10-CM

## 2021-01-12 DIAGNOSIS — E53.8 VITAMIN B 12 DEFICIENCY: ICD-10-CM

## 2021-01-12 DIAGNOSIS — E11.42 TYPE 2 DIABETES MELLITUS WITH POLYNEUROPATHY (HCC): Primary | ICD-10-CM

## 2021-01-12 DIAGNOSIS — I10 ESSENTIAL HYPERTENSION: ICD-10-CM

## 2021-01-12 DIAGNOSIS — E78.2 MIXED HYPERLIPIDEMIA: ICD-10-CM

## 2021-01-12 LAB
ALBUMIN SERPL-MCNC: 4 G/DL (ref 3.5–5.2)
ALBUMIN/GLOB SERPL: 1.7 G/DL
ALP SERPL-CCNC: 124 U/L (ref 39–117)
ALT SERPL W P-5'-P-CCNC: 21 U/L (ref 1–33)
ANION GAP SERPL CALCULATED.3IONS-SCNC: 8.2 MMOL/L (ref 5–15)
AST SERPL-CCNC: 22 U/L (ref 1–32)
BASOPHILS # BLD AUTO: 0.04 10*3/MM3 (ref 0–0.2)
BASOPHILS NFR BLD AUTO: 0.5 % (ref 0–1.5)
BILIRUB SERPL-MCNC: 0.3 MG/DL (ref 0–1.2)
BUN SERPL-MCNC: 8 MG/DL (ref 6–20)
BUN/CREAT SERPL: 11.3 (ref 7–25)
CALCIUM SPEC-SCNC: 8.7 MG/DL (ref 8.6–10.5)
CHLORIDE SERPL-SCNC: 97 MMOL/L (ref 98–107)
CO2 SERPL-SCNC: 26.8 MMOL/L (ref 22–29)
CREAT SERPL-MCNC: 0.71 MG/DL (ref 0.57–1)
DEPRECATED RDW RBC AUTO: 50.4 FL (ref 37–54)
EOSINOPHIL # BLD AUTO: 0.06 10*3/MM3 (ref 0–0.4)
EOSINOPHIL NFR BLD AUTO: 0.8 % (ref 0.3–6.2)
ERYTHROCYTE [DISTWIDTH] IN BLOOD BY AUTOMATED COUNT: 14.7 % (ref 12.3–15.4)
GFR SERPL CREATININE-BSD FRML MDRD: 84 ML/MIN/1.73
GLOBULIN UR ELPH-MCNC: 2.4 GM/DL
GLUCOSE SERPL-MCNC: 267 MG/DL (ref 65–99)
HCT VFR BLD AUTO: 35.3 % (ref 34–46.6)
HGB BLD-MCNC: 12 G/DL (ref 12–15.9)
IMM GRANULOCYTES # BLD AUTO: 0.03 10*3/MM3 (ref 0–0.05)
IMM GRANULOCYTES NFR BLD AUTO: 0.4 % (ref 0–0.5)
LYMPHOCYTES # BLD AUTO: 1.27 10*3/MM3 (ref 0.7–3.1)
LYMPHOCYTES NFR BLD AUTO: 17.4 % (ref 19.6–45.3)
MCH RBC QN AUTO: 32.4 PG (ref 26.6–33)
MCHC RBC AUTO-ENTMCNC: 34 G/DL (ref 31.5–35.7)
MCV RBC AUTO: 95.4 FL (ref 79–97)
MONOCYTES # BLD AUTO: 0.35 10*3/MM3 (ref 0.1–0.9)
MONOCYTES NFR BLD AUTO: 4.8 % (ref 5–12)
NEUTROPHILS NFR BLD AUTO: 5.56 10*3/MM3 (ref 1.7–7)
NEUTROPHILS NFR BLD AUTO: 76.1 % (ref 42.7–76)
NRBC BLD AUTO-RTO: 0 /100 WBC (ref 0–0.2)
PLATELET # BLD AUTO: 344 10*3/MM3 (ref 140–450)
PMV BLD AUTO: 10.1 FL (ref 6–12)
POTASSIUM SERPL-SCNC: 4 MMOL/L (ref 3.5–5.2)
PROT SERPL-MCNC: 6.4 G/DL (ref 6–8.5)
RBC # BLD AUTO: 3.7 10*6/MM3 (ref 3.77–5.28)
SODIUM SERPL-SCNC: 132 MMOL/L (ref 136–145)
WBC # BLD AUTO: 7.31 10*3/MM3 (ref 3.4–10.8)

## 2021-01-12 PROCEDURE — 80053 COMPREHEN METABOLIC PANEL: CPT

## 2021-01-12 PROCEDURE — 36415 COLL VENOUS BLD VENIPUNCTURE: CPT

## 2021-01-12 PROCEDURE — 85025 COMPLETE CBC W/AUTO DIFF WBC: CPT

## 2021-01-19 RX ORDER — RIVAROXABAN 20 MG/1
TABLET, FILM COATED ORAL
Qty: 90 TABLET | Refills: 3 | Status: SHIPPED | OUTPATIENT
Start: 2021-01-19 | End: 2022-01-18

## 2021-02-01 ENCOUNTER — OFFICE VISIT (OUTPATIENT)
Dept: CARDIOLOGY | Facility: CLINIC | Age: 61
End: 2021-02-01

## 2021-02-01 VITALS
WEIGHT: 169.6 LBS | SYSTOLIC BLOOD PRESSURE: 125 MMHG | BODY MASS INDEX: 28.26 KG/M2 | HEART RATE: 84 BPM | TEMPERATURE: 98.7 F | HEIGHT: 65 IN | DIASTOLIC BLOOD PRESSURE: 76 MMHG

## 2021-02-01 DIAGNOSIS — I10 ESSENTIAL HYPERTENSION: ICD-10-CM

## 2021-02-01 DIAGNOSIS — Z79.01 CHRONIC ANTICOAGULATION: ICD-10-CM

## 2021-02-01 DIAGNOSIS — I48.0 PAROXYSMAL ATRIAL FIBRILLATION (HCC): Primary | ICD-10-CM

## 2021-02-01 PROCEDURE — 99214 OFFICE O/P EST MOD 30 MIN: CPT | Performed by: INTERNAL MEDICINE

## 2021-02-01 RX ORDER — FLECAINIDE ACETATE 50 MG/1
50 TABLET ORAL 2 TIMES DAILY
Qty: 180 TABLET | Refills: 3 | Status: SHIPPED | OUTPATIENT
Start: 2021-02-01 | End: 2022-02-01

## 2021-02-01 NOTE — PROGRESS NOTES
"Kreis, Samuel Duane, MD  Lashae Benitez  1960  02/01/2021    Patient Active Problem List   Diagnosis   • Hiatal hernia   • Essential hypertension   • Sjogren's syndrome (CMS/HCC)   • Multiple sclerosis (CMS/HCC)   • Psoriasis   • Fibromyalgia   • Osteoarthritis   • Psoriatic arthritis (CMS/HCC)   • RA (rheumatoid arthritis) (CMS/HCC)   • Degenerative disc disease, lumbar   • TMJ arthritis   • Dupuytren's disease   • H. pylori infection   • Family history of coronary artery disease   • Type 2 diabetes mellitus (CMS/HCC)   • Edema   • Bilateral leg edema   • Isolated corticotropin deficiency (CMS/HCC)   • Hyponatremia   • Paroxysmal atrial fibrillation (CMS/HCC)       Dear Kreis, Samuel Duane, MD:    Subjective     Lashae Benitez is a 60 y.o. female with the problems as listed above, presents    Chief complaint: Follow-up of paroxysmal atrial fibrillation.    History of Present Illness: Ms. Benitez is a pleasant 60-year-old  female with history of paroxysmal atrial fibrillation, has been on flecainide and Xarelto.  She is here for regular cardiology follow-up.  On today's visit she complains of some intermittent mild palpitations which are short-lived.  She denies any bleeding problems from Eliquis.  Overall she has been doing okay.      Allergies   Allergen Reactions   • Codeine Angioedema   • Imuran [Azathioprine] Other (See Comments)     Has pancreatis attacks with med   • Januvia [Sitagliptin] Other (See Comments)     Has pancreatis attacks with med    • Penicillins Angioedema   • Sulfa Antibiotics Angioedema   • Sulfasalazine Unknown (See Comments)   • Beta Adrenergic Blockers Other (See Comments)     I called pt to ask her about the allergy to bblockers in her chart. Pt states \"too big of a dose makes her psoriasis act up\", but this current dose of metoprolol 50 mg bid, she has been on for a long time, with no ill side effects.  JONATHAN IVY 3/28/18   :      Current Outpatient Medications:   •  abatacept " (ORENCIA) 250 MG injection, Infuse 750 mg into a venous catheter Every 28 (Twenty-Eight) Days., Disp: , Rfl:   •  alendronate (FOSAMAX) 70 MG tablet, Take 70 mg by mouth 1 (One) Time Per Week. Wednesday, Disp: , Rfl:   •  amitriptyline (ELAVIL) 25 MG tablet, Take 25 mg by mouth Every Night., Disp: , Rfl:   •  amLODIPine (NORVASC) 5 MG tablet, Take 1.5 tablets by mouth Daily., Disp: 45 tablet, Rfl: 5  •  Apremilast (Otezla) 30 MG tablet, Take  by mouth 2 (two) times a day., Disp: , Rfl:   •  butalbital-acetaminophen-caffeine (Esgic) -40 MG per tablet, Take 1 tablet by mouth Every 4 (Four) Hours As Needed for Headache., Disp: , Rfl:   •  Calcipotriene-Betameth Diprop (Enstilar) 0.005-0.064 % foam, Apply 1 spray topically Daily., Disp: , Rfl:   •  calcium citrate-vitamin d (CALCITRATE) 315-250 MG-UNIT tablet tablet, Take 1 tablet by mouth Every Evening., Disp: , Rfl:   •  Cholecalciferol (VITAMIN D3) 125 MCG (5000 UT) capsule capsule, Take 5,000 Units by mouth Daily., Disp: , Rfl:   •  cyanocobalamin 1000 MCG/ML injection, Inject 1,000 mcg into the shoulder, thigh, or buttocks Every 28 (Twenty-Eight) Days., Disp: , Rfl:   •  cyclobenzaprine (FLEXERIL) 10 MG tablet, Take 10 mg by mouth Every Evening., Disp: , Rfl:   •  diazePAM (VALIUM) 2 MG tablet, Take 2 mg by mouth Daily As Needed for Anxiety., Disp: , Rfl:   •  DULoxetine (CYMBALTA) 30 MG capsule, Take 90 mg by mouth Every Morning., Disp: , Rfl:   •  EMGALITY 120 MG/ML prefilled syringe, Inject 120 mg under the skin into the appropriate area as directed Every 30 (Thirty) Days., Disp: , Rfl: 3  •  ferrous gluconate (FERGON) 240 (27 FE) MG tablet, Take 240 mg by mouth Every Morning., Disp: , Rfl:   •  flecainide (TAMBOCOR) 50 MG tablet, Take 1 tablet by mouth 2 (Two) Times a Day., Disp: 60 tablet, Rfl: 5  •  folic acid (FOLVITE) 1 MG tablet, Take 1 mg by mouth daily., Disp: , Rfl:   •  gabapentin (NEURONTIN) 600 MG tablet, Take 600 mg by mouth 3 (Three) Times a  Day., Disp: , Rfl:   •  insulin detemir (LEVEMIR) 100 UNIT/ML injection, Inject 25 Units under the skin into the appropriate area as directed Daily., Disp: , Rfl:   •  levothyroxine sodium (TIROSINT) 137 MCG capsule, Take 125 mcg by mouth Daily. Takes an extra dose at the first of each month., Disp: , Rfl:   •  linaclotide (LINZESS) 290 MCG capsule capsule, Take 290 mcg by mouth every morning before breakfast., Disp: , Rfl:   •  lisinopril (PRINIVIL,ZESTRIL) 20 MG tablet, Take 20 mg by mouth Daily., Disp: , Rfl:   •  loratadine (CLARITIN) 10 MG tablet, Take 10 mg by mouth Every Night., Disp: , Rfl:   •  methotrexate 2.5 MG tablet, Take 25 mg by mouth 1 (One) Time Per Week. Prior to [facility] [place] operated by [facility], Patient was on: Takes 10 tablets on Saturday, Disp: , Rfl:   •  metoprolol succinate XL (TOPROL-XL) 100 MG 24 hr tablet, TAKE 1 TABLET DAILY, Disp: 90 tablet, Rfl: 3  •  multivitamin (DAILY MICHAEL) tablet tablet, Take 1 tablet by mouth Every Evening., Disp: , Rfl:   •  omeprazole (priLOSEC) 40 MG capsule, Take 40 mg by mouth Daily., Disp: , Rfl:   •  OnabotulinumtoxinA (BOTOX IJ), Inject  as directed. Q 3 M FOTR MIGRAINES, Disp: , Rfl:   •  primidone (MYSOLINE) 50 MG tablet, Take 50 mg by mouth Every Night., Disp: , Rfl:   •  raNITIdine (ZANTAC) 150 MG capsule, Take 150 mg by mouth 2 (Two) Times a Day., Disp: , Rfl:   •  rimegepant 75 MG dispersible tablet, DISSOLVE ONE TABLET UNDER THE TONGUE AT ONSET OF MIGRAINE HEADACHE. MAX DOSE ONE TABLET IN 24 HOURS, Disp: , Rfl:   •  vitamin C (ASCORBIC ACID) 500 MG tablet, Take 1,000 mg by mouth Every Evening., Disp: , Rfl:   •  vitamin E 1000 UNIT capsule, Take 1,000 Units by mouth Every Evening., Disp: , Rfl:   •  Xarelto 20 MG tablet, TAKE 1 TABLET DAILY, Disp: 90 tablet, Rfl: 3      The following portions of the patient's history were reviewed and updated as appropriate: allergies, current medications, past family history, past medical history, past social history, past  "surgical history and problem list.    Social History     Tobacco Use   • Smoking status: Never Smoker   • Smokeless tobacco: Never Used   Substance Use Topics   • Alcohol use: No   • Drug use: No       Review of Systems   Constitution: Negative for chills and fever.   HENT: Negative for nosebleeds and sore throat.    Cardiovascular: Positive for palpitations.   Respiratory: Negative for cough, hemoptysis and wheezing.    Gastrointestinal: Negative for abdominal pain, hematemesis, hematochezia, melena, nausea and vomiting.   Genitourinary: Negative for dysuria and hematuria.   Neurological: Negative for headaches.       Objective   Vitals:    02/01/21 1021   BP: 125/76   Pulse: 84   Temp: 98.7 °F (37.1 °C)   Weight: 76.9 kg (169 lb 9.6 oz)   Height: 165.1 cm (65\")     Body mass index is 28.22 kg/m².        Vitals signs reviewed.   Constitutional:       Appearance: Well-developed.   Eyes:      Conjunctiva/sclera: Conjunctivae normal.   HENT:      Head: Normocephalic.   Neck:      Musculoskeletal: Normal range of motion and neck supple.      Thyroid: No thyromegaly.      Vascular: No JVD.      Trachea: No tracheal deviation.   Pulmonary:      Effort: No respiratory distress.      Breath sounds: Normal breath sounds. No wheezing. No rales.   Cardiovascular:      PMI at left midclavicular line. Normal rate. Regular rhythm. Normal S1. Normal S2.      Murmurs: There is no murmur.      No gallop. No click. No rub.   Pulses:     Intact distal pulses.   Edema:     Peripheral edema absent.   Abdominal:      General: Bowel sounds are normal.      Palpations: Abdomen is soft. There is no abdominal mass.      Tenderness: There is no abdominal tenderness.   Skin:     General: Skin is warm and dry.   Neurological:      Mental Status: Alert and oriented to person, place, and time.      Cranial Nerves: No cranial nerve deficit.         Lab Results   Component Value Date     (L) 01/12/2021    K 4.0 01/12/2021    CL 97 (L) " 01/12/2021    CO2 26.8 01/12/2021    BUN 8 01/12/2021    CREATININE 0.71 01/12/2021    GLUCOSE 267 (H) 01/12/2021    CALCIUM 8.7 01/12/2021    AST 22 01/12/2021    ALT 21 01/12/2021    ALKPHOS 124 (H) 01/12/2021    LABIL2 1.4 (L) 08/11/2015     Lab Results   Component Value Date    CKTOTAL 40 04/12/2017     Lab Results   Component Value Date    WBC 7.31 01/12/2021    HGB 12.0 01/12/2021    HCT 35.3 01/12/2021     01/12/2021     Lab Results   Component Value Date    INR 0.97 11/22/2018    INR 0.93 07/20/2015     Lab Results   Component Value Date    MG 2.0 09/21/2020     Lab Results   Component Value Date    TSH 0.015 (L) 10/20/2020    CHLPL 132 07/22/2015    TRIG 85 07/22/2015    HDL 40 (L) 07/22/2015    LDL 74 07/22/2015      Lab Results   Component Value Date    BNP 51.0 11/22/2018       Assessment/Plan    Diagnosis Plan   1. Paroxysmal atrial fibrillation used to be maintaining sinus rhythm with flecainide.     2. Essential hypertension, controlled.     3. Chronic anticoagulation with Xarelto with no bleeding issues.          Recommendations:  Continue with flecainide, metoprolol succinate and Xarelto at current doses.    Return in about 6 months (around 8/1/2021).    As always, Kreis, Samuel Duane, MD  I appreciate very much the opportunity to participate in the cardiovascular care of your patients. Please do not hesitate to call me with any questions with regards to Lashae Benitez evaluation and management.         With Best Regards,        Eric Burns MD, [facility]    Please note that portions of this note were completed with a voice recognition program.

## 2021-02-25 ENCOUNTER — TRANSCRIBE ORDERS (OUTPATIENT)
Dept: ADMINISTRATIVE | Facility: HOSPITAL | Age: 61
End: 2021-02-25

## 2021-02-25 DIAGNOSIS — Z01.818 PRE-OPERATIVE CLEARANCE: Primary | ICD-10-CM

## 2021-02-26 ENCOUNTER — LAB (OUTPATIENT)
Dept: LAB | Facility: HOSPITAL | Age: 61
End: 2021-02-26

## 2021-02-26 DIAGNOSIS — Z01.818 PRE-OPERATIVE CLEARANCE: ICD-10-CM

## 2021-02-26 PROCEDURE — U0004 COV-19 TEST NON-CDC HGH THRU: HCPCS

## 2021-02-26 PROCEDURE — U0005 INFEC AGEN DETEC AMPLI PROBE: HCPCS

## 2021-02-26 PROCEDURE — C9803 HOPD COVID-19 SPEC COLLECT: HCPCS

## 2021-02-27 LAB — SARS-COV-2 RNA RESP QL NAA+PROBE: NOT DETECTED

## 2021-03-18 ENCOUNTER — BULK ORDERING (OUTPATIENT)
Dept: CASE MANAGEMENT | Facility: OTHER | Age: 61
End: 2021-03-18

## 2021-03-18 DIAGNOSIS — Z23 IMMUNIZATION DUE: ICD-10-CM

## 2021-04-29 ENCOUNTER — LAB (OUTPATIENT)
Dept: LAB | Facility: HOSPITAL | Age: 61
End: 2021-04-29

## 2021-04-29 ENCOUNTER — TRANSCRIBE ORDERS (OUTPATIENT)
Dept: ADMINISTRATIVE | Facility: HOSPITAL | Age: 61
End: 2021-04-29

## 2021-04-29 DIAGNOSIS — E87.1 HYPONATREMIA: Primary | ICD-10-CM

## 2021-04-29 DIAGNOSIS — E87.1 HYPONATREMIA: ICD-10-CM

## 2021-04-29 PROCEDURE — 81003 URINALYSIS AUTO W/O SCOPE: CPT

## 2021-04-29 PROCEDURE — 80053 COMPREHEN METABOLIC PANEL: CPT

## 2021-04-29 PROCEDURE — 36415 COLL VENOUS BLD VENIPUNCTURE: CPT

## 2021-04-30 LAB
ALBUMIN SERPL-MCNC: 4 G/DL (ref 3.5–5.2)
ALBUMIN/GLOB SERPL: 1.8 G/DL
ALP SERPL-CCNC: 112 U/L (ref 39–117)
ALT SERPL W P-5'-P-CCNC: 23 U/L (ref 1–33)
ANION GAP SERPL CALCULATED.3IONS-SCNC: 8 MMOL/L (ref 5–15)
AST SERPL-CCNC: 16 U/L (ref 1–32)
BILIRUB SERPL-MCNC: 0.2 MG/DL (ref 0–1.2)
BILIRUB UR QL STRIP: NEGATIVE
BUN SERPL-MCNC: 9 MG/DL (ref 6–20)
BUN/CREAT SERPL: 11.1 (ref 7–25)
CALCIUM SPEC-SCNC: 8.7 MG/DL (ref 8.6–10.5)
CHLORIDE SERPL-SCNC: 102 MMOL/L (ref 98–107)
CLARITY UR: CLEAR
CO2 SERPL-SCNC: 26 MMOL/L (ref 22–29)
COLOR UR: YELLOW
CREAT SERPL-MCNC: 0.81 MG/DL (ref 0.57–1)
GFR SERPL CREATININE-BSD FRML MDRD: 72 ML/MIN/1.73
GLOBULIN UR ELPH-MCNC: 2.2 GM/DL
GLUCOSE SERPL-MCNC: 103 MG/DL (ref 65–99)
GLUCOSE UR STRIP-MCNC: NEGATIVE MG/DL
HGB UR QL STRIP.AUTO: NEGATIVE
KETONES UR QL STRIP: NEGATIVE
LEUKOCYTE ESTERASE UR QL STRIP.AUTO: NEGATIVE
NITRITE UR QL STRIP: NEGATIVE
PH UR STRIP.AUTO: 6.5 [PH] (ref 5–8)
POTASSIUM SERPL-SCNC: 4.1 MMOL/L (ref 3.5–5.2)
PROT SERPL-MCNC: 6.2 G/DL (ref 6–8.5)
PROT UR QL STRIP: NEGATIVE
SODIUM SERPL-SCNC: 136 MMOL/L (ref 136–145)
SP GR UR STRIP: <=1.005 (ref 1–1.03)
UROBILINOGEN UR QL STRIP: NORMAL

## 2021-06-01 ENCOUNTER — LAB (OUTPATIENT)
Dept: LAB | Facility: HOSPITAL | Age: 61
End: 2021-06-01

## 2021-06-01 ENCOUNTER — TRANSCRIBE ORDERS (OUTPATIENT)
Dept: ADMINISTRATIVE | Facility: HOSPITAL | Age: 61
End: 2021-06-01

## 2021-06-01 DIAGNOSIS — C73 MALIGNANT NEOPLASM OF THYROID GLAND (HCC): Primary | ICD-10-CM

## 2021-06-01 DIAGNOSIS — C73 MALIGNANT NEOPLASM OF THYROID GLAND (HCC): ICD-10-CM

## 2021-06-01 LAB — TSH SERPL DL<=0.05 MIU/L-ACNC: 0.1 UIU/ML (ref 0.27–4.2)

## 2021-06-01 PROCEDURE — 36415 COLL VENOUS BLD VENIPUNCTURE: CPT

## 2021-06-01 PROCEDURE — 84443 ASSAY THYROID STIM HORMONE: CPT

## 2021-06-01 PROCEDURE — 84432 ASSAY OF THYROGLOBULIN: CPT

## 2021-06-01 PROCEDURE — 86800 THYROGLOBULIN ANTIBODY: CPT

## 2021-06-02 LAB
THYROGLOB AB SERPL-ACNC: <1 IU/ML (ref 0–0.9)
THYROGLOB SERPL-MCNC: 0.4 NG/ML (ref 1.5–38.5)

## 2021-06-12 ENCOUNTER — APPOINTMENT (OUTPATIENT)
Dept: GENERAL RADIOLOGY | Facility: HOSPITAL | Age: 61
End: 2021-06-12

## 2021-06-12 ENCOUNTER — HOSPITAL ENCOUNTER (EMERGENCY)
Facility: HOSPITAL | Age: 61
Discharge: HOME | End: 2021-06-12
Attending: FAMILY MEDICINE | Admitting: EMERGENCY MEDICINE

## 2021-06-12 VITALS
DIASTOLIC BLOOD PRESSURE: 88 MMHG | RESPIRATION RATE: 16 BRPM | HEIGHT: 65 IN | WEIGHT: 165 LBS | OXYGEN SATURATION: 100 % | TEMPERATURE: 98.7 F | HEART RATE: 70 BPM | SYSTOLIC BLOOD PRESSURE: 145 MMHG | BODY MASS INDEX: 27.49 KG/M2

## 2021-06-12 DIAGNOSIS — U07.1 COVID-19: Primary | ICD-10-CM

## 2021-06-12 LAB
A-A DO2: 20.7 MMHG (ref 0–300)
ALBUMIN SERPL-MCNC: 3.97 G/DL (ref 3.5–5.2)
ALBUMIN/GLOB SERPL: 1.5 G/DL
ALP SERPL-CCNC: 117 U/L (ref 39–117)
ALT SERPL W P-5'-P-CCNC: 30 U/L (ref 1–33)
ANION GAP SERPL CALCULATED.3IONS-SCNC: 7.7 MMOL/L (ref 5–15)
ARTERIAL PATENCY WRIST A: ABNORMAL
AST SERPL-CCNC: 22 U/L (ref 1–32)
ATMOSPHERIC PRESS: 724 MMHG
BASE EXCESS BLDA CALC-SCNC: 2.9 MMOL/L (ref 0–2)
BASOPHILS # BLD AUTO: 0.04 10*3/MM3 (ref 0–0.2)
BASOPHILS NFR BLD AUTO: 0.6 % (ref 0–1.5)
BDY SITE: ABNORMAL
BILIRUB SERPL-MCNC: 0.3 MG/DL (ref 0–1.2)
BODY TEMPERATURE: 0 C
BUN SERPL-MCNC: 10 MG/DL (ref 6–20)
BUN/CREAT SERPL: 11.4 (ref 7–25)
CALCIUM SPEC-SCNC: 9.2 MG/DL (ref 8.6–10.5)
CHLORIDE SERPL-SCNC: 98 MMOL/L (ref 98–107)
CO2 BLDA-SCNC: 28.7 MMOL/L (ref 22–33)
CO2 SERPL-SCNC: 28.3 MMOL/L (ref 22–29)
COHGB MFR BLD: <0.2 % (ref 0–5)
CREAT SERPL-MCNC: 0.88 MG/DL (ref 0.57–1)
CRP SERPL-MCNC: <0.3 MG/DL (ref 0–0.5)
D-LACTATE SERPL-SCNC: 0.6 MMOL/L (ref 0.5–2)
DEPRECATED RDW RBC AUTO: 52 FL (ref 37–54)
EOSINOPHIL # BLD AUTO: 0.18 10*3/MM3 (ref 0–0.4)
EOSINOPHIL NFR BLD AUTO: 2.6 % (ref 0.3–6.2)
ERYTHROCYTE [DISTWIDTH] IN BLOOD BY AUTOMATED COUNT: 14.6 % (ref 12.3–15.4)
FLUAV RNA RESP QL NAA+PROBE: NOT DETECTED
FLUBV RNA RESP QL NAA+PROBE: NOT DETECTED
GFR SERPL CREATININE-BSD FRML MDRD: 66 ML/MIN/1.73
GLOBULIN UR ELPH-MCNC: 2.6 GM/DL
GLUCOSE SERPL-MCNC: 154 MG/DL (ref 65–99)
HCO3 BLDA-SCNC: 27.5 MMOL/L (ref 20–26)
HCT VFR BLD AUTO: 35.9 % (ref 34–46.6)
HCT VFR BLD CALC: 37.4 % (ref 38–51)
HGB BLD-MCNC: 11.9 G/DL (ref 12–15.9)
HGB BLDA-MCNC: 12.2 G/DL (ref 13.5–17.5)
IMM GRANULOCYTES # BLD AUTO: 0.02 10*3/MM3 (ref 0–0.05)
IMM GRANULOCYTES NFR BLD AUTO: 0.3 % (ref 0–0.5)
INHALED O2 CONCENTRATION: 21 %
LDH SERPL-CCNC: 233 U/L (ref 135–214)
LYMPHOCYTES # BLD AUTO: 1.1 10*3/MM3 (ref 0.7–3.1)
LYMPHOCYTES NFR BLD AUTO: 15.7 % (ref 19.6–45.3)
Lab: ABNORMAL
MCH RBC QN AUTO: 32.3 PG (ref 26.6–33)
MCHC RBC AUTO-ENTMCNC: 33.1 G/DL (ref 31.5–35.7)
MCV RBC AUTO: 97.6 FL (ref 79–97)
METHGB BLD QL: 0.4 % (ref 0–3)
MODALITY: ABNORMAL
MONOCYTES # BLD AUTO: 0.38 10*3/MM3 (ref 0.1–0.9)
MONOCYTES NFR BLD AUTO: 5.4 % (ref 5–12)
NEUTROPHILS NFR BLD AUTO: 5.3 10*3/MM3 (ref 1.7–7)
NEUTROPHILS NFR BLD AUTO: 75.4 % (ref 42.7–76)
NOTE: ABNORMAL
NRBC BLD AUTO-RTO: 0 /100 WBC (ref 0–0.2)
OXYHGB MFR BLDV: 94.1 % (ref 94–99)
PCO2 BLDA: 41.2 MM HG (ref 35–45)
PCO2 TEMP ADJ BLD: ABNORMAL MM[HG]
PH BLDA: 7.43 PH UNITS (ref 7.35–7.45)
PH, TEMP CORRECTED: ABNORMAL
PLATELET # BLD AUTO: 344 10*3/MM3 (ref 140–450)
PMV BLD AUTO: 8.7 FL (ref 6–12)
PO2 BLDA: 74.8 MM HG (ref 83–108)
PO2 TEMP ADJ BLD: ABNORMAL MM[HG]
POTASSIUM SERPL-SCNC: 4 MMOL/L (ref 3.5–5.2)
PROT SERPL-MCNC: 6.6 G/DL (ref 6–8.5)
RBC # BLD AUTO: 3.68 10*6/MM3 (ref 3.77–5.28)
SAO2 % BLDCOA: 94.4 % (ref 94–99)
SARS-COV-2 RNA RESP QL NAA+PROBE: DETECTED
SODIUM SERPL-SCNC: 134 MMOL/L (ref 136–145)
VENTILATOR MODE: ABNORMAL
WBC # BLD AUTO: 7.02 10*3/MM3 (ref 3.4–10.8)

## 2021-06-12 PROCEDURE — 85025 COMPLETE CBC W/AUTO DIFF WBC: CPT | Performed by: PHYSICIAN ASSISTANT

## 2021-06-12 PROCEDURE — 82805 BLOOD GASES W/O2 SATURATION: CPT

## 2021-06-12 PROCEDURE — 83605 ASSAY OF LACTIC ACID: CPT | Performed by: PHYSICIAN ASSISTANT

## 2021-06-12 PROCEDURE — 86140 C-REACTIVE PROTEIN: CPT | Performed by: PHYSICIAN ASSISTANT

## 2021-06-12 PROCEDURE — 83050 HGB METHEMOGLOBIN QUAN: CPT

## 2021-06-12 PROCEDURE — 83615 LACTATE (LD) (LDH) ENZYME: CPT | Performed by: PHYSICIAN ASSISTANT

## 2021-06-12 PROCEDURE — 84145 PROCALCITONIN (PCT): CPT | Performed by: PHYSICIAN ASSISTANT

## 2021-06-12 PROCEDURE — 36600 WITHDRAWAL OF ARTERIAL BLOOD: CPT

## 2021-06-12 PROCEDURE — 87636 SARSCOV2 & INF A&B AMP PRB: CPT | Performed by: PHYSICIAN ASSISTANT

## 2021-06-12 PROCEDURE — M0245 HC INTRAVENOUS INFUSION, BAMLANIVIMAB AND ETESEVIMAB, 2100 MG: HCPCS | Performed by: PHYSICIAN ASSISTANT

## 2021-06-12 PROCEDURE — 25010000006 INJECTION, BAMLANIVIMAB AND ETESEVIMAB, 2100 MG: Performed by: PHYSICIAN ASSISTANT

## 2021-06-12 PROCEDURE — 99283 EMERGENCY DEPT VISIT LOW MDM: CPT

## 2021-06-12 PROCEDURE — 80053 COMPREHEN METABOLIC PANEL: CPT | Performed by: PHYSICIAN ASSISTANT

## 2021-06-12 PROCEDURE — 82375 ASSAY CARBOXYHB QUANT: CPT

## 2021-06-12 PROCEDURE — 71045 X-RAY EXAM CHEST 1 VIEW: CPT

## 2021-06-12 RX ORDER — SODIUM CHLORIDE 9 MG/ML
30 INJECTION, SOLUTION INTRAVENOUS ONCE
Status: COMPLETED | OUTPATIENT
Start: 2021-06-12 | End: 2021-06-12

## 2021-06-12 RX ORDER — METHYLPREDNISOLONE SODIUM SUCCINATE 125 MG/2ML
125 INJECTION, POWDER, LYOPHILIZED, FOR SOLUTION INTRAMUSCULAR; INTRAVENOUS ONCE AS NEEDED
Status: DISCONTINUED | OUTPATIENT
Start: 2021-06-12 | End: 2021-06-13 | Stop reason: HOSPADM

## 2021-06-12 RX ORDER — DIPHENHYDRAMINE HYDROCHLORIDE 50 MG/ML
50 INJECTION INTRAMUSCULAR; INTRAVENOUS ONCE AS NEEDED
Status: DISCONTINUED | OUTPATIENT
Start: 2021-06-12 | End: 2021-06-13 | Stop reason: HOSPADM

## 2021-06-12 RX ORDER — EPINEPHRINE 1 MG/ML
0.3 INJECTION, SOLUTION INTRAMUSCULAR; SUBCUTANEOUS ONCE AS NEEDED
Status: DISCONTINUED | OUTPATIENT
Start: 2021-06-12 | End: 2021-06-13 | Stop reason: HOSPADM

## 2021-06-12 RX ADMIN — SODIUM CHLORIDE: 9 INJECTION, SOLUTION INTRAVENOUS at 20:59

## 2021-06-12 RX ADMIN — SODIUM CHLORIDE 30 ML: 9 INJECTION, SOLUTION INTRAVENOUS at 21:25

## 2021-06-12 NOTE — ED NOTES
MD has discussed The FDA has authorized the emergency use of bamlanivimab, which is not an FDA approved drug. Discussions with the pt regarding the risks and benefits of bamlanivimab have occurred. The pt recognizes that this is an investigational treatment which may offer significant known and potential benefits and risks, the extent of which are unknown. Information on available alternative treatments and the risks and benefits of those alternatives was discussed. The pt received the “Fact Sheet for Patients Parents and Caregivers”. All questions from the pt were answered to satisfaction. The pt has the option to accept or refuse treatment with bamlanivimab and would like to accept treatment.  Education handouts have been given to patient.    Counseling regarding continued self isolation and use of infection control measures according to the CDC guidelines has occurred.       Hong Rahman, RN  06/12/21 1934

## 2021-06-12 NOTE — ED PROVIDER NOTES
Subjective     History provided by:  Patient   used: No    URI  Presenting symptoms: congestion and cough    Presenting symptoms: no ear pain, no fever and no sore throat    Severity:  Mild  Onset quality:  Sudden  Duration:  2 days  Timing:  Constant  Progression:  Worsening  Chronicity:  New  Relieved by:  None tried  Worsened by:  Nothing  Ineffective treatments:  None tried  Associated symptoms: no headaches and no wheezing    Risk factors: diabetes mellitus, immunosuppression and sick contacts        Review of Systems   Constitutional: Positive for activity change and appetite change. Negative for chills and fever.   HENT: Positive for congestion. Negative for ear pain and sore throat.    Respiratory: Positive for cough and shortness of breath. Negative for wheezing.    Cardiovascular: Negative for chest pain.   Gastrointestinal: Negative for diarrhea, nausea and vomiting.   Genitourinary: Negative for dysuria and flank pain.   Skin: Negative for rash.   Neurological: Negative for headaches.   Psychiatric/Behavioral: The patient is not nervous/anxious.    All other systems reviewed and are negative.      Past Medical History:   Diagnosis Date   • Acid reflux    • Allergic    • Anxiety    • Cancer (CMS/HCC)     thyroid, skin   • Chronic pain disorder    • Degenerative disc disease, lumbar    • Depression    • Dupuytren's disease    • Essential hypertension    • Family history of coronary artery disease    • Fibromyalgia    • Gallbladder abscess    • H. pylori infection    • Hiatal hernia    • Hyperlipidemia    • Hypothyroidism    • Migraines     migraines   • Multiple sclerosis (CMS/HCC)    • Osteoarthritis    • Psoriasis    • Psoriatic arthritis (CMS/HCC)    • RA (rheumatoid arthritis) (CMS/HCC)    • Rheumatoid arthritis (CMS/HCC)    • Sinusitis    • Sjogren's syndrome (CMS/HCC)    • Stomach ulcer    • TMJ arthritis    • Type 2 diabetes mellitus (CMS/HCC)    • Urinary tract infection   "      Allergies   Allergen Reactions   • Codeine Angioedema   • Imuran [Azathioprine] Other (See Comments)     Has pancreatis attacks with med   • Januvia [Sitagliptin] Other (See Comments)     Has pancreatis attacks with med    • Penicillins Angioedema   • Sulfa Antibiotics Angioedema   • Sulfasalazine Unknown (See Comments)   • Beta Adrenergic Blockers Other (See Comments)     I called pt to ask her about the allergy to bblockers in her chart. Pt states \"too big of a dose makes her psoriasis act up\", but this current dose of metoprolol 50 mg bid, she has been on for a long time, with no ill side effects.  CATA,JONATHAN 3/28/18       Past Surgical History:   Procedure Laterality Date   • BREAST LUMPECTOMY Left 11/1993   • CARDIAC CATHETERIZATION Left 09/21/2009    Normal    • CARPAL TUNNEL RELEASE  03/2012   • CERVICAL POLYPECTOMY  12/2015   • CHOLECYSTECTOMY  05/2007   • COLONOSCOPY     • ENDOSCOPY     • GASTRIC BYPASS  09/2007   • JOINT REPLACEMENT     • KNEE ARTHROPLASTY     • LUMBAR DISC SURGERY      C5-6   • REPLACEMENT TOTAL KNEE Right 06/2016   • SACRAL NERVE STIMULATOR PLACEMENT  09/2014   • THYROIDECTOMY  03/2016       Family History   Problem Relation Age of Onset   • Diabetes Mother    • Stroke Mother    • Hypertension Mother    • Heart attack Father         First one was in his 20's second 30's.    • Heart failure Father    • Heart disease Father    • Stroke Father    • Diabetes Sister    • Cancer Maternal Grandmother        Social History     Socioeconomic History   • Marital status:      Spouse name: Not on file   • Number of children: Not on file   • Years of education: Not on file   • Highest education level: Not on file   Tobacco Use   • Smoking status: Never Smoker   • Smokeless tobacco: Never Used   Vaping Use   • Vaping Use: Never used   Substance and Sexual Activity   • Alcohol use: No   • Drug use: No   • Sexual activity: Defer           Objective   Physical Exam  Vitals and nursing note " reviewed.   Constitutional:       Appearance: She is well-developed.   HENT:      Head: Normocephalic.   Cardiovascular:      Rate and Rhythm: Normal rate and regular rhythm.   Pulmonary:      Effort: Pulmonary effort is normal.      Breath sounds: Normal breath sounds.   Abdominal:      General: Bowel sounds are normal.      Palpations: Abdomen is soft.      Tenderness: There is no abdominal tenderness.   Musculoskeletal:         General: Normal range of motion.      Cervical back: Neck supple.   Skin:     General: Skin is warm and dry.   Neurological:      Mental Status: She is alert and oriented to person, place, and time.   Psychiatric:         Behavior: Behavior normal.         Thought Content: Thought content normal.         Judgment: Judgment normal.         Procedures           ED Course  ED Course as of Jun 12 2210   Sat Jun 12, 2021 2001 Report given to CHELSEY Walker    [LC]      ED Course User Index  [LC] Gin Walker PA                                           [facility]    Final diagnoses:   COVID-19       ED Disposition  ED Disposition     ED Disposition Condition Comment    Discharge Stable           Kreis, Samuel Duane, MD  [address] [place] [address] [address] [place] [address] [phone]    Schedule an appointment as soon as possible for a visit   If symptoms worsen         Medication List      No changes were made to your prescriptions during this visit.          Colin Walker MD  06/12/21 2210

## 2021-06-13 LAB — PROCALCITONIN SERPL-MCNC: 0.05 NG/ML (ref 0–0.25)

## 2021-06-21 ENCOUNTER — PATIENT OUTREACH (OUTPATIENT)
Dept: CASE MANAGEMENT | Facility: OTHER | Age: 61
End: 2021-06-21

## 2021-06-21 NOTE — OUTREACH NOTE
Ambulatory Case Management Note    ED Potential Covid Discharge Follow-up    Patient was seen in the ED 6/12/21 and tested positive for Covid-19. Patient received the BAM infusion in the ED and reports to be feeling much better. Patient reports some cough, fatigue and congestion but states it is much better than it had been. Patient reports feeling back near her baseline. Pt states her last day of quarantine is today; pt will follow up with her PCP. Patient denies any areas of her health that she would like CM assistance with; pt will call as needed if this changes.    Erica Camacho RN  Ambulatory Case Management    6/21/2021, 13:14 EDT            Erica Camacho RN  Ambulatory Case Management    6/21/2021, 13:14 EDT

## 2021-07-25 DIAGNOSIS — I10 ESSENTIAL HYPERTENSION: ICD-10-CM

## 2021-07-26 RX ORDER — AMLODIPINE BESYLATE 5 MG/1
TABLET ORAL
Qty: 45 TABLET | Refills: 0 | Status: SHIPPED | OUTPATIENT
Start: 2021-07-26 | End: 2021-08-20 | Stop reason: SDUPTHER

## 2021-08-02 ENCOUNTER — OFFICE VISIT (OUTPATIENT)
Dept: CARDIOLOGY | Facility: CLINIC | Age: 61
End: 2021-08-02

## 2021-08-02 VITALS
TEMPERATURE: 98 F | SYSTOLIC BLOOD PRESSURE: 109 MMHG | WEIGHT: 163.8 LBS | RESPIRATION RATE: 16 BRPM | BODY MASS INDEX: 27.29 KG/M2 | HEART RATE: 76 BPM | DIASTOLIC BLOOD PRESSURE: 69 MMHG | HEIGHT: 65 IN

## 2021-08-02 DIAGNOSIS — I10 ESSENTIAL HYPERTENSION: ICD-10-CM

## 2021-08-02 DIAGNOSIS — Z79.01 CHRONIC ANTICOAGULATION: ICD-10-CM

## 2021-08-02 DIAGNOSIS — I48.0 PAROXYSMAL ATRIAL FIBRILLATION (HCC): Primary | ICD-10-CM

## 2021-08-02 DIAGNOSIS — R06.09 DYSPNEA ON EXERTION: ICD-10-CM

## 2021-08-02 PROCEDURE — 99213 OFFICE O/P EST LOW 20 MIN: CPT | Performed by: INTERNAL MEDICINE

## 2021-08-02 PROCEDURE — 93000 ELECTROCARDIOGRAM COMPLETE: CPT | Performed by: INTERNAL MEDICINE

## 2021-08-02 RX ORDER — RABEPRAZOLE SODIUM 20 MG/1
20 TABLET, DELAYED RELEASE ORAL DAILY
COMMUNITY

## 2021-08-02 NOTE — PROGRESS NOTES
"Kreis, Samuel Duane, MD  Lashae Benitez  1960  08/02/2021    Patient Active Problem List   Diagnosis   • Hiatal hernia   • Essential hypertension   • Sjogren's syndrome (CMS/HCC)   • Multiple sclerosis (CMS/HCC)   • Psoriasis   • Fibromyalgia   • Osteoarthritis   • Psoriatic arthritis (CMS/HCC)   • RA (rheumatoid arthritis) (CMS/HCC)   • Degenerative disc disease, lumbar   • TMJ arthritis   • Dupuytren's disease   • H. pylori infection   • Family history of coronary artery disease   • Type 2 diabetes mellitus (CMS/HCC)   • Edema   • Bilateral leg edema   • Isolated corticotropin deficiency (CMS/HCC)   • Hyponatremia   • Paroxysmal atrial fibrillation (CMS/HCC)       Dear Kreis, Samuel Duane, MD:    Subjective     Lashae Benitez is a 60 y.o. female with the problems as listed above, presents    Chief Complaint   Patient presents with   • Atrial Fibrillation     6 mos follow   • Shortness of Breath     intermitt   • Med Management     list provided       History of Present Illness: Ms. Benitez is a pleasant 60-year-old  female with history of paroxysmal atrial fibrillation, has been on flecainide and Xarelto, is here for regular cardiology follow-up.  On today's visit she states she has occasional palpitations which are brief but overall has been doing well.  She denies any bleeding problems with Xarelto.  She has some dyspnea with mild to moderate exertion with no PND, orthopnea.  She has some mild intermittent bilateral leg edema.    Allergies   Allergen Reactions   • Codeine Angioedema   • Imuran [Azathioprine] Other (See Comments)     Has pancreatis attacks with med   • Januvia [Sitagliptin] Other (See Comments)     Has pancreatis attacks with med    • Penicillins Angioedema   • Sulfa Antibiotics Angioedema   • Sulfasalazine Unknown (See Comments)   • Beta Adrenergic Blockers Other (See Comments)     I called pt to ask her about the allergy to bblockers in her chart. Pt states \"too big of a dose makes her " "psoriasis act up\", but this current dose of metoprolol 50 mg bid, she has been on for a long time, with no ill side effects.  JONATHAN IVY 3/28/18   :      Current Outpatient Medications:   •  abatacept (ORENCIA) 250 MG injection, Infuse 750 mg into a venous catheter Every 28 (Twenty-Eight) Days., Disp: , Rfl:   •  alendronate (FOSAMAX) 70 MG tablet, Take 70 mg by mouth 1 (One) Time Per Week. Wednesday, Disp: , Rfl:   •  amitriptyline (ELAVIL) 25 MG tablet, Take 25 mg by mouth Every Night., Disp: , Rfl:   •  amLODIPine (NORVASC) 5 MG tablet, TAKE 1 & 1/2 (ONE & ONE-HALF) TABLETS BY MOUTH ONCE DAILY, Disp: 45 tablet, Rfl: 0  •  Apremilast (Otezla) 30 MG tablet, Take  by mouth 2 (two) times a day., Disp: , Rfl:   •  butalbital-acetaminophen-caffeine (Esgic) -40 MG per tablet, Take 1 tablet by mouth Every 4 (Four) Hours As Needed for Headache., Disp: , Rfl:   •  Calcipotriene-Betameth Diprop (Enstilar) 0.005-0.064 % foam, Apply 1 spray topically Daily., Disp: , Rfl:   •  calcium citrate-vitamin d (CALCITRATE) 315-250 MG-UNIT tablet tablet, Take 1 tablet by mouth Every Evening., Disp: , Rfl:   •  Cholecalciferol (VITAMIN D3) 125 MCG (5000 UT) capsule capsule, Take 5,000 Units by mouth Daily., Disp: , Rfl:   •  cyanocobalamin 1000 MCG/ML injection, Inject 1,000 mcg into the shoulder, thigh, or buttocks Every 28 (Twenty-Eight) Days., Disp: , Rfl:   •  cyclobenzaprine (FLEXERIL) 10 MG tablet, Take 10 mg by mouth Every Evening., Disp: , Rfl:   •  diazePAM (VALIUM) 2 MG tablet, Take 2 mg by mouth Daily As Needed for Anxiety., Disp: , Rfl:   •  DULoxetine (CYMBALTA) 30 MG capsule, Take 90 mg by mouth Every Morning., Disp: , Rfl:   •  EMGALITY 120 MG/ML prefilled syringe, Inject 120 mg under the skin into the appropriate area as directed Every 30 (Thirty) Days., Disp: , Rfl: 3  •  ferrous gluconate (FERGON) 240 (27 FE) MG tablet, Take 240 mg by mouth Every Morning., Disp: , Rfl:   •  flecainide (TAMBOCOR) 50 MG tablet, Take 1 " tablet by mouth 2 (Two) Times a Day., Disp: 180 tablet, Rfl: 3  •  folic acid (FOLVITE) 1 MG tablet, Take 1 mg by mouth daily., Disp: , Rfl:   •  gabapentin (NEURONTIN) 600 MG tablet, Take 600 mg by mouth 3 (Three) Times a Day., Disp: , Rfl:   •  insulin detemir (LEVEMIR) 100 UNIT/ML injection, Inject 25 Units under the skin into the appropriate area as directed Daily., Disp: , Rfl:   •  levothyroxine sodium (TIROSINT) 137 MCG capsule, Take 125 mcg by mouth Daily. Takes an extra dose at the first of each month., Disp: , Rfl:   •  linaclotide (LINZESS) 290 MCG capsule capsule, Take 290 mcg by mouth every morning before breakfast., Disp: , Rfl:   •  lisinopril (PRINIVIL,ZESTRIL) 20 MG tablet, Take 20 mg by mouth Daily., Disp: , Rfl:   •  loratadine (CLARITIN) 10 MG tablet, Take 10 mg by mouth Every Night., Disp: , Rfl:   •  methotrexate 2.5 MG tablet, Take 25 mg by mouth 1 (One) Time Per Week. Prior to [facility] Admission, Patient was on: Takes 10 tablets on Saturday, Disp: , Rfl:   •  metoprolol succinate XL (TOPROL-XL) 100 MG 24 hr tablet, TAKE 1 TABLET DAILY, Disp: 90 tablet, Rfl: 3  •  multivitamin (DAILY MICHAEL) tablet tablet, Take 1 tablet by mouth Every Evening., Disp: , Rfl:   •  omeprazole (priLOSEC) 40 MG capsule, Take 40 mg by mouth Daily., Disp: , Rfl:   •  OnabotulinumtoxinA (BOTOX IJ), Inject  as directed. Q 3 M FOTR MIGRAINES, Disp: , Rfl:   •  primidone (MYSOLINE) 50 MG tablet, Take 50 mg by mouth Every Night., Disp: , Rfl:   •  RABEprazole (ACIPHEX) 20 MG EC tablet, Take 20 mg by mouth Daily., Disp: , Rfl:   •  rimegepant 75 MG dispersible tablet, DISSOLVE ONE TABLET UNDER THE TONGUE AT ONSET OF MIGRAINE HEADACHE. MAX DOSE ONE TABLET IN 24 HOURS, Disp: , Rfl:   •  vitamin C (ASCORBIC ACID) 500 MG tablet, Take 1,000 mg by mouth Every Evening., Disp: , Rfl:   •  vitamin E 1000 UNIT capsule, Take 1,000 Units by mouth Every Evening., Disp: , Rfl:   •  Xarelto 20 MG tablet, TAKE 1 TABLET DAILY, Disp: 90  "tablet, Rfl: 3  •  raNITIdine (ZANTAC) 150 MG capsule, Take 150 mg by mouth 2 (Two) Times a Day., Disp: , Rfl:       The following portions of the patient's history were reviewed and updated as appropriate: allergies, current medications, past family history, past medical history, past social history, past surgical history and problem list.    Social History     Tobacco Use   • Smoking status: Never Smoker   • Smokeless tobacco: Never Used   Vaping Use   • Vaping Use: Never used   Substance Use Topics   • Alcohol use: No   • Drug use: No     Review of Systems   Constitutional: Negative for chills and fever.   HENT: Negative for nosebleeds and sore throat.    Cardiovascular: Negative for chest pain and palpitations.   Respiratory: Positive for shortness of breath. Negative for cough, hemoptysis and wheezing.    Gastrointestinal: Negative for abdominal pain, hematemesis, hematochezia, melena, nausea and vomiting.   Genitourinary: Negative for dysuria and hematuria.   Neurological: Negative for headaches.     Objective   Vitals:    08/02/21 1059   BP: 109/69   Pulse: 76   Resp: 16   Temp: 98 °F (36.7 °C)   Weight: 74.3 kg (163 lb 12.8 oz)   Height: 165.1 cm (65\")     Body mass index is 27.26 kg/m².    Vitals reviewed.   Constitutional:       Appearance: Well-developed.   Eyes:      Conjunctiva/sclera: Conjunctivae normal.   HENT:      Head: Normocephalic.   Neck:      Thyroid: No thyromegaly.      Vascular: No JVD.      Trachea: No tracheal deviation.   Pulmonary:      Effort: No respiratory distress.      Breath sounds: Normal breath sounds. No wheezing. No rales.   Cardiovascular:      PMI at left midclavicular line. Normal rate. Regular rhythm. Normal S1. Normal S2.      Murmurs: There is no murmur.      No gallop. No click. No rub.   Pulses:     Intact distal pulses.   Edema:     Peripheral edema absent.   Abdominal:      General: Bowel sounds are normal.      Palpations: Abdomen is soft. There is no abdominal " mass.      Tenderness: There is no abdominal tenderness.   Musculoskeletal:      Cervical back: Normal range of motion and neck supple. Skin:     General: Skin is warm and dry.   Neurological:      Mental Status: Alert and oriented to person, place, and time.      Cranial Nerves: No cranial nerve deficit.       Lab Results   Component Value Date     (L) 06/12/2021    K 4.0 06/12/2021    CL 98 06/12/2021    CO2 28.3 06/12/2021    BUN 10 06/12/2021    CREATININE 0.88 06/12/2021    GLUCOSE 154 (H) 06/12/2021    CALCIUM 9.2 06/12/2021    AST 22 06/12/2021    ALT 30 06/12/2021    ALKPHOS 117 06/12/2021    LABIL2 1.4 (L) 08/11/2015     Lab Results   Component Value Date    CKTOTAL 40 04/12/2017     Lab Results   Component Value Date    WBC 7.02 06/12/2021    HGB 11.9 (L) 06/12/2021    HCT 35.9 06/12/2021     06/12/2021     Lab Results   Component Value Date    INR 0.97 11/22/2018    INR 0.93 07/20/2015     Lab Results   Component Value Date    MG 2.0 09/21/2020     Lab Results   Component Value Date    TSH 0.101 (L) 06/01/2021    CHLPL 132 07/22/2015    TRIG 85 07/22/2015    HDL 40 (L) 07/22/2015    LDL 74 07/22/2015      Lab Results   Component Value Date    BNP 51.0 11/22/2018       ECG 12 Lead    Date/Time: 8/2/2021 10:59 AM  Performed by: Eric Burns MD  Authorized by: Eric Burns MD   Comparison: compared with previous ECG from 9/24/2020  Similar to previous ECG  Rhythm: sinus rhythm  BPM: 73  Conduction: left anterior fascicular block and 1st degree AV block  Comments: QTc: 398 ms.              Assessment/Plan :   Diagnosis Plan   1. Paroxysmal atrial fibrillation used to be maintaining sinus rhythm with flecainide.     2. Dyspnea on exertion  Adult Transthoracic Echo Complete    3. Chronic anticoagulation with Xarelto with no bleeding issues.     4. Essential hypertension, controlled.            Recommendations:  1. Continue with flecainide, metoprolol succinate and Xarelto at current  doses.  2. As patient is complaining of dyspnea will obtain echo Doppler study to evaluate LV function and tailor further therapy accordingly.    Return in about 3 months (around 11/2/2021).    As always, Dr. Araujo  I appreciate very much the opportunity to participate in the cardiovascular care of your patients. Please do not hesitate to call me with any questions with regards to Lashae Benitez's evaluation and management.       With Best Regards,        Eric Burns MD, FACC    Please note that portions of this note were completed with a voice recognition program.

## 2021-08-16 ENCOUNTER — HOSPITAL ENCOUNTER (OUTPATIENT)
Dept: CARDIOLOGY | Facility: HOSPITAL | Age: 61
Discharge: HOME | End: 2021-08-16
Admitting: INTERNAL MEDICINE

## 2021-08-16 DIAGNOSIS — R06.09 DYSPNEA ON EXERTION: ICD-10-CM

## 2021-08-16 PROCEDURE — 93306 TTE W/DOPPLER COMPLETE: CPT | Performed by: INTERNAL MEDICINE

## 2021-08-16 PROCEDURE — 93306 TTE W/DOPPLER COMPLETE: CPT

## 2021-08-18 DIAGNOSIS — I10 ESSENTIAL HYPERTENSION: ICD-10-CM

## 2021-08-18 LAB
BH CV ECHO MEAS - % IVS THICK: 50 %
BH CV ECHO MEAS - % LVPW THICK: 78.1 %
BH CV ECHO MEAS - ACS: 2.2 CM
BH CV ECHO MEAS - AO MAX PG: 5.9 MMHG
BH CV ECHO MEAS - AO MEAN PG: 3 MMHG
BH CV ECHO MEAS - AO ROOT AREA (BSA CORRECTED): 1.9
BH CV ECHO MEAS - AO ROOT AREA: 9.3 CM^2
BH CV ECHO MEAS - AO ROOT DIAM: 3.5 CM
BH CV ECHO MEAS - AO V2 MAX: 121 CM/SEC
BH CV ECHO MEAS - AO V2 MEAN: 79.4 CM/SEC
BH CV ECHO MEAS - AO V2 VTI: 26.5 CM
BH CV ECHO MEAS - BSA(HAYCOCK): 1.9 M^2
BH CV ECHO MEAS - BSA: 1.8 M^2
BH CV ECHO MEAS - BZI_BMI: 27.1 KILOGRAMS/M^2
BH CV ECHO MEAS - BZI_METRIC_HEIGHT: 165.1 CM
BH CV ECHO MEAS - BZI_METRIC_WEIGHT: 73.9 KG
BH CV ECHO MEAS - EDV(CUBED): 81.2 ML
BH CV ECHO MEAS - EDV(MOD-SP4): 57.5 ML
BH CV ECHO MEAS - EDV(TEICH): 84.4 ML
BH CV ECHO MEAS - EF(CUBED): 57.5 %
BH CV ECHO MEAS - EF(MOD-SP4): 69 %
BH CV ECHO MEAS - EF(TEICH): 49.4 %
BH CV ECHO MEAS - ESV(CUBED): 34.5 ML
BH CV ECHO MEAS - ESV(MOD-SP4): 17.8 ML
BH CV ECHO MEAS - ESV(TEICH): 42.7 ML
BH CV ECHO MEAS - FS: 24.8 %
BH CV ECHO MEAS - IVS/LVPW: 1
BH CV ECHO MEAS - IVSD: 1 CM
BH CV ECHO MEAS - IVSS: 1.5 CM
BH CV ECHO MEAS - LA DIMENSION: 3.3 CM
BH CV ECHO MEAS - LA/AO: 0.96
BH CV ECHO MEAS - LV DIASTOLIC VOL/BSA (35-75): 31.7 ML/M^2
BH CV ECHO MEAS - LV MASS(C)D: 140.8 GRAMS
BH CV ECHO MEAS - LV MASS(C)DI: 77.6 GRAMS/M^2
BH CV ECHO MEAS - LV MASS(C)S: 197.8 GRAMS
BH CV ECHO MEAS - LV MASS(C)SI: 109.1 GRAMS/M^2
BH CV ECHO MEAS - LV SYSTOLIC VOL/BSA (12-30): 9.8 ML/M^2
BH CV ECHO MEAS - LVIDD: 4.3 CM
BH CV ECHO MEAS - LVIDS: 3.3 CM
BH CV ECHO MEAS - LVLD AP4: 7.7 CM
BH CV ECHO MEAS - LVLS AP4: 6.4 CM
BH CV ECHO MEAS - LVOT AREA (M): 3.8 CM^2
BH CV ECHO MEAS - LVOT AREA: 3.8 CM^2
BH CV ECHO MEAS - LVOT DIAM: 2.2 CM
BH CV ECHO MEAS - LVPWD: 0.96 CM
BH CV ECHO MEAS - LVPWS: 1.7 CM
BH CV ECHO MEAS - MV A MAX VEL: 101 CM/SEC
BH CV ECHO MEAS - MV E MAX VEL: 87.3 CM/SEC
BH CV ECHO MEAS - MV E/A: 0.86
BH CV ECHO MEAS - PA ACC TIME: 0.07 SEC
BH CV ECHO MEAS - PA PR(ACCEL): 45.7 MMHG
BH CV ECHO MEAS - RAP SYSTOLE: 10 MMHG
BH CV ECHO MEAS - RVSP: 29.5 MMHG
BH CV ECHO MEAS - SI(AO): 136.6 ML/M^2
BH CV ECHO MEAS - SI(CUBED): 25.8 ML/M^2
BH CV ECHO MEAS - SI(MOD-SP4): 21.9 ML/M^2
BH CV ECHO MEAS - SI(TEICH): 23 ML/M^2
BH CV ECHO MEAS - SV(AO): 247.7 ML
BH CV ECHO MEAS - SV(CUBED): 46.7 ML
BH CV ECHO MEAS - SV(MOD-SP4): 39.7 ML
BH CV ECHO MEAS - SV(TEICH): 41.8 ML
BH CV ECHO MEAS - TR MAX VEL: 220.5 CM/SEC
MAXIMAL PREDICTED HEART RATE: 161 BPM
STRESS TARGET HR: 137 BPM

## 2021-08-18 RX ORDER — AMLODIPINE BESYLATE 5 MG/1
7.5 TABLET ORAL DAILY
Qty: 45 TABLET | Refills: 0 | Status: CANCELLED | OUTPATIENT
Start: 2021-08-18

## 2021-08-20 DIAGNOSIS — I10 ESSENTIAL HYPERTENSION: ICD-10-CM

## 2021-08-20 RX ORDER — AMLODIPINE BESYLATE 5 MG/1
7.5 TABLET ORAL DAILY
Qty: 135 TABLET | Refills: 3 | Status: SHIPPED | OUTPATIENT
Start: 2021-08-20 | End: 2021-08-24 | Stop reason: SDUPTHER

## 2021-08-24 DIAGNOSIS — I10 ESSENTIAL HYPERTENSION: ICD-10-CM

## 2021-08-24 RX ORDER — AMLODIPINE BESYLATE 5 MG/1
7.5 TABLET ORAL DAILY
Qty: 135 TABLET | Refills: 3 | Status: SHIPPED | OUTPATIENT
Start: 2021-08-24 | End: 2022-03-04 | Stop reason: SDUPTHER

## 2021-08-30 ENCOUNTER — TRANSCRIBE ORDERS (OUTPATIENT)
Dept: ADMINISTRATIVE | Facility: HOSPITAL | Age: 61
End: 2021-08-30

## 2021-08-30 DIAGNOSIS — H53.40 VISUAL FIELD DEFECTS: Primary | ICD-10-CM

## 2021-09-09 ENCOUNTER — HOSPITAL ENCOUNTER (OUTPATIENT)
Dept: MRI IMAGING | Facility: HOSPITAL | Age: 61
Discharge: HOME | End: 2021-09-09
Admitting: OPHTHALMOLOGY

## 2021-09-09 DIAGNOSIS — H53.40 VISUAL FIELD DEFECTS: ICD-10-CM

## 2021-09-09 PROCEDURE — 70553 MRI BRAIN STEM W/O & W/DYE: CPT

## 2021-09-09 PROCEDURE — 0 GADOBENATE DIMEGLUMINE 529 MG/ML SOLUTION: Performed by: OPHTHALMOLOGY

## 2021-09-09 PROCEDURE — A9577 INJ MULTIHANCE: HCPCS | Performed by: OPHTHALMOLOGY

## 2021-09-09 PROCEDURE — 70553 MRI BRAIN STEM W/O & W/DYE: CPT | Performed by: RADIOLOGY

## 2021-09-09 RX ADMIN — GADOBENATE DIMEGLUMINE 15 ML: 529 INJECTION, SOLUTION INTRAVENOUS at 11:01

## 2021-09-16 ENCOUNTER — TRANSCRIBE ORDERS (OUTPATIENT)
Dept: ADMINISTRATIVE | Facility: HOSPITAL | Age: 61
End: 2021-09-16

## 2021-09-16 ENCOUNTER — LAB (OUTPATIENT)
Dept: LAB | Facility: HOSPITAL | Age: 61
End: 2021-09-16

## 2021-09-16 DIAGNOSIS — E87.1 HYPONATREMIA: ICD-10-CM

## 2021-09-16 DIAGNOSIS — E87.1 HYPONATREMIA: Primary | ICD-10-CM

## 2021-09-16 LAB
ALBUMIN SERPL-MCNC: 3.4 G/DL (ref 3.5–5.2)
ALBUMIN UR-MCNC: <1.2 MG/DL
ALBUMIN/GLOB SERPL: 1.5 G/DL
ALP SERPL-CCNC: 109 U/L (ref 39–117)
ALT SERPL W P-5'-P-CCNC: 22 U/L (ref 1–33)
ANION GAP SERPL CALCULATED.3IONS-SCNC: 9.6 MMOL/L (ref 5–15)
AST SERPL-CCNC: 22 U/L (ref 1–32)
BACTERIA UR QL AUTO: NORMAL /HPF
BILIRUB SERPL-MCNC: 0.2 MG/DL (ref 0–1.2)
BILIRUB UR QL STRIP: NEGATIVE
BUN SERPL-MCNC: 6 MG/DL (ref 6–20)
BUN/CREAT SERPL: 8.8 (ref 7–25)
CALCIUM SPEC-SCNC: 8.8 MG/DL (ref 8.6–10.5)
CHLORIDE SERPL-SCNC: 102 MMOL/L (ref 98–107)
CLARITY UR: CLEAR
CO2 SERPL-SCNC: 27.4 MMOL/L (ref 22–29)
COLOR UR: YELLOW
CREAT SERPL-MCNC: 0.68 MG/DL (ref 0.57–1)
CREAT UR-MCNC: 8.2 MG/DL
CREAT UR-MCNC: 8.2 MG/DL
GFR SERPL CREATININE-BSD FRML MDRD: 89 ML/MIN/1.73
GLOBULIN UR ELPH-MCNC: 2.2 GM/DL
GLUCOSE SERPL-MCNC: 119 MG/DL (ref 65–99)
GLUCOSE UR STRIP-MCNC: NEGATIVE MG/DL
HGB UR QL STRIP.AUTO: NEGATIVE
HYALINE CASTS UR QL AUTO: NORMAL /LPF
KETONES UR QL STRIP: NEGATIVE
LEUKOCYTE ESTERASE UR QL STRIP.AUTO: NEGATIVE
MICROALBUMIN/CREAT UR: NORMAL MG/G{CREAT}
NITRITE UR QL STRIP: NEGATIVE
PH UR STRIP.AUTO: 7 [PH] (ref 5–8)
POTASSIUM SERPL-SCNC: 3.1 MMOL/L (ref 3.5–5.2)
PROT SERPL-MCNC: 5.6 G/DL (ref 6–8.5)
PROT UR QL STRIP: NEGATIVE
PROT UR-MCNC: <4 MG/DL
PROT/CREAT UR: NORMAL MG/G{CREAT}
RBC # UR: NORMAL /HPF
REF LAB TEST METHOD: NORMAL
SODIUM SERPL-SCNC: 139 MMOL/L (ref 136–145)
SP GR UR STRIP: <1.005 (ref 1–1.03)
SQUAMOUS #/AREA URNS HPF: NORMAL /HPF
UROBILINOGEN UR QL STRIP: ABNORMAL
WBC UR QL AUTO: NORMAL /HPF

## 2021-09-16 PROCEDURE — 80053 COMPREHEN METABOLIC PANEL: CPT

## 2021-09-16 PROCEDURE — 82570 ASSAY OF URINE CREATININE: CPT

## 2021-09-16 PROCEDURE — 81001 URINALYSIS AUTO W/SCOPE: CPT

## 2021-09-16 PROCEDURE — 82043 UR ALBUMIN QUANTITATIVE: CPT

## 2021-09-16 PROCEDURE — 36415 COLL VENOUS BLD VENIPUNCTURE: CPT

## 2021-09-16 PROCEDURE — 84156 ASSAY OF PROTEIN URINE: CPT

## 2021-10-29 ENCOUNTER — TRANSCRIBE ORDERS (OUTPATIENT)
Dept: ADMINISTRATIVE | Facility: HOSPITAL | Age: 61
End: 2021-10-29

## 2021-10-29 DIAGNOSIS — R10.10 UPPER ABDOMINAL PAIN: Primary | ICD-10-CM

## 2021-11-01 ENCOUNTER — HOSPITAL ENCOUNTER (OUTPATIENT)
Dept: ULTRASOUND IMAGING | Facility: HOSPITAL | Age: 61
Discharge: HOME | End: 2021-11-01
Admitting: PHYSICIAN ASSISTANT

## 2021-11-01 DIAGNOSIS — R10.10 UPPER ABDOMINAL PAIN: ICD-10-CM

## 2021-11-01 PROCEDURE — 76700 US EXAM ABDOM COMPLETE: CPT

## 2021-11-01 PROCEDURE — 76700 US EXAM ABDOM COMPLETE: CPT | Performed by: RADIOLOGY

## 2021-11-02 ENCOUNTER — OFFICE VISIT (OUTPATIENT)
Dept: CARDIOLOGY | Facility: CLINIC | Age: 61
End: 2021-11-02

## 2021-11-02 VITALS
WEIGHT: 161.6 LBS | BODY MASS INDEX: 26.89 KG/M2 | TEMPERATURE: 97.3 F | DIASTOLIC BLOOD PRESSURE: 72 MMHG | SYSTOLIC BLOOD PRESSURE: 124 MMHG | HEART RATE: 72 BPM

## 2021-11-02 DIAGNOSIS — I10 ESSENTIAL HYPERTENSION: ICD-10-CM

## 2021-11-02 DIAGNOSIS — R07.2 PRECORDIAL PAIN: Primary | ICD-10-CM

## 2021-11-02 DIAGNOSIS — I48.0 PAROXYSMAL ATRIAL FIBRILLATION (HCC): ICD-10-CM

## 2021-11-02 PROCEDURE — 99214 OFFICE O/P EST MOD 30 MIN: CPT | Performed by: PHYSICIAN ASSISTANT

## 2021-11-02 PROCEDURE — 93000 ELECTROCARDIOGRAM COMPLETE: CPT | Performed by: PHYSICIAN ASSISTANT

## 2021-11-02 RX ORDER — POTASSIUM CHLORIDE 750 MG/1
10 CAPSULE, EXTENDED RELEASE ORAL 2 TIMES DAILY
COMMUNITY

## 2021-11-02 RX ORDER — CLOPIDOGREL BISULFATE 75 MG/1
75 TABLET ORAL DAILY
Qty: 30 TABLET | Refills: 11 | Status: SHIPPED | OUTPATIENT
Start: 2021-11-02 | End: 2021-11-02

## 2021-11-02 RX ORDER — SODIUM CHLORIDE 1000 MG
1 TABLET, SOLUBLE MISCELLANEOUS 2 TIMES DAILY
COMMUNITY

## 2021-11-02 NOTE — PROGRESS NOTES
Kreis, Samuel Duane, MD  Lashae Benitez  1960  11/02/2021    Patient Active Problem List   Diagnosis   • Hiatal hernia   • Essential hypertension   • Sjogren's syndrome (HCC)   • Multiple sclerosis (HCC)   • Psoriasis   • Fibromyalgia   • Osteoarthritis   • Psoriatic arthritis (HCC)   • RA (rheumatoid arthritis) (HCC)   • Degenerative disc disease, lumbar   • TMJ arthritis   • Dupuytren's disease   • H. pylori infection   • Family history of coronary artery disease   • Type 2 diabetes mellitus (HCC)   • Edema   • Bilateral leg edema   • Isolated corticotropin deficiency (HCC)   • Hyponatremia   • Paroxysmal atrial fibrillation (HCC)       Dear Kreis, Samuel Duane, MD:    Subjective     History of Present Illness:    Chief Complaint   Patient presents with   • Follow-up     ECHO RESULTS       Lashae Benitez is a pleasant 61 y.o. female with a past medical history significant for no known coronary artery disease or structural heart disease she also denies any history of tobacco abuse.  She does have diabetes mellitus, paroxysmal atrial fibrillation anticoagulated with Xarelto and with rhythm control on flecainide.  She does have essential hypertension.  She comes in today for routine cardiology follow-up.    Ms. Bhardwaj reports that she did develop epigastric and left lateral upper quadrant abdominal pain roughly 1 month ago.  She reports that this pain has been persistent and essentially every day and throughout the day each day and is on relieving.  She does report that this pain does not change with food intake either making it worse or better.  However she does report when she carries stuff or performs physical exercise that he can get worse and she can struggle to catch her breath.  He does deny any nausea or diaphoresis with this pain.  She also reports that the pain does radiate into her back.  She did have abdominal ultrasound which did show 2 hemangiomas in the liver but otherwise unremarkable exam and did not  "reveal any aortic aneurysms.  She does have history of cholecystectomy.    Allergies   Allergen Reactions   • Codeine Angioedema   • Imuran [Azathioprine] Other (See Comments)     Has pancreatis attacks with med   • Januvia [Sitagliptin] Other (See Comments)     Has pancreatis attacks with med    • Penicillins Angioedema   • Sulfa Antibiotics Angioedema   • Sulfasalazine Unknown (See Comments)   • Beta Adrenergic Blockers Other (See Comments)     I called pt to ask her about the allergy to bblockers in her chart. Pt states \"too big of a dose makes her psoriasis act up\", but this current dose of metoprolol 50 mg bid, she has been on for a long time, with no ill side effects.  CATA,CMA 3/28/18   :      Current Outpatient Medications:   •  abatacept (ORENCIA) 250 MG injection, Infuse 750 mg into a venous catheter Every 28 (Twenty-Eight) Days., Disp: , Rfl:   •  alendronate (FOSAMAX) 70 MG tablet, Take 70 mg by mouth 1 (One) Time Per Week. Wednesday, Disp: , Rfl:   •  amitriptyline (ELAVIL) 25 MG tablet, Take 25 mg by mouth Every Night., Disp: , Rfl:   •  amLODIPine (NORVASC) 5 MG tablet, Take 1.5 tablets by mouth Daily., Disp: 135 tablet, Rfl: 3  •  Apremilast (Otezla) 30 MG tablet, Take  by mouth 2 (two) times a day., Disp: , Rfl:   •  butalbital-acetaminophen-caffeine (Esgic) -40 MG per tablet, Take 1 tablet by mouth Every 4 (Four) Hours As Needed for Headache., Disp: , Rfl:   •  Calcipotriene-Betameth Diprop (Enstilar) 0.005-0.064 % foam, Apply 1 spray topically Daily., Disp: , Rfl:   •  calcium citrate-vitamin d (CALCITRATE) 315-250 MG-UNIT tablet tablet, Take 1 tablet by mouth Every Evening., Disp: , Rfl:   •  Cholecalciferol (VITAMIN D3) 125 MCG (5000 UT) capsule capsule, Take 5,000 Units by mouth Daily., Disp: , Rfl:   •  cyanocobalamin 1000 MCG/ML injection, Inject 1,000 mcg into the shoulder, thigh, or buttocks Every 28 (Twenty-Eight) Days., Disp: , Rfl:   •  cyclobenzaprine (FLEXERIL) 10 MG tablet, Take " 10 mg by mouth Every Evening., Disp: , Rfl:   •  diazePAM (VALIUM) 2 MG tablet, Take 2 mg by mouth Daily As Needed for Anxiety., Disp: , Rfl:   •  EMGALITY 120 MG/ML prefilled syringe, Inject 120 mg under the skin into the appropriate area as directed Every 30 (Thirty) Days., Disp: , Rfl: 3  •  ferrous gluconate (FERGON) 240 (27 FE) MG tablet, Take 240 mg by mouth Every Morning., Disp: , Rfl:   •  flecainide (TAMBOCOR) 50 MG tablet, Take 1 tablet by mouth 2 (Two) Times a Day., Disp: 180 tablet, Rfl: 3  •  folic acid (FOLVITE) 1 MG tablet, Take 1 mg by mouth daily., Disp: , Rfl:   •  gabapentin (NEURONTIN) 600 MG tablet, Take 600 mg by mouth 3 (Three) Times a Day., Disp: , Rfl:   •  insulin detemir (LEVEMIR) 100 UNIT/ML injection, Inject 25 Units under the skin into the appropriate area as directed Daily., Disp: , Rfl:   •  levothyroxine sodium (TIROSINT) 137 MCG capsule, Take 125 mcg by mouth Daily. Takes an extra dose at the first of each month., Disp: , Rfl:   •  linaclotide (LINZESS) 290 MCG capsule capsule, Take 290 mcg by mouth every morning before breakfast., Disp: , Rfl:   •  lisinopril (PRINIVIL,ZESTRIL) 20 MG tablet, Take 20 mg by mouth Daily., Disp: , Rfl:   •  loratadine (CLARITIN) 10 MG tablet, Take 10 mg by mouth Every Night., Disp: , Rfl:   •  methotrexate 2.5 MG tablet, Take 25 mg by mouth 1 (One) Time Per Week. Prior to [facility] [place], operated by [facility] Admission, Patient was on: Takes 10 tablets on Saturday, Disp: , Rfl:   •  metoprolol succinate XL (TOPROL-XL) 100 MG 24 hr tablet, TAKE 1 TABLET DAILY, Disp: 90 tablet, Rfl: 3  •  multivitamin (DAILY MICHAEL) tablet tablet, Take 1 tablet by mouth Every Evening., Disp: , Rfl:   •  omeprazole (priLOSEC) 40 MG capsule, Take 40 mg by mouth Daily., Disp: , Rfl:   •  OnabotulinumtoxinA (BOTOX IJ), Inject  as directed. Q 3 M FOTR MIGRAINES, Disp: , Rfl:   •  potassium chloride (MICRO-K) 10 MEQ CR capsule, Take 10 mEq by mouth 2 (Two) Times a Day., Disp: , Rfl:   •  primidone (MYSOLINE)  50 MG tablet, Take 50 mg by mouth Every Night., Disp: , Rfl:   •  RABEprazole (ACIPHEX) 20 MG EC tablet, Take 20 mg by mouth Daily., Disp: , Rfl:   •  rimegepant 75 MG dispersible tablet, DISSOLVE ONE TABLET UNDER THE TONGUE AT ONSET OF MIGRAINE HEADACHE. MAX DOSE ONE TABLET IN 24 HOURS, Disp: , Rfl:   •  sodium chloride 1 g tablet, Take 1 g by mouth 2 (Two) Times a Day., Disp: , Rfl:   •  vitamin C (ASCORBIC ACID) 500 MG tablet, Take 1,000 mg by mouth Every Evening., Disp: , Rfl:   •  vitamin E 1000 UNIT capsule, Take 1,000 Units by mouth Every Evening., Disp: , Rfl:   •  Xarelto 20 MG tablet, TAKE 1 TABLET DAILY, Disp: 90 tablet, Rfl: 3  •  DULoxetine (CYMBALTA) 30 MG capsule, Take 90 mg by mouth Every Morning., Disp: , Rfl:     The following portions of the patient's history were reviewed and updated as appropriate: allergies, current medications, past family history, past medical history, past social history, past surgical history and problem list.    Social History     Tobacco Use   • Smoking status: Never Smoker   • Smokeless tobacco: Never Used   Vaping Use   • Vaping Use: Never used   Substance Use Topics   • Alcohol use: No   • Drug use: No         Objective   Vitals:    11/02/21 0840   BP: 124/72   Pulse: 72   Temp: 97.3 °F (36.3 °C)   Weight: 73.3 kg (161 lb 9.6 oz)     Body mass index is 26.89 kg/m².    Constitutional:       General: Not in acute distress.     Appearance: Healthy appearance. Well-developed and not in distress. Not diaphoretic.   Eyes:      Conjunctiva/sclera: Conjunctivae normal.      Pupils: Pupils are equal, round, and reactive to light.   HENT:      Head: Normocephalic and atraumatic.   Neck:      Vascular: No carotid bruit or JVD.   Pulmonary:      Effort: Pulmonary effort is normal. No respiratory distress.      Breath sounds: Normal breath sounds.   Cardiovascular:      Normal rate. Regular rhythm.   Abdominal:      General: Bowel sounds are normal.      Tenderness: There is  abdominal tenderness.   Skin:     General: Skin is cool.   Neurological:      Mental Status: Alert, oriented to person, place, and time and oriented to person, place and time.         Lab Results   Component Value Date     09/16/2021    K 3.1 (L) 09/16/2021     09/16/2021    CO2 27.4 09/16/2021    BUN 6 09/16/2021    CREATININE 0.68 09/16/2021    GLUCOSE 119 (H) 09/16/2021    CALCIUM 8.8 09/16/2021    AST 22 09/16/2021    ALT 22 09/16/2021    ALKPHOS 109 09/16/2021    LABIL2 1.4 (L) 08/11/2015     Lab Results   Component Value Date    CKTOTAL 40 04/12/2017     Lab Results   Component Value Date    WBC 7.02 06/12/2021    HGB 11.9 (L) 06/12/2021    HCT 35.9 06/12/2021     06/12/2021     Lab Results   Component Value Date    INR 0.97 11/22/2018    INR 0.93 07/20/2015     Lab Results   Component Value Date    MG 2.0 09/21/2020     Lab Results   Component Value Date    TSH 0.101 (L) 06/01/2021    CHLPL 132 07/22/2015    TRIG 85 07/22/2015    HDL 40 (L) 07/22/2015    LDL 74 07/22/2015      Lab Results   Component Value Date    BNP 51.0 11/22/2018       During this visit the following were done:  Labs Reviewed []    Labs Ordered []    Radiology Reports Reviewed []    Radiology Ordered []    PCP Records Reviewed []    Referring Provider Records Reviewed []    ER Records Reviewed []    Hospital Records Reviewed []    History Obtained From Family []    Radiology Images Reviewed []    Other Reviewed []    Records Requested []         ECG 12 Lead    Date/Time: 11/2/2021 9:24 AM  Performed by: Gael Baum PA-C  Authorized by: Gael Baum PA-C   Comparison: compared with previous ECG   Similar to previous ECG  Rhythm: sinus rhythm  Conduction: left anterior fascicular block  ST Segments: ST segments normal    Clinical impression: normal ECG  Comments:             Assessment/Plan    Diagnosis Plan   1. Precordial pain  Stress Test With Myocardial Perfusion One Day   2. Paroxysmal atrial  fibrillation (HCC)     3. Essential hypertension              Recommendations:  1. Anginal equivalent  1. Overall her abdominal pain I do think could be possible anginal equivalent given her being female and diabetic.  I did recommend stress testing to rule out ischemic etiology.  She expressed understanding and agreeable.  Although, also on the differential is gastritis/PUD as she was tender to palpation today on exam and I did recommend an EGD and advised her to try to get into see her PCP this week for further evaluation.  She expressed understanding.  2. Paroxysmal atrial fibrillation  1. Asymptomatic in regards to her atrial fibrillation appears to have been maintaining sinus rhythm throughout without any issues I will continue flecainide and Xarelto.      No follow-ups on file.    As always, I appreciate very much the opportunity to participate in the cardiovascular care of your patients.      With Best Regards,    Gael Baum PA-C

## 2021-11-08 ENCOUNTER — TRANSCRIBE ORDERS (OUTPATIENT)
Dept: ADMINISTRATIVE | Facility: HOSPITAL | Age: 61
End: 2021-11-08

## 2021-11-08 DIAGNOSIS — R10.84 ABDOMINAL PAIN, GENERALIZED: Primary | ICD-10-CM

## 2021-11-12 ENCOUNTER — HOSPITAL ENCOUNTER (OUTPATIENT)
Dept: NUCLEAR MEDICINE | Facility: HOSPITAL | Age: 61
Discharge: HOME | End: 2021-11-12

## 2021-11-12 ENCOUNTER — HOSPITAL ENCOUNTER (OUTPATIENT)
Dept: CARDIOLOGY | Facility: HOSPITAL | Age: 61
Discharge: HOME | End: 2021-11-12

## 2021-11-12 DIAGNOSIS — R07.2 PRECORDIAL PAIN: ICD-10-CM

## 2021-11-12 PROCEDURE — 93018 CV STRESS TEST I&R ONLY: CPT | Performed by: INTERNAL MEDICINE

## 2021-11-12 PROCEDURE — A9500 TC99M SESTAMIBI: HCPCS | Performed by: PHYSICIAN ASSISTANT

## 2021-11-12 PROCEDURE — 93017 CV STRESS TEST TRACING ONLY: CPT

## 2021-11-12 PROCEDURE — 78452 HT MUSCLE IMAGE SPECT MULT: CPT

## 2021-11-12 PROCEDURE — 78452 HT MUSCLE IMAGE SPECT MULT: CPT | Performed by: INTERNAL MEDICINE

## 2021-11-12 PROCEDURE — 0 TECHNETIUM SESTAMIBI: Performed by: PHYSICIAN ASSISTANT

## 2021-11-12 PROCEDURE — 25010000002 REGADENOSON 0.4 MG/5ML SOLUTION: Performed by: PHYSICIAN ASSISTANT

## 2021-11-12 RX ADMIN — TECHNETIUM TC 99M SESTAMIBI 1 DOSE: 1 INJECTION INTRAVENOUS at 12:48

## 2021-11-12 RX ADMIN — REGADENOSON 0.4 MG: 0.08 INJECTION, SOLUTION INTRAVENOUS at 12:48

## 2021-11-12 RX ADMIN — TECHNETIUM TC 99M SESTAMIBI 1 DOSE: 1 INJECTION INTRAVENOUS at 11:48

## 2021-11-15 LAB
BH CV NUCLEAR PRIOR STUDY: 3
BH CV REST NUCLEAR ISOTOPE DOSE: 9.2 MCI
BH CV STRESS BP STAGE 1: NORMAL
BH CV STRESS BP STAGE 2: NORMAL
BH CV STRESS COMMENTS STAGE 1: NORMAL
BH CV STRESS COMMENTS STAGE 2: NORMAL
BH CV STRESS DOSE REGADENOSON STAGE 1: 0.4
BH CV STRESS DURATION MIN STAGE 1: 0
BH CV STRESS DURATION MIN STAGE 2: 4
BH CV STRESS DURATION SEC STAGE 1: 10
BH CV STRESS DURATION SEC STAGE 2: 0
BH CV STRESS HR STAGE 1: 105
BH CV STRESS HR STAGE 2: 83
BH CV STRESS NUCLEAR ISOTOPE DOSE: 31.5 MCI
BH CV STRESS PROTOCOL 1: NORMAL
BH CV STRESS RECOVERY BP: NORMAL MMHG
BH CV STRESS RECOVERY HR: 83 BPM
BH CV STRESS STAGE 1: 1
BH CV STRESS STAGE 2: 2
LV EF NUC BP: 67 %
MAXIMAL PREDICTED HEART RATE: 160 BPM
PERCENT MAX PREDICTED HR: 65.63 %
STRESS BASELINE BP: NORMAL MMHG
STRESS BASELINE HR: 67 BPM
STRESS PERCENT HR: 77 %
STRESS POST PEAK BP: NORMAL MMHG
STRESS POST PEAK HR: 105 BPM
STRESS TARGET HR: 136 BPM

## 2021-11-23 ENCOUNTER — TRANSCRIBE ORDERS (OUTPATIENT)
Dept: ADMINISTRATIVE | Facility: HOSPITAL | Age: 61
End: 2021-11-23

## 2021-11-23 ENCOUNTER — LAB (OUTPATIENT)
Dept: LAB | Facility: HOSPITAL | Age: 61
End: 2021-11-23

## 2021-11-23 DIAGNOSIS — E87.6 HYPOKALEMIA: ICD-10-CM

## 2021-11-23 DIAGNOSIS — E87.6 HYPOKALEMIA: Primary | ICD-10-CM

## 2021-11-23 PROCEDURE — 81001 URINALYSIS AUTO W/SCOPE: CPT

## 2021-11-23 PROCEDURE — 80053 COMPREHEN METABOLIC PANEL: CPT

## 2021-11-23 PROCEDURE — 84300 ASSAY OF URINE SODIUM: CPT

## 2021-11-23 PROCEDURE — 36415 COLL VENOUS BLD VENIPUNCTURE: CPT

## 2021-11-23 PROCEDURE — 82436 ASSAY OF URINE CHLORIDE: CPT

## 2021-11-23 PROCEDURE — 84133 ASSAY OF URINE POTASSIUM: CPT

## 2021-11-24 LAB
ALBUMIN SERPL-MCNC: 4 G/DL (ref 3.5–5.2)
ALBUMIN/GLOB SERPL: 1.7 G/DL
ALP SERPL-CCNC: 120 U/L (ref 39–117)
ALT SERPL W P-5'-P-CCNC: 24 U/L (ref 1–33)
ANION GAP SERPL CALCULATED.3IONS-SCNC: 10 MMOL/L (ref 5–15)
AST SERPL-CCNC: 18 U/L (ref 1–32)
BACTERIA UR QL AUTO: NORMAL /HPF
BILIRUB SERPL-MCNC: 0.3 MG/DL (ref 0–1.2)
BILIRUB UR QL STRIP: NEGATIVE
BUN SERPL-MCNC: 12 MG/DL (ref 8–23)
BUN/CREAT SERPL: 16.2 (ref 7–25)
CALCIUM SPEC-SCNC: 9 MG/DL (ref 8.6–10.5)
CHLORIDE SERPL-SCNC: 100 MMOL/L (ref 98–107)
CHLORIDE UR-SCNC: 37 MMOL/L
CLARITY UR: CLEAR
CO2 SERPL-SCNC: 25 MMOL/L (ref 22–29)
COLOR UR: YELLOW
CREAT SERPL-MCNC: 0.74 MG/DL (ref 0.57–1)
GFR SERPL CREATININE-BSD FRML MDRD: 80 ML/MIN/1.73
GLOBULIN UR ELPH-MCNC: 2.4 GM/DL
GLUCOSE SERPL-MCNC: 256 MG/DL (ref 65–99)
GLUCOSE UR STRIP-MCNC: ABNORMAL MG/DL
HGB UR QL STRIP.AUTO: ABNORMAL
HYALINE CASTS UR QL AUTO: NORMAL /LPF
KETONES UR QL STRIP: NEGATIVE
LEUKOCYTE ESTERASE UR QL STRIP.AUTO: NEGATIVE
NITRITE UR QL STRIP: NEGATIVE
PH UR STRIP.AUTO: 6 [PH] (ref 5–8)
POTASSIUM SERPL-SCNC: 4.5 MMOL/L (ref 3.5–5.2)
POTASSIUM UR-SCNC: 8.1 MMOL/L
PROT SERPL-MCNC: 6.4 G/DL (ref 6–8.5)
PROT UR QL STRIP: NEGATIVE
RBC # UR STRIP: NORMAL /HPF
REF LAB TEST METHOD: NORMAL
SODIUM SERPL-SCNC: 135 MMOL/L (ref 136–145)
SODIUM UR-SCNC: 43 MMOL/L
SP GR UR STRIP: 1.01 (ref 1–1.03)
SQUAMOUS #/AREA URNS HPF: NORMAL /HPF
UROBILINOGEN UR QL STRIP: ABNORMAL
WBC # UR STRIP: NORMAL /HPF

## 2021-11-30 ENCOUNTER — TELEPHONE (OUTPATIENT)
Dept: CARDIOLOGY | Facility: CLINIC | Age: 61
End: 2021-11-30

## 2021-12-02 NOTE — TELEPHONE ENCOUNTER
I dont see that she has any history of CAD. Can we call and make sure she has never had confirmed blockages in the heart such as a heart attack or stroke? If she hasnt she can stop plavix since she is also on xarelto.

## 2021-12-03 ENCOUNTER — HOSPITAL ENCOUNTER (OUTPATIENT)
Dept: CT IMAGING | Facility: HOSPITAL | Age: 61
Discharge: HOME | End: 2021-12-03
Admitting: FAMILY MEDICINE

## 2021-12-03 DIAGNOSIS — R10.84 ABDOMINAL PAIN, GENERALIZED: ICD-10-CM

## 2021-12-03 PROCEDURE — 25010000002 IOPAMIDOL 61 % SOLUTION: Performed by: FAMILY MEDICINE

## 2021-12-03 PROCEDURE — 74177 CT ABD & PELVIS W/CONTRAST: CPT

## 2021-12-03 PROCEDURE — 74177 CT ABD & PELVIS W/CONTRAST: CPT | Performed by: RADIOLOGY

## 2021-12-03 RX ADMIN — IOPAMIDOL 100 ML: 612 INJECTION, SOLUTION INTRAVENOUS at 10:46

## 2021-12-03 NOTE — TELEPHONE ENCOUNTER
Lashae called back and I ask her the questions and she didn't have any so she understands and is going to stop Plavix

## 2022-01-04 ENCOUNTER — OFFICE VISIT (OUTPATIENT)
Dept: CARDIOLOGY | Facility: CLINIC | Age: 62
End: 2022-01-04

## 2022-01-04 VITALS
TEMPERATURE: 97 F | DIASTOLIC BLOOD PRESSURE: 70 MMHG | BODY MASS INDEX: 27.76 KG/M2 | WEIGHT: 166.6 LBS | HEART RATE: 89 BPM | SYSTOLIC BLOOD PRESSURE: 116 MMHG | HEIGHT: 65 IN

## 2022-01-04 DIAGNOSIS — R07.2 PRECORDIAL PAIN: Primary | ICD-10-CM

## 2022-01-04 PROCEDURE — 99214 OFFICE O/P EST MOD 30 MIN: CPT | Performed by: PHYSICIAN ASSISTANT

## 2022-01-04 RX ORDER — CLOPIDOGREL BISULFATE 75 MG/1
75 TABLET ORAL DAILY
Qty: 30 TABLET | Refills: 11 | Status: SHIPPED | OUTPATIENT
Start: 2022-01-04 | End: 2022-01-04 | Stop reason: SDUPTHER

## 2022-01-04 RX ORDER — CLOPIDOGREL BISULFATE 75 MG/1
75 TABLET ORAL DAILY
Qty: 90 TABLET | Refills: 3 | Status: SHIPPED | OUTPATIENT
Start: 2022-01-04 | End: 2022-03-04

## 2022-01-04 RX ORDER — ISOSORBIDE MONONITRATE 30 MG/1
30 TABLET, EXTENDED RELEASE ORAL DAILY
Qty: 21 TABLET | Refills: 0 | Status: SHIPPED | OUTPATIENT
Start: 2022-01-04 | End: 2022-01-19 | Stop reason: SDUPTHER

## 2022-01-04 NOTE — PROGRESS NOTES
Kreis, Samuel Duane, MD  Lashae Benitez  1960  01/04/2022    Patient Active Problem List   Diagnosis   • Hiatal hernia   • Essential hypertension   • Sjogren's syndrome (HCC)   • Multiple sclerosis (HCC)   • Psoriasis   • Fibromyalgia   • Osteoarthritis   • Psoriatic arthritis (HCC)   • RA (rheumatoid arthritis) (HCC)   • Degenerative disc disease, lumbar   • TMJ arthritis   • Dupuytren's disease   • H. pylori infection   • Family history of coronary artery disease   • Type 2 diabetes mellitus (HCC)   • Edema   • Bilateral leg edema   • Isolated corticotropin deficiency (HCC)   • Hyponatremia   • Paroxysmal atrial fibrillation (HCC)       Dear Kreis, Samuel Duane, MD:    Subjective     History of Present Illness:    Chief Complaint   Patient presents with   • Follow-up     2MO F/U   • Med Management       Lashae Benitez is a pleasant 61 y.o. female with a past medical history significant for coronary artery disease or structural heart disease she also denies any history of tobacco abuse.  She does have diabetes mellitus, paroxysmal atrial fibrillation anticoagulated with Xarelto and with rhythm control on flecainide.  She does have essential hypertension.  she does report family history of premature CAD with her father having a major AMI in his 30s. She comes in today for routine cardiology follow-up.    Ms. Bhardwaj reports that she has still been having this epigastric/lower left chest pain just under her left breast.  She reports that this pain has been coming on essentially daily and at times can last upwards of 2 hours in duration.  She does believe it can be worsened if she overexerts herself such as doing chores around her house.  She does describe this pain as a sharp sensation that radiates into her back.  She did have stress test before which did reveal normal myocardial perfusion study with no evidence of ischemia she also had echocardiogram performed which showed normal left ventricular ejection fraction of  "61 to 65% with no significant valvular abnormalities.  She does have history of gastric bypass in 2007 with no sequelae since.        Allergies   Allergen Reactions   • Codeine Angioedema   • Imuran [Azathioprine] Other (See Comments)     Has pancreatis attacks with med   • Januvia [Sitagliptin] Other (See Comments)     Has pancreatis attacks with med    • Penicillins Angioedema   • Sulfa Antibiotics Angioedema   • Sulfasalazine Unknown (See Comments)   • Beta Adrenergic Blockers Other (See Comments)     I called pt to ask her about the allergy to bblockers in her chart. Pt states \"too big of a dose makes her psoriasis act up\", but this current dose of metoprolol 50 mg bid, she has been on for a long time, with no ill side effects.  CATA,CMA 3/28/18   :      Current Outpatient Medications:   •  abatacept (ORENCIA) 250 MG injection, Infuse 750 mg into a venous catheter Every 28 (Twenty-Eight) Days., Disp: , Rfl:   •  alendronate (FOSAMAX) 70 MG tablet, Take 70 mg by mouth 1 (One) Time Per Week. Wednesday, Disp: , Rfl:   •  amitriptyline (ELAVIL) 25 MG tablet, Take 25 mg by mouth Every Night., Disp: , Rfl:   •  amLODIPine (NORVASC) 5 MG tablet, Take 1.5 tablets by mouth Daily., Disp: 135 tablet, Rfl: 3  •  Apremilast (Otezla) 30 MG tablet, Take  by mouth 2 (two) times a day., Disp: , Rfl:   •  butalbital-acetaminophen-caffeine (Esgic) -40 MG per tablet, Take 1 tablet by mouth Every 4 (Four) Hours As Needed for Headache., Disp: , Rfl:   •  Calcipotriene-Betameth Diprop (Enstilar) 0.005-0.064 % foam, Apply 1 spray topically Daily., Disp: , Rfl:   •  calcium citrate-vitamin d (CALCITRATE) 315-250 MG-UNIT tablet tablet, Take 1 tablet by mouth Every Evening., Disp: , Rfl:   •  Cholecalciferol (VITAMIN D3) 125 MCG (5000 UT) capsule capsule, Take 5,000 Units by mouth Daily., Disp: , Rfl:   •  cyanocobalamin 1000 MCG/ML injection, Inject 1,000 mcg into the shoulder, thigh, or buttocks Every 28 (Twenty-Eight) Days., Disp: " , Rfl:   •  cyclobenzaprine (FLEXERIL) 10 MG tablet, Take 10 mg by mouth Every Evening., Disp: , Rfl:   •  diazePAM (VALIUM) 2 MG tablet, Take 2 mg by mouth Daily As Needed for Anxiety., Disp: , Rfl:   •  DULoxetine (CYMBALTA) 30 MG capsule, Take 90 mg by mouth Every Morning., Disp: , Rfl:   •  EMGALITY 120 MG/ML prefilled syringe, Inject 120 mg under the skin into the appropriate area as directed Every 30 (Thirty) Days., Disp: , Rfl: 3  •  ferrous gluconate (FERGON) 240 (27 FE) MG tablet, Take 240 mg by mouth Every Morning., Disp: , Rfl:   •  flecainide (TAMBOCOR) 50 MG tablet, Take 1 tablet by mouth 2 (Two) Times a Day., Disp: 180 tablet, Rfl: 3  •  folic acid (FOLVITE) 1 MG tablet, Take 1 mg by mouth daily., Disp: , Rfl:   •  gabapentin (NEURONTIN) 600 MG tablet, Take 600 mg by mouth 3 (Three) Times a Day., Disp: , Rfl:   •  insulin detemir (LEVEMIR) 100 UNIT/ML injection, Inject 25 Units under the skin into the appropriate area as directed Daily., Disp: , Rfl:   •  levothyroxine sodium (TIROSINT) 137 MCG capsule, Take 125 mcg by mouth Daily. Takes an extra dose at the first of each month., Disp: , Rfl:   •  linaclotide (LINZESS) 290 MCG capsule capsule, Take 290 mcg by mouth every morning before breakfast., Disp: , Rfl:   •  lisinopril (PRINIVIL,ZESTRIL) 20 MG tablet, Take 20 mg by mouth Daily., Disp: , Rfl:   •  loratadine (CLARITIN) 10 MG tablet, Take 10 mg by mouth Every Night., Disp: , Rfl:   •  methotrexate 2.5 MG tablet, Take 25 mg by mouth 1 (One) Time Per Week. Prior to [facility], [place], operated by [facility] Admission, Patient was on: Takes 10 tablets on Saturday, Disp: , Rfl:   •  metoprolol succinate XL (TOPROL-XL) 100 MG 24 hr tablet, TAKE 1 TABLET DAILY, Disp: 90 tablet, Rfl: 3  •  multivitamin (DAILY MICHAEL) tablet tablet, Take 1 tablet by mouth Every Evening., Disp: , Rfl:   •  omeprazole (priLOSEC) 40 MG capsule, Take 40 mg by mouth Daily., Disp: , Rfl:   •  OnabotulinumtoxinA (BOTOX IJ), Inject  as directed. Q 3 M FOTR  "MIGRAINES, Disp: , Rfl:   •  potassium chloride (MICRO-K) 10 MEQ CR capsule, Take 10 mEq by mouth 2 (Two) Times a Day., Disp: , Rfl:   •  primidone (MYSOLINE) 50 MG tablet, Take 50 mg by mouth Every Night., Disp: , Rfl:   •  RABEprazole (ACIPHEX) 20 MG EC tablet, Take 20 mg by mouth Daily., Disp: , Rfl:   •  rimegepant 75 MG dispersible tablet, DISSOLVE ONE TABLET UNDER THE TONGUE AT ONSET OF MIGRAINE HEADACHE. MAX DOSE ONE TABLET IN 24 HOURS, Disp: , Rfl:   •  sodium chloride 1 g tablet, Take 1 g by mouth 2 (Two) Times a Day., Disp: , Rfl:   •  vitamin C (ASCORBIC ACID) 500 MG tablet, Take 1,000 mg by mouth Every Evening., Disp: , Rfl:   •  vitamin E 1000 UNIT capsule, Take 1,000 Units by mouth Every Evening., Disp: , Rfl:   •  Xarelto 20 MG tablet, TAKE 1 TABLET DAILY, Disp: 90 tablet, Rfl: 3  •  clopidogrel (PLAVIX) 75 MG tablet, Take 1 tablet by mouth Daily., Disp: 90 tablet, Rfl: 3  •  isosorbide mononitrate (IMDUR) 30 MG 24 hr tablet, Take 1 tablet by mouth Daily., Disp: 21 tablet, Rfl: 0    The following portions of the patient's history were reviewed and updated as appropriate: allergies, current medications, past family history, past medical history, past social history, past surgical history and problem list.    Social History     Tobacco Use   • Smoking status: Never Smoker   • Smokeless tobacco: Never Used   Vaping Use   • Vaping Use: Never used   Substance Use Topics   • Alcohol use: No   • Drug use: No         Objective   Vitals:    01/04/22 0811   BP: 116/70   BP Location: Right arm   Patient Position: Sitting   Pulse: 89   Temp: 97 °F (36.1 °C)   Weight: 75.6 kg (166 lb 9.6 oz)   Height: 165.1 cm (65\")     Body mass index is 27.72 kg/m².    Constitutional:       General: Not in acute distress.     Appearance: Healthy appearance. Well-developed and not in distress. Not diaphoretic.   Eyes:      Conjunctiva/sclera: Conjunctivae normal.      Pupils: Pupils are equal, round, and reactive to light. "   HENT:      Head: Normocephalic and atraumatic.   Neck:      Vascular: No carotid bruit or JVD.   Pulmonary:      Effort: Pulmonary effort is normal. No respiratory distress.      Breath sounds: Normal breath sounds.   Cardiovascular:      Normal rate. Regular rhythm.   Skin:     General: Skin is cool.   Neurological:      Mental Status: Alert, oriented to person, place, and time and oriented to person, place and time.         Lab Results   Component Value Date     (L) 11/23/2021    K 4.5 11/23/2021     11/23/2021    CO2 25.0 11/23/2021    BUN 12 11/23/2021    CREATININE 0.74 11/23/2021    GLUCOSE 256 (H) 11/23/2021    CALCIUM 9.0 11/23/2021    AST 18 11/23/2021    ALT 24 11/23/2021    ALKPHOS 120 (H) 11/23/2021    LABIL2 1.4 (L) 08/11/2015     Lab Results   Component Value Date    CKTOTAL 40 04/12/2017     Lab Results   Component Value Date    WBC 7.02 06/12/2021    HGB 11.9 (L) 06/12/2021    HCT 35.9 06/12/2021     06/12/2021     Lab Results   Component Value Date    INR 0.97 11/22/2018    INR 0.93 07/20/2015     Lab Results   Component Value Date    MG 2.0 09/21/2020     Lab Results   Component Value Date    TSH 0.101 (L) 06/01/2021    CHLPL 132 07/22/2015    TRIG 85 07/22/2015    HDL 40 (L) 07/22/2015    LDL 74 07/22/2015      Lab Results   Component Value Date    BNP 51.0 11/22/2018       During this visit the following were done:  Labs Reviewed []    Labs Ordered []    Radiology Reports Reviewed []    Radiology Ordered []    PCP Records Reviewed []    Referring Provider Records Reviewed []    ER Records Reviewed []    Hospital Records Reviewed []    History Obtained From Family []    Radiology Images Reviewed []    Other Reviewed []    Records Requested []       Procedures    Assessment/Plan    Diagnosis Plan   1. Precordial pain  CBC (No Diff)    Lipid Panel            Recommendations:  1. Precordial pain  1. Differential includes coronary artery disease versus GI etiology.  Suspicion of  coronary artery disease is lower with normal stress that she had which I did discuss with her today.  Given her history of gastric bypass I do think she needs to have GI etiology ruled out with an EGD.  However, she does have risk factors and her symptoms do have some characteristics of coronary artery disease so we will continue to treat for possible CAD.  2. I will start Plavix 75 mg daily as well as Imdur 30 mg every morning.  3. Continue lisinopril and metoprolol succinate.  4. Check CBC and lipid panel in 1 week.  I do think she would benefit from statin therapy however wait until lipid panel was drawn to decide on statin intensity.      No follow-ups on file.    As always, I appreciate very much the opportunity to participate in the cardiovascular care of your patients.      With Best Regards,    Gael Baum PA-C

## 2022-01-06 ENCOUNTER — LAB (OUTPATIENT)
Dept: LAB | Facility: HOSPITAL | Age: 62
End: 2022-01-06

## 2022-01-06 DIAGNOSIS — R07.2 PRECORDIAL PAIN: ICD-10-CM

## 2022-01-06 PROCEDURE — 85027 COMPLETE CBC AUTOMATED: CPT

## 2022-01-06 PROCEDURE — 36415 COLL VENOUS BLD VENIPUNCTURE: CPT

## 2022-01-06 PROCEDURE — 80061 LIPID PANEL: CPT

## 2022-01-07 LAB
CHOLEST SERPL-MCNC: 154 MG/DL (ref 0–200)
DEPRECATED RDW RBC AUTO: 48.2 FL (ref 37–54)
ERYTHROCYTE [DISTWIDTH] IN BLOOD BY AUTOMATED COUNT: 14.1 % (ref 12.3–15.4)
HCT VFR BLD AUTO: 33.6 % (ref 34–46.6)
HDLC SERPL-MCNC: 70 MG/DL (ref 40–60)
HGB BLD-MCNC: 11.4 G/DL (ref 12–15.9)
LDLC SERPL CALC-MCNC: 73 MG/DL (ref 0–100)
LDLC/HDLC SERPL: 1.06 {RATIO}
MCH RBC QN AUTO: 32.3 PG (ref 26.6–33)
MCHC RBC AUTO-ENTMCNC: 33.9 G/DL (ref 31.5–35.7)
MCV RBC AUTO: 95.2 FL (ref 79–97)
PLATELET # BLD AUTO: 418 10*3/MM3 (ref 140–450)
PMV BLD AUTO: 9.9 FL (ref 6–12)
RBC # BLD AUTO: 3.53 10*6/MM3 (ref 3.77–5.28)
TRIGL SERPL-MCNC: 49 MG/DL (ref 0–150)
VLDLC SERPL-MCNC: 11 MG/DL (ref 5–40)
WBC NRBC COR # BLD: 7.5 10*3/MM3 (ref 3.4–10.8)

## 2022-01-18 RX ORDER — RIVAROXABAN 20 MG/1
TABLET, FILM COATED ORAL
Qty: 90 TABLET | Refills: 0 | Status: SHIPPED | OUTPATIENT
Start: 2022-01-18 | End: 2022-04-18

## 2022-01-19 RX ORDER — ISOSORBIDE MONONITRATE 30 MG/1
30 TABLET, EXTENDED RELEASE ORAL DAILY
Qty: 90 TABLET | Refills: 0 | Status: SHIPPED | OUTPATIENT
Start: 2022-01-19 | End: 2022-04-01

## 2022-02-01 RX ORDER — FLECAINIDE ACETATE 50 MG/1
TABLET ORAL
Qty: 180 TABLET | Refills: 3 | Status: SHIPPED | OUTPATIENT
Start: 2022-02-01 | End: 2023-01-30

## 2022-02-11 ENCOUNTER — APPOINTMENT (OUTPATIENT)
Dept: MAMMOGRAPHY | Facility: HOSPITAL | Age: 62
End: 2022-02-11

## 2022-02-23 ENCOUNTER — LAB (OUTPATIENT)
Dept: LAB | Facility: HOSPITAL | Age: 62
End: 2022-02-23

## 2022-02-23 ENCOUNTER — TRANSCRIBE ORDERS (OUTPATIENT)
Dept: ADMINISTRATIVE | Facility: HOSPITAL | Age: 62
End: 2022-02-23

## 2022-02-23 DIAGNOSIS — E87.1 HYPONATREMIA: ICD-10-CM

## 2022-02-23 DIAGNOSIS — E87.1 HYPONATREMIA: Primary | ICD-10-CM

## 2022-02-23 PROCEDURE — 81003 URINALYSIS AUTO W/O SCOPE: CPT

## 2022-02-23 PROCEDURE — 36415 COLL VENOUS BLD VENIPUNCTURE: CPT

## 2022-02-23 PROCEDURE — 80053 COMPREHEN METABOLIC PANEL: CPT

## 2022-02-24 LAB
ALBUMIN SERPL-MCNC: 3.9 G/DL (ref 3.5–5.2)
ALBUMIN/GLOB SERPL: 1.6 G/DL
ALP SERPL-CCNC: 127 U/L (ref 39–117)
ALT SERPL W P-5'-P-CCNC: 26 U/L (ref 1–33)
ANION GAP SERPL CALCULATED.3IONS-SCNC: 10.7 MMOL/L (ref 5–15)
AST SERPL-CCNC: 22 U/L (ref 1–32)
BILIRUB SERPL-MCNC: 0.2 MG/DL (ref 0–1.2)
BILIRUB UR QL STRIP: NEGATIVE
BUN SERPL-MCNC: 8 MG/DL (ref 8–23)
BUN/CREAT SERPL: 10.1 (ref 7–25)
CALCIUM SPEC-SCNC: 9.4 MG/DL (ref 8.6–10.5)
CHLORIDE SERPL-SCNC: 97 MMOL/L (ref 98–107)
CLARITY UR: CLEAR
CO2 SERPL-SCNC: 23.3 MMOL/L (ref 22–29)
COLOR UR: YELLOW
CREAT SERPL-MCNC: 0.79 MG/DL (ref 0.57–1)
GFR SERPL CREATININE-BSD FRML MDRD: 74 ML/MIN/1.73
GLOBULIN UR ELPH-MCNC: 2.5 GM/DL
GLUCOSE SERPL-MCNC: 213 MG/DL (ref 65–99)
GLUCOSE UR STRIP-MCNC: ABNORMAL MG/DL
HGB UR QL STRIP.AUTO: NEGATIVE
KETONES UR QL STRIP: NEGATIVE
LEUKOCYTE ESTERASE UR QL STRIP.AUTO: NEGATIVE
NITRITE UR QL STRIP: NEGATIVE
PH UR STRIP.AUTO: 6.5 [PH] (ref 5–8)
POTASSIUM SERPL-SCNC: 4.8 MMOL/L (ref 3.5–5.2)
PROT SERPL-MCNC: 6.4 G/DL (ref 6–8.5)
PROT UR QL STRIP: NEGATIVE
SODIUM SERPL-SCNC: 131 MMOL/L (ref 136–145)
SP GR UR STRIP: <=1.005 (ref 1–1.03)
UROBILINOGEN UR QL STRIP: ABNORMAL

## 2022-03-02 ENCOUNTER — TRANSCRIBE ORDERS (OUTPATIENT)
Dept: LAB | Facility: HOSPITAL | Age: 62
End: 2022-03-02

## 2022-03-02 DIAGNOSIS — Z01.818 OTHER SPECIFIED PRE-OPERATIVE EXAMINATION: Primary | ICD-10-CM

## 2022-03-04 ENCOUNTER — OFFICE VISIT (OUTPATIENT)
Dept: CARDIOLOGY | Facility: CLINIC | Age: 62
End: 2022-03-04

## 2022-03-04 VITALS
BODY MASS INDEX: 27.96 KG/M2 | WEIGHT: 167.8 LBS | HEART RATE: 90 BPM | DIASTOLIC BLOOD PRESSURE: 68 MMHG | OXYGEN SATURATION: 100 % | SYSTOLIC BLOOD PRESSURE: 108 MMHG | TEMPERATURE: 97.1 F | HEIGHT: 65 IN

## 2022-03-04 DIAGNOSIS — I10 ESSENTIAL HYPERTENSION: ICD-10-CM

## 2022-03-04 DIAGNOSIS — I48.0 PAROXYSMAL ATRIAL FIBRILLATION: Primary | ICD-10-CM

## 2022-03-04 PROCEDURE — 99214 OFFICE O/P EST MOD 30 MIN: CPT | Performed by: PHYSICIAN ASSISTANT

## 2022-03-04 RX ORDER — AMLODIPINE BESYLATE 5 MG/1
5 TABLET ORAL DAILY
Qty: 30 TABLET | Refills: 3 | Status: SHIPPED | OUTPATIENT
Start: 2022-03-04 | End: 2022-06-06

## 2022-03-04 NOTE — PROGRESS NOTES
Kreis, Samuel Duane, MD  Lashae Benitez  1960  03/04/2022    Patient Active Problem List   Diagnosis   • Hiatal hernia   • Essential hypertension   • Sjogren's syndrome (HCC)   • Multiple sclerosis (HCC)   • Psoriasis   • Fibromyalgia   • Osteoarthritis   • Psoriatic arthritis (HCC)   • RA (rheumatoid arthritis) (HCC)   • Degenerative disc disease, lumbar   • TMJ arthritis   • Dupuytren's disease   • H. pylori infection   • Family history of coronary artery disease   • Type 2 diabetes mellitus (HCC)   • Edema   • Bilateral leg edema   • Isolated corticotropin deficiency (HCC)   • Hyponatremia   • Paroxysmal atrial fibrillation (HCC)       Dear Kreis, Samuel Duane, MD:    Subjective     History of Present Illness:    Chief Complaint   Patient presents with   • Follow-up     6 week follow up with labs    • Med Management     pt provided list       Lashae Benitez is a pleasant 61 y.o. female with a past medical history significant for no known coronary artery disease or structural heart disease she also denies any history of tobacco abuse.  She does have diabetes mellitus, paroxysmal atrial fibrillation anticoagulated with Xarelto and with rhythm control on flecainide.  She does have essential hypertension.  she does report family history of premature CAD with her father having a major AMI in his 30s. She comes in today for routine cardiology follow-up.    Lashae reports since last visit her chest pains have resolved and she has tolerated both Plavix and Imdur well without any major sequelae.  She did report occasional headache but these are mild.  She also denies any bleeding issues with the Plavix.  She reports her breathing is stable denies any worsening of her shortness of breath.  Her biggest complaint today is that she has become dizzy occasionally when she stands and twists.  Thankfully she denies any falls or full syncopal episodes.  She has been unable to check blood pressure when these episodes come  "on.    Orthostatic vitals in the office: Lying 109/67, sitting 94/60, standing 87/57 heart rate increased from 87-93 from laying to standing.    Allergies   Allergen Reactions   • Codeine Angioedema   • Imuran [Azathioprine] Other (See Comments)     Has pancreatis attacks with med   • Januvia [Sitagliptin] Other (See Comments)     Has pancreatis attacks with med    • Penicillins Angioedema   • Sulfa Antibiotics Angioedema   • Sulfasalazine Unknown (See Comments)   • Beta Adrenergic Blockers Other (See Comments)     I called pt to ask her about the allergy to bblockers in her chart. Pt states \"too big of a dose makes her psoriasis act up\", but this current dose of metoprolol 50 mg bid, she has been on for a long time, with no ill side effects.  CATA,CMA 3/28/18   :      Current Outpatient Medications:   •  abatacept (ORENCIA) 250 MG injection, Infuse 750 mg into a venous catheter Every 28 (Twenty-Eight) Days., Disp: , Rfl:   •  alendronate (FOSAMAX) 70 MG tablet, Take 70 mg by mouth 1 (One) Time Per Week. Wednesday, Disp: , Rfl:   •  amitriptyline (ELAVIL) 25 MG tablet, Take 25 mg by mouth Every Night., Disp: , Rfl:   •  amLODIPine (NORVASC) 5 MG tablet, Take 1 tablet by mouth Daily., Disp: 30 tablet, Rfl: 3  •  Apremilast (Otezla) 30 MG tablet, Take  by mouth 2 (two) times a day., Disp: , Rfl:   •  butalbital-acetaminophen-caffeine (Esgic) -40 MG per tablet, Take 1 tablet by mouth Every 4 (Four) Hours As Needed for Headache., Disp: , Rfl:   •  Calcipotriene-Betameth Diprop (Enstilar) 0.005-0.064 % foam, Apply 1 spray topically Daily., Disp: , Rfl:   •  calcium citrate-vitamin d (CALCITRATE) 315-250 MG-UNIT tablet tablet, Take 1 tablet by mouth Every Evening., Disp: , Rfl:   •  Cholecalciferol (VITAMIN D3) 125 MCG (5000 UT) capsule capsule, Take 5,000 Units by mouth Daily., Disp: , Rfl:   •  cyanocobalamin 1000 MCG/ML injection, Inject 1,000 mcg into the shoulder, thigh, or buttocks Every 28 (Twenty-Eight) Days., " Disp: , Rfl:   •  cyclobenzaprine (FLEXERIL) 10 MG tablet, Take 10 mg by mouth Every Evening., Disp: , Rfl:   •  diazePAM (VALIUM) 2 MG tablet, Take 2 mg by mouth Daily As Needed for Anxiety., Disp: , Rfl:   •  DULoxetine (CYMBALTA) 30 MG capsule, Take 90 mg by mouth Every Morning., Disp: , Rfl:   •  EMGALITY 120 MG/ML prefilled syringe, Inject 120 mg under the skin into the appropriate area as directed Every 30 (Thirty) Days., Disp: , Rfl: 3  •  ferrous gluconate (FERGON) 240 (27 FE) MG tablet, Take 240 mg by mouth Every Morning., Disp: , Rfl:   •  flecainide (TAMBOCOR) 50 MG tablet, TAKE 1 TABLET TWICE A DAY, Disp: 180 tablet, Rfl: 3  •  folic acid (FOLVITE) 1 MG tablet, Take 1 mg by mouth daily., Disp: , Rfl:   •  gabapentin (NEURONTIN) 600 MG tablet, Take 600 mg by mouth 3 (Three) Times a Day., Disp: , Rfl:   •  insulin detemir (LEVEMIR) 100 UNIT/ML injection, Inject 25 Units under the skin into the appropriate area as directed Daily., Disp: , Rfl:   •  isosorbide mononitrate (IMDUR) 30 MG 24 hr tablet, Take 1 tablet by mouth Daily., Disp: 90 tablet, Rfl: 0  •  levothyroxine sodium (TIROSINT) 137 MCG capsule, Take 125 mcg by mouth Daily. Takes an extra dose at the first of each month., Disp: , Rfl:   •  linaclotide (LINZESS) 290 MCG capsule capsule, Take 290 mcg by mouth every morning before breakfast., Disp: , Rfl:   •  lisinopril (PRINIVIL,ZESTRIL) 20 MG tablet, Take 20 mg by mouth Daily., Disp: , Rfl:   •  loratadine (CLARITIN) 10 MG tablet, Take 10 mg by mouth Every Night., Disp: , Rfl:   •  methotrexate 2.5 MG tablet, Take 25 mg by mouth 1 (One) Time Per Week. Prior to [facility] Admission, Patient was on: Takes 10 tablets on Saturday, Disp: , Rfl:   •  metoprolol succinate XL (TOPROL-XL) 100 MG 24 hr tablet, TAKE 1 TABLET DAILY, Disp: 90 tablet, Rfl: 3  •  multivitamin (DAILY MICHAEL) tablet tablet, Take 1 tablet by mouth Every Evening., Disp: , Rfl:   •  omeprazole (priLOSEC) 40 MG capsule, Take 40 mg by mouth  "Daily., Disp: , Rfl:   •  OnabotulinumtoxinA (BOTOX IJ), Inject  as directed. Q 3 M FOTR MIGRAINES, Disp: , Rfl:   •  potassium chloride (MICRO-K) 10 MEQ CR capsule, Take 10 mEq by mouth 2 (Two) Times a Day., Disp: , Rfl:   •  primidone (MYSOLINE) 50 MG tablet, Take 50 mg by mouth Every Night., Disp: , Rfl:   •  RABEprazole (ACIPHEX) 20 MG EC tablet, Take 20 mg by mouth Daily., Disp: , Rfl:   •  rimegepant 75 MG dispersible tablet, DISSOLVE ONE TABLET UNDER THE TONGUE AT ONSET OF MIGRAINE HEADACHE. MAX DOSE ONE TABLET IN 24 HOURS, Disp: , Rfl:   •  sodium chloride 1 g tablet, Take 1 g by mouth 2 (Two) Times a Day., Disp: , Rfl:   •  vitamin C (ASCORBIC ACID) 500 MG tablet, Take 1,000 mg by mouth Every Evening., Disp: , Rfl:   •  vitamin E 1000 UNIT capsule, Take 1,000 Units by mouth Every Evening., Disp: , Rfl:   •  Xarelto 20 MG tablet, TAKE 1 TABLET DAILY, Disp: 90 tablet, Rfl: 0    The following portions of the patient's history were reviewed and updated as appropriate: allergies, current medications, past family history, past medical history, past social history, past surgical history and problem list.    Social History     Tobacco Use   • Smoking status: Never Smoker   • Smokeless tobacco: Never Used   Vaping Use   • Vaping Use: Never used   Substance Use Topics   • Alcohol use: No   • Drug use: No         Objective   Vitals:    03/04/22 0950 03/04/22 1012 03/04/22 1014 03/04/22 1015   BP: 108/68      Pulse: 90      Temp: 97.1 °F (36.2 °C)      SpO2:  100% 100% 100%   Weight: 76.1 kg (167 lb 12.8 oz)      Height: 165.1 cm (65\")        Body mass index is 27.92 kg/m².    Constitutional:       General: Not in acute distress.     Appearance: Healthy appearance. Well-developed and not in distress. Not diaphoretic.   Eyes:      Conjunctiva/sclera: Conjunctivae normal.      Pupils: Pupils are equal, round, and reactive to light.   HENT:      Head: Normocephalic and atraumatic.   Neck:      Vascular: No carotid bruit " or JVD.   Pulmonary:      Effort: Pulmonary effort is normal. No respiratory distress.      Breath sounds: Normal breath sounds.   Cardiovascular:      Normal rate. Regular rhythm.   Skin:     General: Skin is cool.   Neurological:      Mental Status: Alert, oriented to person, place, and time and oriented to person, place and time.         Lab Results   Component Value Date     (L) 02/23/2022    K 4.8 02/23/2022    CL 97 (L) 02/23/2022    CO2 23.3 02/23/2022    BUN 8 02/23/2022    CREATININE 0.79 02/23/2022    GLUCOSE 213 (H) 02/23/2022    CALCIUM 9.4 02/23/2022    AST 22 02/23/2022    ALT 26 02/23/2022    ALKPHOS 127 (H) 02/23/2022    LABIL2 1.4 (L) 08/11/2015     Lab Results   Component Value Date    CKTOTAL 40 04/12/2017     Lab Results   Component Value Date    WBC 7.50 01/06/2022    HGB 11.4 (L) 01/06/2022    HCT 33.6 (L) 01/06/2022     01/06/2022     Lab Results   Component Value Date    INR 0.97 11/22/2018    INR 0.93 07/20/2015     Lab Results   Component Value Date    MG 2.0 09/21/2020     Lab Results   Component Value Date    TSH 0.101 (L) 06/01/2021    CHLPL 132 07/22/2015    TRIG 49 01/06/2022    HDL 70 (H) 01/06/2022    LDL 73 01/06/2022      Lab Results   Component Value Date    BNP 51.0 11/22/2018       During this visit the following were done:  Labs Reviewed []    Labs Ordered []    Radiology Reports Reviewed []    Radiology Ordered []    PCP Records Reviewed []    Referring Provider Records Reviewed []    ER Records Reviewed []    Hospital Records Reviewed []    History Obtained From Family []    Radiology Images Reviewed []    Other Reviewed []    Records Requested []       Procedures    Assessment/Plan    Diagnosis Plan   1. Paroxysmal atrial fibrillation (HCC)     2. Essential hypertension  amLODIPine (NORVASC) 5 MG tablet            Recommendations:  1. Orthostatic hypotension  1. Patient did develop pretty significant orthostatic hypotension from laying to standing dropping from  109/67- 87/57.  Due to this I will reduce dose of amlodipine down to 2.5 mg and strongly encouraged her to increase water intake including trying to drink at least 1 sports drink daily.  I will try to have her come back next week to recheck orthostatic vitals and will adjust antihypertensive regimen accordingly.  She expressed understanding and agreeable to this plan.  2. While she is still having this dizziness I do recommend her to stand cautiously and slowly and even consider using a cane for assistance until this improves.  2. Paroxysmal atrial fibrillation  1. Appears to be maintaining sinus rhythm today tolerating Xarelto well without any bleeding issues.  3. Precordial pain  1. Since she is now chest pain-free and stress test was unremarkable with no known coronary artery disease I do think risk of Plavix outweighs the benefit at this point so I will stop this today.  Especially since she is also on Xarelto.         Return in about 3 months (around 6/4/2022).    As always, I appreciate very much the opportunity to participate in the cardiovascular care of your patients.      With Best Regards,    Gael Baum PA-C

## 2022-03-09 ENCOUNTER — TELEPHONE (OUTPATIENT)
Dept: CARDIOLOGY | Facility: CLINIC | Age: 62
End: 2022-03-09

## 2022-04-01 RX ORDER — ISOSORBIDE MONONITRATE 30 MG/1
TABLET, EXTENDED RELEASE ORAL
Qty: 90 TABLET | Refills: 3 | Status: SHIPPED | OUTPATIENT
Start: 2022-04-01 | End: 2023-03-30

## 2022-04-08 ENCOUNTER — TRANSCRIBE ORDERS (OUTPATIENT)
Dept: ADMINISTRATIVE | Facility: HOSPITAL | Age: 62
End: 2022-04-08

## 2022-04-08 ENCOUNTER — LAB (OUTPATIENT)
Dept: LAB | Facility: HOSPITAL | Age: 62
End: 2022-04-08

## 2022-04-08 DIAGNOSIS — J31.0 CHRONIC RHINITIS: ICD-10-CM

## 2022-04-08 DIAGNOSIS — J31.0 CHRONIC RHINITIS: Primary | ICD-10-CM

## 2022-04-08 PROCEDURE — 86003 ALLG SPEC IGE CRUDE XTRC EA: CPT

## 2022-04-08 PROCEDURE — 36415 COLL VENOUS BLD VENIPUNCTURE: CPT

## 2022-04-08 PROCEDURE — 82785 ASSAY OF IGE: CPT

## 2022-04-08 PROCEDURE — 85025 COMPLETE CBC W/AUTO DIFF WBC: CPT

## 2022-04-09 LAB
BASOPHILS # BLD AUTO: 0.06 10*3/MM3 (ref 0–0.2)
BASOPHILS NFR BLD AUTO: 0.7 % (ref 0–1.5)
DEPRECATED RDW RBC AUTO: 45.4 FL (ref 37–54)
EOSINOPHIL # BLD AUTO: 0.18 10*3/MM3 (ref 0–0.4)
EOSINOPHIL NFR BLD AUTO: 2.2 % (ref 0.3–6.2)
ERYTHROCYTE [DISTWIDTH] IN BLOOD BY AUTOMATED COUNT: 13.1 % (ref 12.3–15.4)
HCT VFR BLD AUTO: 34.6 % (ref 34–46.6)
HGB BLD-MCNC: 12 G/DL (ref 12–15.9)
IMM GRANULOCYTES # BLD AUTO: 0.05 10*3/MM3 (ref 0–0.05)
IMM GRANULOCYTES NFR BLD AUTO: 0.6 % (ref 0–0.5)
LYMPHOCYTES # BLD AUTO: 1.51 10*3/MM3 (ref 0.7–3.1)
LYMPHOCYTES NFR BLD AUTO: 18.2 % (ref 19.6–45.3)
MCH RBC QN AUTO: 33.2 PG (ref 26.6–33)
MCHC RBC AUTO-ENTMCNC: 34.7 G/DL (ref 31.5–35.7)
MCV RBC AUTO: 95.8 FL (ref 79–97)
MONOCYTES # BLD AUTO: 0.61 10*3/MM3 (ref 0.1–0.9)
MONOCYTES NFR BLD AUTO: 7.3 % (ref 5–12)
NEUTROPHILS NFR BLD AUTO: 5.89 10*3/MM3 (ref 1.7–7)
NEUTROPHILS NFR BLD AUTO: 71 % (ref 42.7–76)
NRBC BLD AUTO-RTO: 0 /100 WBC (ref 0–0.2)
PLATELET # BLD AUTO: 405 10*3/MM3 (ref 140–450)
PMV BLD AUTO: 9.9 FL (ref 6–12)
RBC # BLD AUTO: 3.61 10*6/MM3 (ref 3.77–5.28)
WBC NRBC COR # BLD: 8.3 10*3/MM3 (ref 3.4–10.8)

## 2022-04-10 LAB — COMMON RAGWEED IGG AB [MASS/VOLUME] IN SERUM: <2 UG/ML (ref 0–1.9)

## 2022-04-15 LAB
A ALTERNATA IGE QN: <0.1 KU/L
A FUMIGATUS IGE QN: <0.1 KU/L
BERMUDA GRASS IGE QN: <0.1 KU/L
BOXELDER IGE QN: <0.1 KU/L
C HERBARUM IGE QN: <0.1 KU/L
CAT DANDER IGE QN: <0.1 KU/L
CMN PIGWEED IGE QN: <0.1 KU/L
COTTONWOOD IGE QN: <0.1 KU/L
D FARINAE IGE QN: <0.1 KU/L
D PTERONYSS IGE QN: <0.1 KU/L
DOG DANDER IGE QN: <0.1 KU/L
IGE SERPL-ACNC: 4 IU/ML (ref 6–495)
JOHNSON GRASS IGE QN: <0.1 KU/L
LONDON PLANE IGE QN: <0.1 KU/L
MOUSE EPITH IGE QN: <0.1 KU/L
MT JUNIPER IGE QN: <0.1 KU/L
P NOTATUM IGE QN: <0.1 KU/L
PECAN/HICK TREE IGE QN: <0.1 KU/L
ROACH IGE QN: <0.1 KU/L
SALTWORT IGE QN: <0.1 KU/L
SHEEP SORREL IGE QN: <0.1 KU/L
SILVER BIRCH IGE QN: <0.1 KU/L
TIMOTHY IGE QN: <0.1 KU/L
WALNUT IGE QN: <0.1 KU/L
WHITE ASH IGE QN: <0.1 KU/L
WHITE ELM IGE QN: <0.1 KU/L
WHITE HICKORY IGE QN: <0.1 KU/L
WHITE MULBERRY IGE QN: <0.1 KU/L
WHITE OAK IGE QN: <0.1 KU/L

## 2022-04-18 RX ORDER — RIVAROXABAN 20 MG/1
TABLET, FILM COATED ORAL
Qty: 90 TABLET | Refills: 3 | Status: SHIPPED | OUTPATIENT
Start: 2022-04-18

## 2022-05-05 ENCOUNTER — TRANSCRIBE ORDERS (OUTPATIENT)
Dept: OTHER | Facility: OTHER | Age: 62
End: 2022-05-05

## 2022-05-05 ENCOUNTER — HOSPITAL ENCOUNTER (OUTPATIENT)
Dept: GENERAL RADIOLOGY | Facility: HOSPITAL | Age: 62
Discharge: HOME | End: 2022-05-05
Admitting: NURSE PRACTITIONER

## 2022-05-05 DIAGNOSIS — M54.41 ACUTE BILATERAL LOW BACK PAIN WITH RIGHT-SIDED SCIATICA: ICD-10-CM

## 2022-05-05 DIAGNOSIS — M25.551 RIGHT HIP PAIN: ICD-10-CM

## 2022-05-05 DIAGNOSIS — M25.551 RIGHT HIP PAIN: Primary | ICD-10-CM

## 2022-05-05 PROCEDURE — 73502 X-RAY EXAM HIP UNI 2-3 VIEWS: CPT

## 2022-05-05 PROCEDURE — 72100 X-RAY EXAM L-S SPINE 2/3 VWS: CPT

## 2022-05-05 PROCEDURE — 72100 X-RAY EXAM L-S SPINE 2/3 VWS: CPT | Performed by: RADIOLOGY

## 2022-05-05 PROCEDURE — 73502 X-RAY EXAM HIP UNI 2-3 VIEWS: CPT | Performed by: RADIOLOGY

## 2022-05-26 ENCOUNTER — TRANSCRIBE ORDERS (OUTPATIENT)
Dept: ADMINISTRATIVE | Facility: HOSPITAL | Age: 62
End: 2022-05-26

## 2022-05-26 DIAGNOSIS — M54.16 LUMBAR RADICULOPATHY: Primary | ICD-10-CM

## 2022-06-06 ENCOUNTER — OFFICE VISIT (OUTPATIENT)
Dept: CARDIOLOGY | Facility: CLINIC | Age: 62
End: 2022-06-06

## 2022-06-06 VITALS
DIASTOLIC BLOOD PRESSURE: 74 MMHG | SYSTOLIC BLOOD PRESSURE: 130 MMHG | WEIGHT: 181.6 LBS | TEMPERATURE: 97.1 F | OXYGEN SATURATION: 100 % | BODY MASS INDEX: 30.26 KG/M2 | HEIGHT: 65 IN | HEART RATE: 79 BPM

## 2022-06-06 DIAGNOSIS — I48.0 PAROXYSMAL ATRIAL FIBRILLATION: Primary | ICD-10-CM

## 2022-06-06 DIAGNOSIS — I10 ESSENTIAL HYPERTENSION: ICD-10-CM

## 2022-06-06 PROCEDURE — 99213 OFFICE O/P EST LOW 20 MIN: CPT | Performed by: PHYSICIAN ASSISTANT

## 2022-06-06 PROCEDURE — 93000 ELECTROCARDIOGRAM COMPLETE: CPT | Performed by: PHYSICIAN ASSISTANT

## 2022-06-06 RX ORDER — HYDROCODONE BITARTRATE AND ACETAMINOPHEN 5; 325 MG/1; MG/1
1 TABLET ORAL 2 TIMES DAILY
COMMUNITY
Start: 2022-06-02

## 2022-06-06 RX ORDER — TRAMADOL HYDROCHLORIDE 50 MG/1
50 TABLET ORAL EVERY 6 HOURS PRN
COMMUNITY
Start: 2022-05-23

## 2022-06-06 NOTE — PROGRESS NOTES
Kreis, Samuel Duane, MD  Lashae Benitez  1960  06/06/2022    Patient Active Problem List   Diagnosis   • Hiatal hernia   • Essential hypertension   • Sjogren's syndrome (HCC)   • Multiple sclerosis (HCC)   • Psoriasis   • Fibromyalgia   • Osteoarthritis   • Psoriatic arthritis (HCC)   • RA (rheumatoid arthritis) (HCC)   • Degenerative disc disease, lumbar   • TMJ arthritis   • Dupuytren's disease   • H. pylori infection   • Family history of coronary artery disease   • Type 2 diabetes mellitus (HCC)   • Edema   • Bilateral leg edema   • Isolated corticotropin deficiency (HCC)   • Hyponatremia   • Paroxysmal atrial fibrillation (HCC)       Dear Kreis, Samuel Duane, MD:    Subjective     History of Present Illness:    Chief Complaint   Patient presents with   • Follow-up     3 month   • Med Management     list       Lashae Benitez is a pleasant 61 y.o. female with a past medical history significant for no known coronary artery disease or structural heart disease she also denies any history of tobacco abuse.  She does have diabetes mellitus, paroxysmal atrial fibrillation anticoagulated with Xarelto and with rhythm control on flecainide.  She does have essential hypertension.  she does report family history of premature CAD with her father having a major AMI in his 30s. She comes in today for routine cardiology follow-up.     Patient reports she has been doing well from cardiac standpoint with her biggest issue right now being sciatica.  From cardiac standpoint she states that she has had no known episodes of atrial fibrillation and denies any bleeding issues with her anticoagulation.  She also reports her dizziness have markedly improved since stopping amlodipine.  She denies any chest pains or shortness of breath.    Allergies   Allergen Reactions   • Codeine Angioedema   • Imuran [Azathioprine] Other (See Comments)     Has pancreatis attacks with med   • Januvia [Sitagliptin] Other (See Comments)     Has pancreatis  "attacks with med    • Penicillins Angioedema   • Sulfa Antibiotics Angioedema   • Sulfasalazine Unknown (See Comments)   • Beta Adrenergic Blockers Other (See Comments)     I called pt to ask her about the allergy to bblockers in her chart. Pt states \"too big of a dose makes her psoriasis act up\", but this current dose of metoprolol 50 mg bid, she has been on for a long time, with no ill side effects.  CATA,CMA 3/28/18   :      Current Outpatient Medications:   •  abatacept (ORENCIA) 250 MG injection, Infuse 750 mg into a venous catheter Every 28 (Twenty-Eight) Days., Disp: , Rfl:   •  alendronate (FOSAMAX) 70 MG tablet, Take 70 mg by mouth 1 (One) Time Per Week. Wednesday, Disp: , Rfl:   •  amitriptyline (ELAVIL) 25 MG tablet, Take 25 mg by mouth Every Night., Disp: , Rfl:   •  Apremilast (Otezla) 30 MG tablet, Take  by mouth 2 (two) times a day., Disp: , Rfl:   •  butalbital-acetaminophen-caffeine (FIORICET, ESGIC) -40 MG per tablet, Take 1 tablet by mouth Every 4 (Four) Hours As Needed for Headache., Disp: , Rfl:   •  Calcipotriene-Betameth Diprop 0.005-0.064 % foam, Apply 1 spray topically Daily., Disp: , Rfl:   •  calcium citrate-vitamin d (CALCITRATE) 315-250 MG-UNIT tablet tablet, Take 1 tablet by mouth Every Evening., Disp: , Rfl:   •  Cholecalciferol (VITAMIN D3) 125 MCG (5000 UT) capsule capsule, Take 5,000 Units by mouth Daily., Disp: , Rfl:   •  cyanocobalamin 1000 MCG/ML injection, Inject 1,000 mcg into the shoulder, thigh, or buttocks Every 28 (Twenty-Eight) Days., Disp: , Rfl:   •  cyclobenzaprine (FLEXERIL) 10 MG tablet, Take 10 mg by mouth Every Evening., Disp: , Rfl:   •  DULoxetine (CYMBALTA) 30 MG capsule, Take 90 mg by mouth Every Morning., Disp: , Rfl:   •  EMGALITY 120 MG/ML prefilled syringe, Inject 120 mg under the skin into the appropriate area as directed Every 30 (Thirty) Days., Disp: , Rfl: 3  •  ferrous gluconate (FERGON) 240 (27 FE) MG tablet, Take 240 mg by mouth Every Morning., " Disp: , Rfl:   •  flecainide (TAMBOCOR) 50 MG tablet, TAKE 1 TABLET TWICE A DAY, Disp: 180 tablet, Rfl: 3  •  folic acid (FOLVITE) 1 MG tablet, Take 1 mg by mouth daily., Disp: , Rfl:   •  gabapentin (NEURONTIN) 600 MG tablet, Take 600 mg by mouth 3 (Three) Times a Day., Disp: , Rfl:   •  HYDROcodone-acetaminophen (NORCO) 5-325 MG per tablet, Take 1 tablet by mouth 2 (Two) Times a Day. as directed, Disp: , Rfl:   •  insulin detemir (LEVEMIR) 100 UNIT/ML injection, Inject 25 Units under the skin into the appropriate area as directed Daily., Disp: , Rfl:   •  isosorbide mononitrate (IMDUR) 30 MG 24 hr tablet, TAKE 1 TABLET DAILY, Disp: 90 tablet, Rfl: 3  •  levothyroxine sodium (TIROSINT) 137 MCG capsule, Take 125 mcg by mouth Daily. Takes an extra dose at the first of each month., Disp: , Rfl:   •  linaclotide (LINZESS) 290 MCG capsule capsule, Take 290 mcg by mouth every morning before breakfast., Disp: , Rfl:   •  lisinopril (PRINIVIL,ZESTRIL) 20 MG tablet, Take 20 mg by mouth Daily., Disp: , Rfl:   •  loratadine (CLARITIN) 10 MG tablet, Take 10 mg by mouth Every Night., Disp: , Rfl:   •  methotrexate 2.5 MG tablet, Take 25 mg by mouth 1 (One) Time Per Week. Prior to [facility] Admission, Patient was on: Takes 10 tablets on Saturday, Disp: , Rfl:   •  metoprolol succinate XL (TOPROL-XL) 100 MG 24 hr tablet, TAKE 1 TABLET DAILY, Disp: 90 tablet, Rfl: 3  •  multivitamin (DAILY MICHAEL) tablet tablet, Take 1 tablet by mouth Every Evening., Disp: , Rfl:   •  omeprazole (priLOSEC) 40 MG capsule, Take 40 mg by mouth Daily., Disp: , Rfl:   •  OnabotulinumtoxinA (BOTOX IJ), Inject  as directed. Q 3 M FOTR MIGRAINES, Disp: , Rfl:   •  potassium chloride (MICRO-K) 10 MEQ CR capsule, Take 10 mEq by mouth 2 (Two) Times a Day., Disp: , Rfl:   •  primidone (MYSOLINE) 50 MG tablet, Take 50 mg by mouth Every Night., Disp: , Rfl:   •  RABEprazole (ACIPHEX) 20 MG EC tablet, Take 20 mg by mouth Daily., Disp: , Rfl:   •  rimegepant 75 MG  "dispersible tablet, DISSOLVE ONE TABLET UNDER THE TONGUE AT ONSET OF MIGRAINE HEADACHE. MAX DOSE ONE TABLET IN 24 HOURS, Disp: , Rfl:   •  sodium chloride 1 g tablet, Take 1 g by mouth 2 (Two) Times a Day., Disp: , Rfl:   •  traMADol (ULTRAM) 50 MG tablet, Take 50 mg by mouth Every 6 (Six) Hours As Needed., Disp: , Rfl:   •  vitamin C (ASCORBIC ACID) 500 MG tablet, Take 1,000 mg by mouth Every Evening., Disp: , Rfl:   •  vitamin E 1000 UNIT capsule, Take 1,000 Units by mouth Every Evening., Disp: , Rfl:   •  Xarelto 20 MG tablet, TAKE 1 TABLET DAILY, Disp: 90 tablet, Rfl: 3  •  diazePAM (VALIUM) 2 MG tablet, Take 2 mg by mouth Daily As Needed for Anxiety., Disp: , Rfl:     The following portions of the patient's history were reviewed and updated as appropriate: allergies, current medications, past family history, past medical history, past social history, past surgical history and problem list.    Social History     Tobacco Use   • Smoking status: Never Smoker   • Smokeless tobacco: Never Used   Vaping Use   • Vaping Use: Never used   Substance Use Topics   • Alcohol use: No   • Drug use: No         Objective   Vitals:    06/06/22 0907   BP: 130/74   BP Location: Left arm   Patient Position: Sitting   Cuff Size: Adult   Pulse: 79   Temp: 97.1 °F (36.2 °C)   TempSrc: Temporal   SpO2: 100%   Weight: 82.4 kg (181 lb 9.6 oz)   Height: 165.1 cm (65\")     Body mass index is 30.22 kg/m².    Constitutional:       General: Not in acute distress.     Appearance: Healthy appearance. Well-developed and not in distress. Not diaphoretic.   Eyes:      Conjunctiva/sclera: Conjunctivae normal.      Pupils: Pupils are equal, round, and reactive to light.   HENT:      Head: Normocephalic and atraumatic.   Neck:      Vascular: No carotid bruit or JVD.   Pulmonary:      Effort: Pulmonary effort is normal. No respiratory distress.      Breath sounds: Normal breath sounds.   Cardiovascular:      Normal rate. Regular rhythm.   Skin:     " General: Skin is cool.   Neurological:      Mental Status: Alert, oriented to person, place, and time and oriented to person, place and time.         Lab Results   Component Value Date     (L) 02/23/2022    K 4.8 02/23/2022    CL 97 (L) 02/23/2022    CO2 23.3 02/23/2022    BUN 8 02/23/2022    CREATININE 0.79 02/23/2022    GLUCOSE 213 (H) 02/23/2022    CALCIUM 9.4 02/23/2022    AST 22 02/23/2022    ALT 26 02/23/2022    ALKPHOS 127 (H) 02/23/2022    LABIL2 1.4 (L) 08/11/2015     Lab Results   Component Value Date    CKTOTAL 40 04/12/2017     Lab Results   Component Value Date    WBC 8.30 04/08/2022    HGB 12.0 04/08/2022    HCT 34.6 04/08/2022     04/08/2022     Lab Results   Component Value Date    INR 0.97 11/22/2018    INR 0.93 07/20/2015     Lab Results   Component Value Date    MG 2.0 09/21/2020     Lab Results   Component Value Date    TSH 0.101 (L) 06/01/2021    CHLPL 132 07/22/2015    TRIG 49 01/06/2022    HDL 70 (H) 01/06/2022    LDL 73 01/06/2022      Lab Results   Component Value Date    BNP 51.0 11/22/2018       During this visit the following were done:  Labs Reviewed []    Labs Ordered []    Radiology Reports Reviewed []    Radiology Ordered []    PCP Records Reviewed []    Referring Provider Records Reviewed []    ER Records Reviewed []    Hospital Records Reviewed []    History Obtained From Family []    Radiology Images Reviewed []    Other Reviewed []    Records Requested []         ECG 12 Lead    Date/Time: 6/6/2022 9:01 AM  Performed by: Gael Baum PA-C  Authorized by: Gael Baum PA-C   Comparison: compared with previous ECG   Similar to previous ECG  Rhythm: sinus rhythm  Conduction: 1st degree AV block    Clinical impression: normal ECG  Comments:  with QTC of 438.            Assessment & Plan    Diagnosis Plan   1. Paroxysmal atrial fibrillation (HCC)     2. Essential hypertension              Recommendations:  1. Paroxysmal atrial fibrillation  1. Currently in  sinus rhythm with assistance of flecainide but stable QRS and QTc.  She also denies any bleeding issues we will continue both flecainide and Xarelto for anticoagulation.  2. Essential hypertension  1. Currently well controlled resolution of her dizziness after amlodipine was discontinued.  2. Continue Imdur, metoprolol      No follow-ups on file.    As always, I appreciate very much the opportunity to participate in the cardiovascular care of your patients.      With Best Regards,    Gael Baum PA-C

## 2022-06-19 ENCOUNTER — HOSPITAL ENCOUNTER (OUTPATIENT)
Dept: CT IMAGING | Facility: HOSPITAL | Age: 62
Discharge: HOME | End: 2022-06-19
Admitting: FAMILY MEDICINE

## 2022-06-19 DIAGNOSIS — M54.16 LUMBAR RADICULOPATHY: ICD-10-CM

## 2022-06-19 PROCEDURE — 72131 CT LUMBAR SPINE W/O DYE: CPT | Performed by: RADIOLOGY

## 2022-06-19 PROCEDURE — 72131 CT LUMBAR SPINE W/O DYE: CPT

## 2022-06-22 ENCOUNTER — LAB (OUTPATIENT)
Dept: LAB | Facility: HOSPITAL | Age: 62
End: 2022-06-22

## 2022-06-22 ENCOUNTER — TRANSCRIBE ORDERS (OUTPATIENT)
Dept: ADMINISTRATIVE | Facility: HOSPITAL | Age: 62
End: 2022-06-22

## 2022-06-22 DIAGNOSIS — E87.1 HYPONATREMIA: ICD-10-CM

## 2022-06-22 DIAGNOSIS — E87.1 HYPONATREMIA: Primary | ICD-10-CM

## 2022-06-22 PROCEDURE — 80053 COMPREHEN METABOLIC PANEL: CPT

## 2022-06-22 PROCEDURE — 36415 COLL VENOUS BLD VENIPUNCTURE: CPT

## 2022-06-23 LAB
ALBUMIN SERPL-MCNC: 3.7 G/DL (ref 3.5–5.2)
ALBUMIN/GLOB SERPL: 1.9 G/DL
ALP SERPL-CCNC: 84 U/L (ref 39–117)
ALT SERPL W P-5'-P-CCNC: 23 U/L (ref 1–33)
ANION GAP SERPL CALCULATED.3IONS-SCNC: 12.5 MMOL/L (ref 5–15)
AST SERPL-CCNC: 21 U/L (ref 1–32)
BILIRUB SERPL-MCNC: 0.3 MG/DL (ref 0–1.2)
BUN SERPL-MCNC: 10 MG/DL (ref 8–23)
BUN/CREAT SERPL: 13.2 (ref 7–25)
CALCIUM SPEC-SCNC: 8.9 MG/DL (ref 8.6–10.5)
CHLORIDE SERPL-SCNC: 95 MMOL/L (ref 98–107)
CO2 SERPL-SCNC: 22.5 MMOL/L (ref 22–29)
CREAT SERPL-MCNC: 0.76 MG/DL (ref 0.57–1)
EGFRCR SERPLBLD CKD-EPI 2021: 89.8 ML/MIN/1.73
GLOBULIN UR ELPH-MCNC: 2 GM/DL
GLUCOSE SERPL-MCNC: 131 MG/DL (ref 65–99)
POTASSIUM SERPL-SCNC: 4.2 MMOL/L (ref 3.5–5.2)
PROT SERPL-MCNC: 5.7 G/DL (ref 6–8.5)
SODIUM SERPL-SCNC: 130 MMOL/L (ref 136–145)

## 2022-09-28 ENCOUNTER — LAB (OUTPATIENT)
Dept: LAB | Facility: HOSPITAL | Age: 62
End: 2022-09-28

## 2022-09-28 ENCOUNTER — TRANSCRIBE ORDERS (OUTPATIENT)
Dept: ADMINISTRATIVE | Facility: HOSPITAL | Age: 62
End: 2022-09-28

## 2022-09-28 DIAGNOSIS — E87.1 HYPONATREMIA: Primary | ICD-10-CM

## 2022-09-28 DIAGNOSIS — E87.1 HYPONATREMIA: ICD-10-CM

## 2022-09-28 PROCEDURE — 36415 COLL VENOUS BLD VENIPUNCTURE: CPT

## 2022-09-28 PROCEDURE — 80053 COMPREHEN METABOLIC PANEL: CPT

## 2022-09-29 LAB
ALBUMIN SERPL-MCNC: 3.6 G/DL (ref 3.5–5.2)
ALBUMIN/GLOB SERPL: 2.3 G/DL
ALP SERPL-CCNC: 77 U/L (ref 39–117)
ALT SERPL W P-5'-P-CCNC: 38 U/L (ref 1–33)
ANION GAP SERPL CALCULATED.3IONS-SCNC: 10.2 MMOL/L (ref 5–15)
AST SERPL-CCNC: 25 U/L (ref 1–32)
BILIRUB SERPL-MCNC: 0.2 MG/DL (ref 0–1.2)
BUN SERPL-MCNC: 8 MG/DL (ref 8–23)
BUN/CREAT SERPL: 10.4 (ref 7–25)
CALCIUM SPEC-SCNC: 8.7 MG/DL (ref 8.6–10.5)
CHLORIDE SERPL-SCNC: 98 MMOL/L (ref 98–107)
CO2 SERPL-SCNC: 24.8 MMOL/L (ref 22–29)
CREAT SERPL-MCNC: 0.77 MG/DL (ref 0.57–1)
EGFRCR SERPLBLD CKD-EPI 2021: 88.4 ML/MIN/1.73
GLOBULIN UR ELPH-MCNC: 1.6 GM/DL
GLUCOSE SERPL-MCNC: 99 MG/DL (ref 65–99)
POTASSIUM SERPL-SCNC: 4.1 MMOL/L (ref 3.5–5.2)
PROT SERPL-MCNC: 5.2 G/DL (ref 6–8.5)
SODIUM SERPL-SCNC: 133 MMOL/L (ref 136–145)

## 2022-10-17 ENCOUNTER — TELEPHONE (OUTPATIENT)
Dept: CARDIOLOGY | Facility: CLINIC | Age: 62
End: 2022-10-17

## 2022-10-17 NOTE — TELEPHONE ENCOUNTER
Pt called and wanted to know if she was cleared to have her back scan done. She stated she stopped her Xarelto on Thursday and has not took any since. I spoke with Brittney and she is going to speak with  and get back with me. I advised Lashae I would give her call back to let her know if she is cleared or not. She expressed understanding.

## 2022-10-20 NOTE — TELEPHONE ENCOUNTER
Pt was cleared for surgery and clearance was faxed over. Pt was advised and procedure was done on Tuesday (10/18/2022)

## 2022-12-06 ENCOUNTER — LAB (OUTPATIENT)
Dept: LAB | Facility: HOSPITAL | Age: 62
End: 2022-12-06

## 2022-12-06 ENCOUNTER — OFFICE VISIT (OUTPATIENT)
Dept: CARDIOLOGY | Facility: CLINIC | Age: 62
End: 2022-12-06

## 2022-12-06 ENCOUNTER — TRANSCRIBE ORDERS (OUTPATIENT)
Dept: ADMINISTRATIVE | Facility: HOSPITAL | Age: 62
End: 2022-12-06

## 2022-12-06 VITALS
RESPIRATION RATE: 16 BRPM | BODY MASS INDEX: 30.19 KG/M2 | WEIGHT: 181.2 LBS | SYSTOLIC BLOOD PRESSURE: 130 MMHG | HEART RATE: 85 BPM | DIASTOLIC BLOOD PRESSURE: 82 MMHG | HEIGHT: 65 IN

## 2022-12-06 DIAGNOSIS — C73 MALIGNANT NEOPLASM OF THYROID GLAND: Primary | ICD-10-CM

## 2022-12-06 DIAGNOSIS — E89.0 POSTSURGICAL HYPOTHYROIDISM: ICD-10-CM

## 2022-12-06 DIAGNOSIS — I48.0 PAROXYSMAL ATRIAL FIBRILLATION: Primary | ICD-10-CM

## 2022-12-06 DIAGNOSIS — C73 MALIGNANT NEOPLASM OF THYROID GLAND: ICD-10-CM

## 2022-12-06 DIAGNOSIS — I10 ESSENTIAL HYPERTENSION: ICD-10-CM

## 2022-12-06 LAB — TSH SERPL DL<=0.05 MIU/L-ACNC: 1.52 UIU/ML (ref 0.27–4.2)

## 2022-12-06 PROCEDURE — 84443 ASSAY THYROID STIM HORMONE: CPT

## 2022-12-06 PROCEDURE — 36415 COLL VENOUS BLD VENIPUNCTURE: CPT

## 2022-12-06 PROCEDURE — 93000 ELECTROCARDIOGRAM COMPLETE: CPT | Performed by: PHYSICIAN ASSISTANT

## 2022-12-06 PROCEDURE — 99213 OFFICE O/P EST LOW 20 MIN: CPT | Performed by: PHYSICIAN ASSISTANT

## 2022-12-06 PROCEDURE — 84432 ASSAY OF THYROGLOBULIN: CPT

## 2022-12-06 PROCEDURE — 86800 THYROGLOBULIN ANTIBODY: CPT

## 2022-12-06 RX ORDER — LEVOTHYROXINE SODIUM 112 UG/1
112 CAPSULE ORAL DAILY
COMMUNITY

## 2022-12-06 RX ORDER — CELECOXIB 200 MG/1
200 CAPSULE ORAL DAILY
COMMUNITY

## 2022-12-06 RX ORDER — FEXOFENADINE HCL 180 MG/1
180 TABLET ORAL DAILY
COMMUNITY

## 2022-12-06 NOTE — PROGRESS NOTES
Kreis, Samuel Duane, MD  Lashae Benitez  1960  12/06/2022    Patient Active Problem List   Diagnosis   • Hiatal hernia   • Essential hypertension   • Sjogren's syndrome (HCC)   • Multiple sclerosis (HCC)   • Psoriasis   • Fibromyalgia   • Osteoarthritis   • Psoriatic arthritis (HCC)   • RA (rheumatoid arthritis) (HCC)   • Degenerative disc disease, lumbar   • TMJ arthritis   • Dupuytren's disease   • H. pylori infection   • Family history of coronary artery disease   • Type 2 diabetes mellitus (HCC)   • Edema   • Bilateral leg edema   • Isolated corticotropin deficiency (HCC)   • Hyponatremia   • Paroxysmal atrial fibrillation (HCC)       Dear Kreis, Samuel Duane, MD:    Subjective     History of Present Illness:    Chief Complaint   Patient presents with   • Atrial Fibrillation     6 mos follow   • Palpitations     occas   • Med Management     List provided       Lashae Benitez is a pleasant 62 y.o. female with a past medical history significant for no known coronary artery disease or structural heart disease she also denies any history of tobacco abuse.  She does have diabetes mellitus, paroxysmal atrial fibrillation anticoagulated with Xarelto and with rhythm control on flecainide.  She does have essential hypertension.  she does report family history of premature CAD with her father having a major AMI in his 30s. She comes in today for routine cardiology follow-up.     Lashae reports she has been well since she was last seen.  In regards to her atrial fibrillation she does report some occasional palpitations but denies any significant issues that have affected her ADLs.  She reports that these palpitations are short-lived and infrequent.  She otherwise denies any bleeding issues from Xarelto.  She also denies any chest pains or shortness of breath    Allergies   Allergen Reactions   • Codeine Angioedema   • Imuran [Azathioprine] Other (See Comments)     Has pancreatis attacks with med   • Januvia [Sitagliptin]  "Other (See Comments)     Has pancreatis attacks with med    • Penicillins Angioedema   • Sulfa Antibiotics Angioedema   • Sulfasalazine Unknown (See Comments)   • Beta Adrenergic Blockers Other (See Comments)     I called pt to ask her about the allergy to bblockers in her chart. Pt states \"too big of a dose makes her psoriasis act up\", but this current dose of metoprolol 50 mg bid, she has been on for a long time, with no ill side effects.  CATA,CMA 3/28/18   :      Current Outpatient Medications:   •  abatacept (ORENCIA) 250 MG injection, Infuse 750 mg into a venous catheter Every 28 (Twenty-Eight) Days., Disp: , Rfl:   •  alendronate (FOSAMAX) 70 MG tablet, Take 70 mg by mouth 1 (One) Time Per Week. Wednesday, Disp: , Rfl:   •  amitriptyline (ELAVIL) 25 MG tablet, Take 25 mg by mouth Every Night., Disp: , Rfl:   •  Apremilast (Otezla) 30 MG tablet, Take  by mouth 2 (two) times a day., Disp: , Rfl:   •  butalbital-acetaminophen-caffeine (FIORICET, ESGIC) -40 MG per tablet, Take 1 tablet by mouth Every 4 (Four) Hours As Needed for Headache., Disp: , Rfl:   •  calcium citrate-vitamin d (CALCITRATE) 315-250 MG-UNIT tablet tablet, Take 1 tablet by mouth Every Evening., Disp: , Rfl:   •  celecoxib (CeleBREX) 200 MG capsule, Take 200 mg by mouth Daily., Disp: , Rfl:   •  Cholecalciferol (VITAMIN D3) 125 MCG (5000 UT) capsule capsule, Take 5,000 Units by mouth Daily., Disp: , Rfl:   •  cyanocobalamin 1000 MCG/ML injection, Inject 1,000 mcg into the shoulder, thigh, or buttocks Every 28 (Twenty-Eight) Days., Disp: , Rfl:   •  cyclobenzaprine (FLEXERIL) 10 MG tablet, Take 10 mg by mouth Every Evening., Disp: , Rfl:   •  DULoxetine (CYMBALTA) 30 MG capsule, Take 90 mg by mouth Every Morning., Disp: , Rfl:   •  EMGALITY 120 MG/ML prefilled syringe, Inject 120 mg under the skin into the appropriate area as directed Every 30 (Thirty) Days., Disp: , Rfl: 3  •  ferrous gluconate (FERGON) 240 (27 FE) MG tablet, Take 240 mg by " mouth Every Morning., Disp: , Rfl:   •  fexofenadine (ALLEGRA) 180 MG tablet, Take 180 mg by mouth Daily., Disp: , Rfl:   •  flecainide (TAMBOCOR) 50 MG tablet, TAKE 1 TABLET TWICE A DAY, Disp: 180 tablet, Rfl: 3  •  fluticasone-salmeterol (ADVAIR HFA) 115-21 MCG/ACT inhaler, Inhale 2 puffs 2 (Two) Times a Day., Disp: , Rfl:   •  folic acid (FOLVITE) 1 MG tablet, Take 1 mg by mouth daily., Disp: , Rfl:   •  gabapentin (NEURONTIN) 600 MG tablet, Take 600 mg by mouth 3 (Three) Times a Day., Disp: , Rfl:   •  insulin detemir (LEVEMIR) 100 UNIT/ML injection, Inject 25 Units under the skin into the appropriate area as directed Daily., Disp: , Rfl:   •  isosorbide mononitrate (IMDUR) 30 MG 24 hr tablet, TAKE 1 TABLET DAILY, Disp: 90 tablet, Rfl: 3  •  levothyroxine sodium (Tirosint) 112 MCG capsule, Take 112 mcg by mouth Daily., Disp: , Rfl:   •  linaclotide (LINZESS) 290 MCG capsule capsule, Take 290 mcg by mouth every morning before breakfast., Disp: , Rfl:   •  lisinopril (PRINIVIL,ZESTRIL) 20 MG tablet, Take 20 mg by mouth Daily., Disp: , Rfl:   •  methotrexate 2.5 MG tablet, Take 25 mg by mouth 1 (One) Time Per Week. Prior to [facility] Admission, Patient was on: Takes 10 tablets on Saturday, Disp: , Rfl:   •  metoprolol succinate XL (TOPROL-XL) 100 MG 24 hr tablet, TAKE 1 TABLET DAILY, Disp: 90 tablet, Rfl: 3  •  multivitamin (DAILY MICHAEL) tablet tablet, Take 1 tablet by mouth Every Evening., Disp: , Rfl:   •  omeprazole (priLOSEC) 40 MG capsule, Take 40 mg by mouth Daily., Disp: , Rfl:   •  OnabotulinumtoxinA (BOTOX IJ), Inject  as directed. Q 3 M FOTR MIGRAINES, Disp: , Rfl:   •  potassium chloride (MICRO-K) 10 MEQ CR capsule, Take 10 mEq by mouth 2 (Two) Times a Day., Disp: , Rfl:   •  primidone (MYSOLINE) 50 MG tablet, Take 50 mg by mouth Every Night., Disp: , Rfl:   •  RABEprazole (ACIPHEX) 20 MG EC tablet, Take 20 mg by mouth Daily., Disp: , Rfl:   •  rimegepant 75 MG dispersible tablet, DISSOLVE ONE TABLET UNDER  "THE TONGUE AT ONSET OF MIGRAINE HEADACHE. MAX DOSE ONE TABLET IN 24 HOURS, Disp: , Rfl:   •  sodium chloride 1 g tablet, Take 1 g by mouth 2 (Two) Times a Day., Disp: , Rfl:   •  traMADol (ULTRAM) 50 MG tablet, Take 50 mg by mouth Every 6 (Six) Hours As Needed., Disp: , Rfl:   •  vitamin C (ASCORBIC ACID) 500 MG tablet, Take 1,000 mg by mouth Every Evening., Disp: , Rfl:   •  vitamin E 1000 UNIT capsule, Take 1,000 Units by mouth Every Evening., Disp: , Rfl:   •  Xarelto 20 MG tablet, TAKE 1 TABLET DAILY, Disp: 90 tablet, Rfl: 3  •  Calcipotriene-Betameth Diprop 0.005-0.064 % foam, Apply 1 spray topically Daily., Disp: , Rfl:   •  diazePAM (VALIUM) 2 MG tablet, Take 2 mg by mouth Daily As Needed for Anxiety., Disp: , Rfl:   •  HYDROcodone-acetaminophen (NORCO) 5-325 MG per tablet, Take 1 tablet by mouth 2 (Two) Times a Day. as directed, Disp: , Rfl:   •  levothyroxine sodium (TIROSINT) 137 MCG capsule, Take 125 mcg by mouth Daily. Takes an extra dose at the first of each month., Disp: , Rfl:   •  loratadine (CLARITIN) 10 MG tablet, Take 10 mg by mouth Every Night., Disp: , Rfl:     The following portions of the patient's history were reviewed and updated as appropriate: allergies, current medications, past family history, past medical history, past social history, past surgical history and problem list.    Social History     Tobacco Use   • Smoking status: Never   • Smokeless tobacco: Never   Vaping Use   • Vaping Use: Never used   Substance Use Topics   • Alcohol use: No   • Drug use: No         Objective   Vitals:    12/06/22 0926   BP: 130/82   Pulse: 85   Resp: 16   Weight: 82.2 kg (181 lb 3.2 oz)   Height: 165.1 cm (65\")     Body mass index is 30.15 kg/m².    Constitutional:       General: Not in acute distress.     Appearance: Healthy appearance. Well-developed and not in distress. Not diaphoretic.   Eyes:      Conjunctiva/sclera: Conjunctivae normal.      Pupils: Pupils are equal, round, and reactive to " light.   HENT:      Head: Normocephalic and atraumatic.   Neck:      Vascular: No carotid bruit or JVD.   Pulmonary:      Effort: Pulmonary effort is normal. No respiratory distress.      Breath sounds: Normal breath sounds.   Cardiovascular:      Normal rate. Regular rhythm.   Skin:     General: Skin is cool.   Neurological:      Mental Status: Alert, oriented to person, place, and time and oriented to person, place and time.         Lab Results   Component Value Date     (L) 09/28/2022    K 4.1 09/28/2022    CL 98 09/28/2022    CO2 24.8 09/28/2022    BUN 8 09/28/2022    CREATININE 0.77 09/28/2022    GLUCOSE 99 09/28/2022    CALCIUM 8.7 09/28/2022    AST 25 09/28/2022    ALT 38 (H) 09/28/2022    ALKPHOS 77 09/28/2022    LABIL2 1.4 (L) 08/11/2015     Lab Results   Component Value Date    CKTOTAL 40 04/12/2017     Lab Results   Component Value Date    WBC 8.30 04/08/2022    HGB 12.0 04/08/2022    HCT 34.6 04/08/2022     04/08/2022     Lab Results   Component Value Date    INR 0.97 11/22/2018    INR 0.93 07/20/2015     Lab Results   Component Value Date    MG 2.0 09/21/2020     Lab Results   Component Value Date    TSH 0.101 (L) 06/01/2021    CHLPL 132 07/22/2015    TRIG 49 01/06/2022    HDL 70 (H) 01/06/2022    LDL 73 01/06/2022      Lab Results   Component Value Date    BNP 51.0 11/22/2018       During this visit the following were done:  Labs Reviewed []    Labs Ordered []    Radiology Reports Reviewed []    Radiology Ordered []    PCP Records Reviewed []    Referring Provider Records Reviewed []    ER Records Reviewed []    Hospital Records Reviewed []    History Obtained From Family []    Radiology Images Reviewed []    Other Reviewed []    Records Requested []         ECG 12 Lead    Date/Time: 12/6/2022 9:26 AM  Performed by: Gael Baum PA-C  Authorized by: Gael Baum PA-C   Comparison: compared with previous ECG   Similar to previous ECG  Rhythm: sinus rhythm  Conduction: left  anterior fascicular block and 1st degree AV block    Clinical impression: non-specific ECG  Comments:   QTc 398            Assessment & Plan    Diagnosis Plan   1. Paroxysmal atrial fibrillation (HCC)        2. Essential hypertension                 Recommendations:  1. Paroxysmal atrial fibrillation  a. Well-controlled denies any bleeding issues with Xarelto we will continue this as well as metoprolol and flecainide.  2. Essential hypertension  a. Managed by PCP currently well controlled we will continue current regimen.    No follow-ups on file.    As always, I appreciate very much the opportunity to participate in the cardiovascular care of your patients.      With Best Regards,    Gael Baum PA-C

## 2022-12-07 LAB
THYROGLOB AB SERPL-ACNC: <1 IU/ML (ref 0–0.9)
THYROGLOB SERPL-MCNC: 0.6 NG/ML (ref 1.5–38.5)

## 2023-01-02 ENCOUNTER — LAB (OUTPATIENT)
Dept: LAB | Facility: HOSPITAL | Age: 63
End: 2023-01-02
Payer: MEDICARE

## 2023-01-02 ENCOUNTER — TRANSCRIBE ORDERS (OUTPATIENT)
Dept: ADMINISTRATIVE | Facility: HOSPITAL | Age: 63
End: 2023-01-02
Payer: MEDICARE

## 2023-01-02 DIAGNOSIS — E87.1 HYPOSMOLALITY SYNDROME: Primary | ICD-10-CM

## 2023-01-02 DIAGNOSIS — E87.1 HYPOSMOLALITY SYNDROME: ICD-10-CM

## 2023-01-02 LAB
ALBUMIN SERPL-MCNC: 3.5 G/DL (ref 3.5–5.2)
ALBUMIN/GLOB SERPL: 1.5 G/DL
ALP SERPL-CCNC: 105 U/L (ref 39–117)
ALT SERPL W P-5'-P-CCNC: 21 U/L (ref 1–33)
ANION GAP SERPL CALCULATED.3IONS-SCNC: 9 MMOL/L (ref 5–15)
AST SERPL-CCNC: 16 U/L (ref 1–32)
BILIRUB SERPL-MCNC: 0.3 MG/DL (ref 0–1.2)
BUN SERPL-MCNC: 11 MG/DL (ref 8–23)
BUN/CREAT SERPL: 16.4 (ref 7–25)
CALCIUM SPEC-SCNC: 9 MG/DL (ref 8.6–10.5)
CHLORIDE SERPL-SCNC: 103 MMOL/L (ref 98–107)
CO2 SERPL-SCNC: 23 MMOL/L (ref 22–29)
CREAT SERPL-MCNC: 0.67 MG/DL (ref 0.57–1)
EGFRCR SERPLBLD CKD-EPI 2021: 99.6 ML/MIN/1.73
GLOBULIN UR ELPH-MCNC: 2.4 GM/DL
GLUCOSE SERPL-MCNC: 189 MG/DL (ref 65–99)
POTASSIUM SERPL-SCNC: 4.1 MMOL/L (ref 3.5–5.2)
PROT SERPL-MCNC: 5.9 G/DL (ref 6–8.5)
SODIUM SERPL-SCNC: 135 MMOL/L (ref 136–145)

## 2023-01-02 PROCEDURE — 80053 COMPREHEN METABOLIC PANEL: CPT

## 2023-01-02 PROCEDURE — 36415 COLL VENOUS BLD VENIPUNCTURE: CPT

## 2023-01-30 RX ORDER — FLECAINIDE ACETATE 50 MG/1
50 TABLET ORAL 2 TIMES DAILY
Qty: 180 TABLET | Refills: 0 | Status: SHIPPED | OUTPATIENT
Start: 2023-01-30

## 2023-03-15 ENCOUNTER — OFFICE VISIT (OUTPATIENT)
Dept: CARDIOLOGY | Facility: CLINIC | Age: 63
End: 2023-03-15
Payer: MEDICARE

## 2023-03-15 VITALS
WEIGHT: 178.6 LBS | SYSTOLIC BLOOD PRESSURE: 126 MMHG | DIASTOLIC BLOOD PRESSURE: 75 MMHG | BODY MASS INDEX: 29.76 KG/M2 | HEART RATE: 100 BPM | HEIGHT: 65 IN | OXYGEN SATURATION: 98 %

## 2023-03-15 DIAGNOSIS — I10 ESSENTIAL HYPERTENSION: ICD-10-CM

## 2023-03-15 DIAGNOSIS — I48.0 PAROXYSMAL ATRIAL FIBRILLATION: Primary | ICD-10-CM

## 2023-03-15 PROCEDURE — 3074F SYST BP LT 130 MM HG: CPT | Performed by: PHYSICIAN ASSISTANT

## 2023-03-15 PROCEDURE — 99213 OFFICE O/P EST LOW 20 MIN: CPT | Performed by: PHYSICIAN ASSISTANT

## 2023-03-15 PROCEDURE — 3078F DIAST BP <80 MM HG: CPT | Performed by: PHYSICIAN ASSISTANT

## 2023-03-15 NOTE — PROGRESS NOTES
Kreis, Samuel Duane, MD  Lashae Benitez  1960  03/15/2023    Patient Active Problem List   Diagnosis   • Hiatal hernia   • Essential hypertension   • Sjogren's syndrome (HCC)   • Multiple sclerosis (HCC)   • Psoriasis   • Fibromyalgia   • Osteoarthritis   • Psoriatic arthritis (HCC)   • RA (rheumatoid arthritis) (HCC)   • Degenerative disc disease, lumbar   • TMJ arthritis   • Dupuytren's disease   • H. pylori infection   • Family history of coronary artery disease   • Type 2 diabetes mellitus (HCC)   • Edema   • Bilateral leg edema   • Isolated corticotropin deficiency (HCC)   • Hyponatremia   • Paroxysmal atrial fibrillation (HCC)       Dear Kreis, Samuel Duane, MD:    Subjective     History of Present Illness:    Chief Complaint   Patient presents with   • Follow-up     Surgical clearance       Lashae Benitez is a pleasant 62 y.o. female with a past medical history significant for no known coronary artery disease or structural heart disease she also denies any history of tobacco abuse.  She does have diabetes mellitus, paroxysmal atrial fibrillation anticoagulated with Xarelto and with rhythm control on flecainide.  She does have essential hypertension.  she does report family history of premature CAD with her father having a major AMI in his 30s. She comes in today for routine surgical risk evaluation for L4-L5 laminectomy.    Clinically Lashae reports she has done well denies any cardiac symptoms today such as chest pains, shortness of breath, palpitations, dizziness, or syncope.  She reports she has been taking Xarelto and denies any bleeding issues with this.    Allergies   Allergen Reactions   • Codeine Angioedema   • Imuran [Azathioprine] Other (See Comments)     Has pancreatis attacks with med   • Januvia [Sitagliptin] Other (See Comments)     Has pancreatis attacks with med    • Penicillins Angioedema   • Sulfa Antibiotics Angioedema   • Sulfasalazine Unknown (See Comments)   • Beta Adrenergic Blockers Other  "(See Comments)     I called pt to ask her about the allergy to bblockers in her chart. Pt states \"too big of a dose makes her psoriasis act up\", but this current dose of metoprolol 50 mg bid, she has been on for a long time, with no ill side effects.  JONATHAN IVY 3/28/18   :      Current Outpatient Medications:   •  abatacept (ORENCIA) 250 MG injection, Infuse 30 mL into a venous catheter Every 28 (Twenty-Eight) Days., Disp: , Rfl:   •  alendronate (FOSAMAX) 70 MG tablet, Take 1 tablet by mouth 1 (One) Time Per Week. Wednesday, Disp: , Rfl:   •  amitriptyline (ELAVIL) 25 MG tablet, Take 1 tablet by mouth Every Night., Disp: , Rfl:   •  Apremilast (Otezla) 30 MG tablet, Take  by mouth 2 (two) times a day., Disp: , Rfl:   •  butalbital-acetaminophen-caffeine (FIORICET, ESGIC) -40 MG per tablet, Take 1 tablet by mouth Every 4 (Four) Hours As Needed for Headache., Disp: , Rfl:   •  calcium citrate-vitamin d (CALCITRATE) 315-250 MG-UNIT tablet tablet, Take 1 tablet by mouth Every Evening., Disp: , Rfl:   •  celecoxib (CeleBREX) 200 MG capsule, Take 1 capsule by mouth Daily., Disp: , Rfl:   •  Cholecalciferol (VITAMIN D3) 125 MCG (5000 UT) capsule capsule, Take 1 capsule by mouth Daily., Disp: , Rfl:   •  cyanocobalamin 1000 MCG/ML injection, Inject 1 mL into the appropriate muscle as directed by prescriber Every 28 (Twenty-Eight) Days., Disp: , Rfl:   •  cyclobenzaprine (FLEXERIL) 10 MG tablet, Take 1 tablet by mouth Every Evening., Disp: , Rfl:   •  DULoxetine (CYMBALTA) 30 MG capsule, Take 3 capsules by mouth Every Morning., Disp: , Rfl:   •  EMGALITY 120 MG/ML prefilled syringe, Inject 1 mL under the skin into the appropriate area as directed Every 30 (Thirty) Days., Disp: , Rfl: 3  •  ferrous gluconate (FERGON) 240 (27 FE) MG tablet, Take 1 tablet by mouth Every Morning., Disp: , Rfl:   •  fexofenadine (ALLEGRA) 180 MG tablet, Take 1 tablet by mouth Daily., Disp: , Rfl:   •  flecainide (TAMBOCOR) 50 MG tablet, Take " 1 tablet by mouth 2 (Two) Times a Day., Disp: 180 tablet, Rfl: 0  •  fluticasone-salmeterol (ADVAIR HFA) 115-21 MCG/ACT inhaler, Inhale 2 puffs 2 (Two) Times a Day., Disp: , Rfl:   •  folic acid (FOLVITE) 1 MG tablet, Take 1 tablet by mouth Daily., Disp: , Rfl:   •  gabapentin (NEURONTIN) 600 MG tablet, Take 1 tablet by mouth 3 (Three) Times a Day., Disp: , Rfl:   •  insulin detemir (LEVEMIR) 100 UNIT/ML injection, Inject 25 Units under the skin into the appropriate area as directed Daily., Disp: , Rfl:   •  isosorbide mononitrate (IMDUR) 30 MG 24 hr tablet, TAKE 1 TABLET DAILY, Disp: 90 tablet, Rfl: 3  •  levothyroxine sodium (TIROSINT) 112 MCG capsule, Take 1 capsule by mouth Daily., Disp: , Rfl:   •  linaclotide (LINZESS) 290 MCG capsule capsule, Take 1 capsule by mouth Every Morning Before Breakfast., Disp: , Rfl:   •  lisinopril (PRINIVIL,ZESTRIL) 20 MG tablet, Take 1 tablet by mouth Daily., Disp: , Rfl:   •  methotrexate 2.5 MG tablet, Take 10 tablets by mouth 1 (One) Time Per Week. Prior to [facility] Admission, Patient was on: Takes 10 tablets on Saturday, Disp: , Rfl:   •  metoprolol succinate XL (TOPROL-XL) 100 MG 24 hr tablet, TAKE 1 TABLET DAILY, Disp: 90 tablet, Rfl: 3  •  multivitamin (DAILY MICHAEL) tablet tablet, Take 1 tablet by mouth Every Evening., Disp: , Rfl:   •  omeprazole (priLOSEC) 40 MG capsule, Take 1 capsule by mouth Daily., Disp: , Rfl:   •  OnabotulinumtoxinA (BOTOX IJ), Inject  as directed. Q 3 M FOTR MIGRAINES, Disp: , Rfl:   •  potassium chloride (MICRO-K) 10 MEQ CR capsule, Take 1 capsule by mouth 2 (Two) Times a Day., Disp: , Rfl:   •  primidone (MYSOLINE) 50 MG tablet, Take 1 tablet by mouth Every Night., Disp: , Rfl:   •  RABEprazole (ACIPHEX) 20 MG EC tablet, Take 1 tablet by mouth Daily., Disp: , Rfl:   •  rimegepant 75 MG dispersible tablet, DISSOLVE ONE TABLET UNDER THE TONGUE AT ONSET OF MIGRAINE HEADACHE. MAX DOSE ONE TABLET IN 24 HOURS, Disp: , Rfl:   •  sodium chloride 1 g  "tablet, Take 1 tablet by mouth 2 (Two) Times a Day., Disp: , Rfl:   •  vitamin C (ASCORBIC ACID) 500 MG tablet, Take 2 tablets by mouth Every Evening., Disp: , Rfl:   •  vitamin E 1000 UNIT capsule, Take 1 capsule by mouth Every Evening., Disp: , Rfl:   •  Xarelto 20 MG tablet, TAKE 1 TABLET DAILY, Disp: 90 tablet, Rfl: 3  •  Calcipotriene-Betameth Diprop 0.005-0.064 % foam, Apply 1 spray topically Daily. (Patient not taking: Reported on 3/15/2023), Disp: , Rfl:   •  diazePAM (VALIUM) 2 MG tablet, Take 2 mg by mouth Daily As Needed for Anxiety., Disp: , Rfl:   •  HYDROcodone-acetaminophen (NORCO) 5-325 MG per tablet, Take 1 tablet by mouth 2 (Two) Times a Day. as directed (Patient not taking: Reported on 3/15/2023), Disp: , Rfl:   •  levothyroxine sodium (TIROSINT) 137 MCG capsule, Take 125 mcg by mouth Daily. Takes an extra dose at the first of each month., Disp: , Rfl:   •  loratadine (CLARITIN) 10 MG tablet, Take 10 mg by mouth Every Night. (Patient not taking: Reported on 3/15/2023), Disp: , Rfl:   •  traMADol (ULTRAM) 50 MG tablet, Take 50 mg by mouth Every 6 (Six) Hours As Needed., Disp: , Rfl:     The following portions of the patient's history were reviewed and updated as appropriate: allergies, current medications, past family history, past medical history, past social history, past surgical history and problem list.    Social History     Tobacco Use   • Smoking status: Never   • Smokeless tobacco: Never   Vaping Use   • Vaping Use: Never used   Substance Use Topics   • Alcohol use: No   • Drug use: No         Objective   Vitals:    03/15/23 0926   BP: 126/75   BP Location: Right arm   Patient Position: Sitting   Cuff Size: Large Adult   Pulse: 100   SpO2: 98%   Weight: 81 kg (178 lb 9.6 oz)   Height: 165.1 cm (65\")     Body mass index is 29.72 kg/m².    Constitutional:       General: Not in acute distress.     Appearance: Healthy appearance. Well-developed and not in distress. Not diaphoretic.   Eyes:      " Conjunctiva/sclera: Conjunctivae normal.      Pupils: Pupils are equal, round, and reactive to light.   HENT:      Head: Normocephalic and atraumatic.   Neck:      Vascular: No carotid bruit or JVD.   Pulmonary:      Effort: Pulmonary effort is normal. No respiratory distress.      Breath sounds: Normal breath sounds.   Cardiovascular:      Normal rate. Regular rhythm.   Skin:     General: Skin is cool.   Neurological:      Mental Status: Alert, oriented to person, place, and time and oriented to person, place and time.         Lab Results   Component Value Date     (L) 01/02/2023    K 4.1 01/02/2023     01/02/2023    CO2 23.0 01/02/2023    BUN 11 01/02/2023    CREATININE 0.67 01/02/2023    GLUCOSE 189 (H) 01/02/2023    CALCIUM 9.0 01/02/2023    AST 16 01/02/2023    ALT 21 01/02/2023    ALKPHOS 105 01/02/2023    LABIL2 1.4 (L) 08/11/2015     Lab Results   Component Value Date    CKTOTAL 40 04/12/2017     Lab Results   Component Value Date    WBC 8.30 04/08/2022    HGB 12.0 04/08/2022    HCT 34.6 04/08/2022     04/08/2022     Lab Results   Component Value Date    INR 0.97 11/22/2018    INR 0.93 07/20/2015     Lab Results   Component Value Date    MG 2.0 09/21/2020     Lab Results   Component Value Date    TSH 1.520 12/06/2022    CHLPL 132 07/22/2015    TRIG 49 01/06/2022    HDL 70 (H) 01/06/2022    LDL 73 01/06/2022      Lab Results   Component Value Date    BNP 51.0 11/22/2018       During this visit the following were done:  Labs Reviewed []    Labs Ordered []    Radiology Reports Reviewed []    Radiology Ordered []    PCP Records Reviewed []    Referring Provider Records Reviewed []    ER Records Reviewed []    Hospital Records Reviewed []    History Obtained From Family []    Radiology Images Reviewed []    Other Reviewed []    Records Requested []       Procedures    Assessment & Plan    Diagnosis Plan   1. Paroxysmal atrial fibrillation (HCC)        2. Essential hypertension                  Recommendations:  1. Surgical risk evaluation  a. We will clear patient with moderate cardiovascular risk due to the need of having to hold anticoagulation.  She has been maintaining sinus rhythm with assistance of flecainide.  2. Paroxysmal atrial fibrillation  a. Clinically stable maintaining sinus rhythm continue current regimen.    No follow-ups on file.    As always, I appreciate very much the opportunity to participate in the cardiovascular care of your patients.      With Best Regards,    Gael Baum PA-C

## 2023-03-17 ENCOUNTER — TELEPHONE (OUTPATIENT)
Dept: CARDIOLOGY | Facility: CLINIC | Age: 63
End: 2023-03-17
Payer: MEDICARE

## 2023-03-30 RX ORDER — ISOSORBIDE MONONITRATE 30 MG/1
TABLET, EXTENDED RELEASE ORAL
Qty: 90 TABLET | Refills: 0 | Status: SHIPPED | OUTPATIENT
Start: 2023-03-30

## 2023-04-10 ENCOUNTER — LAB (OUTPATIENT)
Dept: LAB | Facility: HOSPITAL | Age: 63
End: 2023-04-10
Payer: MEDICARE

## 2023-04-10 ENCOUNTER — TRANSCRIBE ORDERS (OUTPATIENT)
Dept: ADMINISTRATIVE | Facility: HOSPITAL | Age: 63
End: 2023-04-10
Payer: MEDICARE

## 2023-04-10 DIAGNOSIS — E87.1 HYPONATREMIA: ICD-10-CM

## 2023-04-10 DIAGNOSIS — E87.1 HYPONATREMIA: Primary | ICD-10-CM

## 2023-04-10 PROCEDURE — 36415 COLL VENOUS BLD VENIPUNCTURE: CPT

## 2023-04-10 PROCEDURE — 81001 URINALYSIS AUTO W/SCOPE: CPT

## 2023-04-10 PROCEDURE — 82570 ASSAY OF URINE CREATININE: CPT

## 2023-04-10 PROCEDURE — 80053 COMPREHEN METABOLIC PANEL: CPT

## 2023-04-10 PROCEDURE — 84156 ASSAY OF PROTEIN URINE: CPT

## 2023-04-10 PROCEDURE — 82043 UR ALBUMIN QUANTITATIVE: CPT

## 2023-04-11 LAB
ALBUMIN SERPL-MCNC: 3.8 G/DL (ref 3.5–5.2)
ALBUMIN UR-MCNC: 3.5 MG/DL
ALBUMIN/GLOB SERPL: 1.7 G/DL
ALP SERPL-CCNC: 116 U/L (ref 39–117)
ALT SERPL W P-5'-P-CCNC: 69 U/L (ref 1–33)
ANION GAP SERPL CALCULATED.3IONS-SCNC: 9.9 MMOL/L (ref 5–15)
AST SERPL-CCNC: 32 U/L (ref 1–32)
BACTERIA UR QL AUTO: NORMAL /HPF
BILIRUB SERPL-MCNC: 0.2 MG/DL (ref 0–1.2)
BILIRUB UR QL STRIP: NEGATIVE
BUN SERPL-MCNC: 10 MG/DL (ref 8–23)
BUN/CREAT SERPL: 11.4 (ref 7–25)
CALCIUM SPEC-SCNC: 9.9 MG/DL (ref 8.6–10.5)
CHLORIDE SERPL-SCNC: 97 MMOL/L (ref 98–107)
CLARITY UR: ABNORMAL
CO2 SERPL-SCNC: 24.1 MMOL/L (ref 22–29)
COD CRY URNS QL: NORMAL /HPF
COLOR UR: YELLOW
CREAT SERPL-MCNC: 0.88 MG/DL (ref 0.57–1)
CREAT UR-MCNC: 76 MG/DL
CREAT UR-MCNC: 76 MG/DL
EGFRCR SERPLBLD CKD-EPI 2021: 74.9 ML/MIN/1.73
GLOBULIN UR ELPH-MCNC: 2.3 GM/DL
GLUCOSE SERPL-MCNC: 178 MG/DL (ref 65–99)
GLUCOSE UR STRIP-MCNC: NEGATIVE MG/DL
HGB UR QL STRIP.AUTO: NEGATIVE
HYALINE CASTS UR QL AUTO: NORMAL /LPF
KETONES UR QL STRIP: NEGATIVE
LEUKOCYTE ESTERASE UR QL STRIP.AUTO: NEGATIVE
MICROALBUMIN/CREAT UR: 46.1 MG/G
NITRITE UR QL STRIP: NEGATIVE
PH UR STRIP.AUTO: 5.5 [PH] (ref 5–8)
POTASSIUM SERPL-SCNC: 4.8 MMOL/L (ref 3.5–5.2)
PROT ?TM UR-MCNC: 16.4 MG/DL
PROT SERPL-MCNC: 6.1 G/DL (ref 6–8.5)
PROT UR QL STRIP: ABNORMAL
PROT/CREAT UR: 215.8 MG/G CREA (ref 0–200)
RBC # UR STRIP: NORMAL /HPF
REF LAB TEST METHOD: NORMAL
SODIUM SERPL-SCNC: 131 MMOL/L (ref 136–145)
SP GR UR STRIP: 1.02 (ref 1–1.03)
SQUAMOUS #/AREA URNS HPF: NORMAL /HPF
UROBILINOGEN UR QL STRIP: ABNORMAL
WBC # UR STRIP: NORMAL /HPF

## 2023-04-14 RX ORDER — RIVAROXABAN 20 MG/1
TABLET, FILM COATED ORAL
Qty: 90 TABLET | Refills: 3 | Status: SHIPPED | OUTPATIENT
Start: 2023-04-14

## 2023-05-31 ENCOUNTER — LAB (OUTPATIENT)
Dept: LAB | Facility: HOSPITAL | Age: 63
End: 2023-05-31

## 2023-05-31 ENCOUNTER — TRANSCRIBE ORDERS (OUTPATIENT)
Dept: ADMINISTRATIVE | Facility: HOSPITAL | Age: 63
End: 2023-05-31

## 2023-05-31 DIAGNOSIS — E89.0 POSTPROCEDURAL HYPOTHYROIDISM: Primary | ICD-10-CM

## 2023-05-31 DIAGNOSIS — E11.9 TYPE 2 DIABETES MELLITUS WITHOUT COMPLICATION, UNSPECIFIED WHETHER LONG TERM INSULIN USE: ICD-10-CM

## 2023-05-31 DIAGNOSIS — E89.0 POSTPROCEDURAL HYPOTHYROIDISM: ICD-10-CM

## 2023-05-31 PROCEDURE — 84432 ASSAY OF THYROGLOBULIN: CPT

## 2023-05-31 PROCEDURE — 84443 ASSAY THYROID STIM HORMONE: CPT

## 2023-05-31 PROCEDURE — 80053 COMPREHEN METABOLIC PANEL: CPT

## 2023-05-31 PROCEDURE — 36415 COLL VENOUS BLD VENIPUNCTURE: CPT

## 2023-05-31 PROCEDURE — 86800 THYROGLOBULIN ANTIBODY: CPT

## 2023-05-31 PROCEDURE — 82043 UR ALBUMIN QUANTITATIVE: CPT

## 2023-05-31 PROCEDURE — 82570 ASSAY OF URINE CREATININE: CPT

## 2023-06-01 LAB
ALBUMIN SERPL-MCNC: 3.7 G/DL (ref 3.5–5.2)
ALBUMIN UR-MCNC: <1.2 MG/DL
ALBUMIN/GLOB SERPL: 1.8 G/DL
ALP SERPL-CCNC: 113 U/L (ref 39–117)
ALT SERPL W P-5'-P-CCNC: 23 U/L (ref 1–33)
ANION GAP SERPL CALCULATED.3IONS-SCNC: 9 MMOL/L (ref 5–15)
AST SERPL-CCNC: 21 U/L (ref 1–32)
BILIRUB SERPL-MCNC: 0.3 MG/DL (ref 0–1.2)
BUN SERPL-MCNC: 10 MG/DL (ref 8–23)
BUN/CREAT SERPL: 15.2 (ref 7–25)
CALCIUM SPEC-SCNC: 8.4 MG/DL (ref 8.6–10.5)
CHLORIDE SERPL-SCNC: 103 MMOL/L (ref 98–107)
CO2 SERPL-SCNC: 22 MMOL/L (ref 22–29)
CREAT SERPL-MCNC: 0.66 MG/DL (ref 0.57–1)
CREAT UR-MCNC: 13.2 MG/DL
EGFRCR SERPLBLD CKD-EPI 2021: 99.9 ML/MIN/1.73
GLOBULIN UR ELPH-MCNC: 2.1 GM/DL
GLUCOSE SERPL-MCNC: 146 MG/DL (ref 65–99)
MICROALBUMIN/CREAT UR: NORMAL MG/G{CREAT}
POTASSIUM SERPL-SCNC: 4.4 MMOL/L (ref 3.5–5.2)
PROT SERPL-MCNC: 5.8 G/DL (ref 6–8.5)
SODIUM SERPL-SCNC: 134 MMOL/L (ref 136–145)
THYROGLOB AB SERPL-ACNC: <1 IU/ML (ref 0–0.9)
THYROGLOB SERPL-MCNC: 0.4 NG/ML (ref 1.5–38.5)
TSH SERPL DL<=0.05 MIU/L-ACNC: 0.19 UIU/ML (ref 0.27–4.2)

## 2023-06-07 ENCOUNTER — TRANSCRIBE ORDERS (OUTPATIENT)
Dept: ADMINISTRATIVE | Facility: HOSPITAL | Age: 63
End: 2023-06-07
Payer: MEDICARE

## 2023-06-07 ENCOUNTER — LAB (OUTPATIENT)
Dept: LAB | Facility: HOSPITAL | Age: 63
End: 2023-06-07
Payer: MEDICARE

## 2023-06-07 DIAGNOSIS — E87.1 HYPONATREMIA: Primary | ICD-10-CM

## 2023-06-07 DIAGNOSIS — E87.1 HYPONATREMIA: ICD-10-CM

## 2023-06-07 PROCEDURE — 82570 ASSAY OF URINE CREATININE: CPT

## 2023-06-07 PROCEDURE — 80053 COMPREHEN METABOLIC PANEL: CPT

## 2023-06-07 PROCEDURE — 82043 UR ALBUMIN QUANTITATIVE: CPT

## 2023-06-07 PROCEDURE — 81003 URINALYSIS AUTO W/O SCOPE: CPT

## 2023-06-07 PROCEDURE — 36415 COLL VENOUS BLD VENIPUNCTURE: CPT

## 2023-06-07 PROCEDURE — 84156 ASSAY OF PROTEIN URINE: CPT

## 2023-06-08 LAB
ALBUMIN SERPL-MCNC: 3.7 G/DL (ref 3.5–5.2)
ALBUMIN UR-MCNC: <1.2 MG/DL
ALBUMIN/GLOB SERPL: 1.6 G/DL
ALP SERPL-CCNC: 117 U/L (ref 39–117)
ALT SERPL W P-5'-P-CCNC: 26 U/L (ref 1–33)
ANION GAP SERPL CALCULATED.3IONS-SCNC: 10.9 MMOL/L (ref 5–15)
AST SERPL-CCNC: 29 U/L (ref 1–32)
BILIRUB SERPL-MCNC: 0.3 MG/DL (ref 0–1.2)
BILIRUB UR QL STRIP: NEGATIVE
BUN SERPL-MCNC: 8 MG/DL (ref 8–23)
BUN/CREAT SERPL: 10.5 (ref 7–25)
CALCIUM SPEC-SCNC: 9.3 MG/DL (ref 8.6–10.5)
CHLORIDE SERPL-SCNC: 102 MMOL/L (ref 98–107)
CLARITY UR: CLEAR
CO2 SERPL-SCNC: 23.1 MMOL/L (ref 22–29)
COLOR UR: YELLOW
CREAT SERPL-MCNC: 0.76 MG/DL (ref 0.57–1)
CREAT UR-MCNC: 24.5 MG/DL
CREAT UR-MCNC: 24.5 MG/DL
EGFRCR SERPLBLD CKD-EPI 2021: 89.3 ML/MIN/1.73
GLOBULIN UR ELPH-MCNC: 2.3 GM/DL
GLUCOSE SERPL-MCNC: 57 MG/DL (ref 65–99)
GLUCOSE UR STRIP-MCNC: NEGATIVE MG/DL
HGB UR QL STRIP.AUTO: NEGATIVE
KETONES UR QL STRIP: NEGATIVE
LEUKOCYTE ESTERASE UR QL STRIP.AUTO: NEGATIVE
MICROALBUMIN/CREAT UR: NORMAL MG/G{CREAT}
NITRITE UR QL STRIP: NEGATIVE
PH UR STRIP.AUTO: 6 [PH] (ref 5–8)
POTASSIUM SERPL-SCNC: 4.4 MMOL/L (ref 3.5–5.2)
PROT ?TM UR-MCNC: 5.3 MG/DL
PROT SERPL-MCNC: 6 G/DL (ref 6–8.5)
PROT UR QL STRIP: NEGATIVE
PROT/CREAT UR: 216.3 MG/G CREA (ref 0–200)
SODIUM SERPL-SCNC: 136 MMOL/L (ref 136–145)
SP GR UR STRIP: 1.01 (ref 1–1.03)
UROBILINOGEN UR QL STRIP: NORMAL

## 2023-09-13 ENCOUNTER — HOSPITAL ENCOUNTER (INPATIENT)
Facility: HOSPITAL | Age: 63
LOS: 1 days | Discharge: HOME | End: 2023-09-15
Attending: EMERGENCY MEDICINE | Admitting: INTERNAL MEDICINE

## 2023-09-13 ENCOUNTER — APPOINTMENT (OUTPATIENT)
Dept: CT IMAGING | Facility: HOSPITAL | Age: 63
End: 2023-09-13

## 2023-09-13 ENCOUNTER — APPOINTMENT (OUTPATIENT)
Dept: GENERAL RADIOLOGY | Facility: HOSPITAL | Age: 63
End: 2023-09-13

## 2023-09-13 DIAGNOSIS — A41.9 SEPSIS WITHOUT ACUTE ORGAN DYSFUNCTION, DUE TO UNSPECIFIED ORGANISM: Primary | ICD-10-CM

## 2023-09-13 LAB
ALBUMIN SERPL-MCNC: 3.7 G/DL (ref 3.5–5.2)
ALBUMIN/GLOB SERPL: 1.4 G/DL
ALP SERPL-CCNC: 142 U/L (ref 39–117)
ALT SERPL W P-5'-P-CCNC: 16 U/L (ref 1–33)
ANION GAP SERPL CALCULATED.3IONS-SCNC: 10.8 MMOL/L (ref 5–15)
APTT PPP: 32.2 SECONDS (ref 26.5–34.5)
AST SERPL-CCNC: 13 U/L (ref 1–32)
B PARAPERT DNA SPEC QL NAA+PROBE: NOT DETECTED
B PERT DNA SPEC QL NAA+PROBE: NOT DETECTED
BACTERIA BLD CULT: ABNORMAL
BACTERIA UR QL AUTO: ABNORMAL /HPF
BASOPHILS # BLD AUTO: 0.06 10*3/MM3 (ref 0–0.2)
BASOPHILS NFR BLD AUTO: 0.5 % (ref 0–1.5)
BILIRUB SERPL-MCNC: 0.5 MG/DL (ref 0–1.2)
BILIRUB UR QL STRIP: NEGATIVE
BOTTLE TYPE: ABNORMAL
BUN SERPL-MCNC: 18 MG/DL (ref 8–23)
BUN/CREAT SERPL: 17.1 (ref 7–25)
C PNEUM DNA NPH QL NAA+NON-PROBE: NOT DETECTED
CALCIUM SPEC-SCNC: 9 MG/DL (ref 8.6–10.5)
CHLORIDE SERPL-SCNC: 102 MMOL/L (ref 98–107)
CLARITY UR: CLEAR
CO2 SERPL-SCNC: 22.2 MMOL/L (ref 22–29)
COLOR UR: YELLOW
CREAT SERPL-MCNC: 1.05 MG/DL (ref 0.57–1)
CRP SERPL-MCNC: 4.13 MG/DL (ref 0–0.5)
D-LACTATE SERPL-SCNC: 1.5 MMOL/L (ref 0.5–2)
DEPRECATED RDW RBC AUTO: 55.5 FL (ref 37–54)
EGFRCR SERPLBLD CKD-EPI 2021: 60.6 ML/MIN/1.73
EOSINOPHIL # BLD AUTO: 0.01 10*3/MM3 (ref 0–0.4)
EOSINOPHIL NFR BLD AUTO: 0.1 % (ref 0.3–6.2)
ERYTHROCYTE [DISTWIDTH] IN BLOOD BY AUTOMATED COUNT: 15 % (ref 12.3–15.4)
FLUAV SUBTYP SPEC NAA+PROBE: NOT DETECTED
FLUBV RNA ISLT QL NAA+PROBE: NOT DETECTED
GEN 5 2HR TROPONIN T REFLEX: 9 NG/L
GLOBULIN UR ELPH-MCNC: 2.7 GM/DL
GLUCOSE BLDC GLUCOMTR-MCNC: 138 MG/DL (ref 70–130)
GLUCOSE BLDC GLUCOMTR-MCNC: 141 MG/DL (ref 70–130)
GLUCOSE BLDC GLUCOMTR-MCNC: 167 MG/DL (ref 70–130)
GLUCOSE BLDC GLUCOMTR-MCNC: 382 MG/DL (ref 70–130)
GLUCOSE BLDC GLUCOMTR-MCNC: 94 MG/DL (ref 70–130)
GLUCOSE SERPL-MCNC: 246 MG/DL (ref 65–99)
GLUCOSE UR STRIP-MCNC: ABNORMAL MG/DL
HADV DNA SPEC NAA+PROBE: NOT DETECTED
HCOV 229E RNA SPEC QL NAA+PROBE: NOT DETECTED
HCOV HKU1 RNA SPEC QL NAA+PROBE: NOT DETECTED
HCOV NL63 RNA SPEC QL NAA+PROBE: NOT DETECTED
HCOV OC43 RNA SPEC QL NAA+PROBE: NOT DETECTED
HCT VFR BLD AUTO: 38.5 % (ref 34–46.6)
HGB BLD-MCNC: 12.4 G/DL (ref 12–15.9)
HGB UR QL STRIP.AUTO: ABNORMAL
HMPV RNA NPH QL NAA+NON-PROBE: NOT DETECTED
HOLD SPECIMEN: NORMAL
HOLD SPECIMEN: NORMAL
HPIV1 RNA ISLT QL NAA+PROBE: NOT DETECTED
HPIV2 RNA SPEC QL NAA+PROBE: NOT DETECTED
HPIV3 RNA NPH QL NAA+PROBE: NOT DETECTED
HPIV4 P GENE NPH QL NAA+PROBE: NOT DETECTED
HYALINE CASTS UR QL AUTO: ABNORMAL /LPF
IMM GRANULOCYTES # BLD AUTO: 0.12 10*3/MM3 (ref 0–0.05)
IMM GRANULOCYTES NFR BLD AUTO: 0.9 % (ref 0–0.5)
INR PPP: 1.54 (ref 0.9–1.1)
KETONES UR QL STRIP: NEGATIVE
LEUKOCYTE ESTERASE UR QL STRIP.AUTO: ABNORMAL
LYMPHOCYTES # BLD AUTO: 1.35 10*3/MM3 (ref 0.7–3.1)
LYMPHOCYTES NFR BLD AUTO: 10.3 % (ref 19.6–45.3)
M PNEUMO IGG SER IA-ACNC: NOT DETECTED
MCH RBC QN AUTO: 32.6 PG (ref 26.6–33)
MCHC RBC AUTO-ENTMCNC: 32.2 G/DL (ref 31.5–35.7)
MCV RBC AUTO: 101.3 FL (ref 79–97)
MONOCYTES # BLD AUTO: 0.56 10*3/MM3 (ref 0.1–0.9)
MONOCYTES NFR BLD AUTO: 4.3 % (ref 5–12)
NEUTROPHILS NFR BLD AUTO: 11.02 10*3/MM3 (ref 1.7–7)
NEUTROPHILS NFR BLD AUTO: 83.9 % (ref 42.7–76)
NITRITE UR QL STRIP: NEGATIVE
NRBC BLD AUTO-RTO: 0 /100 WBC (ref 0–0.2)
PH UR STRIP.AUTO: 5.5 [PH] (ref 5–8)
PLATELET # BLD AUTO: 383 10*3/MM3 (ref 140–450)
PMV BLD AUTO: 9.1 FL (ref 6–12)
POTASSIUM SERPL-SCNC: 4.2 MMOL/L (ref 3.5–5.2)
PROCALCITONIN SERPL-MCNC: 3.81 NG/ML (ref 0–0.25)
PROT SERPL-MCNC: 6.4 G/DL (ref 6–8.5)
PROT UR QL STRIP: ABNORMAL
PROTHROMBIN TIME: 19.1 SECONDS (ref 12.1–14.7)
QT INTERVAL: 298 MS
QTC INTERVAL: 421 MS
RBC # BLD AUTO: 3.8 10*6/MM3 (ref 3.77–5.28)
RBC # UR STRIP: ABNORMAL /HPF
REF LAB TEST METHOD: ABNORMAL
RHINOVIRUS RNA SPEC NAA+PROBE: DETECTED
RSV RNA NPH QL NAA+NON-PROBE: NOT DETECTED
SARS-COV-2 RNA NPH QL NAA+NON-PROBE: NOT DETECTED
SODIUM SERPL-SCNC: 135 MMOL/L (ref 136–145)
SP GR UR STRIP: 1.01 (ref 1–1.03)
SQUAMOUS #/AREA URNS HPF: ABNORMAL /HPF
TROPONIN T DELTA: -1 NG/L
TROPONIN T SERPL HS-MCNC: 10 NG/L
UROBILINOGEN UR QL STRIP: ABNORMAL
WBC # UR STRIP: ABNORMAL /HPF
WBC NRBC COR # BLD: 13.12 10*3/MM3 (ref 3.4–10.8)
WHOLE BLOOD HOLD COAG: NORMAL
WHOLE BLOOD HOLD SPECIMEN: NORMAL

## 2023-09-13 PROCEDURE — 0 CEFEPIME PER 500 MG: Performed by: STUDENT IN AN ORGANIZED HEALTH CARE EDUCATION/TRAINING PROGRAM

## 2023-09-13 PROCEDURE — 99285 EMERGENCY DEPT VISIT HI MDM: CPT

## 2023-09-13 PROCEDURE — 85730 THROMBOPLASTIN TIME PARTIAL: CPT | Performed by: STUDENT IN AN ORGANIZED HEALTH CARE EDUCATION/TRAINING PROGRAM

## 2023-09-13 PROCEDURE — 81001 URINALYSIS AUTO W/SCOPE: CPT | Performed by: STUDENT IN AN ORGANIZED HEALTH CARE EDUCATION/TRAINING PROGRAM

## 2023-09-13 PROCEDURE — 93005 ELECTROCARDIOGRAM TRACING: CPT | Performed by: STUDENT IN AN ORGANIZED HEALTH CARE EDUCATION/TRAINING PROGRAM

## 2023-09-13 PROCEDURE — 87040 BLOOD CULTURE FOR BACTERIA: CPT | Performed by: STUDENT IN AN ORGANIZED HEALTH CARE EDUCATION/TRAINING PROGRAM

## 2023-09-13 PROCEDURE — 93010 ELECTROCARDIOGRAM REPORT: CPT | Performed by: SPECIALIST

## 2023-09-13 PROCEDURE — 74176 CT ABD & PELVIS W/O CONTRAST: CPT

## 2023-09-13 PROCEDURE — 82948 REAGENT STRIP/BLOOD GLUCOSE: CPT

## 2023-09-13 PROCEDURE — 71250 CT THORAX DX C-: CPT | Performed by: RADIOLOGY

## 2023-09-13 PROCEDURE — 83605 ASSAY OF LACTIC ACID: CPT | Performed by: STUDENT IN AN ORGANIZED HEALTH CARE EDUCATION/TRAINING PROGRAM

## 2023-09-13 PROCEDURE — 74176 CT ABD & PELVIS W/O CONTRAST: CPT | Performed by: RADIOLOGY

## 2023-09-13 PROCEDURE — 25010000002 CEFTRIAXONE PER 250 MG

## 2023-09-13 PROCEDURE — 94799 UNLISTED PULMONARY SVC/PX: CPT

## 2023-09-13 PROCEDURE — 0202U NFCT DS 22 TRGT SARS-COV-2: CPT | Performed by: STUDENT IN AN ORGANIZED HEALTH CARE EDUCATION/TRAINING PROGRAM

## 2023-09-13 PROCEDURE — 87086 URINE CULTURE/COLONY COUNT: CPT | Performed by: STUDENT IN AN ORGANIZED HEALTH CARE EDUCATION/TRAINING PROGRAM

## 2023-09-13 PROCEDURE — G0378 HOSPITAL OBSERVATION PER HR: HCPCS

## 2023-09-13 PROCEDURE — 94761 N-INVAS EAR/PLS OXIMETRY MLT: CPT

## 2023-09-13 PROCEDURE — 84145 PROCALCITONIN (PCT): CPT | Performed by: STUDENT IN AN ORGANIZED HEALTH CARE EDUCATION/TRAINING PROGRAM

## 2023-09-13 PROCEDURE — 25010000002 LORAZEPAM PER 2 MG: Performed by: EMERGENCY MEDICINE

## 2023-09-13 PROCEDURE — 87150 DNA/RNA AMPLIFIED PROBE: CPT | Performed by: STUDENT IN AN ORGANIZED HEALTH CARE EDUCATION/TRAINING PROGRAM

## 2023-09-13 PROCEDURE — 87186 SC STD MICRODIL/AGAR DIL: CPT | Performed by: STUDENT IN AN ORGANIZED HEALTH CARE EDUCATION/TRAINING PROGRAM

## 2023-09-13 PROCEDURE — 80053 COMPREHEN METABOLIC PANEL: CPT | Performed by: STUDENT IN AN ORGANIZED HEALTH CARE EDUCATION/TRAINING PROGRAM

## 2023-09-13 PROCEDURE — 99223 1ST HOSP IP/OBS HIGH 75: CPT

## 2023-09-13 PROCEDURE — 85610 PROTHROMBIN TIME: CPT | Performed by: STUDENT IN AN ORGANIZED HEALTH CARE EDUCATION/TRAINING PROGRAM

## 2023-09-13 PROCEDURE — 71045 X-RAY EXAM CHEST 1 VIEW: CPT

## 2023-09-13 PROCEDURE — 85025 COMPLETE CBC W/AUTO DIFF WBC: CPT | Performed by: STUDENT IN AN ORGANIZED HEALTH CARE EDUCATION/TRAINING PROGRAM

## 2023-09-13 PROCEDURE — 84484 ASSAY OF TROPONIN QUANT: CPT | Performed by: STUDENT IN AN ORGANIZED HEALTH CARE EDUCATION/TRAINING PROGRAM

## 2023-09-13 PROCEDURE — 99222 1ST HOSP IP/OBS MODERATE 55: CPT | Performed by: NURSE PRACTITIONER

## 2023-09-13 PROCEDURE — 71045 X-RAY EXAM CHEST 1 VIEW: CPT | Performed by: RADIOLOGY

## 2023-09-13 PROCEDURE — P9612 CATHETERIZE FOR URINE SPEC: HCPCS

## 2023-09-13 PROCEDURE — 86140 C-REACTIVE PROTEIN: CPT | Performed by: STUDENT IN AN ORGANIZED HEALTH CARE EDUCATION/TRAINING PROGRAM

## 2023-09-13 PROCEDURE — 71250 CT THORAX DX C-: CPT

## 2023-09-13 PROCEDURE — 36415 COLL VENOUS BLD VENIPUNCTURE: CPT

## 2023-09-13 PROCEDURE — 63710000001 INSULIN GLARGINE PER 5 UNITS: Performed by: INTERNAL MEDICINE

## 2023-09-13 PROCEDURE — 63710000001 INSULIN LISPRO (HUMAN) PER 5 UNITS: Performed by: INTERNAL MEDICINE

## 2023-09-13 PROCEDURE — 25010000002 VANCOMYCIN 5 G RECONSTITUTED SOLUTION: Performed by: STUDENT IN AN ORGANIZED HEALTH CARE EDUCATION/TRAINING PROGRAM

## 2023-09-13 RX ORDER — DULOXETIN HYDROCHLORIDE 30 MG/1
30 CAPSULE, DELAYED RELEASE ORAL EVERY MORNING
Status: DISCONTINUED | OUTPATIENT
Start: 2023-09-14 | End: 2023-09-15 | Stop reason: HOSPADM

## 2023-09-13 RX ORDER — ACETAMINOPHEN 325 MG/1
650 TABLET ORAL EVERY 4 HOURS PRN
Status: DISCONTINUED | OUTPATIENT
Start: 2023-09-13 | End: 2023-09-15 | Stop reason: HOSPADM

## 2023-09-13 RX ORDER — SODIUM CHLORIDE 0.9 % (FLUSH) 0.9 %
10 SYRINGE (ML) INJECTION AS NEEDED
Status: DISCONTINUED | OUTPATIENT
Start: 2023-09-13 | End: 2023-09-15 | Stop reason: HOSPADM

## 2023-09-13 RX ORDER — OLOPATADINE HYDROCHLORIDE 665 UG/1
2 SPRAY NASAL 2 TIMES DAILY
COMMUNITY

## 2023-09-13 RX ORDER — SODIUM CHLORIDE 0.9 % (FLUSH) 0.9 %
10 SYRINGE (ML) INJECTION EVERY 12 HOURS SCHEDULED
Status: DISCONTINUED | OUTPATIENT
Start: 2023-09-13 | End: 2023-09-15 | Stop reason: HOSPADM

## 2023-09-13 RX ORDER — FAMOTIDINE 40 MG/1
40 TABLET, FILM COATED ORAL 2 TIMES DAILY
COMMUNITY

## 2023-09-13 RX ORDER — METOPROLOL SUCCINATE 50 MG/1
100 TABLET, EXTENDED RELEASE ORAL DAILY
Status: DISCONTINUED | OUTPATIENT
Start: 2023-09-13 | End: 2023-09-15 | Stop reason: HOSPADM

## 2023-09-13 RX ORDER — BISACODYL 5 MG/1
5 TABLET, DELAYED RELEASE ORAL DAILY PRN
Status: DISCONTINUED | OUTPATIENT
Start: 2023-09-13 | End: 2023-09-15 | Stop reason: HOSPADM

## 2023-09-13 RX ORDER — CYCLOBENZAPRINE HCL 10 MG
10 TABLET ORAL EVERY EVENING
Status: DISCONTINUED | OUTPATIENT
Start: 2023-09-13 | End: 2023-09-15 | Stop reason: HOSPADM

## 2023-09-13 RX ORDER — CYANOCOBALAMIN 1000 UG/ML
1000 INJECTION, SOLUTION INTRAMUSCULAR; SUBCUTANEOUS
Status: CANCELLED | OUTPATIENT
Start: 2023-09-13

## 2023-09-13 RX ORDER — SUCRALFATE 1 G/1
1 TABLET ORAL 4 TIMES DAILY
Status: DISCONTINUED | OUTPATIENT
Start: 2023-09-13 | End: 2023-09-15 | Stop reason: HOSPADM

## 2023-09-13 RX ORDER — SODIUM CHLORIDE 9 MG/ML
75 INJECTION, SOLUTION INTRAVENOUS CONTINUOUS
Status: DISCONTINUED | OUTPATIENT
Start: 2023-09-13 | End: 2023-09-14

## 2023-09-13 RX ORDER — SODIUM CHLORIDE 1 G/1
1 TABLET ORAL 2 TIMES DAILY
Status: CANCELLED | OUTPATIENT
Start: 2023-09-13

## 2023-09-13 RX ORDER — DULOXETIN HYDROCHLORIDE 60 MG/1
60 CAPSULE, DELAYED RELEASE ORAL DAILY
Status: DISCONTINUED | OUTPATIENT
Start: 2023-09-13 | End: 2023-09-15 | Stop reason: HOSPADM

## 2023-09-13 RX ORDER — CELECOXIB 100 MG/1
200 CAPSULE ORAL DAILY
Status: DISCONTINUED | OUTPATIENT
Start: 2023-09-13 | End: 2023-09-15 | Stop reason: HOSPADM

## 2023-09-13 RX ORDER — BUTALBITAL, ACETAMINOPHEN AND CAFFEINE 50; 325; 40 MG/1; MG/1; MG/1
1 TABLET ORAL ONCE
Status: COMPLETED | OUTPATIENT
Start: 2023-09-13 | End: 2023-09-13

## 2023-09-13 RX ORDER — SODIUM CHLORIDE 9 MG/ML
40 INJECTION, SOLUTION INTRAVENOUS AS NEEDED
Status: DISCONTINUED | OUTPATIENT
Start: 2023-09-13 | End: 2023-09-15 | Stop reason: HOSPADM

## 2023-09-13 RX ORDER — PRIMIDONE 50 MG/1
50 TABLET ORAL NIGHTLY
Status: DISCONTINUED | OUTPATIENT
Start: 2023-09-13 | End: 2023-09-15 | Stop reason: HOSPADM

## 2023-09-13 RX ORDER — POTASSIUM CHLORIDE 750 MG/1
10 TABLET, FILM COATED, EXTENDED RELEASE ORAL DAILY
COMMUNITY

## 2023-09-13 RX ORDER — FERROUS SULFATE 325(65) MG
TABLET ORAL
Status: CANCELLED | OUTPATIENT
Start: 2023-09-13

## 2023-09-13 RX ORDER — DEXTROSE MONOHYDRATE 25 G/50ML
25 INJECTION, SOLUTION INTRAVENOUS
Status: DISCONTINUED | OUTPATIENT
Start: 2023-09-13 | End: 2023-09-15 | Stop reason: HOSPADM

## 2023-09-13 RX ORDER — AMITRIPTYLINE HYDROCHLORIDE 25 MG/1
25 TABLET, FILM COATED ORAL NIGHTLY
Status: DISCONTINUED | OUTPATIENT
Start: 2023-09-13 | End: 2023-09-15 | Stop reason: HOSPADM

## 2023-09-13 RX ORDER — PANTOPRAZOLE SODIUM 40 MG/1
40 TABLET, DELAYED RELEASE ORAL
Status: DISCONTINUED | OUTPATIENT
Start: 2023-09-14 | End: 2023-09-15 | Stop reason: HOSPADM

## 2023-09-13 RX ORDER — FLECAINIDE ACETATE 50 MG/1
50 TABLET ORAL 2 TIMES DAILY
Status: DISCONTINUED | OUTPATIENT
Start: 2023-09-13 | End: 2023-09-15 | Stop reason: HOSPADM

## 2023-09-13 RX ORDER — DULOXETIN HYDROCHLORIDE 60 MG/1
60 CAPSULE, DELAYED RELEASE ORAL DAILY
COMMUNITY

## 2023-09-13 RX ORDER — LORAZEPAM 2 MG/ML
0.5 INJECTION INTRAMUSCULAR ONCE
Status: COMPLETED | OUTPATIENT
Start: 2023-09-13 | End: 2023-09-13

## 2023-09-13 RX ORDER — GABAPENTIN 300 MG/1
600 CAPSULE ORAL EVERY 8 HOURS SCHEDULED
Status: DISCONTINUED | OUTPATIENT
Start: 2023-09-13 | End: 2023-09-15 | Stop reason: HOSPADM

## 2023-09-13 RX ORDER — RABEPRAZOLE SODIUM 20 MG/1
20 TABLET, DELAYED RELEASE ORAL 2 TIMES DAILY
COMMUNITY

## 2023-09-13 RX ORDER — FOLIC ACID 1 MG/1
1 TABLET ORAL DAILY
Status: DISCONTINUED | OUTPATIENT
Start: 2023-09-13 | End: 2023-09-15 | Stop reason: HOSPADM

## 2023-09-13 RX ORDER — SODIUM CHLORIDE 1 G/1
2 TABLET ORAL 3 TIMES DAILY
Status: DISCONTINUED | OUTPATIENT
Start: 2023-09-13 | End: 2023-09-15 | Stop reason: HOSPADM

## 2023-09-13 RX ORDER — AMOXICILLIN 250 MG
2 CAPSULE ORAL 2 TIMES DAILY
Status: DISCONTINUED | OUTPATIENT
Start: 2023-09-13 | End: 2023-09-15 | Stop reason: HOSPADM

## 2023-09-13 RX ORDER — INSULIN LISPRO 100 [IU]/ML
2-9 INJECTION, SOLUTION INTRAVENOUS; SUBCUTANEOUS
Status: DISCONTINUED | OUTPATIENT
Start: 2023-09-13 | End: 2023-09-15 | Stop reason: HOSPADM

## 2023-09-13 RX ORDER — BISACODYL 10 MG
10 SUPPOSITORY, RECTAL RECTAL DAILY PRN
Status: DISCONTINUED | OUTPATIENT
Start: 2023-09-13 | End: 2023-09-15 | Stop reason: HOSPADM

## 2023-09-13 RX ORDER — NITROGLYCERIN 0.4 MG/1
0.4 TABLET SUBLINGUAL
Status: DISCONTINUED | OUTPATIENT
Start: 2023-09-13 | End: 2023-09-15 | Stop reason: HOSPADM

## 2023-09-13 RX ORDER — FAMOTIDINE 20 MG/1
40 TABLET, FILM COATED ORAL 2 TIMES DAILY
Status: DISCONTINUED | OUTPATIENT
Start: 2023-09-13 | End: 2023-09-15 | Stop reason: HOSPADM

## 2023-09-13 RX ORDER — SUCRALFATE 1 G/1
1 TABLET ORAL 4 TIMES DAILY
COMMUNITY

## 2023-09-13 RX ORDER — CETIRIZINE HYDROCHLORIDE 10 MG/1
10 TABLET ORAL DAILY
Status: DISCONTINUED | OUTPATIENT
Start: 2023-09-13 | End: 2023-09-15 | Stop reason: HOSPADM

## 2023-09-13 RX ORDER — GLUCAGON 1 MG/ML
1 KIT INJECTION
Status: DISCONTINUED | OUTPATIENT
Start: 2023-09-13 | End: 2023-09-15 | Stop reason: HOSPADM

## 2023-09-13 RX ORDER — VANCOMYCIN/0.9 % SOD CHLORIDE 1.5G/250ML
20 PLASTIC BAG, INJECTION (ML) INTRAVENOUS ONCE
Status: COMPLETED | OUTPATIENT
Start: 2023-09-13 | End: 2023-09-13

## 2023-09-13 RX ORDER — BUDESONIDE AND FORMOTEROL FUMARATE DIHYDRATE 160; 4.5 UG/1; UG/1
2 AEROSOL RESPIRATORY (INHALATION)
Status: DISCONTINUED | OUTPATIENT
Start: 2023-09-13 | End: 2023-09-15 | Stop reason: HOSPADM

## 2023-09-13 RX ORDER — NICOTINE POLACRILEX 4 MG
15 LOZENGE BUCCAL
Status: DISCONTINUED | OUTPATIENT
Start: 2023-09-13 | End: 2023-09-15 | Stop reason: HOSPADM

## 2023-09-13 RX ORDER — CALCIUM CARBONATE 500 MG/1
2 TABLET, CHEWABLE ORAL 2 TIMES DAILY PRN
Status: DISCONTINUED | OUTPATIENT
Start: 2023-09-13 | End: 2023-09-15 | Stop reason: HOSPADM

## 2023-09-13 RX ORDER — METOPROLOL TARTRATE 5 MG/5ML
2.5 INJECTION INTRAVENOUS ONCE
Status: COMPLETED | OUTPATIENT
Start: 2023-09-13 | End: 2023-09-13

## 2023-09-13 RX ORDER — BUTALBITAL AND ACETAMINOPHEN 50; 300 MG/1; MG/1
300 CAPSULE ORAL DAILY PRN
COMMUNITY

## 2023-09-13 RX ORDER — FINERENONE 10 MG/1
10 TABLET, FILM COATED ORAL DAILY
COMMUNITY

## 2023-09-13 RX ORDER — POLYETHYLENE GLYCOL 3350 17 G/17G
17 POWDER, FOR SOLUTION ORAL DAILY PRN
Status: DISCONTINUED | OUTPATIENT
Start: 2023-09-13 | End: 2023-09-15 | Stop reason: HOSPADM

## 2023-09-13 RX ORDER — POTASSIUM CHLORIDE 20 MEQ/1
10 TABLET, EXTENDED RELEASE ORAL 2 TIMES DAILY
Status: CANCELLED | OUTPATIENT
Start: 2023-09-13

## 2023-09-13 RX ORDER — ISOSORBIDE MONONITRATE 30 MG/1
30 TABLET, EXTENDED RELEASE ORAL DAILY
Status: DISCONTINUED | OUTPATIENT
Start: 2023-09-13 | End: 2023-09-15 | Stop reason: HOSPADM

## 2023-09-13 RX ADMIN — LORAZEPAM 0.5 MG: 2 INJECTION, SOLUTION INTRAMUSCULAR; INTRAVENOUS at 06:36

## 2023-09-13 RX ADMIN — INSULIN LISPRO 8 UNITS: 100 INJECTION, SOLUTION INTRAVENOUS; SUBCUTANEOUS at 12:42

## 2023-09-13 RX ADMIN — SUCRALFATE 1 G: 1 TABLET ORAL at 20:39

## 2023-09-13 RX ADMIN — FLECAINIDE ACETATE 50 MG: 50 TABLET ORAL at 20:38

## 2023-09-13 RX ADMIN — CEFTRIAXONE 1000 MG: 1 INJECTION, POWDER, FOR SOLUTION INTRAMUSCULAR; INTRAVENOUS at 10:27

## 2023-09-13 RX ADMIN — METOPROLOL TARTRATE 2.5 MG: 1 INJECTION, SOLUTION INTRAVENOUS at 04:35

## 2023-09-13 RX ADMIN — PRIMIDONE 50 MG: 50 TABLET ORAL at 20:32

## 2023-09-13 RX ADMIN — Medication 1500 MG: at 02:50

## 2023-09-13 RX ADMIN — BUTALBITAL, ACETAMINOPHEN AND CAFFEINE 1 TABLET: 325; 50; 40 TABLET ORAL at 02:09

## 2023-09-13 RX ADMIN — Medication 10 ML: at 10:28

## 2023-09-13 RX ADMIN — DULOXETINE HYDROCHLORIDE 60 MG: 60 CAPSULE, DELAYED RELEASE ORAL at 20:32

## 2023-09-13 RX ADMIN — SODIUM CHLORIDE 75 ML/HR: 9 INJECTION, SOLUTION INTRAVENOUS at 10:27

## 2023-09-13 RX ADMIN — ACETAMINOPHEN 650 MG: 325 TABLET ORAL at 20:34

## 2023-09-13 RX ADMIN — DOCUSATE SODIUM 50 MG AND SENNOSIDES 8.6 MG 2 TABLET: 8.6; 5 TABLET, FILM COATED ORAL at 20:38

## 2023-09-13 RX ADMIN — Medication 1 MG: at 20:39

## 2023-09-13 RX ADMIN — METOPROLOL SUCCINATE 100 MG: 50 TABLET, EXTENDED RELEASE ORAL at 20:32

## 2023-09-13 RX ADMIN — CEFEPIME 2000 MG: 2 INJECTION, POWDER, FOR SOLUTION INTRAVENOUS at 02:10

## 2023-09-13 RX ADMIN — CELECOXIB 200 MG: 100 CAPSULE ORAL at 20:38

## 2023-09-13 RX ADMIN — CEFTRIAXONE 1000 MG: 1 INJECTION, POWDER, FOR SOLUTION INTRAMUSCULAR; INTRAVENOUS at 17:25

## 2023-09-13 RX ADMIN — ISOSORBIDE MONONITRATE 30 MG: 30 TABLET, EXTENDED RELEASE ORAL at 20:39

## 2023-09-13 RX ADMIN — SODIUM CHLORIDE TAB 1 GM 2 G: 1 TAB at 20:39

## 2023-09-13 RX ADMIN — GABAPENTIN 600 MG: 300 CAPSULE ORAL at 21:15

## 2023-09-13 RX ADMIN — CETIRIZINE HYDROCHLORIDE 10 MG: 10 TABLET, FILM COATED ORAL at 20:38

## 2023-09-13 RX ADMIN — AMITRIPTYLINE HYDROCHLORIDE 25 MG: 25 TABLET, FILM COATED ORAL at 20:39

## 2023-09-13 RX ADMIN — FAMOTIDINE 40 MG: 20 TABLET, FILM COATED ORAL at 20:38

## 2023-09-13 RX ADMIN — SODIUM CHLORIDE 2178 ML: 9 INJECTION, SOLUTION INTRAVENOUS at 01:18

## 2023-09-13 RX ADMIN — INSULIN GLARGINE 30 UNITS: 100 INJECTION, SOLUTION SUBCUTANEOUS at 20:45

## 2023-09-13 RX ADMIN — CYCLOBENZAPRINE 10 MG: 10 TABLET, FILM COATED ORAL at 17:26

## 2023-09-13 RX ADMIN — INSULIN LISPRO 2 UNITS: 100 INJECTION, SOLUTION INTRAVENOUS; SUBCUTANEOUS at 17:09

## 2023-09-13 RX ADMIN — Medication 10 ML: at 20:40

## 2023-09-13 RX ADMIN — RIVAROXABAN 20 MG: 20 TABLET, FILM COATED ORAL at 20:38

## 2023-09-13 NOTE — CASE MANAGEMENT/SOCIAL WORK
Discharge Planning Assessment  [facility]     Patient Name: Lashae Benitez  MRN: [id number]  Today's Date: 9/13/2023    Admit Date: 9/13/2023         Discharge Plan       Row Name 09/13/23 1251       Plan    Plan CM spoke with Pt at bedside. Pt lives at home in [facility] with spouse Khanh and plans to return at d/c.Pt. does not have HH, has a straight cane from [facility] on [address] and a RW from unknown provider. PCP is Paul Araujo MD. and gets prescriptions filled at [facility] on [address] and long term medications from [facility]. Pt has medicare and  denies any  trouble with coverage. Family will transport home at d/c. CM will follow.              Lashae Castro RN

## 2023-09-13 NOTE — PLAN OF CARE
Goal Outcome Evaluation:  Pt resting in bed with no s/s of distress noted at this time. VSS. IV access maintained with NS running @ 75mL/hr. Pt is on RA and tolerating well. Pt is very pleasant and cooperative. No requests at this time. Will continue with plan of care.

## 2023-09-13 NOTE — ED PROVIDER NOTES
Subjective   History of Present Illness  62-year-old female presents to the ED with chief complaint of weakness.  This has been a 1 day history.  Patient states she is experiencing bilateral weakness in her lower extremities.  Patient does have past medical history of hyponatremia.  Patient also has past medical history of atrial fibrillation which she is anticoagulated with Xarelto.  Patient also is on flecainide and Imdur for rate control.  Patient states her body aches have become increasingly worse over the last day.  Complaining of mild headache.  Patient recently started Januvia feels that this may be source of patient's symptoms.      Review of Systems   Constitutional:  Positive for fatigue. Negative for fever.   HENT: Negative.     Respiratory: Negative.     Cardiovascular: Negative.  Negative for chest pain.   Gastrointestinal: Negative.  Negative for abdominal pain.   Endocrine: Negative.    Genitourinary: Negative.  Negative for dysuria.   Musculoskeletal:  Positive for myalgias.   Skin: Negative.    Neurological:  Positive for weakness.   Psychiatric/Behavioral: Negative.     All other systems reviewed and are negative.    Past Medical History:   Diagnosis Date    Acid reflux     Allergic     Anxiety     Cancer     thyroid, skin    Chronic pain disorder     Degenerative disc disease, lumbar     Depression     Dupuytren's disease     Essential hypertension     Family history of coronary artery disease     Fibromyalgia     Gallbladder abscess     H. pylori infection     Hiatal hernia     Hyperlipidemia     Hypothyroidism     Migraines     migraines    Multiple sclerosis     Osteoarthritis     Psoriasis     Psoriatic arthritis     RA (rheumatoid arthritis)     Rheumatoid arthritis     Sinusitis     Sjogren's syndrome     Stomach ulcer     TMJ arthritis     Type 2 diabetes mellitus     Urinary tract infection        Allergies   Allergen Reactions    Codeine Angioedema    Imuran [Azathioprine] Other (See  "Comments)     Has pancreatis attacks with med    Januvia [Sitagliptin] Other (See Comments)     Has pancreatis attacks with med     Penicillins Angioedema    Sulfa Antibiotics Angioedema    Sulfasalazine Unknown (See Comments)    Beta Adrenergic Blockers Other (See Comments)     I called pt to ask her about the allergy to bblockers in her chart. Pt states \"too big of a dose makes her psoriasis act up\", but this current dose of metoprolol 50 mg bid, she has been on for a long time, with no ill side effects.  CATA,CMA 3/28/18       Past Surgical History:   Procedure Laterality Date    BREAST LUMPECTOMY Left 11/1993    CARDIAC CATHETERIZATION Left 09/21/2009    Normal     CARPAL TUNNEL RELEASE  03/2012    CERVICAL POLYPECTOMY  12/2015    CHOLECYSTECTOMY  05/2007    COLONOSCOPY      ENDOSCOPY      GASTRIC BYPASS  09/2007    JOINT REPLACEMENT      KNEE ARTHROPLASTY      LUMBAR DISC SURGERY      C5-6    REPLACEMENT TOTAL KNEE Right 06/2016    SACRAL NERVE STIMULATOR PLACEMENT  09/2014    THYROIDECTOMY  03/2016       Family History   Problem Relation Age of Onset    Diabetes Mother     Stroke Mother     Hypertension Mother     Heart attack Father         First one was in his 20's second 30's.     Heart failure Father     Heart disease Father     Stroke Father     Diabetes Sister     Cancer Maternal Grandmother        Social History     Socioeconomic History    Marital status:    Tobacco Use    Smoking status: Never    Smokeless tobacco: Never   Vaping Use    Vaping Use: Never used   Substance and Sexual Activity    Alcohol use: No    Drug use: No    Sexual activity: Defer           Objective   Physical Exam  Vitals and nursing note reviewed.   Constitutional:       General: She is not in acute distress.     Appearance: She is well-developed. She is not diaphoretic.   HENT:      Head: Normocephalic and atraumatic.      Right Ear: External ear normal.      Left Ear: External ear normal.      Nose: Nose normal.   Eyes: "      Conjunctiva/sclera: Conjunctivae normal.   Neck:      Vascular: No JVD.      Trachea: No tracheal deviation.   Cardiovascular:      Rate and Rhythm: Regular rhythm. Tachycardia present.      Heart sounds: Normal heart sounds. No murmur heard.  Pulmonary:      Effort: Pulmonary effort is normal. No respiratory distress.      Breath sounds: Normal breath sounds. No wheezing.   Abdominal:      Palpations: Abdomen is soft.      Tenderness: There is no abdominal tenderness.   Musculoskeletal:         General: Tenderness present. No deformity. Normal range of motion.      Cervical back: Normal range of motion and neck supple.   Skin:     General: Skin is warm and dry.      Coloration: Skin is not pale.      Findings: No erythema or rash.   Neurological:      Mental Status: She is alert and oriented to person, place, and time.      Cranial Nerves: No cranial nerve deficit.   Psychiatric:         Behavior: Behavior normal.         Thought Content: Thought content normal.       Procedures           ED Course  ED Course as of 09/13/23 0756   Wed Sep 13, 2023   0148 CXR rad interpreted:  No acute cardiopulmonary process. [RB]   0444 EKG interpretation:    Vent. Rate : 120 BPM     Atrial Rate : 120 BPM     P-R Int : 196 ms          QRS Dur : 100 ms      QT Int : 298 ms       P-R-T Axes :  51 -41  86 degrees     QTc Int : 421 ms     Sinus tachycardia  Left axis deviation  Abnormal ECG  When compared with ECG of 14-AUG-2020 12:57,  Criteria for Septal infarct are no longer present  T wave inversion now evident in Lateral leads  Electronically signed by Allan Negrete MD, 09/13/23, 5:50 AM EDT.   [DS]   0616 CT chest / abd pelvis rad interpreted:  CT CHEST:  NO ACUTE ABNORMALITY.     CT ABDOMEN AND PELVIS:  NO ACUTE ABNORMALITY.   [RB]   0753 Discussed with Dr. Whitehead who is agreeable to admit patient for observation. [RB]      ED Course User Index  [DS] Allan Negrete MD  [RB] Bob Andrew II, PA                                            Medical Decision Making  62-year-old female with 1 day history of weakness.  Body aches.    Problems Addressed:  Sepsis without acute organ dysfunction, due to unspecified organism: acute illness or injury     Details: Patient's laboratory work-up was abnormal.  Patient does have elevated procalcitonin however unable to identify source at this time.  Patient was notably tachycardic with a sinus tach of 120s.  Patient was hypotensive upon arrival however after sepsis bolus patient became hypotensive.  At this point in time fluid was stopped.  Patient blood pressure normalized.  Secondary to abnormal findings within patient's laboratory work-up hospitalist was contacted and patient will be admitted for observation.    Amount and/or Complexity of Data Reviewed  Labs: ordered.  Radiology: ordered. Decision-making details documented in ED Course.    Risk  Prescription drug management.  Decision regarding hospitalization.        Final diagnoses:   Sepsis without acute organ dysfunction, due to unspecified organism       ED Disposition  ED Disposition       ED Disposition   Decision to Admit    Condition   --    Comment   Level of Care: Telemetry [5]   Diagnosis: Sepsis [[id number]]   Admitting Physician: JO PISANO [[id number]]                 No follow-up provider specified.       Medication List      No changes were made to your prescriptions during this visit.            Bob Andrew II, PA  09/13/23 [id number]

## 2023-09-13 NOTE — CONSULTS
INFECTIOUS DISEASE CONSULTATION REPORT        Patient Identification:  Name:  Lashae Benitez  Age:  62 y.o.  Sex:  female  :  1960  MRN:  [id number]   Visit Number:  [id number]  Primary Care Physician:  Kreis, Samuel Duane, MD        Referring Provider: Dr. Thao    Reason for consult: Sepsis on admission, rhinovirus, UTI       LOS: 0 days        Subjective       Subjective     History of present illness:      Thank you Dr. Thao for allowing us to participate in the care of your patient.  As you well know, Ms. Lashae Benitez is a 62 y.o. female with past medical history significant for hypertension, MS, psoriasis, psoriatic arthritis, fibromyalgia, type 2 diabetes mellitus, paroxysmal atrial fibrillation, DDD, who presented to [facility] Emergency Department on 2023 for generalized weakness and body aches worsening over the past 2 to 3 days, headaches.  The patient reported she has had a headache and has been wearing sunglasses secondary to photosensitivity.  The patient noted she felt she had a subjective fever at home.  The patient reported a heaviness of the leg breath.  Denies chest pain.  Feels her heart rate has been faster than normal.  No abdominal pain.  Does endorse dysuria.  Patient reported her blood pressure has been low at home and she decided to be seen for evaluation.  The patient reported she was initiated on Jardiance recently.  No diarrhea.  Currently takes methotrexate and is compliant with Xarelto.    On arrival to ED, CRP is 4.13.  Lactate normal 1.5.  Procalcitonin elevated at 3.81.  Leukocytosis at 13.12.  Respiratory panel PCR is positive for human rhinovirus/enterovirus.  Blood cultures are pending x2.  Urinalysis is mildly positive with 6-12 WBCs, trace bacteria and trace leukocytes.  Urine culture pending.  CT chest without contrast reports no acute abnormality.  CT abdomen pelvis without contrast reports no acute abnormality.    This morning the patient is  resting in bed, on room air.  She is wearing sunglasses due to photosensitivity.  Afebrile.  Denies diarrhea.  She does endorse dysuria which she states has been ongoing for approximately 2 weeks since initiating Jardiance.  She states she has also had a dry and nonproductive cough and at times has had dyspnea on exertion.  Lung exam is clear to auscultation bilaterally.  Abdomen is soft and nontender with normoactive bowel sounds.  Peripheral IV to the right forearm with no evidence of infection or phlebitis.      Infectious Disease consultation was requested for antimicrobial management.      ---------------------------------------------------------------------------------------------------------------------     Review Of Systems:    Constitutional: No appetite change or unexpected weight change.  Complains of fatigue, subjective fever, headache, generalized weakness  Eyes: no eye drainage, itching or redness.  HEENT: no mouth sores, dysphagia or nose bleed.  Respiratory: no for shortness of breath, cough or production of sputum.  Cardiovascular: no chest pain, no palpitations, no orthopnea.  Gastrointestinal: no nausea, vomiting or diarrhea. No abdominal pain, hematemesis or rectal bleeding.  Genitourinary: no dysuria or polyuria.  Hematologic/lymphatic: no lymph node abnormalities, no easy bruising or easy bleeding.  Musculoskeletal: no muscle or joint pain.  Skin: No rash and no itching.  Neurological: no loss of consciousness, no seizure, no headache.  Behavioral/Psych: no depression or suicidal ideation.  Endocrine: no hot flashes.  Immunologic: negative.    ---------------------------------------------------------------------------------------------------------------------     Past Medical History    Past Medical History:   Diagnosis Date    Acid reflux     Allergic     Anxiety     Cancer     thyroid, skin    Chronic pain disorder     Degenerative disc disease, lumbar     Depression     Dupuytren's disease      Essential hypertension     Family history of coronary artery disease     Fibromyalgia     Gallbladder abscess     H. pylori infection     Hiatal hernia     Hyperlipidemia     Hypothyroidism     Migraines     migraines    Multiple sclerosis     Osteoarthritis     Psoriasis     Psoriatic arthritis     RA (rheumatoid arthritis)     Rheumatoid arthritis     Sinusitis     Sjogren's syndrome     Stomach ulcer     TMJ arthritis     Type 2 diabetes mellitus     Urinary tract infection        Past Surgical History    Past Surgical History:   Procedure Laterality Date    BREAST LUMPECTOMY Left 11/1993    CARDIAC CATHETERIZATION Left 09/21/2009    Normal     CARPAL TUNNEL RELEASE  03/2012    CERVICAL POLYPECTOMY  12/2015    CHOLECYSTECTOMY  05/2007    COLONOSCOPY      ENDOSCOPY      GASTRIC BYPASS  09/2007    JOINT REPLACEMENT      KNEE ARTHROPLASTY      LUMBAR DISC SURGERY      C5-6    REPLACEMENT TOTAL KNEE Right 06/2016    SACRAL NERVE STIMULATOR PLACEMENT  09/2014    THYROIDECTOMY  03/2016       Family History    Family History   Problem Relation Age of Onset    Diabetes Mother     Stroke Mother     Hypertension Mother     Heart attack Father         First one was in his 20's second 30's.     Heart failure Father     Heart disease Father     Stroke Father     Diabetes Sister     Cancer Maternal Grandmother        Social History    Social History     Tobacco Use    Smoking status: Never    Smokeless tobacco: Never   Vaping Use    Vaping Use: Never used   Substance Use Topics    Alcohol use: No    Drug use: No       Allergies    Codeine, Imuran [azathioprine], Januvia [sitagliptin], Penicillins, Sulfa antibiotics, Sulfasalazine, and Beta adrenergic blockers  ---------------------------------------------------------------------------------------------------------------------     Home Medications:    Prior to Admission Medications       Prescriptions Last Dose Informant Patient Reported? Taking?    abatacept (ORENCIA) 250  MG injection  Self Yes No    Infuse 30 mL into a venous catheter Every 28 (Twenty-Eight) Days.    alendronate (FOSAMAX) 70 MG tablet  Self Yes No    Take 1 tablet by mouth 1 (One) Time Per Week. Wednesday    amitriptyline (ELAVIL) 25 MG tablet   Yes No    Take 1 tablet by mouth Every Night.    Apremilast (Otezla) 30 MG tablet   Yes No    Take  by mouth 2 (two) times a day.    butalbital-acetaminophen-caffeine (FIORICET, ESGIC) -40 MG per tablet   Yes No    Take 1 tablet by mouth Every 4 (Four) Hours As Needed for Headache.    Calcipotriene-Betameth Diprop 0.005-0.064 % foam  Self Yes No    Apply 1 spray topically Daily.    Patient not taking:  Reported on 3/15/2023    calcium citrate-vitamin d (CALCITRATE) 315-250 MG-UNIT tablet tablet  Self Yes No    Take 1 tablet by mouth Every Evening.    celecoxib (CeleBREX) 200 MG capsule   Yes No    Take 1 capsule by mouth Daily.    Cholecalciferol (VITAMIN D3) 125 MCG (5000 UT) capsule capsule  Self Yes No    Take 1 capsule by mouth Daily.    cyanocobalamin 1000 MCG/ML injection  Self Yes No    Inject 1 mL into the appropriate muscle as directed by prescriber Every 28 (Twenty-Eight) Days.    cyclobenzaprine (FLEXERIL) 10 MG tablet  Self Yes No    Take 1 tablet by mouth Every Evening.    diazePAM (VALIUM) 2 MG tablet   Yes No    Take 2 mg by mouth Daily As Needed for Anxiety.    DULoxetine (CYMBALTA) 30 MG capsule   Yes No    Take 3 capsules by mouth Every Morning.    EMGALITY 120 MG/ML prefilled syringe  Self Yes No    Inject 1 mL under the skin into the appropriate area as directed Every 30 (Thirty) Days.    ferrous gluconate (FERGON) 240 (27 FE) MG tablet  Self Yes No    Take 1 tablet by mouth Every Morning.    fexofenadine (ALLEGRA) 180 MG tablet   Yes No    Take 1 tablet by mouth Daily.    flecainide (TAMBOCOR) 50 MG tablet   No No    Take 1 tablet by mouth 2 (Two) Times a Day.    flecainide (TAMBOCOR) 50 MG tablet   No No    Take 1 tablet by mouth 2 (Two) Times a  Day.    fluticasone-salmeterol (ADVAIR HFA) 115-21 MCG/ACT inhaler   Yes No    Inhale 2 puffs 2 (Two) Times a Day.    folic acid (FOLVITE) 1 MG tablet  Self Yes No    Take 1 tablet by mouth Daily.    gabapentin (NEURONTIN) 600 MG tablet  DANIEL Yes No    Take 1 tablet by mouth 3 (Three) Times a Day.    HYDROcodone-acetaminophen (NORCO) 5-325 MG per tablet   Yes No    Take 1 tablet by mouth 2 (Two) Times a Day. as directed    Patient not taking:  Reported on 3/15/2023    insulin detemir (LEVEMIR) 100 UNIT/ML injection  Self Yes No    Inject 25 Units under the skin into the appropriate area as directed Daily.    isosorbide mononitrate (IMDUR) 30 MG 24 hr tablet   No No    TAKE 1 TABLET DAILY    levothyroxine sodium (TIROSINT) 112 MCG capsule   Yes No    Take 1 capsule by mouth Daily.    levothyroxine sodium (TIROSINT) 137 MCG capsule  Self Yes No    Take 125 mcg by mouth Daily. Takes an extra dose at the first of each month.    linaclotide (LINZESS) 290 MCG capsule capsule  Self Yes No    Take 1 capsule by mouth Every Morning Before Breakfast.    lisinopril (PRINIVIL,ZESTRIL) 20 MG tablet   Yes No    Take 1 tablet by mouth Daily.    loratadine (CLARITIN) 10 MG tablet  Self Yes No    Take 10 mg by mouth Every Night.    Patient not taking:  Reported on 3/15/2023    methotrexate 2.5 MG tablet  Self Yes No    Take 10 tablets by mouth 1 (One) Time Per Week. Prior to [facility] Admission, Patient was on: Takes 10 tablets on Saturday    metoprolol succinate XL (TOPROL-XL) 100 MG 24 hr tablet   No No    TAKE 1 TABLET DAILY    multivitamin (DAILY MICHAEL) tablet tablet  Self Yes No    Take 1 tablet by mouth Every Evening.    omeprazole (priLOSEC) 40 MG capsule  Self Yes No    Take 1 capsule by mouth Daily.    OnabotulinumtoxinA (BOTOX IJ)   Yes No    Inject  as directed. Q 3 M FOTR MIGRAINES    potassium chloride (MICRO-K) 10 MEQ CR capsule   Yes No    Take 1 capsule by mouth 2 (Two) Times a Day.    primidone (MYSOLINE) 50 MG tablet   Self Yes No    Take 1 tablet by mouth Every Night.    RABEprazole (ACIPHEX) 20 MG EC tablet   Yes No    Take 1 tablet by mouth Daily.    rimegepant 75 MG dispersible tablet   Yes No    DISSOLVE ONE TABLET UNDER THE TONGUE AT ONSET OF MIGRAINE HEADACHE. MAX DOSE ONE TABLET IN 24 HOURS    sodium chloride 1 g tablet   Yes No    Take 1 tablet by mouth 2 (Two) Times a Day.    traMADol (ULTRAM) 50 MG tablet   Yes No    Take 50 mg by mouth Every 6 (Six) Hours As Needed.    vitamin C (ASCORBIC ACID) 500 MG tablet  Self Yes No    Take 2 tablets by mouth Every Evening.    vitamin E 1000 UNIT capsule  Self Yes No    Take 1 capsule by mouth Every Evening.    Xarelto 20 MG tablet   No No    TAKE 1 TABLET DAILY          ---------------------------------------------------------------------------------------------------------------------    Objective       Objective     Hospital Scheduled Meds:  cefTRIAXone, 1,000 mg, Intravenous, Q24H  insulin lispro, 2-9 Units, Subcutaneous, 4x Daily AC & at Bedtime  senna-docusate sodium, 2 tablet, Oral, BID  sodium chloride, 10 mL, Intravenous, Q12H      sodium chloride, 75 mL/hr      ---------------------------------------------------------------------------------------------------------------------   Vital Signs:  Temp:  [97.8 °F (36.6 °C)-98.1 °F (36.7 °C)] 98.1 °F (36.7 °C)  Heart Rate:  [] 119  Resp:  [18-20] 18  BP: ()/() 112/67  Mean Arterial Pressure (Non-Invasive) for the past 24 hrs (Last 3 readings):   Noninvasive MAP (mmHg)   09/13/23 0834 80   09/13/23 0714 95   09/13/23 0614 111     SpO2 Percentage    09/13/23 0714 09/13/23 0802 09/13/23 0834   SpO2: 98%  Comment: Provider aware of HR 98% 99%     SpO2:  [90 %-100 %] 99 %  on   ;   Device (Oxygen Therapy): room air    Body mass index is 26.78 kg/m².  Wt Readings from Last 3 Encounters:   09/13/23 73 kg (160 lb 14.4 oz)   03/15/23 81 kg (178 lb 9.6 oz)   12/06/22 82.2 kg (181 lb 3.2 oz)      ---------------------------------------------------------------------------------------------------------------------     Physical Exam:    Constitutional: Elderly  female, sitting up comfortably in bed.  On room air with no apparent distress.  Wearing sunglasses due to photosensitivity.     HENT:  Head: Normocephalic and atraumatic.  Mouth:  Moist mucous membranes.    Eyes:  Conjunctivae and EOM are normal.  No scleral icterus.  Neck:  Neck supple.  No JVD present.    Cardiovascular:  Normal rate, regular rhythm and normal heart sounds with no murmur. No edema.  Pulmonary/Chest:  No respiratory distress, no wheezes, no crackles, with normal breath sounds and good air movement.  Abdominal:  Soft.  Bowel sounds are normal.  No distension and no tenderness.   Musculoskeletal:  No edema, no tenderness, and no deformity.  No swelling or redness of joints.  Neurological:  Alert and oriented to person, place, and time.  No facial droop.  No slurred speech.   Skin:  Skin is warm and dry.  No rash noted.  No pallor.       ---------------------------------------------------------------------------------------------------------------------    Results from last 7 days   Lab Units 09/13/23  0328 09/13/23  0102   HSTROP T ng/L 9 10*           Results from last 7 days   Lab Units 09/13/23  0102   CRP mg/dL 4.13*   LACTATE mmol/L 1.5   WBC 10*3/mm3 13.12*   HEMOGLOBIN g/dL 12.4   HEMATOCRIT % 38.5   MCV fL 101.3*   MCHC g/dL 32.2   PLATELETS 10*3/mm3 383   INR  1.54*     Results from last 7 days   Lab Units 09/13/23  0102   SODIUM mmol/L 135*   POTASSIUM mmol/L 4.2   CHLORIDE mmol/L 102   CO2 mmol/L 22.2   BUN mg/dL 18   CREATININE mg/dL 1.05*   CALCIUM mg/dL 9.0   GLUCOSE mg/dL 246*   ALBUMIN g/dL 3.7   BILIRUBIN mg/dL 0.5   ALK PHOS U/L 142*   AST (SGOT) U/L 13   ALT (SGPT) U/L 16   Estimated Creatinine Clearance: 56.3 mL/min (A) (by C-G formula based on SCr of 1.05 mg/dL (H)).  No results found for: AMMONIA    Glucose    Date/Time Value Ref Range Status   09/13/2023 0829 94 70 - 130 mg/dL Final     Lab Results   Component Value Date    HGBA1C 8.10 (H) 08/17/2020     Lab Results   Component Value Date    TSH 0.186 (L) 05/31/2023    FREET4 1.64 08/07/2020       No results found for: BLOODCX  No results found for: URINECX  No results found for: WOUNDCX  No results found for: STOOLCX  No results found for: RESPCX  Pain Management Panel  More data exists         Latest Ref Rng & Units 6/7/2023 5/31/2023   Pain Management Panel   Creatinine, Urine mg/dL 24.5  24.5  13.2      I have personally reviewed the above laboratory results.   ---------------------------------------------------------------------------------------------------------------------  Imaging Results (Last 7 Days)       Procedure Component Value Units Date/Time    CT Chest Without Contrast Diagnostic [[id number]] Collected: 09/13/23 0555     Updated: 09/13/23 0609    Narrative:      CLINICAL HISTORY: Generalized weakness.     COMPARISON: None available.     TECHNIQUE: Noncontrast CT of the chest, abdomen and pelvis was obtained.   Multiplanar reformats were generated.  Limited exposure control,  adjustment of the mA and/or KV according to patient size or use of  iterative reconstruction technique was utilized.     FINDINGS:     Chest CT:     Pulmonary arteries: normal.  Heart and pericardium: Normal heart size.  Coronary atherosclerosis  noted..  Aorta: Mild atherosclerosis of the arch..  Lungs: normal.  Airways: normal.  Pleura: normal.  Lymph nodes: normal.  Musculoskeletal: no acute abnormality.  Old bilateral rib fractures  noted.  Esophagus: normal.  Upper abdomen: normal.  Lower neck: Post thyroidectomy.     CT abdomen pelvis:     Liver: normal.  Pancreas: normal.  Gallbladder: normal.  Spleen: normal.  Adrenals: normal.   Kidneys, ureters, bladder: normal.  Reproductive: normal.  Bowel: Status post gastric bypass.  No obstruction or other acute  abnormality.   Moderate constipation..  Appendix: normal.  Peritoneum: normal, no free air or fluid.  Vascular: Aortoiliac atherosclerosis.  Lymph nodes: normal.  Musculoskeletal: Sacral spinal stimulator in place.       Impression:         CT CHEST:  NO ACUTE ABNORMALITY.     CT ABDOMEN AND PELVIS:  NO ACUTE ABNORMALITY.           This report was finalized on 9/13/2023 6:07 AM by Joann Das MD.       CT Abdomen Pelvis Without Contrast [[id number]] Collected: 09/13/23 0555     Updated: 09/13/23 0609    Narrative:      CLINICAL HISTORY: Generalized weakness.     COMPARISON: None available.     TECHNIQUE: Noncontrast CT of the chest, abdomen and pelvis was obtained.   Multiplanar reformats were generated.  Limited exposure control,  adjustment of the mA and/or KV according to patient size or use of  iterative reconstruction technique was utilized.     FINDINGS:     Chest CT:     Pulmonary arteries: normal.  Heart and pericardium: Normal heart size.  Coronary atherosclerosis  noted..  Aorta: Mild atherosclerosis of the arch..  Lungs: normal.  Airways: normal.  Pleura: normal.  Lymph nodes: normal.  Musculoskeletal: no acute abnormality.  Old bilateral rib fractures  noted.  Esophagus: normal.  Upper abdomen: normal.  Lower neck: Post thyroidectomy.     CT abdomen pelvis:     Liver: normal.  Pancreas: normal.  Gallbladder: normal.  Spleen: normal.  Adrenals: normal.   Kidneys, ureters, bladder: normal.  Reproductive: normal.  Bowel: Status post gastric bypass.  No obstruction or other acute  abnormality.  Moderate constipation..  Appendix: normal.  Peritoneum: normal, no free air or fluid.  Vascular: Aortoiliac atherosclerosis.  Lymph nodes: normal.  Musculoskeletal: Sacral spinal stimulator in place.       Impression:         CT CHEST:  NO ACUTE ABNORMALITY.     CT ABDOMEN AND PELVIS:  NO ACUTE ABNORMALITY.           This report was finalized on 9/13/2023 6:07 AM by Joann Das MD.       XR Chest 1 View [[id number]] Collected:  09/13/23 0103     Updated: 09/13/23 0105    Narrative:      INDICATION: Chest pain.     TECHNIQUE: Frontal radiograph of the chest.     COMPARISON: None.     FINDINGS:   The cardiomediastinal silhouette and pulmonary vasculature appear within  normal limits. No infiltrate, pleural effusion or pneumothorax. No acute  osseous abnormality evident.       Impression:      No acute cardiopulmonary process.     This report was finalized on 9/13/2023 1:03 AM by Alex Pallas, DO.             I have personally reviewed the above radiology results.   ---------------------------------------------------------------------------------------------------------------------      Pertinent Infectious Disease Results          Assessment & Plan      Assessment      Sepsis on admission  Rhinovirus  UTI      Plan      Patient presented to [facility] Emergency Department on 9/13/2023 for generalized weakness and body aches worsening over the past 2 to 3 days, headaches.  The patient reported she has had a headache and has been wearing sunglasses secondary to photosensitivity.  The patient noted she felt she had a subjective fever at home.  The patient reported a heaviness of the leg breath.  Denies chest pain.  Feels her heart rate has been faster than normal.  No abdominal pain.  Does endorse dysuria.  Patient reported her blood pressure has been low at home and she decided to be seen for evaluation.  The patient reported she was initiated on Jardiance recently.  No diarrhea.  Currently takes methotrexate and is compliant with Xarelto.    On arrival to ED, CRP is 4.13.  Lactate normal 1.5.  Procalcitonin elevated at 3.81.  Leukocytosis at 13.12.  Respiratory panel PCR is positive for human rhinovirus/enterovirus.  Blood cultures are pending x2.  Urinalysis is mildly positive with 6-12 WBCs, trace bacteria and trace leukocytes.  Urine culture pending.  CT chest without contrast reports no acute abnormality.  CT abdomen pelvis  without contrast reports no acute abnormality.    This morning the patient is resting in bed, on room air.  She is wearing sunglasses due to photosensitivity.  Afebrile.  Denies diarrhea.  She does endorse dysuria which she states has been ongoing for approximately 2 weeks since initiating Jardiance.  She states she has also had a dry and nonproductive cough and at times has had dyspnea on exertion.  Lung exam is clear to auscultation bilaterally.  Abdomen is soft and nontender with normoactive bowel sounds.  Peripheral IV to the right forearm with no evidence of infection or phlebitis.    The patient was initiated on ceftriaxone course per primary team empirically for UTI.  We are agreeable with this regimen for now, we will plan to de-escalate antibiotic course as feasible.  We will continue to monitor closely while awaiting blood and urine culture reports and susceptibilities.          ANTIMICROBIAL THERAPY    cefTRIAXone (ROCEPHIN) 1000 mg IVPB in 100 mL NS (VTB)       Again, thank you Dr. Thao for allowing us to participate in the care of your patient and please feel free to call for any questions you may have.        Code Status:     Code Status and Medical Interventions:   Ordered at: 09/13/23 0759     Code Status (Patient has no pulse and is not breathing):    CPR (Attempt to Resuscitate)     Medical Interventions (Patient has pulse or is breathing):    Full Support         RUIZ Rivera  09/13/23  10:15 EDT

## 2023-09-13 NOTE — ED NOTES
Encouraged patient to provide a urine sample at this time. Pt stated she will provide one when she is able to pee. Provider made aware.

## 2023-09-13 NOTE — H&P
"    [facility] Medicine Services  History & Physical    Patient Identification:  Name:  Lashae Benitez  Age:  62 y.o.  Sex:  female  :  1960  MRN:  [id number]   Visit Number:  [id number]  Admit Date: 2023   Primary Care Physician:  Kreis, Samuel Duane, MD    Subjective     Chief complaint: Weakness    History of presenting illness:      Lashae Benitez is a 62 y.o. female who presented for further evaluation of generalized weakness and body aches worsening over the past 2-3 days. Also complaining of headaches and is wearing sunglasses at the time of exam secondary to light sensitivity. She notes she has had a subjective fever at home as well. Denies any sick contacts. No shortness of breath but notes a feeling of \"heaviness\" when she takes a deep breath. Denies chest pain but has felt her heart rate has been faster than usual as well. Has not been eating or drinking well since onset. No abdominal pain. Does endorse dysuria for about the past week. She states she decided to seek care when her BP was consistently low at home, around 70/50.  Also notes she was started on jardiance recently. No diarrhea. She reports she is currently taking methotrexate. Is compliant with xarelto. See full ROS below.     Past medical history is significant for HTN, MS, psoriasis, psoriatic arthritis, fibromyalgia, T2DM, paroxysmal atrial fibrillation, DDD    Upon arrival to the ED, vital signs were temp 97.8, heart rate 107, respirations 20, BP 77/60, SPO2 99% on room air.  BP has improved post fluids most recently at 132/79.  Laboratory work-up notable for glucose 246, alk phos 142.  CRP is 4.13, Pro-Guy is 3.81.  CBC with WBC count 13.12, neutrophils 83.9.  Also with .3.  UA notable for leukocytes, protein, 6-12 RBCs, 6-12 WBCs and trace bacteria.  Respiratory PCR is positive for rhinovirus.  CXR negative, CT abdomen pelvis negative, CT chest negative.    Known Emergency Department medications received " prior to my evaluation included cefepime, ativan, lopressor, vancomycin, sepsis bolus.   Room location at the time of my evaluation was 304.     ---------------------------------------------------------------------------------------------------------------------   Review of Systems   Constitutional:  Positive for appetite change and chills.   HENT:  Negative for congestion and rhinorrhea.    Respiratory:  Positive for chest tightness. Negative for shortness of breath.    Cardiovascular:  Positive for palpitations. Negative for chest pain.   Gastrointestinal:  Negative for abdominal pain, constipation, diarrhea, nausea and vomiting.   Genitourinary:  Positive for dysuria. Negative for difficulty urinating.   Musculoskeletal:  Positive for arthralgias and myalgias.   Skin:  Negative for rash and wound.   Neurological:  Positive for headaches. Negative for dizziness and light-headedness.      ---------------------------------------------------------------------------------------------------------------------   Past Medical History:   Diagnosis Date    Acid reflux     Allergic     Anxiety     Cancer     thyroid, skin    Chronic pain disorder     Degenerative disc disease, lumbar     Depression     Dupuytren's disease     Essential hypertension     Family history of coronary artery disease     Fibromyalgia     Gallbladder abscess     H. pylori infection     Hiatal hernia     Hyperlipidemia     Hypothyroidism     Migraines     migraines    Multiple sclerosis     Osteoarthritis     Psoriasis     Psoriatic arthritis     RA (rheumatoid arthritis)     Rheumatoid arthritis     Sinusitis     Sjogren's syndrome     Stomach ulcer     TMJ arthritis     Type 2 diabetes mellitus     Urinary tract infection      Past Surgical History:   Procedure Laterality Date    BREAST LUMPECTOMY Left 11/1993    CARDIAC CATHETERIZATION Left 09/21/2009    Normal     CARPAL TUNNEL RELEASE  03/2012    CERVICAL POLYPECTOMY  12/2015    CHOLECYSTECTOMY   05/2007    COLONOSCOPY      ENDOSCOPY      GASTRIC BYPASS  09/2007    JOINT REPLACEMENT      KNEE ARTHROPLASTY      LUMBAR DISC SURGERY      C5-6    REPLACEMENT TOTAL KNEE Right 06/2016    SACRAL NERVE STIMULATOR PLACEMENT  09/2014    THYROIDECTOMY  03/2016     Family History   Problem Relation Age of Onset    Diabetes Mother     Stroke Mother     Hypertension Mother     Heart attack Father         First one was in his 20's second 30's.     Heart failure Father     Heart disease Father     Stroke Father     Diabetes Sister     Cancer Maternal Grandmother      Social History     Socioeconomic History    Marital status:    Tobacco Use    Smoking status: Never    Smokeless tobacco: Never   Vaping Use    Vaping Use: Never used   Substance and Sexual Activity    Alcohol use: No    Drug use: No    Sexual activity: Defer     ---------------------------------------------------------------------------------------------------------------------   Allergies:  Codeine, Imuran [azathioprine], Januvia [sitagliptin], Penicillins, Sulfa antibiotics, Sulfasalazine, and Beta adrenergic blockers  ---------------------------------------------------------------------------------------------------------------------   Home medications:    Medications below are reported home medications pulling from within the system; at this time, these medications have not been reconciled unless otherwise specified and are in the verification process for further verifcation as current home medications.  (Not in a hospital admission)      Hospital Scheduled Meds:          Current listed hospital scheduled medications may not yet reflect those currently placed in orders that are signed and held awaiting patient's arrival to floor.   ---------------------------------------------------------------------------------------------------------------------     Objective     Vital Signs:  Temp:  [97.8 °F (36.6 °C)] 97.8 °F (36.6 °C)  Heart Rate:  []  124  Resp:  [20] 20  BP: ()/() 132/79      09/13/23  0026   Weight: 72.6 kg (160 lb)     Body mass index is 26.63 kg/m².  ---------------------------------------------------------------------------------------------------------------------       Physical Exam  Vitals and nursing note reviewed.   Constitutional:       General: She is not in acute distress.  HENT:      Head: Normocephalic and atraumatic.   Eyes:      Extraocular Movements: Extraocular movements intact.      Conjunctiva/sclera: Conjunctivae normal.   Cardiovascular:      Rate and Rhythm: Regular rhythm. Tachycardia present.   Pulmonary:      Effort: Pulmonary effort is normal.      Breath sounds: Normal breath sounds.   Abdominal:      Palpations: Abdomen is soft.      Tenderness: There is no abdominal tenderness.   Musculoskeletal:      Right lower leg: No edema.      Left lower leg: No edema.   Skin:     General: Skin is warm and dry.   Neurological:      Mental Status: She is alert. Mental status is at baseline.   Psychiatric:         Mood and Affect: Mood normal.         Behavior: Behavior normal.           ---------------------------------------------------------------------------------------------------------------------  EKG:        I have personally looked at the EKG.  ---------------------------------------------------------------------------------------------------------------------   Results from last 7 days   Lab Units 09/13/23  0102   CRP mg/dL 4.13*   LACTATE mmol/L 1.5   WBC 10*3/mm3 13.12*   HEMOGLOBIN g/dL 12.4   HEMATOCRIT % 38.5   MCV fL 101.3*   MCHC g/dL 32.2   PLATELETS 10*3/mm3 383   INR  1.54*         Results from last 7 days   Lab Units 09/13/23  0102   SODIUM mmol/L 135*   POTASSIUM mmol/L 4.2   CHLORIDE mmol/L 102   CO2 mmol/L 22.2   BUN mg/dL 18   CREATININE mg/dL 1.05*   CALCIUM mg/dL 9.0   GLUCOSE mg/dL 246*   ALBUMIN g/dL 3.7   BILIRUBIN mg/dL 0.5   ALK PHOS U/L 142*   AST (SGOT) U/L 13   ALT (SGPT) U/L 16    Estimated Creatinine Clearance: 56.1 mL/min (A) (by C-G formula based on SCr of 1.05 mg/dL (H)).  No results found for: AMMONIA  Results from last 7 days   Lab Units 09/13/23  0328 09/13/23  0102   HSTROP T ng/L 9 10*         Lab Results   Component Value Date    HGBA1C 8.10 (H) 08/17/2020     Lab Results   Component Value Date    TSH 0.186 (L) 05/31/2023    FREET4 1.64 08/07/2020     Lab Results   Component Value Date    PREGTESTUR Negative 01/21/2018     Pain Management Panel  More data exists         Latest Ref Rng & Units 6/7/2023 5/31/2023   Pain Management Panel   Creatinine, Urine mg/dL 24.5  24.5  13.2      No results found for: BLOODCX  No results found for: URINECX  No results found for: WOUNDCX  No results found for: STOOLCX      ---------------------------------------------------------------------------------------------------------------------  Imaging Results (Last 7 Days)       Procedure Component Value Units Date/Time    CT Chest Without Contrast Diagnostic [[id number]] Collected: 09/13/23 0555     Updated: 09/13/23 0609    Narrative:      CLINICAL HISTORY: Generalized weakness.     COMPARISON: None available.     TECHNIQUE: Noncontrast CT of the chest, abdomen and pelvis was obtained.   Multiplanar reformats were generated.  Limited exposure control,  adjustment of the mA and/or KV according to patient size or use of  iterative reconstruction technique was utilized.     FINDINGS:     Chest CT:     Pulmonary arteries: normal.  Heart and pericardium: Normal heart size.  Coronary atherosclerosis  noted..  Aorta: Mild atherosclerosis of the arch..  Lungs: normal.  Airways: normal.  Pleura: normal.  Lymph nodes: normal.  Musculoskeletal: no acute abnormality.  Old bilateral rib fractures  noted.  Esophagus: normal.  Upper abdomen: normal.  Lower neck: Post thyroidectomy.     CT abdomen pelvis:     Liver: normal.  Pancreas: normal.  Gallbladder: normal.  Spleen: normal.  Adrenals: normal.   Kidneys,  ureters, bladder: normal.  Reproductive: normal.  Bowel: Status post gastric bypass.  No obstruction or other acute  abnormality.  Moderate constipation..  Appendix: normal.  Peritoneum: normal, no free air or fluid.  Vascular: Aortoiliac atherosclerosis.  Lymph nodes: normal.  Musculoskeletal: Sacral spinal stimulator in place.       Impression:         CT CHEST:  NO ACUTE ABNORMALITY.     CT ABDOMEN AND PELVIS:  NO ACUTE ABNORMALITY.           This report was finalized on 9/13/2023 6:07 AM by Joann Das MD.       CT Abdomen Pelvis Without Contrast [[id number]] Collected: 09/13/23 0555     Updated: 09/13/23 0609    Narrative:      CLINICAL HISTORY: Generalized weakness.     COMPARISON: None available.     TECHNIQUE: Noncontrast CT of the chest, abdomen and pelvis was obtained.   Multiplanar reformats were generated.  Limited exposure control,  adjustment of the mA and/or KV according to patient size or use of  iterative reconstruction technique was utilized.     FINDINGS:     Chest CT:     Pulmonary arteries: normal.  Heart and pericardium: Normal heart size.  Coronary atherosclerosis  noted..  Aorta: Mild atherosclerosis of the arch..  Lungs: normal.  Airways: normal.  Pleura: normal.  Lymph nodes: normal.  Musculoskeletal: no acute abnormality.  Old bilateral rib fractures  noted.  Esophagus: normal.  Upper abdomen: normal.  Lower neck: Post thyroidectomy.     CT abdomen pelvis:     Liver: normal.  Pancreas: normal.  Gallbladder: normal.  Spleen: normal.  Adrenals: normal.   Kidneys, ureters, bladder: normal.  Reproductive: normal.  Bowel: Status post gastric bypass.  No obstruction or other acute  abnormality.  Moderate constipation..  Appendix: normal.  Peritoneum: normal, no free air or fluid.  Vascular: Aortoiliac atherosclerosis.  Lymph nodes: normal.  Musculoskeletal: Sacral spinal stimulator in place.       Impression:         CT CHEST:  NO ACUTE ABNORMALITY.     CT ABDOMEN AND PELVIS:  NO ACUTE  ABNORMALITY.           This report was finalized on 9/13/2023 6:07 AM by Joann Das MD.       XR Chest 1 View [[id number]] Collected: 09/13/23 0103     Updated: 09/13/23 0105    Narrative:      INDICATION: Chest pain.     TECHNIQUE: Frontal radiograph of the chest.     COMPARISON: None.     FINDINGS:   The cardiomediastinal silhouette and pulmonary vasculature appear within  normal limits. No infiltrate, pleural effusion or pneumothorax. No acute  osseous abnormality evident.       Impression:      No acute cardiopulmonary process.     This report was finalized on 9/13/2023 1:03 AM by Alex Pallas, DO.               Cultures:  No results found for: BLOODCX, URINECX, WOUNDCX, MRSACX, RESPCX, STOOLCX    Last echocardiogram:  Results for orders placed during the hospital encounter of 08/16/21    Adult Transthoracic Echo Complete w/ Color, Spectral and Contrast if necessary per protocol    Interpretation Summary  · Normal left ventricular cavity size and wall thickness noted. All left ventricular wall segments contract normally.  · Left ventricular ejection fraction appears to be 61 - 65%.  · The aortic valve is structurally normal with no regurgitation or stenosis present.  · The mitral valve is structurally normal with no significant stenosis present. Trace mitral valve regurgitation is present.  · There is no evidence of pericardial effusion.          I have personally reviewed the above radiology images and read the final radiology report on 09/13/23  ---------------------------------------------------------------------------------------------------------------------  Assessment / Plan     Active Hospital Problems    Diagnosis  POA    **Sepsis [A41.9]  Yes       ASSESSMENT/PLAN:    Sepsis, POA  Rhinovirus infection  Acute urinary tract infection  Immunocompromised state  Patient with history of psoriatic arthritis on DMARD presents with 1 day history of weakness and myalgias. Also complaining of dysuria noting  onset coincides with initiation of Jardiance.  Respiratory PCR was positive for rhinovirus.  Patient meets sepsis criteria with tachycardia, leukocytosis.  Presented with hypotension at 77/60 though has improved following fluid resuscitation to 121/76 most recently.  Lactate was WNL though CRP is 4.13 and Pro-Guy is 3.81 suggesting bacterial infection.  CXR, CT chest, CT abdomen and pelvis were all negative.  UA is abnormal with leukocytes, 6-12 WBCs and bacteria appreciated.  Culture is pending.  Patient will be admitted for further observation and management  Follow-up blood and urine culture results  Continue supportive care for rhinovirus  Repeat CBC with differential and CMP in the a.m.  Consult infectious disease, assistance appreciated.  Continue coverage for UTI for now with rocephin 1 g daily x 5 days    Chronic:  Psoriatic arthritis  HTN  MS  Fibromyalgia  T2DM  Paroxysmal atrial fibrillation  DDD  We will continue home meds as indicated pending completion of med rec  Cover with SSI while inpatient.  Accu-Cheks to monitor with hypoglycemia protocol in place  Continue to monitor vital signs per protocol.  ----------  -DVT prophylaxis: Chronically anticoagulated with Xarelto  -Activity: Up with assistance  -Expected length of stay: OBSERVATION status; however, if further evaluation or treatment plans warrant, status may change.  Based upon current information, I predict patient's care encounter to be less than or equal to 2 midnights   -Disposition Pending course, likely return home following clinical improvement    High risk secondary to sepsis, immunocompromised state    Code Status and Medical Interventions:   Ordered at: 09/13/23 0759     Code Status (Patient has no pulse and is not breathing):    CPR (Attempt to Resuscitate)     Medical Interventions (Patient has pulse or is breathing):    Full Support       Saulo Gomez PA-C   09/13/23  07:59 EDT

## 2023-09-14 LAB
ALBUMIN SERPL-MCNC: 2.4 G/DL (ref 3.5–5.2)
ALBUMIN/GLOB SERPL: 1 G/DL
ALP SERPL-CCNC: 91 U/L (ref 39–117)
ALT SERPL W P-5'-P-CCNC: 12 U/L (ref 1–33)
ANION GAP SERPL CALCULATED.3IONS-SCNC: 9.2 MMOL/L (ref 5–15)
AST SERPL-CCNC: 11 U/L (ref 1–32)
BACTERIA SPEC AEROBE CULT: NO GROWTH
BASOPHILS # BLD AUTO: 0.06 10*3/MM3 (ref 0–0.2)
BASOPHILS NFR BLD AUTO: 0.4 % (ref 0–1.5)
BILIRUB SERPL-MCNC: 0.2 MG/DL (ref 0–1.2)
BUN SERPL-MCNC: 19 MG/DL (ref 8–23)
BUN/CREAT SERPL: 18.6 (ref 7–25)
CALCIUM SPEC-SCNC: 7.7 MG/DL (ref 8.6–10.5)
CHLORIDE SERPL-SCNC: 112 MMOL/L (ref 98–107)
CO2 SERPL-SCNC: 16.8 MMOL/L (ref 22–29)
CREAT SERPL-MCNC: 1.02 MG/DL (ref 0.57–1)
DEPRECATED RDW RBC AUTO: 56.4 FL (ref 37–54)
EGFRCR SERPLBLD CKD-EPI 2021: 62.7 ML/MIN/1.73
EOSINOPHIL # BLD AUTO: 0.15 10*3/MM3 (ref 0–0.4)
EOSINOPHIL NFR BLD AUTO: 1.1 % (ref 0.3–6.2)
ERYTHROCYTE [DISTWIDTH] IN BLOOD BY AUTOMATED COUNT: 15.5 % (ref 12.3–15.4)
GLOBULIN UR ELPH-MCNC: 2.3 GM/DL
GLUCOSE BLDC GLUCOMTR-MCNC: 131 MG/DL (ref 70–130)
GLUCOSE BLDC GLUCOMTR-MCNC: 133 MG/DL (ref 70–130)
GLUCOSE BLDC GLUCOMTR-MCNC: 140 MG/DL (ref 70–130)
GLUCOSE BLDC GLUCOMTR-MCNC: 92 MG/DL (ref 70–130)
GLUCOSE SERPL-MCNC: 127 MG/DL (ref 65–99)
HCT VFR BLD AUTO: 28.6 % (ref 34–46.6)
HGB BLD-MCNC: 9.3 G/DL (ref 12–15.9)
IMM GRANULOCYTES # BLD AUTO: 0.34 10*3/MM3 (ref 0–0.05)
IMM GRANULOCYTES NFR BLD AUTO: 2.5 % (ref 0–0.5)
LYMPHOCYTES # BLD AUTO: 1.89 10*3/MM3 (ref 0.7–3.1)
LYMPHOCYTES NFR BLD AUTO: 14 % (ref 19.6–45.3)
MCH RBC QN AUTO: 33.5 PG (ref 26.6–33)
MCHC RBC AUTO-ENTMCNC: 32.5 G/DL (ref 31.5–35.7)
MCV RBC AUTO: 102.9 FL (ref 79–97)
MONOCYTES # BLD AUTO: 0.92 10*3/MM3 (ref 0.1–0.9)
MONOCYTES NFR BLD AUTO: 6.8 % (ref 5–12)
NEUTROPHILS NFR BLD AUTO: 10.12 10*3/MM3 (ref 1.7–7)
NEUTROPHILS NFR BLD AUTO: 75.2 % (ref 42.7–76)
NRBC BLD AUTO-RTO: 0 /100 WBC (ref 0–0.2)
PLATELET # BLD AUTO: 251 10*3/MM3 (ref 140–450)
PMV BLD AUTO: 9.3 FL (ref 6–12)
POTASSIUM SERPL-SCNC: 3.8 MMOL/L (ref 3.5–5.2)
PROT SERPL-MCNC: 4.7 G/DL (ref 6–8.5)
RBC # BLD AUTO: 2.78 10*6/MM3 (ref 3.77–5.28)
SODIUM SERPL-SCNC: 138 MMOL/L (ref 136–145)
WBC NRBC COR # BLD: 13.48 10*3/MM3 (ref 3.4–10.8)

## 2023-09-14 PROCEDURE — 25010000002 CEFTRIAXONE PER 250 MG

## 2023-09-14 PROCEDURE — 97161 PT EVAL LOW COMPLEX 20 MIN: CPT

## 2023-09-14 PROCEDURE — 63710000001 INSULIN GLARGINE PER 5 UNITS: Performed by: INTERNAL MEDICINE

## 2023-09-14 PROCEDURE — 94664 DEMO&/EVAL PT USE INHALER: CPT

## 2023-09-14 PROCEDURE — 94761 N-INVAS EAR/PLS OXIMETRY MLT: CPT

## 2023-09-14 PROCEDURE — 80053 COMPREHEN METABOLIC PANEL: CPT | Performed by: INTERNAL MEDICINE

## 2023-09-14 PROCEDURE — 82948 REAGENT STRIP/BLOOD GLUCOSE: CPT

## 2023-09-14 PROCEDURE — 99232 SBSQ HOSP IP/OBS MODERATE 35: CPT | Performed by: INTERNAL MEDICINE

## 2023-09-14 PROCEDURE — 94799 UNLISTED PULMONARY SVC/PX: CPT

## 2023-09-14 PROCEDURE — 85025 COMPLETE CBC W/AUTO DIFF WBC: CPT | Performed by: INTERNAL MEDICINE

## 2023-09-14 PROCEDURE — 99232 SBSQ HOSP IP/OBS MODERATE 35: CPT | Performed by: NURSE PRACTITIONER

## 2023-09-14 PROCEDURE — 87040 BLOOD CULTURE FOR BACTERIA: CPT | Performed by: NURSE PRACTITIONER

## 2023-09-14 RX ORDER — FLUTICASONE PROPIONATE 50 MCG
2 SPRAY, SUSPENSION (ML) NASAL DAILY
Status: DISCONTINUED | OUTPATIENT
Start: 2023-09-14 | End: 2023-09-15 | Stop reason: HOSPADM

## 2023-09-14 RX ADMIN — DULOXETINE HYDROCHLORIDE 60 MG: 60 CAPSULE, DELAYED RELEASE ORAL at 08:53

## 2023-09-14 RX ADMIN — ACETAMINOPHEN 650 MG: 325 TABLET ORAL at 22:15

## 2023-09-14 RX ADMIN — FLECAINIDE ACETATE 50 MG: 50 TABLET ORAL at 22:10

## 2023-09-14 RX ADMIN — RIVAROXABAN 20 MG: 20 TABLET, FILM COATED ORAL at 08:52

## 2023-09-14 RX ADMIN — AMITRIPTYLINE HYDROCHLORIDE 25 MG: 25 TABLET, FILM COATED ORAL at 22:10

## 2023-09-14 RX ADMIN — DULOXETINE HYDROCHLORIDE 30 MG: 30 CAPSULE, DELAYED RELEASE ORAL at 06:15

## 2023-09-14 RX ADMIN — DOCUSATE SODIUM 50 MG AND SENNOSIDES 8.6 MG 2 TABLET: 8.6; 5 TABLET, FILM COATED ORAL at 08:52

## 2023-09-14 RX ADMIN — CYCLOBENZAPRINE 10 MG: 10 TABLET, FILM COATED ORAL at 17:39

## 2023-09-14 RX ADMIN — INSULIN GLARGINE 15 UNITS: 100 INJECTION, SOLUTION SUBCUTANEOUS at 08:53

## 2023-09-14 RX ADMIN — SODIUM CHLORIDE TAB 1 GM 2 G: 1 TAB at 17:39

## 2023-09-14 RX ADMIN — FLUTICASONE PROPIONATE 2 SPRAY: 50 SPRAY, METERED NASAL at 18:30

## 2023-09-14 RX ADMIN — Medication 10 ML: at 22:10

## 2023-09-14 RX ADMIN — PRIMIDONE 50 MG: 50 TABLET ORAL at 22:10

## 2023-09-14 RX ADMIN — SUCRALFATE 1 G: 1 TABLET ORAL at 08:52

## 2023-09-14 RX ADMIN — SUCRALFATE 1 G: 1 TABLET ORAL at 22:10

## 2023-09-14 RX ADMIN — GABAPENTIN 600 MG: 300 CAPSULE ORAL at 22:09

## 2023-09-14 RX ADMIN — METOPROLOL SUCCINATE 100 MG: 50 TABLET, EXTENDED RELEASE ORAL at 09:00

## 2023-09-14 RX ADMIN — SODIUM CHLORIDE 75 ML/HR: 9 INJECTION, SOLUTION INTRAVENOUS at 00:38

## 2023-09-14 RX ADMIN — SODIUM CHLORIDE 2000 MG: 9 INJECTION, SOLUTION INTRAVENOUS at 17:39

## 2023-09-14 RX ADMIN — CETIRIZINE HYDROCHLORIDE 10 MG: 10 TABLET, FILM COATED ORAL at 08:52

## 2023-09-14 RX ADMIN — CELECOXIB 200 MG: 100 CAPSULE ORAL at 08:52

## 2023-09-14 RX ADMIN — BUDESONIDE AND FORMOTEROL FUMARATE DIHYDRATE 2 PUFF: 160; 4.5 AEROSOL RESPIRATORY (INHALATION) at 06:33

## 2023-09-14 RX ADMIN — SODIUM CHLORIDE TAB 1 GM 2 G: 1 TAB at 08:52

## 2023-09-14 RX ADMIN — FAMOTIDINE 40 MG: 20 TABLET, FILM COATED ORAL at 08:53

## 2023-09-14 RX ADMIN — Medication 10 ML: at 08:53

## 2023-09-14 RX ADMIN — SODIUM CHLORIDE TAB 1 GM 2 G: 1 TAB at 22:10

## 2023-09-14 RX ADMIN — FAMOTIDINE 40 MG: 20 TABLET, FILM COATED ORAL at 22:10

## 2023-09-14 RX ADMIN — FLECAINIDE ACETATE 50 MG: 50 TABLET ORAL at 10:45

## 2023-09-14 RX ADMIN — GABAPENTIN 600 MG: 300 CAPSULE ORAL at 13:19

## 2023-09-14 RX ADMIN — Medication 1 MG: at 08:52

## 2023-09-14 RX ADMIN — SUCRALFATE 1 G: 1 TABLET ORAL at 13:19

## 2023-09-14 RX ADMIN — SUCRALFATE 1 G: 1 TABLET ORAL at 17:39

## 2023-09-14 RX ADMIN — GABAPENTIN 600 MG: 300 CAPSULE ORAL at 06:15

## 2023-09-14 RX ADMIN — PANTOPRAZOLE SODIUM 40 MG: 40 TABLET, DELAYED RELEASE ORAL at 06:15

## 2023-09-14 NOTE — PLAN OF CARE
Goal Outcome Evaluation: Patient has been very pleasant today. Compliant with all medications and care as ordered. She remains on RA, saturations maintaining well above 90%. Fluids DC this AM. Family has been at bedside, updated on plan of care. She has been ambulating to the bathroom with nursing assistance, steady gait noted. She is currently resting in bed. Will continue with plan of care.

## 2023-09-14 NOTE — THERAPY EVALUATION
Acute Care - Physical Therapy Initial Evaluation  [facility]     Patient Name: Lashae Benitez  : 1960  MRN: [id number]  Today's Date: 2023      Visit Dx:     ICD-10-CM ICD-9-CM   1. Sepsis without acute organ dysfunction, due to unspecified organism  A41.9 038.9     995.91     Patient Active Problem List   Diagnosis    Hiatal hernia    Essential hypertension    Sjogren's syndrome    Multiple sclerosis    Psoriasis    Fibromyalgia    Osteoarthritis    Psoriatic arthritis    RA (rheumatoid arthritis)    Degenerative disc disease, lumbar    TMJ arthritis    Dupuytren's disease    H. pylori infection    Family history of coronary artery disease    Type 2 diabetes mellitus    Edema    Bilateral leg edema    Isolated corticotropin deficiency    Hyponatremia    Paroxysmal atrial fibrillation    Sepsis     Past Medical History:   Diagnosis Date    Acid reflux     Allergic     Anxiety     Cancer     thyroid, skin    Chronic pain disorder     Degenerative disc disease, lumbar     Depression     Dupuytren's disease     Essential hypertension     Family history of coronary artery disease     Fibromyalgia     Gallbladder abscess     H. pylori infection     Hiatal hernia     Hyperlipidemia     Hypothyroidism     Migraines     migraines    Multiple sclerosis     Osteoarthritis     Psoriasis     Psoriatic arthritis     RA (rheumatoid arthritis)     Rheumatoid arthritis     Sinusitis     Sjogren's syndrome     Stomach ulcer     TMJ arthritis     Type 2 diabetes mellitus     Urinary tract infection      Past Surgical History:   Procedure Laterality Date    BREAST LUMPECTOMY Left 1993    CARDIAC CATHETERIZATION Left 2009    Normal     CARPAL TUNNEL RELEASE  2012    CERVICAL POLYPECTOMY  2015    CHOLECYSTECTOMY  2007    COLONOSCOPY      ENDOSCOPY      GASTRIC BYPASS  2007    JOINT REPLACEMENT      KNEE ARTHROPLASTY      LUMBAR DISC SURGERY      C5-6    REPLACEMENT TOTAL KNEE Right 2016    SACRAL NERVE  STIMULATOR PLACEMENT  09/2014    THYROIDECTOMY  03/2016     PT Assessment (last 12 hours)       PT Evaluation and Treatment       Row Name 09/14/23 1248          Physical Therapy Time and Intention    Subjective Information no complaints  -KM     Document Type evaluation  -KM     Mode of Treatment individual therapy;physical therapy  -KM     Patient Effort good  -KM       Row Name 09/14/23 1248          General Information    Patient Profile Reviewed yes  -KM     Patient Observations alert;cooperative;agree to therapy  -KM     Prior Level of Function independent:;all household mobility;ADL's  -KM     Existing Precautions/Restrictions no known precautions/restrictions  -KM     Risks Reviewed patient:;LOB;nausea/vomiting;dizziness;increased discomfort  -KM     Benefits Reviewed patient:;improve function;increase independence;increase strength;increase balance  -KM     Barriers to Rehab none identified  -KM       Row Name 09/14/23 1248          Living Environment    Current Living Arrangements home  -KM     People in Home spouse  -KM     Primary Care Provided by self  -KM       Row Name 09/14/23 1248          Home Use of Assistive/Adaptive Equipment    Equipment Currently Used at Home cane, straight  -KM       Row Name 09/14/23 1248          Cognition    Affect/Mental Status (Cognition) WFL  -KM     Orientation Status (Cognition) oriented x 4  -KM     Follows Commands (Cognition) WFL  -KM       Row Name 09/14/23 1248          Range of Motion (ROM)    Range of Motion bilateral lower extremities;ROM is [facility]  -[facility] Name 09/14/23 1248          Strength (Manual Muscle Testing)    Strength (Manual Muscle Testing) bilateral lower extremities;strength is [facility]  -       Row Name 09/14/23 1248          Bed Mobility    Bed Mobility bed mobility (all) activities  -KM     All Activities, [place] (Bed Mobility) modified independence  -KM       Row Name 09/14/23 1248          Transfers    Transfers sit-stand  transfer;stand-sit transfer  -KM       Row Name 09/14/23 1248          Sit-Stand Transfer    Sit-Stand [place] (Transfers) modified independence  -KM     Assistive Device (Sit-Stand Transfers) cane, straight  -KM       Row Name 09/14/23 1248          Stand-Sit Transfer    Stand-Sit [place] (Transfers) modified independence  -KM     Assistive Device (Stand-Sit Transfers) cane, straight  -KM       Row Name 09/14/23 1248          Gait/Stairs (Locomotion)    Gait/Stairs Locomotion gait/ambulation independence;gait/ambulation assistive device;distance ambulated  -KM     [place] Level (Gait) supervision  -KM     Assistive Device (Gait) cane, straight  -KM     Patient was able to Ambulate yes  -KM     Distance in Feet (Gait) 60  -KM     Pattern (Gait) step-through  -KM     Deviations/Abnormal Patterns (Gait) gait speed decreased;ataxic  -KM       Row Name 09/14/23 1248          Safety Issues, Functional Mobility    Impairments Affecting Function (Mobility) balance  -KM       Row Name 09/14/23 1248          Balance    Balance Assessment sitting static balance;standing dynamic balance  -KM     Static Sitting Balance independent  -KM     Position, Sitting Balance sitting edge of bed  -KM     Dynamic Standing Balance supervision  -KM     Position/Device Used, Standing Balance cane, straight  -KM       Row Name 09/14/23 1248          Plan of Care Review    Plan of Care Reviewed With patient  -KM     Outcome Evaluation Pt. evaluation completed during PT session. She was able to perform functional mobility skills independently. She ambulated moderate distance w/ SPC. She tolerated session well w/ minor complaints of impaired balance. Pt. claims to be at baseline, including her impaired balance. PT signing off on pt. at this time.  -KM       Row Name 09/14/23 1248          Therapy Assessment/Plan (PT)    Functional Level at Time of Evaluation (PT) modified independent  [facility]     Criteria for Skilled Interventions Met  (PT) no;does not meet criteria for skilled intervention;no problems identified which require skilled intervention  -     Therapy Frequency (PT) evaluation only  -       Row Name 09/14/23 1248          Therapy Plan Review/Discharge Plan (PT)    Therapy Plan Review (PT) evaluation/treatment results reviewed;patient  -               User Key  (r) = Recorded By, (t) = Taken By, (c) = Cosigned By      Initials Name Provider Type    Mohit Smith, JAMISON Physical Therapist                    Physical Therapy Education       Title: PT OT SLP Therapies (Done)       Topic: Physical Therapy (Done)       Point: Mobility training (Done)       Learning Progress Summary             Patient Acceptance, E,TB, VU by  at 9/14/2023 1253                         Point: Home exercise program (Done)       Learning Progress Summary             Patient Acceptance, E,TB, VU by  at 9/14/2023 1253                         Point: Body mechanics (Done)       Learning Progress Summary             Patient Acceptance, E,TB, VU by  at 9/14/2023 1253                         Point: Precautions (Done)       Learning Progress Summary             Patient Acceptance, E,TB, VU by  at 9/14/2023 1253                                         User Key       Initials Effective Dates Name Provider Type [facility] 05/24/22 -  Mohit Garcia, JAMISON Physical Therapist PT                  PT Recommendation and Plan  Anticipated Discharge Disposition (PT): home, home with assist  Therapy Frequency (PT): evaluation only  Plan of Care Reviewed With: patient  Outcome Evaluation: Pt. evaluation completed during PT session. She was able to perform functional mobility skills independently. She ambulated moderate distance w/ SPC. She tolerated session well w/ minor complaints of impaired balance. Pt. claims to be at baseline, including her impaired balance. PT signing off on pt. at this time.       Time Calculation:    PT Charges       Row Name 09/14/23 1248              Time Calculation    PT Received On 09/14/23  -NIGEL                User Key  (r) = Recorded By, (t) = Taken By, (c) = Cosigned By      Initials Name Provider Type    Mohit Smith, PT Physical Therapist                  Therapy Charges for Today       Code Description Service Date Service Provider Modifiers Qty    [id number] HC PT EVAL LOW COMPLEXITY 4 9/14/2023 Mohit Garcia, PT GP 1            PT G-Codes  AM-PAC 6 Clicks Score (PT): 20    Mohit Garcia PT  9/14/2023

## 2023-09-14 NOTE — PROGRESS NOTES
PROGRESS NOTE         Patient Identification:  Name:  Lashae Benitez  Age:  62 y.o.  Sex:  female  :  1960  MRN:  [id number]  Visit Number:  [id number]  Primary Care Provider:  Kreis, Samuel Duane, MD         LOS: 0 days       ----------------------------------------------------------------------------------------------------------------------  Subjective       Chief Complaints:    Weakness - Generalized, Generalized Body Aches, Spasms, and Headache        Interval History:      Patient sitting up on side of bed this morning.  Currently on room air without apparent distress.  Reports mild right flank pain.  Denies dysuria or polyuria.  Lungs clear to auscultation bilaterally.  Abdomen soft, nontender.  Afebrile, denies diarrhea.  Denies nausea or vomiting.  Urine culture from 2023 currently in process.  Blood cultures from 2023 2 out of 2 sets positive for E. coli.  WBC stable at 13.48.    Review of Systems:    Constitutional: no fever, chills and night sweats.   Eyes: no eye drainage, itching or redness.  HEENT: no mouth sores, dysphagia or nose bleed.  Respiratory: no for shortness of breath, cough or production of sputum.  Cardiovascular: no chest pain, no palpitations, no orthopnea.  Gastrointestinal: no nausea, vomiting or diarrhea. No abdominal pain, hematemesis or rectal bleeding.  Genitourinary: no dysuria or polyuria.  Hematologic/lymphatic: no lymph node abnormalities, no easy bruising or easy bleeding.  Musculoskeletal: Right flank tenderness to palpation.  Skin: No rash and no itching.  Neurological: no loss of consciousness, no seizure, no headache.  Behavioral/Psych: no depression or suicidal ideation.  Endocrine: no hot flashes.  Immunologic: negative.    ----------------------------------------------------------------------------------------------------------------------      Objective       Current Hospital Meds:  amitriptyline, 25 mg, Oral, Nightly  budesonide-formoterol,  2 puff, Inhalation, BID - RT  cefTRIAXone, 2,000 mg, Intravenous, Q24H  celecoxib, 200 mg, Oral, Daily  cetirizine, 10 mg, Oral, Daily  cyclobenzaprine, 10 mg, Oral, Q PM  DULoxetine, 30 mg, Oral, QAM  DULoxetine, 60 mg, Oral, Daily  famotidine, 40 mg, Oral, BID  flecainide, 50 mg, Oral, BID  folic acid, 1 mg, Oral, Daily  gabapentin, 600 mg, Oral, Q8H  insulin glargine, 30 Units, Subcutaneous, Daily  insulin lispro, 2-9 Units, Subcutaneous, 4x Daily AC & at Bedtime  isosorbide mononitrate, 30 mg, Oral, Daily  metoprolol succinate XL, 100 mg, Oral, Daily  pantoprazole, 40 mg, Oral, Q AM  primidone, 50 mg, Oral, Nightly  rivaroxaban, 20 mg, Oral, Daily  senna-docusate sodium, 2 tablet, Oral, BID  sodium chloride, 10 mL, Intravenous, Q12H  sodium chloride, 2 g, Oral, TID  sucralfate, 1 g, Oral, 4x Daily         ----------------------------------------------------------------------------------------------------------------------    Vital Signs:  Temp:  [97.7 °F (36.5 °C)-98.5 °F (36.9 °C)] 98.5 °F (36.9 °C)  Heart Rate:  [] 91  Resp:  [16-20] 20  BP: ()/(56-69) 103/69  Mean Arterial Pressure (Non-Invasive) for the past 24 hrs (Last 3 readings):   Noninvasive MAP (mmHg)   09/14/23 0412 70   09/13/23 2300 80   09/13/23 1900 80     SpO2 Percentage    09/14/23 0412 09/14/23 0633 09/14/23 0700   SpO2: 99% 98% 99%     SpO2:  [94 %-99 %] 99 %  on   ;   Device (Oxygen Therapy): room air    Body mass index is 26.54 kg/m².  Wt Readings from Last 3 Encounters:   09/14/23 72.3 kg (159 lb 8 oz)   03/15/23 81 kg (178 lb 9.6 oz)   12/06/22 82.2 kg (181 lb 3.2 oz)        Intake/Output Summary (Last 24 hours) at 9/14/2023 1210  Last data filed at 9/13/2023 2000  Gross per 24 hour   Intake 600 ml   Output --   Net 600 ml     Diet: Diabetic Diets; Consistent Carbohydrate; Texture: Regular Texture (IDDSI 7); Fluid Consistency: Thin (IDDSI  0)  ----------------------------------------------------------------------------------------------------------------------      Physical Exam:    Constitutional:  Well-developed and well-nourished.  No respiratory distress.      HENT:  Head: Normocephalic and atraumatic.  Mouth:  Moist mucous membranes.    Eyes:  Conjunctivae and EOM are normal.  No scleral icterus.  Neck:  Neck supple.  No JVD present.    Cardiovascular:  Normal rate, regular rhythm and normal heart sounds with no murmur. No edema.  Pulmonary/Chest:  No respiratory distress, no wheezes, no crackles, with normal breath sounds and good air movement.  Abdominal:  Soft.  Bowel sounds are normal.  No distension and no tenderness.   Musculoskeletal:  No edema, no tenderness, and no deformity.  No swelling or redness of joints. Right flank tenderness to palpation.  Neurological:  Alert and oriented to person, place, and time.  No facial droop.  No slurred speech.   Skin:  Skin is warm and dry.  No rash noted.  No pallor.   Psychiatric:  Normal mood and affect.  Behavior is normal.        ----------------------------------------------------------------------------------------------------------------------  Results from last 7 days   Lab Units 09/13/23  0328 09/13/23  0102   HSTROP T ng/L 9 10*           Results from last 7 days   Lab Units 09/14/23  0053 09/13/23  0102   CRP mg/dL  --  4.13*   LACTATE mmol/L  --  1.5   WBC 10*3/mm3 13.48* 13.12*   HEMOGLOBIN g/dL 9.3* 12.4   HEMATOCRIT % 28.6* 38.5   MCV fL 102.9* 101.3*   MCHC g/dL 32.5 32.2   PLATELETS 10*3/mm3 251 383   INR   --  1.54*     Results from last 7 days   Lab Units 09/14/23  0053 09/13/23  0102   SODIUM mmol/L 138 135*   POTASSIUM mmol/L 3.8 4.2   CHLORIDE mmol/L 112* 102   CO2 mmol/L 16.8* 22.2   BUN mg/dL 19 18   CREATININE mg/dL 1.02* 1.05*   CALCIUM mg/dL 7.7* 9.0   GLUCOSE mg/dL 127* 246*   ALBUMIN g/dL 2.4* 3.7   BILIRUBIN mg/dL 0.2 0.5   ALK PHOS U/L 91 142*   AST (SGOT) U/L 11 13    ALT (SGPT) U/L 12 16   Estimated Creatinine Clearance: 57.7 mL/min (A) (by C-G formula based on SCr of 1.02 mg/dL (H)).  No results found for: AMMONIA    Glucose   Date/Time Value Ref Range Status   09/14/2023 1044 140 (H) 70 - 130 mg/dL Final   09/14/2023 0641 92 70 - 130 mg/dL Final   09/13/2023 2215 138 (H) 70 - 130 mg/dL Final   09/13/2023 1923 141 (H) 70 - 130 mg/dL Final   09/13/2023 1619 167 (H) 70 - 130 mg/dL Final   09/13/2023 1126 382 (H) 70 - 130 mg/dL Final   09/13/2023 0829 94 70 - 130 mg/dL Final     Lab Results   Component Value Date    HGBA1C 8.10 (H) 08/17/2020     Lab Results   Component Value Date    TSH 0.186 (L) 05/31/2023    FREET4 1.64 08/07/2020       Blood Culture   Date Value Ref Range Status   09/13/2023 Escherichia coli (C)  Preliminary   09/13/2023 Escherichia coli (C)  Preliminary     No results found for: URINECX  No results found for: WOUNDCX  No results found for: STOOLCX  No results found for: RESPCX  Pain Management Panel  More data exists         Latest Ref Rng & Units 6/7/2023 5/31/2023   Pain Management Panel   Creatinine, Urine mg/dL 24.5  24.5  13.2          ----------------------------------------------------------------------------------------------------------------------  Imaging Results (Last 24 Hours)       ** No results found for the last 24 hours. **            ----------------------------------------------------------------------------------------------------------------------    Pertinent Infectious Disease Results                Assessment/Plan       Assessment     Sepsis on admission  Rhinovirus  UTI  E. Coli Bacteremia      Plan      Patient sitting up on side of bed this morning.  Currently on room air without apparent distress.  Reports mild right flank pain.  Denies dysuria or polyuria.  Lungs clear to auscultation bilaterally.  Abdomen soft, nontender.  Afebrile, denies diarrhea.  Denies nausea or vomiting.  Urine culture from 9/13/2023 currently in process.   Blood cultures from 9/13/2023 2 out of 2 sets positive for E. coli.  WBC stable at 13.48.    We will repeat blood cultures x2 today.  For now we will continue ceftriaxone 2 g IV every 24 hours pending finalization of culture results.  We will continue to follow closely and adjust antibiotic therapy as needed.      ANTIMICROBIAL THERAPY    cefTRIAXone (ROCEPHIN) 2000 mg IVPB in 100 mL NS (VTB)     Code Status:   Code Status and Medical Interventions:   Ordered at: 09/13/23 0759     Code Status (Patient has no pulse and is not breathing):    CPR (Attempt to Resuscitate)     Medical Interventions (Patient has pulse or is breathing):    Full Support       RUIZ Reyes  09/14/23  12:10 EDT

## 2023-09-14 NOTE — PROGRESS NOTES
[facility] HOSPITALIST PROGRESS NOTE    Subjective     History:   Lashae Benitez is a 62 y.o. female admitted on 9/13/2023 secondary to Sepsis     Procedures: None    CC: Follow up bacteremia    Patient seen and examined. Awake and alert with her  present at bedside. States she feels better today. Dysuria improved. Sinus congestion improved. No reported vomiting or diarrhea. No acute events overnight per RN.     History taken from: patient, chart, and RN.      Objective     Vital Signs  Temp:  [97.5 °F (36.4 °C)-98.5 °F (36.9 °C)] 97.5 °F (36.4 °C)  Heart Rate:  [] 88  Resp:  [16-20] 18  BP: ()/(56-81) 126/81    Intake/Output Summary (Last 24 hours) at 9/14/2023 1716  Last data filed at 9/13/2023 2000  Gross per 24 hour   Intake 240 ml   Output --   Net 240 ml         Physical Exam:  General:    Awake, alert, in no acute distress   Heart:      Normal S1 and S2. Regular rate and rhythm. No significant murmur, rubs or gallops appreciated.   Lungs:     Respirations regular, even and unlabored. Lungs clear to auscultation B/L. No wheezes, rales or rhonchi.   Abdomen:   Soft and nontender. No guarding, rebound tenderness or  organomegaly noted. Bowel sounds present x 4.   Extremities:  No clubbing, cyanosis or edema noted. Moves UE and LE equally B/L.     Results Review:    Results from last 7 days   Lab Units 09/14/23 0053 09/13/23 0102   WBC 10*3/mm3 13.48* 13.12*   HEMOGLOBIN g/dL 9.3* 12.4   PLATELETS 10*3/mm3 251 383     Results from last 7 days   Lab Units 09/14/23 0053 09/13/23 0102   SODIUM mmol/L 138 135*   POTASSIUM mmol/L 3.8 4.2   CHLORIDE mmol/L 112* 102   CO2 mmol/L 16.8* 22.2   BUN mg/dL 19 18   CREATININE mg/dL 1.02* 1.05*   CALCIUM mg/dL 7.7* 9.0   GLUCOSE mg/dL 127* 246*     Results from last 7 days   Lab Units 09/14/23 0053 09/13/23  0102   BILIRUBIN mg/dL 0.2 0.5   ALK PHOS U/L 91 142*   AST (SGOT) U/L 11 13   ALT (SGPT) U/L 12 16         Results from last 7 days    Lab Units 09/13/23  0102   INR  1.54*     Results from last 7 days   Lab Units 09/13/23  0328 09/13/23  0102   HSTROP T ng/L 9 10*       Imaging Results (Last 24 Hours)       ** No results found for the last 24 hours. **              Medications:  amitriptyline, 25 mg, Oral, Nightly  budesonide-formoterol, 2 puff, Inhalation, BID - RT  cefTRIAXone, 2,000 mg, Intravenous, Q24H  celecoxib, 200 mg, Oral, Daily  cetirizine, 10 mg, Oral, Daily  cyclobenzaprine, 10 mg, Oral, Q PM  DULoxetine, 30 mg, Oral, QAM  DULoxetine, 60 mg, Oral, Daily  famotidine, 40 mg, Oral, BID  flecainide, 50 mg, Oral, BID  folic acid, 1 mg, Oral, Daily  gabapentin, 600 mg, Oral, Q8H  insulin glargine, 30 Units, Subcutaneous, Daily  insulin lispro, 2-9 Units, Subcutaneous, 4x Daily AC & at Bedtime  isosorbide mononitrate, 30 mg, Oral, Daily  metoprolol succinate XL, 100 mg, Oral, Daily  pantoprazole, 40 mg, Oral, Q AM  primidone, 50 mg, Oral, Nightly  rivaroxaban, 20 mg, Oral, Daily  senna-docusate sodium, 2 tablet, Oral, BID  sodium chloride, 10 mL, Intravenous, Q12H  sodium chloride, 2 g, Oral, TID  sucralfate, 1 g, Oral, 4x Daily               Assessment & Plan   Sepsis: Likely 2/2 E coli bacteremia and UTI. Afebrile and hemodynamically stable. Tachycardia resolved. WBC elevated but stable. Blood cultures revealing growth of E coli with repeat ordered today. Urine culture revealing no growth. Cont high dose Rocephin. Follow cultures and repeat labs in the AM. ID input appreciated.     E coli bacteremia: Likely 2/2 UTI. Cultures and antibiotics as above.     UTI: No growth on culture. Antibiotics as above.     (+) Rhinovirus: Cont supportive treatment.     Paroxysmal Afib: Sinus tachycardia on admission. Tachycardia improved with treatment of sepsis and with restarting home metoprolol and flecainide. Cont Xarelto for stroke prevention.     Psoriatic arthritis: Pt is immunocompromised. Cont supportive treatment.     DM II, insulin dependent:  BG has fluctuated but overall stable. Reduce basal insulin. Cont to monitor.     Macrocytic anemia: Check TSH, B12 and folate. Repeat CBC in the AM.     DVT PPX: Xarelto    Disposition Likely home when medically stable, possibly 24-48 hours.     Rafael Thao DO  09/14/23  17:16 EDT

## 2023-09-14 NOTE — PLAN OF CARE
Goal Outcome Evaluation:   Pt resting in bed this shift. C/o headache earlier in shift, see MAR. No complaints voiced at this time. No visible acute s/s of distress noted. Plan of care ongoing.

## 2023-09-15 ENCOUNTER — READMISSION MANAGEMENT (OUTPATIENT)
Dept: CALL CENTER | Facility: HOSPITAL | Age: 63
End: 2023-09-15
Payer: MEDICARE

## 2023-09-15 VITALS
HEIGHT: 65 IN | WEIGHT: 168.1 LBS | OXYGEN SATURATION: 99 % | BODY MASS INDEX: 28.01 KG/M2 | SYSTOLIC BLOOD PRESSURE: 112 MMHG | RESPIRATION RATE: 18 BRPM | HEART RATE: 58 BPM | TEMPERATURE: 97.8 F | DIASTOLIC BLOOD PRESSURE: 72 MMHG

## 2023-09-15 LAB
ANION GAP SERPL CALCULATED.3IONS-SCNC: 8.9 MMOL/L (ref 5–15)
BACTERIA SPEC AEROBE CULT: ABNORMAL
BACTERIA SPEC AEROBE CULT: ABNORMAL
BASOPHILS # BLD AUTO: 0.05 10*3/MM3 (ref 0–0.2)
BASOPHILS NFR BLD AUTO: 0.6 % (ref 0–1.5)
BUN SERPL-MCNC: 19 MG/DL (ref 8–23)
BUN/CREAT SERPL: 19.2 (ref 7–25)
CALCIUM SPEC-SCNC: 8.1 MG/DL (ref 8.6–10.5)
CHLORIDE SERPL-SCNC: 110 MMOL/L (ref 98–107)
CO2 SERPL-SCNC: 17.1 MMOL/L (ref 22–29)
CREAT SERPL-MCNC: 0.99 MG/DL (ref 0.57–1)
DEPRECATED RDW RBC AUTO: 61.4 FL (ref 37–54)
EGFRCR SERPLBLD CKD-EPI 2021: 65 ML/MIN/1.73
EOSINOPHIL # BLD AUTO: 0.15 10*3/MM3 (ref 0–0.4)
EOSINOPHIL NFR BLD AUTO: 1.9 % (ref 0.3–6.2)
ERYTHROCYTE [DISTWIDTH] IN BLOOD BY AUTOMATED COUNT: 15.6 % (ref 12.3–15.4)
FOLATE SERPL-MCNC: 13.3 NG/ML (ref 4.78–24.2)
GLUCOSE BLDC GLUCOMTR-MCNC: 94 MG/DL (ref 70–130)
GLUCOSE SERPL-MCNC: 174 MG/DL (ref 65–99)
GRAM STN SPEC: ABNORMAL
HCT VFR BLD AUTO: 29.8 % (ref 34–46.6)
HGB BLD-MCNC: 9.2 G/DL (ref 12–15.9)
IMM GRANULOCYTES # BLD AUTO: 0.09 10*3/MM3 (ref 0–0.05)
IMM GRANULOCYTES NFR BLD AUTO: 1.1 % (ref 0–0.5)
ISOLATED FROM: ABNORMAL
ISOLATED FROM: ABNORMAL
LYMPHOCYTES # BLD AUTO: 1.55 10*3/MM3 (ref 0.7–3.1)
LYMPHOCYTES NFR BLD AUTO: 19.4 % (ref 19.6–45.3)
MCH RBC QN AUTO: 33 PG (ref 26.6–33)
MCHC RBC AUTO-ENTMCNC: 30.9 G/DL (ref 31.5–35.7)
MCV RBC AUTO: 106.8 FL (ref 79–97)
MONOCYTES # BLD AUTO: 0.48 10*3/MM3 (ref 0.1–0.9)
MONOCYTES NFR BLD AUTO: 6 % (ref 5–12)
NEUTROPHILS NFR BLD AUTO: 5.68 10*3/MM3 (ref 1.7–7)
NEUTROPHILS NFR BLD AUTO: 71 % (ref 42.7–76)
NRBC BLD AUTO-RTO: 0 /100 WBC (ref 0–0.2)
PLATELET # BLD AUTO: 227 10*3/MM3 (ref 140–450)
PMV BLD AUTO: 9.3 FL (ref 6–12)
POTASSIUM SERPL-SCNC: 3.8 MMOL/L (ref 3.5–5.2)
RBC # BLD AUTO: 2.79 10*6/MM3 (ref 3.77–5.28)
SODIUM SERPL-SCNC: 136 MMOL/L (ref 136–145)
TSH SERPL DL<=0.05 MIU/L-ACNC: 0.69 UIU/ML (ref 0.27–4.2)
VIT B12 BLD-MCNC: 253 PG/ML (ref 211–946)
WBC NRBC COR # BLD: 8 10*3/MM3 (ref 3.4–10.8)

## 2023-09-15 PROCEDURE — 82948 REAGENT STRIP/BLOOD GLUCOSE: CPT

## 2023-09-15 PROCEDURE — 82746 ASSAY OF FOLIC ACID SERUM: CPT | Performed by: INTERNAL MEDICINE

## 2023-09-15 PROCEDURE — 94640 AIRWAY INHALATION TREATMENT: CPT

## 2023-09-15 PROCEDURE — 84443 ASSAY THYROID STIM HORMONE: CPT | Performed by: INTERNAL MEDICINE

## 2023-09-15 PROCEDURE — 85025 COMPLETE CBC W/AUTO DIFF WBC: CPT | Performed by: INTERNAL MEDICINE

## 2023-09-15 PROCEDURE — 99232 SBSQ HOSP IP/OBS MODERATE 35: CPT | Performed by: INTERNAL MEDICINE

## 2023-09-15 PROCEDURE — 94761 N-INVAS EAR/PLS OXIMETRY MLT: CPT

## 2023-09-15 PROCEDURE — 63710000001 INSULIN GLARGINE PER 5 UNITS: Performed by: INTERNAL MEDICINE

## 2023-09-15 PROCEDURE — 99239 HOSP IP/OBS DSCHRG MGMT >30: CPT | Performed by: INTERNAL MEDICINE

## 2023-09-15 PROCEDURE — 94799 UNLISTED PULMONARY SVC/PX: CPT

## 2023-09-15 PROCEDURE — 82607 VITAMIN B-12: CPT | Performed by: INTERNAL MEDICINE

## 2023-09-15 PROCEDURE — 80048 BASIC METABOLIC PNL TOTAL CA: CPT | Performed by: INTERNAL MEDICINE

## 2023-09-15 PROCEDURE — 94664 DEMO&/EVAL PT USE INHALER: CPT

## 2023-09-15 RX ORDER — CEFDINIR 300 MG/1
300 CAPSULE ORAL EVERY 12 HOURS SCHEDULED
Qty: 22 CAPSULE | Refills: 0 | Status: SHIPPED | OUTPATIENT
Start: 2023-09-15 | End: 2023-09-26

## 2023-09-15 RX ORDER — CEFDINIR 300 MG/1
300 CAPSULE ORAL EVERY 12 HOURS SCHEDULED
Status: DISCONTINUED | OUTPATIENT
Start: 2023-09-15 | End: 2023-09-15 | Stop reason: HOSPADM

## 2023-09-15 RX ADMIN — SUCRALFATE 1 G: 1 TABLET ORAL at 09:20

## 2023-09-15 RX ADMIN — PANTOPRAZOLE SODIUM 40 MG: 40 TABLET, DELAYED RELEASE ORAL at 06:11

## 2023-09-15 RX ADMIN — ISOSORBIDE MONONITRATE 30 MG: 30 TABLET, EXTENDED RELEASE ORAL at 09:21

## 2023-09-15 RX ADMIN — FLUTICASONE PROPIONATE 2 SPRAY: 50 SPRAY, METERED NASAL at 09:23

## 2023-09-15 RX ADMIN — SODIUM CHLORIDE TAB 1 GM 2 G: 1 TAB at 09:19

## 2023-09-15 RX ADMIN — CETIRIZINE HYDROCHLORIDE 10 MG: 10 TABLET, FILM COATED ORAL at 09:21

## 2023-09-15 RX ADMIN — METOPROLOL SUCCINATE 100 MG: 50 TABLET, EXTENDED RELEASE ORAL at 09:20

## 2023-09-15 RX ADMIN — INSULIN GLARGINE 15 UNITS: 100 INJECTION, SOLUTION SUBCUTANEOUS at 09:23

## 2023-09-15 RX ADMIN — FLECAINIDE ACETATE 50 MG: 50 TABLET ORAL at 09:21

## 2023-09-15 RX ADMIN — Medication 10 ML: at 09:22

## 2023-09-15 RX ADMIN — DULOXETINE HYDROCHLORIDE 30 MG: 30 CAPSULE, DELAYED RELEASE ORAL at 06:11

## 2023-09-15 RX ADMIN — GABAPENTIN 600 MG: 300 CAPSULE ORAL at 06:11

## 2023-09-15 RX ADMIN — RIVAROXABAN 20 MG: 20 TABLET, FILM COATED ORAL at 09:20

## 2023-09-15 RX ADMIN — BUDESONIDE AND FORMOTEROL FUMARATE DIHYDRATE 2 PUFF: 160; 4.5 AEROSOL RESPIRATORY (INHALATION) at 06:35

## 2023-09-15 RX ADMIN — DOCUSATE SODIUM 50 MG AND SENNOSIDES 8.6 MG 2 TABLET: 8.6; 5 TABLET, FILM COATED ORAL at 09:21

## 2023-09-15 RX ADMIN — CELECOXIB 200 MG: 100 CAPSULE ORAL at 09:20

## 2023-09-15 RX ADMIN — DULOXETINE HYDROCHLORIDE 60 MG: 60 CAPSULE, DELAYED RELEASE ORAL at 09:20

## 2023-09-15 RX ADMIN — Medication 1 MG: at 09:19

## 2023-09-15 RX ADMIN — FAMOTIDINE 40 MG: 20 TABLET, FILM COATED ORAL at 09:20

## 2023-09-15 RX ADMIN — CEFDINIR 300 MG: 300 CAPSULE ORAL at 09:20

## 2023-09-15 NOTE — DISCHARGE SUMMARY
[facility] DISCHARGE SUMMARY      Date of Admission: 9/13/2023    Date of Discharge: 9/15/2023     PCP: Kreis, Samuel Duane, MD    Admission Diagnosis:   Please see admission H&P    Discharge Diagnosis:   Sepsis (present on admission)  E coli bacteremia  UTI  (+) Rhinovirus  Paroxysmal Afib  Psoriatic arthritis  DM II, insulin dependent  Macrocytic anemia    Procedures Performed:  None     Consults:   Consults       Date and Time Order Name Status Description    9/13/2023  8:54 AM Inpatient Infectious Diseases Consult Completed     9/13/2023  7:55 AM Hospitalist (on-call MD unless specified)                History of Present Illness:  Lashae Benitez is a 62 y.o. female who presented to [facility] ED with CC of generalized weakness, body aches and subjective fever. Please see admission H&P for complete details.     In the ED, workup revealed sepsis, UTI and (+) Rhinovirus. CT chest and CT abd/pelvis did not reveal any acute abnormalities. Cultures were obtained and IV antibiotics initiated.      Hospital Course  Lashae Benitez was admitted to the telemetry floor for further evaluation and treatment. She was continued on IV Rocephin and maintenance IVF's.     Upon presentation, she was initially tachycardic but this improved with treatment of her sepsis and restarting her home medications including metoprolol and flecainide. She was continued on her home Eliquis for stroke prevention in the setting of her PAF. Initial blood cultures began revealing E coli per BCID. Her Rocephin was increased to 2g and ID consulted for further input. Urine culture was finalized with no growth but the source of her bacteremia was thought to be from her urine in the setting of reported dysuria with abnormal UA. Repeat blood cultures were obtained with no growth at the time of discharge.     Mrs. Benitez was evaluated on 9/15 and deemed medically stable for discharge after discussion with ID. She was deescalated to [facility] to complete a  course of treatment. She had recently started Jardiance prior to onset of her symptoms and was instructed to hold this medication pending further discussion with her PCP. Instructions were given for close outpatient follow up with her PCP and ID.     Condition on Discharge:  Stable    Vital Signs  Vitals:    09/15/23 0654   BP: 112/72   Pulse: 58   Resp: 18   Temp: 97.8 °F (36.6 °C)   SpO2: 99%       Physical Exam:  General:    Awake, alert, in no acute distress   Heart:      Normal S1 and S2. Regular rate and rhythm. No significant murmur, rubs or gallops appreciated.   Lungs:     Respirations regular, even and unlabored. Lungs clear to auscultation B/L. No wheezes, rales or rhonchi.   Abdomen:   Soft and nontender. No guarding, rebound tenderness or  organomegaly noted. Bowel sounds present x 4.   Extremities:  No clubbing, cyanosis or edema noted. Moves UE and LE equally B/L.     Discharge Disposition:   home      Discharge Medications:     Discharge Medications        New Medications        Instructions Start Date   cefdinir 300 MG capsule  Commonly known as: OMNICEF   300 mg, Oral, Every 12 Hours Scheduled             Continue These Medications        Instructions Start Date   abatacept 250 MG injection  Commonly known as: ORENCIA   750 mg, Intravenous, Every 28 Days      alendronate 70 MG tablet  Commonly known as: FOSAMAX   70 mg, Oral, Weekly, Wednesday      amitriptyline 25 MG tablet  Commonly known as: ELAVIL   25 mg, Oral, Nightly      Butalbital-Acetaminophen  MG capsule   300 mg, Oral, Daily PRN      celecoxib 200 MG capsule  Commonly known as: CeleBREX   200 mg, Oral, Daily      cyanocobalamin 1000 MCG/ML injection   1,000 mcg, Intramuscular, Every 28 Days      cyclobenzaprine 10 MG tablet  Commonly known as: FLEXERIL   10 mg, Oral, Every Evening      Diclofenac Sodium 1 % gel gel  Commonly known as: VOLTAREN   2 g, Topical, 4 Times Daily PRN      DULoxetine 30 MG capsule  Commonly known as:  CYMBALTA   30 mg, Oral, Every Morning      DULoxetine 60 MG capsule  Commonly known as: CYMBALTA   60 mg, Oral, Daily      Emgality 120 MG/ML auto-injector pen  Generic drug: galcanezumab-gnlm   120 mg, Subcutaneous, Every 30 Days      famotidine 40 MG tablet  Commonly known as: PEPCID   40 mg, Oral, 2 Times Daily      ferrous gluconate 240 (27 FE) MG tablet  Commonly known as: FERGON   240 mg, Oral, Every Morning      fexofenadine 180 MG tablet  Commonly known as: ALLEGRA   180 mg, Oral, Daily      flecainide 50 MG tablet  Commonly known as: TAMBOCOR   50 mg, Oral, 2 Times Daily      fluticasone-salmeterol 115-21 MCG/ACT inhaler  Commonly known as: ADVAIR HFA   2 puffs, Inhalation, 2 Times Daily - RT      folic acid 1 MG tablet  Commonly known as: FOLVITE   1 mg, Oral, Daily      gabapentin 600 MG tablet  Commonly known as: NEURONTIN   600 mg, Oral, 3 Times Daily      insulin detemir 100 UNIT/ML injection  Commonly known as: LEVEMIR   30 Units, Subcutaneous, Daily      isosorbide mononitrate 30 MG 24 hr tablet  Commonly known as: IMDUR   TAKE 1 TABLET DAILY      Kerendia 10 MG tablet  Generic drug: Finerenone   10 mg, Oral, Daily      methotrexate 2.5 MG tablet   25 mg, Oral, Weekly, Prior to [facility] Admission, Patient was on: Takes 10 tablets on Saturday      metoprolol succinate  MG 24 hr tablet  Commonly known as: TOPROL-XL   TAKE 1 TABLET DAILY      Nurtec 75 MG tablet dispersible tablet  Generic drug: Rimegepant Sulfate   DISSOLVE ONE TABLET UNDER THE TONGUE AT ONSET OF MIGRAINE HEADACHE. MAX DOSE ONE TABLET IN 24 HOURS      olopatadine 0.6 % solution nasal solution  Commonly known as: PATANASE   2 sprays, Each Nare, 2 Times Daily      Otezla 30 MG tablet  Generic drug: Apremilast   1 tablet, Oral, 2 times daily      potassium chloride 10 MEQ CR tablet   10 mEq, Oral, Daily      primidone 50 MG tablet  Commonly known as: MYSOLINE   50 mg, Oral, Nightly      RABEprazole 20 MG EC tablet  Commonly known as:  ACIPHEX   20 mg, Oral, 2 Times Daily      sodium chloride 1 g tablet   2 g, Oral, 3 Times Daily      sucralfate 1 g tablet  Commonly known as: CARAFATE   1 g, Oral, 4 Times Daily, Take one tablet by mouth 4 times daily 1 hour before meals and at bedtime on an empty stomach.       Xarelto 20 MG tablet  Generic drug: rivaroxaban   TAKE 1 TABLET DAILY             Stop These Medications      empagliflozin 25 MG tablet tablet  Commonly known as: JARDIANCE                Discharge Diet:   Dietary Orders (From admission, onward)       Start     Ordered    09/13/23 0855  Diet: Diabetic Diets; Consistent Carbohydrate; Texture: Regular Texture (IDDSI 7); Fluid Consistency: Thin (IDDSI 0)  Diet Effective Now        References:    Diet Order [facility]   Question Answer Comment   Diets: Diabetic Diets    Diabetic Diet: Consistent Carbohydrate    Texture: Regular Texture (IDDSI 7)    Fluid Consistency: Thin (IDDSI 0)        09/13/23 0854                    Activity at Discharge:  activity as tolerated    Follow-up Appointments:  Additional Instructions for the Follow-ups that You Need to Schedule       Discharge Follow-up with PCP   As directed       Currently Documented PCP:    Kreis, Samuel Duane, MD    PCP Phone Number:    [phone]     Follow Up Details: Follow up with Dr. Araujo in 1 week.        Discharge Follow-up with Specified Provider: Follow up in ID clinic in 1-2 weeks.   As directed      To: Follow up in ID clinic in 1-2 weeks.               Follow-up Information       Miya Ruvalcaba APRN. Schedule an appointment as soon as possible for a visit in 1 week(s).    Specialties: Infectious Diseases, Nurse Practitioner  Contact information:  [address] [place] [id number] [phone]               Kreis, Samuel Duane, MD .    Specialty: Family Medicine  Why: Follow up with Dr. Araujo in 1 week.  Contact information:  [address] [place] [address] [phone]                                 Test  Results Pending at Discharge:  Pending Labs       Order Current Status    Blood Culture - Blood, Arm, Left In process    Blood Culture - Blood, Wrist, Right In process    Folate In process    Vitamin B12 In process             The 10-year ASCVD risk score (Natan WESLEY, et al., 2019) is: 5.1%    Values used to calculate the score:      Age: 62 years      Sex: Female      Is Non- : No      Diabetic: Yes      Tobacco smoker: No      Systolic Blood Pressure: 112 mmHg      Is BP treated: Yes      HDL Cholesterol: 70 mg/dL      Total Cholesterol: 154 mg/dL      Rafael Thao DO  09/15/23  08:32 EDT      Time: Greater than 30 minutes spent on this discharge.

## 2023-09-15 NOTE — DISCHARGE INSTR - APPOINTMENTS
AKUA  HAS   AN  APOINTMENT   WITH  DR CARLOS  FOR  SEPT 27  AT  10  :30    AND  DR XAVIER  FOR  SEPT 26   AT  9:45

## 2023-09-15 NOTE — PROGRESS NOTES
PROGRESS NOTE         Patient Identification:  Name:  Lashae Benitez  Age:  62 y.o.  Sex:  female  :  1960  MRN:  [id number]  Visit Number:  [id number]  Primary Care Provider:  Kreis, Samuel Duane, MD         LOS: 1 day       ----------------------------------------------------------------------------------------------------------------------  Subjective       Chief Complaints:    Weakness - Generalized, Generalized Body Aches, Spasms, and Headache        Interval History:      Patient seen and up in bed this morning.  Very pleasant.  No issues or complaints this morning.  Currently on room air with no apparent distress.  Lungs clear to auscultation bilaterally.  Abdomen soft, nontender.  Afebrile, denies diarrhea.  Urine culture finalized with no growth.  Blood cultures from 2023 currently in process.  WBC normal at 8.00.    Review of Systems:    Constitutional: no fever, chills and night sweats.   Eyes: no eye drainage, itching or redness.  HEENT: no mouth sores, dysphagia or nose bleed.  Respiratory: no for shortness of breath, cough or production of sputum.  Cardiovascular: no chest pain, no palpitations, no orthopnea.  Gastrointestinal: no nausea, vomiting or diarrhea. No abdominal pain, hematemesis or rectal bleeding.  Genitourinary: no dysuria or polyuria.  Hematologic/lymphatic: no lymph node abnormalities, no easy bruising or easy bleeding.  Musculoskeletal: No muscle or joint pain.  Skin: No rash and no itching.  Neurological: no loss of consciousness, no seizure, no headache.  Behavioral/Psych: no depression or suicidal ideation.  Endocrine: no hot flashes.  Immunologic: negative.    ----------------------------------------------------------------------------------------------------------------------      Objective       Current [facility] Meds:  amitriptyline, 25 mg, Oral, Nightly  budesonide-formoterol, 2 puff, Inhalation, BID - RT  cefdinir, 300 mg, Oral, Q12H  celecoxib, 200 mg,  Oral, Daily  cetirizine, 10 mg, Oral, Daily  cyclobenzaprine, 10 mg, Oral, Q PM  DULoxetine, 30 mg, Oral, QAM  DULoxetine, 60 mg, Oral, Daily  famotidine, 40 mg, Oral, BID  flecainide, 50 mg, Oral, BID  fluticasone, 2 spray, Each Nare, Daily  folic acid, 1 mg, Oral, Daily  gabapentin, 600 mg, Oral, Q8H  insulin glargine, 15 Units, Subcutaneous, Daily  insulin lispro, 2-9 Units, Subcutaneous, 4x Daily AC & at Bedtime  isosorbide mononitrate, 30 mg, Oral, Daily  metoprolol succinate XL, 100 mg, Oral, Daily  pantoprazole, 40 mg, Oral, Q AM  primidone, 50 mg, Oral, Nightly  rivaroxaban, 20 mg, Oral, Daily  senna-docusate sodium, 2 tablet, Oral, BID  sodium chloride, 10 mL, Intravenous, Q12H  sodium chloride, 2 g, Oral, TID  sucralfate, 1 g, Oral, 4x Daily         ----------------------------------------------------------------------------------------------------------------------    Vital Signs:  Temp:  [97.5 °F (36.4 °C)-98.7 °F (37.1 °C)] 97.8 °F (36.6 °C)  Heart Rate:  [58-91] 58  Resp:  [17-20] 18  BP: (103-127)/(69-81) 112/72  Mean Arterial Pressure (Non-Invasive) for the past 24 hrs (Last 3 readings):   Noninvasive MAP (mmHg)   09/15/23 0654 83       SpO2 Percentage    09/15/23 0300 09/15/23 0635 09/15/23 0654   SpO2: 99% 99% 99%     SpO2:  [97 %-99 %] 99 %  on   ;   Device (Oxygen Therapy): room air    Body mass index is 27.97 kg/m².  Wt Readings from Last 3 Encounters:   09/15/23 76.2 kg (168 lb 1.6 oz)   03/15/23 81 kg (178 lb 9.6 oz)   12/06/22 82.2 kg (181 lb 3.2 oz)        Intake/Output Summary (Last 24 hours) at 9/15/2023 0809  Last data filed at 9/14/2023 1500  Gross per 24 hour   Intake 120 ml   Output --   Net 120 ml       Diet: Diabetic Diets; Consistent Carbohydrate; Texture: Regular Texture (IDDSI 7); Fluid Consistency: Thin (IDDSI 0)  ----------------------------------------------------------------------------------------------------------------------      Physical Exam:    Constitutional:   Well-developed and well-nourished.  No respiratory distress.  Very pleasant.  On room air.  HENT:  Head: Normocephalic and atraumatic.  Mouth:  Moist mucous membranes.    Eyes:  Conjunctivae and EOM are normal.  No scleral icterus.  Neck:  Neck supple.  No JVD present.    Cardiovascular:  Normal rate, regular rhythm and normal heart sounds with no murmur. No edema.  Pulmonary/Chest:  No respiratory distress, no wheezes, no crackles, with normal breath sounds and good air movement.  Abdominal:  Soft.  Bowel sounds are normal.  No distension and no tenderness.   Musculoskeletal:  No edema, no tenderness, and no deformity.  No swelling or redness of joints.   Neurological:  Alert and oriented to person, place, and time.  No facial droop.  No slurred speech.   Skin:  Skin is warm and dry.  No rash noted.  No pallor.   Psychiatric:  Normal mood and affect.  Behavior is normal.        ----------------------------------------------------------------------------------------------------------------------  Results from last 7 days   Lab Units 09/13/23  0328 09/13/23 0102   HSTROP T ng/L 9 10*             Results from last 7 days   Lab Units 09/15/23  0119 09/14/23 0053 09/13/23 0102   CRP mg/dL  --   --  4.13*   LACTATE mmol/L  --   --  1.5   WBC 10*3/mm3 8.00 13.48* 13.12*   HEMOGLOBIN g/dL 9.2* 9.3* 12.4   HEMATOCRIT % 29.8* 28.6* 38.5   MCV fL 106.8* 102.9* 101.3*   MCHC g/dL 30.9* 32.5 32.2   PLATELETS 10*3/mm3 227 251 383   INR   --   --  1.54*       Results from last 7 days   Lab Units 09/15/23  0119 09/14/23  0053 09/13/23  0102   SODIUM mmol/L 136 138 135*   POTASSIUM mmol/L 3.8 3.8 4.2   CHLORIDE mmol/L 110* 112* 102   CO2 mmol/L 17.1* 16.8* 22.2   BUN mg/dL 19 19 18   CREATININE mg/dL 0.99 1.02* 1.05*   CALCIUM mg/dL 8.1* 7.7* 9.0   GLUCOSE mg/dL 174* 127* 246*   ALBUMIN g/dL  --  2.4* 3.7   BILIRUBIN mg/dL  --  0.2 0.5   ALK PHOS U/L  --  91 142*   AST (SGOT) U/L  --  11 13   ALT (SGPT) U/L  --  12 16      Estimated Creatinine Clearance: 61 mL/min (by C-G formula based on SCr of 0.99 mg/dL).  No results found for: AMMONIA    Glucose   Date/Time Value Ref Range Status   09/15/2023 0657 94 70 - 130 mg/dL Final   09/14/2023 2202 133 (H) 70 - 130 mg/dL Final   09/14/2023 1635 131 (H) 70 - 130 mg/dL Final   09/14/2023 1044 140 (H) 70 - 130 mg/dL Final   09/14/2023 0641 92 70 - 130 mg/dL Final   09/13/2023 2215 138 (H) 70 - 130 mg/dL Final   09/13/2023 1923 141 (H) 70 - 130 mg/dL Final   09/13/2023 1619 167 (H) 70 - 130 mg/dL Final     Lab Results   Component Value Date    HGBA1C 8.10 (H) 08/17/2020     Lab Results   Component Value Date    TSH 0.694 09/15/2023    FREET4 1.64 08/07/2020       Blood Culture   Date Value Ref Range Status   09/13/2023 Escherichia coli (C)  Preliminary   09/13/2023 Escherichia coli (C)  Preliminary     No results found for: URINECX  No results found for: WOUNDCX  No results found for: STOOLCX  No results found for: RESPCX  Pain Management Panel  More data exists         Latest Ref Rng & Units 6/7/2023 5/31/2023   Pain Management Panel   Creatinine, Urine mg/dL 24.5  24.5  13.2        ----------------------------------------------------------------------------------------------------------------------  Imaging Results (Last 24 Hours)       ** No results found for the last 24 hours. **            ----------------------------------------------------------------------------------------------------------------------    Pertinent Infectious Disease Results      Assessment/Plan       Assessment     Sepsis on admission  Rhinovirus  UTI  E. Coli Bacteremia      Plan      I saw and examined the patient myself this morning with RUIZ Reyes and discussed the findings and plan of care with her and got report from primary RN and here are my findings:    The patient was seen this morning and was found to be in a positive and pleasant mood. They have not reported any issues or complaints today.  Currently, the patient is breathing comfortably on room air and does not display any signs of distress. Upon auscultation, clear lung sounds are heard bilaterally, and the abdomen is soft and non-tender. The patient is afebrile and denies experiencing diarrhea. The urine culture results have been finalized, showing no signs of growth. Furthermore, the white blood cell count is within the normal range, measuring at 8.00.    Blood cultures from 9/13/2023 indicated that 2 out of 2 sets were positive for E. coli.    Given the overall improvement in both clinical and laboratory parameters, it has been decided to de-escalate the treatment from ceftriaxone to cefdinir, with a dosage of 300 mg orally twice daily, to be continued until 9/25/2023. The plan includes a follow-up on blood cultures, and it is recommended that the patient schedule an outpatient infectious disease follow-up appointment.    ANTIMICROBIAL THERAPY    cefdinir - 300 MG     Code Status:   Code Status and Medical Interventions:   Ordered at: 09/13/23 0759     Code Status (Patient has no pulse and is not breathing):    CPR (Attempt to Resuscitate)     Medical Interventions (Patient has pulse or is breathing):    Full Support       RUIZ Reyes  09/15/23  08:09 EDT    Electronically signed by RUIZ Reyes, 09/15/23, 8:11 AM EDT.    Electronically signed by Israel Flores MD, 09/15/23, 10:38 AM EDT.

## 2023-09-15 NOTE — PLAN OF CARE
Goal Outcome Evaluation:     Patient resting in bed. No complaints of chest pain or shortness of breath. No visible indicators of acute distress noted. Will continue to follow plan of care this shift.

## 2023-09-16 NOTE — OUTREACH NOTE
Prep Survey      Flowsheet Row Responses   [facility] patient discharged from? [facility]   Is LACE score < 7 ? No   Eligibility Readm Mgmt   Discharge diagnosis Sepsis   Does the patient have one of the following disease processes/diagnoses(primary or secondary)? Sepsis   Does the patient have Home health ordered? No   Is there a DME ordered? No   Prep survey completed? Yes            Hoda SNOW - Registered Nurse

## 2023-09-18 ENCOUNTER — READMISSION MANAGEMENT (OUTPATIENT)
Dept: CALL CENTER | Facility: HOSPITAL | Age: 63
End: 2023-09-18
Payer: MEDICARE

## 2023-09-18 NOTE — OUTREACH NOTE
Sepsis Week 1 Survey      Flowsheet Row Responses   [facility] [place] patient discharged from? [facility]   Does the patient have one of the following disease processes/diagnoses(primary or secondary)? Sepsis   Week 1 attempt successful? Yes   Call start time 1521   Call end time 1524   Discharge diagnosis Sepsis   Meds reviewed with patient/caregiver? Yes   Is the patient having any side effects they believe may be caused by any medication additions or changes? No   Does the patient have all medications related to this admission filled (includes all antibiotics, inhalers, nebulizers,steroids,etc.) Yes   Is the patient taking all medications as directed (includes completed medication regime)? Yes   Does the patient have a primary care provider?  Yes   Does the patient have an appointment with their PCP within 7 days of discharge? Yes   Has the patient kept scheduled appointments due by today? Yes   Psychosocial issues? No   Did the patient receive a copy of their discharge instructions? Yes   Nursing interventions Reviewed instructions with patient   What is the patient's perception of their health status since discharge? Improving   Is the patient/caregiver able to teach back TIME? T emperature - higher or lower than normal, I nfection - may have signs and symptoms of an infection, M ental Decline - confused, sleepy, difficult to arouse, E xtremely Ill - severe pain, discomfort, shortness of breath   Nursing interventions Nurse provided reassurance to patient   Is patient/caregiver able to teach back steps to recovery at home? Rest and regain strength   Is the patient/caregiver able to teach back signs and symptoms of worsening condition: Fever, Rapid heart rate (>90)   If the patient is a current smoker, are they able to teach back resources for cessation? Not a smoker   Is the patient/caregiver able to teach back the hierarchy of who to call/visit for symptoms/problems? PCP, Specialist, Home health nurse, Urgent Care,  ED, 911 Yes   Week 1 call completed? Yes   Is the patient interested in additional calls from an ambulatory ? No   Would this patient benefit from a Referral to [facility]? No   Wrap up additional comments still feels weak at times   Call end time [id number]            NATHALY ARGUETA - Registered Nurse

## 2023-09-19 LAB
BACTERIA SPEC AEROBE CULT: NORMAL
BACTERIA SPEC AEROBE CULT: NORMAL

## 2023-09-21 ENCOUNTER — TELEPHONE (OUTPATIENT)
Dept: CARDIOLOGY | Facility: CLINIC | Age: 63
End: 2023-09-21

## 2023-09-21 NOTE — TELEPHONE ENCOUNTER
[facility] is only not recommended in patient's with heart failure, reviewing chart I do not see that she has history of this. Recommend her speaking to prescribing physician if she has more questions.

## 2023-09-21 NOTE — TELEPHONE ENCOUNTER
Caller: Lashae Benitez    Relationship to patient: Self    Best call back number: [phone]    Patient is needing: TO DISCUSS RX FROM PCP [facility]. PT WOULD LIKE TO KNOW IF IT'S SAFE TO TAKE THIS MEDICATION, PLEASE ADVISE THANK YOU

## 2023-09-25 ENCOUNTER — TELEPHONE (OUTPATIENT)
Dept: CARDIOLOGY | Facility: CLINIC | Age: 63
End: 2023-09-25

## 2023-09-25 ENCOUNTER — OFFICE VISIT (OUTPATIENT)
Dept: CARDIOLOGY | Facility: CLINIC | Age: 63
End: 2023-09-25

## 2023-09-25 VITALS
HEIGHT: 65 IN | DIASTOLIC BLOOD PRESSURE: 82 MMHG | OXYGEN SATURATION: 98 % | BODY MASS INDEX: 27.19 KG/M2 | SYSTOLIC BLOOD PRESSURE: 143 MMHG | HEART RATE: 91 BPM | WEIGHT: 163.2 LBS

## 2023-09-25 DIAGNOSIS — I48.0 PAROXYSMAL ATRIAL FIBRILLATION: Primary | ICD-10-CM

## 2023-09-25 DIAGNOSIS — I10 ESSENTIAL HYPERTENSION: ICD-10-CM

## 2023-09-25 PROCEDURE — 3077F SYST BP >= 140 MM HG: CPT | Performed by: PHYSICIAN ASSISTANT

## 2023-09-25 PROCEDURE — 1160F RVW MEDS BY RX/DR IN RCRD: CPT | Performed by: PHYSICIAN ASSISTANT

## 2023-09-25 PROCEDURE — 3079F DIAST BP 80-89 MM HG: CPT | Performed by: PHYSICIAN ASSISTANT

## 2023-09-25 PROCEDURE — 99213 OFFICE O/P EST LOW 20 MIN: CPT | Performed by: PHYSICIAN ASSISTANT

## 2023-09-25 PROCEDURE — 1159F MED LIST DOCD IN RCRD: CPT | Performed by: PHYSICIAN ASSISTANT

## 2023-09-25 RX ORDER — PIOGLITAZONE 30 MG/1
TABLET ORAL
COMMUNITY
Start: 2023-09-20

## 2023-09-25 NOTE — PROGRESS NOTES
Kreis, Samuel Duane, MD  Lashae Benitez  1960  09/25/2023    Patient Active Problem List   Diagnosis    Hiatal hernia    Essential hypertension    Sjogren's syndrome    Multiple sclerosis    Psoriasis    Fibromyalgia    Osteoarthritis    Psoriatic arthritis    RA (rheumatoid arthritis)    Degenerative disc disease, lumbar    TMJ arthritis    Dupuytren's disease    H. pylori infection    Family history of coronary artery disease    Type 2 diabetes mellitus    Edema    Bilateral leg edema    Isolated corticotropin deficiency    Hyponatremia    Paroxysmal atrial fibrillation    Sepsis       Dear Kreis, Samuel Duane, MD:    Subjective     History of Present Illness:    Chief Complaint   Patient presents with    Paroxsmal atrial fibrillation     Follow up       Lashae Benitez is a pleasant 62 y.o. female with a past medical history significant for  no known coronary artery disease or structural heart disease she also denies any history of tobacco abuse.  She does have diabetes mellitus, paroxysmal atrial fibrillation anticoagulated with Xarelto and with rhythm control on flecainide.  She does have essential hypertension.  she does report family history of premature CAD with her father having a major AMI in his 30s. She comes in for routine cardiology follow up.     Since Lashae was last seen she was hospitalized for UTI with E. coli bacteremia thankfully she was treated appropriately with IV antibiotics and discharged in stable condition.  During her stay she did have CT scan of abdomen and chest which did reveal atherosclerosis of her aorta and iliac arteries.  She does deny any symptoms concerning for PAD such as claudication or other pain when she exerts herself.  She also denies any chest pains or worsening shortness of breath from baseline.  She was recently started on Actos and reports some mild pedal edema since.    Allergies   Allergen Reactions    Codeine Angioedema    Imuran [Azathioprine] Other (See Comments)      "Has pancreatis attacks with med    Januvia [Sitagliptin] Other (See Comments)     Has pancreatis attacks with med     Penicillins Angioedema    Sulfa Antibiotics Angioedema    Sulfasalazine Unknown (See Comments)    Beta Adrenergic Blockers Other (See Comments)     I called pt to ask her about the allergy to bblockers in her chart. Pt states \"too big of a dose makes her psoriasis act up\", but this current dose of metoprolol 50 mg bid, she has been on for a long time, with no ill side effects.  CATA,CMA 3/28/18   :      Current Outpatient Medications:     abatacept (ORENCIA) 250 MG injection, Infuse 30 mL into a venous catheter Every 28 (Twenty-Eight) Days., Disp: , Rfl:     Actos 30 MG tablet, , Disp: , Rfl:     alendronate (FOSAMAX) 70 MG tablet, Take 1 tablet by mouth 1 (One) Time Per Week. Wednesday, Disp: , Rfl:     amitriptyline (ELAVIL) 25 MG tablet, Take 1 tablet by mouth Every Night., Disp: , Rfl:     Apremilast (Otezla) 30 MG tablet, Take 30 mg by mouth 2 (two) times a day., Disp: , Rfl:     Butalbital-Acetaminophen  MG capsule, Take 300 mg by mouth Daily As Needed., Disp: , Rfl:     cefdinir (OMNICEF) 300 MG capsule, Take 1 capsule by mouth Every 12 (Twelve) Hours for 22 doses. Indications: Bacteria in the Blood, Disp: 22 capsule, Rfl: 0    celecoxib (CeleBREX) 200 MG capsule, Take 1 capsule by mouth Daily., Disp: , Rfl:     cyanocobalamin 1000 MCG/ML injection, Inject 1 mL into the appropriate muscle as directed by prescriber Every 28 (Twenty-Eight) Days., Disp: , Rfl:     cyclobenzaprine (FLEXERIL) 10 MG tablet, Take 1 tablet by mouth Every Evening., Disp: , Rfl:     Diclofenac Sodium (VOLTAREN) 1 % gel gel, Apply 2 g topically to the appropriate area as directed 4 (Four) Times a Day As Needed., Disp: , Rfl:     DULoxetine (CYMBALTA) 30 MG capsule, Take 1 capsule by mouth Every Morning., Disp: , Rfl:     DULoxetine (CYMBALTA) 60 MG capsule, Take 1 capsule by mouth Daily., Disp: , Rfl:     EMGALITY " 120 MG/ML prefilled syringe, Inject 1 mL under the skin into the appropriate area as directed Every 30 (Thirty) Days., Disp: , Rfl: 3    famotidine (PEPCID) 40 MG tablet, Take 1 tablet by mouth 2 (Two) Times a Day., Disp: , Rfl:     ferrous gluconate (FERGON) 240 (27 FE) MG tablet, Take 1 tablet by mouth Every Morning., Disp: , Rfl:     fexofenadine (ALLEGRA) 180 MG tablet, Take 1 tablet by mouth Daily., Disp: , Rfl:     Finerenone (Kerendia) 10 MG tablet, Take 1 tablet by mouth Daily., Disp: , Rfl:     flecainide (TAMBOCOR) 50 MG tablet, Take 1 tablet by mouth 2 (Two) Times a Day., Disp: 180 tablet, Rfl: 3    fluticasone-salmeterol (ADVAIR HFA) 115-21 MCG/ACT inhaler, Inhale 2 puffs 2 (Two) Times a Day., Disp: , Rfl:     folic acid (FOLVITE) 1 MG tablet, Take 1 tablet by mouth Daily., Disp: , Rfl:     gabapentin (NEURONTIN) 600 MG tablet, Take 1 tablet by mouth 3 (Three) Times a Day., Disp: , Rfl:     insulin detemir (LEVEMIR) 100 UNIT/ML injection, Inject 30 Units under the skin into the appropriate area as directed Daily., Disp: , Rfl:     isosorbide mononitrate (IMDUR) 30 MG 24 hr tablet, TAKE 1 TABLET DAILY, Disp: 90 tablet, Rfl: 3    methotrexate 2.5 MG tablet, Take 10 tablets by mouth 1 (One) Time Per Week. Prior to [facility] operated by [facility], Patient was on: Takes 10 tablets on Saturday, Disp: , Rfl:     metoprolol succinate XL (TOPROL-XL) 100 MG 24 hr tablet, TAKE 1 TABLET DAILY, Disp: 90 tablet, Rfl: 3    olopatadine (PATANASE) 0.6 % solution nasal solution, 2 sprays by Each Nare route 2 (Two) Times a Day., Disp: , Rfl:     potassium chloride 10 MEQ CR tablet, Take 1 tablet by mouth Daily., Disp: , Rfl:     primidone (MYSOLINE) 50 MG tablet, Take 1 tablet by mouth Every Night., Disp: , Rfl:     RABEprazole (ACIPHEX) 20 MG EC tablet, Take 1 tablet by mouth 2 (Two) Times a Day., Disp: , Rfl:     rimegepant 75 MG dispersible tablet, DISSOLVE ONE TABLET UNDER THE TONGUE AT ONSET OF MIGRAINE HEADACHE. MAX DOSE ONE  "TABLET IN 24 HOURS, Disp: , Rfl:     sodium chloride 1 g tablet, Take 2 tablets by mouth 3 (Three) Times a Day., Disp: , Rfl:     sucralfate (CARAFATE) 1 g tablet, Take 1 tablet by mouth 4 (Four) Times a Day. Take one tablet by mouth 4 times daily 1 hour before meals and at bedtime on an empty stomach., Disp: , Rfl:     Xarelto 20 MG tablet, TAKE 1 TABLET DAILY, Disp: 90 tablet, Rfl: 3    The following portions of the patient's history were reviewed and updated as appropriate: allergies, current medications, past family history, past medical history, past social history, past surgical history and problem list.    Social History     Tobacco Use    Smoking status: Never    Smokeless tobacco: Never   Vaping Use    Vaping Use: Never used   Substance Use Topics    Alcohol use: No    Drug use: No         Objective   Vitals:    09/25/23 1010   BP: 143/82   BP Location: Right arm   Patient Position: Sitting   Cuff Size: Adult   Pulse: 91   SpO2: 98%   Weight: 74 kg (163 lb 3.2 oz)   Height: 165.1 cm (65\")     Body mass index is 27.16 kg/m².    Constitutional:       General: Not in acute distress.     Appearance: Healthy appearance. Well-developed and not in distress. Not diaphoretic.      Comments: Walks with a cane   Eyes:      Conjunctiva/sclera: Conjunctivae normal.      Pupils: Pupils are equal, round, and reactive to light.   HENT:      Head: Normocephalic and atraumatic.   Neck:      Vascular: No carotid bruit or JVD.   Pulmonary:      Effort: Pulmonary effort is normal. No respiratory distress.      Breath sounds: Normal breath sounds.   Cardiovascular:      Normal rate. Regular rhythm.   Edema:     Peripheral edema absent.   Skin:     General: Skin is cool.   Neurological:      Mental Status: Alert, oriented to person, place, and time and oriented to person, place and time.       Lab Results   Component Value Date     09/15/2023    K 3.8 09/15/2023     (H) 09/15/2023    CO2 17.1 (L) 09/15/2023    BUN 19 " 09/15/2023    CREATININE 0.99 09/15/2023    GLUCOSE 174 (H) 09/15/2023    CALCIUM 8.1 (L) 09/15/2023    AST 11 09/14/2023    ALT 12 09/14/2023    ALKPHOS 91 09/14/2023    LABIL2 1.4 (L) 08/11/2015     Lab Results   Component Value Date    CKTOTAL 40 04/12/2017     Lab Results   Component Value Date    WBC 8.00 09/15/2023    HGB 9.2 (L) 09/15/2023    HCT 29.8 (L) 09/15/2023     09/15/2023     Lab Results   Component Value Date    INR 1.54 (H) 09/13/2023    INR 0.97 11/22/2018    INR 0.93 07/20/2015     Lab Results   Component Value Date    MG 2.0 09/21/2020     Lab Results   Component Value Date    TSH 0.694 09/15/2023    CHLPL 132 07/22/2015    TRIG 49 01/06/2022    HDL 70 (H) 01/06/2022    LDL 73 01/06/2022      Lab Results   Component Value Date    BNP 51.0 11/22/2018       During this visit the following were done:  Labs Reviewed []    Labs Ordered []    Radiology Reports Reviewed []    Radiology Ordered []    PCP Records Reviewed []    Referring Provider Records Reviewed []    ER Records Reviewed []    Hospital Records Reviewed []    History Obtained From Family []    Radiology Images Reviewed []    Other Reviewed []    Records Requested []       Procedures    Assessment & Plan    Diagnosis Plan   1. Paroxysmal atrial fibrillation        2. Essential hypertension                 Recommendations:  Atherosclerosis seen on aorta and iliac arteries on CT scan  Currently asymptomatic we will request most recent lipid panel from PCP.  Currently on Xarelto we will continue this  Paroxysmal atrial fibrillation  Denies any breakthrough episodes that she has felt she reports some rare episodes of palpitations that last for just a few seconds.  Continue flecainide as well as Xarelto for anticoagulation.    No follow-ups on file.    As always, I appreciate very much the opportunity to participate in the cardiovascular care of your patients.      With Best Regards,    Gael Baum PA-C

## 2023-09-26 ENCOUNTER — OFFICE VISIT (OUTPATIENT)
Dept: INFECTIOUS DISEASES | Facility: CLINIC | Age: 63
End: 2023-09-26
Payer: MEDICARE

## 2023-09-26 ENCOUNTER — HOSPITAL ENCOUNTER (OUTPATIENT)
Dept: CT IMAGING | Facility: HOSPITAL | Age: 63
Discharge: HOME | End: 2023-09-26
Admitting: INTERNAL MEDICINE
Payer: MEDICARE

## 2023-09-26 VITALS
BODY MASS INDEX: 26.99 KG/M2 | WEIGHT: 162 LBS | HEART RATE: 94 BPM | OXYGEN SATURATION: 100 % | SYSTOLIC BLOOD PRESSURE: 105 MMHG | DIASTOLIC BLOOD PRESSURE: 63 MMHG | HEIGHT: 65 IN

## 2023-09-26 DIAGNOSIS — L40.9 PSORIASIS: ICD-10-CM

## 2023-09-26 DIAGNOSIS — A41.51 SEPSIS DUE TO ESCHERICHIA COLI, UNSPECIFIED WHETHER ACUTE ORGAN DYSFUNCTION PRESENT: Primary | ICD-10-CM

## 2023-09-26 DIAGNOSIS — B96.20 BACTEREMIA, ESCHERICHIA COLI: ICD-10-CM

## 2023-09-26 DIAGNOSIS — R78.81 BACTEREMIA, ESCHERICHIA COLI: ICD-10-CM

## 2023-09-26 DIAGNOSIS — A41.51 SEPSIS DUE TO ESCHERICHIA COLI, UNSPECIFIED WHETHER ACUTE ORGAN DYSFUNCTION PRESENT: ICD-10-CM

## 2023-09-26 LAB — CRP SERPL-MCNC: <0.3 MG/DL (ref 0–0.5)

## 2023-09-26 PROCEDURE — 87040 BLOOD CULTURE FOR BACTERIA: CPT | Performed by: INTERNAL MEDICINE

## 2023-09-26 PROCEDURE — 86140 C-REACTIVE PROTEIN: CPT | Performed by: INTERNAL MEDICINE

## 2023-09-26 PROCEDURE — 73700 CT LOWER EXTREMITY W/O DYE: CPT

## 2023-09-26 NOTE — PROGRESS NOTES
[facility]         Referring Provider: Kreis, Samuel Duane, MD  [address] [place] [address] [place] [address]    Subjective      Chief Complaint  Blood Infection    Blood Infection  Pertinent negatives include no abdominal pain, chest pain, chills, congestion, coughing, diaphoresis, fatigue, fever, nausea, sore throat, swollen glands or vomiting.   Lashae Benitez is a 62 y.o. female who presents today to [facility] for Hospital Follow Up .    Lashae Benitez was admitted to the telemetry floor for further evaluation and treatment. She was continued on IV Rocephin and maintenance IVF's.      Upon presentation, she was initially tachycardic but this improved with treatment of her sepsis and restarting her home medications including metoprolol and flecainide. She was continued on her home Eliquis for stroke prevention in the setting of her PAF. Initial blood cultures began revealing E coli per BCID. Her Rocephin was increased to 2g and ID consulted for further input. Urine culture was finalized with no growth but the source of her bacteremia was thought to be from her urine in the setting of reported dysuria with abnormal UA. Repeat blood cultures were obtained with no growth at the time of discharge.      She was deescalated to Omnicef to complete a course of treatment. She had recently started Jardiance prior to onset of her symptoms and was instructed to hold this medication pending further discussion with her PCP. Instructions were given for close outpatient follow up with her PCP and ID. Now has more right knee tenderness today.    Past Medical History:   Diagnosis Date    Acid reflux     Allergic     Anxiety     Cancer     thyroid, skin    Chronic pain disorder     Degenerative disc disease, lumbar     Depression     Dupuytren's disease     Essential hypertension     Family history of coronary artery disease     Fibromyalgia     Gallbladder abscess     H. pylori  infection     Hiatal hernia     Hyperlipidemia     Hypothyroidism     Migraines     migraines    Multiple sclerosis     Osteoarthritis     Psoriasis     Psoriatic arthritis     RA (rheumatoid arthritis)     Rheumatoid arthritis     Sinusitis     Sjogren's syndrome     Stomach ulcer     TMJ arthritis     Type 2 diabetes mellitus     Urinary tract infection        Past Surgical History:   Procedure Laterality Date    BREAST LUMPECTOMY Left 11/1993    CARDIAC CATHETERIZATION Left 09/21/2009    Normal     CARPAL TUNNEL RELEASE  03/2012    CERVICAL POLYPECTOMY  12/2015    CHOLECYSTECTOMY  05/2007    COLONOSCOPY      ENDOSCOPY      GASTRIC BYPASS  09/2007    JOINT REPLACEMENT      KNEE ARTHROPLASTY      LUMBAR DISC SURGERY      C5-6    REPLACEMENT TOTAL KNEE Right 06/2016    SACRAL NERVE STIMULATOR PLACEMENT  09/2014    THYROIDECTOMY  03/2016       Social History     Socioeconomic History    Marital status:    Tobacco Use    Smoking status: Never    Smokeless tobacco: Never   Vaping Use    Vaping Use: Never used   Substance and Sexual Activity    Alcohol use: No    Drug use: No    Sexual activity: Defer       Family History  family history includes Cancer in her maternal grandmother; Diabetes in her mother and sister; Heart attack in her father; Heart disease in her father; Heart failure in her father; Hypertension in her mother; Stroke in her father and mother.    Immunization History   Administered Date(s) Administered    31-influenza Vac Quardvalent Preservativ 10/04/2019, 09/04/2020    COVID-19 (PFIZER) Purple Cap Monovalent 09/13/2021, 10/12/2021    Covid-19 (Pfizer) Gray Cap Monovalent 03/16/2021, 10/12/2021, 03/16/2022    Flublock Quad =>18yrs 10/10/2019    Fluzone (or Fluarix & Flulaval for VFC) >6mos 09/04/2020, 10/05/2020, 10/13/2021, 11/22/2022    Hep A, 2 Dose 10/01/2019    Hep A, Unspecified 10/01/2019    Hepatitis A 12/18/2018, 06/26/2019    Influenza Seasonal Injectable 01/01/2019, 10/04/2019,  "09/04/2020    Influenza, Unspecified 01/01/2019, 09/04/2020, 10/13/2021    Pneumococcal Polysaccharide (PPSV23) 01/01/2014, 09/04/2020    Shingrix 06/26/2019, 09/09/2019, 10/30/2019    Tdap 09/04/2020        Allergies  Allergies   Allergen Reactions    Codeine Angioedema    Imuran [Azathioprine] Other (See Comments)     Has pancreatis attacks with med    Januvia [Sitagliptin] Other (See Comments)     Has pancreatis attacks with med     Penicillins Angioedema    Sulfa Antibiotics Angioedema    Sulfasalazine Unknown (See Comments)    Beta Adrenergic Blockers Other (See Comments)     I called pt to ask her about the allergy to bblockers in her chart. Pt states \"too big of a dose makes her psoriasis act up\", but this current dose of metoprolol 50 mg bid, she has been on for a long time, with no ill side effects.  JONATHAN IVY 3/28/18       The medication list has been reviewed and updated.   Current Medications    Current Outpatient Medications:     abatacept (ORENCIA) 250 MG injection, Infuse 30 mL into a venous catheter Every 28 (Twenty-Eight) Days., Disp: , Rfl:     Actos 30 MG tablet, , Disp: , Rfl:     alendronate (FOSAMAX) 70 MG tablet, Take 1 tablet by mouth 1 (One) Time Per Week. Wednesday, Disp: , Rfl:     amitriptyline (ELAVIL) 25 MG tablet, Take 1 tablet by mouth Every Night., Disp: , Rfl:     Apremilast (Otezla) 30 MG tablet, Take 30 mg by mouth 2 (two) times a day., Disp: , Rfl:     Butalbital-Acetaminophen  MG capsule, Take 300 mg by mouth Daily As Needed., Disp: , Rfl:     cefdinir (OMNICEF) 300 MG capsule, Take 1 capsule by mouth Every 12 (Twelve) Hours for 22 doses. Indications: Bacteria in the Blood, Disp: 22 capsule, Rfl: 0    celecoxib (CeleBREX) 200 MG capsule, Take 1 capsule by mouth Daily., Disp: , Rfl:     cyanocobalamin 1000 MCG/ML injection, Inject 1 mL into the appropriate muscle as directed by prescriber Every 28 (Twenty-Eight) Days., Disp: , Rfl:     cyclobenzaprine (FLEXERIL) 10 MG tablet, " Take 1 tablet by mouth Every Evening., Disp: , Rfl:     Diclofenac Sodium (VOLTAREN) 1 % gel gel, Apply 2 g topically to the appropriate area as directed 4 (Four) Times a Day As Needed., Disp: , Rfl:     DULoxetine (CYMBALTA) 30 MG capsule, Take 1 capsule by mouth Every Morning., Disp: , Rfl:     DULoxetine (CYMBALTA) 60 MG capsule, Take 1 capsule by mouth Daily., Disp: , Rfl:     EMGALITY 120 MG/ML prefilled syringe, Inject 1 mL under the skin into the appropriate area as directed Every 30 (Thirty) Days., Disp: , Rfl: 3    famotidine (PEPCID) 40 MG tablet, Take 1 tablet by mouth 2 (Two) Times a Day., Disp: , Rfl:     ferrous gluconate (FERGON) 240 (27 FE) MG tablet, Take 1 tablet by mouth Every Morning., Disp: , Rfl:     fexofenadine (ALLEGRA) 180 MG tablet, Take 1 tablet by mouth Daily., Disp: , Rfl:     Finerenone (Kerendia) 10 MG tablet, Take 1 tablet by mouth Daily., Disp: , Rfl:     flecainide (TAMBOCOR) 50 MG tablet, Take 1 tablet by mouth 2 (Two) Times a Day., Disp: 180 tablet, Rfl: 3    fluticasone-salmeterol (ADVAIR HFA) 115-21 MCG/ACT inhaler, Inhale 2 puffs 2 (Two) Times a Day., Disp: , Rfl:     folic acid (FOLVITE) 1 MG tablet, Take 1 tablet by mouth Daily., Disp: , Rfl:     gabapentin (NEURONTIN) 600 MG tablet, Take 1 tablet by mouth 3 (Three) Times a Day., Disp: , Rfl:     insulin detemir (LEVEMIR) 100 UNIT/ML injection, Inject 30 Units under the skin into the appropriate area as directed Daily., Disp: , Rfl:     isosorbide mononitrate (IMDUR) 30 MG 24 hr tablet, TAKE 1 TABLET DAILY, Disp: 90 tablet, Rfl: 3    methotrexate 2.5 MG tablet, Take 10 tablets by mouth 1 (One) Time Per Week. Prior to [facility] Admission, Patient was on: Takes 10 tablets on Saturday, Disp: , Rfl:     metoprolol succinate XL (TOPROL-XL) 100 MG 24 hr tablet, TAKE 1 TABLET DAILY, Disp: 90 tablet, Rfl: 3    olopatadine (PATANASE) 0.6 % solution nasal solution, 2 sprays by Each Nare route 2 (Two) Times a Day., Disp: , Rfl:      potassium chloride 10 MEQ CR tablet, Take 1 tablet by mouth Daily., Disp: , Rfl:     primidone (MYSOLINE) 50 MG tablet, Take 1 tablet by mouth Every Night., Disp: , Rfl:     RABEprazole (ACIPHEX) 20 MG EC tablet, Take 1 tablet by mouth 2 (Two) Times a Day., Disp: , Rfl:     rimegepant 75 MG dispersible tablet, DISSOLVE ONE TABLET UNDER THE TONGUE AT ONSET OF MIGRAINE HEADACHE. MAX DOSE ONE TABLET IN 24 HOURS, Disp: , Rfl:     sodium chloride 1 g tablet, Take 2 tablets by mouth 3 (Three) Times a Day., Disp: , Rfl:     sucralfate (CARAFATE) 1 g tablet, Take 1 tablet by mouth 4 (Four) Times a Day. Take one tablet by mouth 4 times daily 1 hour before meals and at bedtime on an empty stomach., Disp: , Rfl:     Xarelto 20 MG tablet, TAKE 1 TABLET DAILY, Disp: 90 tablet, Rfl: 3      Review of Systems    Review of Systems   Constitutional:  Negative for activity change, appetite change, chills, diaphoresis, fatigue and fever.   HENT:  Negative for congestion, dental problem, drooling, ear discharge, ear pain, facial swelling, postnasal drip, sinus pressure, sore throat and swollen glands.    Eyes:  Negative for blurred vision, double vision, pain, discharge and itching.   Respiratory:  Negative for apnea, cough, choking, chest tightness and shortness of breath.    Cardiovascular:  Negative for chest pain, palpitations and leg swelling.   Gastrointestinal:  Negative for abdominal distention, abdominal pain, diarrhea, nausea, rectal pain and vomiting.   Genitourinary:  Negative for difficulty urinating, dysuria, flank pain, frequency, hematuria, pelvic pain and pelvic pressure.   Musculoskeletal:         Right knee pain   Neurological:  Negative for dizziness, tremors, seizures, syncope, speech difficulty and confusion.   Psychiatric/Behavioral:  Negative for agitation and behavioral problems.       Objective     Vital Signs:  /63 (BP Location: Right arm, Patient Position: Sitting, Cuff Size: Small Adult)   Pulse 94    "Ht 165.1 cm (65\")   Wt 73.5 kg (162 lb)   SpO2 100%   BMI 26.96 kg/m²   Estimated body mass index is 26.96 kg/m² as calculated from the following:    Height as of this encounter: 165.1 cm (65\").    Weight as of this encounter: 73.5 kg (162 lb).    Physical Exam  Constitutional:       General: She is not in acute distress.     Appearance: Normal appearance. She is not ill-appearing, toxic-appearing or diaphoretic.   HENT:      Head: Normocephalic and atraumatic.      Nose: No congestion or rhinorrhea.      Mouth/Throat:      Pharynx: Oropharynx is clear. No oropharyngeal exudate or posterior oropharyngeal erythema.   Cardiovascular:      Rate and Rhythm: Normal rate and regular rhythm.      Heart sounds: No murmur heard.  Pulmonary:      Effort: No respiratory distress.      Breath sounds: No stridor. No wheezing, rhonchi or rales.   Chest:      Chest wall: No tenderness.   Abdominal:      General: There is no distension.      Tenderness: There is no abdominal tenderness. There is no right CVA tenderness, left CVA tenderness, guarding or rebound.   Musculoskeletal:         General: Tenderness (right knee but no erythema, no warmth positive surgery scar) present. No swelling or signs of injury.      Cervical back: No rigidity or tenderness.   Skin:     Coloration: Skin is not jaundiced or pale.      Findings: No bruising, erythema or rash.   Neurological:      General: No focal deficit present.      Mental Status: She is alert. Mental status is at baseline.   Psychiatric:         Mood and Affect: Mood normal.         Behavior: Behavior normal.        Result Review :  The following data was reviewed by Israel Flores MD     Lab Results  Lab Results   Component Value Date    WBC 8.00 09/15/2023    HGB 9.2 (L) 09/15/2023    HCT 29.8 (L) 09/15/2023    .8 (H) 09/15/2023     09/15/2023     Lab Results   Component Value Date    GLUCOSE 174 (H) 09/15/2023    BUN 19 09/15/2023    CREATININE 0.99 " 09/15/2023    EGFRIFNONA 74 02/23/2022    EGFRIFAFRI  09/22/2016      Comment:      <15 Indicative of kidney failure.    BCR 19.2 09/15/2023    K 3.8 09/15/2023    CO2 17.1 (L) 09/15/2023    CALCIUM 8.1 (L) 09/15/2023    ALBUMIN 2.4 (L) 09/14/2023    LABIL2 1.4 (L) 08/11/2015    AST 11 09/14/2023    ALT 12 09/14/2023      Lab Results   Component Value Date    CRP 4.13 (H) 09/13/2023        No results found for: ACANTHNAEG, AFBCX, BPERTUSSISCX, BLOODCX  No results found for: BCIDPCR, CXREFLEX, CSFCX, CULTURETIS  No results found for: CULTURES, HSVCX, URCX  No results found for: EYECULTURE, GCCX, HSVCULTURE, LABHSV  No results found for: LEGIONELLA, MRSACX, MUMPSCX, MYCOPLASCX  No results found for: NOCARDIACX, STOOLCX  No results found for: THROATCX, UNSTIMCULT, URINECX, CULTURE, VZVCULTUR  No results found for: VIRALCULTU, WOUNDCX    Radiology Results  CT Abdomen Pelvis Without Contrast    Result Date: 9/13/2023  Impression:  CT CHEST: NO ACUTE ABNORMALITY.  CT ABDOMEN AND PELVIS: NO ACUTE ABNORMALITY.    This report was finalized on 9/13/2023 6:07 AM by Joann Das MD.      CT Chest Without Contrast Diagnostic    Result Date: 9/13/2023  Impression:  CT CHEST: NO ACUTE ABNORMALITY.  CT ABDOMEN AND PELVIS: NO ACUTE ABNORMALITY.    This report was finalized on 9/13/2023 6:07 AM by Joann Das MD.      XR Chest 1 View    Result Date: 9/13/2023  Impression: No acute cardiopulmonary process.  This report was finalized on 9/13/2023 1:03 AM by Alex Pallas, DO.              Assessment / Plan        Diagnoses and all orders for this visit:    1. Sepsis due to Escherichia coli, unspecified whether acute organ dysfunction present (Primary)  -     C-reactive Protein; Future  -     Blood Culture - Blood,; Future  -     Blood Culture - Blood,; Future  -     CT Lower Extremity Right Without Contrast; Future    2. Psoriasis  -     C-reactive Protein; Future  -     Blood Culture - Blood,; Future  -     Blood Culture - Blood,;  Future  -     CT Lower Extremity Right Without Contrast; Future    3. Bacteremia, escherichia coli  -     C-reactive Protein; Future  -     Blood Culture - Blood,; Future  -     Blood Culture - Blood,; Future  -     CT Lower Extremity Right Without Contrast; Future      I am mostly concerned about the right knee pain in the setting of recent bacteremia. Will go ahead and repeat blood cultures and CRP level.    Follow up with ortho Dr. Lovelace if knee continues to be painful. For now will order STAT CT knee.          Follow Up   Return in about 1 week (around 10/3/2023) for Follow up, With Dr. Flores.    Visit Diagnoses:    ICD-10-CM ICD-9-CM   1. Sepsis due to Escherichia coli, unspecified whether acute organ dysfunction present  A41.51 038.42     995.91   2. Psoriasis  L40.9 696.1   3. Bacteremia, escherichia coli  R78.81 790.7    B96.20 041.49       Patient was given instructions and counseling regarding her condition or for health maintenance advice. Please see specific information pulled into the AVS if appropriate.     This document has been electronically signed by Israel Flores MD   September 26, 2023 09:45 EDT    Dictated Utilizing Dragon Dictation: Part of this note may be an electronic transcription/translation of spoken language to printed text using the Dragon Dictation System.

## 2023-09-27 ENCOUNTER — READMISSION MANAGEMENT (OUTPATIENT)
Dept: CALL CENTER | Facility: HOSPITAL | Age: 63
End: 2023-09-27
Payer: MEDICARE

## 2023-09-27 NOTE — OUTREACH NOTE
Sepsis Week 2 Survey      Flowsheet Row Responses   [facility] patient discharged from? [facility]   Does the patient have one of the following disease processes/diagnoses(primary or secondary)? Sepsis   Week 2 attempt successful? Yes   Call start time [phone]   Call end time [phone]   Discharge diagnosis Sepsis   Meds reviewed with patient/caregiver? Yes   Is the patient having any side effects they believe may be caused by any medication additions or changes? No   Does the patient have all medications related to this admission filled (includes all antibiotics, inhalers, nebulizers,steroids,etc.) Yes   Is the patient taking all medications as directed (includes completed medication regime)? Yes   Does the patient have a primary care provider?  Yes   Comments regarding PCP Patient was in her pcp office waiting to be seen at the time of this call.   Does the patient have an appointment with their PCP within 7 days of discharge? Greater than 7 days   What is preventing the patient from scheduling follow up appointments within 7 days of discharge? Earlier appointment not available   Nursing Interventions Verified appointment date/time/provider   Has the patient kept scheduled appointments due by today? Yes   Has home health visited the patient within 72 hours of discharge? N/A   Psychosocial issues? No   Did the patient receive a copy of their discharge instructions? Yes   Nursing interventions Reviewed instructions with patient   What is the patient's perception of their health status since discharge? Improving   Nursing interventions Nurse provided patient education   Is the patient/caregiver able to teach back TIME? T emperature - higher or lower than normal, M ental Decline - confused, sleepy, difficult to arouse, E xtremely Ill - severe pain, discomfort, shortness of breath, I nfection - may have signs and symptoms of an infection   Nursing interventions Nurse provided reassurance to patient   Is patient/caregiver  able to teach back steps to recovery at home? Set small, achievable goals for return to baseline health, Rest and regain strength, Make a list of questions for PCP appoinment   Is the patient/caregiver able to teach back signs and symptoms of worsening condition: Fever, Hyperthermia, Rapid heart rate (>90), Shortness of breath/rapid respiratory rate, Altered mental status(confusion/coma)   If the patient is a current smoker, are they able to teach back resources for cessation? Not a smoker   Is the patient/caregiver able to teach back the hierarchy of who to call/visit for symptoms/problems? PCP, Specialist, Home health nurse, Urgent Care, ED, 911 Yes   Week 2 call completed? Yes   Call end time [phone]            Neeru Avery Licensed Nurse

## 2023-10-01 LAB
BACTERIA SPEC AEROBE CULT: NORMAL
BACTERIA SPEC AEROBE CULT: NORMAL

## 2023-10-03 ENCOUNTER — OFFICE VISIT (OUTPATIENT)
Dept: INFECTIOUS DISEASES | Facility: CLINIC | Age: 63
End: 2023-10-03
Payer: MEDICARE

## 2023-10-03 VITALS
HEIGHT: 65 IN | BODY MASS INDEX: 27.16 KG/M2 | HEART RATE: 85 BPM | TEMPERATURE: 96 F | WEIGHT: 163 LBS | OXYGEN SATURATION: 100 % | SYSTOLIC BLOOD PRESSURE: 109 MMHG | DIASTOLIC BLOOD PRESSURE: 68 MMHG

## 2023-10-03 DIAGNOSIS — B96.20 BACTEREMIA, ESCHERICHIA COLI: Primary | ICD-10-CM

## 2023-10-03 DIAGNOSIS — R78.81 BACTEREMIA, ESCHERICHIA COLI: Primary | ICD-10-CM

## 2023-10-03 NOTE — PROGRESS NOTES
[facility]         Referring Provider: Kreis, Samuel Duane, MD  [address] [place] [address] [place] [address]    Subjective      Chief Complaint  Blood Infection    Blood Infection  Pertinent negatives include no abdominal pain, chest pain, chills, congestion, coughing, diaphoresis, fatigue, fever, nausea, sore throat, swollen glands or vomiting.   Lashae Benitez is a 63 y.o. female who presents today to [facility] for Follow Up .       Lashae Benitez was admitted to the telemetry floor for further evaluation and treatment. She was continued on IV Rocephin and maintenance IVF's.      Upon presentation, she was initially tachycardic but this improved with treatment of her sepsis and restarting her home medications including metoprolol and flecainide. She was continued on her home Eliquis for stroke prevention in the setting of her PAF. Initial blood cultures began revealing E coli per BCID. Her Rocephin was increased to 2g and ID consulted for further input. Urine culture was finalized with no growth but the source of her bacteremia was thought to be from her urine in the setting of reported dysuria with abnormal UA. Repeat blood cultures were obtained with no growth at the time of discharge.      She was deescalated to Omnicef to complete a course of treatment. She had recently started Jardiance prior to onset of her symptoms and was instructed to hold this medication pending further discussion with her PCP. Instructions were given for close outpatient follow up with her PCP and ID.    Right knee pain much improved with no fever and no chills.      Past Medical History:   Diagnosis Date    Acid reflux     Allergic     Anxiety     Cancer     thyroid, skin    Chronic pain disorder     Degenerative disc disease, lumbar     Depression     Dupuytren's disease     Essential hypertension     Family history of coronary artery disease     Fibromyalgia     Gallbladder abscess      H. pylori infection     Hiatal hernia     Hyperlipidemia     Hypothyroidism     Migraines     migraines    Multiple sclerosis     Osteoarthritis     Psoriasis     Psoriatic arthritis     RA (rheumatoid arthritis)     Rheumatoid arthritis     Sinusitis     Sjogren's syndrome     Stomach ulcer     TMJ arthritis     Type 2 diabetes mellitus     Urinary tract infection        Past Surgical History:   Procedure Laterality Date    BREAST LUMPECTOMY Left 11/1993    CARDIAC CATHETERIZATION Left 09/21/2009    Normal     CARPAL TUNNEL RELEASE  03/2012    CERVICAL POLYPECTOMY  12/2015    CHOLECYSTECTOMY  05/2007    COLONOSCOPY      ENDOSCOPY      GASTRIC BYPASS  09/2007    JOINT REPLACEMENT      KNEE ARTHROPLASTY      LUMBAR DISC SURGERY      C5-6    REPLACEMENT TOTAL KNEE Right 06/2016    SACRAL NERVE STIMULATOR PLACEMENT  09/2014    THYROIDECTOMY  03/2016       Social History     Socioeconomic History    Marital status:    Tobacco Use    Smoking status: Never    Smokeless tobacco: Never   Vaping Use    Vaping Use: Never used   Substance and Sexual Activity    Alcohol use: No    Drug use: No    Sexual activity: Defer       Family History  family history includes Cancer in her maternal grandmother; Diabetes in her mother and sister; Heart attack in her father; Heart disease in her father; Heart failure in her father; Hypertension in her mother; Stroke in her father and mother.    Immunization History   Administered Date(s) Administered    31-influenza Vac Quardvalent Preservativ 10/04/2019, 09/04/2020    COVID-19 (PFIZER) Purple Cap Monovalent 09/13/2021    Covid-19 (Pfizer) Gray Cap Monovalent 03/16/2021, 10/12/2021, 03/16/2022    Flublock Quad =>18yrs 10/10/2019    Fluzone (or Fluarix & Flulaval for VFC) >6mos 09/04/2020, 10/05/2020, 10/13/2021, 11/22/2022    Hep A, 2 Dose 10/01/2019    Hep A, Unspecified 10/01/2019    Hepatitis A 12/18/2018, 06/26/2019    Influenza Seasonal Injectable 01/01/2019, 10/04/2019,  "09/04/2020    Influenza, Unspecified 01/01/2019, 09/04/2020, 10/13/2021    Pneumococcal Polysaccharide (PPSV23) 01/01/2014, 09/04/2020    Shingrix 06/26/2019, 09/09/2019, 10/30/2019    Tdap 09/04/2020        Allergies  Allergies   Allergen Reactions    Codeine Angioedema    Imuran [Azathioprine] Other (See Comments)     Has pancreatis attacks with med    Januvia [Sitagliptin] Other (See Comments)     Has pancreatis attacks with med     Penicillins Angioedema    Sulfa Antibiotics Angioedema    Sulfasalazine Unknown (See Comments)    Beta Adrenergic Blockers Other (See Comments)     I called pt to ask her about the allergy to bblockers in her chart. Pt states \"too big of a dose makes her psoriasis act up\", but this current dose of metoprolol 50 mg bid, she has been on for a long time, with no ill side effects.  JONATHAN IVY 3/28/18       The medication list has been reviewed and updated.   Current Medications    Current Outpatient Medications:     abatacept (ORENCIA) 250 MG injection, Infuse 30 mL into a venous catheter Every 28 (Twenty-Eight) Days., Disp: , Rfl:     Actos 30 MG tablet, , Disp: , Rfl:     alendronate (FOSAMAX) 70 MG tablet, Take 1 tablet by mouth 1 (One) Time Per Week. Wednesday, Disp: , Rfl:     amitriptyline (ELAVIL) 25 MG tablet, Take 1 tablet by mouth Every Night., Disp: , Rfl:     Apremilast (Otezla) 30 MG tablet, Take 30 mg by mouth 2 (two) times a day., Disp: , Rfl:     Butalbital-Acetaminophen  MG capsule, Take 300 mg by mouth Daily As Needed., Disp: , Rfl:     celecoxib (CeleBREX) 200 MG capsule, Take 1 capsule by mouth Daily., Disp: , Rfl:     cyanocobalamin 1000 MCG/ML injection, Inject 1 mL into the appropriate muscle as directed by prescriber Every 28 (Twenty-Eight) Days., Disp: , Rfl:     cyclobenzaprine (FLEXERIL) 10 MG tablet, Take 1 tablet by mouth Every Evening., Disp: , Rfl:     Diclofenac Sodium (VOLTAREN) 1 % gel gel, Apply 2 g topically to the appropriate area as directed 4 " (Four) Times a Day As Needed., Disp: , Rfl:     DULoxetine (CYMBALTA) 30 MG capsule, Take 1 capsule by mouth Every Morning., Disp: , Rfl:     DULoxetine (CYMBALTA) 60 MG capsule, Take 1 capsule by mouth Daily., Disp: , Rfl:     EMGALITY 120 MG/ML prefilled syringe, Inject 1 mL under the skin into the appropriate area as directed Every 30 (Thirty) Days., Disp: , Rfl: 3    famotidine (PEPCID) 40 MG tablet, Take 1 tablet by mouth 2 (Two) Times a Day., Disp: , Rfl:     ferrous gluconate (FERGON) 240 (27 FE) MG tablet, Take 1 tablet by mouth Every Morning., Disp: , Rfl:     fexofenadine (ALLEGRA) 180 MG tablet, Take 1 tablet by mouth Daily., Disp: , Rfl:     Finerenone (Kerendia) 10 MG tablet, Take 1 tablet by mouth Daily., Disp: , Rfl:     flecainide (TAMBOCOR) 50 MG tablet, Take 1 tablet by mouth 2 (Two) Times a Day., Disp: 180 tablet, Rfl: 3    fluticasone-salmeterol (ADVAIR HFA) 115-21 MCG/ACT inhaler, Inhale 2 puffs 2 (Two) Times a Day., Disp: , Rfl:     folic acid (FOLVITE) 1 MG tablet, Take 1 tablet by mouth Daily., Disp: , Rfl:     gabapentin (NEURONTIN) 600 MG tablet, Take 1 tablet by mouth 3 (Three) Times a Day., Disp: , Rfl:     insulin detemir (LEVEMIR) 100 UNIT/ML injection, Inject 30 Units under the skin into the appropriate area as directed Daily., Disp: , Rfl:     isosorbide mononitrate (IMDUR) 30 MG 24 hr tablet, TAKE 1 TABLET DAILY, Disp: 90 tablet, Rfl: 3    methotrexate 2.5 MG tablet, Take 10 tablets by mouth 1 (One) Time Per Week. Prior to [facility] Admission, Patient was on: Takes 10 tablets on Saturday, Disp: , Rfl:     metoprolol succinate XL (TOPROL-XL) 100 MG 24 hr tablet, TAKE 1 TABLET DAILY, Disp: 90 tablet, Rfl: 3    olopatadine (PATANASE) 0.6 % solution nasal solution, 2 sprays by Each Nare route 2 (Two) Times a Day., Disp: , Rfl:     potassium chloride 10 MEQ CR tablet, Take 1 tablet by mouth Daily., Disp: , Rfl:     primidone (MYSOLINE) 50 MG tablet, Take 1 tablet by mouth Every Night.,  "Disp: , Rfl:     RABEprazole (ACIPHEX) 20 MG EC tablet, Take 1 tablet by mouth 2 (Two) Times a Day., Disp: , Rfl:     rimegepant 75 MG dispersible tablet, DISSOLVE ONE TABLET UNDER THE TONGUE AT ONSET OF MIGRAINE HEADACHE. MAX DOSE ONE TABLET IN 24 HOURS, Disp: , Rfl:     sodium chloride 1 g tablet, Take 2 tablets by mouth 3 (Three) Times a Day., Disp: , Rfl:     sucralfate (CARAFATE) 1 g tablet, Take 1 tablet by mouth 4 (Four) Times a Day. Take one tablet by mouth 4 times daily 1 hour before meals and at bedtime on an empty stomach., Disp: , Rfl:     Xarelto 20 MG tablet, TAKE 1 TABLET DAILY, Disp: 90 tablet, Rfl: 3      Review of Systems    Review of Systems   Constitutional:  Negative for activity change, appetite change, chills, diaphoresis, fatigue and fever.   HENT:  Negative for congestion, dental problem, drooling, ear discharge, ear pain, facial swelling, postnasal drip, sinus pressure, sore throat and swollen glands.    Eyes:  Negative for blurred vision, double vision, pain, discharge and itching.   Respiratory:  Negative for apnea, cough, choking, chest tightness and shortness of breath.    Cardiovascular:  Negative for chest pain, palpitations and leg swelling.   Gastrointestinal:  Negative for abdominal distention, abdominal pain, diarrhea, nausea, rectal pain and vomiting.   Genitourinary:  Negative for difficulty urinating, dysuria, flank pain, frequency, hematuria, pelvic pain and pelvic pressure.   Neurological:  Negative for dizziness, tremors, seizures, syncope, speech difficulty and confusion.   Psychiatric/Behavioral:  Negative for agitation and behavioral problems.       Objective     Vital Signs:  /68 (BP Location: Right arm, Patient Position: Sitting, Cuff Size: Small Adult)   Pulse 85   Temp 96 °F (35.6 °C) (Temporal)   Ht 165.1 cm (65\")   Wt 73.9 kg (163 lb)   SpO2 100%   BMI 27.12 kg/m²   Estimated body mass index is 27.12 kg/m² as calculated from the following:    Height as " "of this encounter: 165.1 cm (65\").    Weight as of this encounter: 73.9 kg (163 lb).    Physical Exam  Constitutional:       General: She is not in acute distress.     Appearance: Normal appearance. She is not ill-appearing, toxic-appearing or diaphoretic.   HENT:      Head: Normocephalic and atraumatic.      Nose: No congestion or rhinorrhea.      Mouth/Throat:      Pharynx: Oropharynx is clear. No oropharyngeal exudate or posterior oropharyngeal erythema.   Cardiovascular:      Rate and Rhythm: Normal rate and regular rhythm.      Heart sounds: No murmur heard.  Pulmonary:      Effort: No respiratory distress.      Breath sounds: No stridor. No wheezing, rhonchi or rales.   Chest:      Chest wall: No tenderness.   Abdominal:      General: There is no distension.      Tenderness: There is no abdominal tenderness. There is no right CVA tenderness, left CVA tenderness, guarding or rebound.   Musculoskeletal:         General: No swelling, tenderness or signs of injury.      Cervical back: No rigidity or tenderness.      Comments: Right knee with no redness and no warmth, mild swelling.   Skin:     Coloration: Skin is not jaundiced or pale.      Findings: No bruising, erythema or rash.   Neurological:      General: No focal deficit present.      Mental Status: She is alert. Mental status is at baseline.   Psychiatric:         Mood and Affect: Mood normal.         Behavior: Behavior normal.        Result Review :  The following data was reviewed by Israel Flores MD     Lab Results  Lab Results   Component Value Date    WBC 8.00 09/15/2023    HGB 9.2 (L) 09/15/2023    HCT 29.8 (L) 09/15/2023    .8 (H) 09/15/2023     09/15/2023     Lab Results   Component Value Date    GLUCOSE 174 (H) 09/15/2023    BUN 19 09/15/2023    CREATININE 0.99 09/15/2023    EGFRIFNONA 74 02/23/2022    EGFRIFAFRI  09/22/2016      Comment:      <15 Indicative of kidney failure.    BCR 19.2 09/15/2023    K 3.8 09/15/2023    CO2 " 17.1 (L) 09/15/2023    CALCIUM 8.1 (L) 09/15/2023    ALBUMIN 2.4 (L) 09/14/2023    LABIL2 1.4 (L) 08/11/2015    AST 11 09/14/2023    ALT 12 09/14/2023      Lab Results   Component Value Date    CRP <0.30 09/26/2023        Blood Culture   Date Value Ref Range Status   09/26/2023 No growth at 5 days  Final   09/26/2023 No growth at 5 days  Final     No results found for: BCIDPCR, CXREFLEX, CSFCX, CULTURETIS  No results found for: CULTURES, HSVCX, URCX  No results found for: EYECULTURE, GCCX, HSVCULTURE, LABHSV  No results found for: LEGIONELLA, MRSACX, MUMPSCX, MYCOPLASCX  No results found for: NOCARDIACX, STOOLCX  No results found for: THROATCX, UNSTIMCULT, URINECX, CULTURE, VZVCULTUR  No results found for: VIRALCULTU, WOUNDCX    Radiology Results  CT Abdomen Pelvis Without Contrast    Result Date: 9/13/2023  Impression:  CT CHEST: NO ACUTE ABNORMALITY.  CT ABDOMEN AND PELVIS: NO ACUTE ABNORMALITY.    This report was finalized on 9/13/2023 6:07 AM by Joann Das MD.      CT Chest Without Contrast Diagnostic    Result Date: 9/13/2023  Impression:  CT CHEST: NO ACUTE ABNORMALITY.  CT ABDOMEN AND PELVIS: NO ACUTE ABNORMALITY.    This report was finalized on 9/13/2023 6:07 AM by Joann Das MD.      XR Chest 1 View    Result Date: 9/13/2023  Impression: No acute cardiopulmonary process.  This report was finalized on 9/13/2023 1:03 AM by Alex Pallas, DO.      CT Lower Extremity Right Without Contrast    Result Date: 9/26/2023  Impression: 1.  No fracture or dislocation. 2.  Total right knee arthroplasty changes. 3.  Mild nonspecific superficial soft tissue edema. No loculated fluid collections identified. 4.  Nonspecific mild suprapatella joint fluid or effusion. 5.  Correlation should be made with timing of patient's surgery as above findings could reflect postoperative state. Possibility of infection is not excluded.   This report was finalized on 9/26/2023 12:56 PM by Dr. Shakeel Kemp MD.              Assessment  / Plan        Diagnoses and all orders for this visit:    1. Bacteremia, escherichia coli (Primary)      Patient has made a remarkable recovery with negative blood cultures and normalization of her CRP level. Very minimal concern regarding the right knee.    Follow up with ortho Dr. Lovelace if knee continues to be painful. For now CT scan is reasurring and CRP level normal at less than 0.3 with resolved knee pain today.    No fever, no chills and blood cultures repeated on 9/26/23 x 2 sets were both no growth.            Follow Up   No follow-ups on file.    Visit Diagnoses:    ICD-10-CM ICD-9-CM   1. Bacteremia, escherichia coli  R78.81 790.7    B96.20 041.49       Patient was given instructions and counseling regarding her condition or for health maintenance advice. Please see specific information pulled into the AVS if appropriate.     This document has been electronically signed by Israel Flores MD   October 3, 2023 09:50 EDT    Dictated Utilizing Dragon Dictation: Part of this note may be an electronic transcription/translation of spoken language to printed text using the Dragon Dictation System.

## 2023-10-06 ENCOUNTER — READMISSION MANAGEMENT (OUTPATIENT)
Dept: CALL CENTER | Facility: HOSPITAL | Age: 63
End: 2023-10-06
Payer: MEDICARE

## 2023-10-06 NOTE — OUTREACH NOTE
Sepsis Week 3 Survey      Flowsheet Row Responses   [facility] patient discharged from? [facility]   Does the patient have one of the following disease processes/diagnoses(primary or secondary)? Sepsis   Week 3 attempt successful? No   Unsuccessful attempts Attempt 1            Nasreen RAINES - Registered Nurse

## 2023-10-11 ENCOUNTER — LAB (OUTPATIENT)
Dept: LAB | Facility: HOSPITAL | Age: 63
End: 2023-10-11
Payer: MEDICARE

## 2023-10-11 ENCOUNTER — READMISSION MANAGEMENT (OUTPATIENT)
Dept: CALL CENTER | Facility: HOSPITAL | Age: 63
End: 2023-10-11
Payer: MEDICARE

## 2023-10-11 ENCOUNTER — TRANSCRIBE ORDERS (OUTPATIENT)
Dept: ADMINISTRATIVE | Facility: HOSPITAL | Age: 63
End: 2023-10-11
Payer: MEDICARE

## 2023-10-11 DIAGNOSIS — E87.1 HYPONATREMIA: ICD-10-CM

## 2023-10-11 DIAGNOSIS — E87.1 HYPONATREMIA: Primary | ICD-10-CM

## 2023-10-11 PROCEDURE — 84156 ASSAY OF PROTEIN URINE: CPT

## 2023-10-11 PROCEDURE — 80053 COMPREHEN METABOLIC PANEL: CPT

## 2023-10-11 PROCEDURE — 82570 ASSAY OF URINE CREATININE: CPT

## 2023-10-11 PROCEDURE — 36415 COLL VENOUS BLD VENIPUNCTURE: CPT

## 2023-10-11 PROCEDURE — 81001 URINALYSIS AUTO W/SCOPE: CPT

## 2023-10-11 PROCEDURE — 82043 UR ALBUMIN QUANTITATIVE: CPT

## 2023-10-11 NOTE — OUTREACH NOTE
Sepsis Week 3 Survey      Flowsheet Row Responses   [facility] patient discharged from? [facility]   Does the patient have one of the following disease processes/diagnoses(primary or secondary)? Sepsis   Week 3 attempt successful? No   Unsuccessful attempts Attempt 2            NATHALY ARGUETA - Registered Nurse

## 2023-10-12 LAB
ALBUMIN SERPL-MCNC: 3.8 G/DL (ref 3.5–5.2)
ALBUMIN UR-MCNC: <1.2 MG/DL
ALBUMIN/GLOB SERPL: 1.9 G/DL
ALP SERPL-CCNC: 157 U/L (ref 39–117)
ALT SERPL W P-5'-P-CCNC: 29 U/L (ref 1–33)
ANION GAP SERPL CALCULATED.3IONS-SCNC: 12.6 MMOL/L (ref 5–15)
AST SERPL-CCNC: 29 U/L (ref 1–32)
BACTERIA UR QL AUTO: NORMAL /HPF
BILIRUB SERPL-MCNC: 0.2 MG/DL (ref 0–1.2)
BILIRUB UR QL STRIP: NEGATIVE
BUN SERPL-MCNC: 15 MG/DL (ref 8–23)
BUN/CREAT SERPL: 15.3 (ref 7–25)
CALCIUM SPEC-SCNC: 8.7 MG/DL (ref 8.6–10.5)
CHLORIDE SERPL-SCNC: 104 MMOL/L (ref 98–107)
CLARITY UR: CLEAR
CO2 SERPL-SCNC: 22.4 MMOL/L (ref 22–29)
COLOR UR: YELLOW
CREAT SERPL-MCNC: 0.98 MG/DL (ref 0.57–1)
CREAT UR-MCNC: 17.1 MG/DL
CREAT UR-MCNC: 17.1 MG/DL
EGFRCR SERPLBLD CKD-EPI 2021: 65.4 ML/MIN/1.73
GLOBULIN UR ELPH-MCNC: 2 GM/DL
GLUCOSE SERPL-MCNC: 247 MG/DL (ref 65–99)
GLUCOSE UR STRIP-MCNC: ABNORMAL MG/DL
HGB UR QL STRIP.AUTO: ABNORMAL
HYALINE CASTS UR QL AUTO: NORMAL /LPF
KETONES UR QL STRIP: NEGATIVE
LEUKOCYTE ESTERASE UR QL STRIP.AUTO: ABNORMAL
MICROALBUMIN/CREAT UR: NORMAL MG/G{CREAT}
NITRITE UR QL STRIP: NEGATIVE
PH UR STRIP.AUTO: 6 [PH] (ref 5–8)
POTASSIUM SERPL-SCNC: 4.7 MMOL/L (ref 3.5–5.2)
PROT ?TM UR-MCNC: 4 MG/DL
PROT SERPL-MCNC: 5.8 G/DL (ref 6–8.5)
PROT UR QL STRIP: NEGATIVE
PROT/CREAT UR: 233.9 MG/G CREA (ref 0–200)
RBC # UR STRIP: NORMAL /HPF
REF LAB TEST METHOD: NORMAL
SODIUM SERPL-SCNC: 139 MMOL/L (ref 136–145)
SP GR UR STRIP: 1.01 (ref 1–1.03)
SQUAMOUS #/AREA URNS HPF: NORMAL /HPF
UROBILINOGEN UR QL STRIP: ABNORMAL
WBC # UR STRIP: NORMAL /HPF

## 2023-12-07 ENCOUNTER — CONSULT (OUTPATIENT)
Dept: ONCOLOGY | Facility: CLINIC | Age: 63
End: 2023-12-07
Payer: MEDICARE

## 2023-12-07 VITALS
TEMPERATURE: 97.5 F | DIASTOLIC BLOOD PRESSURE: 68 MMHG | BODY MASS INDEX: 27.66 KG/M2 | HEIGHT: 65 IN | WEIGHT: 166 LBS | HEART RATE: 85 BPM | SYSTOLIC BLOOD PRESSURE: 123 MMHG | OXYGEN SATURATION: 99 % | RESPIRATION RATE: 18 BRPM

## 2023-12-07 DIAGNOSIS — D50.9 IRON DEFICIENCY ANEMIA, UNSPECIFIED IRON DEFICIENCY ANEMIA TYPE: ICD-10-CM

## 2023-12-07 DIAGNOSIS — K90.49 MALABSORPTION DUE TO INTOLERANCE, NOT ELSEWHERE CLASSIFIED: ICD-10-CM

## 2023-12-07 DIAGNOSIS — L40.50 PSORIATIC ARTHRITIS: ICD-10-CM

## 2023-12-07 DIAGNOSIS — D64.9 NORMOCYTIC ANEMIA: Primary | ICD-10-CM

## 2023-12-07 DIAGNOSIS — E16.2 HYPOGLYCEMIA: ICD-10-CM

## 2023-12-07 LAB
ALBUMIN SERPL-MCNC: 3.8 G/DL (ref 3.5–5.2)
ALBUMIN/GLOB SERPL: 1.7 G/DL
ALP SERPL-CCNC: 159 U/L (ref 39–117)
ALT SERPL W P-5'-P-CCNC: 27 U/L (ref 1–33)
ANION GAP SERPL CALCULATED.3IONS-SCNC: 9.4 MMOL/L (ref 5–15)
AST SERPL-CCNC: 25 U/L (ref 1–32)
BASOPHILS # BLD AUTO: 0.06 10*3/MM3 (ref 0–0.2)
BASOPHILS NFR BLD AUTO: 0.8 % (ref 0–1.5)
BILIRUB SERPL-MCNC: 0.3 MG/DL (ref 0–1.2)
BUN SERPL-MCNC: 12 MG/DL (ref 8–23)
BUN/CREAT SERPL: 13.2 (ref 7–25)
CALCIUM SPEC-SCNC: 8.8 MG/DL (ref 8.6–10.5)
CHLORIDE SERPL-SCNC: 105 MMOL/L (ref 98–107)
CO2 SERPL-SCNC: 26.6 MMOL/L (ref 22–29)
CREAT SERPL-MCNC: 0.91 MG/DL (ref 0.57–1)
CRP SERPL-MCNC: <0.3 MG/DL (ref 0–0.5)
DEPRECATED RDW RBC AUTO: 56.7 FL (ref 37–54)
EGFRCR SERPLBLD CKD-EPI 2021: 71.5 ML/MIN/1.73
EOSINOPHIL # BLD AUTO: 0.13 10*3/MM3 (ref 0–0.4)
EOSINOPHIL NFR BLD AUTO: 1.8 % (ref 0.3–6.2)
ERYTHROCYTE [DISTWIDTH] IN BLOOD BY AUTOMATED COUNT: 15.9 % (ref 12.3–15.4)
ERYTHROCYTE [SEDIMENTATION RATE] IN BLOOD: 6 MM/HR (ref 0–30)
FERRITIN SERPL-MCNC: 122.7 NG/ML (ref 13–150)
GLOBULIN UR ELPH-MCNC: 2.3 GM/DL
GLUCOSE SERPL-MCNC: 51 MG/DL (ref 65–99)
HCT VFR BLD AUTO: 32.8 % (ref 34–46.6)
HGB BLD-MCNC: 10.1 G/DL (ref 12–15.9)
IMM GRANULOCYTES # BLD AUTO: 0.06 10*3/MM3 (ref 0–0.05)
IMM GRANULOCYTES NFR BLD AUTO: 0.8 % (ref 0–0.5)
IRON 24H UR-MRATE: 31 MCG/DL (ref 37–145)
IRON SATN MFR SERPL: 7 % (ref 20–50)
LDH SERPL-CCNC: 257 U/L (ref 135–214)
LYMPHOCYTES # BLD AUTO: 1.64 10*3/MM3 (ref 0.7–3.1)
LYMPHOCYTES NFR BLD AUTO: 22.5 % (ref 19.6–45.3)
MCH RBC QN AUTO: 30.3 PG (ref 26.6–33)
MCHC RBC AUTO-ENTMCNC: 30.8 G/DL (ref 31.5–35.7)
MCV RBC AUTO: 98.5 FL (ref 79–97)
MONOCYTES # BLD AUTO: 0.36 10*3/MM3 (ref 0.1–0.9)
MONOCYTES NFR BLD AUTO: 4.9 % (ref 5–12)
NEUTROPHILS NFR BLD AUTO: 5.04 10*3/MM3 (ref 1.7–7)
NEUTROPHILS NFR BLD AUTO: 69.2 % (ref 42.7–76)
NRBC BLD AUTO-RTO: 0 /100 WBC (ref 0–0.2)
PLATELET # BLD AUTO: 500 10*3/MM3 (ref 140–450)
PMV BLD AUTO: 8.5 FL (ref 6–12)
POTASSIUM SERPL-SCNC: 3.8 MMOL/L (ref 3.5–5.2)
PROT SERPL-MCNC: 6.1 G/DL (ref 6–8.5)
RBC # BLD AUTO: 3.33 10*6/MM3 (ref 3.77–5.28)
RETICS # AUTO: 0.05 10*6/MM3 (ref 0.02–0.13)
RETICS/RBC NFR AUTO: 1.36 % (ref 0.7–1.9)
SODIUM SERPL-SCNC: 141 MMOL/L (ref 136–145)
T4 FREE SERPL-MCNC: 1.58 NG/DL (ref 0.93–1.7)
TIBC SERPL-MCNC: 475 MCG/DL (ref 298–536)
TRANSFERRIN SERPL-MCNC: 319 MG/DL (ref 200–360)
TSH SERPL DL<=0.05 MIU/L-ACNC: 6.98 UIU/ML (ref 0.27–4.2)
WBC NRBC COR # BLD AUTO: 7.29 10*3/MM3 (ref 3.4–10.8)

## 2023-12-07 PROCEDURE — 83615 LACTATE (LD) (LDH) ENZYME: CPT | Performed by: NURSE PRACTITIONER

## 2023-12-07 PROCEDURE — 82607 VITAMIN B-12: CPT | Performed by: NURSE PRACTITIONER

## 2023-12-07 PROCEDURE — 85652 RBC SED RATE AUTOMATED: CPT | Performed by: NURSE PRACTITIONER

## 2023-12-07 PROCEDURE — 83540 ASSAY OF IRON: CPT | Performed by: NURSE PRACTITIONER

## 2023-12-07 PROCEDURE — 86364 TISS TRNSGLTMNASE EA IG CLAS: CPT | Performed by: NURSE PRACTITIONER

## 2023-12-07 PROCEDURE — 84466 ASSAY OF TRANSFERRIN: CPT | Performed by: NURSE PRACTITIONER

## 2023-12-07 PROCEDURE — 86140 C-REACTIVE PROTEIN: CPT | Performed by: NURSE PRACTITIONER

## 2023-12-07 PROCEDURE — 83010 ASSAY OF HAPTOGLOBIN QUANT: CPT | Performed by: NURSE PRACTITIONER

## 2023-12-07 PROCEDURE — 85045 AUTOMATED RETICULOCYTE COUNT: CPT | Performed by: NURSE PRACTITIONER

## 2023-12-07 PROCEDURE — 85025 COMPLETE CBC W/AUTO DIFF WBC: CPT | Performed by: NURSE PRACTITIONER

## 2023-12-07 PROCEDURE — 80053 COMPREHEN METABOLIC PANEL: CPT | Performed by: NURSE PRACTITIONER

## 2023-12-07 PROCEDURE — 82728 ASSAY OF FERRITIN: CPT | Performed by: NURSE PRACTITIONER

## 2023-12-07 PROCEDURE — 82746 ASSAY OF FOLIC ACID SERUM: CPT | Performed by: NURSE PRACTITIONER

## 2023-12-07 PROCEDURE — 82525 ASSAY OF COPPER: CPT | Performed by: NURSE PRACTITIONER

## 2023-12-07 PROCEDURE — 84443 ASSAY THYROID STIM HORMONE: CPT | Performed by: NURSE PRACTITIONER

## 2023-12-07 PROCEDURE — 84439 ASSAY OF FREE THYROXINE: CPT | Performed by: NURSE PRACTITIONER

## 2023-12-07 PROCEDURE — 84630 ASSAY OF ZINC: CPT | Performed by: NURSE PRACTITIONER

## 2023-12-07 RX ORDER — SODIUM CHLORIDE 9 MG/ML
20 INJECTION, SOLUTION INTRAVENOUS ONCE
OUTPATIENT
Start: 2023-12-14

## 2023-12-07 RX ORDER — SODIUM CHLORIDE 9 MG/ML
20 INJECTION, SOLUTION INTRAVENOUS ONCE
Status: CANCELLED | OUTPATIENT
Start: 2023-12-07

## 2023-12-07 NOTE — PROGRESS NOTES
Venipuncture Blood Specimen Collection  Venipuncture performed in right arm by Autumn Antoine MA with good hemostasis. Patient tolerated the procedure well without complications.   12/07/23   Autumn Antoine MA

## 2023-12-07 NOTE — PROGRESS NOTES
DATE OF CONSULTATION:  12/7/2023    REASON FOR REFERRAL: Normocytic Anemia    REFERRING PHYSICIAN:  Samuel Duane Kreis, MD    CHIEF COMPLAINT:  Chronic aches/pain, Fatigue        HISTORY OF PRESENT ILLNESS:   Lashae Benitez is a very pleasant 63 y.o. female who is being seen today at the request of Samuel Duane Kreis, MD for evaluation and treatment of normocytic anemia. Ms. Benitez reports following with her PCP every 3 months with lab testing. She reports that she has been aware of the above issue for at least six months. Previous available CBCs were reviewed and patient has had chronic anemia with fluctuations in Hg ranging 9.2-11.9 since at least August 2019. She is taking oral iron supplement daily which she is having a difficult time tolerating due to GI upset. She also takes Folic Acid daily and B12 injections monthly. She denies any obvious blood loss from any source. She reports having colonoscopy per Dr. Frederick last year that was unremarkable. She has never had EGD. No history of blood transfusion. She is on Xarelto for A-Fib. She also follows with Dr. Chand (rheumatology) for psoriatic arthritis and is currently on Orencia, Otezla and MTX. She reports chronic aches/pain and chronic fatigue but denies any worsening. She denies any other specific complaints at this time.     PAST MEDICAL HISTORY:  Past Medical History:   Diagnosis Date    Acid reflux     Allergic     Anxiety     Cancer     thyroid, skin    Chronic pain disorder     Degenerative disc disease, lumbar     Depression     Dupuytren's disease     Essential hypertension     Family history of coronary artery disease     Fibromyalgia     Gallbladder abscess     H. pylori infection     Hiatal hernia     Hyperlipidemia     Hypothyroidism     Migraines     migraines    Multiple sclerosis     Osteoarthritis     Psoriasis     Psoriatic arthritis     RA (rheumatoid arthritis)     Rheumatoid arthritis     Sinusitis     Sjogren's syndrome     Stomach ulcer      TMJ arthritis     Type 2 diabetes mellitus     Urinary tract infection        PAST SURGICAL HISTORY:  Past Surgical History:   Procedure Laterality Date    BREAST LUMPECTOMY Left 11/1993    CARDIAC CATHETERIZATION Left 09/21/2009    Normal     CARPAL TUNNEL RELEASE  03/2012    CERVICAL POLYPECTOMY  12/2015    CHOLECYSTECTOMY  05/2007    COLONOSCOPY      ENDOSCOPY      GASTRIC BYPASS  09/2007    JOINT REPLACEMENT      KNEE ARTHROPLASTY      LUMBAR DISC SURGERY      C5-6    REPLACEMENT TOTAL KNEE Right 06/2016    SACRAL NERVE STIMULATOR PLACEMENT  09/2014    THYROIDECTOMY  03/2016       FAMILY HISTORY:  Family History   Problem Relation Age of Onset    Diabetes Mother     Stroke Mother     Hypertension Mother     Heart attack Father         First one was in his 20's second 30's.     Heart failure Father     Heart disease Father     Stroke Father     Diabetes Sister     Cancer Maternal Grandmother        SOCIAL HISTORY:  Social History     Socioeconomic History    Marital status:    Tobacco Use    Smoking status: Never    Smokeless tobacco: Never   Vaping Use    Vaping Use: Never used   Substance and Sexual Activity    Alcohol use: No    Drug use: No    Sexual activity: Defer              MEDICATIONS:  The current medication list was reviewed in the EMR    Current Outpatient Medications:     abatacept (ORENCIA) 250 MG injection, Infuse 30 mL into a venous catheter Every 28 (Twenty-Eight) Days., Disp: , Rfl:     alendronate (FOSAMAX) 70 MG tablet, Take 1 tablet by mouth 1 (One) Time Per Week. Wednesday, Disp: , Rfl:     amitriptyline (ELAVIL) 25 MG tablet, Take 1 tablet by mouth Every Night., Disp: , Rfl:     Apremilast (Otezla) 30 MG tablet, Take 30 mg by mouth 2 (two) times a day., Disp: , Rfl:     Butalbital-Acetaminophen  MG capsule, Take 300 mg by mouth Daily As Needed., Disp: , Rfl:     celecoxib (CeleBREX) 200 MG capsule, Take 1 capsule by mouth Daily., Disp: , Rfl:     cyanocobalamin 1000 MCG/ML  injection, Inject 1 mL into the appropriate muscle as directed by prescriber Every 28 (Twenty-Eight) Days., Disp: , Rfl:     cyclobenzaprine (FLEXERIL) 10 MG tablet, Take 1 tablet by mouth Every Evening., Disp: , Rfl:     Diclofenac Sodium (VOLTAREN) 1 % gel gel, Apply 2 g topically to the appropriate area as directed 4 (Four) Times a Day As Needed., Disp: , Rfl:     DULoxetine (CYMBALTA) 30 MG capsule, Take 1 capsule by mouth Every Morning., Disp: , Rfl:     DULoxetine (CYMBALTA) 60 MG capsule, Take 1 capsule by mouth Daily., Disp: , Rfl:     EMGALITY 120 MG/ML prefilled syringe, Inject 1 mL under the skin into the appropriate area as directed Every 30 (Thirty) Days., Disp: , Rfl: 3    famotidine (PEPCID) 40 MG tablet, Take 1 tablet by mouth 2 (Two) Times a Day., Disp: , Rfl:     ferrous gluconate (FERGON) 240 (27 FE) MG tablet, Take 1 tablet by mouth Every Morning., Disp: , Rfl:     fexofenadine (ALLEGRA) 180 MG tablet, Take 1 tablet by mouth Daily., Disp: , Rfl:     Finerenone (Kerendia) 10 MG tablet, Take 1 tablet by mouth Daily., Disp: , Rfl:     flecainide (TAMBOCOR) 50 MG tablet, Take 1 tablet by mouth 2 (Two) Times a Day., Disp: 180 tablet, Rfl: 3    fluticasone-salmeterol (ADVAIR HFA) 115-21 MCG/ACT inhaler, Inhale 2 puffs 2 (Two) Times a Day., Disp: , Rfl:     folic acid (FOLVITE) 1 MG tablet, Take 1 tablet by mouth Daily., Disp: , Rfl:     gabapentin (NEURONTIN) 600 MG tablet, Take 1 tablet by mouth 3 (Three) Times a Day., Disp: , Rfl:     insulin detemir (LEVEMIR) 100 UNIT/ML injection, Inject 30 Units under the skin into the appropriate area as directed Daily., Disp: , Rfl:     isosorbide mononitrate (IMDUR) 30 MG 24 hr tablet, TAKE 1 TABLET DAILY, Disp: 90 tablet, Rfl: 3    methotrexate 2.5 MG tablet, Take 10 tablets by mouth 1 (One) Time Per Week. Prior to [facility] Admission, Patient was on: Takes 10 tablets on Saturday, Disp: , Rfl:     metoprolol succinate XL (TOPROL-XL) 100 MG 24 hr tablet, TAKE 1  "TABLET DAILY, Disp: 90 tablet, Rfl: 3    olopatadine (PATANASE) 0.6 % solution nasal solution, 2 sprays by Each Nare route 2 (Two) Times a Day., Disp: , Rfl:     potassium chloride 10 MEQ CR tablet, Take 1 tablet by mouth Daily., Disp: , Rfl:     primidone (MYSOLINE) 50 MG tablet, Take 1 tablet by mouth Every Night., Disp: , Rfl:     rimegepant 75 MG dispersible tablet, DISSOLVE ONE TABLET UNDER THE TONGUE AT ONSET OF MIGRAINE HEADACHE. MAX DOSE ONE TABLET IN 24 HOURS, Disp: , Rfl:     sodium chloride 1 g tablet, Take 2 tablets by mouth 3 (Three) Times a Day., Disp: , Rfl:     sucralfate (CARAFATE) 1 g tablet, Take 1 tablet by mouth 4 (Four) Times a Day. Take one tablet by mouth 4 times daily 1 hour before meals and at bedtime on an empty stomach., Disp: , Rfl:     Xarelto 20 MG tablet, TAKE 1 TABLET DAILY, Disp: 90 tablet, Rfl: 3    ALLERGIES:    Allergies   Allergen Reactions    Codeine Angioedema    Imuran [Azathioprine] Other (See Comments)     Has pancreatis attacks with med    Januvia [Sitagliptin] Other (See Comments)     Has pancreatis attacks with med     Penicillins Angioedema    Sulfa Antibiotics Angioedema    Sulfasalazine Unknown (See Comments)    Beta Adrenergic Blockers Other (See Comments)     I called pt to ask her about the allergy to bblockers in her chart. Pt states \"too big of a dose makes her psoriasis act up\", but this current dose of metoprolol 50 mg bid, she has been on for a long time, with no ill side effects.  CATA,CMA 3/28/18         REVIEW OF SYSTEMS:    A comprehensive 14 point review of systems was performed.  Significant findings as mentioned above.  All other systems reviewed and are negative.        Physical Exam   Vital Signs: /68   Pulse 85   Temp 97.5 °F (36.4 °C) (Temporal)   Resp 18   Ht 165.1 cm (65\")   Wt 75.3 kg (166 lb)   SpO2 99%   BMI 27.62 kg/m²      ECOG score: 0   General: Well developed, well nourished, alert and oriented x 3, in no acute distress.   Head: " ATNC   Eyes: PERRL, No evidence of conjunctivitis.   Nose: No nasal discharge.   Mouth: Oral mucosal membranes moist. No oral ulceration or hemorrhages.   Neck: Neck supple. No thyromegaly. No JVD.   Lungs: Clear in all fields to A&P without rales, rhonchi or wheezing.   Heart: S1, S2. Regular rate and rhythm. No murmurs, rubs, or gallops.   Abdomen: Soft. Bowel sounds are normoactive. Nontender with palpation. No Hepatosplenomegaly can be appreciated.   Extremities: No cyanosis or edema. Peripheral pulses palpable and equal bilaterally.   Integumentary: No rash, sores, erythema or nodules. No blistering, bruising, or dry skin.   Hem/Lymph Nodes: No palpable cervical, submandibular, supraclavicular, axillary  lymphadenopathy noted. No petechiae, purpura or ecchymosis noted.   Neurologic: Grossly non-focal exam    Pain Score:  Pain Score    12/07/23 1121   PainSc:   4   PainLoc: Hip       PHQ-Score Total:  PHQ-9 Total Score: 1       PATHOLOGY:        ENDOSCOPY:        IMAGING:        RECENT LABS:  Lab Results   Component Value Date    WBC 8.00 09/15/2023    HGB 9.2 (L) 09/15/2023    HCT 29.8 (L) 09/15/2023    .8 (H) 09/15/2023    RDW 15.6 (H) 09/15/2023     09/15/2023    NEUTRORELPCT 71.0 09/15/2023    LYMPHORELPCT 19.4 (L) 09/15/2023    MONORELPCT 6.0 09/15/2023    EOSRELPCT 1.9 09/15/2023    BASORELPCT 0.6 09/15/2023    NEUTROABS 5.68 09/15/2023    LYMPHSABS 1.55 09/15/2023       Lab Results   Component Value Date     10/11/2023    K 4.7 10/11/2023    CO2 22.4 10/11/2023     10/11/2023    BUN 15 10/11/2023    CREATININE 0.98 10/11/2023    EGFRIFNONA 74 02/23/2022    EGFRIFAFRI  09/22/2016      Comment:      <15 Indicative of kidney failure.    GLUCOSE 247 (H) 10/11/2023    CALCIUM 8.7 10/11/2023    ALKPHOS 157 (H) 10/11/2023    AST 29 10/11/2023    ALT 29 10/11/2023    BILITOT 0.2 10/11/2023    ALBUMIN 3.8 10/11/2023    PROTEINTOT 5.8 (L) 10/11/2023    MG 2.0 09/21/2020       Lab Results    Component Value Date/Time     (H) 06/12/2021 06:51 PM         ASSESSMENT & PLAN:  Lashae Benitez is a very pleasant 63 y.o. female with    1. Normocytic Anemia  2. DARIO  - Previous available CBCs were reviewed and patient has had chronic anemia with fluctuations in Hg ranging 9.2-11.9 since at least August 2019.   - She is taking oral iron supplement daily which she is having a difficult time tolerating due to GI upset. She also takes Folic Acid daily and B12 injections monthly.   -She denies any obvious blood loss from any source. She reports having colonoscopy per Dr. Frederick last year that was unremarkable (no records currently available, will request today). She has never had EGD. No history of blood transfusion.  - Obtained repeat CBC today which showed Hg (10.1) and slightly elevated platelet count 500,000, likely reactive. WBC is normal. Iron panel is low with normal Ferritin despite oral iron replacement. Therefore, will discontinue oral iron and replace with IV Feraheme, pending insurance approval for apparent malabsorption of oral iron.   - Also obtained additional labs today to further evaluate anemia which are pending including PBS, B12, Folate, Retic, LDH, Haptoglobin, CRP, ESR, TSH, Copper, Zinc and Tissue Transglutaminase.  - Will follow up in 2 months with CBC, Iron panel and Ferritin. In the meantime, will follow up with above pending lab work.     3. Psoriatic Arthritis  - She follows with Dr. Chand at [facility]. She is currently taking Orencia, Otezla and MTX. Ongoing management per rheumatology.    4. Hypoglycemia  - Blood glucose 51 on CMP this am. She reports that she is feeling well. Currently asymptomatic. She was provided peanut butter crackers and Gatorade.       The patient was in agreement with the plan and all questions were answered to her satisfaction.     Thank you so much for allowing us to participate in the care of Lashae Benitez . Please do not hesitate to  contact us with any questions or concerns.       ACO / KRISTAL/Other  Quality measures  -  Lashae Benitez received 2023 flu vaccine.  -  Lashae Benitez reports a pain score of 4.  Given her pain assessment as noted, treatment options were discussed and the following options were decided upon as a follow-up plan to address the patient's pain: continuation of current treatment plan for pain.  -  Current outpatient and discharge medications have been reconciled for the patient.  Reviewed by: RUIZ Reynolds      I spent 45 minutes caring for Lashae on this date of service. This time includes time spent by me in the following activities: preparing for the visit, reviewing tests, obtaining and/or reviewing a separately obtained history, performing a medically appropriate examination and/or evaluation, counseling and educating the patient/family/caregiver, ordering medications, tests, or procedures, documenting information in the medical record, independently interpreting results and communicating that information with the patient/family/caregiver, and care coordination.                     Electronically Signed by: RUIZ Landry , December 7, 2023 11:56 EST           Electronically Signed by: RUIZ Landry , December 7, 2023 11:56 EST       CC:   Samuel Duane Kreis, MD Kreis, Samuel Duane, MD

## 2023-12-08 LAB
FOLATE SERPL-MCNC: >20 NG/ML (ref 4.78–24.2)
HAPTOGLOB SERPL-MCNC: 131 MG/DL (ref 30–200)
REF LAB TEST METHOD: NORMAL
TTG IGA SER-ACNC: <2 U/ML (ref 0–3)
VIT B12 BLD-MCNC: 611 PG/ML (ref 211–946)

## 2023-12-11 ENCOUNTER — INFUSION (OUTPATIENT)
Dept: ONCOLOGY | Facility: HOSPITAL | Age: 63
End: 2023-12-11
Payer: MEDICARE

## 2023-12-11 VITALS
OXYGEN SATURATION: 100 % | SYSTOLIC BLOOD PRESSURE: 142 MMHG | DIASTOLIC BLOOD PRESSURE: 82 MMHG | RESPIRATION RATE: 18 BRPM | BODY MASS INDEX: 27.29 KG/M2 | TEMPERATURE: 97.8 F | HEART RATE: 81 BPM | WEIGHT: 164 LBS

## 2023-12-11 DIAGNOSIS — K90.49 MALABSORPTION DUE TO INTOLERANCE, NOT ELSEWHERE CLASSIFIED: ICD-10-CM

## 2023-12-11 DIAGNOSIS — D50.9 IRON DEFICIENCY ANEMIA, UNSPECIFIED IRON DEFICIENCY ANEMIA TYPE: Primary | ICD-10-CM

## 2023-12-11 PROCEDURE — 25010000002 FERUMOXYTOL 510 MG/17ML SOLUTION: Performed by: NURSE PRACTITIONER

## 2023-12-11 PROCEDURE — 96374 THER/PROPH/DIAG INJ IV PUSH: CPT

## 2023-12-11 PROCEDURE — 25810000003 SODIUM CHLORIDE 0.9 % SOLUTION: Performed by: NURSE PRACTITIONER

## 2023-12-11 PROCEDURE — 96365 THER/PROPH/DIAG IV INF INIT: CPT

## 2023-12-11 RX ORDER — SODIUM CHLORIDE 9 MG/ML
20 INJECTION, SOLUTION INTRAVENOUS ONCE
Status: COMPLETED | OUTPATIENT
Start: 2023-12-11 | End: 2023-12-11

## 2023-12-11 RX ADMIN — SODIUM CHLORIDE 20 ML/HR: 9 INJECTION, SOLUTION INTRAVENOUS at 15:27

## 2023-12-11 RX ADMIN — FERUMOXYTOL 510 MG: 510 INJECTION INTRAVENOUS at 15:27

## 2023-12-15 LAB
COPPER SERPL-MCNC: 111 UG/DL (ref 80–158)
ZINC SERPL-MCNC: 80 UG/DL (ref 44–115)

## 2023-12-18 ENCOUNTER — INFUSION (OUTPATIENT)
Dept: ONCOLOGY | Facility: HOSPITAL | Age: 63
End: 2023-12-18
Payer: MEDICARE

## 2023-12-18 VITALS
SYSTOLIC BLOOD PRESSURE: 123 MMHG | WEIGHT: 162.7 LBS | DIASTOLIC BLOOD PRESSURE: 75 MMHG | BODY MASS INDEX: 27.07 KG/M2 | OXYGEN SATURATION: 100 % | RESPIRATION RATE: 18 BRPM | HEART RATE: 85 BPM | TEMPERATURE: 96.9 F

## 2023-12-18 DIAGNOSIS — K90.49 MALABSORPTION DUE TO INTOLERANCE, NOT ELSEWHERE CLASSIFIED: ICD-10-CM

## 2023-12-18 DIAGNOSIS — D50.9 IRON DEFICIENCY ANEMIA, UNSPECIFIED IRON DEFICIENCY ANEMIA TYPE: Primary | ICD-10-CM

## 2023-12-18 PROCEDURE — 96365 THER/PROPH/DIAG IV INF INIT: CPT

## 2023-12-18 PROCEDURE — 25810000003 SODIUM CHLORIDE 0.9 % SOLUTION: Performed by: NURSE PRACTITIONER

## 2023-12-18 PROCEDURE — 96374 THER/PROPH/DIAG INJ IV PUSH: CPT

## 2023-12-18 PROCEDURE — 25010000002 FERUMOXYTOL 510 MG/17ML SOLUTION: Performed by: NURSE PRACTITIONER

## 2023-12-18 RX ORDER — SODIUM CHLORIDE 9 MG/ML
20 INJECTION, SOLUTION INTRAVENOUS ONCE
Status: COMPLETED | OUTPATIENT
Start: 2023-12-18 | End: 2023-12-18

## 2023-12-18 RX ADMIN — FERUMOXYTOL 510 MG: 510 INJECTION INTRAVENOUS at 09:10

## 2023-12-18 RX ADMIN — SODIUM CHLORIDE 20 ML/HR: 9 INJECTION, SOLUTION INTRAVENOUS at 09:05

## 2024-01-11 ENCOUNTER — TRANSCRIBE ORDERS (OUTPATIENT)
Dept: ADMINISTRATIVE | Facility: HOSPITAL | Age: 64
End: 2024-01-11
Payer: MEDICARE

## 2024-01-11 ENCOUNTER — LAB (OUTPATIENT)
Dept: LAB | Facility: HOSPITAL | Age: 64
End: 2024-01-11
Payer: MEDICARE

## 2024-01-11 DIAGNOSIS — E87.1 HYPONATREMIA: ICD-10-CM

## 2024-01-11 DIAGNOSIS — E87.1 HYPONATREMIA: Primary | ICD-10-CM

## 2024-01-11 PROCEDURE — 80053 COMPREHEN METABOLIC PANEL: CPT

## 2024-01-11 PROCEDURE — 36415 COLL VENOUS BLD VENIPUNCTURE: CPT

## 2024-01-11 PROCEDURE — 82570 ASSAY OF URINE CREATININE: CPT

## 2024-01-11 PROCEDURE — 81001 URINALYSIS AUTO W/SCOPE: CPT

## 2024-01-11 PROCEDURE — 82043 UR ALBUMIN QUANTITATIVE: CPT

## 2024-01-11 PROCEDURE — 84156 ASSAY OF PROTEIN URINE: CPT

## 2024-01-12 LAB
ALBUMIN SERPL-MCNC: 3.6 G/DL (ref 3.5–5.2)
ALBUMIN UR-MCNC: <1.2 MG/DL
ALBUMIN/GLOB SERPL: 2.4 G/DL
ALP SERPL-CCNC: 135 U/L (ref 39–117)
ALT SERPL W P-5'-P-CCNC: 29 U/L (ref 1–33)
ANION GAP SERPL CALCULATED.3IONS-SCNC: 9 MMOL/L (ref 5–15)
AST SERPL-CCNC: 24 U/L (ref 1–32)
BACTERIA UR QL AUTO: ABNORMAL /HPF
BILIRUB SERPL-MCNC: 0.2 MG/DL (ref 0–1.2)
BILIRUB UR QL STRIP: NEGATIVE
BUN SERPL-MCNC: 12 MG/DL (ref 8–23)
BUN/CREAT SERPL: 12.9 (ref 7–25)
CALCIUM SPEC-SCNC: 8.5 MG/DL (ref 8.6–10.5)
CHLORIDE SERPL-SCNC: 104 MMOL/L (ref 98–107)
CLARITY UR: ABNORMAL
CO2 SERPL-SCNC: 23 MMOL/L (ref 22–29)
COD CRY URNS QL: ABNORMAL /HPF
COLOR UR: YELLOW
CREAT SERPL-MCNC: 0.93 MG/DL (ref 0.57–1)
CREAT UR-MCNC: 35.7 MG/DL
CREAT UR-MCNC: 35.7 MG/DL
EGFRCR SERPLBLD CKD-EPI 2021: 69.6 ML/MIN/1.73
GLOBULIN UR ELPH-MCNC: 1.5 GM/DL
GLUCOSE SERPL-MCNC: 237 MG/DL (ref 65–99)
GLUCOSE UR STRIP-MCNC: ABNORMAL MG/DL
HGB UR QL STRIP.AUTO: ABNORMAL
HYALINE CASTS UR QL AUTO: ABNORMAL /LPF
KETONES UR QL STRIP: NEGATIVE
LEUKOCYTE ESTERASE UR QL STRIP.AUTO: ABNORMAL
MICROALBUMIN/CREAT UR: NORMAL MG/G{CREAT}
NITRITE UR QL STRIP: NEGATIVE
PH UR STRIP.AUTO: 6 [PH] (ref 5–8)
POTASSIUM SERPL-SCNC: 4.5 MMOL/L (ref 3.5–5.2)
PROT ?TM UR-MCNC: 7.3 MG/DL
PROT SERPL-MCNC: 5.1 G/DL (ref 6–8.5)
PROT UR QL STRIP: NEGATIVE
PROT/CREAT UR: 204.5 MG/G CREA (ref 0–200)
RBC # UR STRIP: ABNORMAL /HPF
REF LAB TEST METHOD: ABNORMAL
SODIUM SERPL-SCNC: 136 MMOL/L (ref 136–145)
SP GR UR STRIP: 1.01 (ref 1–1.03)
SQUAMOUS #/AREA URNS HPF: ABNORMAL /HPF
UROBILINOGEN UR QL STRIP: ABNORMAL
WBC # UR STRIP: ABNORMAL /HPF

## 2024-02-13 ENCOUNTER — OFFICE VISIT (OUTPATIENT)
Dept: ONCOLOGY | Facility: CLINIC | Age: 64
End: 2024-02-13
Payer: MEDICARE

## 2024-02-13 ENCOUNTER — LAB (OUTPATIENT)
Dept: ONCOLOGY | Facility: CLINIC | Age: 64
End: 2024-02-13
Payer: MEDICARE

## 2024-02-13 VITALS
BODY MASS INDEX: 27.19 KG/M2 | OXYGEN SATURATION: 96 % | WEIGHT: 163.4 LBS | TEMPERATURE: 97.1 F | HEART RATE: 84 BPM | RESPIRATION RATE: 18 BRPM | DIASTOLIC BLOOD PRESSURE: 88 MMHG | SYSTOLIC BLOOD PRESSURE: 127 MMHG

## 2024-02-13 DIAGNOSIS — D50.9 IRON DEFICIENCY ANEMIA, UNSPECIFIED IRON DEFICIENCY ANEMIA TYPE: Primary | ICD-10-CM

## 2024-02-13 DIAGNOSIS — D64.9 NORMOCYTIC ANEMIA: ICD-10-CM

## 2024-02-13 DIAGNOSIS — K90.49 MALABSORPTION DUE TO INTOLERANCE, NOT ELSEWHERE CLASSIFIED: ICD-10-CM

## 2024-02-13 DIAGNOSIS — L40.50 PSORIATIC ARTHRITIS: ICD-10-CM

## 2024-02-13 LAB
BASOPHILS # BLD AUTO: 0.06 10*3/MM3 (ref 0–0.2)
BASOPHILS NFR BLD AUTO: 0.7 % (ref 0–1.5)
DEPRECATED RDW RBC AUTO: 69.1 FL (ref 37–54)
EOSINOPHIL # BLD AUTO: 0.8 10*3/MM3 (ref 0–0.4)
EOSINOPHIL NFR BLD AUTO: 9.3 % (ref 0.3–6.2)
ERYTHROCYTE [DISTWIDTH] IN BLOOD BY AUTOMATED COUNT: 18.4 % (ref 12.3–15.4)
FERRITIN SERPL-MCNC: 158.2 NG/ML (ref 13–150)
HCT VFR BLD AUTO: 35.1 % (ref 34–46.6)
HGB BLD-MCNC: 11.4 G/DL (ref 12–15.9)
IMM GRANULOCYTES # BLD AUTO: 0.03 10*3/MM3 (ref 0–0.05)
IMM GRANULOCYTES NFR BLD AUTO: 0.3 % (ref 0–0.5)
IRON 24H UR-MRATE: 100 MCG/DL (ref 37–145)
IRON SATN MFR SERPL: 32 % (ref 20–50)
LYMPHOCYTES # BLD AUTO: 1.31 10*3/MM3 (ref 0.7–3.1)
LYMPHOCYTES NFR BLD AUTO: 15.3 % (ref 19.6–45.3)
MCH RBC QN AUTO: 33.2 PG (ref 26.6–33)
MCHC RBC AUTO-ENTMCNC: 32.5 G/DL (ref 31.5–35.7)
MCV RBC AUTO: 102.3 FL (ref 79–97)
MONOCYTES # BLD AUTO: 0.25 10*3/MM3 (ref 0.1–0.9)
MONOCYTES NFR BLD AUTO: 2.9 % (ref 5–12)
NEUTROPHILS NFR BLD AUTO: 6.14 10*3/MM3 (ref 1.7–7)
NEUTROPHILS NFR BLD AUTO: 71.5 % (ref 42.7–76)
NRBC BLD AUTO-RTO: 0 /100 WBC (ref 0–0.2)
PLATELET # BLD AUTO: 312 10*3/MM3 (ref 140–450)
PMV BLD AUTO: 9.4 FL (ref 6–12)
RBC # BLD AUTO: 3.43 10*6/MM3 (ref 3.77–5.28)
TIBC SERPL-MCNC: 316 MCG/DL (ref 298–536)
TRANSFERRIN SERPL-MCNC: 212 MG/DL (ref 200–360)
WBC NRBC COR # BLD AUTO: 8.59 10*3/MM3 (ref 3.4–10.8)

## 2024-02-13 PROCEDURE — 1159F MED LIST DOCD IN RCRD: CPT | Performed by: NURSE PRACTITIONER

## 2024-02-13 PROCEDURE — 3079F DIAST BP 80-89 MM HG: CPT | Performed by: NURSE PRACTITIONER

## 2024-02-13 PROCEDURE — 3074F SYST BP LT 130 MM HG: CPT | Performed by: NURSE PRACTITIONER

## 2024-02-13 PROCEDURE — 82728 ASSAY OF FERRITIN: CPT | Performed by: NURSE PRACTITIONER

## 2024-02-13 PROCEDURE — 83540 ASSAY OF IRON: CPT | Performed by: NURSE PRACTITIONER

## 2024-02-13 PROCEDURE — 99214 OFFICE O/P EST MOD 30 MIN: CPT | Performed by: NURSE PRACTITIONER

## 2024-02-13 PROCEDURE — 85025 COMPLETE CBC W/AUTO DIFF WBC: CPT | Performed by: NURSE PRACTITIONER

## 2024-02-13 PROCEDURE — 1160F RVW MEDS BY RX/DR IN RCRD: CPT | Performed by: NURSE PRACTITIONER

## 2024-02-13 PROCEDURE — 1126F AMNT PAIN NOTED NONE PRSNT: CPT | Performed by: NURSE PRACTITIONER

## 2024-02-13 PROCEDURE — 84466 ASSAY OF TRANSFERRIN: CPT | Performed by: NURSE PRACTITIONER

## 2024-02-27 ENCOUNTER — TELEPHONE (OUTPATIENT)
Dept: CARDIOLOGY | Facility: CLINIC | Age: 64
End: 2024-02-27
Payer: MEDICARE

## 2024-03-13 ENCOUNTER — TELEPHONE (OUTPATIENT)
Dept: CARDIOLOGY | Facility: CLINIC | Age: 64
End: 2024-03-13
Payer: MEDICARE

## 2024-03-14 NOTE — TELEPHONE ENCOUNTER
Patient will need to be seen to discuss her surgery clearances.  She has an appt on 03/25 and these both can be addressed then.  Patient aware and verbalized understanding

## 2024-03-21 ENCOUNTER — TELEPHONE (OUTPATIENT)
Dept: CARDIOLOGY | Facility: CLINIC | Age: 64
End: 2024-03-21
Payer: MEDICARE

## 2024-03-21 NOTE — TELEPHONE ENCOUNTER
Rosa M from [facility] called and said patient needs a surgery clearance.     Patient is having removal of current sacral nerve stimulator and lead followed by replacement with lead and sacral nerve stimulator compatible for MRI.     They need the clearance to hold the Xarelto.     Thanks!    Phone: [phone]  Fax: [phone]

## 2024-03-25 ENCOUNTER — HOSPITAL ENCOUNTER (OUTPATIENT)
Dept: GENERAL RADIOLOGY | Facility: HOSPITAL | Age: 64
Discharge: HOME | End: 2024-03-25
Admitting: PHYSICIAN ASSISTANT
Payer: MEDICARE

## 2024-03-25 ENCOUNTER — OFFICE VISIT (OUTPATIENT)
Dept: CARDIOLOGY | Facility: CLINIC | Age: 64
End: 2024-03-25
Payer: MEDICARE

## 2024-03-25 VITALS
WEIGHT: 171.2 LBS | OXYGEN SATURATION: 99 % | SYSTOLIC BLOOD PRESSURE: 133 MMHG | DIASTOLIC BLOOD PRESSURE: 79 MMHG | HEIGHT: 65 IN | HEART RATE: 89 BPM | BODY MASS INDEX: 28.52 KG/M2

## 2024-03-25 DIAGNOSIS — I48.0 PAROXYSMAL ATRIAL FIBRILLATION: ICD-10-CM

## 2024-03-25 DIAGNOSIS — I10 ESSENTIAL HYPERTENSION: Primary | ICD-10-CM

## 2024-03-25 DIAGNOSIS — I10 ESSENTIAL HYPERTENSION: ICD-10-CM

## 2024-03-25 PROCEDURE — 71046 X-RAY EXAM CHEST 2 VIEWS: CPT

## 2024-03-25 PROCEDURE — 1159F MED LIST DOCD IN RCRD: CPT | Performed by: PHYSICIAN ASSISTANT

## 2024-03-25 PROCEDURE — 3075F SYST BP GE 130 - 139MM HG: CPT | Performed by: PHYSICIAN ASSISTANT

## 2024-03-25 PROCEDURE — 3078F DIAST BP <80 MM HG: CPT | Performed by: PHYSICIAN ASSISTANT

## 2024-03-25 PROCEDURE — 99214 OFFICE O/P EST MOD 30 MIN: CPT | Performed by: PHYSICIAN ASSISTANT

## 2024-03-25 PROCEDURE — 1160F RVW MEDS BY RX/DR IN RCRD: CPT | Performed by: PHYSICIAN ASSISTANT

## 2024-03-25 PROCEDURE — 71046 X-RAY EXAM CHEST 2 VIEWS: CPT | Performed by: RADIOLOGY

## 2024-03-25 PROCEDURE — 93000 ELECTROCARDIOGRAM COMPLETE: CPT | Performed by: PHYSICIAN ASSISTANT

## 2024-03-25 NOTE — PROGRESS NOTES
Kreis, Samuel Duane, MD  Lashae Benitez  1960  03/25/2024    Patient Active Problem List   Diagnosis    Hiatal hernia    Essential hypertension    Sjogren's syndrome    Multiple sclerosis    Psoriasis    Fibromyalgia    Osteoarthritis    Psoriatic arthritis    RA (rheumatoid arthritis)    Degenerative disc disease, lumbar    TMJ arthritis    Dupuytren's disease    H. pylori infection    Family history of coronary artery disease    Type 2 diabetes mellitus    Edema    Bilateral leg edema    Isolated corticotropin deficiency    Hyponatremia    Paroxysmal atrial fibrillation    Sepsis    Bacteremia, escherichia coli    Iron deficiency anemia    Malabsorption due to intolerance, not elsewhere classified       Dear Kreis, Samuel Duane, MD:    Subjective     History of Present Illness:    Chief Complaint   Patient presents with    Follow-up     ROUTINE    Edema     BILATERAL FEET AND LEGS-LEFT WORSE    Surgical Clearance     Sacral never stimulator removal and replacement       Lashae Benitez is a pleasant 63 y.o. female with a past medical history significant for no known coronary artery disease or structural heart disease she also denies any history of tobacco abuse.  She does have diabetes mellitus, paroxysmal atrial fibrillation anticoagulated with Xarelto and with rhythm control on flecainide.  She does have essential hypertension.  she does report family history of premature CAD with her father having a major AMI in his 30s.  She does also have history of severe hyponatremia with sodium being as low as 111 thought to be drug-induced as at the time she was taken hydrochlorothiazide, very high dose of Cymbalta, and Celebrex.  She comes in for routine cardiology follow up.      Lashae reports that she has been having some increased pedal edema the last two weeks and is worse on the left side. Denies any previous injuries to the left side but has had a right knee replacement in the past. She does report that she has been  "compliant with xarelto and has not missed any doseages. She does admit the last few days she has noticed some increased dyspnea on exertion reporting that she does get short of breath just walking to her mail box which takes about two minutes of walking although she does also report that she has some chronic dyspnea which is fairly similar.  She denies any orthopnea or PND.    Allergies   Allergen Reactions    Codeine Angioedema    Imuran [Azathioprine] Other (See Comments)     Has pancreatis attacks with med    Januvia [Sitagliptin] Other (See Comments)     Has pancreatis attacks with med     Penicillins Angioedema    Sulfa Antibiotics Angioedema    Sulfasalazine Unknown (See Comments)    Beta Adrenergic Blockers Other (See Comments)     I called pt to ask her about the allergy to bblockers in her chart. Pt states \"too big of a dose makes her psoriasis act up\", but this current dose of metoprolol 50 mg bid, she has been on for a long time, with no ill side effects.  CATA,CMA 3/28/18   :      Current Outpatient Medications:     abatacept (ORENCIA) 250 MG injection, Infuse 30 mL into a venous catheter Every 28 (Twenty-Eight) Days., Disp: , Rfl:     alendronate (FOSAMAX) 70 MG tablet, Take 1 tablet by mouth 1 (One) Time Per Week. Wednesday, Disp: , Rfl:     amitriptyline (ELAVIL) 25 MG tablet, Take 1 tablet by mouth Every Night., Disp: , Rfl:     Apremilast (Otezla) 30 MG tablet, Take 30 mg by mouth 2 (two) times a day., Disp: , Rfl:     Butalbital-Acetaminophen  MG capsule, Take 300 mg by mouth Daily As Needed., Disp: , Rfl:     celecoxib (CeleBREX) 200 MG capsule, Take 1 capsule by mouth Daily., Disp: , Rfl:     cyanocobalamin 1000 MCG/ML injection, Inject 1 mL into the appropriate muscle as directed by prescriber Every 28 (Twenty-Eight) Days., Disp: , Rfl:     cyclobenzaprine (FLEXERIL) 10 MG tablet, Take 1 tablet by mouth Every Evening., Disp: , Rfl:     Diclofenac Sodium (VOLTAREN) 1 % gel gel, Apply 2 g " topically to the appropriate area as directed 4 (Four) Times a Day As Needed., Disp: , Rfl:     DULoxetine (CYMBALTA) 30 MG capsule, Take 1 capsule by mouth Every Morning., Disp: , Rfl:     DULoxetine (CYMBALTA) 60 MG capsule, Take 1 capsule by mouth Daily., Disp: , Rfl:     EMGALITY 120 MG/ML prefilled syringe, Inject 1 mL under the skin into the appropriate area as directed Every 30 (Thirty) Days., Disp: , Rfl: 3    famotidine (PEPCID) 40 MG tablet, Take 1 tablet by mouth 2 (Two) Times a Day., Disp: , Rfl:     ferrous gluconate (FERGON) 240 (27 FE) MG tablet, Take 1 tablet by mouth Every Morning., Disp: , Rfl:     fexofenadine (ALLEGRA) 180 MG tablet, Take 1 tablet by mouth Daily., Disp: , Rfl:     Finerenone (Kerendia) 10 MG tablet, Take 1 tablet by mouth Daily., Disp: , Rfl:     flecainide (TAMBOCOR) 50 MG tablet, Take 1 tablet by mouth 2 (Two) Times a Day., Disp: 180 tablet, Rfl: 3    fluticasone-salmeterol (ADVAIR HFA) 115-21 MCG/ACT inhaler, Inhale 2 puffs 2 (Two) Times a Day., Disp: , Rfl:     folic acid (FOLVITE) 1 MG tablet, Take 1 tablet by mouth Daily., Disp: , Rfl:     gabapentin (NEURONTIN) 600 MG tablet, Take 1 tablet by mouth 3 (Three) Times a Day., Disp: , Rfl:     insulin detemir (LEVEMIR) 100 UNIT/ML injection, Inject 30 Units under the skin into the appropriate area as directed Daily., Disp: , Rfl:     isosorbide mononitrate (IMDUR) 30 MG 24 hr tablet, TAKE 1 TABLET DAILY, Disp: 90 tablet, Rfl: 3    methotrexate 2.5 MG tablet, Take 10 tablets by mouth 1 (One) Time Per Week. Prior to [facility] Admission, Patient was on: Takes 10 tablets on Saturday, Disp: , Rfl:     metoprolol succinate XL (TOPROL-XL) 100 MG 24 hr tablet, TAKE 1 TABLET DAILY, Disp: 90 tablet, Rfl: 3    olopatadine (PATANASE) 0.6 % solution nasal solution, 2 sprays by Each Nare route 2 (Two) Times a Day., Disp: , Rfl:     potassium chloride 10 MEQ CR tablet, Take 1 tablet by mouth Daily., Disp: , Rfl:     primidone (MYSOLINE) 50 MG  "tablet, Take 1 tablet by mouth Every Night., Disp: , Rfl:     rimegepant 75 MG dispersible tablet, DISSOLVE ONE TABLET UNDER THE TONGUE AT ONSET OF MIGRAINE HEADACHE. MAX DOSE ONE TABLET IN 24 HOURS, Disp: , Rfl:     sodium chloride 1 g tablet, Take 2 tablets by mouth 3 (Three) Times a Day., Disp: , Rfl:     sucralfate (CARAFATE) 1 g tablet, Take 1 tablet by mouth 4 (Four) Times a Day. Take one tablet by mouth 4 times daily 1 hour before meals and at bedtime on an empty stomach., Disp: , Rfl:     Xarelto 20 MG tablet, TAKE 1 TABLET DAILY, Disp: 90 tablet, Rfl: 3    The following portions of the patient's history were reviewed and updated as appropriate: allergies, current medications, past family history, past medical history, past social history, past surgical history and problem list.    Social History     Tobacco Use    Smoking status: Never    Smokeless tobacco: Never   Vaping Use    Vaping status: Never Used   Substance Use Topics    Alcohol use: No    Drug use: No         Objective   Vitals:    03/25/24 1033   BP: 133/79   Pulse: 89   SpO2: 99%   Weight: 77.7 kg (171 lb 3.2 oz)   Height: 165.1 cm (65\")     Body mass index is 28.49 kg/m².    ROS    Constitutional:       General: Not in acute distress.     Appearance: Healthy appearance. Well-developed and not in distress. Not diaphoretic.   Eyes:      Conjunctiva/sclera: Conjunctivae normal.      Pupils: Pupils are equal, round, and reactive to light.   HENT:      Head: Normocephalic and atraumatic.   Neck:      Vascular: No carotid bruit or JVD.   Pulmonary:      Effort: Pulmonary effort is normal. No respiratory distress.      Breath sounds: Normal breath sounds.   Cardiovascular:      Normal rate. Regular rhythm.   Edema:     Peripheral edema absent.   Skin:     General: Skin is cool.   Neurological:      Mental Status: Alert, oriented to person, place, and time and oriented to person, place and time.         Lab Results   Component Value Date     " 01/11/2024    K 4.5 01/11/2024     01/11/2024    CO2 23.0 01/11/2024    BUN 12 01/11/2024    CREATININE 0.93 01/11/2024    GLUCOSE 237 (H) 01/11/2024    CALCIUM 8.5 (L) 01/11/2024    AST 24 01/11/2024    ALT 29 01/11/2024    ALKPHOS 135 (H) 01/11/2024    LABIL2 1.4 (L) 08/11/2015     Lab Results   Component Value Date    CKTOTAL 40 04/12/2017     Lab Results   Component Value Date    WBC 8.59 02/13/2024    HGB 11.4 (L) 02/13/2024    HCT 35.1 02/13/2024     02/13/2024     Lab Results   Component Value Date    INR 1.54 (H) 09/13/2023    INR 0.97 11/22/2018    INR 0.93 07/20/2015     Lab Results   Component Value Date    MG 2.0 09/21/2020     Lab Results   Component Value Date    TSH 6.980 (H) 12/07/2023    CHLPL 132 07/22/2015    TRIG 49 01/06/2022    HDL 70 (H) 01/06/2022    LDL 73 01/06/2022      Lab Results   Component Value Date    BNP 51.0 11/22/2018       During this visit the following were done:  Labs Reviewed []    Labs Ordered []    Radiology Reports Reviewed []    Radiology Ordered []    PCP Records Reviewed []    Referring Provider Records Reviewed []    ER Records Reviewed []    Hospital Records Reviewed []    History Obtained From Family []    Radiology Images Reviewed []    Other Reviewed []    Records Requested []         ECG 12 Lead    Date/Time: 3/25/2024 10:34 AM  Performed by: Gael Baum PA-C    Authorized by: Gael Baum PA-C  Comparison: compared with previous ECG   Similar to previous ECG  Rhythm: sinus rhythm  Ectopy: atrial premature contractions  Conduction: left anterior fascicular block and 1st degree AV block    Clinical impression: abnormal EKG        Assessment & Plan    Diagnosis Plan   1. Essential hypertension  ECG 12 Lead    Adult Transthoracic Echo Complete W/ Cont if Necessary Per Protocol    XR Chest 2 View      2. Paroxysmal atrial fibrillation  Adult Transthoracic Echo Complete W/ Cont if Necessary Per Protocol               Recommendations:  Dyspnea on  exertion  Order chest x-ray to rule out pulmonary congestion.  Or symptoms are mild and physical exam was unremarkable lungs were fully clear throughout lung fields with no rales or rhonchi.  Pedal edema is only trace.  She has history of severe hyponatremia requiring hospitalization so reluctant to start diuretics unless symptoms worsen or radiological evidence of pulmonary congestion on x-ray.  Will order echocardiogram  Surgical risk evaluation for bladder stimulator generator change  Fairly low risk procedure if chest x-ray is unremarkable can clear patient she will be low to moderate risk due to need of having to hold Xarelto 48 hours prior to the procedure.    Return in about 2 months (around 5/25/2024).    As always, I appreciate very much the opportunity to participate in the cardiovascular care of your patients.      With Best Regards,    Gael Baum PA-C

## 2024-03-27 ENCOUNTER — TELEPHONE (OUTPATIENT)
Dept: CARDIOLOGY | Facility: CLINIC | Age: 64
End: 2024-03-27
Payer: MEDICARE

## 2024-04-08 RX ORDER — RIVAROXABAN 20 MG/1
TABLET, FILM COATED ORAL
Qty: 90 TABLET | Refills: 3 | Status: SHIPPED | OUTPATIENT
Start: 2024-04-08

## 2024-04-10 ENCOUNTER — LAB (OUTPATIENT)
Dept: LAB | Facility: HOSPITAL | Age: 64
End: 2024-04-10
Payer: MEDICARE

## 2024-04-10 ENCOUNTER — TRANSCRIBE ORDERS (OUTPATIENT)
Dept: ADMINISTRATIVE | Facility: HOSPITAL | Age: 64
End: 2024-04-10
Payer: MEDICARE

## 2024-04-10 DIAGNOSIS — E55.9 VITAMIN D DEFICIENCY: ICD-10-CM

## 2024-04-10 DIAGNOSIS — E87.1 HYPONATREMIA: ICD-10-CM

## 2024-04-10 DIAGNOSIS — E83.51 HYPOCALCEMIA: Primary | ICD-10-CM

## 2024-04-10 DIAGNOSIS — E83.51 HYPOCALCEMIA: ICD-10-CM

## 2024-04-10 PROCEDURE — 80053 COMPREHEN METABOLIC PANEL: CPT

## 2024-04-10 PROCEDURE — 82306 VITAMIN D 25 HYDROXY: CPT

## 2024-04-10 PROCEDURE — 84100 ASSAY OF PHOSPHORUS: CPT

## 2024-04-10 PROCEDURE — 36415 COLL VENOUS BLD VENIPUNCTURE: CPT

## 2024-04-10 PROCEDURE — 83970 ASSAY OF PARATHORMONE: CPT

## 2024-04-11 LAB
25(OH)D3 SERPL-MCNC: 27.8 NG/ML (ref 30–100)
ALBUMIN SERPL-MCNC: 3.6 G/DL (ref 3.5–5.2)
ALBUMIN/GLOB SERPL: 1.7 G/DL
ALP SERPL-CCNC: 144 U/L (ref 39–117)
ALT SERPL W P-5'-P-CCNC: 28 U/L (ref 1–33)
ANION GAP SERPL CALCULATED.3IONS-SCNC: 9 MMOL/L (ref 5–15)
AST SERPL-CCNC: 26 U/L (ref 1–32)
BILIRUB SERPL-MCNC: 0.4 MG/DL (ref 0–1.2)
BUN SERPL-MCNC: 8 MG/DL (ref 8–23)
BUN/CREAT SERPL: 7.6 (ref 7–25)
CALCIUM SPEC-SCNC: 8.2 MG/DL (ref 8.6–10.5)
CHLORIDE SERPL-SCNC: 105 MMOL/L (ref 98–107)
CO2 SERPL-SCNC: 26 MMOL/L (ref 22–29)
CREAT SERPL-MCNC: 1.05 MG/DL (ref 0.57–1)
EGFRCR SERPLBLD CKD-EPI 2021: 60.2 ML/MIN/1.73
GLOBULIN UR ELPH-MCNC: 2.1 GM/DL
GLUCOSE SERPL-MCNC: 323 MG/DL (ref 65–99)
PHOSPHATE SERPL-MCNC: 4 MG/DL (ref 2.5–4.5)
POTASSIUM SERPL-SCNC: 4.1 MMOL/L (ref 3.5–5.2)
PROT SERPL-MCNC: 5.7 G/DL (ref 6–8.5)
PTH-INTACT SERPL-MCNC: 53 PG/ML (ref 15–65)
SODIUM SERPL-SCNC: 140 MMOL/L (ref 136–145)

## 2024-04-23 ENCOUNTER — TRANSCRIBE ORDERS (OUTPATIENT)
Dept: ADMINISTRATIVE | Facility: HOSPITAL | Age: 64
End: 2024-04-23
Payer: MEDICARE

## 2024-04-23 ENCOUNTER — LAB (OUTPATIENT)
Dept: LAB | Facility: HOSPITAL | Age: 64
End: 2024-04-23
Payer: MEDICARE

## 2024-04-23 DIAGNOSIS — R07.89 OTHER CHEST PAIN: ICD-10-CM

## 2024-04-23 DIAGNOSIS — R07.89 OTHER CHEST PAIN: Primary | ICD-10-CM

## 2024-04-23 DIAGNOSIS — Z71.3 DIETARY COUNSELING: ICD-10-CM

## 2024-04-23 LAB
ALBUMIN SERPL-MCNC: 3.3 G/DL (ref 3.5–5.2)
ALBUMIN/GLOB SERPL: 1.3 G/DL
ALP SERPL-CCNC: 178 U/L (ref 39–117)
ALT SERPL W P-5'-P-CCNC: 21 U/L (ref 1–33)
ANION GAP SERPL CALCULATED.3IONS-SCNC: 11.6 MMOL/L (ref 5–15)
AST SERPL-CCNC: 21 U/L (ref 1–32)
BILIRUB SERPL-MCNC: 0.3 MG/DL (ref 0–1.2)
BUN SERPL-MCNC: 12 MG/DL (ref 8–23)
BUN/CREAT SERPL: 15.2 (ref 7–25)
CALCIUM SPEC-SCNC: 8.8 MG/DL (ref 8.6–10.5)
CHLORIDE SERPL-SCNC: 101 MMOL/L (ref 98–107)
CO2 SERPL-SCNC: 23.4 MMOL/L (ref 22–29)
CREAT SERPL-MCNC: 0.79 MG/DL (ref 0.57–1)
CRP SERPL-MCNC: <0.3 MG/DL (ref 0–0.5)
DEPRECATED RDW RBC AUTO: 54.1 FL (ref 37–54)
EGFRCR SERPLBLD CKD-EPI 2021: 84.7 ML/MIN/1.73
ERYTHROCYTE [DISTWIDTH] IN BLOOD BY AUTOMATED COUNT: 14.4 % (ref 12.3–15.4)
ERYTHROCYTE [SEDIMENTATION RATE] IN BLOOD: 9 MM/HR (ref 0–30)
GLOBULIN UR ELPH-MCNC: 2.5 GM/DL
GLUCOSE SERPL-MCNC: 296 MG/DL (ref 65–99)
HCT VFR BLD AUTO: 36.3 % (ref 34–46.6)
HGB BLD-MCNC: 11.7 G/DL (ref 12–15.9)
MCH RBC QN AUTO: 33.7 PG (ref 26.6–33)
MCHC RBC AUTO-ENTMCNC: 32.2 G/DL (ref 31.5–35.7)
MCV RBC AUTO: 104.6 FL (ref 79–97)
PLATELET # BLD AUTO: 323 10*3/MM3 (ref 140–450)
PMV BLD AUTO: 9.6 FL (ref 6–12)
POTASSIUM SERPL-SCNC: 4.9 MMOL/L (ref 3.5–5.2)
PROT SERPL-MCNC: 5.8 G/DL (ref 6–8.5)
RBC # BLD AUTO: 3.47 10*6/MM3 (ref 3.77–5.28)
SODIUM SERPL-SCNC: 136 MMOL/L (ref 136–145)
WBC NRBC COR # BLD AUTO: 6.8 10*3/MM3 (ref 3.4–10.8)

## 2024-04-23 PROCEDURE — 85652 RBC SED RATE AUTOMATED: CPT

## 2024-04-23 PROCEDURE — 80053 COMPREHEN METABOLIC PANEL: CPT

## 2024-04-23 PROCEDURE — 86140 C-REACTIVE PROTEIN: CPT

## 2024-04-23 PROCEDURE — 36415 COLL VENOUS BLD VENIPUNCTURE: CPT

## 2024-04-23 PROCEDURE — 85027 COMPLETE CBC AUTOMATED: CPT

## 2024-04-25 ENCOUNTER — HOSPITAL ENCOUNTER (OUTPATIENT)
Dept: CARDIOLOGY | Facility: HOSPITAL | Age: 64
Discharge: HOME | End: 2024-04-25
Admitting: PHYSICIAN ASSISTANT
Payer: MEDICARE

## 2024-04-25 DIAGNOSIS — I48.0 PAROXYSMAL ATRIAL FIBRILLATION: ICD-10-CM

## 2024-04-25 DIAGNOSIS — I10 ESSENTIAL HYPERTENSION: ICD-10-CM

## 2024-04-25 PROCEDURE — 93306 TTE W/DOPPLER COMPLETE: CPT

## 2024-04-27 LAB
BH CV ECHO MEAS - AO MAX PG: 3.8 MMHG
BH CV ECHO MEAS - AO MEAN PG: 3 MMHG
BH CV ECHO MEAS - AO ROOT DIAM: 3.5 CM
BH CV ECHO MEAS - AO V2 MAX: 97 CM/SEC
BH CV ECHO MEAS - AO V2 VTI: 18 CM
BH CV ECHO MEAS - EDV(CUBED): 27 ML
BH CV ECHO MEAS - EDV(MOD-SP4): 41 ML
BH CV ECHO MEAS - EF(MOD-SP4): 61.5 %
BH CV ECHO MEAS - ESV(CUBED): 24.4 ML
BH CV ECHO MEAS - ESV(MOD-SP4): 15.8 ML
BH CV ECHO MEAS - FS: 3.3 %
BH CV ECHO MEAS - IVS/LVPW: 0.94 CM
BH CV ECHO MEAS - IVSD: 1.5 CM
BH CV ECHO MEAS - LA DIMENSION: 3.9 CM
BH CV ECHO MEAS - LAT PEAK E' VEL: 9 CM/SEC
BH CV ECHO MEAS - LV DIASTOLIC VOL/BSA (35-75): 22.2 CM2
BH CV ECHO MEAS - LV MASS(C)D: 167 GRAMS
BH CV ECHO MEAS - LV SYSTOLIC VOL/BSA (12-30): 8.5 CM2
BH CV ECHO MEAS - LVIDD: 3 CM
BH CV ECHO MEAS - LVIDS: 2.9 CM
BH CV ECHO MEAS - LVOT AREA: 3.1 CM2
BH CV ECHO MEAS - LVOT DIAM: 2 CM
BH CV ECHO MEAS - LVPWD: 1.6 CM
BH CV ECHO MEAS - MED PEAK E' VEL: 6.6 CM/SEC
BH CV ECHO MEAS - MV A MAX VEL: 103 CM/SEC
BH CV ECHO MEAS - MV E MAX VEL: 52.5 CM/SEC
BH CV ECHO MEAS - MV E/A: 0.51
BH CV ECHO MEAS - PA ACC TIME: 0.1 SEC
BH CV ECHO MEAS - SV(MOD-SP4): 25.2 ML
BH CV ECHO MEAS - SVI(MOD-SP4): 13.6 ML/M2
BH CV ECHO MEAS - TAPSE (>1.6): 1.8 CM
BH CV ECHO MEASUREMENTS AVERAGE E/E' RATIO: 6.73
LEFT ATRIUM VOLUME INDEX: 19 ML/M2

## 2024-04-30 ENCOUNTER — TRANSCRIBE ORDERS (OUTPATIENT)
Dept: ADMINISTRATIVE | Facility: HOSPITAL | Age: 64
End: 2024-04-30
Payer: MEDICARE

## 2024-04-30 DIAGNOSIS — R07.81 RIB PAIN ON LEFT SIDE: Primary | ICD-10-CM

## 2024-05-10 ENCOUNTER — HOSPITAL ENCOUNTER (OUTPATIENT)
Dept: CT IMAGING | Facility: HOSPITAL | Age: 64
Discharge: HOME | End: 2024-05-10
Payer: MEDICARE

## 2024-05-10 DIAGNOSIS — R07.81 RIB PAIN ON LEFT SIDE: ICD-10-CM

## 2024-05-10 PROCEDURE — 71250 CT THORAX DX C-: CPT

## 2024-05-22 ENCOUNTER — OFFICE VISIT (OUTPATIENT)
Dept: CARDIOLOGY | Facility: CLINIC | Age: 64
End: 2024-05-22
Payer: MEDICARE

## 2024-05-22 VITALS
WEIGHT: 162.4 LBS | SYSTOLIC BLOOD PRESSURE: 117 MMHG | HEIGHT: 65 IN | BODY MASS INDEX: 27.06 KG/M2 | DIASTOLIC BLOOD PRESSURE: 72 MMHG | HEART RATE: 85 BPM | OXYGEN SATURATION: 100 %

## 2024-05-22 DIAGNOSIS — I48.0 PAROXYSMAL ATRIAL FIBRILLATION: Primary | ICD-10-CM

## 2024-05-22 DIAGNOSIS — I10 ESSENTIAL HYPERTENSION: ICD-10-CM

## 2024-05-22 PROCEDURE — 3078F DIAST BP <80 MM HG: CPT | Performed by: PHYSICIAN ASSISTANT

## 2024-05-22 PROCEDURE — 3074F SYST BP LT 130 MM HG: CPT | Performed by: PHYSICIAN ASSISTANT

## 2024-05-22 PROCEDURE — 99213 OFFICE O/P EST LOW 20 MIN: CPT | Performed by: PHYSICIAN ASSISTANT

## 2024-05-22 NOTE — PROGRESS NOTES
Kreis, Samuel Duane, MD  Lashae Benitez  1960  05/22/2024    Patient Active Problem List   Diagnosis    Hiatal hernia    Essential hypertension    Sjogren's syndrome    Multiple sclerosis    Psoriasis    Fibromyalgia    Osteoarthritis    Psoriatic arthritis    RA (rheumatoid arthritis)    Degenerative disc disease, lumbar    TMJ arthritis    Dupuytren's disease    H. pylori infection    Family history of coronary artery disease    Type 2 diabetes mellitus    Edema    Bilateral leg edema    Isolated corticotropin deficiency    Hyponatremia    Paroxysmal atrial fibrillation    Sepsis    Bacteremia, escherichia coli    Iron deficiency anemia    Malabsorption due to intolerance, not elsewhere classified       Dear Kreis, Samuel Duane, MD:    Subjective     History of Present Illness:    Chief Complaint   Patient presents with    Follow-up     2 month follow up  ECHO RESULTS    Med Management     MED LIST          Lashae Benitez is a pleasant 63 y.o. female with a past medical history significant for no known coronary artery disease or structural heart disease she also denies any history of tobacco abuse.  She does have diabetes mellitus, paroxysmal atrial fibrillation anticoagulated with Xarelto and with rhythm control on flecainide.  She does have essential hypertension.  she does report family history of premature CAD with her father having a major AMI in his 30s.  She does also have history of severe hyponatremia with sodium being as low as 111 thought to be drug-induced as at the time she was taken hydrochlorothiazide, very high dose of Cymbalta, and Celebrex.  She comes in for routine cardiology follow up.      Lashae comes in after echocardiogram was recently performed which showed normal LVEF with grade 1 diastolic dysfunction but no significant valvular disease.  Clinically she denies any breakthrough episodes of atrial fibrillation no bleeding issues with Xarelto.  She reports her breathing has been stable since  "she was last seen and denies any recent worsening of this including orthopnea or PND.  She did recently have CT scan which did show multiple rib fractures she is unsure why she denies any traumatic accidents and is seeing PCP next week for this.    Allergies   Allergen Reactions    Codeine Angioedema    Imuran [Azathioprine] Other (See Comments)     Has pancreatis attacks with med    Januvia [Sitagliptin] Other (See Comments)     Has pancreatis attacks with med     Penicillins Angioedema    Sulfa Antibiotics Angioedema    Sulfasalazine Unknown (See Comments)    Beta Adrenergic Blockers Other (See Comments)     I called pt to ask her about the allergy to bblockers in her chart. Pt states \"too big of a dose makes her psoriasis act up\", but this current dose of metoprolol 50 mg bid, she has been on for a long time, with no ill side effects.  CATA,CMA 3/28/18   :      Current Outpatient Medications:     abatacept (ORENCIA) 250 MG injection, Infuse 30 mL into a venous catheter Every 28 (Twenty-Eight) Days., Disp: , Rfl:     alendronate (FOSAMAX) 70 MG tablet, Take 1 tablet by mouth 1 (One) Time Per Week. Wednesday, Disp: , Rfl:     amitriptyline (ELAVIL) 25 MG tablet, Take 1 tablet by mouth Every Night., Disp: , Rfl:     Apremilast (Otezla) 30 MG tablet, Take 30 mg by mouth 2 (two) times a day., Disp: , Rfl:     celecoxib (CeleBREX) 200 MG capsule, Take 1 capsule by mouth Daily., Disp: , Rfl:     cyanocobalamin 1000 MCG/ML injection, Inject 1 mL into the appropriate muscle as directed by prescriber Every 28 (Twenty-Eight) Days., Disp: , Rfl:     cyclobenzaprine (FLEXERIL) 10 MG tablet, Take 1 tablet by mouth Every Evening., Disp: , Rfl:     DULoxetine (CYMBALTA) 30 MG capsule, Take 1 capsule by mouth Every Morning., Disp: , Rfl:     DULoxetine (CYMBALTA) 60 MG capsule, Take 1 capsule by mouth Daily., Disp: , Rfl:     EMGALITY 120 MG/ML prefilled syringe, Inject 1 mL under the skin into the appropriate area as directed " Every 30 (Thirty) Days., Disp: , Rfl: 3    famotidine (PEPCID) 40 MG tablet, Take 1 tablet by mouth 2 (Two) Times a Day., Disp: , Rfl:     ferrous gluconate (FERGON) 240 (27 FE) MG tablet, Take 1 tablet by mouth Every Morning., Disp: , Rfl:     fexofenadine (ALLEGRA) 180 MG tablet, Take 1 tablet by mouth Daily., Disp: , Rfl:     Finerenone (Kerendia) 10 MG tablet, Take 1 tablet by mouth Daily., Disp: , Rfl:     flecainide (TAMBOCOR) 50 MG tablet, Take 1 tablet by mouth 2 (Two) Times a Day., Disp: 180 tablet, Rfl: 3    fluticasone-salmeterol (ADVAIR HFA) 115-21 MCG/ACT inhaler, Inhale 2 puffs 2 (Two) Times a Day., Disp: , Rfl:     folic acid (FOLVITE) 1 MG tablet, Take 1 tablet by mouth Daily., Disp: , Rfl:     gabapentin (NEURONTIN) 600 MG tablet, Take 1 tablet by mouth 3 (Three) Times a Day., Disp: , Rfl:     isosorbide mononitrate (IMDUR) 30 MG 24 hr tablet, TAKE 1 TABLET DAILY, Disp: 90 tablet, Rfl: 3    methotrexate 2.5 MG tablet, Take 10 tablets by mouth 1 (One) Time Per Week. Prior to [facility] Admission, Patient was on: Takes 10 tablets on Saturday, Disp: , Rfl:     metoprolol succinate XL (TOPROL-XL) 100 MG 24 hr tablet, TAKE 1 TABLET DAILY, Disp: 90 tablet, Rfl: 3    olopatadine (PATANASE) 0.6 % solution nasal solution, 2 sprays by Each Nare route 2 (Two) Times a Day., Disp: , Rfl:     potassium chloride 10 MEQ CR tablet, Take 1 tablet by mouth Daily., Disp: , Rfl:     primidone (MYSOLINE) 50 MG tablet, Take 1 tablet by mouth Every Night., Disp: , Rfl:     sodium chloride 1 g tablet, Take 2 tablets by mouth 3 (Three) Times a Day., Disp: , Rfl:     sucralfate (CARAFATE) 1 g tablet, Take 1 tablet by mouth 4 (Four) Times a Day. Take one tablet by mouth 4 times daily 1 hour before meals and at bedtime on an empty stomach., Disp: , Rfl:     Xarelto 20 MG tablet, TAKE 1 TABLET DAILY, Disp: 90 tablet, Rfl: 3    Butalbital-Acetaminophen  MG capsule, Take 300 mg by mouth Daily As Needed. (Patient not taking:  "Reported on 5/22/2024), Disp: , Rfl:     Diclofenac Sodium (VOLTAREN) 1 % gel gel, Apply 2 g topically to the appropriate area as directed 4 (Four) Times a Day As Needed. (Patient not taking: Reported on 5/22/2024), Disp: , Rfl:     insulin detemir (LEVEMIR) 100 UNIT/ML injection, Inject 30 Units under the skin into the appropriate area as directed Daily., Disp: , Rfl:     rimegepant 75 MG dispersible tablet, DISSOLVE ONE TABLET UNDER THE TONGUE AT ONSET OF MIGRAINE HEADACHE. MAX DOSE ONE TABLET IN 24 HOURS (Patient not taking: Reported on 5/22/2024), Disp: , Rfl:     The following portions of the patient's history were reviewed and updated as appropriate: allergies, current medications, past family history, past medical history, past social history, past surgical history and problem list.    Social History     Tobacco Use    Smoking status: Never    Smokeless tobacco: Never   Vaping Use    Vaping status: Never Used   Substance Use Topics    Alcohol use: No    Drug use: No         Objective   Vitals:    05/22/24 0911   BP: 117/72   BP Location: Left arm   Patient Position: Sitting   Cuff Size: Adult   Pulse: 85   SpO2: 100%   Weight: 73.7 kg (162 lb 6.4 oz)   Height: 165.1 cm (65\")     Body mass index is 27.02 kg/m².    ROS    Physical Exam    Lab Results   Component Value Date     04/23/2024    K 4.9 04/23/2024     04/23/2024    CO2 23.4 04/23/2024    BUN 12 04/23/2024    CREATININE 0.79 04/23/2024    GLUCOSE 296 (H) 04/23/2024    CALCIUM 8.8 04/23/2024    AST 21 04/23/2024    ALT 21 04/23/2024    ALKPHOS 178 (H) 04/23/2024    LABIL2 1.4 (L) 08/11/2015     Lab Results   Component Value Date    CKTOTAL 40 04/12/2017     Lab Results   Component Value Date    WBC 6.80 04/23/2024    HGB 11.7 (L) 04/23/2024    HCT 36.3 04/23/2024     04/23/2024     Lab Results   Component Value Date    INR 1.54 (H) 09/13/2023    INR 0.97 11/22/2018    INR 0.93 07/20/2015     Lab Results   Component Value Date    MG " 2.0 09/21/2020     Lab Results   Component Value Date    TSH 6.980 (H) 12/07/2023    CHLPL 132 07/22/2015    TRIG 49 01/06/2022    HDL 70 (H) 01/06/2022    LDL 73 01/06/2022      Lab Results   Component Value Date    BNP 51.0 11/22/2018       During this visit the following were done:  Labs Reviewed []    Labs Ordered []    Radiology Reports Reviewed []    Radiology Ordered []    PCP Records Reviewed []    Referring Provider Records Reviewed []    ER Records Reviewed []    Hospital Records Reviewed []    History Obtained From Family []    Radiology Images Reviewed []    Other Reviewed []    Records Requested []       Procedures    Assessment & Plan   No diagnosis found.         Recommendations:  Paroxysmal atrial fibrillation  Maintaining sinus rhythm with assistance of flecainide anticoagulated with Xarelto she denies any bleeding issues or known breakthrough episodes of atrial fibrillation  Dyspnea  Discussed results of echocardiogram which was unremarkable.  Recent CT scan showed no pulmonary congestion.  Dyspnea may be related to her recent rib fractures.  Essential hypertension  Well-controlled    No follow-ups on file.    As always, I appreciate very much the opportunity to participate in the cardiovascular care of your patients.      With Best Regards,    Gael Baum PA-C

## 2024-05-28 ENCOUNTER — LAB (OUTPATIENT)
Dept: ONCOLOGY | Facility: CLINIC | Age: 64
End: 2024-05-28
Payer: MEDICARE

## 2024-05-28 ENCOUNTER — OFFICE VISIT (OUTPATIENT)
Dept: ONCOLOGY | Facility: CLINIC | Age: 64
End: 2024-05-28
Payer: MEDICARE

## 2024-05-28 VITALS
WEIGHT: 160.8 LBS | HEART RATE: 88 BPM | TEMPERATURE: 97.1 F | OXYGEN SATURATION: 99 % | BODY MASS INDEX: 26.79 KG/M2 | SYSTOLIC BLOOD PRESSURE: 118 MMHG | DIASTOLIC BLOOD PRESSURE: 68 MMHG | RESPIRATION RATE: 18 BRPM | HEIGHT: 65 IN

## 2024-05-28 DIAGNOSIS — K90.49 MALABSORPTION DUE TO INTOLERANCE, NOT ELSEWHERE CLASSIFIED: ICD-10-CM

## 2024-05-28 DIAGNOSIS — L40.50 PSORIATIC ARTHRITIS: ICD-10-CM

## 2024-05-28 DIAGNOSIS — D64.9 NORMOCYTIC ANEMIA: ICD-10-CM

## 2024-05-28 DIAGNOSIS — D50.9 IRON DEFICIENCY ANEMIA, UNSPECIFIED IRON DEFICIENCY ANEMIA TYPE: Primary | ICD-10-CM

## 2024-05-28 DIAGNOSIS — D50.9 IRON DEFICIENCY ANEMIA, UNSPECIFIED IRON DEFICIENCY ANEMIA TYPE: ICD-10-CM

## 2024-05-28 LAB
BASOPHILS # BLD AUTO: 0.06 10*3/MM3 (ref 0–0.2)
BASOPHILS NFR BLD AUTO: 1.3 % (ref 0–1.5)
DEPRECATED RDW RBC AUTO: 54.1 FL (ref 37–54)
EOSINOPHIL # BLD AUTO: 0.13 10*3/MM3 (ref 0–0.4)
EOSINOPHIL NFR BLD AUTO: 2.8 % (ref 0.3–6.2)
ERYTHROCYTE [DISTWIDTH] IN BLOOD BY AUTOMATED COUNT: 14.6 % (ref 12.3–15.4)
FERRITIN SERPL-MCNC: 71.52 NG/ML (ref 13–150)
HCT VFR BLD AUTO: 34.6 % (ref 34–46.6)
HGB BLD-MCNC: 11.3 G/DL (ref 12–15.9)
IMM GRANULOCYTES # BLD AUTO: 0.02 10*3/MM3 (ref 0–0.05)
IMM GRANULOCYTES NFR BLD AUTO: 0.4 % (ref 0–0.5)
IRON 24H UR-MRATE: 124 MCG/DL (ref 37–145)
IRON SATN MFR SERPL: 38 % (ref 20–50)
LYMPHOCYTES # BLD AUTO: 1.15 10*3/MM3 (ref 0.7–3.1)
LYMPHOCYTES NFR BLD AUTO: 24.7 % (ref 19.6–45.3)
MCH RBC QN AUTO: 33.3 PG (ref 26.6–33)
MCHC RBC AUTO-ENTMCNC: 32.7 G/DL (ref 31.5–35.7)
MCV RBC AUTO: 102.1 FL (ref 79–97)
MONOCYTES # BLD AUTO: 0.36 10*3/MM3 (ref 0.1–0.9)
MONOCYTES NFR BLD AUTO: 7.7 % (ref 5–12)
NEUTROPHILS NFR BLD AUTO: 2.93 10*3/MM3 (ref 1.7–7)
NEUTROPHILS NFR BLD AUTO: 63.1 % (ref 42.7–76)
NRBC BLD AUTO-RTO: 0 /100 WBC (ref 0–0.2)
PLATELET # BLD AUTO: 290 10*3/MM3 (ref 140–450)
PMV BLD AUTO: 9.4 FL (ref 6–12)
RBC # BLD AUTO: 3.39 10*6/MM3 (ref 3.77–5.28)
TIBC SERPL-MCNC: 329 MCG/DL (ref 298–536)
TRANSFERRIN SERPL-MCNC: 221 MG/DL (ref 200–360)
WBC NRBC COR # BLD AUTO: 4.65 10*3/MM3 (ref 3.4–10.8)

## 2024-05-28 PROCEDURE — 85025 COMPLETE CBC W/AUTO DIFF WBC: CPT | Performed by: NURSE PRACTITIONER

## 2024-05-28 PROCEDURE — 83540 ASSAY OF IRON: CPT | Performed by: NURSE PRACTITIONER

## 2024-05-28 PROCEDURE — 36415 COLL VENOUS BLD VENIPUNCTURE: CPT | Performed by: NURSE PRACTITIONER

## 2024-05-28 PROCEDURE — 1160F RVW MEDS BY RX/DR IN RCRD: CPT | Performed by: NURSE PRACTITIONER

## 2024-05-28 PROCEDURE — 3074F SYST BP LT 130 MM HG: CPT | Performed by: NURSE PRACTITIONER

## 2024-05-28 PROCEDURE — 99214 OFFICE O/P EST MOD 30 MIN: CPT | Performed by: NURSE PRACTITIONER

## 2024-05-28 PROCEDURE — 82728 ASSAY OF FERRITIN: CPT | Performed by: NURSE PRACTITIONER

## 2024-05-28 PROCEDURE — 1159F MED LIST DOCD IN RCRD: CPT | Performed by: NURSE PRACTITIONER

## 2024-05-28 PROCEDURE — 3078F DIAST BP <80 MM HG: CPT | Performed by: NURSE PRACTITIONER

## 2024-05-28 PROCEDURE — 84466 ASSAY OF TRANSFERRIN: CPT | Performed by: NURSE PRACTITIONER

## 2024-05-28 PROCEDURE — 1125F AMNT PAIN NOTED PAIN PRSNT: CPT | Performed by: NURSE PRACTITIONER

## 2024-05-28 RX ORDER — INSULIN LISPRO 100 [IU]/ML
15 INJECTION, SUSPENSION SUBCUTANEOUS
COMMUNITY
Start: 2024-03-28

## 2024-05-28 RX ORDER — POTASSIUM CHLORIDE 750 MG/1
10 TABLET, EXTENDED RELEASE ORAL
COMMUNITY
Start: 2024-04-16

## 2024-05-28 RX ORDER — LEVOTHYROXINE SODIUM 112 UG/1
1 TABLET ORAL DAILY
COMMUNITY

## 2024-05-28 NOTE — PROGRESS NOTES
Venipuncture Blood Specimen Collection  Venipuncture performed in Right arm by Patricia Cornelius MA with good hemostasis. Patient tolerated the procedure well without complications.   05/28/24   Patricia Cornelius MA

## 2024-05-28 NOTE — PROGRESS NOTES
DATE OF FOLLOW UP:  5/28/2024    REASON FOR REFERRAL: Normocytic Anemia    REFERRING PHYSICIAN:  No ref. provider found    CHIEF COMPLAINT:  Follow Up Anemia    TREATMENT HISTORY:  Folic Acid 1 mg daily    B12 1000 mcg SQ monthly    Feraheme Day 1 12/11/2023  Day 8 12/18/2023      HISTORY OF PRESENT ILLNESS:   Lashae Benitez is a very pleasant 63 y.o. female who is being seen today at the request of No ref. provider found for evaluation and treatment of normocytic anemia. Ms. Benitez reports following with her PCP every 3 months with lab testing. She reports that she has been aware of the above issue for at least six months. Previous available CBCs were reviewed and patient has had chronic anemia with fluctuations in Hg ranging 9.2-11.9 since at least August 2019. She is taking oral iron supplement daily which she is having a difficult time tolerating due to GI upset. She also takes Folic Acid daily and B12 injections monthly. She denies any obvious blood loss from any source. She reports having colonoscopy per Dr. Frederick last year that was unremarkable. She has never had EGD. No history of blood transfusion. She is on Xarelto for A-Fib. She also follows with Dr. Chand (rheumatology) for psoriatic arthritis and is currently on Orencia, Otezla and MTX. She reports chronic aches/pain and chronic fatigue but denies any worsening. She denies any other specific complaints at this time.     INTERVAL HISTORY:  Ms. Benitez presents today for follow up of anemia. She was last replaced with IV Feraheme in mid-December 2023 and continues to report improvement in fatigue. She is also taking Folic Acid 1 mg daily and B12 1000 mcg SQ monthly. She denies shortness of breath on exertion, chest pain or dizziness. She denies any obvious blood loss from any source. She underwent cystoscopy in February 2023 which she reports was unremarkable. She continues to follow with Dr. Glez.  She also continues to take MTX, Orencia and Otezla for  psoriatic arthritis per [facility]. She is without any specific complaints today.     PAST MEDICAL HISTORY:  Past Medical History:   Diagnosis Date    Acid reflux     Allergic     Anxiety     Cancer     thyroid, skin    Chronic pain disorder     Degenerative disc disease, lumbar     Depression     Dupuytren's disease     Essential hypertension     Family history of coronary artery disease     Fibromyalgia     Gallbladder abscess     H. pylori infection     Hiatal hernia     Hyperlipidemia     Hypothyroidism     Migraines     migraines    Multiple sclerosis     Osteoarthritis     Psoriasis     Psoriatic arthritis     RA (rheumatoid arthritis)     Rheumatoid arthritis     Sinusitis     Sjogren's syndrome     Stomach ulcer     TMJ arthritis     Type 2 diabetes mellitus     Urinary tract infection        PAST SURGICAL HISTORY:  Past Surgical History:   Procedure Laterality Date    BREAST LUMPECTOMY Left 11/1993    CARDIAC CATHETERIZATION Left 09/21/2009    Normal     CARPAL TUNNEL RELEASE  03/2012    CERVICAL POLYPECTOMY  12/2015    CHOLECYSTECTOMY  05/2007    COLONOSCOPY      ENDOSCOPY      GASTRIC BYPASS  09/2007    JOINT REPLACEMENT      KNEE ARTHROPLASTY      LUMBAR DISC SURGERY      C5-6    REPLACEMENT TOTAL KNEE Right 06/2016    SACRAL NERVE STIMULATOR PLACEMENT  09/2014    SACRAL NERVE STIMULATOR PLACEMENT  04/11/2024    @ [facility] in [place]    THYROIDECTOMY  03/2016       FAMILY HISTORY:  Family History   Problem Relation Age of Onset    Diabetes Mother     Stroke Mother     Hypertension Mother     Heart attack Father         First one was in his 20's second 30's.     Heart failure Father     Heart disease Father     Stroke Father     Diabetes Sister     Cancer Maternal Grandmother        SOCIAL HISTORY:  Social History     Socioeconomic History    Marital status:    Tobacco Use    Smoking status: Never    Smokeless tobacco: Never   Vaping Use    Vaping status: Never Used   Substance and  Sexual Activity    Alcohol use: No    Drug use: No    Sexual activity: Defer              MEDICATIONS:  The current medication list was reviewed in the EMR    Current Outpatient Medications:     abatacept (ORENCIA) 250 MG injection, Infuse 30 mL into a venous catheter Every 28 (Twenty-Eight) Days., Disp: , Rfl:     alendronate (FOSAMAX) 70 MG tablet, Take 1 tablet by mouth 1 (One) Time Per Week. Wednesday, Disp: , Rfl:     amitriptyline (ELAVIL) 25 MG tablet, Take 1 tablet by mouth Every Night., Disp: , Rfl:     Apremilast (Otezla) 30 MG tablet, Take 30 mg by mouth 2 (two) times a day., Disp: , Rfl:     Butalbital-Acetaminophen  MG capsule, Take 300 mg by mouth Daily As Needed. (Patient not taking: Reported on 5/22/2024), Disp: , Rfl:     celecoxib (CeleBREX) 200 MG capsule, Take 1 capsule by mouth Daily., Disp: , Rfl:     cyanocobalamin 1000 MCG/ML injection, Inject 1 mL into the appropriate muscle as directed by prescriber Every 28 (Twenty-Eight) Days., Disp: , Rfl:     cyclobenzaprine (FLEXERIL) 10 MG tablet, Take 1 tablet by mouth Every Evening., Disp: , Rfl:     Diclofenac Sodium (VOLTAREN) 1 % gel gel, Apply 2 g topically to the appropriate area as directed 4 (Four) Times a Day As Needed. (Patient not taking: Reported on 5/22/2024), Disp: , Rfl:     DULoxetine (CYMBALTA) 30 MG capsule, Take 1 capsule by mouth Every Morning., Disp: , Rfl:     DULoxetine (CYMBALTA) 60 MG capsule, Take 1 capsule by mouth Daily., Disp: , Rfl:     EMGALITY 120 MG/ML prefilled syringe, Inject 1 mL under the skin into the appropriate area as directed Every 30 (Thirty) Days., Disp: , Rfl: 3    famotidine (PEPCID) 40 MG tablet, Take 1 tablet by mouth 2 (Two) Times a Day., Disp: , Rfl:     ferrous gluconate (FERGON) 240 (27 FE) MG tablet, Take 1 tablet by mouth Every Morning., Disp: , Rfl:     fexofenadine (ALLEGRA) 180 MG tablet, Take 1 tablet by mouth Daily., Disp: , Rfl:     Finerenone (Kerendia) 10 MG tablet, Take 1 tablet by  mouth Daily., Disp: , Rfl:     flecainide (TAMBOCOR) 50 MG tablet, Take 1 tablet by mouth 2 (Two) Times a Day., Disp: 180 tablet, Rfl: 3    fluticasone-salmeterol (ADVAIR HFA) 115-21 MCG/ACT inhaler, Inhale 2 puffs 2 (Two) Times a Day., Disp: , Rfl:     folic acid (FOLVITE) 1 MG tablet, Take 1 tablet by mouth Daily., Disp: , Rfl:     gabapentin (NEURONTIN) 600 MG tablet, Take 1 tablet by mouth 3 (Three) Times a Day., Disp: , Rfl:     insulin detemir (LEVEMIR) 100 UNIT/ML injection, Inject 30 Units under the skin into the appropriate area as directed Daily., Disp: , Rfl:     isosorbide mononitrate (IMDUR) 30 MG 24 hr tablet, TAKE 1 TABLET DAILY, Disp: 90 tablet, Rfl: 3    methotrexate 2.5 MG tablet, Take 10 tablets by mouth 1 (One) Time Per Week. Prior to [facility] Admission, Patient was on: Takes 10 tablets on Saturday, Disp: , Rfl:     metoprolol succinate XL (TOPROL-XL) 100 MG 24 hr tablet, TAKE 1 TABLET DAILY, Disp: 90 tablet, Rfl: 3    olopatadine (PATANASE) 0.6 % solution nasal solution, 2 sprays by Each Nare route 2 (Two) Times a Day., Disp: , Rfl:     potassium chloride 10 MEQ CR tablet, Take 1 tablet by mouth Daily., Disp: , Rfl:     primidone (MYSOLINE) 50 MG tablet, Take 1 tablet by mouth Every Night., Disp: , Rfl:     rimegepant 75 MG dispersible tablet, DISSOLVE ONE TABLET UNDER THE TONGUE AT ONSET OF MIGRAINE HEADACHE. MAX DOSE ONE TABLET IN 24 HOURS (Patient not taking: Reported on 5/22/2024), Disp: , Rfl:     sodium chloride 1 g tablet, Take 2 tablets by mouth 3 (Three) Times a Day., Disp: , Rfl:     sucralfate (CARAFATE) 1 g tablet, Take 1 tablet by mouth 4 (Four) Times a Day. Take one tablet by mouth 4 times daily 1 hour before meals and at bedtime on an empty stomach., Disp: , Rfl:     Xarelto 20 MG tablet, TAKE 1 TABLET DAILY, Disp: 90 tablet, Rfl: 3    ALLERGIES:    Allergies   Allergen Reactions    Codeine Angioedema    Imuran [Azathioprine] Other (See Comments)     Has pancreatis attacks with med  "   Aaronuvia [Sitagliptin] Other (See Comments)     Has pancreatis attacks with med     Penicillins Angioedema    Sulfa Antibiotics Angioedema    Sulfasalazine Unknown (See Comments)    Beta Adrenergic Blockers Other (See Comments)     I called pt to ask her about the allergy to bblockers in her chart. Pt states \"too big of a dose makes her psoriasis act up\", but this current dose of metoprolol 50 mg bid, she has been on for a long time, with no ill side effects.  CATA,JONATHAN 3/28/18         REVIEW OF SYSTEMS:    A comprehensive 14 point review of systems was performed.  Significant findings as mentioned above.  All other systems reviewed and are negative.        Physical Exam   Vital Signs: /68   Pulse 88   Temp 97.1 °F (36.2 °C) (Temporal)   Resp 18   Ht 165.1 cm (65\")   Wt 72.9 kg (160 lb 12.8 oz)   SpO2 99%   BMI 26.76 kg/m²      ECOG score: 0   General: Well developed, well nourished, alert and oriented x 3, in no acute distress.   Head: ATNC   Eyes: PERRL, No evidence of conjunctivitis.   Nose: No nasal discharge.   Mouth: Oral mucosal membranes moist. No oral ulceration or hemorrhages.   Neck: Neck supple. No thyromegaly. No JVD.   Lungs: Clear in all fields to A&P without rales, rhonchi or wheezing.   Heart: S1, S2. Regular rate and rhythm. No murmurs, rubs, or gallops.   Abdomen: Soft. Bowel sounds are normoactive. Nontender with palpation. No Hepatosplenomegaly can be appreciated.   Extremities: No cyanosis or edema. Peripheral pulses palpable and equal bilaterally.   Integumentary: No rash, sores, erythema or nodules. No blistering, bruising, or dry skin.   Hem/Lymph Nodes: No palpable cervical, submandibular, supraclavicular, axillary  lymphadenopathy noted. No petechiae, purpura or ecchymosis noted.   Neurologic: Grossly non-focal exam    Pain Score:  Pain Score    05/28/24 0820   PainSc:   5   PainLoc: Rib Cage       PHQ-Score Total:  PHQ-9 Total Score:  "        PATHOLOGY:        ENDOSCOPY:        IMAGING:        RECENT LABS:  Lab Results   Component Value Date    WBC 4.65 05/28/2024    HGB 11.3 (L) 05/28/2024    HCT 34.6 05/28/2024    .1 (H) 05/28/2024    RDW 14.6 05/28/2024     05/28/2024    NEUTRORELPCT 63.1 05/28/2024    LYMPHORELPCT 24.7 05/28/2024    MONORELPCT 7.7 05/28/2024    EOSRELPCT 2.8 05/28/2024    BASORELPCT 1.3 05/28/2024    NEUTROABS 2.93 05/28/2024    LYMPHSABS 1.15 05/28/2024       Lab Results   Component Value Date     04/23/2024    K 4.9 04/23/2024    CO2 23.4 04/23/2024     04/23/2024    BUN 12 04/23/2024    CREATININE 0.79 04/23/2024    EGFRIFNONA 74 02/23/2022    EGFRIFAFRI  09/22/2016      Comment:      <15 Indicative of kidney failure.    GLUCOSE 296 (H) 04/23/2024    CALCIUM 8.8 04/23/2024    ALKPHOS 178 (H) 04/23/2024    AST 21 04/23/2024    ALT 21 04/23/2024    BILITOT 0.3 04/23/2024    ALBUMIN 3.3 (L) 04/23/2024    PROTEINTOT 5.8 (L) 04/23/2024    MG 2.0 09/21/2020    PHOS 4.0 04/10/2024       Lab Results   Component Value Date/Time     (H) 12/07/2023 12:01 PM     Work Up 12/07/2023      Lab Results   Component Value Date    VEUWEDCT74 611 12/07/2023    FOLATE >20.00 12/07/2023     Lab Results   Component Value Date    RETICCTPCT 1.36 12/07/2023     Lab Results   Component Value Date    HAPTOGLOBIN 131 12/07/2023     Lab Results   Component Value Date    CRP <0.30 04/23/2024    CRP <0.30 12/07/2023    CRP <0.30 09/26/2023    CRP 4.13 (H) 09/13/2023    CRP <0.30 06/12/2021    CRP 0.09 08/07/2020    CRP <0.50 11/22/2018     Lab Results   Component Value Date    SEDRATE 9 04/23/2024    SEDRATE 6 12/07/2023    SEDRATE 8 11/22/2018     TSH  0.270 - 4.200 uIU/mL 6.980 High      Free T4  0.93 - 1.70 ng/dL 1.58       Copper  80 - 158 ug/dL 111     Zinc  44 - 115 ug/dL 80     Tissue Transglutaminase IgA  0 - 3 U/mL <2     Iron  37 - 145 mcg/dL 31 Low    Iron Saturation (TSAT)  20 - 50 % 7 Low    Transferrin  200 -  360 mg/dL 319   TIBC  298 - 536 mcg/dL 475     Ferritin  13.00 - 150.00 ng/mL 122.70       ASSESSMENT & PLAN:  Lashae Benitez is a very pleasant 63 y.o. female with    1. Normocytic Anemia  2. DARIO  - Previous available CBCs were reviewed and patient has had chronic anemia with fluctuations in Hg ranging 9.2-11.9 since at least August 2019.   - She is taking oral iron supplement daily which she is having a difficult time tolerating due to GI upset. She also takes Folic Acid daily and B12 injections monthly.   -She denies any obvious blood loss from any source. She reports having colonoscopy per Dr. Frederick last year that was unremarkable (no records currently available, will request today). She has never had EGD. No history of blood transfusion.  - CBC from initial consultation showed Hg (10.1) and slightly elevated platelet count 500,000, likely reactive. WBC is normal. Iron panel is low with normal Ferritin despite oral iron replacement. Therefore, discontinued oral iron and replaced with IV Feraheme for apparent malabsorption of oral iron.   - Also obtained additional labs to further evaluate anemia including PBS which showed macrocytic anemia with mild anisocytosis including occasional elliptocytes, no increase in schistocyte population, thrombocytosis with normal platelet morphology, normal WBC count and differential, granulocytes show normal morphology and no circulating blasts identified. B12 and Folate are replete. No evidence of hemolysis as Retic, LDH and Haptoglobin are normal. CRP and ESR were normal. TSH was elevated with normal Free T4 (previous thyroidectomy in 2016). Copper, Zinc and Tissue Transglutaminase were normal.  - Repeat CBC from today shows Hg (11.3) with normalized platelet count. Iron panel and Ferritin are replete.   - Will follow up in 3 months with CBC, Iron panel and Ferritin.   - Advised to continue to follow up with Dr. Glez as scheduled.    3. Psoriatic Arthritis  - She follows with  Dr. Chand at [facility]. She is currently taking Orencia, Otezla and MTX. Ongoing management per rheumatology.      ACO / KRISTAL/Other  Quality measures  -  Lashae Benitez received 2023 flu vaccine.  -  Lashae Benitez reports a pain score of 5.  Given her pain assessment as noted, treatment options were discussed and the following options were decided upon as a follow-up plan to address the patient's pain: continuation of current treatment plan for pain.  -  Current outpatient and discharge medications have been reconciled for the patient.  Reviewed by: RUIZ Reynolds                          I spent 30 minutes caring for Lashae on this date of service. This time includes time spent by me in the following activities: preparing for the visit, reviewing tests, obtaining and/or reviewing a separately obtained history, performing a medically appropriate examination and/or evaluation, counseling and educating the patient/family/caregiver, ordering medications, tests, or procedures, documenting information in the medical record, independently interpreting results and communicating that information with the patient/family/caregiver, and care coordination.                     Electronically Signed by: RUIZ Landry , May 28, 2024 08:26 EDT           Electronically Signed by: RUIZ Landry , May 28, 2024 08:26 EDT       CC:   No ref. provider found  Kreis, Samuel Duane, MD

## 2024-06-24 RX ORDER — ISOSORBIDE MONONITRATE 30 MG/1
TABLET, EXTENDED RELEASE ORAL
Qty: 90 TABLET | Refills: 0 | Status: SHIPPED | OUTPATIENT
Start: 2024-06-24

## 2024-07-18 ENCOUNTER — LAB (OUTPATIENT)
Dept: LAB | Facility: HOSPITAL | Age: 64
End: 2024-07-18
Payer: MEDICARE

## 2024-07-18 ENCOUNTER — TRANSCRIBE ORDERS (OUTPATIENT)
Dept: ADMINISTRATIVE | Facility: HOSPITAL | Age: 64
End: 2024-07-18
Payer: MEDICARE

## 2024-07-18 DIAGNOSIS — E87.1 HYPONATREMIA: Primary | ICD-10-CM

## 2024-07-18 DIAGNOSIS — E87.1 HYPONATREMIA: ICD-10-CM

## 2024-07-18 PROCEDURE — 81001 URINALYSIS AUTO W/SCOPE: CPT

## 2024-07-18 PROCEDURE — 82570 ASSAY OF URINE CREATININE: CPT

## 2024-07-18 PROCEDURE — 80053 COMPREHEN METABOLIC PANEL: CPT

## 2024-07-18 PROCEDURE — 84156 ASSAY OF PROTEIN URINE: CPT

## 2024-07-18 PROCEDURE — 82043 UR ALBUMIN QUANTITATIVE: CPT

## 2024-07-18 PROCEDURE — 36415 COLL VENOUS BLD VENIPUNCTURE: CPT

## 2024-07-19 LAB
ALBUMIN SERPL-MCNC: 3.5 G/DL (ref 3.5–5.2)
ALBUMIN UR-MCNC: 2.1 MG/DL
ALBUMIN/GLOB SERPL: 1.6 G/DL
ALP SERPL-CCNC: 154 U/L (ref 39–117)
ALT SERPL W P-5'-P-CCNC: 21 U/L (ref 1–33)
ANION GAP SERPL CALCULATED.3IONS-SCNC: 8.3 MMOL/L (ref 5–15)
AST SERPL-CCNC: 18 U/L (ref 1–32)
BACTERIA UR QL AUTO: ABNORMAL /HPF
BILIRUB SERPL-MCNC: 0.4 MG/DL (ref 0–1.2)
BILIRUB UR QL STRIP: NEGATIVE
BUN SERPL-MCNC: 12 MG/DL (ref 8–23)
BUN/CREAT SERPL: 10.3 (ref 7–25)
CALCIUM SPEC-SCNC: 8.9 MG/DL (ref 8.6–10.5)
CHLORIDE SERPL-SCNC: 100 MMOL/L (ref 98–107)
CLARITY UR: CLEAR
CO2 SERPL-SCNC: 22.7 MMOL/L (ref 22–29)
COD CRY URNS QL: PRESENT /HPF
COLOR UR: YELLOW
CREAT SERPL-MCNC: 1.16 MG/DL (ref 0.57–1)
CREAT UR-MCNC: 31.8 MG/DL
CREAT UR-MCNC: 31.8 MG/DL
EGFRCR SERPLBLD CKD-EPI 2021: 53.4 ML/MIN/1.73
GLOBULIN UR ELPH-MCNC: 2.2 GM/DL
GLUCOSE SERPL-MCNC: 466 MG/DL (ref 65–99)
GLUCOSE UR STRIP-MCNC: ABNORMAL MG/DL
HGB UR QL STRIP.AUTO: NEGATIVE
HYALINE CASTS UR QL AUTO: ABNORMAL /LPF
KETONES UR QL STRIP: NEGATIVE
LEUKOCYTE ESTERASE UR QL STRIP.AUTO: NEGATIVE
MICROALBUMIN/CREAT UR: 66 MG/G (ref 0–29)
NITRITE UR QL STRIP: NEGATIVE
PH UR STRIP.AUTO: 6 [PH] (ref 5–8)
POTASSIUM SERPL-SCNC: 5 MMOL/L (ref 3.5–5.2)
PROT ?TM UR-MCNC: 14 MG/DL
PROT SERPL-MCNC: 5.7 G/DL (ref 6–8.5)
PROT UR QL STRIP: NEGATIVE
PROT/CREAT UR: 440.3 MG/G CREA (ref 0–200)
RBC # UR STRIP: ABNORMAL /HPF
REF LAB TEST METHOD: ABNORMAL
SODIUM SERPL-SCNC: 131 MMOL/L (ref 136–145)
SP GR UR STRIP: 1.02 (ref 1–1.03)
SQUAMOUS #/AREA URNS HPF: ABNORMAL /HPF
UROBILINOGEN UR QL STRIP: ABNORMAL
WBC # UR STRIP: ABNORMAL /HPF

## 2024-08-01 ENCOUNTER — TRANSCRIBE ORDERS (OUTPATIENT)
Dept: ADMINISTRATIVE | Facility: HOSPITAL | Age: 64
End: 2024-08-01
Payer: MEDICARE

## 2024-08-01 ENCOUNTER — LAB (OUTPATIENT)
Dept: LAB | Facility: HOSPITAL | Age: 64
End: 2024-08-01
Payer: MEDICARE

## 2024-08-01 DIAGNOSIS — E87.1 HYPONATREMIA: ICD-10-CM

## 2024-08-01 DIAGNOSIS — E87.1 HYPONATREMIA: Primary | ICD-10-CM

## 2024-08-01 PROCEDURE — 82570 ASSAY OF URINE CREATININE: CPT

## 2024-08-01 PROCEDURE — 82533 TOTAL CORTISOL: CPT

## 2024-08-01 PROCEDURE — 36415 COLL VENOUS BLD VENIPUNCTURE: CPT

## 2024-08-01 PROCEDURE — 80053 COMPREHEN METABOLIC PANEL: CPT

## 2024-08-01 PROCEDURE — 84156 ASSAY OF PROTEIN URINE: CPT

## 2024-08-01 PROCEDURE — 81001 URINALYSIS AUTO W/SCOPE: CPT

## 2024-08-01 PROCEDURE — 82024 ASSAY OF ACTH: CPT

## 2024-08-02 LAB
ALBUMIN SERPL-MCNC: 3.8 G/DL (ref 3.5–5.2)
ALBUMIN/GLOB SERPL: 1.7 G/DL
ALP SERPL-CCNC: 139 U/L (ref 39–117)
ALT SERPL W P-5'-P-CCNC: 21 U/L (ref 1–33)
ANION GAP SERPL CALCULATED.3IONS-SCNC: ABNORMAL MMOL/L
AST SERPL-CCNC: 23 U/L (ref 1–32)
BACTERIA UR QL AUTO: NORMAL /HPF
BILIRUB SERPL-MCNC: 0.2 MG/DL (ref 0–1.2)
BILIRUB UR QL STRIP: NEGATIVE
BUN SERPL-MCNC: 13 MG/DL (ref 8–23)
BUN/CREAT SERPL: 13.4 (ref 7–25)
CALCIUM SPEC-SCNC: 9.2 MG/DL (ref 8.6–10.5)
CHLORIDE SERPL-SCNC: 101 MMOL/L (ref 98–107)
CLARITY UR: CLEAR
CO2 SERPL-SCNC: ABNORMAL MMOL/L
COLOR UR: YELLOW
CORTIS SERPL-MCNC: 14.7 MCG/DL
CREAT SERPL-MCNC: 0.97 MG/DL (ref 0.57–1)
CREAT UR-MCNC: 24 MG/DL
EGFRCR SERPLBLD CKD-EPI 2021: 66.2 ML/MIN/1.73
GLOBULIN UR ELPH-MCNC: 2.2 GM/DL
GLUCOSE SERPL-MCNC: 219 MG/DL (ref 65–99)
GLUCOSE UR STRIP-MCNC: ABNORMAL MG/DL
HGB UR QL STRIP.AUTO: NEGATIVE
HYALINE CASTS UR QL AUTO: NORMAL /LPF
KETONES UR QL STRIP: NEGATIVE
LEUKOCYTE ESTERASE UR QL STRIP.AUTO: NEGATIVE
NITRITE UR QL STRIP: NEGATIVE
PH UR STRIP.AUTO: 6 [PH] (ref 5–8)
POTASSIUM SERPL-SCNC: 4.1 MMOL/L (ref 3.5–5.2)
PROT ?TM UR-MCNC: 4.8 MG/DL
PROT SERPL-MCNC: 6 G/DL (ref 6–8.5)
PROT UR QL STRIP: NEGATIVE
PROT/CREAT UR: 200 MG/G CREA (ref 0–200)
RBC # UR STRIP: NORMAL /HPF
REF LAB TEST METHOD: NORMAL
SODIUM SERPL-SCNC: 133 MMOL/L (ref 136–145)
SP GR UR STRIP: 1.01 (ref 1–1.03)
SQUAMOUS #/AREA URNS HPF: NORMAL /HPF
UROBILINOGEN UR QL STRIP: ABNORMAL
WBC # UR STRIP: NORMAL /HPF

## 2024-08-03 NOTE — PROGRESS NOTES
"     LOS: 1 day     Chief Complaint: Chest Pain    Subjective     Interval History:   She has had no major changes over the last 24 hours.  She is scheduled for stress test today per cardiology.  She states chest pain is ongoing and unchanged.  He continues to have migraine headache pain.  She denies any nausea vomiting or neurological deficits. She is eager to go home at this point. Potassium 3.4 this AM.    Objective     Vital Signs  /70 (Patient Position: Lying)  Pulse 72  Temp 98.2 °F (36.8 °C) (Oral)   Resp 18  Ht 65\" (165.1 cm)  Wt 181 lb 9.6 oz (82.4 kg)  SpO2 97%  BMI 30.22 kg/m2  Intake & Output (last day)       04/13 0701 - 04/14 0700 04/14 0701 - 04/15 0700    P.O. 600     I.V. (mL/kg)      Total Intake(mL/kg) 600 (7.3)     Urine (mL/kg/hr) 1500 (0.8)     Total Output 1500      Net -900                    Physical Exam:     General Appearance:    Alert, cooperative, in no acute distress   Head:    Normocephalic, without obvious abnormality, atraumatic   Eyes:            Lids and lashes normal, conjunctivae and sclerae normal, no   icterus, no pallor, corneas clear, PERRLA   Ears:    Ears appear intact with no abnormalities noted   Throat:   No oral lesions, no thrush, oral mucosa moist   Neck:   No adenopathy, supple, trachea midline, no thyromegaly, no   carotid bruit, no JVD   Lungs:     Clear to auscultation,respirations regular, even and                  unlabored    Heart:    Regular rhythm and normal rate, normal S1 and S2, no            murmur, no gallop, no rub, no click   Chest Wall:    No abnormalities observed   Abdomen:     Normal bowel sounds, no masses, no organomegaly, soft        non-tender, non-distended, no guarding, no rebound                tenderness   Extremities:   Moves all extremities well, no edema, no cyanosis, no             redness   Pulses:   Pulses palpable and equal bilaterally   Skin:   No bleeding, bruising or rash   Lymph nodes:   No palpable adenopathy "   Neurologic:   Cranial nerves 2 - 12 grossly intact, sensation intact, DTR       present and equal bilaterally        Results Review:    Lab Results   Component Value Date    WBC 5.55 04/12/2017    HGB 12.0 04/12/2017    HCT 35.3 (L) 04/12/2017    MCV 94.4 (H) 04/12/2017     04/12/2017       Lab Results   Component Value Date    GLUCOSE 99 04/14/2017    BUN 9 04/14/2017    CREATININE 0.49 04/14/2017    EGFRIFNONA 131 04/14/2017    EGFRIFAFRI  09/22/2016      Comment:      <15 Indicative of kidney failure.    BCR 18.4 04/14/2017     04/14/2017    K 3.4 (L) 04/14/2017     04/14/2017    CO2 26.6 04/14/2017    CALCIUM 8.8 04/14/2017    ALBUMIN 3.60 04/12/2017    ALBUMIN 3.60 04/12/2017    LABIL2 1.5 04/12/2017    AST 20 04/12/2017    AST 20 04/12/2017    ALT 23 04/12/2017    ALT 23 04/12/2017     Lab Results   Component Value Date    INR 0.93 07/20/2015       Glucose   Date Value Ref Range Status   04/14/2017 93 70 - 130 mg/dL Final   04/13/2017 144 (H) 70 - 130 mg/dL Final   04/13/2017 95 70 - 130 mg/dL Final   04/13/2017 113 70 - 130 mg/dL Final   04/13/2017 90 70 - 130 mg/dL Final          Medication Review:     Current Facility-Administered Medications:   •  acetaminophen (TYLENOL) tablet 650 mg, 650 mg, Oral, Q4H PRN, Samuel Duane Kreis, MD  •  aluminum-magnesium hydroxide-simethicone (MAALOX MAX) 400-400-40 MG/5ML suspension 15 mL, 15 mL, Oral, Q6H PRN, Samuel Duane Kreis, MD  •  amitriptyline (ELAVIL) tablet 25 mg, 25 mg, Oral, Daily, Rylie L Anne, RPH, 25 mg at 04/13/17 0839  •  aspirin EC tablet 81 mg, 81 mg, Oral, Daily, Rylie L Anne, RPH, 81 mg at 04/13/17 0839  •  butalbital-acetaminophen-caffeine (FIORICET, ESGIC) -40 MG per tablet 1 tablet, 1 tablet, Oral, Daily PRN, Rylie Rodríguez [facility]  •  calcium-vitamin D 500-200 MG-UNIT tablet 500 mg, 500 mg, Oral, Daily, [facility], 500 mg at 04/13/17 0839  •  celecoxib (CeleBREX) capsule 200 mg, 200 mg, Oral, Daily,  Rylie L [facility], RPH, 200 mg at 04/13/17 0838  •  cetirizine (zyrTEC) tablet 10 mg, 10 mg, Oral, Daily, Rylie L Anne, RPH, 10 mg at 04/13/17 0838  •  cholecalciferol (VITAMIN D3) tablet 1,000 Units, 1,000 Units, Oral, Daily, Rylie BAIRES [facility], RPH, 1,000 Units at 04/13/17 0839  •  cyclobenzaprine (FLEXERIL) tablet 10 mg, 10 mg, Oral, Daily, Rylie BAIRES [facility], RPH, 10 mg at 04/13/17 0838  •  dextrose (D50W) solution 25 g, 25 g, Intravenous, Q15 Min PRN, Samuel Duane Kreis, MD  •  dextrose (GLUTOSE) oral gel 15 g, 15 g, Oral, Q15 Min PRN, Samuel Duane Kreis, MD  •  diazePAM (VALIUM) tablet 2.5 mg, 2.5 mg, Oral, Q12H, Samuel Duane Kreis, MD, 2.5 mg at 04/13/17 2033  •  DULoxetine (CYMBALTA) DR capsule 60 mg, 60 mg, Oral, Q12H, Rylie BAIRES [facility], RPH, 60 mg at 04/13/17 2033  •  enoxaparin (LOVENOX) syringe 40 mg, 40 mg, Subcutaneous, Daily, Samuel Duane Kreis, MD, 40 mg at 04/13/17 0842  •  ferrous sulfate tablet 325 mg, 325 mg, Oral, Daily With Breakfast, Rylie Concepcionchurch, [facility], 325 mg at 04/13/17 0838  •  folic acid (FOLVITE) tablet 1 mg, 1 mg, Oral, Daily, Rylie BAIRES [facility], RPH, 1 mg at 04/13/17 0838  •  gabapentin (NEURONTIN) capsule 600 mg, 600 mg, Oral, Q6H, Rylie L [facility], RPH, 600 mg at 04/13/17 2350  •  glucagon (human recombinant) (GLUCAGEN DIAGNOSTIC) injection 1 mg, 1 mg, Subcutaneous, Q15 Min PRN, Samuel Duane Kreis, MD  •  insulin aspart (novoLOG) injection 0-9 Units, 0-9 Units, Subcutaneous, 4x Daily AC & at Bedtime, Samuel Duane Kreis, MD  •  insulin detemir (LEVEMIR) injection 25 Units, 25 Units, Subcutaneous, Daily, Rylie Rodríguez [facility]  •  levothyroxine (SYNTHROID, LEVOTHROID) tablet 150 mcg, 150 mcg, Oral, Q AM, Rylie Rodríguez [facility], 150 mcg at 04/13/17 0608  •  linaclotide (LINZESS) capsule 290 mcg, 290 mcg, Oral, QAM AC, Rylie Rodríguez [facility], 290 mcg at 04/13/17 0730  •  [START ON 4/15/2017] methotrexate tablet 25 mg, 25 mg, Oral, Weekly, Rylie Rodríguez [facility]  •  metoprolol  succinate XL (TOPROL-XL) 24 hr tablet 50 mg, 50 mg, Oral, Daily, Rylie Vallesh, RPH, 50 mg at 04/13/17 0838  •  multivitamin (DAILY MICHAEL) tablet 1 tablet, 1 tablet, Oral, Daily, Rylie Rodríguez, RPH, 1 tablet at 04/13/17 0838  •  nitroglycerin (NITROSTAT) SL tablet 0.4 mg, 0.4 mg, Sublingual, Q5 Min PRN, Samuel Duane Kreis, MD  •  ondansetron (ZOFRAN) tablet 4 mg, 4 mg, Oral, Q6H PRN **OR** ondansetron ODT (ZOFRAN-ODT) disintegrating tablet 4 mg, 4 mg, Oral, Q6H PRN **OR** ondansetron (ZOFRAN) injection 4 mg, 4 mg, Intravenous, Q6H PRN, Samuel Duane Kreis, MD  •  pantoprazole (PROTONIX) EC tablet 40 mg, 40 mg, Oral, Q AM, Rylie Vallesh, RPH, 40 mg at 04/13/17 0608  •  Pharmacy Meds to Bed Consult, , Does not apply, Daily, Rylie Rodríguez, [facility]  •  polyethylene glycol (MIRALAX) powder 17 g, 17 g, Oral, Daily, Ryliemajo Vallesh, RPH, 17 g at 04/13/17 0842  •  primidone (MYSOLINE) tablet 50 mg, 50 mg, Oral, Daily, Rylie BAIRES Anne, RPH, 50 mg at 04/13/17 0838  •  sodium chloride 0.9 % flush 1-10 mL, 1-10 mL, Intravenous, PRN, Samuel Duane Kreis, MD  •  sodium chloride 0.9 % infusion, 100 mL/hr, Intravenous, Continuous, Samuel Duane Kreis, MD, Last Rate: 100 mL/hr at 04/14/17 0201, 100 mL/hr at 04/14/17 0201  •  tablet cutter misc, , Does not apply, PRN, Samuel Duane Kreis, MD  •  topiramate (TOPAMAX) tablet 50 mg, 50 mg, Oral, Nightly, Rylie L Anne, RPH, 50 mg at 04/13/17 2033  •  traMADol (ULTRAM) tablet 50 mg, 50 mg, Oral, Q12H, Rylie Rodríguez, RPH, 50 mg at 04/13/17 2033  •  vitamin C (ASCORBIC ACID) tablet 1,000 mg, 1,000 mg, Oral, Daily, Rylie Rodríguez, RPH, 1,000 mg at 04/13/17 0838  •  vitamin E capsule 800 Units, 800 Units, Oral, Daily, Klarissa Landa, [facility], 800 Units at 04/13/17 0838      Assessment/Plan   Chest Pain with Atypical Features of Angina  Migraine Headache  DMII  HTN  Multiple Sclerosis  Rheumatoid Arthritis  Hypokalemia    Continue with IVF @ 100 ml/hr    Appreciate  cardiology input, stress test today    Levemir QHS + S/S     Continue with tramadol q 12 hours    Possibly home later today if stress test negative      BRIANDA Ko  04/14/17  7:18 AM     I have seen this patient is morning.  I agree with the above note.  She feels weak all over.  She has been ambulatory.  She doesn't have any worsening to her chest discomfort.  Plan for stress test this morning.  If stress test is negative we'll plan discharge to home.  I think that her discomfort is related to an acute viral illness    Samuel Duane Kreis, MD  04/14/17  8:28 AM       Neuroendocrine carcinoma

## 2024-08-04 LAB — ACTH PLAS-MCNC: 13.2 PG/ML (ref 7.2–63.3)

## 2024-08-22 ENCOUNTER — OFFICE VISIT (OUTPATIENT)
Dept: ONCOLOGY | Facility: CLINIC | Age: 64
End: 2024-08-22
Payer: MEDICARE

## 2024-08-22 ENCOUNTER — LAB (OUTPATIENT)
Dept: ONCOLOGY | Facility: CLINIC | Age: 64
End: 2024-08-22
Payer: MEDICARE

## 2024-08-22 VITALS
SYSTOLIC BLOOD PRESSURE: 137 MMHG | WEIGHT: 162.3 LBS | DIASTOLIC BLOOD PRESSURE: 83 MMHG | BODY MASS INDEX: 27.01 KG/M2 | TEMPERATURE: 97.1 F | HEART RATE: 77 BPM | RESPIRATION RATE: 18 BRPM | OXYGEN SATURATION: 100 %

## 2024-08-22 DIAGNOSIS — D50.9 IRON DEFICIENCY ANEMIA, UNSPECIFIED IRON DEFICIENCY ANEMIA TYPE: ICD-10-CM

## 2024-08-22 DIAGNOSIS — D50.9 IRON DEFICIENCY ANEMIA, UNSPECIFIED IRON DEFICIENCY ANEMIA TYPE: Primary | ICD-10-CM

## 2024-08-22 DIAGNOSIS — K90.49 MALABSORPTION DUE TO INTOLERANCE, NOT ELSEWHERE CLASSIFIED: ICD-10-CM

## 2024-08-22 LAB
BASOPHILS # BLD AUTO: 0.05 10*3/MM3 (ref 0–0.2)
BASOPHILS NFR BLD AUTO: 1.1 % (ref 0–1.5)
DEPRECATED RDW RBC AUTO: 59.5 FL (ref 37–54)
EOSINOPHIL # BLD AUTO: 0.25 10*3/MM3 (ref 0–0.4)
EOSINOPHIL NFR BLD AUTO: 5.7 % (ref 0.3–6.2)
ERYTHROCYTE [DISTWIDTH] IN BLOOD BY AUTOMATED COUNT: 15.7 % (ref 12.3–15.4)
FERRITIN SERPL-MCNC: 59.12 NG/ML (ref 13–150)
HCT VFR BLD AUTO: 33.6 % (ref 34–46.6)
HGB BLD-MCNC: 10.8 G/DL (ref 12–15.9)
IMM GRANULOCYTES # BLD AUTO: 0.02 10*3/MM3 (ref 0–0.05)
IMM GRANULOCYTES NFR BLD AUTO: 0.5 % (ref 0–0.5)
IRON 24H UR-MRATE: 72 MCG/DL (ref 37–145)
IRON SATN MFR SERPL: 24 % (ref 20–50)
LYMPHOCYTES # BLD AUTO: 1.21 10*3/MM3 (ref 0.7–3.1)
LYMPHOCYTES NFR BLD AUTO: 27.6 % (ref 19.6–45.3)
MCH RBC QN AUTO: 33.4 PG (ref 26.6–33)
MCHC RBC AUTO-ENTMCNC: 32.1 G/DL (ref 31.5–35.7)
MCV RBC AUTO: 104 FL (ref 79–97)
MONOCYTES # BLD AUTO: 0.22 10*3/MM3 (ref 0.1–0.9)
MONOCYTES NFR BLD AUTO: 5 % (ref 5–12)
NEUTROPHILS NFR BLD AUTO: 2.63 10*3/MM3 (ref 1.7–7)
NEUTROPHILS NFR BLD AUTO: 60.1 % (ref 42.7–76)
NRBC BLD AUTO-RTO: 0 /100 WBC (ref 0–0.2)
PLATELET # BLD AUTO: 298 10*3/MM3 (ref 140–450)
PMV BLD AUTO: 9 FL (ref 6–12)
RBC # BLD AUTO: 3.23 10*6/MM3 (ref 3.77–5.28)
TIBC SERPL-MCNC: 301 MCG/DL (ref 298–536)
TRANSFERRIN SERPL-MCNC: 202 MG/DL (ref 200–360)
WBC NRBC COR # BLD AUTO: 4.38 10*3/MM3 (ref 3.4–10.8)

## 2024-08-22 PROCEDURE — 1160F RVW MEDS BY RX/DR IN RCRD: CPT | Performed by: NURSE PRACTITIONER

## 2024-08-22 PROCEDURE — 3075F SYST BP GE 130 - 139MM HG: CPT | Performed by: NURSE PRACTITIONER

## 2024-08-22 PROCEDURE — 82728 ASSAY OF FERRITIN: CPT | Performed by: NURSE PRACTITIONER

## 2024-08-22 PROCEDURE — 84466 ASSAY OF TRANSFERRIN: CPT | Performed by: NURSE PRACTITIONER

## 2024-08-22 PROCEDURE — 83540 ASSAY OF IRON: CPT | Performed by: NURSE PRACTITIONER

## 2024-08-22 PROCEDURE — 1159F MED LIST DOCD IN RCRD: CPT | Performed by: NURSE PRACTITIONER

## 2024-08-22 PROCEDURE — 99214 OFFICE O/P EST MOD 30 MIN: CPT | Performed by: NURSE PRACTITIONER

## 2024-08-22 PROCEDURE — 85025 COMPLETE CBC W/AUTO DIFF WBC: CPT | Performed by: NURSE PRACTITIONER

## 2024-08-22 PROCEDURE — 1126F AMNT PAIN NOTED NONE PRSNT: CPT | Performed by: NURSE PRACTITIONER

## 2024-08-22 PROCEDURE — 3079F DIAST BP 80-89 MM HG: CPT | Performed by: NURSE PRACTITIONER

## 2024-08-22 NOTE — PROGRESS NOTES
DATE OF FOLLOW UP:  8/22/2024    REASON FOR REFERRAL: Normocytic Anemia    REFERRING PHYSICIAN:  No ref. provider found    CHIEF COMPLAINT:  Follow Up Anemia    TREATMENT HISTORY:  Folic Acid 1 mg daily    B12 1000 mcg SQ monthly    Feraheme Day 1 12/11/2023  Day 8 12/18/2023      HISTORY OF PRESENT ILLNESS:   Lashae Benitez is a very pleasant 63 y.o. female who is being seen today at the request of No ref. provider found for evaluation and treatment of normocytic anemia. Ms. Benitez reports following with her PCP every 3 months with lab testing. She reports that she has been aware of the above issue for at least six months. Previous available CBCs were reviewed and patient has had chronic anemia with fluctuations in Hg ranging 9.2-11.9 since at least August 2019. She is taking oral iron supplement daily which she is having a difficult time tolerating due to GI upset. She also takes Folic Acid daily and B12 injections monthly. She denies any obvious blood loss from any source. She reports having colonoscopy per Dr. Frederick last year that was unremarkable. She has never had EGD. No history of blood transfusion. She is on Xarelto for A-Fib. She also follows with Dr. Chand (rheumatology) for psoriatic arthritis and is currently on Orencia, Otezla and MTX. She reports chronic aches/pain and chronic fatigue but denies any worsening. She denies any other specific complaints at this time.     INTERVAL HISTORY:  Ms. Benitez presents today for follow up of anemia. She was last replaced with IV Feraheme in mid-December 2023 and continues to report improvement in fatigue. She is also taking Folic Acid 1 mg daily and B12 1000 mcg SQ monthly. She denies shortness of breath on exertion, chest pain or dizziness. She denies any obvious blood loss from any source. She also continues to take MTX, Orencia and Otezla for psoriatic arthritis per [facility]. She is without any specific complaints today.       PAST MEDICAL  HISTORY:  Past Medical History:   Diagnosis Date    Acid reflux     Allergic     Anxiety     Cancer     thyroid, skin    Chronic pain disorder     Degenerative disc disease, lumbar     Depression     Dupuytren's disease     Essential hypertension     Family history of coronary artery disease     Fibromyalgia     Gallbladder abscess     H. pylori infection     Hiatal hernia     Hyperlipidemia     Hypothyroidism     Migraines     migraines    Multiple sclerosis     Osteoarthritis     Psoriasis     Psoriatic arthritis     RA (rheumatoid arthritis)     Rheumatoid arthritis     Sinusitis     Sjogren's syndrome     Stomach ulcer     TMJ arthritis     Type 2 diabetes mellitus     Urinary tract infection        PAST SURGICAL HISTORY:  Past Surgical History:   Procedure Laterality Date    BREAST LUMPECTOMY Left 11/1993    CARDIAC CATHETERIZATION Left 09/21/2009    Normal     CARPAL TUNNEL RELEASE  03/2012    CERVICAL POLYPECTOMY  12/2015    CHOLECYSTECTOMY  05/2007    COLONOSCOPY      ENDOSCOPY      GASTRIC BYPASS  09/2007    JOINT REPLACEMENT      KNEE ARTHROPLASTY      LUMBAR DISC SURGERY      C5-6    REPLACEMENT TOTAL KNEE Right 06/2016    SACRAL NERVE STIMULATOR PLACEMENT  09/2014    SACRAL NERVE STIMULATOR PLACEMENT  04/11/2024    [facility]    SACRAL NERVE STIMULATOR PLACEMENT Right 04/17/2024    THYROIDECTOMY  03/2016       FAMILY HISTORY:  Family History   Problem Relation Age of Onset    Diabetes Mother     Stroke Mother     Hypertension Mother     Heart attack Father         First one was in his 20's second 30's.     Heart failure Father     Heart disease Father     Stroke Father     Diabetes Sister     Cancer Maternal Grandmother        SOCIAL HISTORY:  Social History     Socioeconomic History    Marital status:    Tobacco Use    Smoking status: Never     Passive exposure: Never    Smokeless tobacco: Never   Vaping Use    Vaping status: Never Used   Substance and Sexual Activity    Alcohol use: No     Drug use: No    Sexual activity: Defer              MEDICATIONS:  The current medication list was reviewed in the EMR    Current Outpatient Medications:     abatacept (ORENCIA) 250 MG injection, Infuse 30 mL into a venous catheter Every 28 (Twenty-Eight) Days., Disp: , Rfl:     alendronate (FOSAMAX) 70 MG tablet, Take 1 tablet by mouth 1 (One) Time Per Week. Wednesday, Disp: , Rfl:     amitriptyline (ELAVIL) 25 MG tablet, Take 1 tablet by mouth Every Night., Disp: , Rfl:     Apremilast (Otezla) 30 MG tablet, Take 30 mg by mouth 2 (two) times a day., Disp: , Rfl:     Butalbital-Acetaminophen  MG capsule, Take 300 mg by mouth Daily As Needed., Disp: , Rfl:     celecoxib (CeleBREX) 200 MG capsule, Take 1 capsule by mouth Daily., Disp: , Rfl:     cyanocobalamin 1000 MCG/ML injection, Inject 1 mL into the appropriate muscle as directed by prescriber Every 28 (Twenty-Eight) Days., Disp: , Rfl:     cyclobenzaprine (FLEXERIL) 10 MG tablet, Take 1 tablet by mouth Every Evening., Disp: , Rfl:     Diclofenac Sodium (VOLTAREN) 1 % gel gel, Apply 2 g topically to the appropriate area as directed 4 (Four) Times a Day As Needed., Disp: , Rfl:     DULoxetine (CYMBALTA) 30 MG capsule, Take 1 capsule by mouth Every Morning., Disp: , Rfl:     DULoxetine (CYMBALTA) 60 MG capsule, Take 1 capsule by mouth Daily., Disp: , Rfl:     EMGALITY 120 MG/ML prefilled syringe, Inject 1 mL under the skin into the appropriate area as directed Every 30 (Thirty) Days., Disp: , Rfl: 3    famotidine (PEPCID) 40 MG tablet, Take 1 tablet by mouth 2 (Two) Times a Day., Disp: , Rfl:     ferrous gluconate (FERGON) 240 (27 FE) MG tablet, Take 1 tablet by mouth Every Morning., Disp: , Rfl:     fexofenadine (ALLEGRA) 180 MG tablet, Take 1 tablet by mouth Daily., Disp: , Rfl:     Finerenone (Kerendia) 10 MG tablet, Take 1 tablet by mouth Daily., Disp: , Rfl:     flecainide (TAMBOCOR) 50 MG tablet, Take 1 tablet by mouth 2 (Two) Times a Day., Disp: 180  tablet, Rfl: 3    fluticasone-salmeterol (ADVAIR HFA) 115-21 MCG/ACT inhaler, Inhale 2 puffs 2 (Two) Times a Day., Disp: , Rfl:     folic acid (FOLVITE) 1 MG tablet, Take 1 tablet by mouth Daily., Disp: , Rfl:     gabapentin (NEURONTIN) 600 MG tablet, Take 1 tablet by mouth 3 (Three) Times a Day., Disp: , Rfl:     HumaLOG Mix 50/50 KwikPen (50-50) 100 UNIT/ML suspension pen-injector, 15 Units., Disp: , Rfl:     isosorbide mononitrate (IMDUR) 30 MG 24 hr tablet, TAKE 1 TABLET DAILY, Disp: 90 tablet, Rfl: 0    levothyroxine (SYNTHROID, LEVOTHROID) 112 MCG tablet, Take 1 tablet by mouth Daily., Disp: , Rfl:     methotrexate 2.5 MG tablet, Take 10 tablets by mouth 1 (One) Time Per Week. Prior to [facility] Admission, Patient was on: Takes 10 tablets on Saturday, Disp: , Rfl:     metoprolol succinate XL (TOPROL-XL) 100 MG 24 hr tablet, TAKE 1 TABLET DAILY, Disp: 90 tablet, Rfl: 3    olopatadine (PATANASE) 0.6 % solution nasal solution, 2 sprays by Each Nare route 2 (Two) Times a Day., Disp: , Rfl:     potassium chloride (KLOR-CON M10) 10 MEQ CR tablet, Take 1 tablet by mouth., Disp: , Rfl:     potassium chloride 10 MEQ CR tablet, Take 1 tablet by mouth Daily., Disp: , Rfl:     primidone (MYSOLINE) 50 MG tablet, Take 1 tablet by mouth Every Night., Disp: , Rfl:     rimegepant 75 MG dispersible tablet, , Disp: , Rfl:     sodium chloride 1 g tablet, Take 2 tablets by mouth 3 (Three) Times a Day., Disp: , Rfl:     sucralfate (CARAFATE) 1 g tablet, Take 1 tablet by mouth 4 (Four) Times a Day. Take one tablet by mouth 4 times daily 1 hour before meals and at bedtime on an empty stomach., Disp: , Rfl:     Xarelto 20 MG tablet, TAKE 1 TABLET DAILY, Disp: 90 tablet, Rfl: 3    ALLERGIES:    Allergies   Allergen Reactions    Codeine Angioedema    Imuran [Azathioprine] Other (See Comments)     Has pancreatis attacks with med    Januvia [Sitagliptin] Other (See Comments)     Has pancreatis attacks with med     Penicillins Angioedema     "Sulfa Antibiotics Angioedema    Sulfasalazine Unknown (See Comments)    Beta Adrenergic Blockers Other (See Comments)     I called pt to ask her about the allergy to bblockers in her chart. Pt states \"too big of a dose makes her psoriasis act up\", but this current dose of metoprolol 50 mg bid, she has been on for a long time, with no ill side effects.  JONATHAN IVY 3/28/18         REVIEW OF SYSTEMS:    A comprehensive 14 point review of systems was performed.  Significant findings as mentioned above.  All other systems reviewed and are negative.        Physical Exam  Vitals:    08/22/24 0855   BP: 137/83   Pulse: 77   Resp: 18   Temp: 97.1 °F (36.2 °C)   TempSrc: Temporal   SpO2: 100%   Weight: 73.6 kg (162 lb 4.8 oz)        ECOG score: 0   General: Well developed, well nourished, alert and oriented x 3, in no acute distress.   Head: ATNC   Eyes: PERRL, No evidence of conjunctivitis.   Nose: No nasal discharge.   Mouth: Oral mucosal membranes moist. No oral ulceration or hemorrhages.   Neck: Neck supple. No thyromegaly. No JVD.   Lungs: Clear in all fields to A&P without rales, rhonchi or wheezing.   Heart: S1, S2. Regular rate and rhythm. No murmurs, rubs, or gallops.   Abdomen: Soft. Bowel sounds are normoactive. Nontender with palpation. No Hepatosplenomegaly can be appreciated.   Extremities: No cyanosis or edema. Peripheral pulses palpable and equal bilaterally.   Integumentary: No rash, sores, erythema or nodules. No blistering, bruising, or dry skin.   Hem/Lymph Nodes: No palpable cervical, submandibular, supraclavicular, axillary  lymphadenopathy noted. No petechiae, purpura or ecchymosis noted.   Neurologic: Grossly non-focal exam    Pain Score:  Pain Score    08/22/24 0855   PainSc: 0-No pain       PHQ-Score Total:  PHQ-9 Total Score: 0       PATHOLOGY:        ENDOSCOPY:        IMAGING:        RECENT LABS:  Lab Results   Component Value Date    WBC 4.38 08/22/2024    HGB 10.8 (L) 08/22/2024    HCT 33.6 (L) " 08/22/2024    .0 (H) 08/22/2024    RDW 15.7 (H) 08/22/2024     08/22/2024    NEUTRORELPCT 60.1 08/22/2024    LYMPHORELPCT 27.6 08/22/2024    MONORELPCT 5.0 08/22/2024    EOSRELPCT 5.7 08/22/2024    BASORELPCT 1.1 08/22/2024    NEUTROABS 2.63 08/22/2024    LYMPHSABS 1.21 08/22/2024       Lab Results   Component Value Date     (L) 08/01/2024    K 4.1 08/01/2024    CO2  08/01/2024      Comment:      Unable to release to due to instrument and water system malfunctions.     08/01/2024    BUN 13 08/01/2024    CREATININE 0.97 08/01/2024    EGFRIFNONA 74 02/23/2022    EGFRIFAFRI  09/22/2016      Comment:      <15 Indicative of kidney failure.    GLUCOSE 219 (H) 08/01/2024    CALCIUM 9.2 08/01/2024    ALKPHOS 139 (H) 08/01/2024    AST 23 08/01/2024    ALT 21 08/01/2024    BILITOT 0.2 08/01/2024    ALBUMIN 3.8 08/01/2024    PROTEINTOT 6.0 08/01/2024    MG 2.0 09/21/2020    PHOS 4.0 04/10/2024       Lab Results   Component Value Date/Time     (H) 12/07/2023 12:01 PM     Lab Results   Component Value Date    FERRITIN 59.12 08/22/2024    IRON 72 08/22/2024    TIBC 301 08/22/2024    LABIRON 24 08/22/2024    BUZEHENH56 611 12/07/2023    FOLATE >20.00 12/07/2023       Work Up 12/07/2023      Lab Results   Component Value Date    QOQUIFVQ05 611 12/07/2023    FOLATE >20.00 12/07/2023     Lab Results   Component Value Date    RETICCTPCT 1.36 12/07/2023     Lab Results   Component Value Date    HAPTOGLOBIN 131 12/07/2023     Lab Results   Component Value Date    CRP <0.30 04/23/2024    CRP <0.30 12/07/2023    CRP <0.30 09/26/2023    CRP 4.13 (H) 09/13/2023    CRP <0.30 06/12/2021    CRP 0.09 08/07/2020    CRP <0.50 11/22/2018     Lab Results   Component Value Date    SEDRATE 9 04/23/2024    SEDRATE 6 12/07/2023    SEDRATE 8 11/22/2018     TSH  0.270 - 4.200 uIU/mL 6.980 High      Free T4  0.93 - 1.70 ng/dL 1.58       Copper  80 - 158 ug/dL 111     Zinc  44 - 115 ug/dL 80     Tissue Transglutaminase  IgA  0 - 3 U/mL <2     Iron  37 - 145 mcg/dL 31 Low    Iron Saturation (TSAT)  20 - 50 % 7 Low    Transferrin  200 - 360 mg/dL 319   TIBC  298 - 536 mcg/dL 475     Ferritin  13.00 - 150.00 ng/mL 122.70       ASSESSMENT & PLAN:  Lashae Benitez is a very pleasant 63 y.o. female with    1. Normocytic Anemia  2. DARIO  - Previous available CBCs were reviewed and patient has had chronic anemia with fluctuations in Hg ranging 9.2-11.9 since at least August 2019.   - She is taking oral iron supplement daily which she is having a difficult time tolerating due to GI upset. She also takes Folic Acid daily and B12 injections monthly.   -She denies any obvious blood loss from any source. She reports having colonoscopy per Dr. Frederick last year that was unremarkable (no records currently available, will request today). She has never had EGD. No history of blood transfusion.  - CBC from initial consultation showed Hg (10.1) and slightly elevated platelet count 500,000, likely reactive. WBC is normal. Iron panel is low with normal Ferritin despite oral iron replacement. Therefore, discontinued oral iron and replaced with IV Feraheme for apparent malabsorption of oral iron.   - Also obtained additional labs to further evaluate anemia including PBS which showed macrocytic anemia with mild anisocytosis including occasional elliptocytes, no increase in schistocyte population, thrombocytosis with normal platelet morphology, normal WBC count and differential, granulocytes show normal morphology and no circulating blasts identified. B12 and Folate are replete. No evidence of hemolysis as Retic, LDH and Haptoglobin are normal. CRP and ESR were normal. TSH was elevated with normal Free T4 (previous thyroidectomy in 2016). Copper, Zinc and Tissue Transglutaminase were normal.  - CBC today with WBC 4.38, Hg 10.8, Hct 33.6, .0 and platelet count 298,000. Iron profile and ferritin remain replete.   - Will follow up in 3 months with CBC, iron  profile, ferritin, vitamin B12 and folate.  - Advised to continue to follow up with Dr. Glez as scheduled.    3. Psoriatic Arthritis  - She follows with Dr. Chand at [facility]. She is currently taking Orencia, Otezla and MTX. Ongoing management per rheumatology.        ACO / KRISTAL/Other  Quality measures  -  Lashae Benitez received 2023 flu vaccine.  -  Lashae Benitez reports a pain score of 0.    -  Current outpatient and discharge medications have been reconciled for the patient.  Reviewed by: RUIZ Altamirano      I spent 30 minutes with Lashae Benitez today.  In the office today, more than 50% of this time was spent face-to-face with her  in counseling / coordination of care, reviewing her interim medical history and counseling on the current treatment plan.  All questions were answered to her satisfaction.               Electronically Signed by: RUIZ Moore , August 22, 2024 11:44 EDT         CC:   No ref. provider found  Kreis, Samuel Duane, MD

## 2024-08-22 NOTE — PROGRESS NOTES
Venipuncture Blood Specimen Collection  Venipuncture performed in left arm by Romario Ragland MA with good hemostasis. Patient tolerated the procedure well without complications.   08/22/24   Romario Ragland MA

## 2024-08-28 ENCOUNTER — TRANSCRIBE ORDERS (OUTPATIENT)
Dept: ADMINISTRATIVE | Facility: HOSPITAL | Age: 64
End: 2024-08-28
Payer: MEDICARE

## 2024-08-28 ENCOUNTER — HOSPITAL ENCOUNTER (OUTPATIENT)
Dept: GENERAL RADIOLOGY | Facility: HOSPITAL | Age: 64
Discharge: HOME | End: 2024-08-28
Admitting: FAMILY MEDICINE
Payer: MEDICARE

## 2024-08-28 DIAGNOSIS — M79.672 LEFT FOOT PAIN: Primary | ICD-10-CM

## 2024-08-28 DIAGNOSIS — M25.552 LEFT HIP PAIN: ICD-10-CM

## 2024-08-28 DIAGNOSIS — M79.672 LEFT FOOT PAIN: ICD-10-CM

## 2024-08-28 PROCEDURE — 73502 X-RAY EXAM HIP UNI 2-3 VIEWS: CPT

## 2024-08-28 PROCEDURE — 73630 X-RAY EXAM OF FOOT: CPT

## 2024-08-30 ENCOUNTER — TRANSCRIBE ORDERS (OUTPATIENT)
Dept: ADMINISTRATIVE | Facility: HOSPITAL | Age: 64
End: 2024-08-30
Payer: MEDICARE

## 2024-09-05 ENCOUNTER — TRANSCRIBE ORDERS (OUTPATIENT)
Dept: ADMINISTRATIVE | Facility: HOSPITAL | Age: 64
End: 2024-09-05
Payer: MEDICARE

## 2024-09-05 DIAGNOSIS — R22.42 MASS OF LOWER LEG, LEFT: Primary | ICD-10-CM

## 2024-09-11 ENCOUNTER — HOSPITAL ENCOUNTER (OUTPATIENT)
Facility: HOSPITAL | Age: 64
Discharge: HOME | End: 2024-09-11
Admitting: FAMILY MEDICINE
Payer: MEDICARE

## 2024-09-11 DIAGNOSIS — R22.42 MASS OF LOWER LEG, LEFT: ICD-10-CM

## 2024-09-11 PROCEDURE — 93971 EXTREMITY STUDY: CPT

## 2024-09-20 RX ORDER — ISOSORBIDE MONONITRATE 30 MG/1
TABLET, EXTENDED RELEASE ORAL
Qty: 90 TABLET | Refills: 3 | Status: SHIPPED | OUTPATIENT
Start: 2024-09-20

## 2024-09-25 ENCOUNTER — HOSPITAL ENCOUNTER (OUTPATIENT)
Facility: HOSPITAL | Age: 64
Discharge: HOME | End: 2024-09-25
Admitting: FAMILY MEDICINE
Payer: MEDICARE

## 2024-09-25 ENCOUNTER — TRANSCRIBE ORDERS (OUTPATIENT)
Dept: ADMINISTRATIVE | Facility: HOSPITAL | Age: 64
End: 2024-09-25
Payer: MEDICARE

## 2024-09-25 DIAGNOSIS — M54.16 LUMBAR RADICULOPATHY: Primary | ICD-10-CM

## 2024-09-25 DIAGNOSIS — M54.16 LUMBAR RADICULOPATHY: ICD-10-CM

## 2024-09-25 PROCEDURE — 72110 X-RAY EXAM L-2 SPINE 4/>VWS: CPT

## 2024-09-26 ENCOUNTER — TRANSCRIBE ORDERS (OUTPATIENT)
Dept: ADMINISTRATIVE | Facility: HOSPITAL | Age: 64
End: 2024-09-26
Payer: MEDICARE

## 2024-09-26 DIAGNOSIS — M54.16 LUMBAR RADICULOPATHY: Primary | ICD-10-CM

## 2024-10-07 ENCOUNTER — TRANSCRIBE ORDERS (OUTPATIENT)
Dept: ADMINISTRATIVE | Facility: HOSPITAL | Age: 64
End: 2024-10-07
Payer: MEDICARE

## 2024-10-07 ENCOUNTER — LAB (OUTPATIENT)
Dept: LAB | Facility: HOSPITAL | Age: 64
End: 2024-10-07
Payer: MEDICARE

## 2024-10-07 DIAGNOSIS — E87.1 HYPONATREMIA: Primary | ICD-10-CM

## 2024-10-07 DIAGNOSIS — E87.1 HYPONATREMIA: ICD-10-CM

## 2024-10-07 PROCEDURE — 82570 ASSAY OF URINE CREATININE: CPT

## 2024-10-07 PROCEDURE — 82533 TOTAL CORTISOL: CPT

## 2024-10-07 PROCEDURE — 81001 URINALYSIS AUTO W/SCOPE: CPT

## 2024-10-07 PROCEDURE — 84156 ASSAY OF PROTEIN URINE: CPT

## 2024-10-07 PROCEDURE — 36415 COLL VENOUS BLD VENIPUNCTURE: CPT

## 2024-10-07 PROCEDURE — 82043 UR ALBUMIN QUANTITATIVE: CPT

## 2024-10-07 PROCEDURE — 80053 COMPREHEN METABOLIC PANEL: CPT

## 2024-10-08 LAB
ALBUMIN SERPL-MCNC: 3.4 G/DL (ref 3.5–5.2)
ALBUMIN UR-MCNC: 3.6 MG/DL
ALBUMIN/GLOB SERPL: 1.8 G/DL
ALP SERPL-CCNC: 152 U/L (ref 39–117)
ALT SERPL W P-5'-P-CCNC: 20 U/L (ref 1–33)
ANION GAP SERPL CALCULATED.3IONS-SCNC: 8 MMOL/L (ref 5–15)
AST SERPL-CCNC: 20 U/L (ref 1–32)
BACTERIA UR QL AUTO: NORMAL /HPF
BILIRUB SERPL-MCNC: 0.3 MG/DL (ref 0–1.2)
BILIRUB UR QL STRIP: NEGATIVE
BUN SERPL-MCNC: 9 MG/DL (ref 8–23)
BUN/CREAT SERPL: 12.3 (ref 7–25)
CALCIUM SPEC-SCNC: 8.7 MG/DL (ref 8.6–10.5)
CHLORIDE SERPL-SCNC: 104 MMOL/L (ref 98–107)
CLARITY UR: ABNORMAL
CO2 SERPL-SCNC: 25 MMOL/L (ref 22–29)
COD CRY URNS QL: PRESENT /HPF
COLOR UR: YELLOW
CORTIS SERPL-MCNC: 11.8 MCG/DL
CREAT SERPL-MCNC: 0.73 MG/DL (ref 0.57–1)
CREAT UR-MCNC: 55 MG/DL
CREAT UR-MCNC: 55 MG/DL
EGFRCR SERPLBLD CKD-EPI 2021: 92.5 ML/MIN/1.73
GLOBULIN UR ELPH-MCNC: 1.9 GM/DL
GLUCOSE SERPL-MCNC: 239 MG/DL (ref 65–99)
GLUCOSE UR STRIP-MCNC: ABNORMAL MG/DL
HGB UR QL STRIP.AUTO: NEGATIVE
HOLD SPECIMEN: NORMAL
HYALINE CASTS UR QL AUTO: NORMAL /LPF
KETONES UR QL STRIP: NEGATIVE
LEUKOCYTE ESTERASE UR QL STRIP.AUTO: ABNORMAL
MICROALBUMIN/CREAT UR: 65.5 MG/G (ref 0–29)
NITRITE UR QL STRIP: NEGATIVE
PH UR STRIP.AUTO: 6.5 [PH] (ref 5–8)
POTASSIUM SERPL-SCNC: 3.9 MMOL/L (ref 3.5–5.2)
PROT ?TM UR-MCNC: 17.5 MG/DL
PROT SERPL-MCNC: 5.3 G/DL (ref 6–8.5)
PROT UR QL STRIP: ABNORMAL
PROT/CREAT UR: 318.2 MG/G CREA (ref 0–200)
RBC # UR STRIP: NORMAL /HPF
REF LAB TEST METHOD: NORMAL
SODIUM SERPL-SCNC: 137 MMOL/L (ref 136–145)
SP GR UR STRIP: 1.02 (ref 1–1.03)
SQUAMOUS #/AREA URNS HPF: NORMAL /HPF
UROBILINOGEN UR QL STRIP: ABNORMAL
WBC # UR STRIP: NORMAL /HPF

## 2024-10-08 RX ORDER — FLECAINIDE ACETATE 50 MG/1
50 TABLET ORAL 2 TIMES DAILY
Qty: 180 TABLET | Refills: 3 | Status: SHIPPED | OUTPATIENT
Start: 2024-10-08

## 2024-10-14 ENCOUNTER — HOSPITAL ENCOUNTER (OUTPATIENT)
Dept: MRI IMAGING | Facility: HOSPITAL | Age: 64
Discharge: HOME | End: 2024-10-14
Admitting: FAMILY MEDICINE
Payer: MEDICARE

## 2024-10-14 DIAGNOSIS — M54.16 LUMBAR RADICULOPATHY: ICD-10-CM

## 2024-10-14 PROCEDURE — 72148 MRI LUMBAR SPINE W/O DYE: CPT

## 2024-10-14 PROCEDURE — 72148 MRI LUMBAR SPINE W/O DYE: CPT | Performed by: RADIOLOGY

## 2024-10-16 ENCOUNTER — TELEPHONE (OUTPATIENT)
Dept: CARDIOLOGY | Facility: CLINIC | Age: 64
End: 2024-10-16
Payer: MEDICARE

## 2024-10-17 NOTE — TELEPHONE ENCOUNTER
Cardiac risk assessment for steroid epidural injections for lumbar radiculopathy.       LS 5/22/24  F/up 11/25/24  Echo 4/27/24  Stress 11/15/21    Formed placed on Gael's desk.

## 2024-10-21 NOTE — TELEPHONE ENCOUNTER
Hub to relay.     Called pt advise her that Gael has signed off on her epidural injcection and she may hold her Eliquis 3 days prior. No answer.

## 2024-10-29 DIAGNOSIS — I10 ESSENTIAL HYPERTENSION: Primary | ICD-10-CM

## 2024-10-29 RX ORDER — LOSARTAN POTASSIUM 50 MG/1
50 TABLET ORAL DAILY
Qty: 30 TABLET | Refills: 2 | Status: SHIPPED | OUTPATIENT
Start: 2024-10-29

## 2024-11-15 ENCOUNTER — LAB (OUTPATIENT)
Dept: LAB | Facility: HOSPITAL | Age: 64
End: 2024-11-15
Payer: MEDICARE

## 2024-11-15 DIAGNOSIS — I10 ESSENTIAL HYPERTENSION: ICD-10-CM

## 2024-11-15 PROCEDURE — 36415 COLL VENOUS BLD VENIPUNCTURE: CPT

## 2024-11-15 PROCEDURE — 80048 BASIC METABOLIC PNL TOTAL CA: CPT

## 2024-11-16 LAB
ANION GAP SERPL CALCULATED.3IONS-SCNC: 10 MMOL/L (ref 5–15)
BUN SERPL-MCNC: 13 MG/DL (ref 8–23)
BUN/CREAT SERPL: 14.3 (ref 7–25)
CALCIUM SPEC-SCNC: 8.5 MG/DL (ref 8.6–10.5)
CHLORIDE SERPL-SCNC: 100 MMOL/L (ref 98–107)
CO2 SERPL-SCNC: 23 MMOL/L (ref 22–29)
CREAT SERPL-MCNC: 0.91 MG/DL (ref 0.57–1)
EGFRCR SERPLBLD CKD-EPI 2021: 71 ML/MIN/1.73
GLUCOSE SERPL-MCNC: 305 MG/DL (ref 65–99)
POTASSIUM SERPL-SCNC: 4.2 MMOL/L (ref 3.5–5.2)
SODIUM SERPL-SCNC: 133 MMOL/L (ref 136–145)

## 2024-11-26 ENCOUNTER — OFFICE VISIT (OUTPATIENT)
Dept: CARDIOLOGY | Facility: CLINIC | Age: 64
End: 2024-11-26
Payer: MEDICARE

## 2024-11-26 VITALS
BODY MASS INDEX: 25.22 KG/M2 | RESPIRATION RATE: 18 BRPM | HEART RATE: 64 BPM | OXYGEN SATURATION: 100 % | SYSTOLIC BLOOD PRESSURE: 180 MMHG | WEIGHT: 151.4 LBS | DIASTOLIC BLOOD PRESSURE: 86 MMHG | HEIGHT: 65 IN

## 2024-11-26 DIAGNOSIS — I48.0 PAROXYSMAL ATRIAL FIBRILLATION: Primary | ICD-10-CM

## 2024-11-26 DIAGNOSIS — I10 ESSENTIAL HYPERTENSION: ICD-10-CM

## 2024-11-26 PROCEDURE — 3079F DIAST BP 80-89 MM HG: CPT | Performed by: PHYSICIAN ASSISTANT

## 2024-11-26 PROCEDURE — 3077F SYST BP >= 140 MM HG: CPT | Performed by: PHYSICIAN ASSISTANT

## 2024-11-26 PROCEDURE — 99214 OFFICE O/P EST MOD 30 MIN: CPT | Performed by: PHYSICIAN ASSISTANT

## 2024-11-26 PROCEDURE — 93000 ELECTROCARDIOGRAM COMPLETE: CPT | Performed by: PHYSICIAN ASSISTANT

## 2024-11-26 RX ORDER — AMLODIPINE BESYLATE 5 MG/1
5 TABLET ORAL DAILY
Qty: 30 TABLET | Refills: 11 | Status: SHIPPED | OUTPATIENT
Start: 2024-11-26

## 2024-11-26 NOTE — PROGRESS NOTES
"Kreis, Samuel Duane, MD  Lashae Benitez  1960  11/26/2024    Patient Active Problem List   Diagnosis   • Hiatal hernia   • Essential hypertension   • Sjogren's syndrome   • Multiple sclerosis   • Psoriasis   • Fibromyalgia   • Osteoarthritis   • Psoriatic arthritis   • RA (rheumatoid arthritis)   • Degenerative disc disease, lumbar   • TMJ arthritis   • Dupuytren's disease   • H. pylori infection   • Family history of coronary artery disease   • Type 2 diabetes mellitus   • Edema   • Bilateral leg edema   • Isolated corticotropin deficiency   • Hyponatremia   • Paroxysmal atrial fibrillation   • Sepsis   • Bacteremia, escherichia coli   • Iron deficiency anemia   • Malabsorption due to intolerance, not elsewhere classified       Dear Kreis, Samuel Duane, MD:    Subjective     History of Present Illness:    Chief Complaint   Patient presents with   • Paroxysmal atrial fibrillation       Lashae Benitez is a pleasant 64 y.o. female with a past medical history significant for    Allergies   Allergen Reactions   • Codeine Angioedema   • Imuran [Azathioprine] Other (See Comments)     Has pancreatis attacks with med   • Januvia [Sitagliptin] Other (See Comments)     Has pancreatis attacks with med    • Penicillins Angioedema   • Sulfa Antibiotics Angioedema   • Sulfasalazine Unknown (See Comments)   • Beta Adrenergic Blockers Other (See Comments)     I called pt to ask her about the allergy to bblockers in her chart. Pt states \"too big of a dose makes her psoriasis act up\", but this current dose of metoprolol 50 mg bid, she has been on for a long time, with no ill side effects.  CATA,CMA 3/28/18   :      Current Outpatient Medications:   •  abatacept (ORENCIA) 250 MG injection, Infuse 30 mL into a venous catheter Every 28 (Twenty-Eight) Days., Disp: , Rfl:   •  alendronate (FOSAMAX) 70 MG tablet, Take 1 tablet by mouth 1 (One) Time Per Week. Wednesday, Disp: , Rfl:   •  amitriptyline (ELAVIL) 25 MG tablet, Take 1 tablet by " mouth Every Night., Disp: , Rfl:   •  Apremilast (Otezla) 30 MG tablet, Take 30 mg by mouth 2 (two) times a day., Disp: , Rfl:   •  Butalbital-Acetaminophen  MG capsule, Take 300 mg by mouth Daily As Needed., Disp: , Rfl:   •  celecoxib (CeleBREX) 200 MG capsule, Take 1 capsule by mouth Daily., Disp: , Rfl:   •  cyanocobalamin 1000 MCG/ML injection, Inject 1 mL into the appropriate muscle as directed by prescriber Every 28 (Twenty-Eight) Days., Disp: , Rfl:   •  cyclobenzaprine (FLEXERIL) 10 MG tablet, Take 1 tablet by mouth Every Evening., Disp: , Rfl:   •  Diclofenac Sodium (VOLTAREN) 1 % gel gel, Apply 2 g topically to the appropriate area as directed 4 (Four) Times a Day As Needed., Disp: , Rfl:   •  DULoxetine (CYMBALTA) 30 MG capsule, Take 1 capsule by mouth Every Morning., Disp: , Rfl:   •  DULoxetine (CYMBALTA) 60 MG capsule, Take 1 capsule by mouth Daily., Disp: , Rfl:   •  EMGALITY 120 MG/ML prefilled syringe, Inject 1 mL under the skin into the appropriate area as directed Every 30 (Thirty) Days., Disp: , Rfl: 3  •  famotidine (PEPCID) 40 MG tablet, Take 1 tablet by mouth 2 (Two) Times a Day., Disp: , Rfl:   •  ferrous gluconate (FERGON) 240 (27 FE) MG tablet, Take 1 tablet by mouth Every Morning., Disp: , Rfl:   •  fexofenadine (ALLEGRA) 180 MG tablet, Take 1 tablet by mouth Daily., Disp: , Rfl:   •  Finerenone (Kerendia) 10 MG tablet, Take 1 tablet by mouth Daily., Disp: , Rfl:   •  flecainide (TAMBOCOR) 50 MG tablet, TAKE 1 TABLET TWICE A DAY, Disp: 180 tablet, Rfl: 3  •  fluticasone-salmeterol (ADVAIR HFA) 115-21 MCG/ACT inhaler, Inhale 2 puffs 2 (Two) Times a Day., Disp: , Rfl:   •  folic acid (FOLVITE) 1 MG tablet, Take 1 tablet by mouth Daily., Disp: , Rfl:   •  gabapentin (NEURONTIN) 600 MG tablet, Take 1 tablet by mouth 3 (Three) Times a Day., Disp: , Rfl:   •  HumaLOG Mix 50/50 KwikPen (50-50) 100 UNIT/ML suspension pen-injector, 15 Units., Disp: , Rfl:   •  isosorbide mononitrate (IMDUR)  "30 MG 24 hr tablet, TAKE 1 TABLET DAILY, Disp: 90 tablet, Rfl: 3  •  levothyroxine (SYNTHROID, LEVOTHROID) 112 MCG tablet, Take 1 tablet by mouth Daily., Disp: , Rfl:   •  losartan (Cozaar) 50 MG tablet, Take 1 tablet by mouth Daily., Disp: 30 tablet, Rfl: 2  •  methotrexate 2.5 MG tablet, Take 10 tablets by mouth 1 (One) Time Per Week. Prior to [facility] [place] [place] by [facility] Admission, Patient was on: Takes 10 tablets on Saturday, Disp: , Rfl:   •  metoprolol succinate XL (TOPROL-XL) 100 MG 24 hr tablet, TAKE 1 TABLET DAILY, Disp: 90 tablet, Rfl: 3  •  olopatadine (PATANASE) 0.6 % solution nasal solution, 2 sprays by Each Nare route 2 (Two) Times a Day., Disp: , Rfl:   •  potassium chloride (KLOR-CON M10) 10 MEQ CR tablet, Take 1 tablet by mouth., Disp: , Rfl:   •  potassium chloride 10 MEQ CR tablet, Take 1 tablet by mouth Daily., Disp: , Rfl:   •  primidone (MYSOLINE) 50 MG tablet, Take 1 tablet by mouth Every Night., Disp: , Rfl:   •  rimegepant 75 MG dispersible tablet, , Disp: , Rfl:   •  sodium chloride 1 g tablet, Take 2 tablets by mouth 3 (Three) Times a Day., Disp: , Rfl:   •  sucralfate (CARAFATE) 1 g tablet, Take 1 tablet by mouth 4 (Four) Times a Day. Take one tablet by mouth 4 times daily 1 hour before meals and at bedtime on an empty stomach., Disp: , Rfl:   •  Xarelto 20 MG tablet, TAKE 1 TABLET DAILY, Disp: 90 tablet, Rfl: 3    The following portions of the patient's history were reviewed and updated as appropriate: allergies, current medications, past family history, past medical history, past social history, past surgical history and problem list.    Social History     Tobacco Use   • Smoking status: Never     Passive exposure: Never   • Smokeless tobacco: Never   Vaping Use   • Vaping status: Never Used   Substance Use Topics   • Alcohol use: Never   • Drug use: Never         Objective   Vitals:    11/26/24 0908   Resp: 18   Weight: 68.7 kg (151 lb 6.4 oz)   Height: 165.1 cm (65\")     Body mass index is 25.19 " kg/m².    ROS    Physical Exam    Lab Results   Component Value Date     (L) 11/15/2024    K 4.2 11/15/2024     11/15/2024    CO2 23.0 11/15/2024    BUN 13 11/15/2024    CREATININE 0.91 11/15/2024    GLUCOSE 305 (H) 11/15/2024    CALCIUM 8.5 (L) 11/15/2024    AST 20 10/07/2024    ALT 20 10/07/2024    ALKPHOS 152 (H) 10/07/2024    LABIL2 1.4 (L) 08/11/2015     Lab Results   Component Value Date    CKTOTAL 40 04/12/2017     Lab Results   Component Value Date    WBC 6.5 11/20/2024    HGB 11.7 11/20/2024    HCT 35.7 11/20/2024     11/20/2024     Lab Results   Component Value Date    INR 1.54 (H) 09/13/2023    INR 0.97 11/22/2018    INR 0.93 07/20/2015     Lab Results   Component Value Date    MG 2.0 09/21/2020     Lab Results   Component Value Date    TSH 6.980 (H) 12/07/2023    CHLPL 132 07/22/2015    TRIG 49 01/06/2022    HDL 70 (H) 01/06/2022    LDL 73 01/06/2022      Lab Results   Component Value Date    BNP 51.0 11/22/2018       During this visit the following were done:  Labs Reviewed []    Labs Ordered []    Radiology Reports Reviewed []    Radiology Ordered []    PCP Records Reviewed []    Referring Provider Records Reviewed []    ER Records Reviewed []    Hospital Records Reviewed []    History Obtained From Family []    Radiology Images Reviewed []    Other Reviewed []    Records Requested []         ECG 12 Lead    Date/Time: 11/26/2024 9:25 AM  Performed by: Gael Baum PA-C    Authorized by: Gael Baum PA-C  Comparison: compared with previous ECG from 3/25/2024      Assessment & Plan   No diagnosis found.         Recommendations:      No follow-ups on file.    As always, I appreciate very much the opportunity to participate in the cardiovascular care of your patients.      With Best Regards,    Gael Baum PA-C

## 2024-11-26 NOTE — PROGRESS NOTES
Kreis, Samuel Duane, MD  Lashae Benitez  1960  11/26/2024    Patient Active Problem List   Diagnosis    Hiatal hernia    Essential hypertension    Sjogren's syndrome    Multiple sclerosis    Psoriasis    Fibromyalgia    Osteoarthritis    Psoriatic arthritis    RA (rheumatoid arthritis)    Degenerative disc disease, lumbar    TMJ arthritis    Dupuytren's disease    H. pylori infection    Family history of coronary artery disease    Type 2 diabetes mellitus    Edema    Bilateral leg edema    Isolated corticotropin deficiency    Hyponatremia    Paroxysmal atrial fibrillation    Sepsis    Bacteremia, escherichia coli    Iron deficiency anemia    Malabsorption due to intolerance, not elsewhere classified       Dear Kreis, Samuel Duane, MD:    Subjective     History of Present Illness:    Chief Complaint   Patient presents with    Paroxysmal atrial fibrillation       Lashae Benitez is a pleasant 64 y.o. female with a past medical history significant for no known coronary artery disease or structural heart disease she also denies any history of tobacco abuse.  She does have diabetes mellitus, paroxysmal atrial fibrillation anticoagulated with Xarelto and with rhythm control on flecainide.  She does have essential hypertension.  she does report family history of premature CAD with her father having a major AMI in his 30s.  She does also have history of severe hyponatremia with sodium being as low as 111 thought to be drug-induced as at the time she was taken hydrochlorothiazide, very high dose of Cymbalta, and Celebrex.  She comes in for routine cardiology follow up.      Patient reports she has been doing well from cardiac standpoint other than her blood pressure.  Blood pressure has been persistently elevated recently and is elevated in the office today.  She denies any chest pains or worsening shortness of breath from baseline.  She also denies any syncope or near syncope.    Allergies   Allergen Reactions    Codeine  "Angioedema    Imuran [Azathioprine] Other (See Comments)     Has pancreatis attacks with med    Januvia [Sitagliptin] Other (See Comments)     Has pancreatis attacks with med     Penicillins Angioedema    Sulfa Antibiotics Angioedema    Sulfasalazine Unknown (See Comments)    Beta Adrenergic Blockers Other (See Comments)     I called pt to ask her about the allergy to bblockers in her chart. Pt states \"too big of a dose makes her psoriasis act up\", but this current dose of metoprolol 50 mg bid, she has been on for a long time, with no ill side effects.  CATA,CMA 3/28/18   :      Current Outpatient Medications:     abatacept (ORENCIA) 250 MG injection, Infuse 30 mL into a venous catheter Every 28 (Twenty-Eight) Days., Disp: , Rfl:     alendronate (FOSAMAX) 70 MG tablet, Take 1 tablet by mouth 1 (One) Time Per Week. Wednesday, Disp: , Rfl:     amitriptyline (ELAVIL) 25 MG tablet, Take 1 tablet by mouth Every Night., Disp: , Rfl:     Apremilast (Otezla) 30 MG tablet, Take 30 mg by mouth 2 (two) times a day., Disp: , Rfl:     Butalbital-Acetaminophen  MG capsule, Take 300 mg by mouth Daily As Needed., Disp: , Rfl:     celecoxib (CeleBREX) 200 MG capsule, Take 1 capsule by mouth Daily., Disp: , Rfl:     cyanocobalamin 1000 MCG/ML injection, Inject 1 mL into the appropriate muscle as directed by prescriber Every 28 (Twenty-Eight) Days., Disp: , Rfl:     cyclobenzaprine (FLEXERIL) 10 MG tablet, Take 1 tablet by mouth Every Evening., Disp: , Rfl:     Diclofenac Sodium (VOLTAREN) 1 % gel gel, Apply 2 g topically to the appropriate area as directed 4 (Four) Times a Day As Needed., Disp: , Rfl:     DULoxetine (CYMBALTA) 30 MG capsule, Take 1 capsule by mouth Every Morning., Disp: , Rfl:     DULoxetine (CYMBALTA) 60 MG capsule, Take 1 capsule by mouth Daily., Disp: , Rfl:     EMGALITY 120 MG/ML prefilled syringe, Inject 1 mL under the skin into the appropriate area as directed Every 30 (Thirty) Days., Disp: , Rfl: 3    " famotidine (PEPCID) 40 MG tablet, Take 1 tablet by mouth 2 (Two) Times a Day., Disp: , Rfl:     ferrous gluconate (FERGON) 240 (27 FE) MG tablet, Take 1 tablet by mouth Every Morning., Disp: , Rfl:     fexofenadine (ALLEGRA) 180 MG tablet, Take 1 tablet by mouth Daily., Disp: , Rfl:     Finerenone (Kerendia) 10 MG tablet, Take 1 tablet by mouth Daily., Disp: , Rfl:     flecainide (TAMBOCOR) 50 MG tablet, TAKE 1 TABLET TWICE A DAY, Disp: 180 tablet, Rfl: 3    fluticasone-salmeterol (ADVAIR HFA) 115-21 MCG/ACT inhaler, Inhale 2 puffs 2 (Two) Times a Day., Disp: , Rfl:     folic acid (FOLVITE) 1 MG tablet, Take 1 tablet by mouth Daily., Disp: , Rfl:     gabapentin (NEURONTIN) 600 MG tablet, Take 1 tablet by mouth 3 (Three) Times a Day., Disp: , Rfl:     HumaLOG Mix 50/50 KwikPen (50-50) 100 UNIT/ML suspension pen-injector, 15 Units., Disp: , Rfl:     isosorbide mononitrate (IMDUR) 30 MG 24 hr tablet, TAKE 1 TABLET DAILY, Disp: 90 tablet, Rfl: 3    levothyroxine (SYNTHROID, LEVOTHROID) 112 MCG tablet, Take 1 tablet by mouth Daily., Disp: , Rfl:     losartan (Cozaar) 50 MG tablet, Take 1 tablet by mouth Daily., Disp: 30 tablet, Rfl: 2    methotrexate 2.5 MG tablet, Take 10 tablets by mouth 1 (One) Time Per Week. Prior to [facility] [place] Admission, Patient was on: Takes 10 tablets on Saturday, Disp: , Rfl:     metoprolol succinate XL (TOPROL-XL) 100 MG 24 hr tablet, TAKE 1 TABLET DAILY, Disp: 90 tablet, Rfl: 3    olopatadine (PATANASE) 0.6 % solution nasal solution, 2 sprays by Each Nare route 2 (Two) Times a Day., Disp: , Rfl:     potassium chloride (KLOR-CON M10) 10 MEQ CR tablet, Take 1 tablet by mouth., Disp: , Rfl:     potassium chloride 10 MEQ CR tablet, Take 1 tablet by mouth Daily., Disp: , Rfl:     primidone (MYSOLINE) 50 MG tablet, Take 1 tablet by mouth Every Night., Disp: , Rfl:     rimegepant 75 MG dispersible tablet, , Disp: , Rfl:     sodium chloride 1 g tablet, Take 2 tablets by mouth 3 (Three) Times a Day., Disp:  ", Rfl:     sucralfate (CARAFATE) 1 g tablet, Take 1 tablet by mouth 4 (Four) Times a Day. Take one tablet by mouth 4 times daily 1 hour before meals and at bedtime on an empty stomach., Disp: , Rfl:     Xarelto 20 MG tablet, TAKE 1 TABLET DAILY, Disp: 90 tablet, Rfl: 3    amLODIPine (NORVASC) 5 MG tablet, Take 1 tablet by mouth Daily., Disp: 30 tablet, Rfl: 11    The following portions of the patient's history were reviewed and updated as appropriate: allergies, current medications, past family history, past medical history, past social history, past surgical history and problem list.    Social History     Tobacco Use    Smoking status: Never     Passive exposure: Never    Smokeless tobacco: Never   Vaping Use    Vaping status: Never Used   Substance Use Topics    Alcohol use: Never    Drug use: Never         Objective   Vitals:    11/26/24 0908   BP: 180/86   BP Location: Left arm   Patient Position: Sitting   Cuff Size: Adult   Pulse: 64   Resp: 18   SpO2: 100%   Weight: 68.7 kg (151 lb 6.4 oz)   Height: 165.1 cm (65\")     Body mass index is 25.19 kg/m².    ROS    Constitutional:       General: Not in acute distress.     Appearance: Healthy appearance. Well-developed and not in distress. Not diaphoretic.   Eyes:      Conjunctiva/sclera: Conjunctivae normal.      Pupils: Pupils are equal, round, and reactive to light.   HENT:      Head: Normocephalic and atraumatic.   Neck:      Vascular: No carotid bruit or JVD.   Pulmonary:      Effort: Pulmonary effort is normal. No respiratory distress.      Breath sounds: Normal breath sounds.   Cardiovascular:      Normal rate. Regular rhythm.   Edema:     Peripheral edema absent.   Skin:     General: Skin is cool.   Neurological:      Mental Status: Alert, oriented to person, place, and time and oriented to person, place and time.         Lab Results   Component Value Date     (L) 11/15/2024    K 4.2 11/15/2024     11/15/2024    CO2 23.0 11/15/2024    BUN 13 " 11/15/2024    CREATININE 0.91 11/15/2024    GLUCOSE 305 (H) 11/15/2024    CALCIUM 8.5 (L) 11/15/2024    AST 20 10/07/2024    ALT 20 10/07/2024    ALKPHOS 152 (H) 10/07/2024    LABIL2 1.4 (L) 08/11/2015     Lab Results   Component Value Date    CKTOTAL 40 04/12/2017     Lab Results   Component Value Date    WBC 6.5 11/20/2024    HGB 11.7 11/20/2024    HCT 35.7 11/20/2024     11/20/2024     Lab Results   Component Value Date    INR 1.54 (H) 09/13/2023    INR 0.97 11/22/2018    INR 0.93 07/20/2015     Lab Results   Component Value Date    MG 2.0 09/21/2020     Lab Results   Component Value Date    TSH 6.980 (H) 12/07/2023    CHLPL 132 07/22/2015    TRIG 49 01/06/2022    HDL 70 (H) 01/06/2022    LDL 73 01/06/2022      Lab Results   Component Value Date    BNP 51.0 11/22/2018       During this visit the following were done:  Labs Reviewed []    Labs Ordered []    Radiology Reports Reviewed []    Radiology Ordered []    PCP Records Reviewed []    Referring Provider Records Reviewed []    ER Records Reviewed []    Hospital Records Reviewed []    History Obtained From Family []    Radiology Images Reviewed []    Other Reviewed []    Records Requested []         ECG 12 Lead    Date/Time: 11/26/2024 9:42 AM  Performed by: Gael Baum PA-C    Authorized by: Gael Baum PA-C  Comparison: compared with previous ECG   Similar to previous ECG  Rhythm: sinus rhythm  Conduction: incomplete right bundle branch block and left anterior fascicular block    Clinical impression: non-specific ECG          Assessment & Plan    Diagnosis Plan   1. Paroxysmal atrial fibrillation  ECG 12 Lead      2. Essential hypertension                 Recommendations:  Essential hypertension  I will start amlodipine 5 mg  Continue losartan and metoprolol  Paroxysmal atrial fibrillation  Maintaining sinus rhythm anticoagulated with Xarelto.  Continue metoprolol      No follow-ups on file.    As always, I appreciate very much the  opportunity to participate in the cardiovascular care of your patients.      With Best Regards,    Gael Baum PA-C

## 2024-11-27 ENCOUNTER — TRANSCRIBE ORDERS (OUTPATIENT)
Dept: ADMINISTRATIVE | Facility: HOSPITAL | Age: 64
End: 2024-11-27
Payer: MEDICARE

## 2024-11-27 ENCOUNTER — LAB (OUTPATIENT)
Dept: LAB | Facility: HOSPITAL | Age: 64
End: 2024-11-27
Payer: MEDICARE

## 2024-11-27 DIAGNOSIS — E87.1 HYPONATREMIA: Primary | ICD-10-CM

## 2024-11-27 DIAGNOSIS — E87.1 HYPONATREMIA: ICD-10-CM

## 2024-11-27 LAB
ALBUMIN SERPL-MCNC: 3.4 G/DL (ref 3.5–5.2)
ALBUMIN/GLOB SERPL: 1.4 G/DL
ALP SERPL-CCNC: 179 U/L (ref 39–117)
ALT SERPL W P-5'-P-CCNC: 13 U/L (ref 1–33)
ANION GAP SERPL CALCULATED.3IONS-SCNC: 9.5 MMOL/L (ref 5–15)
AST SERPL-CCNC: 13 U/L (ref 1–32)
BILIRUB SERPL-MCNC: 0.4 MG/DL (ref 0–1.2)
BUN SERPL-MCNC: 10 MG/DL (ref 8–23)
BUN/CREAT SERPL: 13.9 (ref 7–25)
CALCIUM SPEC-SCNC: 8.7 MG/DL (ref 8.6–10.5)
CHLORIDE SERPL-SCNC: 97 MMOL/L (ref 98–107)
CO2 SERPL-SCNC: 25.5 MMOL/L (ref 22–29)
CREAT SERPL-MCNC: 0.72 MG/DL (ref 0.57–1)
EGFRCR SERPLBLD CKD-EPI 2021: 94.1 ML/MIN/1.73
GLOBULIN UR ELPH-MCNC: 2.5 GM/DL
GLUCOSE SERPL-MCNC: 310 MG/DL (ref 65–99)
POTASSIUM SERPL-SCNC: 3.8 MMOL/L (ref 3.5–5.2)
PROT SERPL-MCNC: 5.9 G/DL (ref 6–8.5)
SODIUM SERPL-SCNC: 132 MMOL/L (ref 136–145)

## 2024-11-27 PROCEDURE — 84156 ASSAY OF PROTEIN URINE: CPT

## 2024-11-27 PROCEDURE — 36415 COLL VENOUS BLD VENIPUNCTURE: CPT

## 2024-11-27 PROCEDURE — 82043 UR ALBUMIN QUANTITATIVE: CPT

## 2024-11-27 PROCEDURE — 81001 URINALYSIS AUTO W/SCOPE: CPT

## 2024-11-27 PROCEDURE — 82570 ASSAY OF URINE CREATININE: CPT

## 2024-11-27 PROCEDURE — 80053 COMPREHEN METABOLIC PANEL: CPT

## 2024-11-28 LAB
ALBUMIN UR-MCNC: 4.7 MG/DL
BACTERIA UR QL AUTO: ABNORMAL /HPF
BILIRUB UR QL STRIP: NEGATIVE
CLARITY UR: ABNORMAL
COD CRY URNS QL: PRESENT /HPF
COLOR UR: YELLOW
CREAT UR-MCNC: 76 MG/DL
CREAT UR-MCNC: 76 MG/DL
GLUCOSE UR STRIP-MCNC: ABNORMAL MG/DL
HGB UR QL STRIP.AUTO: NEGATIVE
HYALINE CASTS UR QL AUTO: ABNORMAL /LPF
KETONES UR QL STRIP: ABNORMAL
LEUKOCYTE ESTERASE UR QL STRIP.AUTO: NEGATIVE
MICROALBUMIN/CREAT UR: 61.8 MG/G (ref 0–29)
NITRITE UR QL STRIP: NEGATIVE
PH UR STRIP.AUTO: 6.5 [PH] (ref 5–8)
PROT ?TM UR-MCNC: 29.5 MG/DL
PROT UR QL STRIP: ABNORMAL
PROT/CREAT UR: 388.2 MG/G CREA (ref 0–200)
RBC # UR STRIP: ABNORMAL /HPF
REF LAB TEST METHOD: ABNORMAL
SP GR UR STRIP: 1.02 (ref 1–1.03)
SQUAMOUS #/AREA URNS HPF: ABNORMAL /HPF
UROBILINOGEN UR QL STRIP: ABNORMAL
WBC # UR STRIP: ABNORMAL /HPF
YEAST URNS QL MICRO: PRESENT /HPF

## 2024-12-03 ENCOUNTER — LAB (OUTPATIENT)
Dept: ONCOLOGY | Facility: CLINIC | Age: 64
End: 2024-12-03
Payer: MEDICARE

## 2024-12-03 ENCOUNTER — OFFICE VISIT (OUTPATIENT)
Dept: ONCOLOGY | Facility: CLINIC | Age: 64
End: 2024-12-03
Payer: MEDICARE

## 2024-12-03 VITALS
TEMPERATURE: 98 F | SYSTOLIC BLOOD PRESSURE: 145 MMHG | OXYGEN SATURATION: 100 % | HEART RATE: 72 BPM | WEIGHT: 149.2 LBS | DIASTOLIC BLOOD PRESSURE: 80 MMHG | BODY MASS INDEX: 24.86 KG/M2 | RESPIRATION RATE: 20 BRPM | HEIGHT: 65 IN

## 2024-12-03 DIAGNOSIS — D50.9 IRON DEFICIENCY ANEMIA, UNSPECIFIED IRON DEFICIENCY ANEMIA TYPE: ICD-10-CM

## 2024-12-03 DIAGNOSIS — L40.50 PSORIATIC ARTHRITIS: ICD-10-CM

## 2024-12-03 DIAGNOSIS — K90.49 MALABSORPTION DUE TO INTOLERANCE, NOT ELSEWHERE CLASSIFIED: Primary | ICD-10-CM

## 2024-12-03 LAB
BASOPHILS # BLD AUTO: 0.05 10*3/MM3 (ref 0–0.2)
BASOPHILS NFR BLD AUTO: 0.6 % (ref 0–1.5)
DEPRECATED RDW RBC AUTO: 53.7 FL (ref 37–54)
EOSINOPHIL # BLD AUTO: 0.19 10*3/MM3 (ref 0–0.4)
EOSINOPHIL NFR BLD AUTO: 2.4 % (ref 0.3–6.2)
ERYTHROCYTE [DISTWIDTH] IN BLOOD BY AUTOMATED COUNT: 14.9 % (ref 12.3–15.4)
FERRITIN SERPL-MCNC: 43.77 NG/ML (ref 13–150)
FOLATE SERPL-MCNC: >20 NG/ML (ref 4.78–24.2)
HCT VFR BLD AUTO: 35.2 % (ref 34–46.6)
HGB BLD-MCNC: 11.6 G/DL (ref 12–15.9)
IMM GRANULOCYTES # BLD AUTO: 0.02 10*3/MM3 (ref 0–0.05)
IMM GRANULOCYTES NFR BLD AUTO: 0.2 % (ref 0–0.5)
IRON 24H UR-MRATE: 66 MCG/DL (ref 37–145)
IRON SATN MFR SERPL: 20 % (ref 20–50)
LYMPHOCYTES # BLD AUTO: 0.96 10*3/MM3 (ref 0.7–3.1)
LYMPHOCYTES NFR BLD AUTO: 12 % (ref 19.6–45.3)
MCH RBC QN AUTO: 32.2 PG (ref 26.6–33)
MCHC RBC AUTO-ENTMCNC: 33 G/DL (ref 31.5–35.7)
MCV RBC AUTO: 97.8 FL (ref 79–97)
MONOCYTES # BLD AUTO: 0.39 10*3/MM3 (ref 0.1–0.9)
MONOCYTES NFR BLD AUTO: 4.9 % (ref 5–12)
NEUTROPHILS NFR BLD AUTO: 6.42 10*3/MM3 (ref 1.7–7)
NEUTROPHILS NFR BLD AUTO: 79.9 % (ref 42.7–76)
NRBC BLD AUTO-RTO: 0 /100 WBC (ref 0–0.2)
PLATELET # BLD AUTO: 318 10*3/MM3 (ref 140–450)
PMV BLD AUTO: 9.3 FL (ref 6–12)
RBC # BLD AUTO: 3.6 10*6/MM3 (ref 3.77–5.28)
TIBC SERPL-MCNC: 335 MCG/DL (ref 298–536)
TRANSFERRIN SERPL-MCNC: 225 MG/DL (ref 200–360)
VIT B12 BLD-MCNC: 243 PG/ML (ref 211–946)
WBC NRBC COR # BLD AUTO: 8.03 10*3/MM3 (ref 3.4–10.8)

## 2024-12-03 PROCEDURE — 83540 ASSAY OF IRON: CPT | Performed by: NURSE PRACTITIONER

## 2024-12-03 PROCEDURE — 82746 ASSAY OF FOLIC ACID SERUM: CPT | Performed by: NURSE PRACTITIONER

## 2024-12-03 PROCEDURE — 1159F MED LIST DOCD IN RCRD: CPT | Performed by: NURSE PRACTITIONER

## 2024-12-03 PROCEDURE — 82607 VITAMIN B-12: CPT | Performed by: NURSE PRACTITIONER

## 2024-12-03 PROCEDURE — 99214 OFFICE O/P EST MOD 30 MIN: CPT | Performed by: NURSE PRACTITIONER

## 2024-12-03 PROCEDURE — 1160F RVW MEDS BY RX/DR IN RCRD: CPT | Performed by: NURSE PRACTITIONER

## 2024-12-03 PROCEDURE — 85025 COMPLETE CBC W/AUTO DIFF WBC: CPT | Performed by: NURSE PRACTITIONER

## 2024-12-03 PROCEDURE — 1125F AMNT PAIN NOTED PAIN PRSNT: CPT | Performed by: NURSE PRACTITIONER

## 2024-12-03 PROCEDURE — 3079F DIAST BP 80-89 MM HG: CPT | Performed by: NURSE PRACTITIONER

## 2024-12-03 PROCEDURE — 3077F SYST BP >= 140 MM HG: CPT | Performed by: NURSE PRACTITIONER

## 2024-12-03 PROCEDURE — 84466 ASSAY OF TRANSFERRIN: CPT | Performed by: NURSE PRACTITIONER

## 2024-12-03 PROCEDURE — 82728 ASSAY OF FERRITIN: CPT | Performed by: NURSE PRACTITIONER

## 2024-12-03 RX ORDER — SODIUM CHLORIDE 9 MG/ML
20 INJECTION, SOLUTION INTRAVENOUS ONCE
Status: CANCELLED | OUTPATIENT
Start: 2024-12-06

## 2024-12-03 RX ORDER — SODIUM CHLORIDE 9 MG/ML
20 INJECTION, SOLUTION INTRAVENOUS ONCE
Status: CANCELLED | OUTPATIENT
Start: 2024-12-09

## 2024-12-03 RX ORDER — TRAMADOL HYDROCHLORIDE 50 MG/1
1 TABLET ORAL 2 TIMES DAILY PRN
COMMUNITY

## 2024-12-03 NOTE — PROGRESS NOTES
Venipuncture Blood Specimen Collection  Venipuncture performed in left arm by Cary Ca MA with good hemostasis. Patient tolerated the procedure well without complications.   12/03/24   Cary Ca MA

## 2024-12-03 NOTE — PROGRESS NOTES
DATE OF FOLLOW UP:  12/3/2024    REASON FOR REFERRAL: Normocytic Anemia    REFERRING PHYSICIAN:  No ref. provider found    CHIEF COMPLAINT:  Fatigue    TREATMENT HISTORY:  Folic Acid 1 mg daily    B12 1000 mcg SQ monthly    Feraheme Day 1 12/11/2023  Day 8 12/18/2023      HISTORY OF PRESENT ILLNESS:   Lashae Benitez is a very pleasant 64 y.o. female who is being seen today at the request of No ref. provider found for evaluation and treatment of normocytic anemia. Ms. Benitez reports following with her PCP every 3 months with lab testing. She reports that she has been aware of the above issue for at least six months. Previous available CBCs were reviewed and patient has had chronic anemia with fluctuations in Hg ranging 9.2-11.9 since at least August 2019. She is taking oral iron supplement daily which she is having a difficult time tolerating due to GI upset. She also takes Folic Acid daily and B12 injections monthly. She denies any obvious blood loss from any source. She reports having colonoscopy per Dr. Frederick last year that was unremarkable. She has never had EGD. No history of blood transfusion. She is on Xarelto for A-Fib. She also follows with Dr. Chand (rheumatology) for psoriatic arthritis and is currently on Orencia, Otezla and MTX. She reports chronic aches/pain and chronic fatigue but denies any worsening. She denies any other specific complaints at this time.     INTERVAL HISTORY:  Ms. Benitez presents today for follow up of anemia. She was last replaced with IV Feraheme in mid-December 2023. She is also taking Folic Acid 1 mg daily and B12 1000 mcg SQ monthly. She reports worsening fatigue over the last several months. She denies shortness of breath on exertion, chest pain or dizziness. She denies any obvious blood loss from any source. She also continues to take MTX, Orencia and Otezla for psoriatic arthritis per [facility]. She is without any other specific complaints today.       PAST  MEDICAL HISTORY:  Past Medical History:   Diagnosis Date    Acid reflux     Allergic     Anxiety     Atrial fibrillation     Cancer     thyroid, skin    Chronic pain disorder     Degenerative disc disease, lumbar     Depression     Dupuytren's disease     Essential hypertension     Family history of coronary artery disease     Fibromyalgia     Gallbladder abscess     H. pylori infection     Hiatal hernia     Hyperlipidemia     Hypothyroidism     Migraines     migraines    Multiple sclerosis     Osteoarthritis     Psoriasis     Psoriatic arthritis     RA (rheumatoid arthritis)     Rheumatoid arthritis     Sinusitis     Sjogren's syndrome     Stomach ulcer     TMJ arthritis     Type 2 diabetes mellitus     Urinary tract infection        PAST SURGICAL HISTORY:  Past Surgical History:   Procedure Laterality Date    BREAST LUMPECTOMY Left 11/1993    CARDIAC CATHETERIZATION Left 09/21/2009    Normal     CARPAL TUNNEL RELEASE  03/2012    CERVICAL POLYPECTOMY  12/2015    CHOLECYSTECTOMY  05/2007    COLONOSCOPY      ENDOSCOPY      GASTRIC BYPASS  09/2007    JOINT REPLACEMENT      KNEE ARTHROPLASTY      LUMBAR DISC SURGERY      C5-6    REPLACEMENT TOTAL KNEE Right 06/2016    SACRAL NERVE STIMULATOR PLACEMENT  09/2014    SACRAL NERVE STIMULATOR PLACEMENT  04/11/2024    [facility]    SACRAL NERVE STIMULATOR PLACEMENT Right 04/17/2024    THYROIDECTOMY  03/2016       FAMILY HISTORY:  Family History   Problem Relation Age of Onset    Diabetes Mother     Stroke Mother     Hypertension Mother     Asthma Mother     Fainting Mother     Heart attack Father         First one was in his 20's second 30's.     Heart failure Father     Heart disease Father     Stroke Father     Diabetes Sister     Cancer Maternal Grandmother        SOCIAL HISTORY:  Social History     Socioeconomic History    Marital status:    Tobacco Use    Smoking status: Never     Passive exposure: Never    Smokeless tobacco: Never   Vaping Use    Vaping  status: Never Used   Substance and Sexual Activity    Alcohol use: Never    Drug use: Never    Sexual activity: Yes     Partners: Male     Birth control/protection: Post-menopausal, None              MEDICATIONS:  The current medication list was reviewed in the EMR    Current Outpatient Medications:     alendronate (FOSAMAX) 70 MG tablet, Take 1 tablet by mouth 1 (One) Time Per Week. Wednesday, Disp: , Rfl:     amitriptyline (ELAVIL) 25 MG tablet, Take 1 tablet by mouth Every Night., Disp: , Rfl:     amLODIPine (NORVASC) 5 MG tablet, Take 1 tablet by mouth Daily. (Patient taking differently: Take 0.5 tablets by mouth Daily.), Disp: 30 tablet, Rfl: 11    Apremilast (Otezla) 30 MG tablet, Take 30 mg by mouth 2 (two) times a day., Disp: , Rfl:     Butalbital-Acetaminophen  MG capsule, Take 300 mg by mouth Daily As Needed., Disp: , Rfl:     celecoxib (CeleBREX) 200 MG capsule, Take 1 capsule by mouth Daily., Disp: , Rfl:     cyanocobalamin 1000 MCG/ML injection, Inject 1 mL into the appropriate muscle as directed by prescriber Every 28 (Twenty-Eight) Days., Disp: , Rfl:     cyclobenzaprine (FLEXERIL) 10 MG tablet, Take 1 tablet by mouth Every Evening., Disp: , Rfl:     Diclofenac Sodium (VOLTAREN) 1 % gel gel, Apply 2 g topically to the appropriate area as directed 4 (Four) Times a Day As Needed., Disp: , Rfl:     DULoxetine (CYMBALTA) 30 MG capsule, Take 1 capsule by mouth Every Morning., Disp: , Rfl:     DULoxetine (CYMBALTA) 60 MG capsule, Take 1 capsule by mouth Daily., Disp: , Rfl:     EMGALITY 120 MG/ML prefilled syringe, Inject 1 mL under the skin into the appropriate area as directed Every 30 (Thirty) Days., Disp: , Rfl: 3    famotidine (PEPCID) 40 MG tablet, Take 1 tablet by mouth 2 (Two) Times a Day., Disp: , Rfl:     ferrous gluconate (FERGON) 240 (27 FE) MG tablet, Take 1 tablet by mouth Every Morning., Disp: , Rfl:     fexofenadine (ALLEGRA) 180 MG tablet, Take 1 tablet by mouth Daily., Disp: , Rfl:      Finerenone (Kerendia) 10 MG tablet, Take 1 tablet by mouth Daily., Disp: , Rfl:     flecainide (TAMBOCOR) 50 MG tablet, TAKE 1 TABLET TWICE A DAY, Disp: 180 tablet, Rfl: 3    fluticasone-salmeterol (ADVAIR HFA) 115-21 MCG/ACT inhaler, Inhale 2 puffs 2 (Two) Times a Day., Disp: , Rfl:     folic acid (FOLVITE) 1 MG tablet, Take 1 tablet by mouth Daily., Disp: , Rfl:     gabapentin (NEURONTIN) 600 MG tablet, Take 1 tablet by mouth 3 (Three) Times a Day., Disp: , Rfl:     HumaLOG Mix 50/50 KwikPen (50-50) 100 UNIT/ML suspension pen-injector, 15 Units., Disp: , Rfl:     isosorbide mononitrate (IMDUR) 30 MG 24 hr tablet, TAKE 1 TABLET DAILY, Disp: 90 tablet, Rfl: 3    levothyroxine (SYNTHROID, LEVOTHROID) 112 MCG tablet, Take 1 tablet by mouth Daily., Disp: , Rfl:     losartan (Cozaar) 50 MG tablet, Take 1 tablet by mouth Daily., Disp: 30 tablet, Rfl: 2    methotrexate 2.5 MG tablet, Take 10 tablets by mouth 1 (One) Time Per Week. Prior to [facility] Admission, Patient was on: Takes 10 tablets on Saturday, Disp: , Rfl:     metoprolol succinate XL (TOPROL-XL) 100 MG 24 hr tablet, TAKE 1 TABLET DAILY, Disp: 90 tablet, Rfl: 3    olopatadine (PATANASE) 0.6 % solution nasal solution, 2 sprays by Each Nare route 2 (Two) Times a Day., Disp: , Rfl:     potassium chloride (KLOR-CON M10) 10 MEQ CR tablet, Take 1 tablet by mouth., Disp: , Rfl:     potassium chloride 10 MEQ CR tablet, Take 1 tablet by mouth Daily., Disp: , Rfl:     primidone (MYSOLINE) 50 MG tablet, Take 1 tablet by mouth Every Night., Disp: , Rfl:     sodium chloride 1 g tablet, Take 2 tablets by mouth 3 (Three) Times a Day., Disp: , Rfl:     sucralfate (CARAFATE) 1 g tablet, Take 1 tablet by mouth 4 (Four) Times a Day. Take one tablet by mouth 4 times daily 1 hour before meals and at bedtime on an empty stomach., Disp: , Rfl:     traMADol (ULTRAM) 50 MG tablet, Take 1 tablet by mouth 2 (Two) Times a Day As Needed., Disp: , Rfl:     Xarelto 20 MG tablet, TAKE 1  "TABLET DAILY, Disp: 90 tablet, Rfl: 3    abatacept (ORENCIA) 250 MG injection, Infuse 30 mL into a venous catheter Every 28 (Twenty-Eight) Days., Disp: , Rfl:     rimegepant 75 MG dispersible tablet, , Disp: , Rfl:     ALLERGIES:    Allergies   Allergen Reactions    Codeine Angioedema    Imuran [Azathioprine] Other (See Comments)     Has pancreatis attacks with med    Januvia [Sitagliptin] Other (See Comments)     Has pancreatis attacks with med     Penicillins Angioedema    Sulfa Antibiotics Angioedema    Sulfasalazine Unknown (See Comments)    Beta Adrenergic Blockers Other (See Comments)     I called pt to ask her about the allergy to bblockers in her chart. Pt states \"too big of a dose makes her psoriasis act up\", but this current dose of metoprolol 50 mg bid, she has been on for a long time, with no ill side effects.  CATACMA 3/28/18         REVIEW OF SYSTEMS:    A comprehensive 14 point review of systems was performed.  Significant findings as mentioned above.  All other systems reviewed and are negative.        Physical Exam  Vitals:    12/03/24 0846   BP: 145/80   Pulse: 72   Resp: 20   Temp: 98 °F (36.7 °C)   TempSrc: Temporal   SpO2: 100%   Weight: 67.7 kg (149 lb 3.2 oz)   Height: 165.1 cm (65\")        ECOG score: 0   General: Well developed, well nourished, alert and oriented x 3, in no acute distress.   Head: ATNC   Eyes: PERRL, No evidence of conjunctivitis.   Nose: No nasal discharge.   Mouth: Oral mucosal membranes moist. No oral ulceration or hemorrhages.   Neck: Neck supple. No thyromegaly. No JVD.   Lungs: Clear in all fields to A&P without rales, rhonchi or wheezing.   Heart: S1, S2. Regular rate and rhythm. No murmurs, rubs, or gallops.   Abdomen: Soft. Bowel sounds are normoactive. Nontender with palpation. No Hepatosplenomegaly can be appreciated.   Extremities: No cyanosis or edema. Peripheral pulses palpable and equal bilaterally.   Integumentary: No rash, sores, erythema or nodules. No " blistering, bruising, or dry skin.   Hem/Lymph Nodes: No palpable cervical, submandibular, supraclavicular, axillary  lymphadenopathy noted. No petechiae, purpura or ecchymosis noted.   Neurologic: Grossly non-focal exam    Pain Score:  Pain Score    12/03/24 0846   PainSc:   5   PainLoc: Generalized       PHQ-Score Total:  PHQ-9 Total Score:         PATHOLOGY:        ENDOSCOPY:        IMAGING:        RECENT LABS:  Lab Results   Component Value Date    WBC 8.03 12/03/2024    HGB 11.6 (L) 12/03/2024    HCT 35.2 12/03/2024    MCV 97.8 (H) 12/03/2024    RDW 14.9 12/03/2024     12/03/2024    NEUTRORELPCT 79.9 (H) 12/03/2024    LYMPHORELPCT 12.0 (L) 12/03/2024    MONORELPCT 4.9 (L) 12/03/2024    EOSRELPCT 2.4 12/03/2024    BASORELPCT 0.6 12/03/2024    NEUTROABS 6.42 12/03/2024    LYMPHSABS 0.96 12/03/2024       Lab Results   Component Value Date     (L) 11/27/2024    K 3.8 11/27/2024    CO2 25.5 11/27/2024    CL 97 (L) 11/27/2024    BUN 10 11/27/2024    CREATININE 0.72 11/27/2024    EGFRIFNONA 74 02/23/2022    EGFRIFAFRI  09/22/2016      Comment:      <15 Indicative of kidney failure.    GLUCOSE 310 (H) 11/27/2024    CALCIUM 8.7 11/27/2024    ALKPHOS 179 (H) 11/27/2024    AST 13 11/27/2024    ALT 13 11/27/2024    BILITOT 0.4 11/27/2024    ALBUMIN 3.4 (L) 11/27/2024    PROTEINTOT 5.9 (L) 11/27/2024    MG 2.0 09/21/2020    PHOS 4.0 04/10/2024       Lab Results   Component Value Date/Time     (H) 12/07/2023 12:01 PM     Lab Results   Component Value Date    FERRITIN 59.12 08/22/2024    IRON 72 08/22/2024    TIBC 301 08/22/2024    LABIRON 24 08/22/2024    UGWZBRSS13 611 12/07/2023    FOLATE >20.00 12/07/2023       Work Up 12/07/2023      Lab Results   Component Value Date    ZUZYHDLU39 611 12/07/2023    FOLATE >20.00 12/07/2023     Lab Results   Component Value Date    RETICCTPCT 1.36 12/07/2023     Lab Results   Component Value Date    HAPTOGLOBIN 131 12/07/2023     Lab Results   Component Value Date     CRP <0.30 04/23/2024    CRP <0.30 12/07/2023    CRP <0.30 09/26/2023    CRP 4.13 (H) 09/13/2023    CRP <0.30 06/12/2021    CRP 0.09 08/07/2020    CRP <0.50 11/22/2018     Lab Results   Component Value Date    SEDRATE 9 04/23/2024    SEDRATE 6 12/07/2023    SEDRATE 8 11/22/2018     TSH  0.270 - 4.200 uIU/mL 6.980 High      Free T4  0.93 - 1.70 ng/dL 1.58       Copper  80 - 158 ug/dL 111     Zinc  44 - 115 ug/dL 80     Tissue Transglutaminase IgA  0 - 3 U/mL <2     Iron  37 - 145 mcg/dL 31 Low    Iron Saturation (TSAT)  20 - 50 % 7 Low    Transferrin  200 - 360 mg/dL 319   TIBC  298 - 536 mcg/dL 475     Ferritin  13.00 - 150.00 ng/mL 122.70       ASSESSMENT & PLAN:  Lashae Benitez is a very pleasant 64 y.o. female with    1. Normocytic Anemia  2. DARIO  - Previous available CBCs were reviewed and patient has had chronic anemia with fluctuations in Hg ranging 9.2-11.9 since at least August 2019.   - She is taking oral iron supplement daily which she is having a difficult time tolerating due to GI upset. She also takes Folic Acid daily and B12 injections monthly.   -She denies any obvious blood loss from any source. She reports having colonoscopy per Dr. Frederick last year that was unremarkable (no records currently available, will request today). She has never had EGD. No history of blood transfusion.  - CBC from initial consultation showed Hg (10.1) and slightly elevated platelet count 500,000, likely reactive. WBC is normal. Iron panel is low with normal Ferritin despite oral iron replacement. Therefore, discontinued oral iron and replaced with IV Feraheme for apparent malabsorption of oral iron.   - Also obtained additional labs to further evaluate anemia including PBS which showed macrocytic anemia with mild anisocytosis including occasional elliptocytes, no increase in schistocyte population, thrombocytosis with normal platelet morphology, normal WBC count and differential, granulocytes show normal morphology and no  circulating blasts identified. B12 and Folate are replete. No evidence of hemolysis as Retic, LDH and Haptoglobin are normal. CRP and ESR were normal. TSH was elevated with normal Free T4 (previous thyroidectomy in 2016). Copper, Zinc and Tissue Transglutaminase were normal.  - She was last replaced with IV Feraheme in December 2023.  - CBC from today shows low Hg (11.6) with normal WBC and platelets. Iron panel and Ferritin are marginally low. Therefore, will replace with IV Feraheme, pending insurance approval. B12 and Folate are pending from today.  - Will follow up in 3 months with CBC, Iron panel and Ferritin.   - Advised to continue B12 1000 mcg SQ monthly and Folic Acid 1 mg daily.     3. Psoriatic Arthritis  - She follows with Dr. Chand at [facility]. She is currently taking Orencia, Otezla and MTX. Ongoing management per rheumatology.        ACO / KRISTAL/Other  Quality measures  -  Lashae Benitez received 2024 flu vaccine.  -  Lashae Benitez reports a pain score of 5.  Given her pain assessment as noted, treatment options were discussed and the following options were decided upon as a follow-up plan to address the patient's pain: continuation of current treatment plan for pain.  -  Current outpatient and discharge medications have been reconciled for the patient.  Reviewed by: RUIZ Reynolds          I spent 30 minutes caring for Lashae on this date of service. This time includes time spent by me in the following activities: preparing for the visit, reviewing tests, performing a medically appropriate examination and/or evaluation, counseling and educating the patient/family/caregiver, referring and communicating with other health care professionals, documenting information in the medical record, independently interpreting results and communicating that information with the patient/family/caregiver, care coordination, ordering medications, obtaining a separately obtained history, and  reviewing a separately obtained history.                                   Electronically Signed by: RUIZ Landry , December 3, 2024 08:52 EST         CC:   No ref. provider found  Kreis, Samuel Duane, MD

## 2024-12-04 ENCOUNTER — OFFICE VISIT (OUTPATIENT)
Dept: NEUROSURGERY | Facility: CLINIC | Age: 64
End: 2024-12-04
Payer: MEDICARE

## 2024-12-04 VITALS — HEIGHT: 65 IN | WEIGHT: 149 LBS | BODY MASS INDEX: 24.83 KG/M2 | TEMPERATURE: 97.1 F

## 2024-12-04 DIAGNOSIS — M96.1 POST LAMINECTOMY SYNDROME: Primary | ICD-10-CM

## 2024-12-04 NOTE — PROGRESS NOTES
"Patient: Lashae Benitez  : 1960    Primary Care Provider: Kreis, Samuel Duane, MD    Requesting Provider: As above      Chief Complaint: Back Pain, Leg Pain (BLE), Numbness (BLE), and Extremity Weakness (BLE)      History of Present Illness: This is a 64 y.o. female who status post an L4-5 laminectomy by Dr. Gonsales in .  She states that surgery helped for a little while, but she has continued to deal with back and bilateral lower extremity pain.  She feels like the pain radiates down the posterior aspect of her legs and is exacerbated by walking.  She does find some relief when she leans forward on a shopping cart at the store.  She has previously done physical therapy and recently had an injection a few weeks ago which gave her a mild amount of relief.    PMHX   Allergies   Allergen Reactions    Codeine Angioedema    Imuran [Azathioprine] Other (See Comments)     Has pancreatis attacks with med    Januvia [Sitagliptin] Other (See Comments)     Has pancreatis attacks with med     Penicillins Angioedema    Sulfa Antibiotics Angioedema    Sulfasalazine Unknown (See Comments)    Beta Adrenergic Blockers Other (See Comments)     I called pt to ask her about the allergy to bblockers in her chart. Pt states \"too big of a dose makes her psoriasis act up\", but this current dose of metoprolol 50 mg bid, she has been on for a long time, with no ill side effects.  CATA,CMA 3/28/18     Medications    Current Outpatient Medications:     alendronate (FOSAMAX) 70 MG tablet, Take 1 tablet by mouth 1 (One) Time Per Week. Wednesday, Disp: , Rfl:     amitriptyline (ELAVIL) 25 MG tablet, Take 1 tablet by mouth Every Night., Disp: , Rfl:     amLODIPine (NORVASC) 5 MG tablet, Take 1 tablet by mouth Daily. (Patient taking differently: Take 0.5 tablets by mouth Daily.), Disp: 30 tablet, Rfl: 11    Apremilast (Otezla) 30 MG tablet, Take 30 mg by mouth 2 (two) times a day., Disp: , Rfl:     Butalbital-Acetaminophen  MG capsule, " Take 300 mg by mouth Daily As Needed., Disp: , Rfl:     celecoxib (CeleBREX) 200 MG capsule, Take 1 capsule by mouth Daily., Disp: , Rfl:     cyanocobalamin 1000 MCG/ML injection, Inject 1 mL into the appropriate muscle as directed by prescriber Every 28 (Twenty-Eight) Days., Disp: , Rfl:     cyclobenzaprine (FLEXERIL) 10 MG tablet, Take 1 tablet by mouth Every Evening., Disp: , Rfl:     Diclofenac Sodium (VOLTAREN) 1 % gel gel, Apply 2 g topically to the appropriate area as directed 4 (Four) Times a Day As Needed., Disp: , Rfl:     DULoxetine (CYMBALTA) 30 MG capsule, Take 1 capsule by mouth Every Morning., Disp: , Rfl:     DULoxetine (CYMBALTA) 60 MG capsule, Take 1 capsule by mouth Daily., Disp: , Rfl:     EMGALITY 120 MG/ML prefilled syringe, Inject 1 mL under the skin into the appropriate area as directed Every 30 (Thirty) Days., Disp: , Rfl: 3    famotidine (PEPCID) 40 MG tablet, Take 1 tablet by mouth 2 (Two) Times a Day., Disp: , Rfl:     ferrous gluconate (FERGON) 240 (27 FE) MG tablet, Take 1 tablet by mouth Every Morning., Disp: , Rfl:     fexofenadine (ALLEGRA) 180 MG tablet, Take 1 tablet by mouth Daily., Disp: , Rfl:     Finerenone (Kerendia) 10 MG tablet, Take 1 tablet by mouth Daily., Disp: , Rfl:     flecainide (TAMBOCOR) 50 MG tablet, TAKE 1 TABLET TWICE A DAY, Disp: 180 tablet, Rfl: 3    fluticasone-salmeterol (ADVAIR HFA) 115-21 MCG/ACT inhaler, Inhale 2 puffs 2 (Two) Times a Day., Disp: , Rfl:     folic acid (FOLVITE) 1 MG tablet, Take 1 tablet by mouth Daily., Disp: , Rfl:     gabapentin (NEURONTIN) 600 MG tablet, Take 1 tablet by mouth 3 (Three) Times a Day., Disp: , Rfl:     HumaLOG Mix 50/50 KwikPen (50-50) 100 UNIT/ML suspension pen-injector, 15 Units., Disp: , Rfl:     isosorbide mononitrate (IMDUR) 30 MG 24 hr tablet, TAKE 1 TABLET DAILY, Disp: 90 tablet, Rfl: 3    levothyroxine (SYNTHROID, LEVOTHROID) 112 MCG tablet, Take 1 tablet by mouth Daily., Disp: , Rfl:     losartan (Cozaar) 50 MG  tablet, Take 1 tablet by mouth Daily., Disp: 30 tablet, Rfl: 2    methotrexate 2.5 MG tablet, Take 10 tablets by mouth 1 (One) Time Per Week. Prior to [facility], Patient was on: Takes 10 tablets on Saturday, Disp: , Rfl:     metoprolol succinate XL (TOPROL-XL) 100 MG 24 hr tablet, TAKE 1 TABLET DAILY, Disp: 90 tablet, Rfl: 3    olopatadine (PATANASE) 0.6 % solution nasal solution, 2 sprays by Each Nare route 2 (Two) Times a Day., Disp: , Rfl:     potassium chloride (KLOR-CON M10) 10 MEQ CR tablet, Take 1 tablet by mouth., Disp: , Rfl:     potassium chloride 10 MEQ CR tablet, Take 1 tablet by mouth Daily., Disp: , Rfl:     primidone (MYSOLINE) 50 MG tablet, Take 1 tablet by mouth Every Night., Disp: , Rfl:     rimegepant 75 MG dispersible tablet, , Disp: , Rfl:     sodium chloride 1 g tablet, Take 2 tablets by mouth 3 (Three) Times a Day., Disp: , Rfl:     sucralfate (CARAFATE) 1 g tablet, Take 1 tablet by mouth 4 (Four) Times a Day. Take one tablet by mouth 4 times daily 1 hour before meals and at bedtime on an empty stomach., Disp: , Rfl:     traMADol (ULTRAM) 50 MG tablet, Take 1 tablet by mouth 2 (Two) Times a Day As Needed., Disp: , Rfl:     Xarelto 20 MG tablet, TAKE 1 TABLET DAILY, Disp: 90 tablet, Rfl: 3  Past Medical History:  Past Medical History:   Diagnosis Date    Acid reflux     Allergic     Anemia     Anxiety     Atrial fibrillation     Cancer     thyroid, skin    Cervical disc disorder     Chronic pain disorder     Claustrophobia     CTS (carpal tunnel syndrome)     Degenerative disc disease, lumbar     Depression     Dupuytren's disease     Essential hypertension     Family history of coronary artery disease     Fibromyalgia     Gallbladder abscess     H. pylori infection     Hiatal hernia     Hyperlipidemia     Hypothyroidism     Low back pain     Lumbosacral disc disease     Migraines     migraines    Multiple sclerosis     Osteoarthritis     Peripheral neuropathy     Psoriasis      Psoriatic arthritis     RA (rheumatoid arthritis)     Rheumatoid arthritis     Sinusitis     Sjogren's syndrome     Stomach ulcer     Thoracic disc disorder     TMJ arthritis     Type 2 diabetes mellitus     Urinary tract infection      Past Surgical History:  Past Surgical History:   Procedure Laterality Date    BACK SURGERY      BREAST LUMPECTOMY Left 11/1993    CARDIAC CATHETERIZATION Left 09/21/2009    Normal     CARPAL TUNNEL RELEASE  03/2012    CERVICAL POLYPECTOMY  12/2015    CHOLECYSTECTOMY  05/2007    COLONOSCOPY      ENDOSCOPY      EPIDURAL BLOCK      GASTRIC BYPASS  09/2007    JOINT REPLACEMENT      KNEE ARTHROPLASTY      LUMBAR DISC SURGERY      C5-6    NECK SURGERY      REPLACEMENT TOTAL KNEE Right 06/2016    SACRAL NERVE STIMULATOR PLACEMENT  09/2014    SACRAL NERVE STIMULATOR PLACEMENT  04/11/2024    @ [facility] in [place]    SACRAL NERVE STIMULATOR PLACEMENT Right 04/17/2024    THYROIDECTOMY  03/2016    TRIGGER POINT INJECTION       Social Hx:  Social History     Tobacco Use    Smoking status: Never     Passive exposure: Never    Smokeless tobacco: Never   Vaping Use    Vaping status: Never Used   Substance Use Topics    Alcohol use: Never    Drug use: Never     Family Hx:  Family History   Problem Relation Age of Onset    Diabetes Mother     Stroke Mother     Hypertension Mother     Asthma Mother     Fainting Mother     Miscarriages / Stillbirths Mother     Heart attack Father         First one was in his 20's second 30's.     Heart failure Father     Heart disease Father     Stroke Father     Diabetes Sister     Cancer Maternal Grandmother      Review of Systems:        Review of Systems   Constitutional:  Negative for activity change, appetite change, chills, diaphoresis, fatigue, fever and unexpected weight change.   HENT:  Negative for congestion, dental problem, drooling, ear discharge, ear pain, facial swelling, hearing loss, mouth sores, nosebleeds, postnasal drip, rhinorrhea, sinus pressure, sinus  "pain, sneezing, sore throat, tinnitus, trouble swallowing and voice change.    Eyes:  Negative for photophobia, pain, discharge, redness, itching and visual disturbance.   Respiratory:  Negative for apnea, cough, choking, chest tightness, shortness of breath, wheezing and stridor.    Cardiovascular:  Negative for chest pain, palpitations and leg swelling.   Gastrointestinal:  Negative for abdominal distention, abdominal pain, anal bleeding, blood in stool, constipation, diarrhea, nausea, rectal pain and vomiting.   Endocrine: Negative for cold intolerance, heat intolerance, polydipsia, polyphagia and polyuria.   Genitourinary:  Negative for decreased urine volume, difficulty urinating, dysuria, enuresis, flank pain, frequency, genital sores, hematuria and urgency.   Musculoskeletal:  Positive for back pain and gait problem. Negative for arthralgias, joint swelling, myalgias, neck pain and neck stiffness.   Skin:  Negative for color change, pallor, rash and wound.   Allergic/Immunologic: Negative for environmental allergies, food allergies and immunocompromised state.   Neurological:  Positive for weakness and numbness. Negative for dizziness, tremors, seizures, syncope, facial asymmetry, speech difficulty, light-headedness and headaches.   Hematological:  Negative for adenopathy. Does not bruise/bleed easily.   Psychiatric/Behavioral:  Negative for agitation, behavioral problems, confusion, decreased concentration, dysphoric mood, hallucinations, self-injury, sleep disturbance and suicidal ideas. The patient is not nervous/anxious and is not hyperactive.    All other systems reviewed and are negative.       Physical Exam:   Temp 97.1 °F (36.2 °C) (Infrared)   Ht 165.1 cm (65\")   Wt 67.6 kg (149 lb)   BMI 24.79 kg/m²   Awake, alert and oriented x 3  Speech f/c  Opens eyes spont  Pupils 3 mm rx bilaterally  Extraocular muscles intact bilaterally  Normal sensation to light touch in all 3 distributions of CN V " bilaterally  Face symmetric bilaterally  Tongue midline  5/5 in the lower extremities bilaterally    Diagnostic Studies:  All neurological imaging studies were independently reviewed unless stated otherwise    Assessment/Plan:  This is a 64 y.o. female with a history of an L4-5 laminectomy who presents with continued back and bilateral lower extremity pain.  In reviewing the patient's lumbar MRI, she has moderate degenerative changes throughout her lumbar spine but there is no area or levels of severe stenosis.  Clinically, I think the patient symptoms are most consistent with postlaminectomy syndrome.  Due to the fact she does not have any severe stenosis, I do not think further surgical decompression is warranted.  She is already established with pain management and I encouraged her to discuss potential spinal cord stimulator options with them.  Because I do not think there is a role for any further surgical intervention, we are not going to schedule any further follow-up.    Diagnoses and all orders for this visit:    1. Post laminectomy syndrome (Primary)      Lashae Benitez  reports that she has never smoked. She has never been exposed to tobacco smoke. She has never used smokeless tobacco.         Jared Yo MD  12/04/24  11:22 EST

## 2024-12-06 ENCOUNTER — INFUSION (OUTPATIENT)
Dept: ONCOLOGY | Facility: HOSPITAL | Age: 64
End: 2024-12-06
Payer: MEDICARE

## 2024-12-06 VITALS
OXYGEN SATURATION: 100 % | TEMPERATURE: 97.3 F | BODY MASS INDEX: 25.11 KG/M2 | HEART RATE: 84 BPM | DIASTOLIC BLOOD PRESSURE: 88 MMHG | RESPIRATION RATE: 18 BRPM | SYSTOLIC BLOOD PRESSURE: 151 MMHG | WEIGHT: 150.9 LBS

## 2024-12-06 DIAGNOSIS — K90.49 MALABSORPTION DUE TO INTOLERANCE, NOT ELSEWHERE CLASSIFIED: ICD-10-CM

## 2024-12-06 DIAGNOSIS — D50.9 IRON DEFICIENCY ANEMIA, UNSPECIFIED IRON DEFICIENCY ANEMIA TYPE: Primary | ICD-10-CM

## 2024-12-06 PROCEDURE — 96374 THER/PROPH/DIAG INJ IV PUSH: CPT

## 2024-12-06 PROCEDURE — 25010000002 FERUMOXYTOL 510 MG/17ML SOLUTION: Performed by: NURSE PRACTITIONER

## 2024-12-06 RX ORDER — SODIUM CHLORIDE 9 MG/ML
20 INJECTION, SOLUTION INTRAVENOUS ONCE
Status: DISCONTINUED | OUTPATIENT
Start: 2024-12-06 | End: 2024-12-06

## 2024-12-06 RX ADMIN — FERUMOXYTOL 510 MG: 510 INJECTION INTRAVENOUS at 09:08

## 2024-12-09 ENCOUNTER — INFUSION (OUTPATIENT)
Dept: ONCOLOGY | Facility: HOSPITAL | Age: 64
End: 2024-12-09
Payer: MEDICARE

## 2024-12-09 VITALS
BODY MASS INDEX: 24.76 KG/M2 | WEIGHT: 148.8 LBS | DIASTOLIC BLOOD PRESSURE: 87 MMHG | OXYGEN SATURATION: 99 % | HEART RATE: 84 BPM | TEMPERATURE: 96.9 F | SYSTOLIC BLOOD PRESSURE: 142 MMHG | RESPIRATION RATE: 18 BRPM

## 2024-12-09 DIAGNOSIS — K90.49 MALABSORPTION DUE TO INTOLERANCE, NOT ELSEWHERE CLASSIFIED: ICD-10-CM

## 2024-12-09 DIAGNOSIS — D50.9 IRON DEFICIENCY ANEMIA, UNSPECIFIED IRON DEFICIENCY ANEMIA TYPE: Primary | ICD-10-CM

## 2024-12-09 PROCEDURE — 96365 THER/PROPH/DIAG IV INF INIT: CPT

## 2024-12-09 PROCEDURE — 96374 THER/PROPH/DIAG INJ IV PUSH: CPT

## 2024-12-09 PROCEDURE — 25010000002 FERUMOXYTOL 510 MG/17ML SOLUTION: Performed by: NURSE PRACTITIONER

## 2024-12-09 RX ORDER — SODIUM CHLORIDE 9 MG/ML
20 INJECTION, SOLUTION INTRAVENOUS ONCE
Status: DISCONTINUED | OUTPATIENT
Start: 2024-12-09 | End: 2024-12-09 | Stop reason: HOSPADM

## 2024-12-09 RX ADMIN — FERUMOXYTOL 510 MG: 510 INJECTION INTRAVENOUS at 08:34

## 2025-01-06 ENCOUNTER — TELEPHONE (OUTPATIENT)
Dept: CARDIOLOGY | Facility: CLINIC | Age: 65
End: 2025-01-06
Payer: MEDICARE

## 2025-01-06 NOTE — TELEPHONE ENCOUNTER
HUB TO RELAY    LEFT VM TO CONFIRM TODAY'S APPT AND THAT SHE MAY COME IN EARLY, AS SOON AS 1:00PM IF SHE LIKES.

## 2025-01-27 RX ORDER — LOSARTAN POTASSIUM 50 MG/1
50 TABLET ORAL DAILY
Qty: 30 TABLET | Refills: 2 | Status: SHIPPED | OUTPATIENT
Start: 2025-01-27

## 2025-02-03 ENCOUNTER — LAB (OUTPATIENT)
Dept: LAB | Facility: HOSPITAL | Age: 65
End: 2025-02-03
Payer: MEDICARE

## 2025-02-03 ENCOUNTER — TRANSCRIBE ORDERS (OUTPATIENT)
Dept: ADMINISTRATIVE | Facility: HOSPITAL | Age: 65
End: 2025-02-03
Payer: MEDICARE

## 2025-02-03 DIAGNOSIS — E87.1 HYPONATREMIA: ICD-10-CM

## 2025-02-03 DIAGNOSIS — E87.1 HYPONATREMIA: Primary | ICD-10-CM

## 2025-02-03 LAB
ALBUMIN SERPL-MCNC: 3.2 G/DL (ref 3.5–5.2)
ALBUMIN/GLOB SERPL: 1.4 G/DL
ALP SERPL-CCNC: 187 U/L (ref 39–117)
ALT SERPL W P-5'-P-CCNC: 24 U/L (ref 1–33)
ANION GAP SERPL CALCULATED.3IONS-SCNC: 9 MMOL/L (ref 5–15)
AST SERPL-CCNC: 27 U/L (ref 1–32)
BILIRUB SERPL-MCNC: 0.4 MG/DL (ref 0–1.2)
BUN SERPL-MCNC: 10 MG/DL (ref 8–23)
BUN/CREAT SERPL: 13.2 (ref 7–25)
CALCIUM SPEC-SCNC: 8.2 MG/DL (ref 8.6–10.5)
CHLORIDE SERPL-SCNC: 97 MMOL/L (ref 98–107)
CO2 SERPL-SCNC: 24 MMOL/L (ref 22–29)
CREAT SERPL-MCNC: 0.76 MG/DL (ref 0.57–1)
EGFRCR SERPLBLD CKD-EPI 2021: 88.2 ML/MIN/1.73
GLOBULIN UR ELPH-MCNC: 2.3 GM/DL
GLUCOSE SERPL-MCNC: 406 MG/DL (ref 65–99)
POTASSIUM SERPL-SCNC: 3.9 MMOL/L (ref 3.5–5.2)
PROT SERPL-MCNC: 5.5 G/DL (ref 6–8.5)
SODIUM SERPL-SCNC: 130 MMOL/L (ref 136–145)

## 2025-02-03 PROCEDURE — 82570 ASSAY OF URINE CREATININE: CPT

## 2025-02-03 PROCEDURE — 80053 COMPREHEN METABOLIC PANEL: CPT

## 2025-02-03 PROCEDURE — 84156 ASSAY OF PROTEIN URINE: CPT

## 2025-02-03 PROCEDURE — 82043 UR ALBUMIN QUANTITATIVE: CPT

## 2025-02-03 PROCEDURE — 81001 URINALYSIS AUTO W/SCOPE: CPT

## 2025-02-03 PROCEDURE — 36415 COLL VENOUS BLD VENIPUNCTURE: CPT

## 2025-02-04 LAB
ALBUMIN UR-MCNC: 2.4 MG/DL
BACTERIA UR QL AUTO: ABNORMAL /HPF
BILIRUB UR QL STRIP: NEGATIVE
BILIRUB UR QL STRIP: NEGATIVE
CLARITY UR: CLEAR
CLARITY UR: CLEAR
COLOR UR: YELLOW
COLOR UR: YELLOW
CREAT UR-MCNC: 30.7 MG/DL
CREAT UR-MCNC: 30.7 MG/DL
GLUCOSE UR STRIP-MCNC: ABNORMAL MG/DL
GLUCOSE UR STRIP-MCNC: ABNORMAL MG/DL
HGB UR QL STRIP.AUTO: ABNORMAL
HGB UR QL STRIP.AUTO: ABNORMAL
HOLD SPECIMEN: NORMAL
HYALINE CASTS UR QL AUTO: ABNORMAL /LPF
KETONES UR QL STRIP: NEGATIVE
KETONES UR QL STRIP: NEGATIVE
LEUKOCYTE ESTERASE UR QL STRIP.AUTO: NEGATIVE
LEUKOCYTE ESTERASE UR QL STRIP.AUTO: NEGATIVE
MICROALBUMIN/CREAT UR: 78.2 MG/G (ref 0–29)
NITRITE UR QL STRIP: NEGATIVE
NITRITE UR QL STRIP: NEGATIVE
PH UR STRIP.AUTO: 7 [PH] (ref 5–8)
PH UR STRIP.AUTO: 7 [PH] (ref 5–8)
PROT ?TM UR-MCNC: 10.1 MG/DL
PROT UR QL STRIP: NEGATIVE
PROT UR QL STRIP: NEGATIVE
PROT/CREAT UR: 329 MG/G CREA (ref 0–200)
RBC # UR STRIP: ABNORMAL /HPF
REF LAB TEST METHOD: ABNORMAL
SP GR UR STRIP: 1.01 (ref 1–1.03)
SP GR UR STRIP: 1.01 (ref 1–1.03)
SQUAMOUS #/AREA URNS HPF: ABNORMAL /HPF
UROBILINOGEN UR QL STRIP: ABNORMAL
UROBILINOGEN UR QL STRIP: ABNORMAL
WBC # UR STRIP: ABNORMAL /HPF

## 2025-03-03 ENCOUNTER — TELEPHONE (OUTPATIENT)
Dept: CARDIOLOGY | Facility: CLINIC | Age: 65
End: 2025-03-03
Payer: MEDICARE

## 2025-03-03 NOTE — TELEPHONE ENCOUNTER
Caller: Lashae Benitez    Relationship to patient: Self    Best call back number: [phone]        Chief complaint:     Type of visit: FOLLOW UP    Requested date: ANY DAY BUT THURSDAYS     If rescheduling, when is the original appointment:      Additional notes:PATIENT MISSED APPOINTMENT IN JANUARY DUE TO SNOW AND NEEDS TO RESCHEDULE. HUB HAS NO SCHEDULING TIME FRAME PLEASE REACH OUT.

## 2025-03-11 ENCOUNTER — OFFICE VISIT (OUTPATIENT)
Dept: CARDIOLOGY | Facility: CLINIC | Age: 65
End: 2025-03-11
Payer: MEDICARE

## 2025-03-11 VITALS
BODY MASS INDEX: 23.56 KG/M2 | HEIGHT: 65 IN | SYSTOLIC BLOOD PRESSURE: 144 MMHG | WEIGHT: 141.4 LBS | HEART RATE: 81 BPM | DIASTOLIC BLOOD PRESSURE: 83 MMHG

## 2025-03-11 DIAGNOSIS — R07.2 PRECORDIAL PAIN: ICD-10-CM

## 2025-03-11 DIAGNOSIS — I48.0 PAROXYSMAL ATRIAL FIBRILLATION: Primary | ICD-10-CM

## 2025-03-11 DIAGNOSIS — I10 ESSENTIAL HYPERTENSION: ICD-10-CM

## 2025-03-11 RX ORDER — METOPROLOL TARTRATE 100 MG/1
100 TABLET ORAL 2 TIMES DAILY
Qty: 60 TABLET | Refills: 2 | Status: SHIPPED | OUTPATIENT
Start: 2025-03-11

## 2025-03-11 RX ORDER — UPADACITINIB 15 MG/1
TABLET, EXTENDED RELEASE ORAL
COMMUNITY

## 2025-03-11 NOTE — PROGRESS NOTES
Kreis, Samuel Duane, MD  Lashae Benitez  1960  03/11/2025    Patient Active Problem List   Diagnosis    Hiatal hernia    Essential hypertension    Sjogren's syndrome    Multiple sclerosis    Psoriasis    Fibromyalgia    Osteoarthritis    Psoriatic arthritis    RA (rheumatoid arthritis)    Degenerative disc disease, lumbar    TMJ arthritis    Dupuytren's disease    H. pylori infection    Family history of coronary artery disease    Type 2 diabetes mellitus    Edema    Bilateral leg edema    Isolated corticotropin deficiency    Hyponatremia    Paroxysmal atrial fibrillation    Sepsis    Bacteremia, escherichia coli    Iron deficiency anemia    Malabsorption due to intolerance, not elsewhere classified       Dear Kreis, Samuel Duane, MD:    Subjective     History of Present Illness:    Chief Complaint   Patient presents with    Follow-up     ROUTINE    Palpitations     MORE FLUTTERS THAN USUAL        Lashae Benitez is a pleasant 64 y.o. female with a past medical history significant for diabetes mellitus, paroxysmal atrial fibrillation anticoagulated with Xarelto and with rhythm control on flecainide.  She does have essential hypertension AND family history of premature CAD with her father having a major AMI in his 30s.     Lashae reports for about a month now she has been having more palpitations when they occur she reports that they are lasting 15 to 20 minutes and seem to be occurring at random.  She also reports she has been having some chest tightness on a near daily basis occurring for roughly 20 minutes as well.  She does believe she can exacerbate this pain with walking or overexerted.  She also reports this pain can radiate laterally.  She denies any syncope but does have unsteady gait but walks with a cane.  She denies any bleeding issues with Xarelto.      Allergies   Allergen Reactions    Codeine Angioedema    Imuran [Azathioprine] Other (See Comments)     Has pancreatis attacks with med    Januvia  "[Sitagliptin] Other (See Comments)     Has pancreatis attacks with med     Penicillins Angioedema    Sulfa Antibiotics Angioedema    Sulfasalazine Unknown (See Comments)    Beta Adrenergic Blockers Other (See Comments)     I called pt to ask her about the allergy to bblockers in her chart. Pt states \"too big of a dose makes her psoriasis act up\", but this current dose of metoprolol 50 mg bid, she has been on for a long time, with no ill side effects.  CATA,CMA 3/28/18   :      Current Outpatient Medications:     Upadacitinib ER (Rinvoq) 15 MG tablet sustained-release 24 hour, Take  by mouth., Disp: , Rfl:     alendronate (FOSAMAX) 70 MG tablet, Take 1 tablet by mouth 1 (One) Time Per Week. Wednesday, Disp: , Rfl:     amitriptyline (ELAVIL) 25 MG tablet, Take 1 tablet by mouth Every Night., Disp: , Rfl:     Apremilast (Otezla) 30 MG tablet, Take 30 mg by mouth 2 (two) times a day., Disp: , Rfl:     Butalbital-Acetaminophen  MG capsule, Take 300 mg by mouth Daily As Needed., Disp: , Rfl:     celecoxib (CeleBREX) 200 MG capsule, Take 1 capsule by mouth Daily., Disp: , Rfl:     cyanocobalamin 1000 MCG/ML injection, Inject 1 mL into the appropriate muscle as directed by prescriber Every 28 (Twenty-Eight) Days., Disp: , Rfl:     cyclobenzaprine (FLEXERIL) 10 MG tablet, Take 1 tablet by mouth Every Evening., Disp: , Rfl:     Diclofenac Sodium (VOLTAREN) 1 % gel gel, Apply 2 g topically to the appropriate area as directed 4 (Four) Times a Day As Needed., Disp: , Rfl:     DULoxetine (CYMBALTA) 30 MG capsule, Take 1 capsule by mouth Every Morning., Disp: , Rfl:     DULoxetine (CYMBALTA) 60 MG capsule, Take 1 capsule by mouth Daily., Disp: , Rfl:     EMGALITY 120 MG/ML prefilled syringe, Inject 1 mL under the skin into the appropriate area as directed Every 30 (Thirty) Days., Disp: , Rfl: 3    famotidine (PEPCID) 40 MG tablet, Take 1 tablet by mouth 2 (Two) Times a Day., Disp: , Rfl:     ferrous gluconate (FERGON) 240 (27 " FE) MG tablet, Take 1 tablet by mouth Every Morning., Disp: , Rfl:     fexofenadine (ALLEGRA) 180 MG tablet, Take 1 tablet by mouth Daily., Disp: , Rfl:     Finerenone (Kerendia) 10 MG tablet, Take 1 tablet by mouth Daily., Disp: , Rfl:     flecainide (TAMBOCOR) 50 MG tablet, TAKE 1 TABLET TWICE A DAY, Disp: 180 tablet, Rfl: 3    fluticasone-salmeterol (ADVAIR HFA) 115-21 MCG/ACT inhaler, Inhale 2 puffs 2 (Two) Times a Day., Disp: , Rfl:     folic acid (FOLVITE) 1 MG tablet, Take 1 tablet by mouth Daily., Disp: , Rfl:     gabapentin (NEURONTIN) 600 MG tablet, Take 1 tablet by mouth 3 (Three) Times a Day., Disp: , Rfl:     HumaLOG Mix 50/50 KwikPen (50-50) 100 UNIT/ML suspension pen-injector, 15 Units., Disp: , Rfl:     isosorbide mononitrate (IMDUR) 30 MG 24 hr tablet, TAKE 1 TABLET DAILY, Disp: 90 tablet, Rfl: 3    levothyroxine (SYNTHROID, LEVOTHROID) 112 MCG tablet, Take 1 tablet by mouth Daily., Disp: , Rfl:     losartan (COZAAR) 50 MG tablet, TAKE 1 TABLET BY MOUTH DAILY, Disp: 30 tablet, Rfl: 2    methotrexate 2.5 MG tablet, Take 10 tablets by mouth 1 (One) Time Per Week. Prior to [facility] Admission, Patient was on: Takes 10 tablets on Saturday, Disp: , Rfl:     metoprolol tartrate (LOPRESSOR) 100 MG tablet, Take 1 tablet by mouth 2 (Two) Times a Day., Disp: 60 tablet, Rfl: 2    olopatadine (PATANASE) 0.6 % solution nasal solution, 2 sprays by Each Nare route 2 (Two) Times a Day., Disp: , Rfl:     potassium chloride (KLOR-CON M10) 10 MEQ CR tablet, Take 1 tablet by mouth., Disp: , Rfl:     potassium chloride 10 MEQ CR tablet, Take 1 tablet by mouth Daily., Disp: , Rfl:     primidone (MYSOLINE) 50 MG tablet, Take 1 tablet by mouth Every Night., Disp: , Rfl:     rimegepant 75 MG dispersible tablet, , Disp: , Rfl:     sodium chloride 1 g tablet, Take 2 tablets by mouth 3 (Three) Times a Day., Disp: , Rfl:     sucralfate (CARAFATE) 1 g tablet, Take 1 tablet by mouth 4 (Four) Times a Day. Take one tablet by mouth 4  "times daily 1 hour before meals and at bedtime on an empty stomach., Disp: , Rfl:     traMADol (ULTRAM) 50 MG tablet, Take 1 tablet by mouth 2 (Two) Times a Day As Needed., Disp: , Rfl:     Xarelto 20 MG tablet, TAKE 1 TABLET DAILY, Disp: 90 tablet, Rfl: 3    The following portions of the patient's history were reviewed and updated as appropriate: allergies, current medications, past family history, past medical history, past social history, past surgical history and problem list.    Social History     Tobacco Use    Smoking status: Never     Passive exposure: Never    Smokeless tobacco: Never   Vaping Use    Vaping status: Never Used   Substance Use Topics    Alcohol use: Never    Drug use: Never         Objective   Vitals:    03/11/25 0905   BP: 144/83   Pulse: 81   Weight: 64.1 kg (141 lb 6.4 oz)   Height: 165.1 cm (65\")     Body mass index is 23.53 kg/m².    ROS    Constitutional:       General: Not in acute distress.     Appearance: Healthy appearance. Well-developed and not in distress. Not diaphoretic.   Eyes:      Conjunctiva/sclera: Conjunctivae normal.      Pupils: Pupils are equal, round, and reactive to light.   HENT:      Head: Normocephalic and atraumatic.   Neck:      Vascular: No carotid bruit or JVD.   Pulmonary:      Effort: Pulmonary effort is normal. No respiratory distress.      Breath sounds: Normal breath sounds.   Cardiovascular:      Normal rate. Regular rhythm.   Edema:     Peripheral edema absent.   Skin:     General: Skin is cool.   Neurological:      Mental Status: Alert, oriented to person, place, and time and oriented to person, place and time.         Lab Results   Component Value Date     (L) 02/03/2025    K 3.9 02/03/2025    CL 97 (L) 02/03/2025    CO2 24.0 02/03/2025    BUN 10 02/03/2025    CREATININE 0.76 02/03/2025    GLUCOSE 406 (C) 02/03/2025    CALCIUM 8.2 (L) 02/03/2025    AST 27 02/03/2025    ALT 24 02/03/2025    ALKPHOS 187 (H) 02/03/2025     Lab Results   Component " Value Date    CKTOTAL 40 04/12/2017     Lab Results   Component Value Date    WBC 4.5 02/21/2025    HGB 12.1 02/21/2025    HCT 37.1 02/21/2025     02/21/2025     Lab Results   Component Value Date    INR 1.54 (H) 09/13/2023    INR 0.97 11/22/2018    INR 0.93 07/20/2015     Lab Results   Component Value Date    MG 2.0 09/21/2020     Lab Results   Component Value Date    TSH 6.980 (H) 12/07/2023    CHLPL 132 07/22/2015    TRIG 49 01/06/2022    HDL 70 (H) 01/06/2022    LDL 73 01/06/2022      Lab Results   Component Value Date    BNP 51.0 11/22/2018       During this visit the following were done:  Labs Reviewed []    Labs Ordered []    Radiology Reports Reviewed []    Radiology Ordered []    PCP Records Reviewed []    Referring Provider Records Reviewed []    ER Records Reviewed []    Hospital Records Reviewed []    History Obtained From Family []    Radiology Images Reviewed []    Other Reviewed []    Records Requested []       Procedures    Assessment & Plan    Diagnosis Plan   1. Paroxysmal atrial fibrillation  Holter Monitor - 72 Hour Up To 15 Days      2. Precordial pain  Stress Test With Myocardial Perfusion One Day      3. Essential hypertension                 Recommendations:  Precordial pain  Will evaluate further with Lexiscan sestamibi study  Paroxysmal atrial fibrillation  Patient having frequent prolonged episodes of palpitations may be having breakthrough episodes of atrial fibrillation will order Holter monitor  Will change metoprolol succinate to metoprolol to tartrate however I did ask her to initially start with 50 mg of tartrate and after couple days if heart rate is still greater than 60 and still having palpitations she can go up to 100 mg  Hypertension  Slightly above goal hopefully will improve with metoprolol tartrate    Return in about 2 months (around 5/11/2025).    As always, I appreciate very much the opportunity to participate in the cardiovascular care of your patients.      With  Best Regards,    Gael Baum PA-C

## 2025-03-12 ENCOUNTER — LAB (OUTPATIENT)
Dept: LAB | Facility: HOSPITAL | Age: 65
End: 2025-03-12
Payer: MEDICARE

## 2025-03-12 ENCOUNTER — TRANSCRIBE ORDERS (OUTPATIENT)
Dept: ADMINISTRATIVE | Facility: HOSPITAL | Age: 65
End: 2025-03-12
Payer: MEDICARE

## 2025-03-12 DIAGNOSIS — E11.9 DIABETES MELLITUS WITHOUT COMPLICATION: ICD-10-CM

## 2025-03-12 DIAGNOSIS — E89.0 POSTSURGICAL HYPOTHYROIDISM: ICD-10-CM

## 2025-03-12 DIAGNOSIS — C73 MALIGNANT TUMOR OF THYROID GLAND: ICD-10-CM

## 2025-03-12 DIAGNOSIS — C73 MALIGNANT TUMOR OF THYROID GLAND: Primary | ICD-10-CM

## 2025-03-12 PROCEDURE — 82043 UR ALBUMIN QUANTITATIVE: CPT

## 2025-03-12 PROCEDURE — 84443 ASSAY THYROID STIM HORMONE: CPT

## 2025-03-12 PROCEDURE — 80061 LIPID PANEL: CPT

## 2025-03-12 PROCEDURE — 36415 COLL VENOUS BLD VENIPUNCTURE: CPT

## 2025-03-12 PROCEDURE — 86341 ISLET CELL ANTIBODY: CPT

## 2025-03-12 PROCEDURE — 86800 THYROGLOBULIN ANTIBODY: CPT

## 2025-03-12 PROCEDURE — 84432 ASSAY OF THYROGLOBULIN: CPT

## 2025-03-12 PROCEDURE — 80053 COMPREHEN METABOLIC PANEL: CPT

## 2025-03-12 PROCEDURE — 82570 ASSAY OF URINE CREATININE: CPT

## 2025-03-12 PROCEDURE — 84681 ASSAY OF C-PEPTIDE: CPT

## 2025-03-13 LAB
ALBUMIN SERPL-MCNC: 3.7 G/DL (ref 3.5–5.2)
ALBUMIN UR-MCNC: 6.6 MG/DL
ALBUMIN/GLOB SERPL: 1.9 G/DL
ALP SERPL-CCNC: 170 U/L (ref 39–117)
ALT SERPL W P-5'-P-CCNC: 85 U/L (ref 1–33)
ANION GAP SERPL CALCULATED.3IONS-SCNC: 12 MMOL/L (ref 5–15)
AST SERPL-CCNC: 65 U/L (ref 1–32)
BILIRUB SERPL-MCNC: 0.4 MG/DL (ref 0–1.2)
BUN SERPL-MCNC: 14 MG/DL (ref 8–23)
BUN/CREAT SERPL: 16.1 (ref 7–25)
CALCIUM SPEC-SCNC: 8.4 MG/DL (ref 8.6–10.5)
CHLORIDE SERPL-SCNC: 102 MMOL/L (ref 98–107)
CHOLEST SERPL-MCNC: 135 MG/DL (ref 0–200)
CO2 SERPL-SCNC: 21 MMOL/L (ref 22–29)
CREAT SERPL-MCNC: 0.87 MG/DL (ref 0.57–1)
CREAT UR-MCNC: 123.7 MG/DL
EGFRCR SERPLBLD CKD-EPI 2021: 75 ML/MIN/1.73
GLOBULIN UR ELPH-MCNC: 1.9 GM/DL
GLUCOSE SERPL-MCNC: 260 MG/DL (ref 65–99)
HDLC SERPL-MCNC: 50 MG/DL (ref 40–60)
LDLC SERPL CALC-MCNC: 72 MG/DL (ref 0–100)
LDLC/HDLC SERPL: 1.45 {RATIO}
MICROALBUMIN/CREAT UR: 53.4 MG/G (ref 0–29)
POTASSIUM SERPL-SCNC: 4.9 MMOL/L (ref 3.5–5.2)
PROT SERPL-MCNC: 5.6 G/DL (ref 6–8.5)
SODIUM SERPL-SCNC: 135 MMOL/L (ref 136–145)
TRIGL SERPL-MCNC: 62 MG/DL (ref 0–150)
TSH SERPL DL<=0.05 MIU/L-ACNC: 15.9 UIU/ML (ref 0.27–4.2)
VLDLC SERPL-MCNC: 13 MG/DL (ref 5–40)

## 2025-03-14 LAB
C PEPTIDE SERPL-MCNC: 2.1 NG/ML (ref 1.1–4.4)
THYROGLOB AB SERPL-ACNC: <1 IU/ML (ref 0–0.9)
THYROGLOB SERPL-MCNC: 3.5 NG/ML (ref 1.5–38.5)

## 2025-03-15 LAB — GAD65 AB SER IA-ACNC: <5 U/ML (ref 0–5)

## 2025-03-17 ENCOUNTER — TRANSCRIBE ORDERS (OUTPATIENT)
Dept: ADMINISTRATIVE | Facility: HOSPITAL | Age: 65
End: 2025-03-17
Payer: MEDICARE

## 2025-03-17 ENCOUNTER — LAB (OUTPATIENT)
Dept: ONCOLOGY | Facility: CLINIC | Age: 65
End: 2025-03-17
Payer: MEDICARE

## 2025-03-17 ENCOUNTER — LAB (OUTPATIENT)
Dept: LAB | Facility: HOSPITAL | Age: 65
End: 2025-03-17
Payer: MEDICARE

## 2025-03-17 ENCOUNTER — OFFICE VISIT (OUTPATIENT)
Dept: ONCOLOGY | Facility: CLINIC | Age: 65
End: 2025-03-17
Payer: MEDICARE

## 2025-03-17 VITALS
WEIGHT: 140 LBS | DIASTOLIC BLOOD PRESSURE: 57 MMHG | HEART RATE: 82 BPM | TEMPERATURE: 97.1 F | BODY MASS INDEX: 23.32 KG/M2 | SYSTOLIC BLOOD PRESSURE: 93 MMHG | OXYGEN SATURATION: 100 % | HEIGHT: 65 IN | RESPIRATION RATE: 18 BRPM

## 2025-03-17 DIAGNOSIS — K90.49 MALABSORPTION DUE TO INTOLERANCE, NOT ELSEWHERE CLASSIFIED: ICD-10-CM

## 2025-03-17 DIAGNOSIS — D50.9 IRON DEFICIENCY ANEMIA, UNSPECIFIED IRON DEFICIENCY ANEMIA TYPE: ICD-10-CM

## 2025-03-17 DIAGNOSIS — I10 HYPERTENSION, UNSPECIFIED TYPE: ICD-10-CM

## 2025-03-17 DIAGNOSIS — D50.9 IRON DEFICIENCY ANEMIA, UNSPECIFIED IRON DEFICIENCY ANEMIA TYPE: Primary | ICD-10-CM

## 2025-03-17 DIAGNOSIS — L40.50 PSORIATIC ARTHRITIS: ICD-10-CM

## 2025-03-17 DIAGNOSIS — E55.9 VITAMIN D DEFICIENCY: ICD-10-CM

## 2025-03-17 DIAGNOSIS — E87.1 HYPONATREMIA: Primary | ICD-10-CM

## 2025-03-17 DIAGNOSIS — E87.1 HYPONATREMIA: ICD-10-CM

## 2025-03-17 DIAGNOSIS — D64.9 NORMOCYTIC ANEMIA: ICD-10-CM

## 2025-03-17 LAB
ALBUMIN SERPL-MCNC: 3.7 G/DL (ref 3.5–5.2)
ALBUMIN/GLOB SERPL: 1.7 G/DL
ALP SERPL-CCNC: 178 U/L (ref 39–117)
ALT SERPL W P-5'-P-CCNC: 68 U/L (ref 1–33)
ANION GAP SERPL CALCULATED.3IONS-SCNC: 9.8 MMOL/L (ref 5–15)
AST SERPL-CCNC: 41 U/L (ref 1–32)
BASOPHILS # BLD AUTO: 0.04 10*3/MM3 (ref 0–0.2)
BASOPHILS # BLD AUTO: 0.05 10*3/MM3 (ref 0–0.2)
BASOPHILS NFR BLD AUTO: 0.6 % (ref 0–1.5)
BASOPHILS NFR BLD AUTO: 0.7 % (ref 0–1.5)
BILIRUB SERPL-MCNC: 0.4 MG/DL (ref 0–1.2)
BUN SERPL-MCNC: 14 MG/DL (ref 8–23)
BUN/CREAT SERPL: 14.4 (ref 7–25)
CALCIUM SPEC-SCNC: 8.5 MG/DL (ref 8.6–10.5)
CHLORIDE SERPL-SCNC: 100 MMOL/L (ref 98–107)
CO2 SERPL-SCNC: 23.2 MMOL/L (ref 22–29)
CREAT SERPL-MCNC: 0.97 MG/DL (ref 0.57–1)
DEPRECATED RDW RBC AUTO: 56.1 FL (ref 37–54)
DEPRECATED RDW RBC AUTO: 57.4 FL (ref 37–54)
EGFRCR SERPLBLD CKD-EPI 2021: 65.8 ML/MIN/1.73
EOSINOPHIL # BLD AUTO: 0.09 10*3/MM3 (ref 0–0.4)
EOSINOPHIL # BLD AUTO: 0.09 10*3/MM3 (ref 0–0.4)
EOSINOPHIL NFR BLD AUTO: 1.3 % (ref 0.3–6.2)
EOSINOPHIL NFR BLD AUTO: 1.3 % (ref 0.3–6.2)
ERYTHROCYTE [DISTWIDTH] IN BLOOD BY AUTOMATED COUNT: 14.8 % (ref 12.3–15.4)
ERYTHROCYTE [DISTWIDTH] IN BLOOD BY AUTOMATED COUNT: 14.9 % (ref 12.3–15.4)
FERRITIN SERPL-MCNC: 308.4 NG/ML (ref 13–150)
FERRITIN SERPL-MCNC: 326.9 NG/ML (ref 13–150)
GLOBULIN UR ELPH-MCNC: 2.2 GM/DL
GLUCOSE SERPL-MCNC: 287 MG/DL (ref 65–99)
HCT VFR BLD AUTO: 33.4 % (ref 34–46.6)
HCT VFR BLD AUTO: 34.5 % (ref 34–46.6)
HGB BLD-MCNC: 11.1 G/DL (ref 12–15.9)
HGB BLD-MCNC: 11.2 G/DL (ref 12–15.9)
IMM GRANULOCYTES # BLD AUTO: 0.05 10*3/MM3 (ref 0–0.05)
IMM GRANULOCYTES # BLD AUTO: 0.06 10*3/MM3 (ref 0–0.05)
IMM GRANULOCYTES NFR BLD AUTO: 0.7 % (ref 0–0.5)
IMM GRANULOCYTES NFR BLD AUTO: 0.8 % (ref 0–0.5)
IRON 24H UR-MRATE: 167 MCG/DL (ref 37–145)
IRON 24H UR-MRATE: 184 MCG/DL (ref 37–145)
IRON SATN MFR SERPL: 52 % (ref 20–50)
IRON SATN MFR SERPL: 57 % (ref 20–50)
LYMPHOCYTES # BLD AUTO: 1.58 10*3/MM3 (ref 0.7–3.1)
LYMPHOCYTES # BLD AUTO: 1.95 10*3/MM3 (ref 0.7–3.1)
LYMPHOCYTES NFR BLD AUTO: 22 % (ref 19.6–45.3)
LYMPHOCYTES NFR BLD AUTO: 28.9 % (ref 19.6–45.3)
MCH RBC QN AUTO: 34.1 PG (ref 26.6–33)
MCH RBC QN AUTO: 34.4 PG (ref 26.6–33)
MCHC RBC AUTO-ENTMCNC: 32.5 G/DL (ref 31.5–35.7)
MCHC RBC AUTO-ENTMCNC: 33.2 G/DL (ref 31.5–35.7)
MCV RBC AUTO: 103.4 FL (ref 79–97)
MCV RBC AUTO: 105.2 FL (ref 79–97)
MONOCYTES # BLD AUTO: 0.35 10*3/MM3 (ref 0.1–0.9)
MONOCYTES # BLD AUTO: 0.39 10*3/MM3 (ref 0.1–0.9)
MONOCYTES NFR BLD AUTO: 5.2 % (ref 5–12)
MONOCYTES NFR BLD AUTO: 5.4 % (ref 5–12)
NEUTROPHILS NFR BLD AUTO: 4.26 10*3/MM3 (ref 1.7–7)
NEUTROPHILS NFR BLD AUTO: 5.03 10*3/MM3 (ref 1.7–7)
NEUTROPHILS NFR BLD AUTO: 63.2 % (ref 42.7–76)
NEUTROPHILS NFR BLD AUTO: 69.9 % (ref 42.7–76)
NRBC BLD AUTO-RTO: 0 /100 WBC (ref 0–0.2)
NRBC BLD AUTO-RTO: 0 /100 WBC (ref 0–0.2)
PLATELET # BLD AUTO: 350 10*3/MM3 (ref 140–450)
PLATELET # BLD AUTO: 373 10*3/MM3 (ref 140–450)
PMV BLD AUTO: 10 FL (ref 6–12)
PMV BLD AUTO: 9.2 FL (ref 6–12)
POTASSIUM SERPL-SCNC: 5 MMOL/L (ref 3.5–5.2)
PROT SERPL-MCNC: 5.9 G/DL (ref 6–8.5)
RBC # BLD AUTO: 3.23 10*6/MM3 (ref 3.77–5.28)
RBC # BLD AUTO: 3.28 10*6/MM3 (ref 3.77–5.28)
SODIUM SERPL-SCNC: 133 MMOL/L (ref 136–145)
TIBC SERPL-MCNC: 319 MCG/DL (ref 298–536)
TIBC SERPL-MCNC: 322 MCG/DL (ref 298–536)
TRANSFERRIN SERPL-MCNC: 214 MG/DL (ref 200–360)
TRANSFERRIN SERPL-MCNC: 216 MG/DL (ref 200–360)
WBC NRBC COR # BLD AUTO: 6.75 10*3/MM3 (ref 3.4–10.8)
WBC NRBC COR # BLD AUTO: 7.19 10*3/MM3 (ref 3.4–10.8)

## 2025-03-17 PROCEDURE — 85025 COMPLETE CBC W/AUTO DIFF WBC: CPT | Performed by: NURSE PRACTITIONER

## 2025-03-17 PROCEDURE — 3078F DIAST BP <80 MM HG: CPT | Performed by: NURSE PRACTITIONER

## 2025-03-17 PROCEDURE — 1160F RVW MEDS BY RX/DR IN RCRD: CPT | Performed by: NURSE PRACTITIONER

## 2025-03-17 PROCEDURE — 82043 UR ALBUMIN QUANTITATIVE: CPT

## 2025-03-17 PROCEDURE — 82728 ASSAY OF FERRITIN: CPT | Performed by: NURSE PRACTITIONER

## 2025-03-17 PROCEDURE — 81001 URINALYSIS AUTO W/SCOPE: CPT

## 2025-03-17 PROCEDURE — 80053 COMPREHEN METABOLIC PANEL: CPT

## 2025-03-17 PROCEDURE — 82728 ASSAY OF FERRITIN: CPT

## 2025-03-17 PROCEDURE — 36415 COLL VENOUS BLD VENIPUNCTURE: CPT

## 2025-03-17 PROCEDURE — 1125F AMNT PAIN NOTED PAIN PRSNT: CPT | Performed by: NURSE PRACTITIONER

## 2025-03-17 PROCEDURE — 83540 ASSAY OF IRON: CPT | Performed by: NURSE PRACTITIONER

## 2025-03-17 PROCEDURE — 1159F MED LIST DOCD IN RCRD: CPT | Performed by: NURSE PRACTITIONER

## 2025-03-17 PROCEDURE — 85025 COMPLETE CBC W/AUTO DIFF WBC: CPT

## 2025-03-17 PROCEDURE — 84156 ASSAY OF PROTEIN URINE: CPT

## 2025-03-17 PROCEDURE — 99214 OFFICE O/P EST MOD 30 MIN: CPT | Performed by: NURSE PRACTITIONER

## 2025-03-17 PROCEDURE — 3074F SYST BP LT 130 MM HG: CPT | Performed by: NURSE PRACTITIONER

## 2025-03-17 PROCEDURE — 82570 ASSAY OF URINE CREATININE: CPT

## 2025-03-17 PROCEDURE — 84466 ASSAY OF TRANSFERRIN: CPT | Performed by: NURSE PRACTITIONER

## 2025-03-17 NOTE — PROGRESS NOTES
DATE OF FOLLOW UP:  3/17/2025    REASON FOR REFERRAL: Normocytic Anemia    REFERRING PHYSICIAN:  No ref. provider found    CHIEF COMPLAINT:  Chronic Fatigue    TREATMENT HISTORY:  Folic Acid 1 mg daily    B12 1000 mcg SQ monthly    Feraheme Day 1 12/11/2023  Day 8 12/18/2023  Feraheme Day 1  12/06/2024 Day 8 12/09/2024      HISTORY OF PRESENT ILLNESS:   Lashae Benitez is a very pleasant 64 y.o. female who is being seen today at the request of No ref. provider found for evaluation and treatment of normocytic anemia. Ms. Benitez reports following with her PCP every 3 months with lab testing. She reports that she has been aware of the above issue for at least six months. Previous available CBCs were reviewed and patient has had chronic anemia with fluctuations in Hg ranging 9.2-11.9 since at least August 2019. She is taking oral iron supplement daily which she is having a difficult time tolerating due to GI upset. She also takes Folic Acid daily and B12 injections monthly. She denies any obvious blood loss from any source. She reports having colonoscopy per Dr. Frederick last year that was unremarkable. She has never had EGD. No history of blood transfusion. She is on Xarelto for A-Fib. She also follows with Dr. Chand (rheumatology) for psoriatic arthritis and is currently on Orencia, Otezla and MTX. She reports chronic aches/pain and chronic fatigue but denies any worsening. She denies any other specific complaints at this time.     INTERVAL HISTORY:  Ms. Benitez presents today for follow up of anemia. She was last replaced with IV Feraheme in December 2024. She is also taking Folic Acid 1 mg daily and B12 1000 mcg SQ monthly. She reports chronic fatigue but denies any worsening. She denies shortness of breath on exertion, chest pain or dizziness. She denies any obvious blood loss from any source. She also continues to take MTX and recently started Rinvoq for psoriatic arthritis per [facility]. She is without  any other specific complaints today.       PAST MEDICAL HISTORY:  Past Medical History:   Diagnosis Date    Acid reflux     Allergic     Anemia     Anxiety     Atrial fibrillation     Cancer     thyroid, skin    Cervical disc disorder     Chronic pain disorder     Claustrophobia     CTS (carpal tunnel syndrome)     Degenerative disc disease, lumbar     Depression     Dupuytren's disease     Essential hypertension     Family history of coronary artery disease     Fibromyalgia     Gallbladder abscess     H. pylori infection     Hiatal hernia     Hyperlipidemia     Hypothyroidism     Low back pain     Lumbosacral disc disease     Migraines     migraines    Multiple sclerosis     Osteoarthritis     Peripheral neuropathy     Psoriasis     Psoriatic arthritis     RA (rheumatoid arthritis)     Rheumatoid arthritis     Sinusitis     Sjogren's syndrome     Stomach ulcer     Thoracic disc disorder     TMJ arthritis     Type 2 diabetes mellitus     Urinary tract infection        PAST SURGICAL HISTORY:  Past Surgical History:   Procedure Laterality Date    BACK SURGERY      BREAST LUMPECTOMY Left 11/1993    CARDIAC CATHETERIZATION Left 09/21/2009    Normal     CARPAL TUNNEL RELEASE  03/2012    CERVICAL POLYPECTOMY  12/2015    CHOLECYSTECTOMY  05/2007    COLONOSCOPY      ENDOSCOPY      EPIDURAL BLOCK      GASTRIC BYPASS  09/2007    JOINT REPLACEMENT      KNEE ARTHROPLASTY      LUMBAR DISC SURGERY      C5-6    NECK SURGERY      REPLACEMENT TOTAL KNEE Right 06/2016    SACRAL NERVE STIMULATOR PLACEMENT  09/2014    SACRAL NERVE STIMULATOR PLACEMENT  04/11/2024    [facility]    SACRAL NERVE STIMULATOR PLACEMENT Right 04/17/2024    THYROIDECTOMY  03/2016    TRIGGER POINT INJECTION         FAMILY HISTORY:  Family History   Problem Relation Age of Onset    Diabetes Mother     Stroke Mother     Hypertension Mother     Asthma Mother     Fainting Mother     Miscarriages / Stillbirths Mother     Heart attack Father         First one  was in his 20's second 30's.     Heart failure Father     Heart disease Father     Stroke Father     Diabetes Sister     Cancer Maternal Grandmother        SOCIAL HISTORY:  Social History     Socioeconomic History    Marital status:    Tobacco Use    Smoking status: Never     Passive exposure: Never    Smokeless tobacco: Never   Vaping Use    Vaping status: Never Used   Substance and Sexual Activity    Alcohol use: Never    Drug use: Never    Sexual activity: Yes     Partners: Male     Birth control/protection: Post-menopausal, None              MEDICATIONS:  The current medication list was reviewed in the EMR    Current Outpatient Medications:     alendronate (FOSAMAX) 70 MG tablet, Take 1 tablet by mouth 1 (One) Time Per Week. Wednesday, Disp: , Rfl:     amitriptyline (ELAVIL) 25 MG tablet, Take 1 tablet by mouth Every Night., Disp: , Rfl:     Apremilast (Otezla) 30 MG tablet, Take 30 mg by mouth 2 (two) times a day., Disp: , Rfl:     Butalbital-Acetaminophen  MG capsule, Take 300 mg by mouth Daily As Needed., Disp: , Rfl:     celecoxib (CeleBREX) 200 MG capsule, Take 1 capsule by mouth Daily., Disp: , Rfl:     cyanocobalamin 1000 MCG/ML injection, Inject 1 mL into the appropriate muscle as directed by prescriber Every 28 (Twenty-Eight) Days., Disp: , Rfl:     cyclobenzaprine (FLEXERIL) 10 MG tablet, Take 1 tablet by mouth Every Evening., Disp: , Rfl:     Diclofenac Sodium (VOLTAREN) 1 % gel gel, Apply 2 g topically to the appropriate area as directed 4 (Four) Times a Day As Needed., Disp: , Rfl:     DULoxetine (CYMBALTA) 30 MG capsule, Take 1 capsule by mouth Every Morning., Disp: , Rfl:     DULoxetine (CYMBALTA) 60 MG capsule, Take 1 capsule by mouth Daily., Disp: , Rfl:     EMGALITY 120 MG/ML prefilled syringe, Inject 1 mL under the skin into the appropriate area as directed Every 30 (Thirty) Days., Disp: , Rfl: 3    famotidine (PEPCID) 40 MG tablet, Take 1 tablet by mouth 2 (Two) Times a Day.,  Disp: , Rfl:     ferrous gluconate (FERGON) 240 (27 FE) MG tablet, Take 1 tablet by mouth Every Morning., Disp: , Rfl:     fexofenadine (ALLEGRA) 180 MG tablet, Take 1 tablet by mouth Daily., Disp: , Rfl:     Finerenone (Kerendia) 10 MG tablet, Take 1 tablet by mouth Daily., Disp: , Rfl:     flecainide (TAMBOCOR) 50 MG tablet, TAKE 1 TABLET TWICE A DAY, Disp: 180 tablet, Rfl: 3    fluticasone-salmeterol (ADVAIR HFA) 115-21 MCG/ACT inhaler, Inhale 2 puffs 2 (Two) Times a Day., Disp: , Rfl:     folic acid (FOLVITE) 1 MG tablet, Take 1 tablet by mouth Daily., Disp: , Rfl:     gabapentin (NEURONTIN) 600 MG tablet, Take 1 tablet by mouth 3 (Three) Times a Day., Disp: , Rfl:     HumaLOG Mix 50/50 KwikPen (50-50) 100 UNIT/ML suspension pen-injector, 15 Units., Disp: , Rfl:     isosorbide mononitrate (IMDUR) 30 MG 24 hr tablet, TAKE 1 TABLET DAILY, Disp: 90 tablet, Rfl: 3    levothyroxine (SYNTHROID, LEVOTHROID) 112 MCG tablet, Take 1 tablet by mouth Daily., Disp: , Rfl:     losartan (COZAAR) 50 MG tablet, TAKE 1 TABLET BY MOUTH DAILY, Disp: 30 tablet, Rfl: 2    methotrexate 2.5 MG tablet, Take 10 tablets by mouth 1 (One) Time Per Week. Prior to [facility] Admission, Patient was on: Takes 10 tablets on Saturday, Disp: , Rfl:     metoprolol tartrate (LOPRESSOR) 100 MG tablet, Take 1 tablet by mouth 2 (Two) Times a Day., Disp: 60 tablet, Rfl: 2    olopatadine (PATANASE) 0.6 % solution nasal solution, 2 sprays by Each Nare route 2 (Two) Times a Day., Disp: , Rfl:     potassium chloride (KLOR-CON M10) 10 MEQ CR tablet, Take 1 tablet by mouth., Disp: , Rfl:     potassium chloride 10 MEQ CR tablet, Take 1 tablet by mouth Daily., Disp: , Rfl:     primidone (MYSOLINE) 50 MG tablet, Take 1 tablet by mouth Every Night., Disp: , Rfl:     rimegepant 75 MG dispersible tablet, , Disp: , Rfl:     sodium chloride 1 g tablet, Take 2 tablets by mouth 3 (Three) Times a Day., Disp: , Rfl:     sucralfate (CARAFATE) 1 g tablet, Take 1 tablet by  "mouth 4 (Four) Times a Day. Take one tablet by mouth 4 times daily 1 hour before meals and at bedtime on an empty stomach., Disp: , Rfl:     traMADol (ULTRAM) 50 MG tablet, Take 1 tablet by mouth 2 (Two) Times a Day As Needed., Disp: , Rfl:     Upadacitinib ER (Rinvoq) 15 MG tablet sustained-release 24 hour, Take  by mouth., Disp: , Rfl:     Xarelto 20 MG tablet, TAKE 1 TABLET DAILY, Disp: 90 tablet, Rfl: 3    ALLERGIES:    Allergies   Allergen Reactions    Codeine Angioedema    Imuran [Azathioprine] Other (See Comments)     Has pancreatis attacks with med    Januvia [Sitagliptin] Other (See Comments)     Has pancreatis attacks with med     Penicillins Angioedema    Sulfa Antibiotics Angioedema    Sulfasalazine Unknown (See Comments)    Beta Adrenergic Blockers Other (See Comments)     I called pt to ask her about the allergy to bblockers in her chart. Pt states \"too big of a dose makes her psoriasis act up\", but this current dose of metoprolol 50 mg bid, she has been on for a long time, with no ill side effects.  CATA,CMA 3/28/18         REVIEW OF SYSTEMS:    A comprehensive 14 point review of systems was performed.  Significant findings as mentioned above.  All other systems reviewed and are negative.        Physical Exam  Vitals:    03/17/25 0929   BP: 93/57   Pulse: 82   Resp: 18   Temp: 97.1 °F (36.2 °C)   TempSrc: Temporal   SpO2: 100%   Weight: 63.5 kg (140 lb)   Height: 165.1 cm (65\")        ECOG score: 0   General: Well developed, well nourished, alert and oriented x 3, in no acute distress.   Head: ATNC   Eyes: PERRL, No evidence of conjunctivitis.   Nose: No nasal discharge.   Mouth: Oral mucosal membranes moist. No oral ulceration or hemorrhages.   Neck: Neck supple. No thyromegaly. No JVD.   Lungs: Clear in all fields to A&P without rales, rhonchi or wheezing.   Heart: S1, S2. Regular rate and rhythm. No murmurs, rubs, or gallops.   Abdomen: Soft. Bowel sounds are normoactive. Nontender with palpation. No " Hepatosplenomegaly can be appreciated.   Extremities: No cyanosis or edema. Peripheral pulses palpable and equal bilaterally.   Integumentary: Warm, dry, intact. Red, Scaly plaques on bilateral upper extremities.  Hem/Lymph Nodes: No palpable cervical, submandibular, supraclavicular, axillary  lymphadenopathy noted. No petechiae, purpura or ecchymosis noted.   Neurologic: Grossly non-focal exam    Pain Score:  Pain Score    03/17/25 0929   PainSc: 4        PHQ-Score Total:  PHQ-9 Total Score:         PATHOLOGY:        ENDOSCOPY:        IMAGING:        RECENT LABS:  Lab Results   Component Value Date    WBC 4.5 02/21/2025    HGB 12.1 02/21/2025    HCT 37.1 02/21/2025     (H) 02/21/2025    RDW 14.5 02/21/2025     02/21/2025    NEUTRORELPCT 79.9 (H) 12/03/2024    LYMPHORELPCT 12.0 (L) 12/03/2024    MONORELPCT 4.9 (L) 12/03/2024    EOSRELPCT 2.4 12/03/2024    BASORELPCT 0.6 12/03/2024    NEUTROABS 6.42 12/03/2024    LYMPHSABS 0.96 12/03/2024       Lab Results   Component Value Date     (L) 03/12/2025    K 4.9 03/12/2025    CO2 21.0 (L) 03/12/2025     03/12/2025    BUN 14 03/12/2025    CREATININE 0.87 03/12/2025    EGFRIFNONA 74 02/23/2022    EGFRIFAFRI  09/22/2016      Comment:      <15 Indicative of kidney failure.    GLUCOSE 260 (H) 03/12/2025    CALCIUM 8.4 (L) 03/12/2025    ALKPHOS 170 (H) 03/12/2025    AST 65 (H) 03/12/2025    ALT 85 (H) 03/12/2025    BILITOT 0.4 03/12/2025    ALBUMIN 3.7 03/12/2025    PROTEINTOT 5.6 (L) 03/12/2025    MG 2.0 09/21/2020    PHOS 4.0 04/10/2024       Lab Results   Component Value Date/Time     (H) 12/07/2023 12:01 PM     Lab Results   Component Value Date    FERRITIN 43.77 12/03/2024    IRON 66 12/03/2024    TIBC 335 12/03/2024    LABIRON 20 12/03/2024    DZTQMDEV57 243 12/03/2024    FOLATE >20.00 12/03/2024       Work Up 12/07/2023      Lab Results   Component Value Date    [id number] 243 12/03/2024    FOLATE >20.00 12/03/2024     Lab Results    Component Value Date    RETICCTPCT 1.36 12/07/2023     Lab Results   Component Value Date    HAPTOGLOBIN 131 12/07/2023     Lab Results   Component Value Date    CRP <0.30 04/23/2024    CRP <0.30 12/07/2023    CRP <0.30 09/26/2023    CRP 4.13 (H) 09/13/2023    CRP <0.30 06/12/2021    CRP 0.09 08/07/2020    CRP <0.50 11/22/2018     Lab Results   Component Value Date    SEDRATE 9 04/23/2024    SEDRATE 6 12/07/2023    SEDRATE 8 11/22/2018     TSH  0.270 - 4.200 uIU/mL 6.980 High      Free T4  0.93 - 1.70 ng/dL 1.58       Copper  80 - 158 ug/dL 111     Zinc  44 - 115 ug/dL 80     Tissue Transglutaminase IgA  0 - 3 U/mL <2     Iron  37 - 145 mcg/dL 31 Low    Iron Saturation (TSAT)  20 - 50 % 7 Low    Transferrin  200 - 360 mg/dL 319   TIBC  298 - 536 mcg/dL 475     Ferritin  13.00 - 150.00 ng/mL 122.70       ASSESSMENT & PLAN:  Lashae Benitez is a very pleasant 64 y.o. female with    1. Normocytic Anemia  2. DARIO  - Previous available CBCs were reviewed and patient has had chronic anemia with fluctuations in Hg ranging 9.2-11.9 since at least August 2019.   - She is taking oral iron supplement daily which she is having a difficult time tolerating due to GI upset. She also takes Folic Acid daily and B12 injections monthly.   -She denies any obvious blood loss from any source. She reports having colonoscopy per Dr. Frederick last year that was unremarkable (no records currently available, will request today). She has never had EGD. No history of blood transfusion.  - CBC from initial consultation showed Hg (10.1) and slightly elevated platelet count 500,000, likely reactive. WBC is normal. Iron panel is low with normal Ferritin despite oral iron replacement. Therefore, discontinued oral iron and replaced with IV Feraheme for apparent malabsorption of oral iron.   - Also obtained additional labs to further evaluate anemia including PBS which showed macrocytic anemia with mild anisocytosis including occasional elliptocytes, no  increase in schistocyte population, thrombocytosis with normal platelet morphology, normal WBC count and differential, granulocytes show normal morphology and no circulating blasts identified. B12 and Folate are replete. No evidence of hemolysis as Retic, LDH and Haptoglobin are normal. CRP and ESR were normal. TSH was elevated with normal Free T4 (previous thyroidectomy in 2016). Copper, Zinc and Tissue Transglutaminase were normal.  - She was last replaced with IV Feraheme in December 2024.  - CBC from today shows low Hg (11.1) with normal WBC and platelets. Iron panel and Ferritin are high.   - Will follow up in 3 months with CBC, Iron panel and Ferritin. Will replace with IV iron as needed.   - Advised to continue B12 1000 mcg SQ monthly and Folic Acid 1 mg daily.     3. Psoriatic Arthritis  - She follows with Dr. Chand at [facility]. She is currently taking MTX and Rinvoq. Ongoing management per rheumatology.        ACO / KRISTAL/Other  Quality measures  -  Lashae Benitez received 2024 flu vaccine.  -  Lashae Benitez reports a pain score of 4.  Given her pain assessment as noted, treatment options were discussed and the following options were decided upon as a follow-up plan to address the patient's pain: continuation of current treatment plan for pain.  -  Current outpatient and discharge medications have been reconciled for the patient.  Reviewed by: RUIZ Reynolds                          I spent 30 minutes caring for Lashae on this date of service. This time includes time spent by me in the following activities: preparing for the visit, reviewing tests, performing a medically appropriate examination and/or evaluation, counseling and educating the patient/family/caregiver, referring and communicating with other health care professionals, documenting information in the medical record, independently interpreting results and communicating that information with the patient/family/caregiver, care  coordination, ordering medications, obtaining a separately obtained history, and reviewing a separately obtained history.                                   Electronically Signed by: RUIZ Landry , March 17, 2025 09:30 EDT         CC:   No ref. provider found  Kreis, Samuel Duane, MD

## 2025-03-17 NOTE — PROGRESS NOTES
Venipuncture Blood Specimen Collection  Venipuncture performed in left arm by Jesica Gabriel MA with good hemostasis. Patient tolerated the procedure well without complications.   03/17/25   Jesica Gabriel MA

## 2025-03-18 LAB
ALBUMIN UR-MCNC: <1.2 MG/DL
BACTERIA UR QL AUTO: NORMAL /HPF
BILIRUB UR QL STRIP: NEGATIVE
BILIRUB UR QL STRIP: NEGATIVE
CLARITY UR: CLEAR
CLARITY UR: CLEAR
COLOR UR: YELLOW
COLOR UR: YELLOW
CREAT UR-MCNC: 7.9 MG/DL
CREAT UR-MCNC: 7.9 MG/DL
GLUCOSE UR STRIP-MCNC: ABNORMAL MG/DL
GLUCOSE UR STRIP-MCNC: ABNORMAL MG/DL
HGB UR QL STRIP.AUTO: NEGATIVE
HGB UR QL STRIP.AUTO: NEGATIVE
HOLD SPECIMEN: NORMAL
HYALINE CASTS UR QL AUTO: NORMAL /LPF
KETONES UR QL STRIP: NEGATIVE
KETONES UR QL STRIP: NEGATIVE
LEUKOCYTE ESTERASE UR QL STRIP.AUTO: NEGATIVE
LEUKOCYTE ESTERASE UR QL STRIP.AUTO: NEGATIVE
MICROALBUMIN/CREAT UR: NORMAL MG/G{CREAT}
NITRITE UR QL STRIP: NEGATIVE
NITRITE UR QL STRIP: NEGATIVE
PH UR STRIP.AUTO: 6 [PH] (ref 5–8)
PH UR STRIP.AUTO: 6 [PH] (ref 5–8)
PROT ?TM UR-MCNC: <4 MG/DL
PROT UR QL STRIP: NEGATIVE
PROT UR QL STRIP: NEGATIVE
PROT/CREAT UR: NORMAL MG/G{CREAT}
RBC # UR STRIP: NORMAL /HPF
REF LAB TEST METHOD: NORMAL
SP GR UR STRIP: 1.01 (ref 1–1.03)
SP GR UR STRIP: 1.01 (ref 1–1.03)
SQUAMOUS #/AREA URNS HPF: NORMAL /HPF
UROBILINOGEN UR QL STRIP: ABNORMAL
UROBILINOGEN UR QL STRIP: ABNORMAL
WBC # UR STRIP: NORMAL /HPF

## 2025-03-24 ENCOUNTER — HOSPITAL ENCOUNTER (OUTPATIENT)
Dept: NUCLEAR MEDICINE | Facility: HOSPITAL | Age: 65
Discharge: HOME | End: 2025-03-24
Payer: MEDICARE

## 2025-03-24 ENCOUNTER — HOSPITAL ENCOUNTER (OUTPATIENT)
Dept: CARDIOLOGY | Facility: HOSPITAL | Age: 65
Discharge: HOME | End: 2025-03-24
Payer: MEDICARE

## 2025-03-24 DIAGNOSIS — R07.2 PRECORDIAL PAIN: ICD-10-CM

## 2025-03-24 PROCEDURE — 93017 CV STRESS TEST TRACING ONLY: CPT

## 2025-03-24 PROCEDURE — A9500 TC99M SESTAMIBI: HCPCS | Performed by: PHYSICIAN ASSISTANT

## 2025-03-24 PROCEDURE — 78452 HT MUSCLE IMAGE SPECT MULT: CPT | Performed by: INTERNAL MEDICINE

## 2025-03-24 PROCEDURE — 34310000005 TECHNETIUM SESTAMIBI: Performed by: PHYSICIAN ASSISTANT

## 2025-03-24 PROCEDURE — 78452 HT MUSCLE IMAGE SPECT MULT: CPT

## 2025-03-24 PROCEDURE — 93018 CV STRESS TEST I&R ONLY: CPT | Performed by: INTERNAL MEDICINE

## 2025-03-24 PROCEDURE — 25010000002 REGADENOSON 0.4 MG/5ML SOLUTION: Performed by: PHYSICIAN ASSISTANT

## 2025-03-24 RX ORDER — REGADENOSON 0.08 MG/ML
0.4 INJECTION, SOLUTION INTRAVENOUS
Status: COMPLETED | OUTPATIENT
Start: 2025-03-24 | End: 2025-03-24

## 2025-03-24 RX ADMIN — TECHNETIUM TC 99M SESTAMIBI 1 DOSE: 1 INJECTION INTRAVENOUS at 09:10

## 2025-03-24 RX ADMIN — TECHNETIUM TC 99M SESTAMIBI 1 DOSE: 1 INJECTION INTRAVENOUS at 08:02

## 2025-03-24 RX ADMIN — REGADENOSON 0.4 MG: 0.08 INJECTION, SOLUTION INTRAVENOUS at 09:10

## 2025-03-25 LAB
BH CV NUCLEAR PRIOR STUDY: 3
BH CV REST NUCLEAR ISOTOPE DOSE: 9.9 MCI
BH CV STRESS BP STAGE 1: NORMAL
BH CV STRESS COMMENTS STAGE 1: NORMAL
BH CV STRESS DOSE REGADENOSON STAGE 1: 0.4
BH CV STRESS DURATION MIN STAGE 1: 0
BH CV STRESS DURATION SEC STAGE 1: 10
BH CV STRESS HR STAGE 1: 111
BH CV STRESS NUCLEAR ISOTOPE DOSE: 30.7 MCI
BH CV STRESS PROTOCOL 1: NORMAL
BH CV STRESS RECOVERY BP: NORMAL MMHG
BH CV STRESS RECOVERY HR: 99 BPM
BH CV STRESS STAGE 1: 1
MAXIMAL PREDICTED HEART RATE: 157 BPM
PERCENT MAX PREDICTED HR: 70.7 %
SPECT HRT GATED+EF W RNC IV: 65 %
STRESS BASELINE BP: NORMAL MMHG
STRESS BASELINE HR: 99 BPM
STRESS PERCENT HR: 83 %
STRESS POST PEAK BP: NORMAL MMHG
STRESS POST PEAK HR: 111 BPM
STRESS TARGET HR: 133 BPM

## 2025-04-03 RX ORDER — RIVAROXABAN 20 MG/1
20 TABLET, FILM COATED ORAL DAILY
Qty: 90 TABLET | Refills: 3 | Status: SHIPPED | OUTPATIENT
Start: 2025-04-03

## 2025-04-15 ENCOUNTER — TRANSCRIBE ORDERS (OUTPATIENT)
Dept: ADMINISTRATIVE | Facility: HOSPITAL | Age: 65
End: 2025-04-15
Payer: MEDICARE

## 2025-04-15 ENCOUNTER — HOSPITAL ENCOUNTER (OUTPATIENT)
Dept: GENERAL RADIOLOGY | Facility: HOSPITAL | Age: 65
Discharge: HOME | End: 2025-04-15
Admitting: NURSE PRACTITIONER
Payer: MEDICARE

## 2025-04-15 DIAGNOSIS — R07.81 RIB PAIN ON LEFT SIDE: ICD-10-CM

## 2025-04-15 DIAGNOSIS — R07.81 RIB PAIN ON LEFT SIDE: Primary | ICD-10-CM

## 2025-04-15 PROCEDURE — 71110 X-RAY EXAM RIBS BIL 3 VIEWS: CPT | Performed by: RADIOLOGY

## 2025-04-15 PROCEDURE — 71110 X-RAY EXAM RIBS BIL 3 VIEWS: CPT

## 2025-05-12 ENCOUNTER — LAB (OUTPATIENT)
Dept: LAB | Facility: HOSPITAL | Age: 65
End: 2025-05-12
Payer: MEDICARE

## 2025-05-12 ENCOUNTER — TRANSCRIBE ORDERS (OUTPATIENT)
Dept: ADMINISTRATIVE | Facility: HOSPITAL | Age: 65
End: 2025-05-12
Payer: MEDICARE

## 2025-05-12 DIAGNOSIS — E11.9 DIABETES MELLITUS WITHOUT COMPLICATION: ICD-10-CM

## 2025-05-12 DIAGNOSIS — E55.9 VITAMIN D DEFICIENCY: ICD-10-CM

## 2025-05-12 DIAGNOSIS — N39.0 URINARY TRACT INFECTION WITHOUT HEMATURIA, SITE UNSPECIFIED: ICD-10-CM

## 2025-05-12 DIAGNOSIS — E87.1 HYPONATREMIA: ICD-10-CM

## 2025-05-12 DIAGNOSIS — I10 HYPERTENSION, ESSENTIAL: ICD-10-CM

## 2025-05-12 DIAGNOSIS — E87.1 HYPONATREMIA: Primary | ICD-10-CM

## 2025-05-12 PROCEDURE — 82043 UR ALBUMIN QUANTITATIVE: CPT

## 2025-05-12 PROCEDURE — 84156 ASSAY OF PROTEIN URINE: CPT

## 2025-05-12 PROCEDURE — 83970 ASSAY OF PARATHORMONE: CPT

## 2025-05-12 PROCEDURE — 84100 ASSAY OF PHOSPHORUS: CPT

## 2025-05-12 PROCEDURE — 82570 ASSAY OF URINE CREATININE: CPT

## 2025-05-12 PROCEDURE — 80053 COMPREHEN METABOLIC PANEL: CPT

## 2025-05-12 PROCEDURE — 82306 VITAMIN D 25 HYDROXY: CPT

## 2025-05-12 PROCEDURE — 81001 URINALYSIS AUTO W/SCOPE: CPT

## 2025-05-12 PROCEDURE — 36415 COLL VENOUS BLD VENIPUNCTURE: CPT

## 2025-05-13 ENCOUNTER — OFFICE VISIT (OUTPATIENT)
Dept: CARDIOLOGY | Facility: CLINIC | Age: 65
End: 2025-05-13
Payer: MEDICARE

## 2025-05-13 ENCOUNTER — TELEPHONE (OUTPATIENT)
Dept: CARDIOLOGY | Facility: CLINIC | Age: 65
End: 2025-05-13
Payer: MEDICARE

## 2025-05-13 VITALS
OXYGEN SATURATION: 98 % | DIASTOLIC BLOOD PRESSURE: 79 MMHG | HEIGHT: 65 IN | HEART RATE: 60 BPM | SYSTOLIC BLOOD PRESSURE: 145 MMHG | BODY MASS INDEX: 23.3 KG/M2

## 2025-05-13 DIAGNOSIS — I48.0 PAROXYSMAL ATRIAL FIBRILLATION: ICD-10-CM

## 2025-05-13 DIAGNOSIS — R94.39 ABNORMAL NUCLEAR STRESS TEST: ICD-10-CM

## 2025-05-13 DIAGNOSIS — I10 ESSENTIAL HYPERTENSION: ICD-10-CM

## 2025-05-13 DIAGNOSIS — R07.2 PRECORDIAL PAIN: Primary | ICD-10-CM

## 2025-05-13 LAB
25(OH)D3 SERPL-MCNC: 52.9 NG/ML (ref 30–100)
ALBUMIN SERPL-MCNC: 3.6 G/DL (ref 3.5–5.2)
ALBUMIN UR-MCNC: <1.2 MG/DL
ALBUMIN/GLOB SERPL: 1.3 G/DL
ALP SERPL-CCNC: 147 U/L (ref 39–117)
ALT SERPL W P-5'-P-CCNC: 44 U/L (ref 1–33)
ANION GAP SERPL CALCULATED.3IONS-SCNC: 10.8 MMOL/L (ref 5–15)
AST SERPL-CCNC: 39 U/L (ref 1–32)
BACTERIA UR QL AUTO: NORMAL /HPF
BILIRUB SERPL-MCNC: 0.4 MG/DL (ref 0–1.2)
BILIRUB UR QL STRIP: NEGATIVE
BUN SERPL-MCNC: 10 MG/DL (ref 8–23)
BUN/CREAT SERPL: 10.6 (ref 7–25)
CALCIUM SPEC-SCNC: 8.7 MG/DL (ref 8.6–10.5)
CHLORIDE SERPL-SCNC: 99 MMOL/L (ref 98–107)
CLARITY UR: CLEAR
CO2 SERPL-SCNC: 22.2 MMOL/L (ref 22–29)
COLOR UR: YELLOW
CREAT SERPL-MCNC: 0.94 MG/DL (ref 0.57–1)
CREAT UR-MCNC: 12.4 MG/DL
CREAT UR-MCNC: 12.4 MG/DL
EGFRCR SERPLBLD CKD-EPI 2021: 68.3 ML/MIN/1.73
GLOBULIN UR ELPH-MCNC: 2.8 GM/DL
GLUCOSE SERPL-MCNC: 161 MG/DL (ref 65–99)
GLUCOSE UR STRIP-MCNC: NEGATIVE MG/DL
HGB UR QL STRIP.AUTO: NEGATIVE
HYALINE CASTS UR QL AUTO: NORMAL /LPF
KETONES UR QL STRIP: NEGATIVE
LEUKOCYTE ESTERASE UR QL STRIP.AUTO: NEGATIVE
MICROALBUMIN/CREAT UR: NORMAL MG/G{CREAT}
NITRITE UR QL STRIP: NEGATIVE
PH UR STRIP.AUTO: 6.5 [PH] (ref 5–8)
PHOSPHATE SERPL-MCNC: 3.8 MG/DL (ref 2.5–4.5)
POTASSIUM SERPL-SCNC: 4.1 MMOL/L (ref 3.5–5.2)
PROT ?TM UR-MCNC: <4 MG/DL
PROT SERPL-MCNC: 6.4 G/DL (ref 6–8.5)
PROT UR QL STRIP: NEGATIVE
PROT/CREAT UR: NORMAL MG/G{CREAT}
PTH-INTACT SERPL-MCNC: 55.9 PG/ML (ref 15–65)
RBC # UR STRIP: NORMAL /HPF
REF LAB TEST METHOD: NORMAL
SODIUM SERPL-SCNC: 132 MMOL/L (ref 136–145)
SP GR UR STRIP: 1.01 (ref 1–1.03)
SQUAMOUS #/AREA URNS HPF: NORMAL /HPF
UROBILINOGEN UR QL STRIP: NORMAL
WBC # UR STRIP: NORMAL /HPF

## 2025-05-13 RX ORDER — ISOSORBIDE MONONITRATE 60 MG/1
60 TABLET, EXTENDED RELEASE ORAL DAILY
Qty: 90 TABLET | Refills: 2 | Status: SHIPPED | OUTPATIENT
Start: 2025-05-13

## 2025-05-13 RX ORDER — NITROGLYCERIN 0.4 MG/1
0.4 TABLET SUBLINGUAL
OUTPATIENT
Start: 2025-05-13 | End: 2025-05-13

## 2025-05-13 RX ORDER — SODIUM CHLORIDE 0.9 % (FLUSH) 0.9 %
10 SYRINGE (ML) INJECTION AS NEEDED
OUTPATIENT
Start: 2025-05-13

## 2025-05-13 RX ORDER — NYSTATIN 100000 [USP'U]/G
POWDER TOPICAL
COMMUNITY
Start: 2025-04-03

## 2025-05-13 RX ORDER — SODIUM CHLORIDE 0.9 % (FLUSH) 0.9 %
10 SYRINGE (ML) INJECTION EVERY 12 HOURS SCHEDULED
OUTPATIENT
Start: 2025-05-13

## 2025-05-13 RX ORDER — ERGOCALCIFEROL 1.25 MG/1
CAPSULE, LIQUID FILLED ORAL
COMMUNITY
Start: 2025-04-03

## 2025-05-13 RX ORDER — SODIUM CHLORIDE 9 MG/ML
40 INJECTION, SOLUTION INTRAVENOUS AS NEEDED
OUTPATIENT
Start: 2025-05-13

## 2025-05-13 NOTE — TELEPHONE ENCOUNTER
Patient aware and verbalized understanding.  She will hold this med and call us if her symptoms progress. She says there is no need to rush the order at this time.

## 2025-05-13 NOTE — PROGRESS NOTES
Kreis, Samuel Duane, MD  Lashae Benitez  1960  05/13/2025    Patient Active Problem List   Diagnosis    Hiatal hernia    Essential hypertension    Sjogren's syndrome    Multiple sclerosis    Psoriasis    Fibromyalgia    Osteoarthritis    Psoriatic arthritis    RA (rheumatoid arthritis)    Degenerative disc disease, lumbar    TMJ arthritis    Dupuytren's disease    H. pylori infection    Family history of coronary artery disease    Type 2 diabetes mellitus    Edema    Bilateral leg edema    Isolated corticotropin deficiency    Hyponatremia    Paroxysmal atrial fibrillation    Sepsis    Bacteremia, escherichia coli    Iron deficiency anemia    Malabsorption due to intolerance, not elsewhere classified       Dear Kreis, Samuel Duane, MD:    Subjective     History of Present Illness:    Chief Complaint   Patient presents with    Results     STRESS AND HEART MONITOR       Lashae Benitez is a pleasant 64 y.o. female with a past medical history significant for diabetes mellitus, paroxysmal atrial fibrillation anticoagulated with Xarelto and with rhythm control on flecainide.  She does have essential hypertension AND family history of premature CAD with her father having a major AMI in his 30s. She comes in for cardiology follow up.    Lashae comes in after stress test was performed no direct ischemia was seen however she did have an elevated transient ischemic dilation of 1.29.  Clinically she still reports anginal symptoms that she describes as pressure in the center of her chest occurring daily that can last up to 10 minutes in duration she reports that this pain is associated with some shortness of breath but denies any diaphoresis or nausea.       Allergies   Allergen Reactions    Codeine Angioedema    Imuran [Azathioprine] Other (See Comments)     Has pancreatis attacks with med    Januvia [Sitagliptin] Other (See Comments)     Has pancreatis attacks with med     Penicillins Angioedema    Sulfa Antibiotics Angioedema     "Sulfasalazine Unknown (See Comments)    Beta Adrenergic Blockers Other (See Comments)     I called pt to ask her about the allergy to bblockers in her chart. Pt states \"too big of a dose makes her psoriasis act up\", but this current dose of metoprolol 50 mg bid, she has been on for a long time, with no ill side effects.  CATA,CMA 3/28/18   :      Current Outpatient Medications:     alendronate (FOSAMAX) 70 MG tablet, Take 1 tablet by mouth 1 (One) Time Per Week. Wednesday, Disp: , Rfl:     amitriptyline (ELAVIL) 25 MG tablet, Take 1 tablet by mouth Every Night., Disp: , Rfl:     Apremilast (Otezla) 30 MG tablet, Take 30 mg by mouth 2 (two) times a day., Disp: , Rfl:     Butalbital-Acetaminophen  MG capsule, Take 300 mg by mouth Daily As Needed., Disp: , Rfl:     celecoxib (CeleBREX) 200 MG capsule, Take 1 capsule by mouth Daily., Disp: , Rfl:     cyanocobalamin 1000 MCG/ML injection, Inject 1 mL into the appropriate muscle as directed by prescriber Every 28 (Twenty-Eight) Days., Disp: , Rfl:     cyclobenzaprine (FLEXERIL) 10 MG tablet, Take 1 tablet by mouth Every Evening., Disp: , Rfl:     Diclofenac Sodium (VOLTAREN) 1 % gel gel, Apply 2 g topically to the appropriate area as directed 4 (Four) Times a Day As Needed., Disp: , Rfl:     DULoxetine (CYMBALTA) 30 MG capsule, Take 1 capsule by mouth Every Morning., Disp: , Rfl:     DULoxetine (CYMBALTA) 60 MG capsule, Take 1 capsule by mouth Daily., Disp: , Rfl:     EMGALITY 120 MG/ML prefilled syringe, Inject 1 mL under the skin into the appropriate area as directed Every 30 (Thirty) Days., Disp: , Rfl: 3    famotidine (PEPCID) 40 MG tablet, Take 1 tablet by mouth 2 (Two) Times a Day., Disp: , Rfl:     ferrous gluconate (FERGON) 240 (27 FE) MG tablet, Take 1 tablet by mouth Every Morning., Disp: , Rfl:     fexofenadine (ALLEGRA) 180 MG tablet, Take 1 tablet by mouth Daily., Disp: , Rfl:     Finerenone (Kerendia) 10 MG tablet, Take 1 tablet by mouth Daily., Disp: , " Rfl:     flecainide (TAMBOCOR) 50 MG tablet, TAKE 1 TABLET TWICE A DAY, Disp: 180 tablet, Rfl: 3    fluticasone-salmeterol (ADVAIR HFA) 115-21 MCG/ACT inhaler, Inhale 2 puffs 2 (Two) Times a Day., Disp: , Rfl:     folic acid (FOLVITE) 1 MG tablet, Take 1 tablet by mouth Daily., Disp: , Rfl:     gabapentin (NEURONTIN) 600 MG tablet, Take 1 tablet by mouth 3 (Three) Times a Day., Disp: , Rfl:     HumaLOG Mix 50/50 KwikPen (50-50) 100 UNIT/ML suspension pen-injector, 15 Units., Disp: , Rfl:     isosorbide mononitrate (IMDUR) 60 MG 24 hr tablet, Take 1 tablet by mouth Daily., Disp: 90 tablet, Rfl: 2    levothyroxine (SYNTHROID, LEVOTHROID) 112 MCG tablet, Take 1 tablet by mouth Daily., Disp: , Rfl:     losartan (COZAAR) 50 MG tablet, TAKE 1 TABLET BY MOUTH DAILY, Disp: 30 tablet, Rfl: 2    methotrexate 2.5 MG tablet, Take 10 tablets by mouth 1 (One) Time Per Week. Prior to [facility] Admission, Patient was on: Takes 10 tablets on Saturday, Disp: , Rfl:     metoprolol tartrate (LOPRESSOR) 100 MG tablet, Take 1 tablet by mouth 2 (Two) Times a Day., Disp: 60 tablet, Rfl: 2    nystatin 513479 UNIT/GM topical powder, , Disp: , Rfl:     olopatadine (PATANASE) 0.6 % solution nasal solution, 2 sprays by Each Nare route 2 (Two) Times a Day., Disp: , Rfl:     potassium chloride (KLOR-CON M10) 10 MEQ CR tablet, Take 1 tablet by mouth., Disp: , Rfl:     potassium chloride 10 MEQ CR tablet, Take 1 tablet by mouth Daily., Disp: , Rfl:     primidone (MYSOLINE) 50 MG tablet, Take 1 tablet by mouth Every Night., Disp: , Rfl:     rimegepant 75 MG dispersible tablet, , Disp: , Rfl:     sodium chloride 1 g tablet, Take 2 tablets by mouth 3 (Three) Times a Day., Disp: , Rfl:     sucralfate (CARAFATE) 1 g tablet, Take 1 tablet by mouth 4 (Four) Times a Day. Take one tablet by mouth 4 times daily 1 hour before meals and at bedtime on an empty stomach., Disp: , Rfl:     traMADol (ULTRAM) 50 MG tablet, Take 1 tablet by mouth 2 (Two) Times a Day As  "Needed., Disp: , Rfl:     Upadacitinib ER (Rinvoq) 15 MG tablet sustained-release 24 hour, Take  by mouth., Disp: , Rfl:     vitamin D (ERGOCALCIFEROL) 1.25 MG (36307 UT) capsule capsule, , Disp: , Rfl:     Xarelto 20 MG tablet, TAKE 1 TABLET DAILY, Disp: 90 tablet, Rfl: 3    The following portions of the patient's history were reviewed and updated as appropriate: allergies, current medications, past family history, past medical history, past social history, past surgical history and problem list.    Social History     Tobacco Use    Smoking status: Never     Passive exposure: Never    Smokeless tobacco: Never   Vaping Use    Vaping status: Never Used   Substance Use Topics    Alcohol use: Never    Drug use: Never         Objective   Vitals:    05/13/25 0903   BP: 145/79   Pulse: 60   SpO2: 98%   Height: 165.1 cm (65\")     Body mass index is 23.3 kg/m².    ROS    Constitutional:       General: Not in acute distress.     Appearance: Healthy appearance. Well-developed and not in distress. Not diaphoretic.   Eyes:      Conjunctiva/sclera: Conjunctivae normal.      Pupils: Pupils are equal, round, and reactive to light.   HENT:      Head: Normocephalic and atraumatic.   Neck:      Vascular: No carotid bruit or JVD.   Pulmonary:      Effort: Pulmonary effort is normal. No respiratory distress.      Breath sounds: Normal breath sounds.   Cardiovascular:      Normal rate. Regular rhythm.   Edema:     Peripheral edema absent.   Skin:     General: Skin is cool.   Neurological:      Mental Status: Alert, oriented to person, place, and time and oriented to person, place and time.       Lab Results   Component Value Date     (L) 05/12/2025    K 4.1 05/12/2025    CL 99 05/12/2025    CO2 22.2 05/12/2025    BUN 10 05/12/2025    CREATININE 0.94 05/12/2025    GLUCOSE 161 (H) 05/12/2025    CALCIUM 8.7 05/12/2025    AST 39 (H) 05/12/2025    ALT 44 (H) 05/12/2025    ALKPHOS 147 (H) 05/12/2025     Lab Results   Component Value " Date    CKTOTAL 40 04/12/2017     Lab Results   Component Value Date    WBC 6.75 03/17/2025    HGB 11.2 (L) 03/17/2025    HCT 34.5 03/17/2025     03/17/2025     Lab Results   Component Value Date    INR 1.54 (H) 09/13/2023    INR 0.97 11/22/2018    INR 0.93 07/20/2015     Lab Results   Component Value Date    MG 2.0 09/21/2020     Lab Results   Component Value Date    TSH 15.900 (H) 03/12/2025    CHLPL 132 07/22/2015    TRIG 62 03/12/2025    HDL 50 03/12/2025    LDL 72 03/12/2025      Lab Results   Component Value Date    BNP 51.0 11/22/2018       During this visit the following were done:  Labs Reviewed []    Labs Ordered []    Radiology Reports Reviewed []    Radiology Ordered []    PCP Records Reviewed []    Referring Provider Records Reviewed []    ER Records Reviewed []    Hospital Records Reviewed []    History Obtained From Family []    Radiology Images Reviewed []    Other Reviewed []    Records Requested []       Procedures    Assessment & Plan    Diagnosis Plan   1. Precordial pain  CT Angiogram Coronary    nitroglycerin (NITROSTAT) SL tablet 0.4 mg    sodium chloride 0.9 % flush 10 mL    sodium chloride 0.9 % flush 10 mL    sodium chloride 0.9 % infusion 40 mL      2. Abnormal nuclear stress test  CT Angiogram Coronary    nitroglycerin (NITROSTAT) SL tablet 0.4 mg    sodium chloride 0.9 % flush 10 mL    sodium chloride 0.9 % flush 10 mL    sodium chloride 0.9 % infusion 40 mL      3. Essential hypertension        4. Paroxysmal atrial fibrillation                 Recommendations:  Precordial pain with abnormal stress test  She does have multiple risk factors for coronary artery disease and symptoms are fairly typical with stable angina.  Will pursue further workup with CT coronary angiogram.  I did ask her to take 100 mg of her metoprolol to tartrate the day of the exam.  Increase Imdur to 60 mg  Paroxysmal atrial fibrillation  No A-fib seen on Holter monitor.  Last echocardiogram did show normal  LVEF.  I am going to hold her flecainide until CT coronary angiogram is finished.  Continue Xarelto she denies any bleeding issues    No follow-ups on file.    As always, I appreciate very much the opportunity to participate in the cardiovascular care of your patients.      With Best Regards,    Gael Baum PA-C

## 2025-05-13 NOTE — Clinical Note
Please call and let patient know that I want her to hold flecainide until after CT coronary angiogram is finished.  Also can we try to call scheduling and have this done ASAP however I would like to avoid changing it to stat if possible

## 2025-05-13 NOTE — TELEPHONE ENCOUNTER
----- Message from Gael Baum sent at 5/13/2025 10:20 AM EDT -----  Please call and let patient know that I want her to hold flecainide until after CT coronary angiogram is finished.  Also can we try to call scheduling and have this done ASAP however I would like to avoid changing it to stat if possible

## 2025-05-22 RX ORDER — METOPROLOL TARTRATE 100 MG/1
100 TABLET ORAL 2 TIMES DAILY
Qty: 180 TABLET | Refills: 0 | Status: SHIPPED | OUTPATIENT
Start: 2025-05-22

## 2025-05-28 ENCOUNTER — HOSPITAL ENCOUNTER (OUTPATIENT)
Dept: CT IMAGING | Facility: HOSPITAL | Age: 65
Discharge: HOME | End: 2025-05-28
Admitting: PHYSICIAN ASSISTANT
Payer: MEDICARE

## 2025-05-28 VITALS — DIASTOLIC BLOOD PRESSURE: 84 MMHG | HEART RATE: 70 BPM | SYSTOLIC BLOOD PRESSURE: 156 MMHG

## 2025-05-28 DIAGNOSIS — R94.39 ABNORMAL NUCLEAR STRESS TEST: ICD-10-CM

## 2025-05-28 DIAGNOSIS — R07.2 PRECORDIAL PAIN: ICD-10-CM

## 2025-05-28 PROCEDURE — 75574 CT ANGIO HRT W/3D IMAGE: CPT

## 2025-05-28 PROCEDURE — 25510000001 IOPAMIDOL PER 1 ML: Performed by: PHYSICIAN ASSISTANT

## 2025-05-28 RX ORDER — NITROGLYCERIN 0.4 MG/1
0.4 TABLET SUBLINGUAL
Status: DISCONTINUED | OUTPATIENT
Start: 2025-05-28 | End: 2025-05-29 | Stop reason: HOSPADM

## 2025-05-28 RX ORDER — IOPAMIDOL 755 MG/ML
100 INJECTION, SOLUTION INTRAVASCULAR
Status: COMPLETED | OUTPATIENT
Start: 2025-05-28 | End: 2025-05-28

## 2025-05-28 RX ADMIN — IOPAMIDOL 100 ML: 755 INJECTION, SOLUTION INTRAVENOUS at 15:46

## 2025-06-09 ENCOUNTER — OFFICE VISIT (OUTPATIENT)
Dept: CARDIOLOGY | Facility: CLINIC | Age: 65
End: 2025-06-09
Payer: MEDICARE

## 2025-06-09 VITALS
WEIGHT: 144.6 LBS | SYSTOLIC BLOOD PRESSURE: 159 MMHG | HEIGHT: 65 IN | BODY MASS INDEX: 24.09 KG/M2 | HEART RATE: 77 BPM | DIASTOLIC BLOOD PRESSURE: 71 MMHG

## 2025-06-09 DIAGNOSIS — I48.0 PAROXYSMAL ATRIAL FIBRILLATION: Primary | ICD-10-CM

## 2025-06-09 DIAGNOSIS — I10 ESSENTIAL HYPERTENSION: ICD-10-CM

## 2025-06-09 DIAGNOSIS — R07.2 PRECORDIAL PAIN: ICD-10-CM

## 2025-06-09 RX ORDER — ISOSORBIDE MONONITRATE 120 MG/1
120 TABLET, EXTENDED RELEASE ORAL DAILY
Qty: 90 TABLET | Refills: 2 | Status: SHIPPED | OUTPATIENT
Start: 2025-06-09

## 2025-06-09 RX ORDER — INSULIN GLARGINE 300 U/ML
INJECTION, SOLUTION SUBCUTANEOUS
COMMUNITY
Start: 2025-06-02

## 2025-06-09 RX ORDER — OMEPRAZOLE 40 MG/1
CAPSULE, DELAYED RELEASE ORAL
COMMUNITY

## 2025-06-09 RX ORDER — ROSUVASTATIN CALCIUM 10 MG/1
10 TABLET, COATED ORAL DAILY
Qty: 90 TABLET | Refills: 1 | Status: SHIPPED | OUTPATIENT
Start: 2025-06-09

## 2025-06-09 NOTE — PROGRESS NOTES
Kreis, Samuel Duane, MD  Lashae Benitez  1960  06/09/2025    Patient Active Problem List   Diagnosis    Hiatal hernia    Essential hypertension    Sjogren's syndrome    Multiple sclerosis    Psoriasis    Fibromyalgia    Osteoarthritis    Psoriatic arthritis    RA (rheumatoid arthritis)    Degenerative disc disease, lumbar    TMJ arthritis    Dupuytren's disease    H. pylori infection    Family history of coronary artery disease    Type 2 diabetes mellitus    Edema    Bilateral leg edema    Isolated corticotropin deficiency    Hyponatremia    Paroxysmal atrial fibrillation    Sepsis    Bacteremia, escherichia coli    Iron deficiency anemia    Malabsorption due to intolerance, not elsewhere classified       Dear Kreis, Samuel Duane, MD:    Subjective     History of Present Illness:    Chief Complaint   Patient presents with    Follow-up     ROUTINE       Lashae Benitez is a pleasant 64 y.o. female with a past medical history significant for diabetes mellitus, paroxysmal atrial fibrillation anticoagulated with Xarelto and with rhythm control on flecainide.  She does have essential hypertension AND family history of premature CAD with her father having a major AMI in his 30s. She comes in for cardiology follow up.     Lashae comes in after CT coronary angiogram was done which did show moderate degree of stenosis in the LAD.  She does still report pressure in the center of her chest but reports it has recently improved with the addition of Imdur but is still occurring at least once weekly that last for few minutes at a time.  She denies any awareness of arrhythmia with her A-fib and is tolerating Xarelto well without any bleeding issues.  Blood pressure is elevated in the office today but she reports it is typically better controlled and she knew she was coming in for test results.      Allergies   Allergen Reactions    Codeine Angioedema    Imuran [Azathioprine] Other (See Comments)     Has pancreatis attacks with med     "Aaronuvia [Sitagliptin] Other (See Comments)     Has pancreatis attacks with med     Penicillins Angioedema    Sulfa Antibiotics Angioedema    Sulfasalazine Unknown (See Comments)    Beta Adrenergic Blockers Other (See Comments)     I called pt to ask her about the allergy to bblockers in her chart. Pt states \"too big of a dose makes her psoriasis act up\", but this current dose of metoprolol 50 mg bid, she has been on for a long time, with no ill side effects.  CATA,CMA 3/28/18   :      Current Outpatient Medications:     amitriptyline (ELAVIL) 25 MG tablet, Take 1 tablet by mouth Every Night., Disp: , Rfl:     Butalbital-Acetaminophen  MG capsule, Take 300 mg by mouth Daily As Needed., Disp: , Rfl:     celecoxib (CeleBREX) 200 MG capsule, Take 1 capsule by mouth Daily., Disp: , Rfl:     cyanocobalamin 1000 MCG/ML injection, Inject 1 mL into the appropriate muscle as directed by prescriber Every 28 (Twenty-Eight) Days., Disp: , Rfl:     cyclobenzaprine (FLEXERIL) 10 MG tablet, Take 1 tablet by mouth Every Evening., Disp: , Rfl:     Diclofenac Sodium (VOLTAREN) 1 % gel gel, Apply 2 g topically to the appropriate area as directed 4 (Four) Times a Day As Needed., Disp: , Rfl:     DULoxetine (CYMBALTA) 30 MG capsule, Take 1 capsule by mouth Every Morning., Disp: , Rfl:     DULoxetine (CYMBALTA) 60 MG capsule, Take 1 capsule by mouth Daily., Disp: , Rfl:     EMGALITY 120 MG/ML prefilled syringe, Inject 1 mL under the skin into the appropriate area as directed Every 30 (Thirty) Days., Disp: , Rfl: 3    famotidine (PEPCID) 40 MG tablet, Take 1 tablet by mouth 2 (Two) Times a Day., Disp: , Rfl:     ferrous gluconate (FERGON) 240 (27 FE) MG tablet, Take 1 tablet by mouth Every Morning., Disp: , Rfl:     fexofenadine (ALLEGRA) 180 MG tablet, Take 1 tablet by mouth Daily., Disp: , Rfl:     flecainide (TAMBOCOR) 50 MG tablet, TAKE 1 TABLET TWICE A DAY, Disp: 180 tablet, Rfl: 3    fluticasone-salmeterol (ADVAIR HFA) 115-21 " MCG/ACT inhaler, Inhale 2 puffs 2 (Two) Times a Day., Disp: , Rfl:     folic acid (FOLVITE) 1 MG tablet, Take 1 tablet by mouth Daily., Disp: , Rfl:     gabapentin (NEURONTIN) 600 MG tablet, Take 1 tablet by mouth 3 (Three) Times a Day., Disp: , Rfl:     isosorbide mononitrate (IMDUR) 120 MG 24 hr tablet, Take 1 tablet by mouth Daily., Disp: 90 tablet, Rfl: 2    levothyroxine (SYNTHROID, LEVOTHROID) 112 MCG tablet, Take 1 tablet by mouth Daily., Disp: , Rfl:     losartan (COZAAR) 50 MG tablet, TAKE 1 TABLET BY MOUTH DAILY, Disp: 30 tablet, Rfl: 2    methotrexate 2.5 MG tablet, Take 10 tablets by mouth 1 (One) Time Per Week. Prior to [facility] Admission, Patient was on: Takes 10 tablets on Saturday, Disp: , Rfl:     metoprolol tartrate (LOPRESSOR) 100 MG tablet, TAKE 1 TABLET TWICE A DAY, Disp: 180 tablet, Rfl: 0    nystatin 278122 UNIT/GM topical powder, , Disp: , Rfl:     olopatadine (PATANASE) 0.6 % solution nasal solution, 2 sprays by Each Nare route 2 (Two) Times a Day., Disp: , Rfl:     omeprazole (priLOSEC) 40 MG capsule, , Disp: , Rfl:     potassium chloride (KLOR-CON M10) 10 MEQ CR tablet, Take 1 tablet by mouth., Disp: , Rfl:     potassium chloride 10 MEQ CR tablet, Take 1 tablet by mouth Daily., Disp: , Rfl:     primidone (MYSOLINE) 50 MG tablet, Take 1 tablet by mouth Every Night., Disp: , Rfl:     rimegepant 75 MG dispersible tablet, , Disp: , Rfl:     sodium chloride 1 g tablet, Take 2 tablets by mouth 3 (Three) Times a Day., Disp: , Rfl:     sucralfate (CARAFATE) 1 g tablet, Take 1 tablet by mouth 4 (Four) Times a Day. Take one tablet by mouth 4 times daily 1 hour before meals and at bedtime on an empty stomach., Disp: , Rfl:     Toujeo SoloStar 300 UNIT/ML solution pen-injector injection, , Disp: , Rfl:     traMADol (ULTRAM) 50 MG tablet, Take 1 tablet by mouth 2 (Two) Times a Day As Needed., Disp: , Rfl:     Upadacitinib ER (Rinvoq) 15 MG tablet sustained-release 24 hour, Take  by mouth., Disp: , Rfl:  "    vitamin D (ERGOCALCIFEROL) 1.25 MG (19741 UT) capsule capsule, , Disp: , Rfl:     Xarelto 20 MG tablet, TAKE 1 TABLET DAILY, Disp: 90 tablet, Rfl: 3    rosuvastatin (CRESTOR) 10 MG tablet, Take 1 tablet by mouth Daily., Disp: 90 tablet, Rfl: 1    The following portions of the patient's history were reviewed and updated as appropriate: allergies, current medications, past family history, past medical history, past social history, past surgical history and problem list.    Social History     Tobacco Use    Smoking status: Never     Passive exposure: Never    Smokeless tobacco: Never   Vaping Use    Vaping status: Never Used   Substance Use Topics    Alcohol use: Never    Drug use: Never         Objective   Vitals:    06/09/25 1317   BP: 159/71   Pulse: 77   Weight: 65.6 kg (144 lb 9.6 oz)   Height: 165.1 cm (65\")     Body mass index is 24.06 kg/m².    ROS    Constitutional:       General: Not in acute distress.     Appearance: Healthy appearance. Well-developed and not in distress. Not diaphoretic.   Eyes:      Conjunctiva/sclera: Conjunctivae normal.      Pupils: Pupils are equal, round, and reactive to light.   HENT:      Head: Normocephalic and atraumatic.   Neck:      Vascular: No carotid bruit or JVD.   Pulmonary:      Effort: Pulmonary effort is normal. No respiratory distress.      Breath sounds: Normal breath sounds.   Cardiovascular:      Normal rate. Regular rhythm.   Edema:     Peripheral edema absent.   Skin:     General: Skin is cool.   Neurological:      Mental Status: Alert, oriented to person, place, and time and oriented to person, place and time.         Lab Results   Component Value Date     (L) 05/12/2025    K 4.1 05/12/2025    CL 99 05/12/2025    CO2 22.2 05/12/2025    BUN 10 05/12/2025    CREATININE 0.94 05/12/2025    GLUCOSE 161 (H) 05/12/2025    CALCIUM 8.7 05/12/2025    AST 39 (H) 05/12/2025    ALT 44 (H) 05/12/2025    ALKPHOS 147 (H) 05/12/2025     Lab Results   Component Value Date "    CKTOTAL 40 04/12/2017     Lab Results   Component Value Date    WBC 5.6 05/28/2025    HGB 10.7 (L) 05/28/2025    HCT 33.2 (L) 05/28/2025     05/28/2025     Lab Results   Component Value Date    INR 1.54 (H) 09/13/2023    INR 0.97 11/22/2018    INR 0.93 07/20/2015     Lab Results   Component Value Date    MG 2.0 09/21/2020     Lab Results   Component Value Date    TSH 15.900 (H) 03/12/2025    CHLPL 132 07/22/2015    TRIG 62 03/12/2025    HDL 50 03/12/2025    LDL 72 03/12/2025      Lab Results   Component Value Date    BNP 51.0 11/22/2018       During this visit the following were done:  Labs Reviewed []    Labs Ordered []    Radiology Reports Reviewed []    Radiology Ordered []    PCP Records Reviewed []    Referring Provider Records Reviewed []    ER Records Reviewed []    Hospital Records Reviewed []    History Obtained From Family []    Radiology Images Reviewed []    Other Reviewed []    Records Requested []       Procedures    Assessment & Plan    Diagnosis Plan   1. Paroxysmal atrial fibrillation        2. Essential hypertension        3. Precordial pain  Ambulatory Referral to Cardiology for Chest Pain               Recommendations:  Precordial pain with CT coronary angiogram evidence of moderate LAD stenosis  Will try to continue treating with GDMT increase Imdur to 120 mg.  I am going to refer her to interventional cardiology for their opinion on heart cath I mostly concerned with continuing her flecainide and for me to feel comfortable continuing this I think a heart cath is necessary to rule out obstructive CAD  Continue Crestor, losartan, metoprolol  Paroxysmal atrial fibrillation  Maintaining sinus rhythm tolerating therapy well for now we will continue regimen.  Essential hypertension  Above goal today but she reports it is typically much better at home and she was anxious for test results today.    Return in about 3 months (around 9/9/2025).    As always, I appreciate very much the  opportunity to participate in the cardiovascular care of your patients.      With Best Regards,    Gael Baum PA-C

## 2025-06-10 ENCOUNTER — TELEPHONE (OUTPATIENT)
Dept: ONCOLOGY | Facility: CLINIC | Age: 65
End: 2025-06-10

## 2025-06-10 NOTE — TELEPHONE ENCOUNTER
Caller: Lashae Benitez    Relationship to patient: Self    Best call back number: [phone]    Chief complaint: NEEDING TO R/S CANCELLED APPT    Type of visit: LAB AND FOLLOW UP 2    Requested date: ANY DAY EXCEPT ON THURSDAYS , NEXT AVAILABLE      If rescheduling, when is the original appointment: 06/09

## 2025-06-11 ENCOUNTER — OFFICE VISIT (OUTPATIENT)
Dept: ONCOLOGY | Facility: CLINIC | Age: 65
End: 2025-06-11
Payer: MEDICARE

## 2025-06-11 ENCOUNTER — LAB (OUTPATIENT)
Dept: ONCOLOGY | Facility: CLINIC | Age: 65
End: 2025-06-11
Payer: MEDICARE

## 2025-06-11 VITALS
RESPIRATION RATE: 18 BRPM | TEMPERATURE: 97 F | HEART RATE: 71 BPM | DIASTOLIC BLOOD PRESSURE: 68 MMHG | WEIGHT: 146.6 LBS | HEIGHT: 65 IN | BODY MASS INDEX: 24.43 KG/M2 | OXYGEN SATURATION: 100 % | SYSTOLIC BLOOD PRESSURE: 128 MMHG

## 2025-06-11 DIAGNOSIS — L40.50 PSORIATIC ARTHRITIS: ICD-10-CM

## 2025-06-11 DIAGNOSIS — D64.9 NORMOCYTIC ANEMIA: Primary | ICD-10-CM

## 2025-06-11 DIAGNOSIS — K90.49 MALABSORPTION DUE TO INTOLERANCE, NOT ELSEWHERE CLASSIFIED: ICD-10-CM

## 2025-06-11 DIAGNOSIS — D50.9 IRON DEFICIENCY ANEMIA, UNSPECIFIED IRON DEFICIENCY ANEMIA TYPE: Primary | ICD-10-CM

## 2025-06-11 DIAGNOSIS — D50.9 IRON DEFICIENCY ANEMIA, UNSPECIFIED IRON DEFICIENCY ANEMIA TYPE: ICD-10-CM

## 2025-06-11 LAB
BASOPHILS # BLD AUTO: 0.04 10*3/MM3 (ref 0–0.2)
BASOPHILS NFR BLD AUTO: 1 % (ref 0–1.5)
DEPRECATED RDW RBC AUTO: 59.3 FL (ref 37–54)
EOSINOPHIL # BLD AUTO: 0.08 10*3/MM3 (ref 0–0.4)
EOSINOPHIL NFR BLD AUTO: 2 % (ref 0.3–6.2)
ERYTHROCYTE [DISTWIDTH] IN BLOOD BY AUTOMATED COUNT: 14.8 % (ref 12.3–15.4)
FERRITIN SERPL-MCNC: 167 NG/ML (ref 13–150)
HCT VFR BLD AUTO: 31.9 % (ref 34–46.6)
HGB BLD-MCNC: 10.3 G/DL (ref 12–15.9)
IMM GRANULOCYTES # BLD AUTO: 0.16 10*3/MM3 (ref 0–0.05)
IMM GRANULOCYTES NFR BLD AUTO: 3.9 % (ref 0–0.5)
IRON 24H UR-MRATE: 107 MCG/DL (ref 37–145)
IRON SATN MFR SERPL: 35 % (ref 20–50)
LYMPHOCYTES # BLD AUTO: 1.18 10*3/MM3 (ref 0.7–3.1)
LYMPHOCYTES NFR BLD AUTO: 29.1 % (ref 19.6–45.3)
MCH RBC QN AUTO: 34.9 PG (ref 26.6–33)
MCHC RBC AUTO-ENTMCNC: 32.3 G/DL (ref 31.5–35.7)
MCV RBC AUTO: 108.1 FL (ref 79–97)
MONOCYTES # BLD AUTO: 0.3 10*3/MM3 (ref 0.1–0.9)
MONOCYTES NFR BLD AUTO: 7.4 % (ref 5–12)
NEUTROPHILS NFR BLD AUTO: 2.3 10*3/MM3 (ref 1.7–7)
NEUTROPHILS NFR BLD AUTO: 56.6 % (ref 42.7–76)
NRBC BLD AUTO-RTO: 0 /100 WBC (ref 0–0.2)
PLATELET # BLD AUTO: 349 10*3/MM3 (ref 140–450)
PMV BLD AUTO: 8.9 FL (ref 6–12)
RBC # BLD AUTO: 2.95 10*6/MM3 (ref 3.77–5.28)
TIBC SERPL-MCNC: 305 MCG/DL (ref 298–536)
TRANSFERRIN SERPL-MCNC: 205 MG/DL (ref 200–360)
WBC NRBC COR # BLD AUTO: 4.06 10*3/MM3 (ref 3.4–10.8)

## 2025-06-11 PROCEDURE — 83540 ASSAY OF IRON: CPT | Performed by: NURSE PRACTITIONER

## 2025-06-11 PROCEDURE — 3078F DIAST BP <80 MM HG: CPT | Performed by: NURSE PRACTITIONER

## 2025-06-11 PROCEDURE — 1159F MED LIST DOCD IN RCRD: CPT | Performed by: NURSE PRACTITIONER

## 2025-06-11 PROCEDURE — 3074F SYST BP LT 130 MM HG: CPT | Performed by: NURSE PRACTITIONER

## 2025-06-11 PROCEDURE — 84466 ASSAY OF TRANSFERRIN: CPT | Performed by: NURSE PRACTITIONER

## 2025-06-11 PROCEDURE — 1160F RVW MEDS BY RX/DR IN RCRD: CPT | Performed by: NURSE PRACTITIONER

## 2025-06-11 PROCEDURE — 1125F AMNT PAIN NOTED PAIN PRSNT: CPT | Performed by: NURSE PRACTITIONER

## 2025-06-11 PROCEDURE — 99214 OFFICE O/P EST MOD 30 MIN: CPT | Performed by: NURSE PRACTITIONER

## 2025-06-11 PROCEDURE — 85025 COMPLETE CBC W/AUTO DIFF WBC: CPT | Performed by: NURSE PRACTITIONER

## 2025-06-11 PROCEDURE — 82728 ASSAY OF FERRITIN: CPT | Performed by: NURSE PRACTITIONER

## 2025-06-11 NOTE — PROGRESS NOTES
DATE OF FOLLOW UP:  6/11/2025    REASON FOR REFERRAL: Normocytic Anemia    REFERRING PHYSICIAN:  No ref. provider found    CHIEF COMPLAINT:  Chronic Fatigue; Aches/Pains    TREATMENT HISTORY:  Folic Acid 1 mg daily    B12 1000 mcg SQ monthly    Feraheme Day 1 12/11/2023  Day 8 12/18/2023  Feraheme Day 1  12/06/2024 Day 8 12/09/2024      HISTORY OF PRESENT ILLNESS:   Lashae Benitez is a very pleasant 64 y.o. female who is being seen today at the request of No ref. provider found for evaluation and treatment of normocytic anemia. Ms. Benitez reports following with her PCP every 3 months with lab testing. She reports that she has been aware of the above issue for at least six months. Previous available CBCs were reviewed and patient has had chronic anemia with fluctuations in Hg ranging 9.2-11.9 since at least August 2019. She is taking oral iron supplement daily which she is having a difficult time tolerating due to GI upset. She also takes Folic Acid daily and B12 injections monthly. She denies any obvious blood loss from any source. She reports having colonoscopy per Dr. Frederick last year that was unremarkable. She has never had EGD. No history of blood transfusion. She is on Xarelto for A-Fib. She also follows with Dr. Chand (rheumatology) for psoriatic arthritis and is currently on Orencia, Otezla and MTX. She reports chronic aches/pain and chronic fatigue but denies any worsening. She denies any other specific complaints at this time.     INTERVAL HISTORY:  Ms. Benitez presents today for follow up of anemia. She was last replaced with IV Feraheme in December 2024. She is also taking Folic Acid 1 mg daily and B12 1000 mcg SQ monthly. She reports chronic fatigue but denies any worsening. She denies shortness of breath on exertion, chest pain or dizziness. She denies any obvious blood loss from any source. She also continues to take MTX and recently started Rinvoq for psoriatic arthritis per [facility].  However, she reports significant joint aches/pains. She is without any other specific complaints today.       PAST MEDICAL HISTORY:  Past Medical History:   Diagnosis Date    Acid reflux     Allergic     Anemia     Anxiety     Atrial fibrillation     Cancer     thyroid, skin    Cervical disc disorder     Chronic pain disorder     Claustrophobia     CTS (carpal tunnel syndrome)     Degenerative disc disease, lumbar     Depression     Dupuytren's disease     Essential hypertension     Family history of coronary artery disease     Fibromyalgia     Gallbladder abscess     H. pylori infection     Hiatal hernia     Hyperlipidemia     Hypothyroidism     Low back pain     Lumbosacral disc disease     Migraines     migraines    Multiple sclerosis     Osteoarthritis     Peripheral neuropathy     Psoriasis     Psoriatic arthritis     RA (rheumatoid arthritis)     Rheumatoid arthritis     Sinusitis     Sjogren's syndrome     Stomach ulcer     Thoracic disc disorder     TMJ arthritis     Type 2 diabetes mellitus     Urinary tract infection        PAST SURGICAL HISTORY:  Past Surgical History:   Procedure Laterality Date    BACK SURGERY      BREAST LUMPECTOMY Left 11/1993    CARDIAC CATHETERIZATION Left 09/21/2009    Normal     CARPAL TUNNEL RELEASE  03/2012    CERVICAL POLYPECTOMY  12/2015    CHOLECYSTECTOMY  05/2007    COLONOSCOPY      ENDOSCOPY      EPIDURAL BLOCK      GASTRIC BYPASS  09/2007    JOINT REPLACEMENT      KNEE ARTHROPLASTY      LUMBAR DISC SURGERY      C5-6    NECK SURGERY      REPLACEMENT TOTAL KNEE Right 06/2016    SACRAL NERVE STIMULATOR PLACEMENT  09/2014    SACRAL NERVE STIMULATOR PLACEMENT  04/11/2024    [facility]    SACRAL NERVE STIMULATOR PLACEMENT Right 04/17/2024    THYROIDECTOMY  03/2016    TRIGGER POINT INJECTION         FAMILY HISTORY:  Family History   Problem Relation Age of Onset    Diabetes Mother     Stroke Mother     Hypertension Mother     Asthma Mother     Fainting Mother      Miscarriages / Stillbirths Mother     Heart attack Father         First one was in his 20's second 30's.     Heart failure Father     Heart disease Father     Stroke Father     Diabetes Sister     Cancer Maternal Grandmother        SOCIAL HISTORY:  Social History     Socioeconomic History    Marital status:    Tobacco Use    Smoking status: Never     Passive exposure: Never    Smokeless tobacco: Never   Vaping Use    Vaping status: Never Used   Substance and Sexual Activity    Alcohol use: Never    Drug use: Never    Sexual activity: Yes     Partners: Male     Birth control/protection: Post-menopausal, None              MEDICATIONS:  The current medication list was reviewed in the EMR    Current Outpatient Medications:     amitriptyline (ELAVIL) 25 MG tablet, Take 1 tablet by mouth Every Night., Disp: , Rfl:     Butalbital-Acetaminophen  MG capsule, Take 300 mg by mouth Daily As Needed., Disp: , Rfl:     celecoxib (CeleBREX) 200 MG capsule, Take 1 capsule by mouth Daily., Disp: , Rfl:     cyanocobalamin 1000 MCG/ML injection, Inject 1 mL into the appropriate muscle as directed by prescriber Every 28 (Twenty-Eight) Days., Disp: , Rfl:     cyclobenzaprine (FLEXERIL) 10 MG tablet, Take 1 tablet by mouth Every Evening., Disp: , Rfl:     Diclofenac Sodium (VOLTAREN) 1 % gel gel, Apply 2 g topically to the appropriate area as directed 4 (Four) Times a Day As Needed., Disp: , Rfl:     DULoxetine (CYMBALTA) 30 MG capsule, Take 1 capsule by mouth Every Morning., Disp: , Rfl:     DULoxetine (CYMBALTA) 60 MG capsule, Take 1 capsule by mouth Daily., Disp: , Rfl:     EMGALITY 120 MG/ML prefilled syringe, Inject 1 mL under the skin into the appropriate area as directed Every 30 (Thirty) Days., Disp: , Rfl: 3    famotidine (PEPCID) 40 MG tablet, Take 1 tablet by mouth 2 (Two) Times a Day., Disp: , Rfl:     ferrous gluconate (FERGON) 240 (27 FE) MG tablet, Take 1 tablet by mouth Every Morning., Disp: , Rfl:      fexofenadine (ALLEGRA) 180 MG tablet, Take 1 tablet by mouth Daily., Disp: , Rfl:     flecainide (TAMBOCOR) 50 MG tablet, TAKE 1 TABLET TWICE A DAY, Disp: 180 tablet, Rfl: 3    fluticasone-salmeterol (ADVAIR HFA) 115-21 MCG/ACT inhaler, Inhale 2 puffs 2 (Two) Times a Day., Disp: , Rfl:     folic acid (FOLVITE) 1 MG tablet, Take 1 tablet by mouth Daily., Disp: , Rfl:     gabapentin (NEURONTIN) 600 MG tablet, Take 1 tablet by mouth 3 (Three) Times a Day., Disp: , Rfl:     isosorbide mononitrate (IMDUR) 120 MG 24 hr tablet, Take 1 tablet by mouth Daily., Disp: 90 tablet, Rfl: 2    levothyroxine (SYNTHROID, LEVOTHROID) 112 MCG tablet, Take 1 tablet by mouth Daily., Disp: , Rfl:     losartan (COZAAR) 50 MG tablet, TAKE 1 TABLET BY MOUTH DAILY, Disp: 30 tablet, Rfl: 2    methotrexate 2.5 MG tablet, Take 10 tablets by mouth 1 (One) Time Per Week. Prior to [facility] Admission, Patient was on: Takes 10 tablets on Saturday, Disp: , Rfl:     metoprolol tartrate (LOPRESSOR) 100 MG tablet, TAKE 1 TABLET TWICE A DAY, Disp: 180 tablet, Rfl: 0    nystatin 743930 UNIT/GM topical powder, , Disp: , Rfl:     olopatadine (PATANASE) 0.6 % solution nasal solution, 2 sprays by Each Nare route 2 (Two) Times a Day., Disp: , Rfl:     omeprazole (priLOSEC) 40 MG capsule, , Disp: , Rfl:     potassium chloride (KLOR-CON M10) 10 MEQ CR tablet, Take 1 tablet by mouth., Disp: , Rfl:     potassium chloride 10 MEQ CR tablet, Take 1 tablet by mouth Daily., Disp: , Rfl:     primidone (MYSOLINE) 50 MG tablet, Take 1 tablet by mouth Every Night., Disp: , Rfl:     rimegepant 75 MG dispersible tablet, , Disp: , Rfl:     rosuvastatin (CRESTOR) 10 MG tablet, Take 1 tablet by mouth Daily., Disp: 90 tablet, Rfl: 1    sodium chloride 1 g tablet, Take 2 tablets by mouth 3 (Three) Times a Day., Disp: , Rfl:     sucralfate (CARAFATE) 1 g tablet, Take 1 tablet by mouth 4 (Four) Times a Day. Take one tablet by mouth 4 times daily 1 hour before meals and at bedtime on  "an empty stomach., Disp: , Rfl:     Toujeo SoloStar 300 UNIT/ML solution pen-injector injection, , Disp: , Rfl:     traMADol (ULTRAM) 50 MG tablet, Take 1 tablet by mouth 2 (Two) Times a Day As Needed., Disp: , Rfl:     Upadacitinib ER (Rinvoq) 15 MG tablet sustained-release 24 hour, Take  by mouth., Disp: , Rfl:     vitamin D (ERGOCALCIFEROL) 1.25 MG (31718 UT) capsule capsule, , Disp: , Rfl:     Xarelto 20 MG tablet, TAKE 1 TABLET DAILY, Disp: 90 tablet, Rfl: 3    ALLERGIES:    Allergies   Allergen Reactions    Codeine Angioedema    Imuran [Azathioprine] Other (See Comments)     Has pancreatis attacks with med    Januvia [Sitagliptin] Other (See Comments)     Has pancreatis attacks with med     Penicillins Angioedema    Sulfa Antibiotics Angioedema    Sulfasalazine Unknown (See Comments)    Beta Adrenergic Blockers Other (See Comments)     I called pt to ask her about the allergy to bblockers in her chart. Pt states \"too big of a dose makes her psoriasis act up\", but this current dose of metoprolol 50 mg bid, she has been on for a long time, with no ill side effects.  CATA,CMA 3/28/18         REVIEW OF SYSTEMS:    A comprehensive 14 point review of systems was performed.  Significant findings as mentioned above.  All other systems reviewed and are negative.        Physical Exam  Vitals:    06/11/25 1109   BP: 128/68   Pulse: 71   Resp: 18   Temp: 97 °F (36.1 °C)   TempSrc: Temporal   SpO2: 100%   Weight: 66.5 kg (146 lb 9.6 oz)   Height: 165.1 cm (65\")        ECOG score: 0   General: Well developed, well nourished, alert and oriented x 3, in no acute distress.   Head: ATNC   Eyes: PERRL, No evidence of conjunctivitis.   Nose: No nasal discharge.   Mouth: Oral mucosal membranes moist. No oral ulceration or hemorrhages.   Neck: Neck supple. No thyromegaly. No JVD.   Lungs: Clear in all fields to A&P without rales, rhonchi or wheezing.   Heart: S1, S2. Regular rate and rhythm. No murmurs, rubs, or gallops.   Abdomen: " Soft. Bowel sounds are normoactive. Nontender with palpation. No Hepatosplenomegaly can be appreciated.   Extremities: No cyanosis or edema. Peripheral pulses palpable and equal bilaterally.   Integumentary: Warm, dry, intact. Red, Scaly plaques on bilateral upper extremities.  Hem/Lymph Nodes: No palpable cervical, submandibular, supraclavicular, axillary  lymphadenopathy noted. No petechiae, purpura or ecchymosis noted.   Neurologic: Grossly non-focal exam    Pain Score:  Pain Score    06/11/25 1109   PainSc: 4        PHQ-Score Total:  PHQ-9 Total Score:         PATHOLOGY:        ENDOSCOPY:        IMAGING:        RECENT LABS:  Lab Results   Component Value Date    WBC 4.06 06/11/2025    HGB 10.3 (L) 06/11/2025    HCT 31.9 (L) 06/11/2025    .1 (H) 06/11/2025    RDW 14.8 06/11/2025     06/11/2025    NEUTRORELPCT 56.6 06/11/2025    LYMPHORELPCT 29.1 06/11/2025    MONORELPCT 7.4 06/11/2025    EOSRELPCT 2.0 06/11/2025    BASORELPCT 1.0 06/11/2025    NEUTROABS 2.30 06/11/2025    LYMPHSABS 1.18 06/11/2025       Lab Results   Component Value Date     (L) 05/12/2025    K 4.1 05/12/2025    CO2 22.2 05/12/2025    CL 99 05/12/2025    BUN 10 05/12/2025    CREATININE 0.94 05/12/2025    EGFRIFNONA 74 02/23/2022    EGFRIFAFRI  09/22/2016      Comment:      <15 Indicative of kidney failure.    GLUCOSE 161 (H) 05/12/2025    CALCIUM 8.7 05/12/2025    ALKPHOS 147 (H) 05/12/2025    AST 39 (H) 05/12/2025    ALT 44 (H) 05/12/2025    BILITOT 0.4 05/12/2025    ALBUMIN 3.6 05/12/2025    PROTEINTOT 6.4 05/12/2025    MG 2.0 09/21/2020    PHOS 3.8 05/12/2025       Lab Results   Component Value Date/Time     (H) 12/07/2023 12:01 PM     Lab Results   Component Value Date    FERRITIN 326.90 (H) 03/17/2025    IRON 184 (H) 03/17/2025    TIBC 322 03/17/2025    LABIRON 57 (H) 03/17/2025    VMAOEDFX78 243 12/03/2024    FOLATE >20.00 12/03/2024       Work Up 12/07/2023      Lab Results   Component Value Date    LFCWMOTV32  243 12/03/2024    FOLATE >20.00 12/03/2024     Lab Results   Component Value Date    RETICCTPCT 1.36 12/07/2023     Lab Results   Component Value Date    HAPTOGLOBIN 131 12/07/2023     Lab Results   Component Value Date    CRP <0.30 04/23/2024    CRP <0.30 12/07/2023    CRP <0.30 09/26/2023    CRP 4.13 (H) 09/13/2023    CRP <0.30 06/12/2021    CRP 0.09 08/07/2020    CRP <0.50 11/22/2018     Lab Results   Component Value Date    SEDRATE 9 04/23/2024    SEDRATE 6 12/07/2023    SEDRATE 8 11/22/2018     TSH  0.270 - 4.200 uIU/mL 6.980 High      Free T4  0.93 - 1.70 ng/dL 1.58       Copper  80 - 158 ug/dL 111     Zinc  44 - 115 ug/dL 80     Tissue Transglutaminase IgA  0 - 3 U/mL <2     Iron  37 - 145 mcg/dL 31 Low    Iron Saturation (TSAT)  20 - 50 % 7 Low    Transferrin  200 - 360 mg/dL 319   TIBC  298 - 536 mcg/dL 475     Ferritin  13.00 - 150.00 ng/mL 122.70       ASSESSMENT & PLAN:  Lashae Benitez is a very pleasant 64 y.o. female with    1. Normocytic Anemia  2. DARIO  - Previous available CBCs were reviewed and patient has had chronic anemia with fluctuations in Hg ranging 9.2-11.9 since at least August 2019.   - She is taking oral iron supplement daily which she is having a difficult time tolerating due to GI upset. She also takes Folic Acid daily and B12 injections monthly.   -She denies any obvious blood loss from any source. She reports having colonoscopy per Dr. Frederick last year that was unremarkable (no records currently available, will request today). She has never had EGD. No history of blood transfusion.  - CBC from initial consultation showed Hg (10.1) and slightly elevated platelet count 500,000, likely reactive. WBC is normal. Iron panel is low with normal Ferritin despite oral iron replacement. Therefore, discontinued oral iron and replaced with IV Feraheme for apparent malabsorption of oral iron.   - Also obtained additional labs to further evaluate anemia including PBS which showed macrocytic anemia with  mild anisocytosis including occasional elliptocytes, no increase in schistocyte population, thrombocytosis with normal platelet morphology, normal WBC count and differential, granulocytes show normal morphology and no circulating blasts identified. B12 and Folate are replete. No evidence of hemolysis as Retic, LDH and Haptoglobin are normal. CRP and ESR were normal. TSH was elevated with normal Free T4 (previous thyroidectomy in 2016). Copper, Zinc and Tissue Transglutaminase were normal.  - She was last replaced with IV Feraheme in December 2024.  - CBC from today shows low Hg (10.3) with normal WBC and platelets. Iron panel and Ferritin are replete.   - Will follow up in 4 months with CBC, Iron panel and Ferritin. Will replace with IV iron as needed. In the interim, if she were to develop any worsening weakness/fatigue, shortness of breath on exertion, chest pain or dizziness she was advised to call and we will be happy to check labs sooner.   - Advised to continue B12 1000 mcg SQ monthly and Folic Acid 1 mg daily.     3. Psoriatic Arthritis  - She follows with Dr. Chand at [facility]. She is currently taking MTX and Rinvoq. Ecnouraged to follow up with rheumatology if joint aches/pains continue to persist. Ongoing management per rheumatology.        ACO / KRISTAL/Other  Quality measures  -  Lashae Benitez received 2024 flu vaccine.  -  Lashae Benitez reports a pain score of 4.  Given her pain assessment as noted, treatment options were discussed and the following options were decided upon as a follow-up plan to address the patient's pain: continuation of current treatment plan for pain.  -  Current outpatient and discharge medications have been reconciled for the patient.  Reviewed by: RUIZ Reynolds                  I spent 30 minutes caring for Lashae on this date of service. This time includes time spent by me in the following activities: preparing for the visit, reviewing tests, performing a  medically appropriate examination and/or evaluation, counseling and educating the patient/family/caregiver, referring and communicating with other health care professionals, documenting information in the medical record, independently interpreting results and communicating that information with the patient/family/caregiver, care coordination, ordering medications, obtaining a separately obtained history, and reviewing a separately obtained history.                                   Electronically Signed by: RUIZ Landry , June 11, 2025 11:29 EDT         CC:   No ref. provider found  Kreis, Samuel Duane, MD

## 2025-06-11 NOTE — PROGRESS NOTES
Venipuncture Blood Specimen Collection  Venipuncture performed in right arm by Jesica Gabriel MA with good hemostasis. Patient tolerated the procedure well without complications.   06/11/25   Jesica Gabriel MA

## 2025-06-12 ENCOUNTER — TELEPHONE (OUTPATIENT)
Dept: CARDIOLOGY | Facility: CLINIC | Age: 65
End: 2025-06-12
Payer: MEDICARE

## 2025-06-12 NOTE — TELEPHONE ENCOUNTER
I placed referral for interventional cardiology but I do not see this appointments been made yet can we check on this?

## 2025-06-12 NOTE — TELEPHONE ENCOUNTER
Called IC and got appointment made for 6/18@ 1:00, IC is calling to let patient know.

## 2025-06-12 NOTE — TELEPHONE ENCOUNTER
Called pt and she stated that she does not have an allergy to ASA.       ----- Message from Gael Baum sent at 6/10/2025 10:28 AM EDT -----  Does she have allergy to aspirin?

## 2025-06-18 ENCOUNTER — OFFICE VISIT (OUTPATIENT)
Dept: CARDIOLOGY | Facility: CLINIC | Age: 65
End: 2025-06-18
Payer: MEDICARE

## 2025-06-18 ENCOUNTER — PATIENT ROUNDING (BHMG ONLY) (OUTPATIENT)
Dept: CARDIOLOGY | Facility: CLINIC | Age: 65
End: 2025-06-18
Payer: MEDICARE

## 2025-06-18 VITALS
DIASTOLIC BLOOD PRESSURE: 100 MMHG | WEIGHT: 149.4 LBS | SYSTOLIC BLOOD PRESSURE: 184 MMHG | OXYGEN SATURATION: 98 % | BODY MASS INDEX: 24.89 KG/M2 | HEART RATE: 77 BPM | HEIGHT: 65 IN | RESPIRATION RATE: 16 BRPM

## 2025-06-18 DIAGNOSIS — Z91.89 CHRONIC CHEST PAIN WITH HIGH RISK FOR CAD: ICD-10-CM

## 2025-06-18 DIAGNOSIS — R94.39 ABNORMAL NUCLEAR STRESS TEST: ICD-10-CM

## 2025-06-18 DIAGNOSIS — E11.9 TYPE 2 DIABETES MELLITUS WITHOUT COMPLICATION, WITHOUT LONG-TERM CURRENT USE OF INSULIN: ICD-10-CM

## 2025-06-18 DIAGNOSIS — R93.89 ABNORMAL COMPUTED TOMOGRAPHY ANGIOGRAPHY (CTA): ICD-10-CM

## 2025-06-18 DIAGNOSIS — I10 ESSENTIAL HYPERTENSION: Primary | ICD-10-CM

## 2025-06-18 DIAGNOSIS — G89.29 CHRONIC CHEST PAIN WITH HIGH RISK FOR CAD: ICD-10-CM

## 2025-06-18 DIAGNOSIS — R07.9 CHRONIC CHEST PAIN WITH HIGH RISK FOR CAD: ICD-10-CM

## 2025-06-18 RX ORDER — BUTALBITAL, ACETAMINOPHEN AND CAFFEINE 300; 40; 50 MG/1; MG/1; MG/1
1 CAPSULE ORAL
COMMUNITY
Start: 2025-06-12

## 2025-06-18 RX ORDER — NITROGLYCERIN 0.4 MG/1
TABLET SUBLINGUAL
Qty: 30 TABLET | Refills: 0 | Status: SHIPPED | OUTPATIENT
Start: 2025-06-18

## 2025-06-18 RX ORDER — SYRINGE WITH NEEDLE, 1 ML 25GX5/8"
SYRINGE, EMPTY DISPOSABLE MISCELLANEOUS
COMMUNITY
Start: 2025-06-11

## 2025-06-18 RX ORDER — TOFACITINIB 11 MG/1
TABLET, FILM COATED, EXTENDED RELEASE ORAL
COMMUNITY
Start: 2025-06-09

## 2025-06-18 RX ORDER — PEN NEEDLE, DIABETIC 32GX 5/32"
NEEDLE, DISPOSABLE MISCELLANEOUS
COMMUNITY
Start: 2025-06-11

## 2025-06-18 RX ORDER — AMLODIPINE BESYLATE 5 MG/1
5 TABLET ORAL DAILY
Qty: 30 TABLET | Refills: 11 | Status: SHIPPED | OUTPATIENT
Start: 2025-06-18

## 2025-06-18 NOTE — PROGRESS NOTES
A [employer] message has been sent to the patient for patient rounding for [facility] [place] [facility]Inter[facility]

## 2025-06-18 NOTE — PROGRESS NOTES
Chief Complaint  Establish Care (Referral by Gael Baum - Paroxysmal atrial fibrillation / Precordial pain)    Subjective          Lashae Benitez presents to [facility] for follow up.    History of Present Illness    Progression of symptoms   She experiences intermittent chest tightness accompanied by swelling and shortness of breath. These symptoms occur both during physical activity and at rest, with a frequency of approximately every other day. The severity of the pain varies, sometimes necessitating cessation of her current activity, while at other times it is manageable with rest or deep breathing. She also reports episodes of weakness in her arms and legs, mild nausea, and occasional perspiration.     Failure of medical management  Imdur, beta blocker - adding amlodipine    Cardiac Risk Factors:  diabetes mellitus, hypercholesterolemia, hypertension, Sedentary life style, and multiple autoimmune issues    CCS Class: Class III-symptoms with every day living activities    Previous issues with anesthesia:  None    Potential barriers to antiplatelet/anticoagulant therapy:  none    Airway issues:  None noted    Review of Systems - History obtained from the patient  General ROS: positive for  - fatigue  negative for - chills or fever  Respiratory ROS: positive for - shortness of breath  negative for - cough or sputum changes  Cardiovascular ROS: positive for - chest pain, dyspnea on exertion, irregular heartbeat, and shortness of breath        SCAI acceptable use criteria score = 8. Risks and benefits of the procedure discussed with the patient.  Risks specifically mentioned included bruising/hematoma, bleeding, vascular injury, infection, allergic reaction to medications, renal injury, dysrhythmia, blood clot, heart attack, stroke, and death.             Tobacco Use: Low Risk  (6/18/2025)    Patient History     Smoking Tobacco Use: Never     Smokeless Tobacco Use: Never     Passive  "Exposure: Never       Objective     Vital Signs:   BP (!) 184/100 (BP Location: Left arm, Patient Position: Sitting, Cuff Size: Adult) Comment: Recheck  Pulse 77   Resp 16   Ht 165.1 cm (65\")   Wt 67.8 kg (149 lb 6.4 oz)   SpO2 98%   BMI 24.86 kg/m²       Physical Exam  Vitals and nursing note reviewed.   Constitutional:       General: She is not in acute distress.     Appearance: She is ill-appearing (Chronically).   HENT:      Head: Normocephalic and atraumatic.   Neck:      Vascular: No carotid bruit.   Cardiovascular:      Rate and Rhythm: Normal rate. Rhythm irregular.      Pulses:           Posterior tibial pulses are 2+ on the right side and 2+ on the left side.      Heart sounds: No murmur heard.     No friction rub. No gallop.   Pulmonary:      Effort: Pulmonary effort is normal.      Breath sounds: Normal breath sounds.   Musculoskeletal:      Right lower leg: Edema present.      Left lower leg: Edema present.      Comments: Uses a walking stick   Skin:     General: Skin is warm and dry.   Neurological:      General: No focal deficit present.      Mental Status: She is alert.   Psychiatric:         Mood and Affect: Mood normal.         Behavior: Behavior normal.             Result Review :            The following data was reviewed by me 06/18/25 at 13:31 EDT: cardiac and lab studies as detailed below.    WBC   Date Value Ref Range Status   06/11/2025 4.06 3.40 - 10.80 10*3/mm3 Final     Hemoglobin   Date Value Ref Range Status   06/11/2025 10.3 (L) 12.0 - 15.9 g/dL Final     Hematocrit   Date Value Ref Range Status   06/11/2025 31.9 (L) 34.0 - 46.6 % Final     MCV   Date Value Ref Range Status   06/11/2025 108.1 (H) 79.0 - 97.0 fL Final     MCH   Date Value Ref Range Status   06/11/2025 34.9 (H) 26.6 - 33.0 pg Final     Platelets   Date Value Ref Range Status   06/11/2025 349 140 - 450 10*3/mm3 Final     Glucose   Date Value Ref Range Status   05/12/2025 161 (H) 65 - 99 mg/dL Final     BUN   Date " Value Ref Range Status   05/12/2025 10 8 - 23 mg/dL Final     Creatinine   Date Value Ref Range Status   05/12/2025 0.94 0.57 - 1.00 mg/dL Final     Sodium   Date Value Ref Range Status   05/12/2025 132 (L) 136 - 145 mmol/L Final     Potassium   Date Value Ref Range Status   05/12/2025 4.1 3.5 - 5.2 mmol/L Final     Chloride   Date Value Ref Range Status   05/12/2025 99 98 - 107 mmol/L Final     CO2   Date Value Ref Range Status   05/12/2025 22.2 22.0 - 29.0 mmol/L Final     Calcium   Date Value Ref Range Status   05/12/2025 8.7 8.6 - 10.5 mg/dL Final     Total Protein   Date Value Ref Range Status   05/12/2025 6.4 6.0 - 8.5 g/dL Final     Albumin   Date Value Ref Range Status   05/12/2025 3.6 3.5 - 5.2 g/dL Final     ALT (SGPT)   Date Value Ref Range Status   05/12/2025 44 (H) 1 - 33 U/L Final     AST (SGOT)   Date Value Ref Range Status   05/12/2025 39 (H) 1 - 32 U/L Final     Alkaline Phosphatase   Date Value Ref Range Status   05/12/2025 147 (H) 39 - 117 U/L Final     Total Bilirubin   Date Value Ref Range Status   05/12/2025 0.4 0.0 - 1.2 mg/dL Final     Anion Gap   Date Value Ref Range Status   05/12/2025 10.8 5.0 - 15.0 mmol/L Final     eGFR   Date Value Ref Range Status   05/12/2025 68.3 >60.0 mL/min/1.73 Final     Total Cholesterol   Date Value Ref Range Status   03/12/2025 135 0 - 200 mg/dL Final     HDL Cholesterol   Date Value Ref Range Status   03/12/2025 50 40 - 60 mg/dL Final     LDL Cholesterol    Date Value Ref Range Status   03/12/2025 72 0 - 100 mg/dL Final     LDL/HDL Ratio   Date Value Ref Range Status   03/12/2025 1.45  Final     Hemoglobin A1C   Date Value Ref Range Status   02/21/2025 10.5 (H) 4.8 - 5.6 % Final     Comment:                                                            .           Prediabetes: 5.7 - 6.4           Diabetes: >6.4           Glycemic control for adults with diabetes: <7.0   08/17/2020 8.10 (H) 4.80 - 5.60 % Final     Magnesium   Date Value Ref Range Status    09/21/2020 2.0 1.6 - 2.6 mg/dL Final       Last Cardiac Cath - 2009  No obstructive disease      Last Coronary CTA  Results for orders placed during the hospital encounter of 05/28/25    CT Angiogram Coronary    Narrative  EXAM:  CT Angiography Heart With Intravenous Contrast    EXAM DATE:  5/28/2025 3:12 PM    CLINICAL HISTORY:  ; R07.2-Precordial pain; R94.39-Abnormal result of other  cardiovascular function study    TECHNIQUE:  Axial computed tomographic angiography images of the coronary arteries  with intravenous contrast.  Sublingual nitroglycerine for coronary  vasodilation and IV metoprolol to reduce heart rate were administered as  needed.  This CT exam was performed using one or more of the following  dose reduction techniques:  automated exposure control, adjustment of  the mA and/or kV according to patient size, and/or use of iterative  reconstruction technique.  MIP reconstructed images were created and reviewed.    COMPARISON:  None available    FINDINGS:  LEFT MAIN CORONARY ARTERY:  Unremarkable.  The left main coronary  artery bifurcates in LAD and LCX.  It is patent with no evidence of  plaque or stenosis.  LEFT ANTERIOR DESCENDING ARTERY:  Minimal calcified plaque in the LAD  causing moderate stenosis.  LAD calcium 195.  LEFT CIRCUMFLEX ARTERY:  Unremarkable.  Patent with no evidence of  plaque or stenosis.  RIGHT CORONARY ARTERY:  Unremarkable.  Patent with no evidence of  plaque or stenosis.  It gives off a patent posterior descending artery  and a patent posterior left ventricular branch.    CARDIAC VALVES:  Tricuspid aortic valve without significant  calcification.  PERICARDIUM:  Unremarkable.  Contour is preserved with no effusion,  thickening or calcification.  CARDIAC FUNCTION:  Unremarkable.  Preserved global and regional wall  motion.    AORTA:  Ascending thoracic aorta measuring 3.75 cm. No evidence of  aortic dissection.  PULMONARY ARTERIES:  Unremarkable.  No evidence of a  pulmonary  embolus.  LUNGS AND PLEURAL SPACES:  Unremarkable as visualized.  No mass.  No  consolidation or edema.  No pleural effusion or thickening.  No  pneumothorax.  MEDIASTINUM:  Unremarkable.  No mass.  OTHER FINDINGS:  Total calcium score 198.    Impression  1.  No evidence of a pulmonary embolus.  2.  Minimal calcified plaque in the LAD causing moderate stenosis.  3.  Total calcium score 198.  4.  LAD calcium 195.  5.  Tricuspid aortic valve without significant calcification.  6.  Ascending thoracic aorta measuring 3.75 cm. No evidence of aortic  dissection.    ASSESSMENT: CAD RADS 3/P2    This report was finalized on 5/29/2025 8:03 AM by Dr. Burt Aranda MD.      Last Stress test  Results for orders placed during the hospital encounter of 03/24/25    Stress Test With Myocardial Perfusion One Day    Interpretation Summary  Images from the original result were not included.      A pharmacological stress test was performed using regadenoson without low-level exercise.    Findings consistent with a normal ECG stress test.    Myocardial perfusion imaging indicates a normal myocardial perfusion study with no evidence of ischemia    TID;1.29    Normal LV cavity size. Normal LV wall motion noted.    Left ventricular ejection fraction is normal (Calculated EF = 65%).    . Impressions are consistent with a low to intermediate risk study (due to TID).       Last Echo  Results for orders placed during the hospital encounter of 04/25/24    Adult Transthoracic Echo Complete W/ Cont if Necessary Per Protocol    Interpretation Summary    Normal left ventricular cavity size and wall thickness noted. All left ventricular wall segments contract normally    Left ventricular ejection fraction appears to be 61 - 65%.    Left ventricular diastolic function is consistent with (grade I) impaired relaxation.    The aortic valve is structurally normal with no regurgitation or stenosis present.    The mitral valve is structurally  "normal with trace  regurgitation but no significant stenosis present.    There is no evidence of pericardial effusion.         ECG 12 Lead    Date/Time: 6/18/2025 3:17 PM  Performed by: Peggy Vargas APRN    Authorized by: Peggy Vargas APRN  Comparison: compared with previous ECG from 11/25/2024  Comparison to previous ECG: Sinus rhythm with incomplete right bundle branch block, LAFB, and Q waves in V1 and V2  Rhythm: sinus rhythm  Rate: normal  BPM: 77  Q waves: V1 and V2    QRS axis: left    Clinical impression: non-specific ECG           Current Outpatient Medications   Medication Sig Dispense Refill    amitriptyline (ELAVIL) 25 MG tablet Take 1 tablet by mouth Every Night.      B-D 3CC LUER-NITIN SYR 25GX5/8\" 25G X 5/8\" 3 ML misc       Butalbital-Acetaminophen  MG capsule Take 300 mg by mouth Daily As Needed.      butalbital-acetaminophen-caffeine (ORBIVAN) -40 MG capsule capsule Take 1 capsule by mouth.      celecoxib (CeleBREX) 200 MG capsule Take 1 capsule by mouth Daily.      cyanocobalamin 1000 MCG/ML injection Inject 1 mL into the appropriate muscle as directed by prescriber Every 28 (Twenty-Eight) Days.      cyclobenzaprine (FLEXERIL) 10 MG tablet Take 1 tablet by mouth Every Evening.      Diclofenac Sodium (VOLTAREN) 1 % gel gel Apply 2 g topically to the appropriate area as directed 4 (Four) Times a Day As Needed.      DULoxetine (CYMBALTA) 30 MG capsule Take 1 capsule by mouth Every Morning.      DULoxetine (CYMBALTA) 60 MG capsule Take 1 capsule by mouth Daily.      Embecta Pen Needle Akila 2 Gen 32G X 4 MM misc       EMGALITY 120 MG/ML prefilled syringe Inject 1 mL under the skin into the appropriate area as directed Every 30 (Thirty) Days.  3    famotidine (PEPCID) 40 MG tablet Take 1 tablet by mouth 2 (Two) Times a Day.      ferrous gluconate (FERGON) 240 (27 FE) MG tablet Take 1 tablet by mouth Every Morning.      fexofenadine (ALLEGRA) 180 MG tablet Take 1 tablet by mouth Daily.   "    flecainide (TAMBOCOR) 50 MG tablet TAKE 1 TABLET TWICE A  tablet 3    fluticasone-salmeterol (ADVAIR HFA) 115-21 MCG/ACT inhaler Inhale 2 puffs 2 (Two) Times a Day.      folic acid (FOLVITE) 1 MG tablet Take 1 tablet by mouth Daily.      gabapentin (NEURONTIN) 600 MG tablet Take 1 tablet by mouth 3 (Three) Times a Day.      isosorbide mononitrate (IMDUR) 120 MG 24 hr tablet Take 1 tablet by mouth Daily. 90 tablet 2    levothyroxine (SYNTHROID, LEVOTHROID) 112 MCG tablet Take 1 tablet by mouth Daily.      losartan (COZAAR) 50 MG tablet TAKE 1 TABLET BY MOUTH DAILY 30 tablet 2    methotrexate 2.5 MG tablet Take 10 tablets by mouth 1 (One) Time Per Week. Prior to [facility] Admission, Patient was on: Takes 10 tablets on Saturday      metoprolol tartrate (LOPRESSOR) 100 MG tablet TAKE 1 TABLET TWICE A  tablet 0    nystatin 991242 UNIT/GM topical powder       olopatadine (PATANASE) 0.6 % solution nasal solution 2 sprays by Each Nare route 2 (Two) Times a Day.      omeprazole (priLOSEC) 40 MG capsule       potassium chloride (KLOR-CON M10) 10 MEQ CR tablet Take 1 tablet by mouth.      potassium chloride 10 MEQ CR tablet Take 1 tablet by mouth Daily.      primidone (MYSOLINE) 50 MG tablet Take 1 tablet by mouth Every Night.      rimegepant 75 MG dispersible tablet       rosuvastatin (CRESTOR) 10 MG tablet Take 1 tablet by mouth Daily. 90 tablet 1    sodium chloride 1 g tablet Take 2 tablets by mouth 3 (Three) Times a Day.      sucralfate (CARAFATE) 1 g tablet Take 1 tablet by mouth 4 (Four) Times a Day. Take one tablet by mouth 4 times daily 1 hour before meals and at bedtime on an empty stomach.      Toujeo SoloStar 300 UNIT/ML solution pen-injector injection       traMADol (ULTRAM) 50 MG tablet Take 1 tablet by mouth 2 (Two) Times a Day As Needed.      Upadacitinib ER (Rinvoq) 15 MG tablet sustained-release 24 hour Take  by mouth.      vitamin D (ERGOCALCIFEROL) 1.25 MG (83446 UT) capsule capsule        Xarelto 20 MG tablet TAKE 1 TABLET DAILY 90 tablet 3    Xeljanz XR 11 MG tablet sustained-release 24 hour       amLODIPine (NORVASC) 5 MG tablet Take 1 tablet by mouth Daily. 30 tablet 11    nitroglycerin (NITROSTAT) 0.4 MG SL tablet 1 under the tongue as needed for angina, may repeat q5mins for up three doses 30 tablet 0     No current facility-administered medications for this visit.            Assessment and Plan    Diagnoses and all orders for this visit:    1. Essential hypertension (Primary)  Assessment & Plan:  Uncontrolled-contributing to ASCVD risk-adding amlodipine.  Will need to monitor for edema    Orders:  -     amLODIPine (NORVASC) 5 MG tablet; Take 1 tablet by mouth Daily.  Dispense: 30 tablet; Refill: 11    2. Chronic chest pain with high risk for CAD  -     nitroglycerin (NITROSTAT) 0.4 MG SL tablet; 1 under the tongue as needed for angina, may repeat q5mins for up three doses  Dispense: 30 tablet; Refill: 0  -     ECG 12 Lead    3. Type 2 diabetes mellitus without complication, without long-term current use of insulin  Assessment & Plan:  Uncontrolled and contributing to ASCVD risk      4. Abnormal nuclear stress test    5. Abnormal computed tomography angiography (CTA)          Follow Up     No follow-ups on file.    Patient was given instructions and counseling regarding her condition or for health maintenance advice. Please see specific information pulled into the AVS if appropriate.       Electronically signed by RUIZ Choi, 06/18/25, 3:22 PM EDT.    Patient or patient representative verbalized consent for the use of Ambient Listening during the visit with  RUIZ Choi for chart documentation. 6/18/2025  15:22 EDT     Dictated Utilizing Dragon Dictation: Part of this note may be an electronic transcription/translation of spoken language to printed text using the Dragon Dictation System

## 2025-06-18 NOTE — H&P (VIEW-ONLY)
Chief Complaint  Establish Care (Referral by Gael Baum - Paroxysmal atrial fibrillation / Precordial pain)    Subjective          Lashae Benitez presents to [facility] for follow up.    History of Present Illness    Progression of symptoms   She experiences intermittent chest tightness accompanied by swelling and shortness of breath. These symptoms occur both during physical activity and at rest, with a frequency of approximately every other day. The severity of the pain varies, sometimes necessitating cessation of her current activity, while at other times it is manageable with rest or deep breathing. She also reports episodes of weakness in her arms and legs, mild nausea, and occasional perspiration.     Failure of medical management  Imdur, beta blocker - adding amlodipine    Cardiac Risk Factors:  diabetes mellitus, hypercholesterolemia, hypertension, Sedentary life style, and multiple autoimmune issues    CCS Class: Class III-symptoms with every day living activities    Previous issues with anesthesia:  None    Potential barriers to antiplatelet/anticoagulant therapy:  none    Airway issues:  None noted    Review of Systems - History obtained from the patient  General ROS: positive for  - fatigue  negative for - chills or fever  Respiratory ROS: positive for - shortness of breath  negative for - cough or sputum changes  Cardiovascular ROS: positive for - chest pain, dyspnea on exertion, irregular heartbeat, and shortness of breath        SCAI acceptable use criteria score = 8. Risks and benefits of the procedure discussed with the patient.  Risks specifically mentioned included bruising/hematoma, bleeding, vascular injury, infection, allergic reaction to medications, renal injury, dysrhythmia, blood clot, heart attack, stroke, and death.             Tobacco Use: Low Risk  (6/18/2025)    Patient History     Smoking Tobacco Use: Never     Smokeless Tobacco Use: Never     Passive  "Exposure: Never       Objective     Vital Signs:   BP (!) 184/100 (BP Location: Left arm, Patient Position: Sitting, Cuff Size: Adult) Comment: Recheck  Pulse 77   Resp 16   Ht 165.1 cm (65\")   Wt 67.8 kg (149 lb 6.4 oz)   SpO2 98%   BMI 24.86 kg/m²       Physical Exam  Vitals and nursing note reviewed.   Constitutional:       General: She is not in acute distress.     Appearance: She is ill-appearing (Chronically).   HENT:      Head: Normocephalic and atraumatic.   Neck:      Vascular: No carotid bruit.   Cardiovascular:      Rate and Rhythm: Normal rate. Rhythm irregular.      Pulses:           Posterior tibial pulses are 2+ on the right side and 2+ on the left side.      Heart sounds: No murmur heard.     No friction rub. No gallop.   Pulmonary:      Effort: Pulmonary effort is normal.      Breath sounds: Normal breath sounds.   Musculoskeletal:      Right lower leg: Edema present.      Left lower leg: Edema present.      Comments: Uses a walking stick   Skin:     General: Skin is warm and dry.   Neurological:      General: No focal deficit present.      Mental Status: She is alert.   Psychiatric:         Mood and Affect: Mood normal.         Behavior: Behavior normal.             Result Review :            The following data was reviewed by me 06/18/25 at 13:31 EDT: cardiac and lab studies as detailed below.    WBC   Date Value Ref Range Status   06/11/2025 4.06 3.40 - 10.80 10*3/mm3 Final     Hemoglobin   Date Value Ref Range Status   06/11/2025 10.3 (L) 12.0 - 15.9 g/dL Final     Hematocrit   Date Value Ref Range Status   06/11/2025 31.9 (L) 34.0 - 46.6 % Final     MCV   Date Value Ref Range Status   06/11/2025 108.1 (H) 79.0 - 97.0 fL Final     MCH   Date Value Ref Range Status   06/11/2025 34.9 (H) 26.6 - 33.0 pg Final     Platelets   Date Value Ref Range Status   06/11/2025 349 140 - 450 10*3/mm3 Final     Glucose   Date Value Ref Range Status   05/12/2025 161 (H) 65 - 99 mg/dL Final     BUN   Date " Value Ref Range Status   05/12/2025 10 8 - 23 mg/dL Final     Creatinine   Date Value Ref Range Status   05/12/2025 0.94 0.57 - 1.00 mg/dL Final     Sodium   Date Value Ref Range Status   05/12/2025 132 (L) 136 - 145 mmol/L Final     Potassium   Date Value Ref Range Status   05/12/2025 4.1 3.5 - 5.2 mmol/L Final     Chloride   Date Value Ref Range Status   05/12/2025 99 98 - 107 mmol/L Final     CO2   Date Value Ref Range Status   05/12/2025 22.2 22.0 - 29.0 mmol/L Final     Calcium   Date Value Ref Range Status   05/12/2025 8.7 8.6 - 10.5 mg/dL Final     Total Protein   Date Value Ref Range Status   05/12/2025 6.4 6.0 - 8.5 g/dL Final     Albumin   Date Value Ref Range Status   05/12/2025 3.6 3.5 - 5.2 g/dL Final     ALT (SGPT)   Date Value Ref Range Status   05/12/2025 44 (H) 1 - 33 U/L Final     AST (SGOT)   Date Value Ref Range Status   05/12/2025 39 (H) 1 - 32 U/L Final     Alkaline Phosphatase   Date Value Ref Range Status   05/12/2025 147 (H) 39 - 117 U/L Final     Total Bilirubin   Date Value Ref Range Status   05/12/2025 0.4 0.0 - 1.2 mg/dL Final     Anion Gap   Date Value Ref Range Status   05/12/2025 10.8 5.0 - 15.0 mmol/L Final     eGFR   Date Value Ref Range Status   05/12/2025 68.3 >60.0 mL/min/1.73 Final     Total Cholesterol   Date Value Ref Range Status   03/12/2025 135 0 - 200 mg/dL Final     HDL Cholesterol   Date Value Ref Range Status   03/12/2025 50 40 - 60 mg/dL Final     LDL Cholesterol    Date Value Ref Range Status   03/12/2025 72 0 - 100 mg/dL Final     LDL/HDL Ratio   Date Value Ref Range Status   03/12/2025 1.45  Final     Hemoglobin A1C   Date Value Ref Range Status   02/21/2025 10.5 (H) 4.8 - 5.6 % Final     Comment:                                                            .           Prediabetes: 5.7 - 6.4           Diabetes: >6.4           Glycemic control for adults with diabetes: <7.0   08/17/2020 8.10 (H) 4.80 - 5.60 % Final     Magnesium   Date Value Ref Range Status    09/21/2020 2.0 1.6 - 2.6 mg/dL Final       Last Cardiac Cath - 2009  No obstructive disease      Last Coronary CTA  Results for orders placed during the hospital encounter of 05/28/25    CT Angiogram Coronary    Narrative  EXAM:  CT Angiography Heart With Intravenous Contrast    EXAM DATE:  5/28/2025 3:12 PM    CLINICAL HISTORY:  ; R07.2-Precordial pain; R94.39-Abnormal result of other  cardiovascular function study    TECHNIQUE:  Axial computed tomographic angiography images of the coronary arteries  with intravenous contrast.  Sublingual nitroglycerine for coronary  vasodilation and IV metoprolol to reduce heart rate were administered as  needed.  This CT exam was performed using one or more of the following  dose reduction techniques:  automated exposure control, adjustment of  the mA and/or kV according to patient size, and/or use of iterative  reconstruction technique.  MIP reconstructed images were created and reviewed.    COMPARISON:  None available    FINDINGS:  LEFT MAIN CORONARY ARTERY:  Unremarkable.  The left main coronary  artery bifurcates in LAD and LCX.  It is patent with no evidence of  plaque or stenosis.  LEFT ANTERIOR DESCENDING ARTERY:  Minimal calcified plaque in the LAD  causing moderate stenosis.  LAD calcium 195.  LEFT CIRCUMFLEX ARTERY:  Unremarkable.  Patent with no evidence of  plaque or stenosis.  RIGHT CORONARY ARTERY:  Unremarkable.  Patent with no evidence of  plaque or stenosis.  It gives off a patent posterior descending artery  and a patent posterior left ventricular branch.    CARDIAC VALVES:  Tricuspid aortic valve without significant  calcification.  PERICARDIUM:  Unremarkable.  Contour is preserved with no effusion,  thickening or calcification.  CARDIAC FUNCTION:  Unremarkable.  Preserved global and regional wall  motion.    AORTA:  Ascending thoracic aorta measuring 3.75 cm. No evidence of  aortic dissection.  PULMONARY ARTERIES:  Unremarkable.  No evidence of a  pulmonary  embolus.  LUNGS AND PLEURAL SPACES:  Unremarkable as visualized.  No mass.  No  consolidation or edema.  No pleural effusion or thickening.  No  pneumothorax.  MEDIASTINUM:  Unremarkable.  No mass.  OTHER FINDINGS:  Total calcium score 198.    Impression  1.  No evidence of a pulmonary embolus.  2.  Minimal calcified plaque in the LAD causing moderate stenosis.  3.  Total calcium score 198.  4.  LAD calcium 195.  5.  Tricuspid aortic valve without significant calcification.  6.  Ascending thoracic aorta measuring 3.75 cm. No evidence of aortic  dissection.    ASSESSMENT: CAD RADS 3/P2    This report was finalized on 5/29/2025 8:03 AM by Dr. Burt Aranda MD.      Last Stress test  Results for orders placed during the hospital encounter of 03/24/25    Stress Test With Myocardial Perfusion One Day    Interpretation Summary  Images from the original result were not included.      A pharmacological stress test was performed using regadenoson without low-level exercise.    Findings consistent with a normal ECG stress test.    Myocardial perfusion imaging indicates a normal myocardial perfusion study with no evidence of ischemia    TID;1.29    Normal LV cavity size. Normal LV wall motion noted.    Left ventricular ejection fraction is normal (Calculated EF = 65%).    . Impressions are consistent with a low to intermediate risk study (due to TID).       Last Echo  Results for orders placed during the hospital encounter of 04/25/24    Adult Transthoracic Echo Complete W/ Cont if Necessary Per Protocol    Interpretation Summary    Normal left ventricular cavity size and wall thickness noted. All left ventricular wall segments contract normally    Left ventricular ejection fraction appears to be 61 - 65%.    Left ventricular diastolic function is consistent with (grade I) impaired relaxation.    The aortic valve is structurally normal with no regurgitation or stenosis present.    The mitral valve is structurally  "normal with trace  regurgitation but no significant stenosis present.    There is no evidence of pericardial effusion.         ECG 12 Lead    Date/Time: 6/18/2025 3:17 PM  Performed by: Peggy Vargas APRN    Authorized by: Peggy Vargas APRN  Comparison: compared with previous ECG from 11/25/2024  Comparison to previous ECG: Sinus rhythm with incomplete right bundle branch block, LAFB, and Q waves in V1 and V2  Rhythm: sinus rhythm  Rate: normal  BPM: 77  Q waves: V1 and V2    QRS axis: left    Clinical impression: non-specific ECG           Current Outpatient Medications   Medication Sig Dispense Refill    amitriptyline (ELAVIL) 25 MG tablet Take 1 tablet by mouth Every Night.      B-D 3CC LUER-NITIN SYR 25GX5/8\" 25G X 5/8\" 3 ML misc       Butalbital-Acetaminophen  MG capsule Take 300 mg by mouth Daily As Needed.      butalbital-acetaminophen-caffeine (ORBIVAN) -40 MG capsule capsule Take 1 capsule by mouth.      celecoxib (CeleBREX) 200 MG capsule Take 1 capsule by mouth Daily.      cyanocobalamin 1000 MCG/ML injection Inject 1 mL into the appropriate muscle as directed by prescriber Every 28 (Twenty-Eight) Days.      cyclobenzaprine (FLEXERIL) 10 MG tablet Take 1 tablet by mouth Every Evening.      Diclofenac Sodium (VOLTAREN) 1 % gel gel Apply 2 g topically to the appropriate area as directed 4 (Four) Times a Day As Needed.      DULoxetine (CYMBALTA) 30 MG capsule Take 1 capsule by mouth Every Morning.      DULoxetine (CYMBALTA) 60 MG capsule Take 1 capsule by mouth Daily.      Embecta Pen Needle Akila 2 Gen 32G X 4 MM misc       EMGALITY 120 MG/ML prefilled syringe Inject 1 mL under the skin into the appropriate area as directed Every 30 (Thirty) Days.  3    famotidine (PEPCID) 40 MG tablet Take 1 tablet by mouth 2 (Two) Times a Day.      ferrous gluconate (FERGON) 240 (27 FE) MG tablet Take 1 tablet by mouth Every Morning.      fexofenadine (ALLEGRA) 180 MG tablet Take 1 tablet by mouth Daily.   "    flecainide (TAMBOCOR) 50 MG tablet TAKE 1 TABLET TWICE A  tablet 3    fluticasone-salmeterol (ADVAIR HFA) 115-21 MCG/ACT inhaler Inhale 2 puffs 2 (Two) Times a Day.      folic acid (FOLVITE) 1 MG tablet Take 1 tablet by mouth Daily.      gabapentin (NEURONTIN) 600 MG tablet Take 1 tablet by mouth 3 (Three) Times a Day.      isosorbide mononitrate (IMDUR) 120 MG 24 hr tablet Take 1 tablet by mouth Daily. 90 tablet 2    levothyroxine (SYNTHROID, LEVOTHROID) 112 MCG tablet Take 1 tablet by mouth Daily.      losartan (COZAAR) 50 MG tablet TAKE 1 TABLET BY MOUTH DAILY 30 tablet 2    methotrexate 2.5 MG tablet Take 10 tablets by mouth 1 (One) Time Per Week. Prior to [facility] Admission, Patient was on: Takes 10 tablets on Saturday      metoprolol tartrate (LOPRESSOR) 100 MG tablet TAKE 1 TABLET TWICE A  tablet 0    nystatin 344751 UNIT/GM topical powder       olopatadine (PATANASE) 0.6 % solution nasal solution 2 sprays by Each Nare route 2 (Two) Times a Day.      omeprazole (priLOSEC) 40 MG capsule       potassium chloride (KLOR-CON M10) 10 MEQ CR tablet Take 1 tablet by mouth.      potassium chloride 10 MEQ CR tablet Take 1 tablet by mouth Daily.      primidone (MYSOLINE) 50 MG tablet Take 1 tablet by mouth Every Night.      rimegepant 75 MG dispersible tablet       rosuvastatin (CRESTOR) 10 MG tablet Take 1 tablet by mouth Daily. 90 tablet 1    sodium chloride 1 g tablet Take 2 tablets by mouth 3 (Three) Times a Day.      sucralfate (CARAFATE) 1 g tablet Take 1 tablet by mouth 4 (Four) Times a Day. Take one tablet by mouth 4 times daily 1 hour before meals and at bedtime on an empty stomach.      Toujeo SoloStar 300 UNIT/ML solution pen-injector injection       traMADol (ULTRAM) 50 MG tablet Take 1 tablet by mouth 2 (Two) Times a Day As Needed.      Upadacitinib ER (Rinvoq) 15 MG tablet sustained-release 24 hour Take  by mouth.      vitamin D (ERGOCALCIFEROL) 1.25 MG (14597 UT) capsule capsule        Xarelto 20 MG tablet TAKE 1 TABLET DAILY 90 tablet 3    Xeljanz XR 11 MG tablet sustained-release 24 hour       amLODIPine (NORVASC) 5 MG tablet Take 1 tablet by mouth Daily. 30 tablet 11    nitroglycerin (NITROSTAT) 0.4 MG SL tablet 1 under the tongue as needed for angina, may repeat q5mins for up three doses 30 tablet 0     No current facility-administered medications for this visit.            Assessment and Plan    Diagnoses and all orders for this visit:    1. Essential hypertension (Primary)  Assessment & Plan:  Uncontrolled-contributing to ASCVD risk-adding amlodipine.  Will need to monitor for edema    Orders:  -     amLODIPine (NORVASC) 5 MG tablet; Take 1 tablet by mouth Daily.  Dispense: 30 tablet; Refill: 11    2. Chronic chest pain with high risk for CAD  -     nitroglycerin (NITROSTAT) 0.4 MG SL tablet; 1 under the tongue as needed for angina, may repeat q5mins for up three doses  Dispense: 30 tablet; Refill: 0  -     ECG 12 Lead    3. Type 2 diabetes mellitus without complication, without long-term current use of insulin  Assessment & Plan:  Uncontrolled and contributing to ASCVD risk      4. Abnormal nuclear stress test    5. Abnormal computed tomography angiography (CTA)          Follow Up     No follow-ups on file.    Patient was given instructions and counseling regarding her condition or for health maintenance advice. Please see specific information pulled into the AVS if appropriate.       Electronically signed by RUIZ Choi, 06/18/25, 3:22 PM EDT.    Patient or patient representative verbalized consent for the use of Ambient Listening during the visit with  RUIZ Choi for chart documentation. 6/18/2025  15:22 EDT     Dictated Utilizing Dragon Dictation: Part of this note may be an electronic transcription/translation of spoken language to printed text using the Dragon Dictation System

## 2025-06-23 DIAGNOSIS — R94.39 ABNORMAL NUCLEAR STRESS TEST: Primary | ICD-10-CM

## 2025-06-24 ENCOUNTER — HOSPITAL ENCOUNTER (OUTPATIENT)
Facility: HOSPITAL | Age: 65
Discharge: HOME | End: 2025-06-24
Attending: INTERNAL MEDICINE | Admitting: INTERNAL MEDICINE
Payer: MEDICARE

## 2025-06-24 VITALS
BODY MASS INDEX: 24.66 KG/M2 | TEMPERATURE: 97.8 F | HEIGHT: 65 IN | WEIGHT: 148 LBS | DIASTOLIC BLOOD PRESSURE: 64 MMHG | SYSTOLIC BLOOD PRESSURE: 116 MMHG | OXYGEN SATURATION: 100 % | RESPIRATION RATE: 18 BRPM | HEART RATE: 65 BPM

## 2025-06-24 DIAGNOSIS — R93.89 ABNORMAL COMPUTED TOMOGRAPHY ANGIOGRAPHY (CTA): ICD-10-CM

## 2025-06-24 DIAGNOSIS — R94.39 ABNORMAL NUCLEAR STRESS TEST: ICD-10-CM

## 2025-06-24 DIAGNOSIS — Z91.89 CHRONIC CHEST PAIN WITH HIGH RISK FOR CAD: ICD-10-CM

## 2025-06-24 DIAGNOSIS — G89.29 CHRONIC CHEST PAIN WITH HIGH RISK FOR CAD: ICD-10-CM

## 2025-06-24 DIAGNOSIS — R07.9 CHRONIC CHEST PAIN WITH HIGH RISK FOR CAD: ICD-10-CM

## 2025-06-24 LAB
ANION GAP SERPL CALCULATED.3IONS-SCNC: 7.6 MMOL/L (ref 5–15)
BUN SERPL-MCNC: 9.8 MG/DL (ref 8–23)
BUN/CREAT SERPL: 12.7 (ref 7–25)
CALCIUM SPEC-SCNC: 8.5 MG/DL (ref 8.6–10.5)
CHLORIDE SERPL-SCNC: 107 MMOL/L (ref 98–107)
CO2 SERPL-SCNC: 25.4 MMOL/L (ref 22–29)
CREAT SERPL-MCNC: 0.77 MG/DL (ref 0.57–1)
DEPRECATED RDW RBC AUTO: 59.8 FL (ref 37–54)
EGFRCR SERPLBLD CKD-EPI 2021: 86.8 ML/MIN/1.73
ERYTHROCYTE [DISTWIDTH] IN BLOOD BY AUTOMATED COUNT: 14.9 % (ref 12.3–15.4)
GLUCOSE SERPL-MCNC: 189 MG/DL (ref 65–99)
HCT VFR BLD AUTO: 32.9 % (ref 34–46.6)
HGB BLD-MCNC: 10.5 G/DL (ref 12–15.9)
MCH RBC QN AUTO: 34.8 PG (ref 26.6–33)
MCHC RBC AUTO-ENTMCNC: 31.9 G/DL (ref 31.5–35.7)
MCV RBC AUTO: 108.9 FL (ref 79–97)
PLATELET # BLD AUTO: 282 10*3/MM3 (ref 140–450)
PMV BLD AUTO: 8.7 FL (ref 6–12)
POTASSIUM SERPL-SCNC: 4.2 MMOL/L (ref 3.5–5.2)
RBC # BLD AUTO: 3.02 10*6/MM3 (ref 3.77–5.28)
SODIUM SERPL-SCNC: 140 MMOL/L (ref 136–145)
WBC NRBC COR # BLD AUTO: 3.28 10*3/MM3 (ref 3.4–10.8)

## 2025-06-24 PROCEDURE — 80048 BASIC METABOLIC PNL TOTAL CA: CPT | Performed by: INTERNAL MEDICINE

## 2025-06-24 PROCEDURE — 25810000003 SODIUM CHLORIDE 0.9 % SOLUTION: Performed by: INTERNAL MEDICINE

## 2025-06-24 PROCEDURE — A9270 NON-COVERED ITEM OR SERVICE: HCPCS | Performed by: INTERNAL MEDICINE

## 2025-06-24 PROCEDURE — 85027 COMPLETE CBC AUTOMATED: CPT | Performed by: INTERNAL MEDICINE

## 2025-06-24 PROCEDURE — 25010000002 LIDOCAINE 2% SOLUTION: Performed by: INTERNAL MEDICINE

## 2025-06-24 PROCEDURE — 25010000002 MIDAZOLAM PER 1 MG: Performed by: INTERNAL MEDICINE

## 2025-06-24 PROCEDURE — 93458 L HRT ARTERY/VENTRICLE ANGIO: CPT | Performed by: INTERNAL MEDICINE

## 2025-06-24 PROCEDURE — C1894 INTRO/SHEATH, NON-LASER: HCPCS | Performed by: INTERNAL MEDICINE

## 2025-06-24 PROCEDURE — C1769 GUIDE WIRE: HCPCS | Performed by: INTERNAL MEDICINE

## 2025-06-24 PROCEDURE — 25510000001 IOPAMIDOL PER 1 ML: Performed by: INTERNAL MEDICINE

## 2025-06-24 PROCEDURE — 63710000001 ASPIRIN 81 MG CHEWABLE TABLET: Performed by: INTERNAL MEDICINE

## 2025-06-24 PROCEDURE — 25010000002 HEPARIN (PORCINE) PER 1000 UNITS: Performed by: INTERNAL MEDICINE

## 2025-06-24 PROCEDURE — 25010000002 FENTANYL CITRATE (PF) 50 MCG/ML SOLUTION: Performed by: INTERNAL MEDICINE

## 2025-06-24 RX ORDER — NITROGLYCERIN 0.4 MG/1
0.4 TABLET SUBLINGUAL
Status: DISCONTINUED | OUTPATIENT
Start: 2025-06-24 | End: 2025-06-24 | Stop reason: HOSPADM

## 2025-06-24 RX ORDER — HEPARIN SODIUM 1000 [USP'U]/ML
INJECTION, SOLUTION INTRAVENOUS; SUBCUTANEOUS
Status: DISCONTINUED | OUTPATIENT
Start: 2025-06-24 | End: 2025-06-24 | Stop reason: HOSPADM

## 2025-06-24 RX ORDER — ACETAMINOPHEN 325 MG/1
650 TABLET ORAL EVERY 4 HOURS PRN
Status: DISCONTINUED | OUTPATIENT
Start: 2025-06-24 | End: 2025-06-24 | Stop reason: HOSPADM

## 2025-06-24 RX ORDER — LIDOCAINE HYDROCHLORIDE 20 MG/ML
INJECTION, SOLUTION INFILTRATION; PERINEURAL
Status: DISCONTINUED | OUTPATIENT
Start: 2025-06-24 | End: 2025-06-24 | Stop reason: HOSPADM

## 2025-06-24 RX ORDER — MIDAZOLAM HYDROCHLORIDE 1 MG/ML
INJECTION, SOLUTION INTRAMUSCULAR; INTRAVENOUS
Status: DISCONTINUED | OUTPATIENT
Start: 2025-06-24 | End: 2025-06-24 | Stop reason: HOSPADM

## 2025-06-24 RX ORDER — IOPAMIDOL 755 MG/ML
INJECTION, SOLUTION INTRAVASCULAR
Status: DISCONTINUED | OUTPATIENT
Start: 2025-06-24 | End: 2025-06-24 | Stop reason: HOSPADM

## 2025-06-24 RX ORDER — VERAPAMIL HYDROCHLORIDE 2.5 MG/ML
INJECTION INTRAVENOUS
Status: DISCONTINUED | OUTPATIENT
Start: 2025-06-24 | End: 2025-06-24 | Stop reason: HOSPADM

## 2025-06-24 RX ORDER — SODIUM CHLORIDE 9 MG/ML
INJECTION, SOLUTION INTRAVENOUS
Status: COMPLETED | OUTPATIENT
Start: 2025-06-24 | End: 2025-06-24

## 2025-06-24 RX ORDER — FENTANYL CITRATE 50 UG/ML
INJECTION, SOLUTION INTRAMUSCULAR; INTRAVENOUS
Status: DISCONTINUED | OUTPATIENT
Start: 2025-06-24 | End: 2025-06-24 | Stop reason: HOSPADM

## 2025-06-24 RX ORDER — ASPIRIN 81 MG/1
81 TABLET, CHEWABLE ORAL ONCE
Status: COMPLETED | OUTPATIENT
Start: 2025-06-24 | End: 2025-06-24

## 2025-06-24 RX ORDER — SODIUM CHLORIDE 9 MG/ML
3 INJECTION, SOLUTION INTRAVENOUS CONTINUOUS
Status: DISCONTINUED | OUTPATIENT
Start: 2025-06-24 | End: 2025-06-24 | Stop reason: HOSPADM

## 2025-06-24 RX ADMIN — ASPIRIN 81 MG: 81 TABLET, CHEWABLE ORAL at 08:09

## 2025-06-24 NOTE — Clinical Note
Hemostasis started on the right radial artery. R-Band was used in achieving hemostasis. Radial compression device applied to vessel. Hemostasis achieved successfully. Closure device additional comment: 10ml air

## 2025-06-24 NOTE — DISCHARGE INSTR - ACTIVITY
Take it easy for the next several days to one week. No driving or signing legal documents for the next 24 hours. Keep cath site clean, dry, and covered. Do not submerge site underwater, no hot tubs, tub baths, or swimming pools for the next several days to one week.

## 2025-06-28 DIAGNOSIS — G89.29 CHRONIC CHEST PAIN WITH HIGH RISK FOR CAD: ICD-10-CM

## 2025-06-28 DIAGNOSIS — Z91.89 CHRONIC CHEST PAIN WITH HIGH RISK FOR CAD: ICD-10-CM

## 2025-06-28 DIAGNOSIS — R07.9 CHRONIC CHEST PAIN WITH HIGH RISK FOR CAD: ICD-10-CM

## 2025-07-01 DIAGNOSIS — R07.9 CHRONIC CHEST PAIN WITH HIGH RISK FOR CAD: ICD-10-CM

## 2025-07-01 DIAGNOSIS — G89.29 CHRONIC CHEST PAIN WITH HIGH RISK FOR CAD: ICD-10-CM

## 2025-07-01 DIAGNOSIS — Z91.89 CHRONIC CHEST PAIN WITH HIGH RISK FOR CAD: ICD-10-CM

## 2025-07-01 RX ORDER — NITROGLYCERIN 0.4 MG/1
TABLET SUBLINGUAL
Qty: 25 TABLET | OUTPATIENT
Start: 2025-07-01

## 2025-07-01 RX ORDER — NITROGLYCERIN 0.4 MG/1
TABLET SUBLINGUAL
Qty: 30 TABLET | Refills: 0 | Status: SHIPPED | OUTPATIENT
Start: 2025-07-01

## 2025-07-14 ENCOUNTER — OFFICE VISIT (OUTPATIENT)
Dept: CARDIOLOGY | Facility: CLINIC | Age: 65
End: 2025-07-14
Payer: MEDICARE

## 2025-07-14 ENCOUNTER — TELEPHONE (OUTPATIENT)
Dept: CARDIOLOGY | Facility: CLINIC | Age: 65
End: 2025-07-14
Payer: MEDICARE

## 2025-07-14 VITALS
HEIGHT: 65 IN | WEIGHT: 144.6 LBS | SYSTOLIC BLOOD PRESSURE: 172 MMHG | BODY MASS INDEX: 24.09 KG/M2 | OXYGEN SATURATION: 99 % | HEART RATE: 66 BPM | DIASTOLIC BLOOD PRESSURE: 90 MMHG

## 2025-07-14 DIAGNOSIS — I48.0 PAROXYSMAL ATRIAL FIBRILLATION: ICD-10-CM

## 2025-07-14 DIAGNOSIS — I10 ESSENTIAL HYPERTENSION: Primary | ICD-10-CM

## 2025-07-14 PROCEDURE — 99214 OFFICE O/P EST MOD 30 MIN: CPT | Performed by: PHYSICIAN ASSISTANT

## 2025-07-14 PROCEDURE — 3077F SYST BP >= 140 MM HG: CPT | Performed by: PHYSICIAN ASSISTANT

## 2025-07-14 PROCEDURE — 3080F DIAST BP >= 90 MM HG: CPT | Performed by: PHYSICIAN ASSISTANT

## 2025-07-14 PROCEDURE — G2211 COMPLEX E/M VISIT ADD ON: HCPCS | Performed by: PHYSICIAN ASSISTANT

## 2025-07-14 RX ORDER — LOSARTAN POTASSIUM 50 MG/1
50 TABLET ORAL 2 TIMES DAILY
Qty: 180 TABLET | Refills: 2 | Status: ON HOLD | OUTPATIENT
Start: 2025-07-14

## 2025-07-14 RX ORDER — FUROSEMIDE 20 MG/1
1 TABLET ORAL DAILY PRN
Status: ON HOLD | COMMUNITY
Start: 2025-06-25

## 2025-07-14 NOTE — TELEPHONE ENCOUNTER
Name: Lashae Benitez      Relationship: Self      Best Callback Number: [phone]      HUB PROVIDED THE RELAY MESSAGE FROM THE OFFICE      PATIENT: VOICED UNDERSTANDING AND HAS NO FURTHER QUESTIONS AT THIS TIME    ADDITIONAL INFORMATION: PATIENT SAID IT'S A COMPOUND CREAM WITH SEVERAL THINGS MIXED IN IT BUT IT DOESN'T HAVE A NAME. INGREDIENTS: KETAMINE, GABAPENTIN, IBUPROFEN, LIDOCAINE, BACLOSEN. PLEASE CALL THE PATIENT WITH ANY QUESTIONS

## 2025-07-14 NOTE — Clinical Note
She had show me a cream that she was taking that was mixed with several prescription medications I do not see it on her medication list can we call her and add this to her list

## 2025-07-14 NOTE — TELEPHONE ENCOUNTER
Hub to relay.     Called the office and LM. We are needing to know the name of the cream Lashae uses.

## 2025-07-14 NOTE — PROGRESS NOTES
Kreis, Samuel Duane, MD  Lashae Benitez  1960  07/14/2025    Patient Active Problem List   Diagnosis    Hiatal hernia    Essential hypertension    Sjogren's syndrome    Multiple sclerosis    Psoriasis    Fibromyalgia    Osteoarthritis    Psoriatic arthritis    RA (rheumatoid arthritis)    Degenerative disc disease, lumbar    TMJ arthritis    Dupuytren's disease    H. pylori infection    Family history of coronary artery disease    Type 2 diabetes mellitus    Edema    Bilateral leg edema    Isolated corticotropin deficiency    Hyponatremia    Paroxysmal atrial fibrillation    Sepsis    Bacteremia, escherichia coli    Iron deficiency anemia    Malabsorption due to intolerance, not elsewhere classified    Chronic chest pain with high risk for CAD    Abnormal nuclear stress test    Abnormal computed tomography angiography (CTA)       Dear Kreis, Samuel Duane, MD:    Subjective     History of Present Illness:    Chief Complaint   Patient presents with    Follow-up     S/P CATH       Lashae Benitez is a pleasant 64 y.o. 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  She does have essential hypertension AND family history of premature CAD with her father having a major AMI in his 30s. She comes in for cardiology follow up.     Lashae comes in since she was last seen she did have left heart catheterization which showed only minimal nonobstructive disease but also incidentally had mild myocardial bridging found.  Clinically she still reports some occasional chest pains still describing as a pressure in her chest.  She denies any awareness of atrial fibrillation with the exception of only mild palpitations occurring very infrequently typically less than once monthly.  She denies any bleeding issues with Eliquis blood pressure is elevated in the office  "today she reports it is elevated at the house but also has well-controlled readings as well but admits she does not check blood pressure on a consistent daily basis.    Allergies   Allergen Reactions    Codeine Angioedema    Imuran [Azathioprine] Other (See Comments)     Has pancreatis attacks with med    Januvia [Sitagliptin] Other (See Comments)     Has pancreatis attacks with med     Penicillins Angioedema    Sulfa Antibiotics Angioedema    Sulfasalazine Unknown (See Comments)    Beta Adrenergic Blockers Other (See Comments)     I called pt to ask her about the allergy to bblockers in her chart. Pt states \"too big of a dose makes her psoriasis act up\", but this current dose of metoprolol 50 mg bid, she has been on for a long time, with no ill side effects.  CATA,CMA 3/28/18   :      Current Outpatient Medications:     amitriptyline (ELAVIL) 25 MG tablet, Take 1 tablet by mouth Every Night., Disp: , Rfl:     amLODIPine (NORVASC) 5 MG tablet, Take 1 tablet by mouth Daily., Disp: 30 tablet, Rfl: 11    B-D 3CC LUER-NITIN SYR 25GX5/8\" 25G X 5/8\" 3 ML misc, , Disp: , Rfl:     Butalbital-Acetaminophen  MG capsule, Take 300 mg by mouth Daily As Needed., Disp: , Rfl:     butalbital-acetaminophen-caffeine (ORBIVAN) -40 MG capsule capsule, Take 1 capsule by mouth., Disp: , Rfl:     celecoxib (CeleBREX) 200 MG capsule, Take 1 capsule by mouth Daily., Disp: , Rfl:     cyanocobalamin 1000 MCG/ML injection, Inject 1 mL into the appropriate muscle as directed by prescriber Every 28 (Twenty-Eight) Days., Disp: , Rfl:     cyclobenzaprine (FLEXERIL) 10 MG tablet, Take 1 tablet by mouth Every Evening., Disp: , Rfl:     Diclofenac Sodium (VOLTAREN) 1 % gel gel, Apply 2 g topically to the appropriate area as directed 4 (Four) Times a Day As Needed., Disp: , Rfl:     DULoxetine (CYMBALTA) 30 MG capsule, Take 1 capsule by mouth Every Morning., Disp: , Rfl:     DULoxetine (CYMBALTA) 60 MG capsule, Take 1 capsule by mouth Daily., " Disp: , Rfl:     Embecta Pen Needle Akila 2 Gen 32G X 4 MM misc, , Disp: , Rfl:     EMGALITY 120 MG/ML prefilled syringe, Inject 1 mL under the skin into the appropriate area as directed Every 30 (Thirty) Days., Disp: , Rfl: 3    famotidine (PEPCID) 40 MG tablet, Take 1 tablet by mouth 2 (Two) Times a Day., Disp: , Rfl:     ferrous gluconate (FERGON) 240 (27 FE) MG tablet, Take 1 tablet by mouth Every Morning., Disp: , Rfl:     fexofenadine (ALLEGRA) 180 MG tablet, Take 1 tablet by mouth Daily., Disp: , Rfl:     flecainide (TAMBOCOR) 50 MG tablet, TAKE 1 TABLET TWICE A DAY, Disp: 180 tablet, Rfl: 3    fluticasone-salmeterol (ADVAIR HFA) 115-21 MCG/ACT inhaler, Inhale 2 puffs 2 (Two) Times a Day., Disp: , Rfl:     folic acid (FOLVITE) 1 MG tablet, Take 1 tablet by mouth Daily., Disp: , Rfl:     furosemide (LASIX) 20 MG tablet, Take 1 tablet by mouth Daily., Disp: , Rfl:     gabapentin (NEURONTIN) 600 MG tablet, Take 1 tablet by mouth 3 (Three) Times a Day., Disp: , Rfl:     levothyroxine (SYNTHROID, LEVOTHROID) 112 MCG tablet, Take 1 tablet by mouth Daily., Disp: , Rfl:     losartan (COZAAR) 50 MG tablet, Take 1 tablet by mouth 2 (Two) Times a Day., Disp: 180 tablet, Rfl: 2    methotrexate 2.5 MG tablet, Take 10 tablets by mouth 1 (One) Time Per Week. Prior to [facility] Admission, Patient was on: Takes 10 tablets on Saturday, Disp: , Rfl:     metoprolol tartrate (LOPRESSOR) 100 MG tablet, TAKE 1 TABLET TWICE A DAY, Disp: 180 tablet, Rfl: 0    nitroglycerin (NITROSTAT) 0.4 MG SL tablet, 1 under the tongue as needed for angina, may repeat q5mins for up three doses, Disp: 30 tablet, Rfl: 0    nystatin 162100 UNIT/GM topical powder, , Disp: , Rfl:     olopatadine (PATANASE) 0.6 % solution nasal solution, 2 sprays by Each Nare route 2 (Two) Times a Day., Disp: , Rfl:     omeprazole (priLOSEC) 40 MG capsule, , Disp: , Rfl:     potassium chloride (KLOR-CON M10) 10 MEQ CR tablet, Take 1 tablet by mouth., Disp: , Rfl:      "primidone (MYSOLINE) 50 MG tablet, Take 1 tablet by mouth Every Night., Disp: , Rfl:     rimegepant 75 MG dispersible tablet, , Disp: , Rfl:     rosuvastatin (CRESTOR) 10 MG tablet, Take 1 tablet by mouth Daily., Disp: 90 tablet, Rfl: 1    sodium chloride 1 g tablet, Take 2 tablets by mouth 3 (Three) Times a Day., Disp: , Rfl:     sucralfate (CARAFATE) 1 g tablet, Take 1 tablet by mouth 4 (Four) Times a Day. Take one tablet by mouth 4 times daily 1 hour before meals and at bedtime on an empty stomach., Disp: , Rfl:     Toujeo SoloStar 300 UNIT/ML solution pen-injector injection, , Disp: , Rfl:     traMADol (ULTRAM) 50 MG tablet, Take 1 tablet by mouth 2 (Two) Times a Day As Needed., Disp: , Rfl:     vitamin D (ERGOCALCIFEROL) 1.25 MG (79902 UT) capsule capsule, , Disp: , Rfl:     Xarelto 20 MG tablet, TAKE 1 TABLET DAILY, Disp: 90 tablet, Rfl: 3    Xeljanz XR 11 MG tablet sustained-release 24 hour, , Disp: , Rfl:     Upadacitinib ER (Rinvoq) 15 MG tablet sustained-release 24 hour, Take  by mouth., Disp: , Rfl:     The following portions of the patient's history were reviewed and updated as appropriate: allergies, current medications, past family history, past medical history, past social history, past surgical history and problem list.    Social History     Tobacco Use    Smoking status: Never     Passive exposure: Never    Smokeless tobacco: Never   Vaping Use    Vaping status: Never Used   Substance Use Topics    Alcohol use: Never    Drug use: Never         Objective   Vitals:    07/14/25 0851   BP: 172/90   Pulse: 66   SpO2: 99%   Weight: 65.6 kg (144 lb 9.6 oz)   Height: 165.1 cm (65\")     Body mass index is 24.06 kg/m².    ROS    Constitutional:       General: Not in acute distress.     Appearance: Healthy appearance. Well-developed and not in distress. Not diaphoretic.   Eyes:      Conjunctiva/sclera: Conjunctivae normal.      Pupils: Pupils are equal, round, and reactive to light.   HENT:      Head: " Normocephalic and atraumatic.   Neck:      Vascular: No carotid bruit or JVD.   Pulmonary:      Effort: Pulmonary effort is normal. No respiratory distress.      Breath sounds: Normal breath sounds.   Cardiovascular:      Normal rate. Regular rhythm.   Edema:     Peripheral edema absent.   Skin:     General: Skin is cool.   Neurological:      Mental Status: Alert, oriented to person, place, and time and oriented to person, place and time.         Lab Results   Component Value Date     06/24/2025    K 4.2 06/24/2025     06/24/2025    CO2 25.4 06/24/2025    BUN 9.8 06/24/2025    CREATININE 0.77 06/24/2025    GLUCOSE 189 (H) 06/24/2025    CALCIUM 8.5 (L) 06/24/2025    AST 39 (H) 05/12/2025    ALT 44 (H) 05/12/2025    ALKPHOS 147 (H) 05/12/2025     Lab Results   Component Value Date    CKTOTAL 40 04/12/2017     Lab Results   Component Value Date    WBC 3.28 (L) 06/24/2025    HGB 10.5 (L) 06/24/2025    HCT 32.9 (L) 06/24/2025     06/24/2025     Lab Results   Component Value Date    INR 1.54 (H) 09/13/2023    INR 0.97 11/22/2018    INR 0.93 07/20/2015     Lab Results   Component Value Date    MG 2.0 09/21/2020     Lab Results   Component Value Date    TSH 15.900 (H) 03/12/2025    CHLPL 132 07/22/2015    TRIG 62 03/12/2025    HDL 50 03/12/2025    LDL 72 03/12/2025      Lab Results   Component Value Date    BNP 51.0 11/22/2018       During this visit the following were done:  Labs Reviewed []    Labs Ordered []    Radiology Reports Reviewed []    Radiology Ordered []    PCP Records Reviewed []    Referring Provider Records Reviewed []    ER Records Reviewed []    Hospital Records Reviewed []    History Obtained From Family []    Radiology Images Reviewed []    Other Reviewed []    Records Requested []       Procedures    Assessment & Plan    Diagnosis Plan   1. Essential hypertension  Basic Metabolic Panel      2. Paroxysmal atrial fibrillation                 Recommendations:  Minimal nonobstructive CAD  and mild myocardial bridging  I will discontinue Imdur since this can worsen myocardial bridging symptoms.  Reassured her on chest pains and encouraged noncardiac workup.  Essential hypertension  Not at goal will increase losartan to 50 mg twice daily  Check BMP in 5 days  Continue amlodipine, metoprolol  Encouraged her to check blood pressure twice daily and provide blood pressure log at next visit.    No follow-ups on file.    As always, I appreciate very much the opportunity to participate in the cardiovascular care of your patients.      With Best Regards,    Gael Baum PA-C

## 2025-07-14 NOTE — TELEPHONE ENCOUNTER
Called pt to see who prescribed it. She stated it was [facility] in [facility] [place].     Called their office and they are closed from 12-1 for lunch. Their number is [phone]

## 2025-07-14 NOTE — TELEPHONE ENCOUNTER
Called pt and she stated their is no name on it just the INGREDIENTS: KETAMINE, GABAPENTIN, IBUPROFEN, LIDOCAINE, BACLOSEN.

## 2025-07-14 NOTE — TELEPHONE ENCOUNTER
Hub to relay.     Called pt to get the name of the cream so we can add it to her med list.   No answer LM.         ----- Message from Gael Baum sent at 7/14/2025  9:55 AM EDT -----  She had show me a cream that she was taking that was mixed with several prescription medications I do not see it on her medication list can we call her and add this to her list

## 2025-07-15 NOTE — TELEPHONE ENCOUNTER
Discussed this with Dr. England.  Since this is an extreme chance of systemic absorption is low the risk is low with interfering with her medications, however ibuprofen still carries risk of increasing cardiovascular disease and bleeding risk.  Although this may be lower with cream then with oral supplementation it still increases risk.  If she expresses understanding with this risk and would still wishes to try this cream she can.

## 2025-07-16 ENCOUNTER — LAB (OUTPATIENT)
Dept: LAB | Facility: HOSPITAL | Age: 65
End: 2025-07-16
Payer: MEDICARE

## 2025-07-16 DIAGNOSIS — I10 ESSENTIAL HYPERTENSION: ICD-10-CM

## 2025-07-16 LAB
ANION GAP SERPL CALCULATED.3IONS-SCNC: 11 MMOL/L (ref 5–15)
BUN SERPL-MCNC: 9 MG/DL (ref 8–23)
BUN/CREAT SERPL: 13.2 (ref 7–25)
CALCIUM SPEC-SCNC: 8.3 MG/DL (ref 8.6–10.5)
CHLORIDE SERPL-SCNC: 100 MMOL/L (ref 98–107)
CO2 SERPL-SCNC: 23 MMOL/L (ref 22–29)
CREAT SERPL-MCNC: 0.68 MG/DL (ref 0.57–1)
EGFRCR SERPLBLD CKD-EPI 2021: 98 ML/MIN/1.73
GLUCOSE SERPL-MCNC: 215 MG/DL (ref 65–99)
POTASSIUM SERPL-SCNC: 3.8 MMOL/L (ref 3.5–5.2)
SODIUM SERPL-SCNC: 134 MMOL/L (ref 136–145)

## 2025-07-16 PROCEDURE — 80048 BASIC METABOLIC PNL TOTAL CA: CPT

## 2025-07-16 PROCEDURE — 36415 COLL VENOUS BLD VENIPUNCTURE: CPT

## 2025-07-17 ENCOUNTER — APPOINTMENT (OUTPATIENT)
Dept: GENERAL RADIOLOGY | Facility: HOSPITAL | Age: 65
DRG: 481 | End: 2025-07-17
Payer: MEDICARE

## 2025-07-17 ENCOUNTER — HOSPITAL ENCOUNTER (INPATIENT)
Facility: HOSPITAL | Age: 65
LOS: 5 days | DRG: 481 | End: 2025-07-22
Admitting: INTERNAL MEDICINE
Payer: MEDICARE

## 2025-07-17 DIAGNOSIS — R07.2 PRECORDIAL PAIN: ICD-10-CM

## 2025-07-17 DIAGNOSIS — S72.142A CLOSED INTERTROCHANTERIC FRACTURE OF HIP, LEFT, INITIAL ENCOUNTER: Primary | ICD-10-CM

## 2025-07-17 DIAGNOSIS — R94.39 ABNORMAL NUCLEAR STRESS TEST: ICD-10-CM

## 2025-07-17 DIAGNOSIS — Z98.890 POST-OPERATIVE STATE: ICD-10-CM

## 2025-07-17 PROBLEM — S72.009A HIP FRACTURE: Status: ACTIVE | Noted: 2025-07-17

## 2025-07-17 LAB
ALBUMIN SERPL-MCNC: 3.5 G/DL (ref 3.5–5.2)
ALBUMIN/GLOB SERPL: 1.5 G/DL
ALP SERPL-CCNC: 159 U/L (ref 39–117)
ALT SERPL W P-5'-P-CCNC: 37 U/L (ref 1–33)
ANION GAP SERPL CALCULATED.3IONS-SCNC: 11.9 MMOL/L (ref 5–15)
APTT PPP: 28.7 SECONDS (ref 24.5–35.9)
AST SERPL-CCNC: 25 U/L (ref 1–32)
BASOPHILS # BLD AUTO: 0.03 10*3/MM3 (ref 0–0.2)
BASOPHILS NFR BLD AUTO: 0.4 % (ref 0–1.5)
BILIRUB SERPL-MCNC: 0.3 MG/DL (ref 0–1.2)
BILIRUB UR QL STRIP: NEGATIVE
BUN SERPL-MCNC: 11.5 MG/DL (ref 8–23)
BUN/CREAT SERPL: 17.4 (ref 7–25)
CALCIUM SPEC-SCNC: 8.3 MG/DL (ref 8.6–10.5)
CHLORIDE SERPL-SCNC: 102 MMOL/L (ref 98–107)
CLARITY UR: CLEAR
CO2 SERPL-SCNC: 21.1 MMOL/L (ref 22–29)
COLOR UR: YELLOW
CREAT SERPL-MCNC: 0.66 MG/DL (ref 0.57–1)
DEPRECATED RDW RBC AUTO: 52.5 FL (ref 37–54)
EGFRCR SERPLBLD CKD-EPI 2021: 98.7 ML/MIN/1.73
EOSINOPHIL # BLD AUTO: 0.05 10*3/MM3 (ref 0–0.4)
EOSINOPHIL NFR BLD AUTO: 0.7 % (ref 0.3–6.2)
ERYTHROCYTE [DISTWIDTH] IN BLOOD BY AUTOMATED COUNT: 13.8 % (ref 12.3–15.4)
GLOBULIN UR ELPH-MCNC: 2.3 GM/DL
GLUCOSE BLDC GLUCOMTR-MCNC: 164 MG/DL (ref 70–130)
GLUCOSE BLDC GLUCOMTR-MCNC: 244 MG/DL (ref 70–130)
GLUCOSE SERPL-MCNC: 284 MG/DL (ref 65–99)
GLUCOSE UR STRIP-MCNC: ABNORMAL MG/DL
HBA1C MFR BLD: 9.1 % (ref 4.8–5.6)
HCT VFR BLD AUTO: 33.6 % (ref 34–46.6)
HGB BLD-MCNC: 11.3 G/DL (ref 12–15.9)
HGB UR QL STRIP.AUTO: NEGATIVE
IMM GRANULOCYTES # BLD AUTO: 0.04 10*3/MM3 (ref 0–0.05)
IMM GRANULOCYTES NFR BLD AUTO: 0.6 % (ref 0–0.5)
INR PPP: 1.26 (ref 0.9–1.1)
KETONES UR QL STRIP: NEGATIVE
LEUKOCYTE ESTERASE UR QL STRIP.AUTO: NEGATIVE
LYMPHOCYTES # BLD AUTO: 1.1 10*3/MM3 (ref 0.7–3.1)
LYMPHOCYTES NFR BLD AUTO: 16.2 % (ref 19.6–45.3)
MCH RBC QN AUTO: 34.8 PG (ref 26.6–33)
MCHC RBC AUTO-ENTMCNC: 33.6 G/DL (ref 31.5–35.7)
MCV RBC AUTO: 103.4 FL (ref 79–97)
MONOCYTES # BLD AUTO: 0.39 10*3/MM3 (ref 0.1–0.9)
MONOCYTES NFR BLD AUTO: 5.8 % (ref 5–12)
NEUTROPHILS NFR BLD AUTO: 5.16 10*3/MM3 (ref 1.7–7)
NEUTROPHILS NFR BLD AUTO: 76.3 % (ref 42.7–76)
NITRITE UR QL STRIP: NEGATIVE
NRBC BLD AUTO-RTO: 0 /100 WBC (ref 0–0.2)
PH UR STRIP.AUTO: 7 [PH] (ref 5–8)
PLATELET # BLD AUTO: 299 10*3/MM3 (ref 140–450)
PMV BLD AUTO: 9.4 FL (ref 6–12)
POTASSIUM SERPL-SCNC: 3.6 MMOL/L (ref 3.5–5.2)
PROT SERPL-MCNC: 5.8 G/DL (ref 6–8.5)
PROT UR QL STRIP: NEGATIVE
PROTHROMBIN TIME: 16.6 SECONDS (ref 12.5–15.2)
QT INTERVAL: 392 MS
QTC INTERVAL: 435 MS
RBC # BLD AUTO: 3.25 10*6/MM3 (ref 3.77–5.28)
SODIUM SERPL-SCNC: 135 MMOL/L (ref 136–145)
SP GR UR STRIP: 1.01 (ref 1–1.03)
UROBILINOGEN UR QL STRIP: ABNORMAL
WBC NRBC COR # BLD AUTO: 6.77 10*3/MM3 (ref 3.4–10.8)

## 2025-07-17 PROCEDURE — 83036 HEMOGLOBIN GLYCOSYLATED A1C: CPT | Performed by: INTERNAL MEDICINE

## 2025-07-17 PROCEDURE — 73502 X-RAY EXAM HIP UNI 2-3 VIEWS: CPT | Performed by: RADIOLOGY

## 2025-07-17 PROCEDURE — 80053 COMPREHEN METABOLIC PANEL: CPT | Performed by: NURSE PRACTITIONER

## 2025-07-17 PROCEDURE — 99223 1ST HOSP IP/OBS HIGH 75: CPT | Performed by: INTERNAL MEDICINE

## 2025-07-17 PROCEDURE — 85025 COMPLETE CBC W/AUTO DIFF WBC: CPT | Performed by: NURSE PRACTITIONER

## 2025-07-17 PROCEDURE — 93005 ELECTROCARDIOGRAM TRACING: CPT | Performed by: NURSE PRACTITIONER

## 2025-07-17 PROCEDURE — 63710000001 INSULIN LISPRO (HUMAN) PER 5 UNITS: Performed by: INTERNAL MEDICINE

## 2025-07-17 PROCEDURE — 25010000002 HYDROMORPHONE PER 4 MG: Performed by: NURSE PRACTITIONER

## 2025-07-17 PROCEDURE — 25010000002 HYDROMORPHONE 1 MG/ML SOLUTION: Performed by: NURSE PRACTITIONER

## 2025-07-17 PROCEDURE — 85730 THROMBOPLASTIN TIME PARTIAL: CPT | Performed by: NURSE PRACTITIONER

## 2025-07-17 PROCEDURE — 93010 ELECTROCARDIOGRAM REPORT: CPT | Performed by: INTERNAL MEDICINE

## 2025-07-17 PROCEDURE — 85610 PROTHROMBIN TIME: CPT | Performed by: NURSE PRACTITIONER

## 2025-07-17 PROCEDURE — 73502 X-RAY EXAM HIP UNI 2-3 VIEWS: CPT

## 2025-07-17 PROCEDURE — 73030 X-RAY EXAM OF SHOULDER: CPT

## 2025-07-17 PROCEDURE — 99285 EMERGENCY DEPT VISIT HI MDM: CPT

## 2025-07-17 PROCEDURE — 73080 X-RAY EXAM OF ELBOW: CPT

## 2025-07-17 PROCEDURE — 25010000002 HYDROMORPHONE PER 4 MG: Performed by: INTERNAL MEDICINE

## 2025-07-17 PROCEDURE — 81003 URINALYSIS AUTO W/O SCOPE: CPT | Performed by: NURSE PRACTITIONER

## 2025-07-17 PROCEDURE — 71045 X-RAY EXAM CHEST 1 VIEW: CPT | Performed by: RADIOLOGY

## 2025-07-17 PROCEDURE — 71045 X-RAY EXAM CHEST 1 VIEW: CPT

## 2025-07-17 PROCEDURE — 73552 X-RAY EXAM OF FEMUR 2/>: CPT

## 2025-07-17 PROCEDURE — 73030 X-RAY EXAM OF SHOULDER: CPT | Performed by: RADIOLOGY

## 2025-07-17 PROCEDURE — 73552 X-RAY EXAM OF FEMUR 2/>: CPT | Performed by: RADIOLOGY

## 2025-07-17 PROCEDURE — 82948 REAGENT STRIP/BLOOD GLUCOSE: CPT

## 2025-07-17 PROCEDURE — 36415 COLL VENOUS BLD VENIPUNCTURE: CPT

## 2025-07-17 PROCEDURE — 73080 X-RAY EXAM OF ELBOW: CPT | Performed by: RADIOLOGY

## 2025-07-17 RX ORDER — POTASSIUM CHLORIDE 1500 MG/1
10 TABLET, EXTENDED RELEASE ORAL DAILY
Status: CANCELLED | OUTPATIENT
Start: 2025-07-18

## 2025-07-17 RX ORDER — SODIUM CHLORIDE 0.9 % (FLUSH) 0.9 %
10 SYRINGE (ML) INJECTION AS NEEDED
Status: DISCONTINUED | OUTPATIENT
Start: 2025-07-17 | End: 2025-07-22 | Stop reason: HOSPADM

## 2025-07-17 RX ORDER — TRAMADOL HYDROCHLORIDE 50 MG/1
50 TABLET ORAL 3 TIMES DAILY PRN
Status: CANCELLED | OUTPATIENT
Start: 2025-07-17

## 2025-07-17 RX ORDER — TOFACITINIB 11 MG/1
11 TABLET, FILM COATED, EXTENDED RELEASE ORAL DAILY
Status: ON HOLD | COMMUNITY

## 2025-07-17 RX ORDER — NICOTINE POLACRILEX 4 MG
15 LOZENGE BUCCAL
Status: DISCONTINUED | OUTPATIENT
Start: 2025-07-17 | End: 2025-07-22 | Stop reason: HOSPADM

## 2025-07-17 RX ORDER — INSULIN LISPRO 100 [IU]/ML
2-7 INJECTION, SOLUTION INTRAVENOUS; SUBCUTANEOUS
Status: DISCONTINUED | OUTPATIENT
Start: 2025-07-17 | End: 2025-07-22 | Stop reason: HOSPADM

## 2025-07-17 RX ORDER — DEXTROSE MONOHYDRATE 25 G/50ML
25 INJECTION, SOLUTION INTRAVENOUS
Status: DISCONTINUED | OUTPATIENT
Start: 2025-07-17 | End: 2025-07-22 | Stop reason: HOSPADM

## 2025-07-17 RX ORDER — METHOTREXATE 2.5 MG/1
25 TABLET ORAL WEEKLY
Status: CANCELLED | OUTPATIENT
Start: 2025-07-19

## 2025-07-17 RX ORDER — POLYETHYLENE GLYCOL 3350 17 G/17G
17 POWDER, FOR SOLUTION ORAL DAILY PRN
Status: DISCONTINUED | OUTPATIENT
Start: 2025-07-17 | End: 2025-07-22 | Stop reason: HOSPADM

## 2025-07-17 RX ORDER — LEVOTHYROXINE SODIUM 125 UG/1
125 CAPSULE ORAL
Status: ON HOLD | COMMUNITY

## 2025-07-17 RX ORDER — SODIUM CHLORIDE 0.9 % (FLUSH) 0.9 %
10 SYRINGE (ML) INJECTION EVERY 12 HOURS SCHEDULED
Status: DISCONTINUED | OUTPATIENT
Start: 2025-07-17 | End: 2025-07-22 | Stop reason: HOSPADM

## 2025-07-17 RX ORDER — HYDROMORPHONE HYDROCHLORIDE 1 MG/ML
0.5 INJECTION, SOLUTION INTRAMUSCULAR; INTRAVENOUS; SUBCUTANEOUS EVERY 4 HOURS PRN
Status: DISCONTINUED | OUTPATIENT
Start: 2025-07-17 | End: 2025-07-18

## 2025-07-17 RX ORDER — BISACODYL 10 MG
10 SUPPOSITORY, RECTAL RECTAL DAILY PRN
Status: DISCONTINUED | OUTPATIENT
Start: 2025-07-17 | End: 2025-07-22 | Stop reason: HOSPADM

## 2025-07-17 RX ORDER — LOSARTAN POTASSIUM 50 MG/1
50 TABLET ORAL 2 TIMES DAILY
Status: CANCELLED | OUTPATIENT
Start: 2025-07-18

## 2025-07-17 RX ORDER — HYDROMORPHONE HYDROCHLORIDE 1 MG/ML
0.5 INJECTION, SOLUTION INTRAMUSCULAR; INTRAVENOUS; SUBCUTANEOUS ONCE
Refills: 0 | Status: COMPLETED | OUTPATIENT
Start: 2025-07-17 | End: 2025-07-17

## 2025-07-17 RX ORDER — SODIUM CHLORIDE 9 MG/ML
40 INJECTION, SOLUTION INTRAVENOUS AS NEEDED
Status: DISCONTINUED | OUTPATIENT
Start: 2025-07-17 | End: 2025-07-22 | Stop reason: HOSPADM

## 2025-07-17 RX ORDER — PRIMIDONE 50 MG/1
50 TABLET ORAL NIGHTLY
Status: CANCELLED | OUTPATIENT
Start: 2025-07-17

## 2025-07-17 RX ORDER — CELECOXIB 200 MG/1
200 CAPSULE ORAL DAILY
Status: CANCELLED | OUTPATIENT
Start: 2025-07-18

## 2025-07-17 RX ORDER — FUROSEMIDE 20 MG/1
20 TABLET ORAL DAILY PRN
Status: CANCELLED | OUTPATIENT
Start: 2025-07-17

## 2025-07-17 RX ORDER — AMOXICILLIN 250 MG
2 CAPSULE ORAL 2 TIMES DAILY PRN
Status: DISCONTINUED | OUTPATIENT
Start: 2025-07-17 | End: 2025-07-22 | Stop reason: HOSPADM

## 2025-07-17 RX ORDER — GLUCAGON 1 MG/ML
1 KIT INJECTION
Status: DISCONTINUED | OUTPATIENT
Start: 2025-07-17 | End: 2025-07-22 | Stop reason: HOSPADM

## 2025-07-17 RX ORDER — CYCLOBENZAPRINE HCL 10 MG
10 TABLET ORAL 2 TIMES DAILY
Status: CANCELLED | OUTPATIENT
Start: 2025-07-17

## 2025-07-17 RX ORDER — POTASSIUM CHLORIDE 1500 MG/1
40 TABLET, EXTENDED RELEASE ORAL ONCE
Status: COMPLETED | OUTPATIENT
Start: 2025-07-17 | End: 2025-07-17

## 2025-07-17 RX ORDER — BUTALBITAL, ACETAMINOPHEN AND CAFFEINE 50; 325; 40 MG/1; MG/1; MG/1
1 TABLET ORAL EVERY 4 HOURS PRN
Status: ON HOLD | COMMUNITY
End: 2025-07-22 | Stop reason: ALTCHOICE

## 2025-07-17 RX ORDER — BISACODYL 5 MG/1
5 TABLET, DELAYED RELEASE ORAL DAILY PRN
Status: DISCONTINUED | OUTPATIENT
Start: 2025-07-17 | End: 2025-07-22 | Stop reason: HOSPADM

## 2025-07-17 RX ADMIN — HYDROMORPHONE HYDROCHLORIDE 0.5 MG: 1 INJECTION, SOLUTION INTRAMUSCULAR; INTRAVENOUS; SUBCUTANEOUS at 18:06

## 2025-07-17 RX ADMIN — HYDROMORPHONE HYDROCHLORIDE 0.5 MG: 1 INJECTION, SOLUTION INTRAMUSCULAR; INTRAVENOUS; SUBCUTANEOUS at 22:13

## 2025-07-17 RX ADMIN — Medication 10 ML: at 22:14

## 2025-07-17 RX ADMIN — HYDROMORPHONE HYDROCHLORIDE 0.5 MG: 1 INJECTION, SOLUTION INTRAMUSCULAR; INTRAVENOUS; SUBCUTANEOUS at 15:35

## 2025-07-17 RX ADMIN — POTASSIUM CHLORIDE 40 MEQ: 1500 TABLET, EXTENDED RELEASE ORAL at 18:06

## 2025-07-17 RX ADMIN — INSULIN LISPRO 3 UNITS: 100 INJECTION, SOLUTION INTRAVENOUS; SUBCUTANEOUS at 18:14

## 2025-07-17 RX ADMIN — HYDROMORPHONE HYDROCHLORIDE 0.5 MG: 1 INJECTION, SOLUTION INTRAMUSCULAR; INTRAVENOUS; SUBCUTANEOUS at 14:05

## 2025-07-17 NOTE — ED PROVIDER NOTES
Subjective   History of Present Illness  Patient is a 61-year-old female presents today for a fall.  Patient reports severe pain in her left hip also reports pain in her left shoulder and left elbow.  Patient reports that hip is the worst.  Patient reports pain is worse with any attempted movement.  Patient denies any other injury.  Patient does have a history of atrial fibrillation and is currently on anticoagulation and takes Xarelto daily.    Fall      Review of Systems   Constitutional: Negative.    HENT: Negative.     Eyes: Negative.    Respiratory: Negative.     Cardiovascular: Negative.    Gastrointestinal: Negative.    Endocrine: Negative.    Genitourinary: Negative.    Musculoskeletal:  Positive for gait problem and joint swelling.   Skin: Negative.    Allergic/Immunologic: Negative.    Hematological: Negative.    Psychiatric/Behavioral: Negative.         Past Medical History:   Diagnosis Date    Acid reflux     Allergic     Anemia     Anxiety     Atrial fibrillation     Cancer     thyroid, skin    Cervical disc disorder     Chronic pain disorder     Claustrophobia     CTS (carpal tunnel syndrome)     Degenerative disc disease, lumbar     Depression     Dupuytren's disease     Essential hypertension     Family history of coronary artery disease     Fibromyalgia     Gallbladder abscess     H. pylori infection     Hiatal hernia     Hyperlipidemia     Hypothyroidism     Low back pain     Lumbosacral disc disease     Migraines     migraines    Multiple sclerosis     Osteoarthritis     Peripheral neuropathy     Psoriasis     Psoriatic arthritis     RA (rheumatoid arthritis)     Rheumatoid arthritis     Sinusitis     Sjogren's syndrome     Stomach ulcer     Thoracic disc disorder     TMJ arthritis     Type 2 diabetes mellitus     Urinary tract infection        Allergies   Allergen Reactions    Codeine Angioedema    Imuran [Azathioprine] Other (See Comments)     Has pancreatis attacks with med    Januvia  "[Sitagliptin] Other (See Comments)     Has pancreatis attacks with med     Penicillins Angioedema    Sulfa Antibiotics Angioedema    Sulfasalazine Unknown (See Comments)    Beta Adrenergic Blockers Other (See Comments)     I called pt to ask her about the allergy to bblockers in her chart. Pt states \"too big of a dose makes her psoriasis act up\", but this current dose of metoprolol 50 mg bid, she has been on for a long time, with no ill side effects.  CATA,CMA 3/28/18       Past Surgical History:   Procedure Laterality Date    BACK SURGERY      BREAST LUMPECTOMY Left 11/1993    CARDIAC CATHETERIZATION Left 09/21/2009    Normal     CARDIAC CATHETERIZATION N/A 6/24/2025    Procedure: Left Heart Cath;  Surgeon: Teodora Barron MD;  [facility];  Service: Cardiovascular;  Laterality: N/A;    CARPAL TUNNEL RELEASE  03/2012    CERVICAL POLYPECTOMY  12/2015    CHOLECYSTECTOMY  05/2007    COLONOSCOPY      ENDOSCOPY      EPIDURAL BLOCK      GASTRIC BYPASS  09/2007    JOINT REPLACEMENT      KNEE ARTHROPLASTY      LUMBAR DISC SURGERY      C5-6    NECK SURGERY      REPLACEMENT TOTAL KNEE Right 06/2016    SACRAL NERVE STIMULATOR PLACEMENT  09/2014    SACRAL NERVE STIMULATOR PLACEMENT  04/11/2024    [facility] in [place]    SACRAL NERVE STIMULATOR PLACEMENT Right 04/17/2024    THYROIDECTOMY  03/2016    TRIGGER POINT INJECTION         Family History   Problem Relation Age of Onset    Diabetes Mother     Stroke Mother     Hypertension Mother     Asthma Mother     Fainting Mother     Miscarriages / Stillbirths Mother     Heart attack Father         First one was in his 20's second 30's.     Heart failure Father     Heart disease Father     Stroke Father     Diabetes Sister     Cancer Maternal Grandmother        Social History     Socioeconomic History    Marital status:    Tobacco Use    Smoking status: Never     Passive exposure: Never    Smokeless tobacco: Never   Vaping Use    Vaping status: Never " Used   Substance and Sexual Activity    Alcohol use: Never    Drug use: Never    Sexual activity: Yes     Partners: Male     Birth control/protection: Post-menopausal, None           Objective   Physical Exam  Vitals and nursing note reviewed.   Constitutional:       Appearance: She is well-developed.   HENT:      Head: Normocephalic.      Right Ear: External ear normal.      Left Ear: External ear normal.   Eyes:      Conjunctiva/sclera: Conjunctivae normal.      Pupils: Pupils are equal, round, and reactive to light.   Cardiovascular:      Rate and Rhythm: Normal rate and regular rhythm.      Heart sounds: Normal heart sounds.   Pulmonary:      Effort: Pulmonary effort is normal.      Breath sounds: Normal breath sounds.   Abdominal:      General: Bowel sounds are normal.      Palpations: Abdomen is soft.   Musculoskeletal:         General: Normal range of motion.      Cervical back: Normal range of motion and neck supple.   Skin:     General: Skin is warm and dry.      Capillary Refill: Capillary refill takes less than 2 seconds.   Neurological:      Mental Status: She is alert and oriented to person, place, and time.   Psychiatric:         Behavior: Behavior normal.         Thought Content: Thought content normal.         Procedures           ED Course  ED Course as of 07/17/25 1616   Thu Jul 17, 2025   1600 Discussed with Dr. Lovelace. Advises no surgery tomorrow if patient took Xarelto today.  [NATHAN]      ED Course User Index  [NATHAN] Ajay Andrew, APRN                                           Results for orders placed or performed during the hospital encounter of 07/17/25   ECG 12 Lead Pre-Op / Pre-Procedure    Collection Time: 07/17/25  1:59 PM   Result Value Ref Range    QT Interval 392 ms    QTC Interval 435 ms   Comprehensive Metabolic Panel    Collection Time: 07/17/25  2:02 PM    Specimen: Blood   Result Value Ref Range    Glucose 284 (H) 65 - 99 mg/dL    BUN 11.5 8.0 - 23.0 mg/dL    Creatinine 0.66  0.57 - 1.00 mg/dL    Sodium 135 (L) 136 - 145 mmol/L    Potassium 3.6 3.5 - 5.2 mmol/L    Chloride 102 98 - 107 mmol/L    CO2 21.1 (L) 22.0 - 29.0 mmol/L    Calcium 8.3 (L) 8.6 - 10.5 mg/dL    Total Protein 5.8 (L) 6.0 - 8.5 g/dL    Albumin 3.5 3.5 - 5.2 g/dL    ALT (SGPT) 37 (H) 1 - 33 U/L    AST (SGOT) 25 1 - 32 U/L    Alkaline Phosphatase 159 (H) 39 - 117 U/L    Total Bilirubin 0.3 0.0 - 1.2 mg/dL    Globulin 2.3 gm/dL    A/G Ratio 1.5 g/dL    BUN/Creatinine Ratio 17.4 7.0 - 25.0    Anion Gap 11.9 5.0 - 15.0 mmol/L    eGFR 98.7 >60.0 mL/min/1.73   CBC Auto Differential    Collection Time: 07/17/25  2:02 PM    Specimen: Blood   Result Value Ref Range    WBC 6.77 3.40 - 10.80 10*3/mm3    RBC 3.25 (L) 3.77 - 5.28 10*6/mm3    Hemoglobin 11.3 (L) 12.0 - 15.9 g/dL    Hematocrit 33.6 (L) 34.0 - 46.6 %    .4 (H) 79.0 - 97.0 fL    MCH 34.8 (H) 26.6 - 33.0 pg    MCHC 33.6 31.5 - 35.7 g/dL    RDW 13.8 12.3 - 15.4 %    RDW-SD 52.5 37.0 - 54.0 fl    MPV 9.4 6.0 - 12.0 fL    Platelets 299 140 - 450 10*3/mm3    Neutrophil % 76.3 (H) 42.7 - 76.0 %    Lymphocyte % 16.2 (L) 19.6 - 45.3 %    Monocyte % 5.8 5.0 - 12.0 %    Eosinophil % 0.7 0.3 - 6.2 %    Basophil % 0.4 0.0 - 1.5 %    Immature Grans % 0.6 (H) 0.0 - 0.5 %    Neutrophils, Absolute 5.16 1.70 - 7.00 10*3/mm3    Lymphocytes, Absolute 1.10 0.70 - 3.10 10*3/mm3    Monocytes, Absolute 0.39 0.10 - 0.90 10*3/mm3    Eosinophils, Absolute 0.05 0.00 - 0.40 10*3/mm3    Basophils, Absolute 0.03 0.00 - 0.20 10*3/mm3    Immature Grans, Absolute 0.04 0.00 - 0.05 10*3/mm3    nRBC 0.0 0.0 - 0.2 /100 WBC   Protime-INR    Collection Time: 07/17/25  2:18 PM    Specimen: Blood   Result Value Ref Range    Protime 16.6 (H) 12.5 - 15.2 Seconds    INR 1.26 (H) 0.90 - 1.10   aPTT    Collection Time: 07/17/25  2:18 PM    Specimen: Blood   Result Value Ref Range    PTT 28.7 24.5 - 35.9 seconds   Urinalysis With Microscopic If Indicated (No Culture) - Urine, Clean Catch    Collection Time:  07/17/25  3:38 PM    Specimen: Urine, Clean Catch   Result Value Ref Range    Color, UA Yellow Yellow, Straw    Appearance, UA Clear Clear    pH, UA 7.0 5.0 - 8.0    Specific Gravity, UA 1.009 1.005 - 1.030    Glucose, UA >=1000 mg/dL (3+) (A) Negative    Ketones, UA Negative Negative    Bilirubin, UA Negative Negative    Blood, UA Negative Negative    Protein, UA Negative Negative    Leuk Esterase, UA Negative Negative    Nitrite, UA Negative Negative    Urobilinogen, UA 0.2 E.U./dL 0.2 - 1.0 E.U./dL     XR Femur 2 View Left   Final Result     Left intertrochanteric hip fracture again noted. No mid or distal   femur fracture identified.       This report was finalized on 7/17/2025 2:42 PM by Dr. Shakeel Kemp MD.          XR Hip With or Without Pelvis 2 - 3 View Left   Final Result     Acute angulated left intertrochanteric fracture with varus angulation   of distal fracture fragments and mild superior displacement of distal   fracture fragments.       This report was finalized on 7/17/2025 2:29 PM by Dr. Shakeel Kemp MD.          XR Shoulder 2+ View Left   Final Result     No acute findings in the left shoulder.       This report was finalized on 7/17/2025 2:35 PM by Dr. Shakeel Kemp MD.          XR Chest AP   Final Result     Unremarkable exam with no acute cardiopulmonary findings identified.       This report was finalized on 7/17/2025 2:37 PM by Dr. Shakeel Kemp MD.          XR Elbow 3+ View Left   Final Result   1.  No fracture or dislocation.   2.  Mild olecranon soft tissue swelling may indicate mild olecranon   bursitis.       This report was finalized on 7/17/2025 2:34 PM by Dr. Shakeel Kemp MD.                        Medical Decision Making  MDM:    Escalation of care including admission/observation considered    - Discussions of management with other providers:  Orthopedics and hospitalist    - Discussed/reviewed with Radiology regarding test interpretation    - Independent interpretation:  None    - Additional patient history obtained from: None    - Review of external non-ED record (if available):  Prior Inpt record, Office record, Outpt record, Prior Outpt labs, PCP record, Outside ED record, Other    - Chronic conditions affecting care: See HPI and medical Hx.    - Social Determinants of health significantly affecting care:  None        Medical Decision Making Discussion:    Patient is a 61-year-old female presents today for a fall.  Patient reports severe pain in her left hip also reports pain in her left shoulder and left elbow.  Patient reports that hip is the worst.  Patient reports pain is worse with any attempted movement.  Patient denies any other injury.  Patient does have a history of atrial fibrillation and is currently on anticoagulation and takes Xarelto daily.    Patient admitted    The patient has been given very strict return precautions to return to the emergency department should there be any acute change or worsening of their condition.  I have explained my findings and the patient has expressed understanding to me.  I explained that the work-up performed in the ED has been based on the specific complaint and concern, as the nature of emergency medicine is complaint driven and they understand that new symptoms may arise.  I have told them that, should there be any new symptoms, worsening or changing symptoms, a new work-up may be indicated that they are encouraged to return to the emergency department or promptly contact their primary care physician. We have employed a shared decision-making process as the discussion of their disposition.  The patient has been educated as to the nature of the visit, the tests and work-up performed and the findings from today's visit. At this time, there does not appear to be any acute emergent process that necessitates admission to the hospital, however, the patient understands that this can change unexpectedly. At this time, the patient is stable  for discharge home and agrees to follow-up with her primary care physician in the next 24 to 48 hours or earlier should they be able to obtain an appointment.    The patient was counseled regarding diagnostic results and treatment plan and patient has indicated understanding of these instructions.     Amount and/or Complexity of Data Reviewed  Labs: ordered. Decision-making details documented in ED Course.  Radiology: ordered. Decision-making details documented in ED Course.  ECG/medicine tests: ordered. Decision-making details documented in ED Course.    Risk  Prescription drug management.  Decision regarding hospitalization.        Final diagnoses:   Closed intertrochanteric fracture of hip, left, initial encounter       ED Disposition  ED Disposition       ED Disposition   Decision to Admit    Condition   --    Comment   Level of Care: Telemetry [5]   Diagnosis: Hip fracture [266939]   Admitting Physician: DANIEL FORD [[id number]]   Attending Physician: DANIEL FORD [[id number]]   Certification: I Certify That Inpatient Hospital Services Are Medically Necessary For Greater Than 2 Midnights                 No follow-up provider specified.       Medication List      No changes were made to your prescriptions during this visit.            Ajay Andrew P, APRN  07/17/25 1610

## 2025-07-17 NOTE — PLAN OF CARE
Goal Outcome Evaluation:  Plan of Care Reviewed With: patient           Outcome Evaluation: Patient arrived to room 332 from ED.

## 2025-07-17 NOTE — H&P
[facility] HOSPITALIST HISTORY AND PHYSICAL    Patient Identification:  Name:  Lashae Benitez  Age:  64 y.o.  Sex:  female  :  1960  MRN:  [id number]   Admit Date: 2025   Visit Number:  [id number]  Room number:  114/14  Primary Care Physician:  Kreis, Samuel Duane, MD     Subjective     Chief complaint:    Chief Complaint   Patient presents with    Hip Injury    Fall     History of presenting illness:      Reason for Admit: Left intertrochanteric hip fracture.    The patient is a 64-year-old female with a past medical history of fibromyalgia, degenerative disk disease, psoriasis, psoriatic arthritis, multiple sclerosis (MS), type 2 diabetes, hypertension, paroxysmal atrial fibrillation (A-fib) on chronic anticoagulation, anxiety, reflux, thyroid cancer (now hypothyroid), rheumatoid arthritis, and Sjogren's syndrome. She presented to [facility] ER on 2025 following a fall. She reported severe pain in her left hip, shoulder, and elbow, which worsens with any movement. Imaging confirmed a left intertrochanteric hip fracture. No other acute fractures were noted on plain films. The ER provider treated her with IV Dilaudid for pain. The case was discussed with orthopedics, who recommended no surgery today due to her use of Xarelto at home, but anticipate surgery likely tomorrow. Hospital medicine was consulted for admission.    She experienced a fall today while disposing of trash, landing on her left hip, shoulder, and elbow. She is uncertain if her feet got entangled. She describes an unusual sensation in her left side upon touch, but it does not resemble pins and needles. Her mobility is generally limited, but she manages to move around at home. She is open to therapy if recommended. She has Medicare and [employer] insurance.    She is currently on Xarelto for atrial fibrillation and has undergone a heart catheterization in the remote past which didn't show any significant Coronary  Artery Disease.     She is also on insulin for diabetes.    She is currently being evaluated for multiple sclerosis.    She has psoriasis and psoriatic arthritis and is taking methotrexate and Xeljanz.    MEDICATIONS  Xarelto, methotrexate, Xeljanz  ---------------------------------------------------------------------------------------------------------------------   Review of Systems   HENT: Negative.     Eyes: Negative.    Respiratory: Negative.     Cardiovascular: Negative.    Gastrointestinal: Negative.    Endocrine: Negative.    Genitourinary: Negative.    Musculoskeletal:  Positive for gait problem and joint swelling.   Skin: Negative.    Neurological:  Positive for weakness.   Hematological: Negative.    Psychiatric/Behavioral: Negative.       ---------------------------------------------------------------------------------------------------------------------   Past Medical History:   Diagnosis Date    Acid reflux     Allergic     Anemia     Anxiety     Atrial fibrillation     Cancer     thyroid, skin    Cervical disc disorder     Chronic pain disorder     Claustrophobia     CTS (carpal tunnel syndrome)     Degenerative disc disease, lumbar     Depression     Dupuytren's disease     Essential hypertension     Family history of coronary artery disease     Fibromyalgia     Gallbladder abscess     H. pylori infection     Hiatal hernia     Hyperlipidemia     Hypothyroidism     Low back pain     Lumbosacral disc disease     Migraines     migraines    Multiple sclerosis     Osteoarthritis     Peripheral neuropathy     Psoriasis     Psoriatic arthritis     RA (rheumatoid arthritis)     Rheumatoid arthritis     Sinusitis     Sjogren's syndrome     Stomach ulcer     Thoracic disc disorder     TMJ arthritis     Type 2 diabetes mellitus     Urinary tract infection      Past Surgical History:   Procedure Laterality Date    BACK SURGERY      BREAST LUMPECTOMY Left 11/1993    CARDIAC CATHETERIZATION Left 09/21/2009     Normal     CARDIAC CATHETERIZATION N/A 6/24/2025    Procedure: Left Heart Cath;  Surgeon: Teodora Barron MD;  Location: [facility] CATH INVASIVE LOCATION;  Service: Cardiovascular;  Laterality: N/A;    CARPAL TUNNEL RELEASE  03/2012    CERVICAL POLYPECTOMY  12/2015    CHOLECYSTECTOMY  05/2007    COLONOSCOPY      ENDOSCOPY      EPIDURAL BLOCK      GASTRIC BYPASS  09/2007    JOINT REPLACEMENT      KNEE ARTHROPLASTY      LUMBAR DISC SURGERY      C5-6    NECK SURGERY      REPLACEMENT TOTAL KNEE Right 06/2016    SACRAL NERVE STIMULATOR PLACEMENT  09/2014    SACRAL NERVE STIMULATOR PLACEMENT  04/11/2024    [facility] in [place]    SACRAL NERVE STIMULATOR PLACEMENT Right 04/17/2024    THYROIDECTOMY  03/2016    TRIGGER POINT INJECTION       Family History   Problem Relation Age of Onset    Diabetes Mother     Stroke Mother     Hypertension Mother     Asthma Mother     Fainting Mother     Miscarriages / Stillbirths Mother     Heart attack Father         First one was in his 20's second 30's.     Heart failure Father     Heart disease Father     Stroke Father     Diabetes Sister     Cancer Maternal Grandmother      Social History     Socioeconomic History    Marital status:    Tobacco Use    Smoking status: Never     Passive exposure: Never    Smokeless tobacco: Never   Vaping Use    Vaping status: Never Used   Substance and Sexual Activity    Alcohol use: Never    Drug use: Never    Sexual activity: Yes     Partners: Male     Birth control/protection: Post-menopausal, None     ---------------------------------------------------------------------------------------------------------------------   Allergies:  Codeine, Imuran [azathioprine], Januvia [sitagliptin], Penicillins, Sulfa antibiotics, Sulfasalazine, and Beta adrenergic blockers  ---------------------------------------------------------------------------------------------------------------------   Medications below are reported home medications pulling from within  "the system; at this time, these medications have not been reconciled unless otherwise specified and are in the verification process for further verifcation as current home medications.    Prior to Admission Medications       Prescriptions Last Dose Informant Patient Reported? Taking?    amitriptyline (ELAVIL) 25 MG tablet  Self, Pharmacy Yes No    Take 1 tablet by mouth Every Night.    amLODIPine (NORVASC) 5 MG tablet   No No    Take 1 tablet by mouth Daily.    B-D 3CC LUER-NITIN SYR 25GX5/8\" 25G X 5/8\" 3 ML misc   Yes No    Butalbital-Acetaminophen  MG capsule  Self, Pharmacy Yes No    Take 300 mg by mouth Daily As Needed.    butalbital-acetaminophen-caffeine (ORBIVAN) -40 MG capsule capsule   Yes No    Take 1 capsule by mouth.    celecoxib (CeleBREX) 200 MG capsule  Self, Pharmacy Yes No    Take 1 capsule by mouth Daily.    cyanocobalamin 1000 MCG/ML injection  Self, Pharmacy Yes No    Inject 1 mL into the appropriate muscle as directed by prescriber Every 28 (Twenty-Eight) Days.    cyclobenzaprine (FLEXERIL) 10 MG tablet  Self, Pharmacy Yes No    Take 1 tablet by mouth Every Evening.    Diclofenac Sodium (VOLTAREN) 1 % gel gel  Self, Pharmacy Yes No    Apply 2 g topically to the appropriate area as directed 4 (Four) Times a Day As Needed.    DULoxetine (CYMBALTA) 30 MG capsule  Self, Pharmacy Yes No    Take 1 capsule by mouth Every Morning.    DULoxetine (CYMBALTA) 60 MG capsule  Self, Pharmacy Yes No    Take 1 capsule by mouth Daily.    Embecta Pen Needle Akila 2 Gen 32G X 4 MM misc   Yes No    EMGALITY 120 MG/ML prefilled syringe  Self, Pharmacy Yes No    Inject 1 mL under the skin into the appropriate area as directed Every 30 (Thirty) Days.    famotidine (PEPCID) 40 MG tablet  Self, Pharmacy Yes No    Take 1 tablet by mouth 2 (Two) Times a Day.    ferrous gluconate (FERGON) 240 (27 FE) MG tablet  Self, Pharmacy Yes No    Take 1 tablet by mouth Every Morning.    fexofenadine (ALLEGRA) 180 MG tablet  " Self, Pharmacy Yes No    Take 1 tablet by mouth Daily.    flecainide (TAMBOCOR) 50 MG tablet   No No    TAKE 1 TABLET TWICE A DAY    fluticasone-salmeterol (ADVAIR HFA) 115-21 MCG/ACT inhaler  Self, Pharmacy Yes No    Inhale 2 puffs 2 (Two) Times a Day.    folic acid (FOLVITE) 1 MG tablet  Self, Pharmacy Yes No    Take 1 tablet by mouth Daily.    furosemide (LASIX) 20 MG tablet   Yes No    Take 1 tablet by mouth Daily.    gabapentin (NEURONTIN) 600 MG tablet  [facility], Self, Pharmacy Yes No    Take 1 tablet by mouth 3 (Three) Times a Day.    levothyroxine (SYNTHROID, LEVOTHROID) 112 MCG tablet   Yes No    Take 1 tablet by mouth Daily.    losartan (COZAAR) 50 MG tablet   No No    Take 1 tablet by mouth 2 (Two) Times a Day.    methotrexate 2.5 MG tablet  Self, Pharmacy Yes No    Take 10 tablets by mouth 1 (One) Time Per Week. Prior to [facility] Admission, Patient was on: Takes 10 tablets on Saturday    metoprolol tartrate (LOPRESSOR) 100 MG tablet   No No    TAKE 1 TABLET TWICE A DAY    nitroglycerin (NITROSTAT) 0.4 MG SL tablet   No No    1 under the tongue as needed for angina, may repeat q5mins for up three doses    NON FORMULARY   Yes No    COMPOUND CREAM WITH SEVERAL THINGS MIXED IN IT BUT IT DOESN'T HAVE A NAME. INGREDIENTS: KETAMINE, GABAPENTIN, IBUPROFEN, LIDOCAINE, BACLOSEN    nystatin 044401 UNIT/GM topical powder   Yes No    olopatadine (PATANASE) 0.6 % solution nasal solution  Self, Pharmacy Yes No    2 sprays by Each Nare route 2 (Two) Times a Day.    omeprazole (priLOSEC) 40 MG capsule   Yes No    potassium chloride (KLOR-CON M10) 10 MEQ CR tablet   Yes No    Take 1 tablet by mouth.    primidone (MYSOLINE) 50 MG tablet  Self, Pharmacy Yes No    Take 1 tablet by mouth Every Night.    rimegepant 75 MG dispersible tablet  Self, Pharmacy Yes No    rosuvastatin (CRESTOR) 10 MG tablet   No No    Take 1 tablet by mouth Daily.    sodium chloride 1 g tablet  Self, Pharmacy Yes No    Take 2 tablets by mouth 3 (Three)  Times a Day.    sucralfate (CARAFATE) 1 g tablet  Self, Pharmacy Yes No    Take 1 tablet by mouth 4 (Four) Times a Day. Take one tablet by mouth 4 times daily 1 hour before meals and at bedtime on an empty stomach.    Toujeo SoloStar 300 UNIT/ML solution pen-injector injection   Yes No    traMADol (ULTRAM) 50 MG tablet   Yes No    Take 1 tablet by mouth 2 (Two) Times a Day As Needed.    Upadacitinib ER (Rinvoq) 15 MG tablet sustained-release 24 hour   Yes No    Take  by mouth.    vitamin D (ERGOCALCIFEROL) 1.25 MG (31825 UT) capsule capsule   Yes No    Xarelto 20 MG tablet   No No    TAKE 1 TABLET DAILY    Xeljanz XR 11 MG tablet sustained-release 24 hour   Yes No          Objective     Vital Signs:  Temp:  [98 °F (36.7 °C)-98.1 °F (36.7 °C)] 98 °F (36.7 °C)  Heart Rate:  [69-88] 71  Resp:  [19-29] 19  BP: (130-185)/(78-99) 130/78    Mean Arterial Pressure (Non-Invasive) for the past 24 hrs (Last 3 readings):   Noninvasive MAP (mmHg)   07/17/25 1536 97   07/17/25 1521 123   07/17/25 1506 133     SpO2:  [97 %-100 %] 100 %  on   ;   Device (Oxygen Therapy): room air  Body mass index is 24.13 kg/m².    Wt Readings from Last 3 Encounters:   07/17/25 65.8 kg (145 lb)   07/14/25 65.6 kg (144 lb 9.6 oz)   06/24/25 67.1 kg (148 lb)      ---------------------------------------------------------------------------------------------------------------------   Physical Exam:  Constitutional:  Elderly.  Mild acute distress.      HENT:  Head:  Normocephalic and atraumatic.  Mouth:  Moist mucous membranes.    Eyes:  Conjunctivae and EOM are normal. No scleral icterus.    Neck:  Neck supple.  No JVD present.    Cardiovascular:  Tachycardic rate, regular rhythm and normal heart sounds with no murmur.  Pulmonary/Chest:  No respiratory distress, no wheezes, no crackles, with normal breath sounds and good air movement.  Abdominal:  Soft. No distension and no tenderness.   Musculoskeletal:  Mild tenderness, L lower extremity deformity  present.  No red or swollen joints anywhere.    Neurological:  Alert and oriented to person, place, and time.  No cranial nerve deficit.    Skin:  Skin is warm and dry. No rash noted. No pallor.   Peripheral vascular:  No clubbing, no cyanosis, no edema.  Psychiatric: Appropriate mood and affect     Edited by: Ggii Anguiano MD at 7/17/2025 1646  ---------------------------------------------------------------------------------------------------------------------  EKG:    ECG 12 Lead Pre-Op / Pre-Procedure   Preliminary Result   Test Reason : Pre-Op / Pre-Procedure   Blood Pressure :   */*   mmHG   Vent. Rate :  74 BPM     Atrial Rate :  68 BPM      P-R Int :   * ms          QRS Dur :  94 ms       QT Int : 392 ms       P-R-T Axes :   * -37  -5 degrees     QTcB Int : 435 ms      Accelerated Junctional rhythm   Left axis deviation   Abnormal ECG   When compared with ECG of 24-Mar-2025 09:08, (Unconfirmed)   Junctional rhythm has replaced Sinus rhythm   Vent. rate has decreased by  37 bpm   Nonspecific T wave abnormality now evident in Inferior leads      Referred By: CASSI           Confirmed By:         Telemetry:  normal sinus rhythm, nonspecific ST changes    I have personally looked at both the EKG and the telemetry strips.    Last echocardiogram:  Results for orders placed during the hospital encounter of 04/25/24    Adult Transthoracic Echo Complete W/ Cont if Necessary Per Protocol 04/27/2024  4:55 PM    Interpretation Summary    Normal left ventricular cavity size and wall thickness noted. All left ventricular wall segments contract normally    Left ventricular ejection fraction appears to be 61 - 65%.    Left ventricular diastolic function is consistent with (grade I) impaired relaxation.    The aortic valve is structurally normal with no regurgitation or stenosis present.    The mitral valve is structurally normal with trace  regurgitation but no significant stenosis present.    There is no evidence of  "pericardial effusion.    --------------------------------------------------------------------------------------------------------------------  Labs:  Results from last 7 days   Lab Units 07/17/25  1418 07/17/25  1402   WBC 10*3/mm3  --  6.77   HEMOGLOBIN g/dL  --  11.3*   HEMATOCRIT %  --  33.6*   MCV fL  --  103.4*   MCHC g/dL  --  33.6   PLATELETS 10*3/mm3  --  299   INR  1.26*  --          Results from last 7 days   Lab Units 07/17/25  1402 07/16/25  1336   SODIUM mmol/L 135* 134*   POTASSIUM mmol/L 3.6 3.8   CHLORIDE mmol/L 102 100   CO2 mmol/L 21.1* 23.0   BUN mg/dL 11.5 9.0   CREATININE mg/dL 0.66 0.68   CALCIUM mg/dL 8.3* 8.3*   GLUCOSE mg/dL 284* 215*   ALBUMIN g/dL 3.5  --    BILIRUBIN mg/dL 0.3  --    ALK PHOS U/L 159*  --    AST (SGOT) U/L 25  --    ALT (SGPT) U/L 37*  --    Estimated Creatinine Clearance: 90.6 mL/min (by C-G formula based on SCr of 0.66 mg/dL).  No results found for: \"AMMONIA\"          No results found for: \"HGBA1C\", \"POCGLU\"  Lab Results   Component Value Date    TSH 15.900 (H) 03/12/2025    FREET4 1.58 12/07/2023     Lab Results   Component Value Date    PREGTESTUR Negative 01/21/2018     Pain Management Panel  More data exists         Latest Ref Rng & Units 5/12/2025 3/17/2025   Pain Management Panel   Creatinine, Urine mg/dL  mg/dL 12.4  12.4  7.9  7.9       Details          Multiple values from one day are sorted in reverse-chronological order             Brief Urine Lab Results  (Last result in the past 365 days)        Color   Clarity   Blood   Leuk Est   Nitrite   Protein   CREAT   Urine HCG        07/17/25 1538 Yellow   Clear   Negative   Negative   Negative   Negative                 No results found for: \"BLOODCX\"  No results found for: \"URINECX\"  No results found for: \"WOUNDCX\"  No results found for: \"STOOLCX\"    I have personally looked at the labs and they are summarized " above.  ----------------------------------------------------------------------------------------------------------------------  Detailed radiology reports for the last 24 hours:    Imaging Results (Last 24 Hours)       Procedure Component Value Units Date/Time    XR Femur 2 View Left [[id number]] Collected: 07/17/25 1442     Updated: 07/17/25 1445    Narrative:      EXAM:    XR Left Femur, 2 Views     EXAM DATE:    7/17/2025 2:09 PM     CLINICAL HISTORY:    fall     TECHNIQUE:    Frontal and lateral views of the left femur.     COMPARISON:    No relevant prior studies available.     FINDINGS:    Bones/joints:  Left intertrochanteric hip fracture again noted. No mid  or distal femur fracture identified.  No dislocation.    Soft tissues:  Unremarkable.       Impression:        Left intertrochanteric hip fracture again noted. No mid or distal  femur fracture identified.     This report was finalized on 7/17/2025 2:42 PM by Dr. Shakeel Kemp MD.       XR Chest AP [[id number]] Collected: 07/17/25 1435     Updated: 07/17/25 1439    Narrative:      EXAM:    XR Chest, 1 View     EXAM DATE:    7/17/2025 2:10 PM     CLINICAL HISTORY:    pre op     TECHNIQUE:    Frontal view of the chest.     COMPARISON:    3/25/2024     FINDINGS:    Lungs and pleural spaces:  Unremarkable.  No consolidation.  No  pneumothorax.    Heart:  Unremarkable.  No cardiomegaly.    Mediastinum:  Unremarkable.  Normal mediastinal contour.    Bones/joints:  Unremarkable.  No acute fracture.       Impression:        Unremarkable exam with no acute cardiopulmonary findings identified.     This report was finalized on 7/17/2025 2:37 PM by Dr. Shakeel Kemp MD.       XR Shoulder 2+ View Left [[id number]] Collected: 07/17/25 1434     Updated: 07/17/25 1437    Narrative:      EXAM:    XR Left Shoulder Complete, 2 or More Views     EXAM DATE:    7/17/2025 1:54 PM     CLINICAL HISTORY:    fall     TECHNIQUE:    Two or more views of the left shoulder.      COMPARISON:    No relevant prior studies available.     FINDINGS:    Bones/joints:  Unremarkable.  No acute fracture.  No dislocation.    Soft tissues:  Unremarkable.       Impression:        No acute findings in the left shoulder.     This report was finalized on 7/17/2025 2:35 PM by Dr. Shakeel Kemp MD.       XR Elbow 3+ View Left [[id number]] Collected: 07/17/25 1434     Updated: 07/17/25 1436    Narrative:      EXAM:    XR Left Elbow Complete, 3 or More Views     EXAM DATE:    7/17/2025 1:55 PM     CLINICAL HISTORY:    pain     TECHNIQUE:    Frontal, lateral and oblique views of the left elbow.     COMPARISON:    No relevant prior studies available.     FINDINGS:    Bones/joints:  Unremarkable.  No fracture or dislocation.    Soft tissues:  Mild olecranon soft tissue swelling may indicate mild  olecranon bursitis.       Impression:      1.  No fracture or dislocation.  2.  Mild olecranon soft tissue swelling may indicate mild olecranon  bursitis.     This report was finalized on 7/17/2025 2:34 PM by Dr. Shakeel Kemp MD.       XR Hip With or Without Pelvis 2 - 3 View Left [[id number]] Collected: 07/17/25 1428     Updated: 07/17/25 1431    Narrative:      EXAM:    XR Left Hip With Pelvis When Performed, 2 or 3 Views     EXAM DATE:    7/17/2025 1:54 PM     CLINICAL HISTORY:    fall     TECHNIQUE:    Two or three views of the left hip with pelvis when performed.     COMPARISON:    No relevant prior studies available.     FINDINGS:    Bones/joints:  Acute angulated left intertrochanteric fracture with  varus angulation of distal fracture fragments and mild superior  displacement of distal fracture fragments.  No dislocation.    Soft tissues:  Unremarkable.       Impression:        Acute angulated left intertrochanteric fracture with varus angulation  of distal fracture fragments and mild superior displacement of distal  fracture fragments.     This report was finalized on 7/17/2025 2:29 PM by Dr. Shakeel Kemp  MD.             I have personally looked at the radiology images and read the final radiology report.    Assessment & Plan    #Acute, Closed, Angulated, Mildly Displaced, Non-comminuted, L Hip Fracture  - Orthopedics consulted; Recommendations pending  - Will make patient NPO after MN  - Home Xarelto will suffice for DVT prophylaxis pre-surgery, will likely resume post surgery as well when cleared by surgery   - Will treat pain with scheduled Tylenol and IV Narcotics as needed for breakthrough  - Admit to telemetry, follow up Orthopedics recommendations on surgical intervention, order PT/OT evaluations post-procedure     #Pre-operative Risk Stratification/Clearance  #Hypertension/Hyperlipidemia   #Paroxysmal Atrial Fibrillation, on chronic anticoagulation   - EKG read as junctional rhythm, appears to be normal sinus rhythm per my read  - RCRI = 1, conferring 1.1% 30 day risk of death, MI, or cardiac arrest  - At this time surgery is non-emergent, Orthopedic surgery carries inherent intermediate risk, but patient does not have any active cardiac issues or modifiable risk factors; Recommend to proceed with surgery per Orthopedics  - Review home medications and resume as indicated   - Hold any home ACEI/Diuretic dose morning of planned procedure  - Hold Xarelto perio-operatively, will bridge with Heparin drip if procedure is delayed beyond tomorrow  - Continue to monitor on telemetry    #Insulin Dependent Diabetes Mellitus Type II, uncontrolled, unknown complications  - Hgb A1c = 10.5%  - Holding home oral agents, continue FSBG and SSI, add long acting as indicated.    - Holding home oral agents, continue FSBG and SSI, add long acting as indicated.    #Hypothyroid  - Continue home Levothyroxine pending medication reconciliation     #Rheumatoid Arthritis/Psoriatic Arthritis/Sjogren's Syndrome, on DMARD  - Supportive Care, review home medications and resume as indicated    #Anxiety/Depression/Fibromyalgia   - Continue  home regimen pending medication reconciliation     #Gastroesophageal Reflux Disease/History Peptic Ulcer Disease   - Continue home PPI pending medication reconciliation     #Chronic Pain  - Review DANIEL, hold home regimen for now pending IV treatment for acute processes.    F: Oral, NPO @ MN  E: Monitor & Replace PRN  N: Cardiac, NPO @ MN  VTE Prophylaxis:Mechanical VTE prophylaxis orders are signed & held.    Code Status (Patient has no pulse and is not breathing): CPR  Medical Interventions (Patient has pulse or is breathing): Full Support      Dispo: Pending Ortho recommendations and clinical improvement    *This patient is considered high risk secondary to Acute Hip Fx requiring surgical intervention, requiring IV narcotics for pain control.    Edited by: Gigi Anguiano MD at 7/17/2025 [id number]    Gigi Anguiano MD  [facility] Hospitalist  07/17/25  17:25 EDT    Patient or patient representative verbalized consent for the use of Ambient Listening during the visit for chart documentation.

## 2025-07-18 ENCOUNTER — ANESTHESIA (OUTPATIENT)
Dept: PERIOP | Facility: HOSPITAL | Age: 65
End: 2025-07-18
Payer: MEDICARE

## 2025-07-18 ENCOUNTER — ANESTHESIA EVENT (OUTPATIENT)
Dept: PERIOP | Facility: HOSPITAL | Age: 65
End: 2025-07-18
Payer: MEDICARE

## 2025-07-18 ENCOUNTER — APPOINTMENT (OUTPATIENT)
Dept: CARDIOLOGY | Facility: HOSPITAL | Age: 65
DRG: 481 | End: 2025-07-18
Payer: MEDICARE

## 2025-07-18 PROBLEM — S72.142A CLOSED INTERTROCHANTERIC FRACTURE OF HIP, LEFT, INITIAL ENCOUNTER: Status: ACTIVE | Noted: 2025-07-17

## 2025-07-18 LAB
ABO GROUP BLD: NORMAL
ABO GROUP BLD: NORMAL
ALBUMIN SERPL-MCNC: 3.2 G/DL (ref 3.5–5.2)
ALBUMIN/GLOB SERPL: 1.5 G/DL
ALP SERPL-CCNC: 134 U/L (ref 39–117)
ALT SERPL W P-5'-P-CCNC: 33 U/L (ref 1–33)
ANION GAP SERPL CALCULATED.3IONS-SCNC: 9.4 MMOL/L (ref 5–15)
AORTIC DIMENSIONLESS INDEX: 1 (DI)
APTT PPP: 29.9 SECONDS (ref 24.5–35.9)
AST SERPL-CCNC: 23 U/L (ref 1–32)
AV MEAN PRESS GRAD SYS DOP V1V2: 3 MMHG
AV VMAX SYS DOP: 115 CM/SEC
BH CV ECHO MEAS - ACS: 1.9 CM
BH CV ECHO MEAS - AO MAX PG: 5.3 MMHG
BH CV ECHO MEAS - AO ROOT DIAM: 3.7 CM
BH CV ECHO MEAS - AO V2 VTI: 20.9 CM
BH CV ECHO MEAS - AVA(I,D): 3.2 CM2
BH CV ECHO MEAS - EDV(CUBED): 26.7 ML
BH CV ECHO MEAS - EDV(MOD-SP2): 83 ML
BH CV ECHO MEAS - EDV(MOD-SP4): 78.1 ML
BH CV ECHO MEAS - EF(MOD-SP2): 59 %
BH CV ECHO MEAS - EF(MOD-SP4): 59.2 %
BH CV ECHO MEAS - ESV(CUBED): 8.2 ML
BH CV ECHO MEAS - ESV(MOD-SP2): 34 ML
BH CV ECHO MEAS - ESV(MOD-SP4): 31.9 ML
BH CV ECHO MEAS - FS: 32.4 %
BH CV ECHO MEAS - IVS/LVPW: 0.98 CM
BH CV ECHO MEAS - IVSD: 1.07 CM
BH CV ECHO MEAS - LA DIMENSION: 3.1 CM
BH CV ECHO MEAS - LAT PEAK E' VEL: 11.7 CM/SEC
BH CV ECHO MEAS - LV DIASTOLIC VOL/BSA (35-75): 45.3 CM2
BH CV ECHO MEAS - LV MASS(C)D: 92 GRAMS
BH CV ECHO MEAS - LV MAX PG: 3.8 MMHG
BH CV ECHO MEAS - LV MEAN PG: 2 MMHG
BH CV ECHO MEAS - LV SYSTOLIC VOL/BSA (12-30): 18.5 CM2
BH CV ECHO MEAS - LV V1 MAX: 97.3 CM/SEC
BH CV ECHO MEAS - LV V1 VTI: 21 CM
BH CV ECHO MEAS - LVIDD: 3 CM
BH CV ECHO MEAS - LVIDS: 2.02 CM
BH CV ECHO MEAS - LVOT AREA: 3.1 CM2
BH CV ECHO MEAS - LVOT DIAM: 2 CM
BH CV ECHO MEAS - LVPWD: 1.09 CM
BH CV ECHO MEAS - MED PEAK E' VEL: 7.2 CM/SEC
BH CV ECHO MEAS - MR MAX PG: 11.9 MMHG
BH CV ECHO MEAS - MR MAX VEL: 172.5 CM/SEC
BH CV ECHO MEAS - MV A MAX VEL: 106 CM/SEC
BH CV ECHO MEAS - MV DEC SLOPE: 190 CM/SEC2
BH CV ECHO MEAS - MV DEC TIME: 0.34 SEC
BH CV ECHO MEAS - MV E MAX VEL: 64.7 CM/SEC
BH CV ECHO MEAS - MV E/A: 0.61
BH CV ECHO MEAS - MV MAX PG: 6.1 MMHG
BH CV ECHO MEAS - MV MEAN PG: 2 MMHG
BH CV ECHO MEAS - MV V2 VTI: 29.1 CM
BH CV ECHO MEAS - MVA(VTI): 2.27 CM2
BH CV ECHO MEAS - PA ACC TIME: 0.14 SEC
BH CV ECHO MEAS - PA V2 MAX: 58.8 CM/SEC
BH CV ECHO MEAS - RAP SYSTOLE: 10 MMHG
BH CV ECHO MEAS - RVSP: 30.8 MMHG
BH CV ECHO MEAS - SV(LVOT): 66 ML
BH CV ECHO MEAS - SV(MOD-SP2): 49 ML
BH CV ECHO MEAS - SV(MOD-SP4): 46.2 ML
BH CV ECHO MEAS - SVI(LVOT): 38.2 ML/M2
BH CV ECHO MEAS - SVI(MOD-SP2): 28.4 ML/M2
BH CV ECHO MEAS - SVI(MOD-SP4): 26.8 ML/M2
BH CV ECHO MEAS - TAPSE (>1.6): 3.3 CM
BH CV ECHO MEAS - TR MAX PG: 20.8 MMHG
BH CV ECHO MEAS - TR MAX VEL: 228 CM/SEC
BH CV ECHO MEASUREMENTS AVERAGE E/E' RATIO: 6.85
BILIRUB SERPL-MCNC: 0.4 MG/DL (ref 0–1.2)
BLD GP AB SCN SERPL QL: NEGATIVE
BUN SERPL-MCNC: 11.2 MG/DL (ref 8–23)
BUN/CREAT SERPL: 19.3 (ref 7–25)
CALCIUM SPEC-SCNC: 8.1 MG/DL (ref 8.6–10.5)
CHLORIDE SERPL-SCNC: 104 MMOL/L (ref 98–107)
CO2 SERPL-SCNC: 22.6 MMOL/L (ref 22–29)
CREAT SERPL-MCNC: 0.58 MG/DL (ref 0.57–1)
DEPRECATED RDW RBC AUTO: 54.4 FL (ref 37–54)
EGFRCR SERPLBLD CKD-EPI 2021: 101.8 ML/MIN/1.73
ERYTHROCYTE [DISTWIDTH] IN BLOOD BY AUTOMATED COUNT: 14 % (ref 12.3–15.4)
GLOBULIN UR ELPH-MCNC: 2.2 GM/DL
GLUCOSE BLDC GLUCOMTR-MCNC: 146 MG/DL (ref 70–130)
GLUCOSE BLDC GLUCOMTR-MCNC: 147 MG/DL (ref 70–130)
GLUCOSE BLDC GLUCOMTR-MCNC: 168 MG/DL (ref 70–130)
GLUCOSE BLDC GLUCOMTR-MCNC: 217 MG/DL (ref 70–130)
GLUCOSE SERPL-MCNC: 203 MG/DL (ref 65–99)
HCT VFR BLD AUTO: 31.2 % (ref 34–46.6)
HGB BLD-MCNC: 10.2 G/DL (ref 12–15.9)
INR PPP: 1.15 (ref 0.9–1.1)
LEFT ATRIUM VOLUME INDEX: 24.9 ML/M2
LV EF BIPLANE MOD: 58.5 %
MCH RBC QN AUTO: 34.8 PG (ref 26.6–33)
MCHC RBC AUTO-ENTMCNC: 32.7 G/DL (ref 31.5–35.7)
MCV RBC AUTO: 106.5 FL (ref 79–97)
PLATELET # BLD AUTO: 276 10*3/MM3 (ref 140–450)
PMV BLD AUTO: 9.5 FL (ref 6–12)
POTASSIUM SERPL-SCNC: 3.9 MMOL/L (ref 3.5–5.2)
PROT SERPL-MCNC: 5.4 G/DL (ref 6–8.5)
PROTHROMBIN TIME: 15.4 SECONDS (ref 12.5–15.2)
RBC # BLD AUTO: 2.93 10*6/MM3 (ref 3.77–5.28)
RH BLD: NEGATIVE
RH BLD: NEGATIVE
SODIUM SERPL-SCNC: 136 MMOL/L (ref 136–145)
T&S EXPIRATION DATE: NORMAL
UFH PPP CHRO-ACNC: 0.88 IU/ML (ref 0.3–0.7)
WBC NRBC COR # BLD AUTO: 8.42 10*3/MM3 (ref 3.4–10.8)

## 2025-07-18 PROCEDURE — 94799 UNLISTED PULMONARY SVC/PX: CPT

## 2025-07-18 PROCEDURE — 93306 TTE W/DOPPLER COMPLETE: CPT | Performed by: INTERNAL MEDICINE

## 2025-07-18 PROCEDURE — 94664 DEMO&/EVAL PT USE INHALER: CPT

## 2025-07-18 PROCEDURE — 85610 PROTHROMBIN TIME: CPT | Performed by: INTERNAL MEDICINE

## 2025-07-18 PROCEDURE — 86901 BLOOD TYPING SEROLOGIC RH(D): CPT

## 2025-07-18 PROCEDURE — 85520 HEPARIN ASSAY: CPT | Performed by: INTERNAL MEDICINE

## 2025-07-18 PROCEDURE — 93306 TTE W/DOPPLER COMPLETE: CPT

## 2025-07-18 PROCEDURE — 86850 RBC ANTIBODY SCREEN: CPT

## 2025-07-18 PROCEDURE — 85027 COMPLETE CBC AUTOMATED: CPT | Performed by: INTERNAL MEDICINE

## 2025-07-18 PROCEDURE — 82948 REAGENT STRIP/BLOOD GLUCOSE: CPT

## 2025-07-18 PROCEDURE — 94640 AIRWAY INHALATION TREATMENT: CPT

## 2025-07-18 PROCEDURE — 94761 N-INVAS EAR/PLS OXIMETRY MLT: CPT

## 2025-07-18 PROCEDURE — 86900 BLOOD TYPING SEROLOGIC ABO: CPT

## 2025-07-18 PROCEDURE — 99232 SBSQ HOSP IP/OBS MODERATE 35: CPT | Performed by: INTERNAL MEDICINE

## 2025-07-18 PROCEDURE — 85730 THROMBOPLASTIN TIME PARTIAL: CPT | Performed by: INTERNAL MEDICINE

## 2025-07-18 PROCEDURE — 25010000002 HYDROMORPHONE PER 4 MG: Performed by: INTERNAL MEDICINE

## 2025-07-18 PROCEDURE — 94760 N-INVAS EAR/PLS OXIMETRY 1: CPT

## 2025-07-18 PROCEDURE — 82948 REAGENT STRIP/BLOOD GLUCOSE: CPT | Performed by: INTERNAL MEDICINE

## 2025-07-18 PROCEDURE — 63710000001 INSULIN LISPRO (HUMAN) PER 5 UNITS: Performed by: INTERNAL MEDICINE

## 2025-07-18 PROCEDURE — 80053 COMPREHEN METABOLIC PANEL: CPT | Performed by: INTERNAL MEDICINE

## 2025-07-18 RX ORDER — BUDESONIDE AND FORMOTEROL FUMARATE DIHYDRATE 160; 4.5 UG/1; UG/1
2 AEROSOL RESPIRATORY (INHALATION)
Status: DISCONTINUED | OUTPATIENT
Start: 2025-07-18 | End: 2025-07-22 | Stop reason: HOSPADM

## 2025-07-18 RX ORDER — DULOXETIN HYDROCHLORIDE 30 MG/1
30 CAPSULE, DELAYED RELEASE ORAL EVERY MORNING
Status: DISCONTINUED | OUTPATIENT
Start: 2025-07-18 | End: 2025-07-22 | Stop reason: HOSPADM

## 2025-07-18 RX ORDER — BUTALBITAL, ACETAMINOPHEN AND CAFFEINE 50; 325; 40 MG/1; MG/1; MG/1
1 TABLET ORAL EVERY 4 HOURS PRN
Status: DISCONTINUED | OUTPATIENT
Start: 2025-07-18 | End: 2025-07-22 | Stop reason: HOSPADM

## 2025-07-18 RX ORDER — DULOXETIN HYDROCHLORIDE 60 MG/1
60 CAPSULE, DELAYED RELEASE ORAL DAILY
Status: DISCONTINUED | OUTPATIENT
Start: 2025-07-18 | End: 2025-07-22 | Stop reason: HOSPADM

## 2025-07-18 RX ORDER — FOLIC ACID 1 MG/1
1 TABLET ORAL DAILY
Status: DISCONTINUED | OUTPATIENT
Start: 2025-07-18 | End: 2025-07-22 | Stop reason: HOSPADM

## 2025-07-18 RX ORDER — HEPARIN SODIUM 10000 [USP'U]/100ML
12 INJECTION, SOLUTION INTRAVENOUS
Status: DISCONTINUED | OUTPATIENT
Start: 2025-07-18 | End: 2025-07-18

## 2025-07-18 RX ORDER — PANTOPRAZOLE SODIUM 40 MG/1
40 TABLET, DELAYED RELEASE ORAL DAILY PRN
Status: DISCONTINUED | OUTPATIENT
Start: 2025-07-18 | End: 2025-07-22 | Stop reason: HOSPADM

## 2025-07-18 RX ORDER — NITROGLYCERIN 0.4 MG/1
0.4 TABLET SUBLINGUAL
Status: DISCONTINUED | OUTPATIENT
Start: 2025-07-18 | End: 2025-07-22 | Stop reason: HOSPADM

## 2025-07-18 RX ORDER — TRAMADOL HYDROCHLORIDE 50 MG/1
50 TABLET ORAL 3 TIMES DAILY PRN
Status: DISCONTINUED | OUTPATIENT
Start: 2025-07-18 | End: 2025-07-22 | Stop reason: HOSPADM

## 2025-07-18 RX ORDER — FLECAINIDE ACETATE 50 MG/1
50 TABLET ORAL 2 TIMES DAILY
Status: DISCONTINUED | OUTPATIENT
Start: 2025-07-18 | End: 2025-07-22 | Stop reason: HOSPADM

## 2025-07-18 RX ORDER — LEVOTHYROXINE SODIUM 125 UG/1
125 TABLET ORAL
Status: DISCONTINUED | OUTPATIENT
Start: 2025-07-18 | End: 2025-07-22 | Stop reason: HOSPADM

## 2025-07-18 RX ORDER — AMLODIPINE BESYLATE 5 MG/1
5 TABLET ORAL DAILY
Status: DISCONTINUED | OUTPATIENT
Start: 2025-07-18 | End: 2025-07-18

## 2025-07-18 RX ORDER — LOSARTAN POTASSIUM 50 MG/1
50 TABLET ORAL 2 TIMES DAILY
Status: DISCONTINUED | OUTPATIENT
Start: 2025-07-18 | End: 2025-07-18

## 2025-07-18 RX ORDER — METOPROLOL TARTRATE 50 MG
50 TABLET ORAL 2 TIMES DAILY
Status: DISCONTINUED | OUTPATIENT
Start: 2025-07-18 | End: 2025-07-22 | Stop reason: HOSPADM

## 2025-07-18 RX ORDER — HYDROMORPHONE HYDROCHLORIDE 1 MG/ML
0.5 INJECTION, SOLUTION INTRAMUSCULAR; INTRAVENOUS; SUBCUTANEOUS
Status: DISCONTINUED | OUTPATIENT
Start: 2025-07-18 | End: 2025-07-19

## 2025-07-18 RX ORDER — LOSARTAN POTASSIUM 50 MG/1
50 TABLET ORAL ONCE
Status: DISCONTINUED | OUTPATIENT
Start: 2025-07-18 | End: 2025-07-18

## 2025-07-18 RX ORDER — GABAPENTIN 300 MG/1
600 CAPSULE ORAL EVERY 8 HOURS SCHEDULED
Status: DISCONTINUED | OUTPATIENT
Start: 2025-07-18 | End: 2025-07-22 | Stop reason: HOSPADM

## 2025-07-18 RX ORDER — HEPARIN SODIUM 5000 [USP'U]/ML
50 INJECTION, SOLUTION INTRAVENOUS; SUBCUTANEOUS AS NEEDED
Status: DISCONTINUED | OUTPATIENT
Start: 2025-07-18 | End: 2025-07-18

## 2025-07-18 RX ORDER — FUROSEMIDE 20 MG/1
20 TABLET ORAL DAILY PRN
Status: DISCONTINUED | OUTPATIENT
Start: 2025-07-18 | End: 2025-07-22 | Stop reason: HOSPADM

## 2025-07-18 RX ORDER — CYANOCOBALAMIN 1000 UG/ML
1000 INJECTION, SOLUTION INTRAMUSCULAR; SUBCUTANEOUS WEEKLY
Status: DISCONTINUED | OUTPATIENT
Start: 2025-07-20 | End: 2025-07-22 | Stop reason: HOSPADM

## 2025-07-18 RX ORDER — AMITRIPTYLINE HYDROCHLORIDE 50 MG/1
25 TABLET ORAL NIGHTLY
Status: DISCONTINUED | OUTPATIENT
Start: 2025-07-18 | End: 2025-07-22 | Stop reason: HOSPADM

## 2025-07-18 RX ORDER — CETIRIZINE HYDROCHLORIDE 10 MG/1
10 TABLET ORAL DAILY
Status: DISCONTINUED | OUTPATIENT
Start: 2025-07-18 | End: 2025-07-22 | Stop reason: HOSPADM

## 2025-07-18 RX ORDER — PRIMIDONE 50 MG/1
50 TABLET ORAL NIGHTLY
Status: DISCONTINUED | OUTPATIENT
Start: 2025-07-18 | End: 2025-07-18

## 2025-07-18 RX ORDER — HEPARIN SODIUM 5000 [USP'U]/ML
25 INJECTION, SOLUTION INTRAVENOUS; SUBCUTANEOUS AS NEEDED
Status: DISCONTINUED | OUTPATIENT
Start: 2025-07-18 | End: 2025-07-18

## 2025-07-18 RX ADMIN — HYDROMORPHONE HYDROCHLORIDE 0.5 MG: 1 INJECTION, SOLUTION INTRAMUSCULAR; INTRAVENOUS; SUBCUTANEOUS at 23:18

## 2025-07-18 RX ADMIN — BUDESONIDE AND FORMOTEROL FUMARATE DIHYDRATE 2 PUFF: 160; 4.5 AEROSOL RESPIRATORY (INHALATION) at 10:34

## 2025-07-18 RX ADMIN — METOPROLOL TARTRATE 50 MG: 50 TABLET, FILM COATED ORAL at 08:29

## 2025-07-18 RX ADMIN — HYDROMORPHONE HYDROCHLORIDE 0.5 MG: 1 INJECTION, SOLUTION INTRAMUSCULAR; INTRAVENOUS; SUBCUTANEOUS at 08:28

## 2025-07-18 RX ADMIN — INSULIN LISPRO 2 UNITS: 100 INJECTION, SOLUTION INTRAVENOUS; SUBCUTANEOUS at 21:05

## 2025-07-18 RX ADMIN — AMITRIPTYLINE HYDROCHLORIDE 25 MG: 50 TABLET, FILM COATED ORAL at 21:04

## 2025-07-18 RX ADMIN — HYDROMORPHONE HYDROCHLORIDE 0.5 MG: 1 INJECTION, SOLUTION INTRAMUSCULAR; INTRAVENOUS; SUBCUTANEOUS at 16:01

## 2025-07-18 RX ADMIN — FLECAINIDE ACETATE 50 MG: 50 TABLET ORAL at 08:29

## 2025-07-18 RX ADMIN — Medication 10 ML: at 21:05

## 2025-07-18 RX ADMIN — GABAPENTIN 600 MG: 300 CAPSULE ORAL at 21:04

## 2025-07-18 RX ADMIN — BUDESONIDE AND FORMOTEROL FUMARATE DIHYDRATE 2 PUFF: 160; 4.5 AEROSOL RESPIRATORY (INHALATION) at 18:59

## 2025-07-18 RX ADMIN — HYDROMORPHONE HYDROCHLORIDE 0.5 MG: 1 INJECTION, SOLUTION INTRAMUSCULAR; INTRAVENOUS; SUBCUTANEOUS at 04:02

## 2025-07-18 RX ADMIN — METOPROLOL TARTRATE 50 MG: 50 TABLET, FILM COATED ORAL at 21:05

## 2025-07-18 RX ADMIN — FLECAINIDE ACETATE 50 MG: 50 TABLET ORAL at 21:05

## 2025-07-18 RX ADMIN — HYDROMORPHONE HYDROCHLORIDE 0.5 MG: 1 INJECTION, SOLUTION INTRAMUSCULAR; INTRAVENOUS; SUBCUTANEOUS at 12:14

## 2025-07-18 RX ADMIN — Medication 10 ML: at 08:29

## 2025-07-18 RX ADMIN — INSULIN LISPRO 3 UNITS: 100 INJECTION, SOLUTION INTRAVENOUS; SUBCUTANEOUS at 08:29

## 2025-07-18 RX ADMIN — HYDROMORPHONE HYDROCHLORIDE 0.5 MG: 1 INJECTION, SOLUTION INTRAMUSCULAR; INTRAVENOUS; SUBCUTANEOUS at 18:24

## 2025-07-18 NOTE — NURSING NOTE
Spoke with Dr. Lovelace via phone per Dr. Anguiano request to notify Dr. Lovelace of heparin drip. Dr. Lovelace said that pt is not to receive heparin drip at all due to risk of pt bleeding out during surgery. Notified Dr. Anguiano that Dr. Lovelace said not to give heparin drip. Dr. Anguiano said to cancel heparin orders.

## 2025-07-18 NOTE — PLAN OF CARE
Goal Outcome Evaluation:  Plan of Care Reviewed With: patient           Outcome Evaluation: Pt is A&O x4, PRN meds given for pain per MAR. Pt has been NPO since midnight. VSS. Continuing POC.

## 2025-07-18 NOTE — PROGRESS NOTES
Patient's surgery bumped to tomorrow.  Had ordered Heparin drip for Atrial Fibrillation while awaiting procedure as is now going on 2 days since she took her home DOAC, but Nurse reports Dr. Lovelace recommended no anticoagulation at this time.  Heparin drip canceled.  Follow up for procedure tomorrow.

## 2025-07-18 NOTE — PROGRESS NOTES
[facility] HOSPITALIST PROGRESS NOTE     Patient Identification:  Name:  Lashae Benitez  Age:  64 y.o.  Sex:  female  :  1960  MRN:  [id number]  Visit Number:  [id number]  ROOM: [address] [place] [address]     Primary Care Provider:  Kreis, Samuel Duane, MD    Length of stay in inpatient status:  1    Subjective     Chief Compliant:    Chief Complaint   Patient presents with    Hip Injury    Fall     History of Presenting Illness:    Patient remains ill but stable today, no acute events overnight, no new complaints, denies any fevers or chills, Orthopedics in the room this AM, planning for surgery later today, patient stable, no new complaints, anxious about surgery today but wants to feel better, tentative plan for Short Term Rehab, will consult PT/OT post-procedure.  Objective     Current Hospital Meds:  amitriptyline, 25 mg, Oral, Nightly  budesonide-formoterol, 2 puff, Inhalation, BID - RT  cetirizine, 10 mg, Oral, Daily  [START ON 2025] cholecalciferol, 50,000 Units, Oral, Weekly  [START ON 2025] cyanocobalamin, 1,000 mcg, Intramuscular, Weekly  DULoxetine, 30 mg, Oral, QAM  DULoxetine, 60 mg, Oral, Daily  flecainide, 50 mg, Oral, BID  folic acid, 1 mg, Oral, Daily  gabapentin, 600 mg, Oral, Q8H  insulin lispro, 2-7 Units, Subcutaneous, 4x Daily PC & at Bedtime  levothyroxine, 125 mcg, Oral, Q AM  metoprolol tartrate, 50 mg, Oral, BID  sodium chloride, 10 mL, Intravenous, Q12H         ----------------------------------------------------------------------------------------------------------------------  Vital Signs:  Temp:  [97.7 °F (36.5 °C)-98.4 °F (36.9 °C)] 98.4 °F (36.9 °C)  Heart Rate:  [69-94] 69  Resp:  [16-29] 18  BP: (127-185)/(63-99) 140/73  SpO2:  [92 %-100 %] 100 %  on   ;   Device (Oxygen Therapy): room air  Body mass index is 24.13 kg/m².      Intake/Output Summary (Last 24 hours) at 2025 1130  Last data filed at 2025 1007  Gross per 24 hour   Intake 500 ml   Output 1250 ml  "  Net -750 ml      ----------------------------------------------------------------------------------------------------------------------  Physical exam:  Constitutional:  Elderly.  No acute distress.      HENT:  Head:  Normocephalic and atraumatic.  Mouth:  Moist mucous membranes.    Eyes:  Conjunctivae and EOM are normal. No scleral icterus.    Neck:  Neck supple.  No JVD present.    Cardiovascular:  Tachycardic rate, regular rhythm and normal heart sounds with no murmur.  Pulmonary/Chest:  No respiratory distress, no wheezes, on room air   Abdominal:  Soft. No distension and no tenderness.   Musculoskeletal:  Mild tenderness, L lower extremity deformity present.  No red or swollen joints anywhere.    Neurological:  Alert and oriented to person, place, and time.  No gross focal deficits   Skin:  Skin is warm and dry. No rash noted. No pallor.   Peripheral vascular:  No clubbing, no cyanosis, no edema.  Psychiatric: Appropriate mood and affect     Edited by: Gigi Anguiano MD at 7/18/2025 1130  ----------------------------------------------------------------------------------------------------------------------  WBC/HGB/HCT/PLT   8.42/10.2/31.2/276 (07/18 0338)  BUN/CREAT/GLUC/ALT/AST/ARMIDA/LIP    11.2/0.58/203/33/23/--/-- (07/18 0338)  LYTES - Na/K/Cl/CO2: 136/3.9/104/22.6 (07/18 0338)  PT/INR/PTT   16.6/1.26/28.7 (07/17 1418)     No results found for: \"URINECX\"  No results found for: \"BLOODCX\"    I have personally looked at the labs and they are summarized above.  ----------------------------------------------------------------------------------------------------------------------  Detailed radiology reports for the last 24 hours:  XR Femur 2 View Left  Result Date: 7/17/2025    Left intertrochanteric hip fracture again noted. No mid or distal femur fracture identified.  This report was finalized on 7/17/2025 2:42 PM by Dr. Shakeel Kemp MD.      XR Chest AP  Result Date: 7/17/2025    Unremarkable exam with no " acute cardiopulmonary findings identified.  This report was finalized on 7/17/2025 2:37 PM by Dr. Shakeel Kemp MD.      XR Shoulder 2+ View Left  Result Date: 7/17/2025    No acute findings in the left shoulder.  This report was finalized on 7/17/2025 2:35 PM by Dr. Shakeel Kemp MD.      XR Elbow 3+ View Left  Result Date: 7/17/2025  1.  No fracture or dislocation. 2.  Mild olecranon soft tissue swelling may indicate mild olecranon bursitis.  This report was finalized on 7/17/2025 2:34 PM by Dr. Shakeel Kemp MD.      XR Hip With or Without Pelvis 2 - 3 View Left  Result Date: 7/17/2025    Acute angulated left intertrochanteric fracture with varus angulation of distal fracture fragments and mild superior displacement of distal fracture fragments.  This report was finalized on 7/17/2025 2:29 PM by Dr. Shakeel Kemp MD.      Assessment & Plan    #Acute, Closed, Angulated, Mildly Displaced, Non-comminuted, L Hip Fracture  - Orthopedics consulted and following, plan to take to OR today  - Home Xarelto will suffice for DVT prophylaxis pre-surgery, will likely resume post surgery as well when cleared by surgery   - Continue Tylenol and IV Narcotics as needed for breakthrough  - Admit to telemetry, follow up Orthopedics recommendations on surgical intervention, order PT/OT evaluations post-procedure     #Pre-operative Risk Stratification/Clearance  #Hypertension/Hyperlipidemia   #Paroxysmal Atrial Fibrillation, on chronic anticoagulation   - EKG read as junctional rhythm, appears to be normal sinus rhythm per my read  - RCRI = 1, conferring 1.1% 30 day risk of death, MI, or cardiac arrest  - At this time surgery is non-emergent, Orthopedic surgery carries inherent intermediate risk, but patient does not have any active cardiac issues or modifiable risk factors; Recommend to proceed with surgery per Orthopedics  - Continue home beta blocker at lower dose, home flecainide  - Holding other home antihypertensives  roxi-operatively, resume as indicated  - Hold Xarelto for now, likely hep drip vs resume Xarelto post procedure  - Continue to monitor on telemetry    #Insulin Dependent Diabetes Mellitus Type II, uncontrolled, unknown complications  - Hgb A1c = 10.5%  - Holding home oral agents, continue FSBG and SSI, add long acting as indicated.    #Hypothyroid  - Continue home Levothyroxine     #Rheumatoid Arthritis/Psoriatic Arthritis/Sjogren's Syndrome, on DMARD  - Supportive Care, review home medications and resume as indicated    #Anxiety/Depression/Fibromyalgia   - Continue home regimen     #Gastroesophageal Reflux Disease/History Peptic Ulcer Disease   - Continue home PPI     #Chronic Pain  - Hold home regimen for now pending IV treatment for acute processes.    F: NPO  E: Monitor & Replace as needed   N: NPO Diet NPO Type: Strict NPO   VTE Prophylaxis:Mechanical VTE prophylaxis orders are signed & held.    Code Status (Patient has no pulse and is not breathing): CPR  Medical Interventions (Patient has pulse or is breathing): Full Support      Dispo: Pending definitive surgical fixation, PT/OT evals ordered and pending, possibly Short Term Rehab     *This patient is considered high risk secondary to Acute Hip Fx requiring surgical intervention, requiring IV narcotics for pain control.    Edited by: Gigi Anguiano MD at 7/18/2025 1130  [facility]

## 2025-07-18 NOTE — CONSULTS
Referring Provider:  Reason for Consultation: Left intertrochanteric hip fracture    Patient Care Team:  Kreis, Samuel Duane, MD as PCP - General  Kreis, Samuel Duane, MD as PCP - Family Medicine  Charles Landeros MD as Consulting Physician (Dermatology)  Will Chand DO as Consulting Physician (Rheumatology)  Orlando Sanders MD (Endocrinology)  Ezequiel Garcia MD as Consulting Physician (Allergy)  Damian Glez DO (Urology)  Kreis, Samuel Duane, MD as Referring Physician (Family Medicine)  Jared Yo MD as Consulting Physician (Neurosurgery)    Chief complaint: left hip pain    Subjective     History of present illness: Lashae is a 64-year-old female with a past medical history of GERD, anemia, anxiety, atrial fibrillation, degenerative disc disease, depression, hypertension, hyperlipidemia, hypothyroidism, Sjogren's syndrome, type 2 diabetes, and rheumatoid arthritis.  Patient presented to [facility] emergency room on 7/17/2025 with a complaint of left hip pain.  She endorses that she was in the process of walking to the trash bin at her home, turned around, and lost her balance.  She states that she attempted to catch herself by reaching for the  back of her vehicle but was unable to do so.  She reports that she landed directly against the left side with the direct impact against the hip.  Patient states that after sustaining her fall she was unable to stand, bear weight, or ambulate.  She reports that while she was lying on the ground she was yelling for her .  She reports that she did have to be transported via EMS for further evaluation and treatment.  It was noted upon x-ray imaging that she had sustained a fracture to the left hip.  She denies any other acute injury, injections, or surgical intervention to the left hip.  Patient reports that she normally ambulates with the assistance of a cane. She presents today for orthopedic evaluation.    Review of  Systems  Review of Systems   Constitutional:  Positive for activity change.   Musculoskeletal:  Positive for arthralgias, back pain, gait problem, joint swelling and myalgias.   All other systems reviewed and are negative.       History  Past Medical History:   Diagnosis Date    Acid reflux     Allergic     Anemia     Anxiety     Atrial fibrillation     Cancer     thyroid, skin    Cervical disc disorder     Chronic pain disorder     Claustrophobia     CTS (carpal tunnel syndrome)     Degenerative disc disease, lumbar     Depression     Dupuytren's disease     Essential hypertension     Family history of coronary artery disease     Fibromyalgia     Gallbladder abscess     H. pylori infection     Hiatal hernia     Hyperlipidemia     Hypothyroidism     Low back pain     Lumbosacral disc disease     Migraines     migraines    Multiple sclerosis     Osteoarthritis     Peripheral neuropathy     Psoriasis     Psoriatic arthritis     RA (rheumatoid arthritis)     Rheumatoid arthritis     Sinusitis     Sjogren's syndrome     Stomach ulcer     Thoracic disc disorder     TMJ arthritis     Type 2 diabetes mellitus     Urinary tract infection    ,   Past Surgical History:   Procedure Laterality Date    BACK SURGERY      BREAST LUMPECTOMY Left 11/1993    CARDIAC CATHETERIZATION Left 09/21/2009    Normal     CARDIAC CATHETERIZATION N/A 6/24/2025    Procedure: Left Heart Cath;  Surgeon: Teodora Barron MD;  Location: [facility] CATH INVASIVE LOCATION;  Service: Cardiovascular;  Laterality: N/A;    CARPAL TUNNEL RELEASE  03/2012    CERVICAL POLYPECTOMY  12/2015    CHOLECYSTECTOMY  05/2007    COLONOSCOPY      ENDOSCOPY      EPIDURAL BLOCK      GASTRIC BYPASS  09/2007    JOINT REPLACEMENT      KNEE ARTHROPLASTY      LUMBAR DISC SURGERY      C5-6    NECK SURGERY      REPLACEMENT TOTAL KNEE Right 06/2016    SACRAL NERVE STIMULATOR PLACEMENT  09/2014    SACRAL NERVE STIMULATOR PLACEMENT  04/11/2024    @ [facility] in [place]    SACRAL NERVE  STIMULATOR PLACEMENT Right 04/17/2024    THYROIDECTOMY  03/2016    TRIGGER POINT INJECTION     ,   Family History   Problem Relation Age of Onset    Diabetes Mother     Stroke Mother     Hypertension Mother     Asthma Mother     Fainting Mother     Miscarriages / Stillbirths Mother     Heart attack Father         First one was in his 20's second 30's.     Heart failure Father     Heart disease Father     Stroke Father     Diabetes Sister     Cancer Maternal Grandmother    ,   Social History     Tobacco Use    Smoking status: Never     Passive exposure: Never    Smokeless tobacco: Never   Vaping Use    Vaping status: Never Used   Substance Use Topics    Alcohol use: Never    Drug use: Never   ,   Medications Prior to Admission   Medication Sig Dispense Refill Last Dose/Taking    amitriptyline (ELAVIL) 25 MG tablet Take 1 tablet by mouth Every Night.   7/16/2025    amLODIPine (NORVASC) 5 MG tablet Take 1 tablet by mouth Daily. 30 tablet 11 7/17/2025    butalbital-acetaminophen-caffeine (FIORICET, ESGIC) -40 MG per tablet Take 1 tablet by mouth Every 4 (Four) Hours As Needed for Headache.   Past Month    celecoxib (CeleBREX) 200 MG capsule Take 1 capsule by mouth Daily.   7/17/2025    cyclobenzaprine (FLEXERIL) 10 MG tablet Take 1 tablet by mouth 2 (Two) Times a Day.   7/17/2025    DULoxetine (CYMBALTA) 30 MG capsule Take 1 capsule by mouth Every Morning.   7/17/2025    DULoxetine (CYMBALTA) 60 MG capsule Take 1 capsule by mouth Daily.   7/17/2025    fexofenadine (ALLEGRA) 180 MG tablet Take 1 tablet by mouth Daily.   7/17/2025    flecainide (TAMBOCOR) 50 MG tablet TAKE 1 TABLET TWICE A  tablet 3 7/17/2025    fluticasone-salmeterol (ADVAIR HFA) 115-21 MCG/ACT inhaler Inhale 2 puffs 2 (Two) Times a Day.   7/17/2025    folic acid (FOLVITE) 1 MG tablet Take 1 tablet by mouth Daily.   7/17/2025    furosemide (LASIX) 20 MG tablet Take 1 tablet by mouth Daily As Needed (swelling).   7/16/2025    gabapentin  (NEURONTIN) 600 MG tablet Take 1 tablet by mouth 3 (Three) Times a Day.   7/17/2025    levothyroxine (SYNTHROID, LEVOTHROID) 125 MCG tablet Take 1 tablet by mouth Every Morning.   7/17/2025    losartan (COZAAR) 50 MG tablet Take 1 tablet by mouth 2 (Two) Times a Day. 180 tablet 2 7/17/2025    metoprolol tartrate (LOPRESSOR) 100 MG tablet TAKE 1 TABLET TWICE A  tablet 0 7/17/2025    nystatin 123280 UNIT/GM topical powder Apply 1 Application topically to the appropriate area as directed 4 (Four) Times a Day As Needed (irritation).   7/17/2025    omeprazole (priLOSEC) 40 MG capsule Take 1 capsule by mouth Daily As Needed (acid reflux).   7/16/2025    potassium chloride (KLOR-CON M10) 10 MEQ CR tablet Take 1 tablet by mouth Daily.   7/17/2025    primidone (MYSOLINE) 50 MG tablet Take 1 tablet by mouth Every Night.   7/16/2025    Tofacitinib Citrate ER (Xeljanz XR) 11 MG tablet sustained-release 24 hour Take 1 tablet by mouth Daily.   7/17/2025    Toujeo SoloStar 300 UNIT/ML solution pen-injector injection Inject 15 Units under the skin into the appropriate area as directed Every Morning.   7/17/2025    traMADol (ULTRAM) 50 MG tablet Take 1 tablet by mouth 3 (Three) Times a Day As Needed for Moderate Pain.   7/17/2025    Xarelto 20 MG tablet TAKE 1 TABLET DAILY 90 tablet 3 7/17/2025    cyanocobalamin 1000 MCG/ML injection Inject 1 mL into the appropriate muscle as directed by prescriber 1 (One) Time Per Week.   7/13/2025    EMGALITY 120 MG/ML prefilled syringe Inject 1 mL under the skin into the appropriate area as directed Every 30 (Thirty) Days.  3 6/29/2025    methotrexate 2.5 MG tablet Take 10 tablets by mouth 1 (One) Time Per Week.   7/12/2025    nitroglycerin (NITROSTAT) 0.4 MG SL tablet 1 under the tongue as needed for angina, may repeat q5mins for up three doses 30 tablet 0 Unknown    vitamin D (ERGOCALCIFEROL) 1.25 MG (35869 UT) capsule capsule Take 1 capsule by mouth 1 (One) Time Per Week.   7/12/2025    , Scheduled Meds:  amitriptyline, 25 mg, Oral, Nightly  budesonide-formoterol, 2 puff, Inhalation, BID - RT  cetirizine, 10 mg, Oral, Daily  [START ON 7/19/2025] cholecalciferol, 50,000 Units, Oral, Weekly  [START ON 7/20/2025] cyanocobalamin, 1,000 mcg, Intramuscular, Weekly  DULoxetine, 30 mg, Oral, QAM  DULoxetine, 60 mg, Oral, Daily  flecainide, 50 mg, Oral, BID  folic acid, 1 mg, Oral, Daily  gabapentin, 600 mg, Oral, Q8H  insulin lispro, 2-7 Units, Subcutaneous, 4x Daily PC & at Bedtime  levothyroxine, 125 mcg, Oral, Q AM  metoprolol tartrate, 50 mg, Oral, BID  sodium chloride, 10 mL, Intravenous, Q12H    , Continuous Infusions:   , PRN Meds:    senna-docusate sodium **AND** polyethylene glycol **AND** bisacodyl **AND** bisacodyl    butalbital-acetaminophen-caffeine    Calcium Replacement - Follow Nurse / BPA Driven Protocol    dextrose    dextrose    [Held by provider] furosemide    glucagon (human recombinant)    HYDROmorphone    Magnesium Standard Dose Replacement - Follow Nurse / BPA Driven Protocol    miconazole    nitroglycerin    pantoprazole    Phosphorus Replacement - Follow Nurse / BPA Driven Protocol    Potassium Replacement - Follow Nurse / BPA Driven Protocol    [COMPLETED] Insert peripheral IV **AND** sodium chloride    sodium chloride    sodium chloride    [Held by provider] traMADol and Allergies:  Codeine, Imuran [azathioprine], Januvia [sitagliptin], Penicillins, Sulfa antibiotics, Sulfasalazine, and Beta adrenergic blockers    Objective     Vital Signs   Temp:  [97.7 °F (36.5 °C)-98.4 °F (36.9 °C)] 98.4 °F (36.9 °C)  Heart Rate:  [69-94] 94  Resp:  [16-29] 16  BP: (127-185)/(63-99) 161/84    Physical Exam:   Physical Exam  Vitals reviewed.   HENT:      Head: Normocephalic.      Nose: Nose normal.      Mouth/Throat:      Mouth: Mucous membranes are moist.      Pharynx: Oropharynx is clear.   Eyes:      Pupils: Pupils are equal, round, and reactive to light.   Cardiovascular:      Rate and  Rhythm: Normal rate.   Pulmonary:      Effort: Pulmonary effort is normal.   Abdominal:      General: Abdomen is flat.   Musculoskeletal:         General: Tenderness and signs of injury present.      Cervical back: Normal range of motion.      Comments: Upon examination of the left hip there is no erythema, ecchymosis, wounds, drainage, or signs of an infectious process.  There is mild to moderate edema noted laterally.  Patient sensation is intact to light touch with a brisk capillary refill.  Patient is able to plantar and dorsiflex at the ankle without complication.  There is a palpable pulse distally.  Shortening is noted of the left lower extremity.  No external rotation noted.  Range of motion deferred.   Skin:     General: Skin is warm and dry.      Capillary Refill: Capillary refill takes less than 2 seconds.   Neurological:      General: No focal deficit present.      Mental Status: She is alert and oriented to person, place, and time.   Psychiatric:         Mood and Affect: Mood normal.         Behavior: Behavior normal.         Results Review:      Results from last 7 days   Lab Units 07/18/25  0338 07/17/25  1418 07/17/25  1402   WBC 10*3/mm3 8.42  --  6.77   HEMOGLOBIN g/dL 10.2*  --  11.3*   HEMATOCRIT % 31.2*  --  33.6*   MCV fL 106.5*  --  103.4*   MCHC g/dL 32.7  --  33.6   PLATELETS 10*3/mm3 276  --  299   INR   --  1.26*  --          Results from last 7 days   Lab Units 07/18/25  0338 07/17/25  1402 07/16/25  1336   SODIUM mmol/L 136 135* 134*   POTASSIUM mmol/L 3.9 3.6 3.8   CHLORIDE mmol/L 104 102 100   CO2 mmol/L 22.6 21.1* 23.0   BUN mg/dL 11.2 11.5 9.0   CREATININE mg/dL 0.58 0.66 0.68   CALCIUM mg/dL 8.1* 8.3* 8.3*   GLUCOSE mg/dL 203* 284* 215*   ALBUMIN g/dL 3.2* 3.5  --    BILIRUBIN mg/dL 0.4 0.3  --    ALK PHOS U/L 134* 159*  --    AST (SGOT) U/L 23 25  --    ALT (SGPT) U/L 33 37*  --    Estimated Creatinine Clearance: 103.1 mL/min (by C-G formula based on SCr of 0.58 mg/dL).  No results  "found for: \"AMMONIA\"          Lab Results   Component Value Date    HGBA1C 9.10 (H) 07/17/2025     Lab Results   Component Value Date    TSH 15.900 (H) 03/12/2025    FREET4 1.58 12/07/2023     Lab Results   Component Value Date    PREGTESTUR Negative 01/21/2018     Pain Management Panel  More data exists         Latest Ref Rng & Units 5/12/2025 3/17/2025   Pain Management Panel   Creatinine, Urine mg/dL  mg/dL 12.4  12.4  7.9  7.9       Details          Multiple values from one day are sorted in reverse-chronological order             No results found for: \"BLOODCX\"  No results found for: \"URINECX\"  No results found for: \"WOUNDCX\"  No results found for: \"STOOLCX\"      ---------------------------------------------------------------------------------------------------------------------   Imaging Results (Last 7 Days)       Procedure Component Value Units Date/Time    XR Femur 2 View Left [[id number]] Collected: 07/17/25 1442     Updated: 07/17/25 1445    Narrative:      EXAM:    XR Left Femur, 2 Views     EXAM DATE:    7/17/2025 2:09 PM     CLINICAL HISTORY:    fall     TECHNIQUE:    Frontal and lateral views of the left femur.     COMPARISON:    No relevant prior studies available.     FINDINGS:    Bones/joints:  Left intertrochanteric hip fracture again noted. No mid  or distal femur fracture identified.  No dislocation.    Soft tissues:  Unremarkable.       Impression:        Left intertrochanteric hip fracture again noted. No mid or distal  femur fracture identified.     This report was finalized on 7/17/2025 2:42 PM by Dr. Shakeel Kemp MD.       XR Chest AP [[id number]] Collected: 07/17/25 1435     Updated: 07/17/25 1439    Narrative:      EXAM:    XR Chest, 1 View     EXAM DATE:    7/17/2025 2:10 PM     CLINICAL HISTORY:    pre op     TECHNIQUE:    Frontal view of the chest.     COMPARISON:    3/25/2024     FINDINGS:    Lungs and pleural spaces:  Unremarkable.  No consolidation.  No  pneumothorax.    Heart:  " Unremarkable.  No cardiomegaly.    Mediastinum:  Unremarkable.  Normal mediastinal contour.    Bones/joints:  Unremarkable.  No acute fracture.       Impression:        Unremarkable exam with no acute cardiopulmonary findings identified.     This report was finalized on 7/17/2025 2:37 PM by Dr. Shakeel Kemp MD.       XR Shoulder 2+ View Left [[id number]] Collected: 07/17/25 1434     Updated: 07/17/25 1437    Narrative:      EXAM:    XR Left Shoulder Complete, 2 or More Views     EXAM DATE:    7/17/2025 1:54 PM     CLINICAL HISTORY:    fall     TECHNIQUE:    Two or more views of the left shoulder.     COMPARISON:    No relevant prior studies available.     FINDINGS:    Bones/joints:  Unremarkable.  No acute fracture.  No dislocation.    Soft tissues:  Unremarkable.       Impression:        No acute findings in the left shoulder.     This report was finalized on 7/17/2025 2:35 PM by Dr. Shakeel Kemp MD.       XR Elbow 3+ View Left [[id number]] Collected: 07/17/25 1434     Updated: 07/17/25 1436    Narrative:      EXAM:    XR Left Elbow Complete, 3 or More Views     EXAM DATE:    7/17/2025 1:55 PM     CLINICAL HISTORY:    pain     TECHNIQUE:    Frontal, lateral and oblique views of the left elbow.     COMPARISON:    No relevant prior studies available.     FINDINGS:    Bones/joints:  Unremarkable.  No fracture or dislocation.    Soft tissues:  Mild olecranon soft tissue swelling may indicate mild  olecranon bursitis.       Impression:      1.  No fracture or dislocation.  2.  Mild olecranon soft tissue swelling may indicate mild olecranon  bursitis.     This report was finalized on 7/17/2025 2:34 PM by Dr. Shakeel Kemp MD.       XR Hip With or Without Pelvis 2 - 3 View Left [[id number]] Collected: 07/17/25 1428     Updated: 07/17/25 1431    Narrative:      EXAM:    XR Left Hip With Pelvis When Performed, 2 or 3 Views     EXAM DATE:    7/17/2025 1:54 PM     CLINICAL HISTORY:    fall     TECHNIQUE:    Two or three  views of the left hip with pelvis when performed.     COMPARISON:    No relevant prior studies available.     FINDINGS:    Bones/joints:  Acute angulated left intertrochanteric fracture with  varus angulation of distal fracture fragments and mild superior  displacement of distal fracture fragments.  No dislocation.    Soft tissues:  Unremarkable.       Impression:        Acute angulated left intertrochanteric fracture with varus angulation  of distal fracture fragments and mild superior displacement of distal  fracture fragments.     This report was finalized on 7/17/2025 2:29 PM by Dr. Shakeel Kemp MD.               Assessment & Plan     Left intertrochanteric hip fracture      Imaging was shared with the on-call surgeon Dr. Lovelace.  Recommendation at this time is for surgical intervention.    Continue with pain control.    Patient is to remain NPO.    Patient is to remain non-weightbearing to the left lower extremity.    Lengthy discussion was had today with the patient and family in regard to surgical versus conservative intervention.  Patient was informed that due to the severity of the fracture noted at the hip the recommendation at this time is for surgical intervention.  Patient wishes to proceed forward at this time.    Plan will be for a left hip open reduction internal fixation with intertrochanteric nailing  The nature of this procedure, as well as risks, benefits, alternatives, and complications to the above operation were discussed with the patient.  Risks include but are not limited to: Anesthesia complications, death, injury to nerves/vessels, infection, blood clots, continued pain, and repeat or revision surgery.  Following the discussion the patient verbalized understanding of the risks and benefits of the above procedure and elected to proceed with surgery.    An order for a type and screen, case request, and consent will be placed into the system today.    Orthopedics will continue to  follow.      Discharge planning to be determined.        Thank you for this consult.     Luann Schroeder, RUIZ  07/18/25  08:49 EDT

## 2025-07-18 NOTE — PLAN OF CARE
Goal Outcome Evaluation:  Plan of Care Reviewed With: patient        Progress: no change  Outcome Evaluation: Pt resting in bed. Pt had complaints of pain, prn meds given. Pt to be NPO at midnight for surgery tmr. VSS on RA. No acute changes noted at this time. Will continue to follow plan of care.

## 2025-07-18 NOTE — ANESTHESIA PREPROCEDURE EVALUATION
Anesthesia Evaluation     Patient summary reviewed and Nursing notes reviewed   no history of anesthetic complications:                Airway   Mallampati: I  TM distance: >3 FB  Neck ROM: full  No difficulty expected  Dental - normal exam     Pulmonary - negative pulmonary ROS and normal exam   Cardiovascular - normal exam  Exercise tolerance: poor (<4 METS)    NYHA Classification: II  Patient on routine beta blocker and Beta blocker given within 24 hours of surgery    (+) hypertension, dysrhythmias, hyperlipidemia    ROS comment: Conclusion:  1.  Right dominant circulation.  2.  No significant angiographic evidence of coronary artery disease.           Recommendation:  1.  Optimize medical therapy.  2.  Routine post-cath care.      Neuro/Psych  (+) headaches, numbness, psychiatric history  GI/Hepatic/Renal/Endo    (+) hiatal hernia, GERD, PUD, diabetes mellitus, thyroid problem hypothyroidism    Musculoskeletal     Abdominal  - normal exam    Bowel sounds: normal.   Substance History - negative use     OB/GYN negative ob/gyn ROS         Other   arthritis,   history of cancer    ROS/Med Hx Other: ·  Left ventricular systolic function is normal. Calculated left ventricular EF = 58.5%  ·  Left ventricular diastolic function is consistent with (grade I) impaired relaxation.  ·  Estimated right ventricular systolic pressure from tricuspid regurgitation is normal (<35 mmHg).  ·  No significant valvular abnormalities noted                     Anesthesia Plan    ASA 4     general     intravenous induction     Anesthetic plan, risks, benefits, and alternatives have been provided, discussed and informed consent has been obtained with: patient.      CODE STATUS:    Code Status (Patient has no pulse and is not breathing): CPR (Attempt to Resuscitate)  Medical Interventions (Patient has pulse or is breathing): Full Support

## 2025-07-19 ENCOUNTER — APPOINTMENT (OUTPATIENT)
Dept: GENERAL RADIOLOGY | Facility: HOSPITAL | Age: 65
DRG: 481 | End: 2025-07-19
Payer: MEDICARE

## 2025-07-19 LAB
ALBUMIN SERPL-MCNC: 3 G/DL (ref 3.5–5.2)
ALBUMIN/GLOB SERPL: 1.3 G/DL
ALP SERPL-CCNC: 128 U/L (ref 39–117)
ALT SERPL W P-5'-P-CCNC: 26 U/L (ref 1–33)
ANION GAP SERPL CALCULATED.3IONS-SCNC: 12 MMOL/L (ref 5–15)
AST SERPL-CCNC: 17 U/L (ref 1–32)
BILIRUB SERPL-MCNC: 0.4 MG/DL (ref 0–1.2)
BUN SERPL-MCNC: 10.9 MG/DL (ref 8–23)
BUN/CREAT SERPL: 17.6 (ref 7–25)
CALCIUM SPEC-SCNC: 8.1 MG/DL (ref 8.6–10.5)
CHLORIDE SERPL-SCNC: 100 MMOL/L (ref 98–107)
CO2 SERPL-SCNC: 22 MMOL/L (ref 22–29)
CREAT SERPL-MCNC: 0.62 MG/DL (ref 0.57–1)
DEPRECATED RDW RBC AUTO: 56.2 FL (ref 37–54)
EGFRCR SERPLBLD CKD-EPI 2021: 100.2 ML/MIN/1.73
ERYTHROCYTE [DISTWIDTH] IN BLOOD BY AUTOMATED COUNT: 14 % (ref 12.3–15.4)
GLOBULIN UR ELPH-MCNC: 2.4 GM/DL
GLUCOSE BLDC GLUCOMTR-MCNC: 147 MG/DL (ref 70–130)
GLUCOSE BLDC GLUCOMTR-MCNC: 198 MG/DL (ref 70–130)
GLUCOSE BLDC GLUCOMTR-MCNC: 213 MG/DL (ref 70–130)
GLUCOSE BLDC GLUCOMTR-MCNC: 217 MG/DL (ref 70–130)
GLUCOSE BLDC GLUCOMTR-MCNC: 221 MG/DL (ref 70–130)
GLUCOSE SERPL-MCNC: 177 MG/DL (ref 65–99)
HCT VFR BLD AUTO: 31.6 % (ref 34–46.6)
HGB BLD-MCNC: 10.3 G/DL (ref 12–15.9)
MCH RBC QN AUTO: 35.3 PG (ref 26.6–33)
MCHC RBC AUTO-ENTMCNC: 32.6 G/DL (ref 31.5–35.7)
MCV RBC AUTO: 108.2 FL (ref 79–97)
PLATELET # BLD AUTO: 251 10*3/MM3 (ref 140–450)
PMV BLD AUTO: 9.6 FL (ref 6–12)
POTASSIUM SERPL-SCNC: 3.6 MMOL/L (ref 3.5–5.2)
POTASSIUM SERPL-SCNC: 4.9 MMOL/L (ref 3.5–5.2)
PROT SERPL-MCNC: 5.4 G/DL (ref 6–8.5)
RBC # BLD AUTO: 2.92 10*6/MM3 (ref 3.77–5.28)
SODIUM SERPL-SCNC: 134 MMOL/L (ref 136–145)
WBC NRBC COR # BLD AUTO: 9.59 10*3/MM3 (ref 3.4–10.8)

## 2025-07-19 PROCEDURE — 25010000002 ONDANSETRON PER 1 MG: Performed by: ORTHOPAEDIC SURGERY

## 2025-07-19 PROCEDURE — 25010000002 FENTANYL CITRATE (PF) 50 MCG/ML SOLUTION: Performed by: NURSE ANESTHETIST, CERTIFIED REGISTERED

## 2025-07-19 PROCEDURE — 0QS706Z REPOSITION LEFT UPPER FEMUR WITH INTRAMEDULLARY INTERNAL FIXATION DEVICE, OPEN APPROACH: ICD-10-PCS | Performed by: ORTHOPAEDIC SURGERY

## 2025-07-19 PROCEDURE — 80053 COMPREHEN METABOLIC PANEL: CPT | Performed by: INTERNAL MEDICINE

## 2025-07-19 PROCEDURE — 94799 UNLISTED PULMONARY SVC/PX: CPT

## 2025-07-19 PROCEDURE — C1713 ANCHOR/SCREW BN/BN,TIS/BN: HCPCS | Performed by: ORTHOPAEDIC SURGERY

## 2025-07-19 PROCEDURE — 25010000002 CEFAZOLIN PER 500 MG: Performed by: ORTHOPAEDIC SURGERY

## 2025-07-19 PROCEDURE — C1769 GUIDE WIRE: HCPCS | Performed by: ORTHOPAEDIC SURGERY

## 2025-07-19 PROCEDURE — 84132 ASSAY OF SERUM POTASSIUM: CPT | Performed by: ORTHOPAEDIC SURGERY

## 2025-07-19 PROCEDURE — 99232 SBSQ HOSP IP/OBS MODERATE 35: CPT | Performed by: INTERNAL MEDICINE

## 2025-07-19 PROCEDURE — 25010000002 HYDROMORPHONE PER 4 MG: Performed by: INTERNAL MEDICINE

## 2025-07-19 PROCEDURE — 25010000002 CEFAZOLIN PER 500 MG: Performed by: NURSE ANESTHETIST, CERTIFIED REGISTERED

## 2025-07-19 PROCEDURE — 97163 PT EVAL HIGH COMPLEX 45 MIN: CPT

## 2025-07-19 PROCEDURE — 76000 FLUOROSCOPY <1 HR PHYS/QHP: CPT

## 2025-07-19 PROCEDURE — 25010000002 ONDANSETRON PER 1 MG: Performed by: NURSE ANESTHETIST, CERTIFIED REGISTERED

## 2025-07-19 PROCEDURE — 63710000001 INSULIN LISPRO (HUMAN) PER 5 UNITS: Performed by: ORTHOPAEDIC SURGERY

## 2025-07-19 PROCEDURE — 25010000002 POTASSIUM CHLORIDE 10 MEQ/100ML SOLUTION: Performed by: HOSPITALIST

## 2025-07-19 PROCEDURE — 25810000003 LACTATED RINGERS PER 1000 ML: Performed by: NURSE ANESTHETIST, CERTIFIED REGISTERED

## 2025-07-19 PROCEDURE — 94761 N-INVAS EAR/PLS OXIMETRY MLT: CPT

## 2025-07-19 PROCEDURE — 94664 DEMO&/EVAL PT USE INHALER: CPT

## 2025-07-19 PROCEDURE — 25010000002 PROPOFOL 10 MG/ML EMULSION: Performed by: NURSE ANESTHETIST, CERTIFIED REGISTERED

## 2025-07-19 PROCEDURE — 25010000002 FAMOTIDINE (PF) 20 MG/2ML SOLUTION: Performed by: NURSE ANESTHETIST, CERTIFIED REGISTERED

## 2025-07-19 PROCEDURE — 25810000003 SODIUM CHLORIDE 0.9 % SOLUTION: Performed by: ORTHOPAEDIC SURGERY

## 2025-07-19 PROCEDURE — 25010000002 HYDROMORPHONE PER 4 MG: Performed by: ORTHOPAEDIC SURGERY

## 2025-07-19 PROCEDURE — 82948 REAGENT STRIP/BLOOD GLUCOSE: CPT

## 2025-07-19 PROCEDURE — 85027 COMPLETE CBC AUTOMATED: CPT | Performed by: INTERNAL MEDICINE

## 2025-07-19 RX ORDER — MAGNESIUM HYDROXIDE 1200 MG/15ML
LIQUID ORAL AS NEEDED
Status: DISCONTINUED | OUTPATIENT
Start: 2025-07-19 | End: 2025-07-19 | Stop reason: HOSPADM

## 2025-07-19 RX ORDER — PHENYLEPHRINE HCL IN 0.9% NACL 1 MG/10 ML
SYRINGE (ML) INTRAVENOUS AS NEEDED
Status: DISCONTINUED | OUTPATIENT
Start: 2025-07-19 | End: 2025-07-19 | Stop reason: SURG

## 2025-07-19 RX ORDER — FENTANYL CITRATE 50 UG/ML
INJECTION, SOLUTION INTRAMUSCULAR; INTRAVENOUS AS NEEDED
Status: DISCONTINUED | OUTPATIENT
Start: 2025-07-19 | End: 2025-07-19 | Stop reason: SURG

## 2025-07-19 RX ORDER — OXYCODONE AND ACETAMINOPHEN 7.5; 325 MG/1; MG/1
2 TABLET ORAL EVERY 4 HOURS PRN
Status: DISCONTINUED | OUTPATIENT
Start: 2025-07-19 | End: 2025-07-22

## 2025-07-19 RX ORDER — ONDANSETRON 2 MG/ML
4 INJECTION INTRAMUSCULAR; INTRAVENOUS EVERY 6 HOURS PRN
Status: DISCONTINUED | OUTPATIENT
Start: 2025-07-19 | End: 2025-07-22 | Stop reason: HOSPADM

## 2025-07-19 RX ORDER — IPRATROPIUM BROMIDE AND ALBUTEROL SULFATE 2.5; .5 MG/3ML; MG/3ML
3 SOLUTION RESPIRATORY (INHALATION) ONCE AS NEEDED
Status: DISCONTINUED | OUTPATIENT
Start: 2025-07-19 | End: 2025-07-19 | Stop reason: HOSPADM

## 2025-07-19 RX ORDER — FAMOTIDINE 10 MG/ML
INJECTION, SOLUTION INTRAVENOUS AS NEEDED
Status: DISCONTINUED | OUTPATIENT
Start: 2025-07-19 | End: 2025-07-19 | Stop reason: SURG

## 2025-07-19 RX ORDER — PROPOFOL 10 MG/ML
VIAL (ML) INTRAVENOUS AS NEEDED
Status: DISCONTINUED | OUTPATIENT
Start: 2025-07-19 | End: 2025-07-19 | Stop reason: SURG

## 2025-07-19 RX ORDER — SODIUM CHLORIDE 0.9 % (FLUSH) 0.9 %
10 SYRINGE (ML) INJECTION AS NEEDED
Status: DISCONTINUED | OUTPATIENT
Start: 2025-07-19 | End: 2025-07-22 | Stop reason: HOSPADM

## 2025-07-19 RX ORDER — SODIUM CHLORIDE 0.9 % (FLUSH) 0.9 %
10 SYRINGE (ML) INJECTION EVERY 12 HOURS SCHEDULED
Status: DISCONTINUED | OUTPATIENT
Start: 2025-07-19 | End: 2025-07-22 | Stop reason: HOSPADM

## 2025-07-19 RX ORDER — MEPERIDINE HYDROCHLORIDE 25 MG/ML
12.5 INJECTION INTRAMUSCULAR; INTRAVENOUS; SUBCUTANEOUS
Status: DISCONTINUED | OUTPATIENT
Start: 2025-07-19 | End: 2025-07-19 | Stop reason: HOSPADM

## 2025-07-19 RX ORDER — HYDROCODONE BITARTRATE AND ACETAMINOPHEN 7.5; 325 MG/1; MG/1
1 TABLET ORAL EVERY 4 HOURS PRN
Status: DISCONTINUED | OUTPATIENT
Start: 2025-07-19 | End: 2025-07-22 | Stop reason: HOSPADM

## 2025-07-19 RX ORDER — SODIUM CHLORIDE, SODIUM LACTATE, POTASSIUM CHLORIDE, CALCIUM CHLORIDE 600; 310; 30; 20 MG/100ML; MG/100ML; MG/100ML; MG/100ML
100 INJECTION, SOLUTION INTRAVENOUS ONCE AS NEEDED
Status: DISCONTINUED | OUTPATIENT
Start: 2025-07-19 | End: 2025-07-19 | Stop reason: HOSPADM

## 2025-07-19 RX ORDER — SODIUM CHLORIDE 9 MG/ML
125 INJECTION, SOLUTION INTRAVENOUS CONTINUOUS
Status: ACTIVE | OUTPATIENT
Start: 2025-07-19 | End: 2025-07-20

## 2025-07-19 RX ORDER — OXYCODONE AND ACETAMINOPHEN 7.5; 325 MG/1; MG/1
1 TABLET ORAL EVERY 8 HOURS PRN
Qty: 20 TABLET | Refills: 0 | Status: ON HOLD | OUTPATIENT
Start: 2025-07-19 | End: 2025-07-29

## 2025-07-19 RX ORDER — ONDANSETRON 2 MG/ML
4 INJECTION INTRAMUSCULAR; INTRAVENOUS AS NEEDED
Status: DISCONTINUED | OUTPATIENT
Start: 2025-07-19 | End: 2025-07-19 | Stop reason: HOSPADM

## 2025-07-19 RX ORDER — FENTANYL CITRATE 50 UG/ML
50 INJECTION, SOLUTION INTRAMUSCULAR; INTRAVENOUS
Status: DISCONTINUED | OUTPATIENT
Start: 2025-07-19 | End: 2025-07-19 | Stop reason: HOSPADM

## 2025-07-19 RX ORDER — HYDROMORPHONE HYDROCHLORIDE 1 MG/ML
0.5 INJECTION, SOLUTION INTRAMUSCULAR; INTRAVENOUS; SUBCUTANEOUS
Status: DISCONTINUED | OUTPATIENT
Start: 2025-07-19 | End: 2025-07-21

## 2025-07-19 RX ORDER — POTASSIUM CHLORIDE 7.45 MG/ML
10 INJECTION INTRAVENOUS
Status: COMPLETED | OUTPATIENT
Start: 2025-07-19 | End: 2025-07-19

## 2025-07-19 RX ORDER — TRANEXAMIC ACID 100 MG/ML
INJECTION, SOLUTION INTRAVENOUS AS NEEDED
Status: DISCONTINUED | OUTPATIENT
Start: 2025-07-19 | End: 2025-07-19 | Stop reason: SURG

## 2025-07-19 RX ORDER — NALOXONE HCL 0.4 MG/ML
0.4 VIAL (ML) INJECTION
Status: DISCONTINUED | OUTPATIENT
Start: 2025-07-19 | End: 2025-07-22 | Stop reason: HOSPADM

## 2025-07-19 RX ORDER — SODIUM CHLORIDE 9 MG/ML
40 INJECTION, SOLUTION INTRAVENOUS AS NEEDED
Status: DISCONTINUED | OUTPATIENT
Start: 2025-07-19 | End: 2025-07-22 | Stop reason: HOSPADM

## 2025-07-19 RX ORDER — KETOROLAC TROMETHAMINE 30 MG/ML
15 INJECTION, SOLUTION INTRAMUSCULAR; INTRAVENOUS EVERY 6 HOURS PRN
Status: ACTIVE | OUTPATIENT
Start: 2025-07-19 | End: 2025-07-21

## 2025-07-19 RX ORDER — SODIUM CHLORIDE, SODIUM LACTATE, POTASSIUM CHLORIDE, CALCIUM CHLORIDE 600; 310; 30; 20 MG/100ML; MG/100ML; MG/100ML; MG/100ML
INJECTION, SOLUTION INTRAVENOUS CONTINUOUS PRN
Status: DISCONTINUED | OUTPATIENT
Start: 2025-07-19 | End: 2025-07-19 | Stop reason: SURG

## 2025-07-19 RX ORDER — NITROGLYCERIN 0.4 MG/1
0.4 TABLET SUBLINGUAL
Status: DISCONTINUED | OUTPATIENT
Start: 2025-07-19 | End: 2025-07-22 | Stop reason: HOSPADM

## 2025-07-19 RX ORDER — ONDANSETRON 2 MG/ML
INJECTION INTRAMUSCULAR; INTRAVENOUS AS NEEDED
Status: DISCONTINUED | OUTPATIENT
Start: 2025-07-19 | End: 2025-07-19 | Stop reason: SURG

## 2025-07-19 RX ADMIN — FENTANYL CITRATE 50 MCG: 50 INJECTION, SOLUTION INTRAMUSCULAR; INTRAVENOUS at 10:52

## 2025-07-19 RX ADMIN — Medication 10 ML: at 21:09

## 2025-07-19 RX ADMIN — INSULIN LISPRO 3 UNITS: 100 INJECTION, SOLUTION INTRAVENOUS; SUBCUTANEOUS at 21:08

## 2025-07-19 RX ADMIN — POTASSIUM CHLORIDE 10 MEQ: 7.46 INJECTION, SOLUTION INTRAVENOUS at 04:00

## 2025-07-19 RX ADMIN — TRANEXAMIC ACID 1000 MG: 100 INJECTION INTRAVENOUS at 10:06

## 2025-07-19 RX ADMIN — FENTANYL CITRATE 50 MCG: 50 INJECTION, SOLUTION INTRAMUSCULAR; INTRAVENOUS at 09:37

## 2025-07-19 RX ADMIN — GABAPENTIN 600 MG: 300 CAPSULE ORAL at 21:07

## 2025-07-19 RX ADMIN — ONDANSETRON 4 MG: 2 INJECTION, SOLUTION INTRAMUSCULAR; INTRAVENOUS at 09:20

## 2025-07-19 RX ADMIN — AMITRIPTYLINE HYDROCHLORIDE 25 MG: 50 TABLET, FILM COATED ORAL at 21:07

## 2025-07-19 RX ADMIN — OXYCODONE AND ACETAMINOPHEN 2 TABLET: 7.5; 325 TABLET ORAL at 18:09

## 2025-07-19 RX ADMIN — SODIUM CHLORIDE 125 ML/HR: 9 INJECTION, SOLUTION INTRAVENOUS at 12:29

## 2025-07-19 RX ADMIN — CEFAZOLIN 2 G: 2 INJECTION, POWDER, FOR SOLUTION INTRAMUSCULAR; INTRAVENOUS at 09:15

## 2025-07-19 RX ADMIN — SODIUM CHLORIDE, POTASSIUM CHLORIDE, SODIUM LACTATE AND CALCIUM CHLORIDE: 600; 310; 30; 20 INJECTION, SOLUTION INTRAVENOUS at 09:06

## 2025-07-19 RX ADMIN — HYDROMORPHONE HYDROCHLORIDE 0.5 MG: 1 INJECTION, SOLUTION INTRAMUSCULAR; INTRAVENOUS; SUBCUTANEOUS at 07:28

## 2025-07-19 RX ADMIN — CEFAZOLIN 2000 MG: 2 INJECTION, POWDER, FOR SOLUTION INTRAMUSCULAR; INTRAVENOUS at 17:06

## 2025-07-19 RX ADMIN — Medication 100 MCG: at 09:20

## 2025-07-19 RX ADMIN — GABAPENTIN 600 MG: 300 CAPSULE ORAL at 14:23

## 2025-07-19 RX ADMIN — BUDESONIDE AND FORMOTEROL FUMARATE DIHYDRATE 2 PUFF: 160; 4.5 AEROSOL RESPIRATORY (INHALATION) at 19:00

## 2025-07-19 RX ADMIN — FLECAINIDE ACETATE 50 MG: 50 TABLET ORAL at 08:09

## 2025-07-19 RX ADMIN — HYDROMORPHONE HYDROCHLORIDE 0.5 MG: 1 INJECTION, SOLUTION INTRAMUSCULAR; INTRAVENOUS; SUBCUTANEOUS at 15:01

## 2025-07-19 RX ADMIN — INSULIN LISPRO 3 UNITS: 100 INJECTION, SOLUTION INTRAVENOUS; SUBCUTANEOUS at 18:09

## 2025-07-19 RX ADMIN — POTASSIUM CHLORIDE 10 MEQ: 7.46 INJECTION, SOLUTION INTRAVENOUS at 06:00

## 2025-07-19 RX ADMIN — SODIUM CHLORIDE 125 ML/HR: 9 INJECTION, SOLUTION INTRAVENOUS at 20:55

## 2025-07-19 RX ADMIN — POTASSIUM CHLORIDE 10 MEQ: 7.46 INJECTION, SOLUTION INTRAVENOUS at 03:01

## 2025-07-19 RX ADMIN — TRANEXAMIC ACID 1000 MG: 100 INJECTION INTRAVENOUS at 09:13

## 2025-07-19 RX ADMIN — Medication 100 MCG: at 09:54

## 2025-07-19 RX ADMIN — Medication 100 MCG: at 09:32

## 2025-07-19 RX ADMIN — HYDROCODONE BITARTRATE AND ACETAMINOPHEN 1 TABLET: 7.5; 325 TABLET ORAL at 13:31

## 2025-07-19 RX ADMIN — Medication 100 MCG: at 10:08

## 2025-07-19 RX ADMIN — FAMOTIDINE 20 MG: 10 INJECTION, SOLUTION INTRAVENOUS at 09:20

## 2025-07-19 RX ADMIN — PROPOFOL 100 MG: 10 INJECTION, EMULSION INTRAVENOUS at 09:08

## 2025-07-19 RX ADMIN — METOPROLOL TARTRATE 50 MG: 50 TABLET, FILM COATED ORAL at 08:09

## 2025-07-19 RX ADMIN — BUDESONIDE AND FORMOTEROL FUMARATE DIHYDRATE 2 PUFF: 160; 4.5 AEROSOL RESPIRATORY (INHALATION) at 07:34

## 2025-07-19 RX ADMIN — POTASSIUM CHLORIDE 10 MEQ: 7.46 INJECTION, SOLUTION INTRAVENOUS at 05:00

## 2025-07-19 RX ADMIN — ONDANSETRON 4 MG: 2 INJECTION, SOLUTION INTRAMUSCULAR; INTRAVENOUS at 15:02

## 2025-07-19 RX ADMIN — FENTANYL CITRATE 50 MCG: 50 INJECTION, SOLUTION INTRAMUSCULAR; INTRAVENOUS at 10:19

## 2025-07-19 RX ADMIN — FLECAINIDE ACETATE 50 MG: 50 TABLET ORAL at 21:08

## 2025-07-19 RX ADMIN — Medication 100 MCG: at 09:24

## 2025-07-19 RX ADMIN — HYDROMORPHONE HYDROCHLORIDE 0.5 MG: 1 INJECTION, SOLUTION INTRAMUSCULAR; INTRAVENOUS; SUBCUTANEOUS at 03:53

## 2025-07-19 NOTE — ANESTHESIA PROCEDURE NOTES
Airway  Reason: elective    Date/Time: 7/19/2025 9:09 AM  Airway not difficult    General Information and Staff    Patient location during procedure: OR  CRNA/CAA: Faith Kyle CRNA    Indications and Patient Condition  Indications for airway management: airway protection    Preoxygenated: yes    Mask difficulty assessment: 0 - not attempted    Final Airway Details    Final airway type: supraglottic airway      Successful airway: unique  Size: 4   Number of attempts at approach: 1  Assessment: lips, teeth, and gum same as pre-op

## 2025-07-19 NOTE — THERAPY EVALUATION
Acute Care - Physical Therapy Initial Evaluation   [facility]     Patient Name: Lashae Benitez  : 1960  MRN: [id number]  Today's Date: 2025   Onset of Illness/Injury or Date of Surgery: 25  Visit Dx:     ICD-10-CM ICD-9-CM   1. Closed intertrochanteric fracture of hip, left, initial encounter  S72.142A 820.21   2. Post-operative state  Z98.890 V45.89   3. Precordial pain  R07.2 786.51   4. Abnormal nuclear stress test  R94.39 794.39     Patient Active Problem List   Diagnosis    Hiatal hernia    Essential hypertension    Sjogren's syndrome    Multiple sclerosis    Psoriasis    Fibromyalgia    Osteoarthritis    Psoriatic arthritis    RA (rheumatoid arthritis)    Degenerative disc disease, lumbar    TMJ arthritis    Dupuytren's disease    H. pylori infection    Family history of coronary artery disease    Type 2 diabetes mellitus    Edema    Bilateral leg edema    Isolated corticotropin deficiency    Hyponatremia    Paroxysmal atrial fibrillation    Sepsis    Bacteremia, escherichia coli    Iron deficiency anemia    Malabsorption due to intolerance, not elsewhere classified    Chronic chest pain with high risk for CAD    Abnormal nuclear stress test    Abnormal computed tomography angiography (CTA)    Hip fracture    Closed intertrochanteric fracture of hip, left, initial encounter     Past Medical History:   Diagnosis Date    Acid reflux     Allergic     Anemia     Anxiety     Atrial fibrillation     Cancer     thyroid, skin    Cervical disc disorder     Chronic pain disorder     Claustrophobia     CTS (carpal tunnel syndrome)     Degenerative disc disease, lumbar     Depression     Dupuytren's disease     Essential hypertension     Family history of coronary artery disease     Fibromyalgia     Gallbladder abscess     H. pylori infection     Hiatal hernia     Hyperlipidemia     Hypothyroidism     Low back pain     Lumbosacral disc disease     Migraines     migraines    Multiple sclerosis      Osteoarthritis     Peripheral neuropathy     Psoriasis     Psoriatic arthritis     RA (rheumatoid arthritis)     Rheumatoid arthritis     Sinusitis     Sjogren's syndrome     Stomach ulcer     Thoracic disc disorder     TMJ arthritis     Type 2 diabetes mellitus     Urinary tract infection      Past Surgical History:   Procedure Laterality Date    BACK SURGERY      BREAST LUMPECTOMY Left 11/1993    CARDIAC CATHETERIZATION Left 09/21/2009    Normal     CARDIAC CATHETERIZATION N/A 6/24/2025    Procedure: Left Heart Cath;  Surgeon: Teodora Barron MD;  Location: [facility] CATH INVASIVE LOCATION;  Service: Cardiovascular;  Laterality: N/A;    CARPAL TUNNEL RELEASE  03/2012    CERVICAL POLYPECTOMY  12/2015    CHOLECYSTECTOMY  05/2007    COLONOSCOPY      ENDOSCOPY      EPIDURAL BLOCK      GASTRIC BYPASS  09/2007    JOINT REPLACEMENT      KNEE ARTHROPLASTY      LUMBAR DISC SURGERY      C5-6    NECK SURGERY      REPLACEMENT TOTAL KNEE Right 06/2016    SACRAL NERVE STIMULATOR PLACEMENT  09/2014    SACRAL NERVE STIMULATOR PLACEMENT  04/11/2024    [facility] in [place]    SACRAL NERVE STIMULATOR PLACEMENT Right 04/17/2024    THYROIDECTOMY  03/2016    TRIGGER POINT INJECTION       PT Assessment (Last 12 Hours)       PT Evaluation and Treatment       Row Name 07/19/25 1601          Physical Therapy Time and Intention    Subjective Information complains of;pain  -AG     Document Type evaluation  -AG     Mode of Treatment physical therapy  -AG     Patient Effort good  -AG     Symptoms Noted During/After Treatment increased pain;nausea  -AG       Row Name 07/19/25 1601          General Information    Patient Profile Reviewed yes  -AG     Onset of Illness/Injury or Date of Surgery 07/17/25  -AG     Referring Physician Dr. Lovelace  -     Patient Observations agree to therapy;cooperative;alert  -AG     Patient/Family/Caregiver Comments/Observations pt. supine in bed, spouse at bedside.  Pt. is pleasant, cooperative for evaluation.  B  scuds in place.  Pt. appears to have 4 surgical sites, heavily bandaged.  -AG     General Observations of Patient per chart, pt. is being evaluated for MS  -AG     Prior Level of Function independent:  pt. reports previously independent using her straight cane which she normally uses at all times, however, at the time of the injury, she was taking out her trash and was not using cane.  -AG     Equipment Currently Used at Home cane, straight  -AG     Pertinent History of Current Functional Problem pt. sustained a fall resulting in L complex intertrochanteric fx; underwent ORIF with orders for TTWB x 6 weeks.  -AG     Existing Precautions/Restrictions fall;weight bearing  -AG     Equipment Issued to Patient gait belt  -AG     Risks Reviewed patient:;spouse/S.O.:;increased discomfort;dizziness;nausea/vomiting  -AG     Benefits Reviewed patient:;spouse/S.O.:;improve function;increase independence;increase strength;increase balance;increase knowledge;decrease risk of DVT  -AG     Barriers to Rehab none identified  -AG       Row Name 07/19/25 1601          Previous Level of Function/Home Environm    BADLs, Previous Functional Level independent  -AG     Community Ambulation, Previous Functional Level independent;uses device or equipment  -AG       Row Name 07/19/25 1601          Living Environment    Current Living Arrangements home  -AG     Home Accessibility stairs to enter home  -AG     People in Home spouse  -AG     Primary Care Provided by self  -AG       Row Name 07/19/25 1601          Home Use of Assistive/Adaptive Equipment    Equipment Currently Used at Home cane, straight  -AG       Row Name 07/19/25 1601          Pain    Pain Location hip  -AG     Pain Side/Orientation left  -AG     Additional Documentation Pain Scale: FACES Pre/Post-Treatment (Group)  -AG       Row Name 07/19/25 1601          Pain Scale: FACES Pre/Post-Treatment    Pain: FACES Scale, Pretreatment 4-->hurts little more  -AG     Posttreatment  Pain Rating 8-->hurts whole lot  -AG       Row Name 07/19/25 1601          Cognition    Affect/Mental Status (Cognition) WNL  -AG     Orientation Status (Cognition) oriented x 3  -AG     Follows Commands (Cognition) verbal cues/prompting required  -AG     Personal Safety Interventions fall prevention program maintained;gait belt;nonskid shoes/slippers when out of bed;supervised activity  -AG       Row Name 07/19/25 1601          Range of Motion (ROM)    Range of Motion --  R LE AROM WFL; L ankle AROM WFL; decr L knee, hip d/t intense pain  -AG       Row Name 07/19/25 1601          Strength (Manual Muscle Testing)    Strength (Manual Muscle Testing) --  L LE grossly 2+/5  -AG       Row Name 07/19/25 1601          Mobility    Extremity Weight-bearing Status left lower extremity  -AG     Left Lower Extremity (Weight-bearing Status) toe touch weight-bearing (TTWB)  -AG       Row Name 07/19/25 1601          Bed Mobility    Bed Mobility rolling left;rolling right;scooting/bridging;supine-sit;sit-supine  -AG     Rolling Right [place] (Bed Mobility) verbal cues;nonverbal cues (demo/gesture);maximum assist (25% patient effort);2 person assist  -AG     Scooting/Bridging [place] (Bed Mobility) verbal cues;nonverbal cues (demo/gesture);maximum assist (25% patient effort);2 person assist  -AG     Supine-Sit [place] (Bed Mobility) verbal cues;nonverbal cues (demo/gesture);maximum assist (25% patient effort);2 person assist  -AG     Sit-Supine [place] (Bed Mobility) verbal cues;nonverbal cues (demo/gesture);maximum assist (25% patient effort);2 person assist  -AG     Bed Mobility, Safety Issues decreased use of arms for pushing/pulling;decreased use of legs for bridging/pushing  -AG     Assistive Device (Bed Mobility) repositioning sheet  -AG       Row Name 07/19/25 1601          Transfers    Transfers sit-stand transfer;stand-sit transfer  -AG     Maintains Weight-bearing Status (Transfers) verbal cues to  maintain  -AG       Row Name 07/19/25 1601          Sit-Stand Transfer    Sit-Stand [place] (Transfers) verbal cues;nonverbal cues (demo/gesture);moderate assist (50% patient effort);2 person assist  -AG     Assistive Device (Sit-Stand Transfers) walker, front-wheeled  -       Row Name 07/19/25 1601          Stand-Sit Transfer    Stand-Sit [place] (Transfers) verbal cues;nonverbal cues (demo/gesture);moderate assist (50% patient effort);2 person assist  -AG     Assistive Device (Stand-Sit Transfers) walker, front-wheeled  -       Row Name 07/19/25 1601          Gait/Stairs (Locomotion)    Patient was able to Ambulate no, other medical factors prevent ambulation  -AG     Reason Patient was unable to Ambulate Uncontrolled Pain  -[facility] Name 07/19/25 1601          Safety Issues/Impairments Affecting Functional Mobility    Impairments Affecting Function (Mobility) balance;endurance/activity tolerance;pain;range of motion (ROM);strength  -[facility] Name 07/19/25 1601          Balance    Balance Assessment sitting static balance;sitting dynamic balance;sit to stand dynamic balance;standing static balance;standing dynamic balance  -     Static Sitting Balance standby assist  -AG     Position, Sitting Balance unsupported;sitting edge of bed  -     Static Standing Balance verbal cues;non-verbal cues (demo/gesture);moderate assist;2-person assist  -AG     Position/Device Used, Standing Balance walker, front-wheeled  -[facility] Name 07/19/25 1601          Motor Skills    Therapeutic Exercise hip;knee;ankle  -[facility] Name 07/19/25 1601          Hip (Therapeutic Exercise)    Hip (Therapeutic Exercise) isometric exercises  -     Hip Isometrics (Therapeutic Exercise) bilateral;gluteal sets;supine  -[facility] Name 07/19/25 1601          Ankle (Therapeutic Exercise)    Ankle (Therapeutic Exercise) strengthening exercise  -     Ankle Strengthening (Therapeutic Exercise)  bilateral;dorsiflexion;plantarflexion;supine  -AG       Row Name             Wound 07/19/25 0921 Left gluteal Other (Comments)    Wound - Properties Group Placement Date: 07/19/25  -JG Placement Time: 0921 -JG Side: Left  -JG Location: gluteal  -JG Primary Wound Type: Other  -JG Additional Comments: blister noted on right buttock during skin assessment prior to surgery  -JG    Retired Wound - Properties Group Placement Date: 07/19/25  -JG Placement Time: 0921 -JG Side: Left  -JG Location: gluteal  -JG Additional Comments: blister noted on right buttock during skin assessment prior to surgery  -JG    Retired Wound - Properties Group Placement Date: 07/19/25  -JG Placement Time: 0921 -JG Side: Left  -JG Location: gluteal  -JG Additional Comments: blister noted on right buttock during skin assessment prior to surgery  -JG    Retired Wound - Properties Group Date first assessed: 07/19/25  -JG Time first assessed: 0921 -JG Side: Left  -JG Location: gluteal  -JG Additional Comments: blister noted on right buttock during skin assessment prior to surgery  -JG      Row Name             Wound 07/19/25 0938 Left anterior thigh Surgical Open Surgical Incision    Wound - Properties Group Placement Date: 07/19/25  -JG Placement Time: 0938 -JG Side: Left  -JG Orientation: anterior  -JG Location: thigh  -JG Primary Wound Type: Surgical  -JG Secondary Wound Type - Surgical: Open Surg  -JG    Retired Wound - Properties Group Placement Date: 07/19/25  -JG Placement Time: 0938 -JG Side: Left  -JG Orientation: anterior  -JG Location: thigh  -JG    Retired Wound - Properties Group Placement Date: 07/19/25  -JG Placement Time: 0938 -JG Side: Left  -JG Orientation: anterior  -JG Location: thigh  -JG    Retired Wound - Properties Group Date first assessed: 07/19/25  -JG Time first assessed: 0938  -JG Side: Left  -JG Location: thigh  -JG      Row Name 07/19/25 1601          Coping    Observed Emotional State cooperative  -AG      Verbalized Emotional State acceptance  -AG     Trust Relationship/Rapport care explained;choices provided;thoughts/feelings acknowledged  -AG     Family/Support Persons spouse  -AG     Involvement in Care at bedside;attentive to patient  -AG     Family/Support System Care support provided;self-care encouraged  -AG       Row Name 07/19/25 1601          Plan of Care Review    Plan of Care Reviewed With patient;spouse  -AG     Outcome Evaluation PT evaluation performed.  Pt. requires max A x 2 for bed mobility skills including scooting, rolling, supine<>sit d/t intense pain.  Pt. mod A x 2 to stand with RW x 2 trials, able to maintain standing while maintaining L LE TTWB for ~2 minutes in order for RN to assess buttock wound.  Initiated HEP including gluteal sets and AP. Pt. will require ongoing skilled PT to resume PLOF.  Recommend IPR.  Will continue to follow and progress as tolerated.  -AG       Row Name 07/19/25 1601          Positioning and Restraints    Pre-Treatment Position in bed  -AG     Post Treatment Position bed  -AG     In Bed fowlers;call light within reach;encouraged to call for assist;exit alarm on;with family/caregiver;with nsg;side rails up x3;SCD pump applied;heels elevated  -AG       Row Name 07/19/25 1601          Therapy Assessment/Plan (PT)    Patient/Family Therapy Goals Statement (PT) return home with spouse; pt. is amenable to IPR if this is an option.  -AG     Functional Level at Time of Evaluation (PT) max A x 2  -AG     PT Diagnosis (PT) impaired functional mobility s/p L hip ORIF  -AG     Rehab Potential (PT) good  -AG     Criteria for Skilled Interventions Met (PT) yes;skilled treatment is necessary  -AG     Therapy Frequency (PT) 6 times/wk  Monday-Saturday  -AG     Predicted Duration of Therapy Intervention (PT) LOS  -AG     Problem List (PT) problems related to;balance;mobility;pain;strength;range of motion (ROM)  -AG     Activity Limitations Related to Problem List (PT) unable to  ambulate safely;unable to transfer safely;BADLs not performed adequately or safely  -AG       Row Name 07/19/25 1601          Therapy Plan Review/Discharge Plan (PT)    Therapy Plan Review (PT) evaluation/treatment results reviewed;care plan/treatment goals reviewed;risks/benefits reviewed;current/potential barriers reviewed;participants voiced agreement with care plan;participants included;patient;spouse/significant other  -       Row Name 07/19/25 1601          Physical Therapy Goals    Bed Mobility Goal Selection (PT) bed mobility, PT goal 1  -AG     Transfer Goal Selection (PT) transfer, PT goal 1  -AG     Gait Training Goal Selection (PT) gait training, PT goal 1  -AG       Row Name 07/19/25 1601          Bed Mobility Goal 1 (PT)    Activity/Assistive Device (Bed Mobility Goal 1, PT) rolling to left;rolling to right;scooting;sit to supine;supine to sit  -AG     [place] Level/Cues Needed (Bed Mobility Goal 1, PT) moderate assist (50-74% patient effort)  -AG     Time Frame (Bed Mobility Goal 1, PT) by discharge  -AG       Row Name 07/19/25 1601          Transfer Goal 1 (PT)    Activity/Assistive Device (Transfer Goal 1, PT) sit-to-stand/stand-to-sit;bed-to-chair/chair-to-bed;toilet;walker, rolling  -AG     [place] Level/Cues Needed (Transfer Goal 1, PT) moderate assist (50-74% patient effort)  -AG     Time Frame (Transfer Goal 1, PT) by discharge  -AG       Row Name 07/19/25 1601          Gait Training Goal 1 (PT)    Activity/Assistive Device (Gait Training Goal 1, PT) gait (walking locomotion);assistive device use;decrease fall risk;diminish gait deviation;improve balance and speed;increase endurance/gait distance;maintain weight-bearing status;walker, rolling  -AG     [place] Level (Gait Training Goal 1, PT) moderate assist (50-74% patient effort)  -AG     Distance (Gait Training Goal 1, PT) 5  -AG     Time Frame (Gait Training Goal 1, PT) by discharge  -AG               User Key  (r) =  Recorded By, (t) = Taken By, (c) = Cosigned By      Initials Name Provider Type    Rebekah Martin, RN Registered Nurse    Anum Martinez, PT Physical Therapist                    Physical Therapy Education       Title: PT OT SLP Therapies (Done)       Topic: Physical Therapy (Done)       Point: Mobility training (Done)       Learning Progress Summary            Patient Acceptance, E,D,TB, VU,NR by  at 7/19/2025 1600    Acceptance, E,TB, VU by  at 7/18/2025 2335    Acceptance, E,TB, VU by  at 7/18/2025 0008   Significant Other Acceptance, E,D,TB, VU,NR by  at 7/19/2025 1600                      Point: Home exercise program (Done)       Learning Progress Summary            Patient Acceptance, E,D,TB, VU,NR by  at 7/19/2025 1600    Acceptance, E,TB, VU by  at 7/18/2025 2335    Acceptance, E,TB, VU by  at 7/18/2025 0008   Significant Other Acceptance, E,D,TB, VU,NR by  at 7/19/2025 1600                      Point: Body mechanics (Done)       Learning Progress Summary            Patient Acceptance, E,D,TB, VU,NR by  at 7/19/2025 1600    Acceptance, E,TB, VU by  at 7/18/2025 2335    Acceptance, E,TB, VU by  at 7/18/2025 0008   Significant Other Acceptance, E,D,TB, VU,NR by  at 7/19/2025 1600                      Point: Precautions (Done)       Learning Progress Summary            Patient Acceptance, E,D,TB, VU,NR by  at 7/19/2025 1600    Acceptance, E,TB, VU by  at 7/18/2025 2335    Acceptance, E,TB, VU by  at 7/18/2025 0008   Significant Other Acceptance, E,D,TB, VU,NR by  at 7/19/2025 1600                                      User Key       Initials Effective Dates Name Provider Type Discipline     06/16/21 -  Anum Stark, PT Physical Therapist PT     11/20/24 -  Rylie Elaine, RN Registered Nurse Nurse                  PT Recommendation and Plan  Anticipated Discharge Disposition (PT): inpatient rehabilitation facility  Planned Therapy Interventions (PT): balance  training, bed mobility training, gait training, home exercise program, transfer training, strengthening, ROM (range of motion), patient/family education  Therapy Frequency (PT): 6 times/wk (Monday-Saturday)  Plan of Care Reviewed With: patient, spouse  Outcome Evaluation: PT evaluation performed.  Pt. requires max A x 2 for bed mobility skills including scooting, rolling, supine<>sit d/t intense pain.  Pt. mod A x 2 to stand with RW x 2 trials, able to maintain standing while maintaining L LE TTWB for ~2 minutes in order for RN to assess buttock wound.  Initiated HEP including gluteal sets and AP. Pt. will require ongoing skilled PT to resume PLOF.  Recommend IPR.  Will continue to follow and progress as tolerated.       Time Calculation:    PT Charges       Row Name 07/19/25 1559             Time Calculation    PT Received On 07/19/25  -      PT - Next Appointment 07/21/25  -      PT Goal Re-Cert Due Date 08/02/25  -                User Key  (r) = Recorded By, (t) = Taken By, (c) = Cosigned By      Initials Name Provider Type     Anum Stark, PT Physical Therapist                  Therapy Charges for Today       Code Description Service Date Service Provider Modifiers Qty    15156419876 HC PT EVAL HIGH COMPLEXITY 4 7/19/2025 Anum Stark, PT GP 1            PT G-Codes  AM-PAC 6 Clicks Score (PT): 10    Anum Stark, PT  7/19/2025

## 2025-07-19 NOTE — ANESTHESIA POSTPROCEDURE EVALUATION
Patient: Lashae Benitez    Procedure Summary       Date: 07/19/25 Room / Location: [facility] OR  /  [facility] OR    Anesthesia Start: 0907 Anesthesia Stop: 1031    Procedure: HIP INTERTROCHANTERIC NAILING (Left: Hip) Diagnosis:       Closed intertrochanteric fracture of hip, left, initial encounter      (Closed intertrochanteric fracture of hip, left, initial encounter [S72.142A])    Surgeons: Mateo Lovelace MD Provider: Faith Kyle CRNA    Anesthesia Type: general ASA Status: 4            Anesthesia Type: general    Vitals  Vitals Value Taken Time   /70 07/19/25 11:04   Temp 99.3 °F (37.4 °C) 07/19/25 11:04   Pulse 75 07/19/25 11:06   Resp 13 07/19/25 11:04   SpO2 100 % 07/19/25 11:06   Vitals shown include unfiled device data.        Post Anesthesia Care and Evaluation    Patient location during evaluation: PHASE II  Patient participation: complete - patient participated  Level of consciousness: awake and alert  Pain score: 1  Pain management: adequate    Airway patency: patent  Anesthetic complications: No anesthetic complications  PONV Status: controlled  Cardiovascular status: acceptable  Respiratory status: acceptable  Hydration status: acceptable

## 2025-07-19 NOTE — OP NOTE
Operative report  Surgeon Mateo cunha  Preoperative diagnosis left proximal femur Sub trochanteric fracture  Post operative diagnosis the same    Surgical procedure left hip open reduction internal fixation with intramedullary nail S[employer]s TFN a long vera proximal locking with blade distal locking cortical screw      After proper patient limb identification bring to the operating room general anesthesia dorsal decubitus proceed with some traction neutral rotation.  C-arm show good reduction of the fracture fragment.  Correcting scrubbing and sterile draping proper timeout performed.  Longitudinal incision on the tip of the trochanteric area incision over the fascia of the gluteus.  Trocar was used piercing the tip of the trochanteric insertion of guidepin in the femoral canal.  Reaming with adequate reamer and insertion of a long nail with proximal locking with a blade through a incision and guidepin along the femoral neck.  Distal locking with 2 cortical screw.  Irrigation with sterile fluid.  Closing the fascia with Vicryl 1 suture skin Vicryl and Monocryl 2 oh and mL clipped.  Large dressing was applied and patient returned to recovery in stable condition    Blood loss 200 cc

## 2025-07-19 NOTE — PLAN OF CARE
Goal Outcome Evaluation:  Plan of Care Reviewed With: patient        Progress: improving  Outcome Evaluation: Pt resting in bed at this time. Pt went off unit for surgery. Pt returned VSS on RA. Pt had complaints of pain, prn meds given. Blister found on pt's left glute while in surgery, upon return to 3N standing wound care orders placed and wound care completed. Incision dressing CDI. IVF running at this time. Pt worked with PT. No acute changes noted at this time. Will continue to follow plan of care.

## 2025-07-19 NOTE — PLAN OF CARE
Goal Outcome Evaluation:  Plan of Care Reviewed With: patient           Outcome Evaluation: Pt is resting at this time. VSS on RA. PRN meds given for pain. Pt is NPO. Pottassium was 3.6 and IV replacement has been started. Pt has no complaints or concerns at this time. Continuing POC.

## 2025-07-19 NOTE — PROGRESS NOTES
[facility] HOSPITALIST PROGRESS NOTE     Patient Identification:  Name:  Lashae Benitez  Age:  64 y.o.  Sex:  female  :  1960  MRN:  [id number]  Visit Number:  [id number]  ROOM: [facility] OR/MAIN OR     Primary Care Provider:  Kreis, Samuel Duane, MD    Length of stay in inpatient status:  2    Subjective     Chief Compliant:    Chief Complaint   Patient presents with    Hip Injury    Fall     History of Presenting Illness:    Patient remains ill but stable today, no acute events overnight, no new complaints, denies any fevers or chills, taking for definitive surgical fixation today, some ongoing pain, discussed with Orthopedics team today, appreciate recommendations on anticoagulation post procedure.   Objective     Current Hospital Meds:  [Transfer Hold] amitriptyline, 25 mg, Oral, Nightly  [Transfer Hold] budesonide-formoterol, 2 puff, Inhalation, BID - RT  [Transfer Hold] cetirizine, 10 mg, Oral, Daily  [Transfer Hold] cholecalciferol, 50,000 Units, Oral, Weekly  [Transfer Hold] cyanocobalamin, 1,000 mcg, Intramuscular, Weekly  [Transfer Hold] DULoxetine, 30 mg, Oral, QAM  [Transfer Hold] DULoxetine, 60 mg, Oral, Daily  flecainide, 50 mg, Oral, BID  [Transfer Hold] folic acid, 1 mg, Oral, Daily  gabapentin, 600 mg, Oral, Q8H  [Transfer Hold] insulin lispro, 2-7 Units, Subcutaneous, 4x Daily PC & at Bedtime  [Transfer Hold] levothyroxine, 125 mcg, Oral, Q AM  metoprolol tartrate, 50 mg, Oral, BID  [Transfer Hold] sodium chloride, 10 mL, Intravenous, Q12H         ----------------------------------------------------------------------------------------------------------------------  Vital Signs:  Temp:  [98.4 °F (36.9 °C)-99.8 °F (37.7 °C)] 99.1 °F (37.3 °C)  Heart Rate:  [81-92] 81  Resp:  [13-18] 13  BP: (126-190)/(73-84) 126/73  SpO2:  [93 %-99 %] 97 %  on   ;   Device (Oxygen Therapy): room air  Body mass index is 24.13 kg/m².      Intake/Output Summary (Last 24 hours) at 2025 1041  Last data  "filed at 7/19/2025 0800  Gross per 24 hour   Intake 420 ml   Output 450 ml   Net -30 ml      ----------------------------------------------------------------------------------------------------------------------  Physical exam:  Constitutional:  Elderly.  No acute distress.      HENT:  Head:  Normocephalic and atraumatic.  Mouth:  Moist mucous membranes.    Eyes:  Conjunctivae and EOM are normal. No scleral icterus.    Neck:  Neck supple.  No JVD present.    Cardiovascular:  regular rate, regular rhythm and normal heart sounds with no murmur.  Pulmonary/Chest:  No respiratory distress, no wheezes, on room air   Abdominal:  Soft. No distension and no tenderness.   Musculoskeletal:  Mild tenderness, L lower extremity deformity present.  No red or swollen joints anywhere.    Neurological:  Alert and oriented to person, place, and time.  No gross focal deficits   Skin:  Skin is warm and dry. No rash noted. No pallor.   Peripheral vascular:  No clubbing, no cyanosis, no edema.  Psychiatric: Appropriate mood and affect     Edited by: Gigi Anguiano MD at 7/19/2025 1041  ----------------------------------------------------------------------------------------------------------------------  WBC/HGB/HCT/PLT   9.59/10.3/31.6/251 (07/19 0106)  BUN/CREAT/GLUC/ALT/AST/ARMIDA/LIP    10.9/0.62/177/26/17/--/-- (07/19 0106)  LYTES - Na/K/Cl/CO2: 134*/3.6/100/22.0 (07/19 0106)  PT/INR/PTT   15.4/1.15/29.9 (07/18 1703)     No results found for: \"URINECX\"  No results found for: \"BLOODCX\"    I have personally looked at the labs and they are summarized above.  ----------------------------------------------------------------------------------------------------------------------  Detailed radiology reports for the last 24 hours:  XR Femur 2 View Left  Result Date: 7/17/2025    Left intertrochanteric hip fracture again noted. No mid or distal femur fracture identified.  This report was finalized on 7/17/2025 2:42 PM by Dr. Shakeel Kemp MD.  "     XR Chest AP  Result Date: 7/17/2025    Unremarkable exam with no acute cardiopulmonary findings identified.  This report was finalized on 7/17/2025 2:37 PM by Dr. Shakeel Kemp MD.      XR Shoulder 2+ View Left  Result Date: 7/17/2025    No acute findings in the left shoulder.  This report was finalized on 7/17/2025 2:35 PM by Dr. Shakeel Kemp MD.      XR Elbow 3+ View Left  Result Date: 7/17/2025  1.  No fracture or dislocation. 2.  Mild olecranon soft tissue swelling may indicate mild olecranon bursitis.  This report was finalized on 7/17/2025 2:34 PM by Dr. Shakeel Kemp MD.      XR Hip With or Without Pelvis 2 - 3 View Left  Result Date: 7/17/2025    Acute angulated left intertrochanteric fracture with varus angulation of distal fracture fragments and mild superior displacement of distal fracture fragments.  This report was finalized on 7/17/2025 2:29 PM by Dr. Shakeel Kemp MD.      Assessment & Plan    #Acute, Closed, Angulated, Mildly Displaced, Non-comminuted, L Hip Fracture  - Orthopedics consulted and following, plan to take to OR today  - Home Xarelto will suffice for DVT prophylaxis pre-surgery, will likely resume post surgery as well when cleared by surgery   - Continue Tylenol and IV Narcotics as needed for breakthrough  - Admit to telemetry, follow up Orthopedics recommendations on surgical intervention, order PT/OT evaluations post-procedure     #Pre-operative Risk Stratification/Clearance  #Hypertension/Hyperlipidemia   #Paroxysmal Atrial Fibrillation, on chronic anticoagulation   - EKG read as junctional rhythm, appears to be normal sinus rhythm per my read  - RCRI = 1, conferring 1.1% 30 day risk of death, MI, or cardiac arrest  - At this time surgery is non-emergent, Orthopedic surgery carries inherent intermediate risk, but patient does not have any active cardiac issues or modifiable risk factors; Recommend to proceed with surgery per Orthopedics  - Continue home beta blocker at lower  dose, home flecainide  - Holding other home antihypertensives roxi-operatively, resume as indicated  - Hold Xarelto for now, likely hep drip vs resume Xarelto post procedure  - Continue to monitor on telemetry    #Insulin Dependent Diabetes Mellitus Type II, uncontrolled, unknown complications  - Hgb A1c = 10.5%  - Holding home oral agents, continue FSBG and SSI, add long acting as indicated.    #Hypothyroid  - Continue home Levothyroxine     #Rheumatoid Arthritis/Psoriatic Arthritis/Sjogren's Syndrome, on DMARD  - Supportive Care, Hold medications for now     #Anxiety/Depression/Fibromyalgia   - Continue home regimen     #Gastroesophageal Reflux Disease/History Peptic Ulcer Disease   - Continue home PPI     #Chronic Pain  - Hold home regimen for now pending IV treatment for acute processes.    F: NPO  E: Monitor & Replace as needed   N: NPO Diet NPO Type: Strict NPO   VTE Prophylaxis:Mechanical VTE prophylaxis orders are signed & held.    Code Status (Patient has no pulse and is not breathing): CPR  Medical Interventions (Patient has pulse or is breathing): Full Support      Dispo: Pending definitive surgical fixation, PT/OT evals ordered and pending, possibly Short Term Rehab     *This patient is considered high risk secondary to Acute Hip Fx requiring surgical intervention, requiring IV narcotics for pain control.    Edited by: Gigi Anguiano MD at 7/19/2025 1041  [facility]

## 2025-07-19 NOTE — BRIEF OP NOTE
HIP TROCHANTERIC NAILING WITH INTRAMEDULLARY HIP SCREW  Progress Note    Lashae Benitez  7/19/2025    Pre-op Diagnosis:   Closed intertrochanteric fracture of hip, left, initial encounter [S72.142A]  Subtrochanteric fracture       Post-Op Diagnosis Codes:     * Closed intertrochanteric fracture of hip, left, initial encounter [S72.142A]    Procedure(s):      Procedure(s):  HIP INTERTROCHANTERIC NAILING              Surgeon(s):  Mateo Lovelace MD    Anesthesia: Choice    Staff:   Circulator: Rebekah Cornelius RN  Radiology Technologist: Reyna Samuel  Scrub Person: Myrna Rivera  Assistant: Lashae Manley CSA  Assistant: Lashae Manley CSA    Estimated Blood Loss: 200ml    Urine Voided: * No values recorded between 7/19/2025  9:05 AM and 7/19/2025 10:06 AM *    Specimens:                None      Drains: * No LDAs found *    Findings:       Complications: none    Assistant: Lashae Manley CSA  was responsible for performing the following activities: Retraction, Suction, Irrigation, Suturing, Closing, and Placing Dressing and their skilled assistance was necessary for the success of this case.    Mateo Lovelace MD     Date: 7/19/2025  Time: 10:19 EDT

## 2025-07-20 LAB
ABO GROUP BLD: NORMAL
ABO GROUP BLD: NORMAL
ALBUMIN SERPL-MCNC: 2.6 G/DL (ref 3.5–5.2)
ALBUMIN/GLOB SERPL: 1.4 G/DL
ALP SERPL-CCNC: 92 U/L (ref 39–117)
ALT SERPL W P-5'-P-CCNC: 21 U/L (ref 1–33)
ANION GAP SERPL CALCULATED.3IONS-SCNC: 10.9 MMOL/L (ref 5–15)
AST SERPL-CCNC: 18 U/L (ref 1–32)
BH BB BLOOD EXPIRATION DATE: NORMAL
BH BB BLOOD TYPE BARCODE: 5100
BH BB DISPENSE STATUS: NORMAL
BH BB PRODUCT CODE: NORMAL
BH BB UNIT NUMBER: NORMAL
BILIRUB SERPL-MCNC: 0.2 MG/DL (ref 0–1.2)
BLD GP AB SCN SERPL QL: NEGATIVE
BLD GP AB SCN SERPL QL: NEGATIVE
BUN SERPL-MCNC: 20.3 MG/DL (ref 8–23)
BUN/CREAT SERPL: 20.7 (ref 7–25)
CALCIUM SPEC-SCNC: 7.5 MG/DL (ref 8.6–10.5)
CHLORIDE SERPL-SCNC: 102 MMOL/L (ref 98–107)
CO2 SERPL-SCNC: 20.1 MMOL/L (ref 22–29)
CREAT SERPL-MCNC: 0.98 MG/DL (ref 0.57–1)
CROSSMATCH INTERPRETATION: NORMAL
DEPRECATED RDW RBC AUTO: 53.5 FL (ref 37–54)
EGFRCR SERPLBLD CKD-EPI 2021: 65 ML/MIN/1.73
ERYTHROCYTE [DISTWIDTH] IN BLOOD BY AUTOMATED COUNT: 13.8 % (ref 12.3–15.4)
GLOBULIN UR ELPH-MCNC: 1.8 GM/DL
GLUCOSE BLDC GLUCOMTR-MCNC: 163 MG/DL (ref 70–130)
GLUCOSE BLDC GLUCOMTR-MCNC: 171 MG/DL (ref 70–130)
GLUCOSE BLDC GLUCOMTR-MCNC: 178 MG/DL (ref 70–130)
GLUCOSE BLDC GLUCOMTR-MCNC: 224 MG/DL (ref 70–130)
GLUCOSE SERPL-MCNC: 210 MG/DL (ref 65–99)
HCT VFR BLD AUTO: 21.1 % (ref 34–46.6)
HGB BLD-MCNC: 6.9 G/DL (ref 12–15.9)
MCH RBC QN AUTO: 35.4 PG (ref 26.6–33)
MCHC RBC AUTO-ENTMCNC: 32.7 G/DL (ref 31.5–35.7)
MCV RBC AUTO: 108.2 FL (ref 79–97)
PLATELET # BLD AUTO: 230 10*3/MM3 (ref 140–450)
PMV BLD AUTO: 10 FL (ref 6–12)
POTASSIUM SERPL-SCNC: 4.4 MMOL/L (ref 3.5–5.2)
PROT SERPL-MCNC: 4.4 G/DL (ref 6–8.5)
RBC # BLD AUTO: 1.95 10*6/MM3 (ref 3.77–5.28)
RH BLD: NEGATIVE
RH BLD: NEGATIVE
SODIUM SERPL-SCNC: 133 MMOL/L (ref 136–145)
T&S EXPIRATION DATE: NORMAL
T&S EXPIRATION DATE: NORMAL
UNIT  ABO: NORMAL
UNIT  RH: NORMAL
WBC NRBC COR # BLD AUTO: 8.35 10*3/MM3 (ref 3.4–10.8)

## 2025-07-20 PROCEDURE — 63710000001 INSULIN LISPRO (HUMAN) PER 5 UNITS: Performed by: ORTHOPAEDIC SURGERY

## 2025-07-20 PROCEDURE — 86900 BLOOD TYPING SEROLOGIC ABO: CPT | Performed by: INTERNAL MEDICINE

## 2025-07-20 PROCEDURE — 82948 REAGENT STRIP/BLOOD GLUCOSE: CPT

## 2025-07-20 PROCEDURE — 86923 COMPATIBILITY TEST ELECTRIC: CPT

## 2025-07-20 PROCEDURE — 86850 RBC ANTIBODY SCREEN: CPT | Performed by: INTERNAL MEDICINE

## 2025-07-20 PROCEDURE — 94799 UNLISTED PULMONARY SVC/PX: CPT

## 2025-07-20 PROCEDURE — 94664 DEMO&/EVAL PT USE INHALER: CPT

## 2025-07-20 PROCEDURE — 99232 SBSQ HOSP IP/OBS MODERATE 35: CPT | Performed by: INTERNAL MEDICINE

## 2025-07-20 PROCEDURE — 86900 BLOOD TYPING SEROLOGIC ABO: CPT

## 2025-07-20 PROCEDURE — 25010000002 CYANOCOBALAMIN PER 1000 MCG: Performed by: ORTHOPAEDIC SURGERY

## 2025-07-20 PROCEDURE — 85027 COMPLETE CBC AUTOMATED: CPT | Performed by: ORTHOPAEDIC SURGERY

## 2025-07-20 PROCEDURE — 36430 TRANSFUSION BLD/BLD COMPNT: CPT

## 2025-07-20 PROCEDURE — 86901 BLOOD TYPING SEROLOGIC RH(D): CPT | Performed by: INTERNAL MEDICINE

## 2025-07-20 PROCEDURE — 94761 N-INVAS EAR/PLS OXIMETRY MLT: CPT

## 2025-07-20 PROCEDURE — 25010000002 CEFAZOLIN PER 500 MG: Performed by: ORTHOPAEDIC SURGERY

## 2025-07-20 PROCEDURE — 25810000003 SODIUM CHLORIDE 0.9 % SOLUTION: Performed by: ORTHOPAEDIC SURGERY

## 2025-07-20 PROCEDURE — P9016 RBC LEUKOCYTES REDUCED: HCPCS

## 2025-07-20 PROCEDURE — 80053 COMPREHEN METABOLIC PANEL: CPT | Performed by: ORTHOPAEDIC SURGERY

## 2025-07-20 RX ADMIN — Medication 10 ML: at 20:25

## 2025-07-20 RX ADMIN — INSULIN LISPRO 2 UNITS: 100 INJECTION, SOLUTION INTRAVENOUS; SUBCUTANEOUS at 09:21

## 2025-07-20 RX ADMIN — DULOXETINE 60 MG: 60 CAPSULE, DELAYED RELEASE ORAL at 09:21

## 2025-07-20 RX ADMIN — DULOXETINE 30 MG: 30 CAPSULE, DELAYED RELEASE ORAL at 06:00

## 2025-07-20 RX ADMIN — GABAPENTIN 600 MG: 300 CAPSULE ORAL at 14:15

## 2025-07-20 RX ADMIN — CEFAZOLIN 2000 MG: 2 INJECTION, POWDER, FOR SOLUTION INTRAMUSCULAR; INTRAVENOUS at 00:56

## 2025-07-20 RX ADMIN — GABAPENTIN 600 MG: 300 CAPSULE ORAL at 20:24

## 2025-07-20 RX ADMIN — FLECAINIDE ACETATE 50 MG: 50 TABLET ORAL at 09:21

## 2025-07-20 RX ADMIN — INSULIN LISPRO 2 UNITS: 100 INJECTION, SOLUTION INTRAVENOUS; SUBCUTANEOUS at 12:26

## 2025-07-20 RX ADMIN — LEVOTHYROXINE SODIUM 125 MCG: 0.12 TABLET ORAL at 05:39

## 2025-07-20 RX ADMIN — AMITRIPTYLINE HYDROCHLORIDE 25 MG: 50 TABLET, FILM COATED ORAL at 20:24

## 2025-07-20 RX ADMIN — HYDROCODONE BITARTRATE AND ACETAMINOPHEN 1 TABLET: 7.5; 325 TABLET ORAL at 09:20

## 2025-07-20 RX ADMIN — BUDESONIDE AND FORMOTEROL FUMARATE DIHYDRATE 2 PUFF: 160; 4.5 AEROSOL RESPIRATORY (INHALATION) at 18:59

## 2025-07-20 RX ADMIN — INSULIN LISPRO 2 UNITS: 100 INJECTION, SOLUTION INTRAVENOUS; SUBCUTANEOUS at 20:25

## 2025-07-20 RX ADMIN — CETIRIZINE HYDROCHLORIDE 10 MG: 10 TABLET, FILM COATED ORAL at 09:21

## 2025-07-20 RX ADMIN — GABAPENTIN 600 MG: 300 CAPSULE ORAL at 05:39

## 2025-07-20 RX ADMIN — CYANOCOBALAMIN 1000 MCG: 1000 INJECTION, SOLUTION INTRAMUSCULAR; SUBCUTANEOUS at 09:21

## 2025-07-20 RX ADMIN — INSULIN LISPRO 3 UNITS: 100 INJECTION, SOLUTION INTRAVENOUS; SUBCUTANEOUS at 18:15

## 2025-07-20 RX ADMIN — FOLIC ACID 1 MG: 1 TABLET ORAL at 09:21

## 2025-07-20 RX ADMIN — Medication 10 ML: at 09:21

## 2025-07-20 RX ADMIN — METOPROLOL TARTRATE 50 MG: 50 TABLET, FILM COATED ORAL at 09:21

## 2025-07-20 RX ADMIN — BUDESONIDE AND FORMOTEROL FUMARATE DIHYDRATE 2 PUFF: 160; 4.5 AEROSOL RESPIRATORY (INHALATION) at 08:10

## 2025-07-20 RX ADMIN — FLECAINIDE ACETATE 50 MG: 50 TABLET ORAL at 20:24

## 2025-07-20 RX ADMIN — METOPROLOL TARTRATE 50 MG: 50 TABLET, FILM COATED ORAL at 20:26

## 2025-07-20 RX ADMIN — SODIUM CHLORIDE 125 ML/HR: 9 INJECTION, SOLUTION INTRAVENOUS at 05:39

## 2025-07-20 NOTE — PLAN OF CARE
Goal Outcome Evaluation:  Plan of Care Reviewed With: patient        Progress: no change  Outcome Evaluation: Patient resting in bed. VSS on room air. Patient encouraged to ambulate this shift but refused; patient educated on the importance. Patient voices no concerns at this time, will continue with POC.

## 2025-07-20 NOTE — PROGRESS NOTES
LOS: 3 days     Chief Complaint: Left hip fracture    Subjective     Interval History:   Postoperative day 1 left subtrochanteric fracture ORIF with TFNA  Patient in bed, complained of significant discomfort, no family present.  No acute events reported overnight.  Has not been out of bed postoperatively.  Drop in hemoglobin noted this morning.  Transfusion planned per hospitalist.        Objective     Vital Signs  Temp:  [97.5 °F (36.4 °C)-99.3 °F (37.4 °C)] 98.2 °F (36.8 °C)  Heart Rate:  [66-97] 80  Resp:  [10-20] 20  BP: ()/(53-75) 132/75    Labs & Rads  Lab Results (last 72 hours)       Procedure Component Value Units Date/Time    POC Glucose Once [[id number]]  (Abnormal) Collected: 07/20/25 0618    Specimen: Blood Updated: 07/20/25 0624     Glucose 171 mg/dL      Comment: Serial Number: [id number]:  [id number]       CBC (No Diff) [[id number]]  (Abnormal) Collected: 07/20/25 0519    Specimen: Blood Updated: 07/20/25 0601     WBC 8.35 10*3/mm3      RBC 1.95 10*6/mm3      Hemoglobin 6.9 g/dL      Hematocrit 21.1 %      .2 fL      MCH 35.4 pg      MCHC 32.7 g/dL      RDW 13.8 %      RDW-SD 53.5 fl      MPV 10.0 fL      Platelets 230 10*3/mm3     Comprehensive Metabolic Panel [[id number]]  (Abnormal) Collected: 07/20/25 0139    Specimen: Blood Updated: 07/20/25 0220     Glucose 210 mg/dL      BUN 20.3 mg/dL      Creatinine 0.98 mg/dL      Sodium 133 mmol/L      Potassium 4.4 mmol/L      Chloride 102 mmol/L      CO2 20.1 mmol/L      Calcium 7.5 mg/dL      Total Protein 4.4 g/dL      Albumin 2.6 g/dL      ALT (SGPT) 21 U/L      AST (SGOT) 18 U/L      Alkaline Phosphatase 92 U/L      Total Bilirubin 0.2 mg/dL      Globulin 1.8 gm/dL      A/G Ratio 1.4 g/dL      BUN/Creatinine Ratio 20.7     Anion Gap 10.9 mmol/L      eGFR 65.0 mL/min/1.73     Narrative:      GFR Categories in Chronic Kidney Disease (CKD)              GFR Category          GFR (mL/min/1.73)    Interpretation  G1                     90 or greater        Normal or high (1)  G2                    60-89                Mild decrease (1)  G3a                   45-59                Mild to moderate decrease  G3b                   30-44                Moderate to severe decrease  G4                    15-29                Severe decrease  G5                    14 or less           Kidney failure    (1)In the absence of evidence of kidney disease, neither GFR category G1 or G2 fulfill the criteria for CKD.    eGFR calculation 2021 CKD-EPI creatinine equation, which does not include race as a factor    POC Glucose Once [[id number]]  (Abnormal) Collected: 07/19/25 2055    Specimen: Blood Updated: 07/19/25 2057     Glucose 221 mg/dL      Comment: Serial Number: [id number]:  [id number]       POC Glucose Once [[id number]]  (Abnormal) Collected: 07/19/25 1803    Specimen: Blood Updated: 07/19/25 1805     Glucose 217 mg/dL      Comment: Serial Number: [id number]:  [id number]       POC Glucose Once [[id number]]  (Abnormal) Collected: 07/19/25 1646    Specimen: Blood Updated: 07/19/25 1651     Glucose 198 mg/dL      Comment: Serial Number: [id number]:  [id number]       Potassium [[id number]]  (Normal) Collected: 07/19/25 1608    Specimen: Blood Updated: 07/19/25 1633     Potassium 4.9 mmol/L     POC Glucose Once [[id number]]  (Abnormal) Collected: 07/19/25 1155    Specimen: Blood Updated: 07/19/25 1158     Glucose 147 mg/dL      Comment: Serial Number: [id number]:  [id number]       POC Glucose Once [[id number]]  (Abnormal) Collected: 07/19/25 0637    Specimen: Blood Updated: 07/19/25 0640     Glucose 213 mg/dL      Comment: Serial Number: [id number]:  [id number]       Comprehensive Metabolic Panel [[id number]]  (Abnormal) Collected: 07/19/25 0106    Specimen: Blood Updated: 07/19/25 0211     Glucose 177 mg/dL      BUN 10.9 mg/dL      Creatinine 0.62 mg/dL      Sodium 134 mmol/L      Potassium 3.6 mmol/L      Chloride 100 mmol/L      CO2  22.0 mmol/L      Calcium 8.1 mg/dL      Total Protein 5.4 g/dL      Albumin 3.0 g/dL      ALT (SGPT) 26 U/L      AST (SGOT) 17 U/L      Alkaline Phosphatase 128 U/L      Total Bilirubin 0.4 mg/dL      Globulin 2.4 gm/dL      A/G Ratio 1.3 g/dL      BUN/Creatinine Ratio 17.6     Anion Gap 12.0 mmol/L      eGFR 100.2 mL/min/1.73     Narrative:      GFR Categories in Chronic Kidney Disease (CKD)              GFR Category          GFR (mL/min/1.73)    Interpretation  G1                    90 or greater        Normal or high (1)  G2                    60-89                Mild decrease (1)  G3a                   45-59                Mild to moderate decrease  G3b                   30-44                Moderate to severe decrease  G4                    15-29                Severe decrease  G5                    14 or less           Kidney failure    (1)In the absence of evidence of kidney disease, neither GFR category G1 or G2 fulfill the criteria for CKD.    eGFR calculation 2021 CKD-EPI creatinine equation, which does not include race as a factor    CBC (No Diff) [[id number]]  (Abnormal) Collected: 07/19/25 0106    Specimen: Blood Updated: 07/19/25 0150     WBC 9.59 10*3/mm3      RBC 2.92 10*6/mm3      Hemoglobin 10.3 g/dL      Hematocrit 31.6 %      .2 fL      MCH 35.3 pg      MCHC 32.6 g/dL      RDW 14.0 %      RDW-SD 56.2 fl      MPV 9.6 fL      Platelets 251 10*3/mm3     POC Glucose Once [[id number]]  (Abnormal) Collected: 07/18/25 2050    Specimen: Blood Updated: 07/18/25 2052     Glucose 168 mg/dL      Comment: Serial Number: [id number]:  [id number]       POC Glucose Once [[id number]]  (Abnormal) Collected: 07/18/25 1809    Specimen: Blood Updated: 07/18/25 1811     Glucose 146 mg/dL      Comment: Serial Number: [id number]:  [id number]       Heparin Anti-Xa [[id number]]  (Abnormal) Collected: 07/18/25 1703    Specimen: Blood Updated: 07/18/25 1735     Heparin Anti-Xa (UFH) 0.88 IU/ml     Protime-INR  [[id number]]  (Abnormal) Collected: 07/18/25 1703    Specimen: Blood Updated: 07/18/25 1733     Protime 15.4 Seconds      INR 1.15    Narrative:      Suggested INR therapeutic range for stable oral anticoagulant therapy:    Low Intensity therapy:   1.5-2.0  Moderate Intensity therapy:   2.0-3.0  High Intensity therapy:   2.5-4.0    aPTT [[id number]]  (Normal) Collected: 07/18/25 1703    Specimen: Blood Updated: 07/18/25 1733     PTT 29.9 seconds     Narrative:      PTT Heparin Therapeutic Range:  45 - 116 seconds      POC Glucose 4x Daily PC & at Bedtime [[id number]]  (Abnormal) Collected: 07/18/25 1305    Specimen: Blood Updated: 07/18/25 1307     Glucose 147 mg/dL      Comment: Serial Number: [id number]:  [id number]       POC Glucose Once [[id number]]  (Abnormal) Collected: 07/18/25 0655    Specimen: Blood Updated: 07/18/25 0658     Glucose 217 mg/dL      Comment: Serial Number: [id number]:  [id number]       Comprehensive Metabolic Panel [[id number]]  (Abnormal) Collected: 07/18/25 0338    Specimen: Blood Updated: 07/18/25 0428     Glucose 203 mg/dL      BUN 11.2 mg/dL      Creatinine 0.58 mg/dL      Sodium 136 mmol/L      Potassium 3.9 mmol/L      Chloride 104 mmol/L      CO2 22.6 mmol/L      Calcium 8.1 mg/dL      Total Protein 5.4 g/dL      Albumin 3.2 g/dL      ALT (SGPT) 33 U/L      AST (SGOT) 23 U/L      Alkaline Phosphatase 134 U/L      Total Bilirubin 0.4 mg/dL      Globulin 2.2 gm/dL      A/G Ratio 1.5 g/dL      BUN/Creatinine Ratio 19.3     Anion Gap 9.4 mmol/L      eGFR 101.8 mL/min/1.73     Narrative:      GFR Categories in Chronic Kidney Disease (CKD)              GFR Category          GFR (mL/min/1.73)    Interpretation  G1                    90 or greater        Normal or high (1)  G2                    60-89                Mild decrease (1)  G3a                   45-59                Mild to moderate decrease  G3b                   30-44                Moderate to severe decrease  G4                     15-29                Severe decrease  G5                    14 or less           Kidney failure    (1)In the absence of evidence of kidney disease, neither GFR category G1 or G2 fulfill the criteria for CKD.    eGFR calculation 2021 CKD-EPI creatinine equation, which does not include race as a factor    CBC (No Diff) [[id number]]  (Abnormal) Collected: 07/18/25 0338    Specimen: Blood Updated: 07/18/25 0415     WBC 8.42 10*3/mm3      RBC 2.93 10*6/mm3      Hemoglobin 10.2 g/dL      Hematocrit 31.2 %      .5 fL      MCH 34.8 pg      MCHC 32.7 g/dL      RDW 14.0 %      RDW-SD 54.4 fl      MPV 9.5 fL      Platelets 276 10*3/mm3     POC Glucose Once [[id number]]  (Abnormal) Collected: 07/17/25 2043    Specimen: Blood Updated: 07/17/25 2045     Glucose 164 mg/dL      Comment: Serial Number: [id number]:  [id number]       Hemoglobin A1c [[id number]]  (Abnormal) Collected: 07/17/25 1402    Specimen: Blood Updated: 07/17/25 1817     Hemoglobin A1C 9.10 %     Narrative:      Hemoglobin A1C Ranges:    Increased Risk for Diabetes  5.7% to 6.4%  Diabetes                     >= 6.5%  Diabetic Goal                < 7.0%    POC Glucose Once [[id number]]  (Abnormal) Collected: 07/17/25 1807    Specimen: Blood Updated: 07/17/25 1810     Glucose 244 mg/dL      Comment: Serial Number: [id number]:  [id number]       Urinalysis With Microscopic If Indicated (No Culture) - Urine, Clean Catch [[id number]]  (Abnormal) Collected: 07/17/25 1538    Specimen: Urine, Clean Catch Updated: 07/17/25 1543     Color, UA Yellow     Appearance, UA Clear     pH, UA 7.0     Specific Gravity, UA 1.009     Glucose, UA >=1000 mg/dL (3+)     Ketones, UA Negative     Bilirubin, UA Negative     Blood, UA Negative     Protein, UA Negative     Leuk Esterase, UA Negative     Nitrite, UA Negative     Urobilinogen, UA 0.2 E.U./dL    Narrative:      Urine microscopic not indicated.    Protime-INR [[id number]]  (Abnormal) Collected: 07/17/25  1418    Specimen: Blood Updated: 07/17/25 1433     Protime 16.6 Seconds      INR 1.26    Narrative:      Suggested INR therapeutic range for stable oral anticoagulant therapy:    Low Intensity therapy:   1.5-2.0  Moderate Intensity therapy:   2.0-3.0  High Intensity therapy:   2.5-4.0    aPTT [[id number]]  (Normal) Collected: 07/17/25 1418    Specimen: Blood Updated: 07/17/25 1433     PTT 28.7 seconds     Narrative:      PTT Heparin Therapeutic Range:  45 - 116 seconds      Comprehensive Metabolic Panel [[id number]]  (Abnormal) Collected: 07/17/25 1402    Specimen: Blood Updated: 07/17/25 1426     Glucose 284 mg/dL      BUN 11.5 mg/dL      Creatinine 0.66 mg/dL      Sodium 135 mmol/L      Potassium 3.6 mmol/L      Chloride 102 mmol/L      CO2 21.1 mmol/L      Calcium 8.3 mg/dL      Total Protein 5.8 g/dL      Albumin 3.5 g/dL      ALT (SGPT) 37 U/L      AST (SGOT) 25 U/L      Alkaline Phosphatase 159 U/L      Total Bilirubin 0.3 mg/dL      Globulin 2.3 gm/dL      A/G Ratio 1.5 g/dL      BUN/Creatinine Ratio 17.4     Anion Gap 11.9 mmol/L      eGFR 98.7 mL/min/1.73     Narrative:      GFR Categories in Chronic Kidney Disease (CKD)              GFR Category          GFR (mL/min/1.73)    Interpretation  G1                    90 or greater        Normal or high (1)  G2                    60-89                Mild decrease (1)  G3a                   45-59                Mild to moderate decrease  G3b                   30-44                Moderate to severe decrease  G4                    15-29                Severe decrease  G5                    14 or less           Kidney failure    (1)In the absence of evidence of kidney disease, neither GFR category G1 or G2 fulfill the criteria for CKD.    eGFR calculation 2021 CKD-EPI creatinine equation, which does not include race as a factor    CBC & Differential [[id number]]  (Abnormal) Collected: 07/17/25 1402    Specimen: Blood Updated: 07/17/25 1408    Narrative:      The  following orders were created for panel order CBC & Differential.  Procedure                               Abnormality         Status                     ---------                               -----------         ------                     CBC Auto Differential[[id number]]        Abnormal            Final result                 Please view results for these tests on the individual orders.    CBC Auto Differential [[id number]]  (Abnormal) Collected: 07/17/25 1402    Specimen: Blood Updated: 07/17/25 1408     WBC 6.77 10*3/mm3      RBC 3.25 10*6/mm3      Hemoglobin 11.3 g/dL      Hematocrit 33.6 %      .4 fL      MCH 34.8 pg      MCHC 33.6 g/dL      RDW 13.8 %      RDW-SD 52.5 fl      MPV 9.4 fL      Platelets 299 10*3/mm3      Neutrophil % 76.3 %      Lymphocyte % 16.2 %      Monocyte % 5.8 %      Eosinophil % 0.7 %      Basophil % 0.4 %      Immature Grans % 0.6 %      Neutrophils, Absolute 5.16 10*3/mm3      Lymphocytes, Absolute 1.10 10*3/mm3      Monocytes, Absolute 0.39 10*3/mm3      Eosinophils, Absolute 0.05 10*3/mm3      Basophils, Absolute 0.03 10*3/mm3      Immature Grans, Absolute 0.04 10*3/mm3      nRBC 0.0 /100 WBC           Imaging Results (Last 72 Hours)       Procedure Component Value Units Date/Time    FL Surgery Fluoro [[id number]] Collected: 07/19/25 1115     Updated: 07/19/25 1118    Narrative:      EXAMINATION: FL SURGERY FLUORO-      CLINICAL INDICATION:     Hip; S72.142A-Displaced intertrochanteric  fracture of left femur, initial encounter for closed fracture     TECHNIQUE:  FL SURGERY FLUORO-      FLUOROSCOPY TIME: 2.1 minutes     FINDINGS:   Intraoperative fluoroscopy for hip fixation.       Impression:      As above.     This report was finalized on 7/19/2025 11:15 AM by Dr. Shakeel Kemp MD.       XR Femur 2 View Left [[id number]] Collected: 07/17/25 1442     Updated: 07/17/25 1445    Narrative:      EXAM:    XR Left Femur, 2 Views     EXAM DATE:    7/17/2025 2:09 PM     CLINICAL  HISTORY:    fall     TECHNIQUE:    Frontal and lateral views of the left femur.     COMPARISON:    No relevant prior studies available.     FINDINGS:    Bones/joints:  Left intertrochanteric hip fracture again noted. No mid  or distal femur fracture identified.  No dislocation.    Soft tissues:  Unremarkable.       Impression:        Left intertrochanteric hip fracture again noted. No mid or distal  femur fracture identified.     This report was finalized on 7/17/2025 2:42 PM by Dr. Shakeel Kemp MD.       XR Chest AP [[id number]] Collected: 07/17/25 1435     Updated: 07/17/25 1439    Narrative:      EXAM:    XR Chest, 1 View     EXAM DATE:    7/17/2025 2:10 PM     CLINICAL HISTORY:    pre op     TECHNIQUE:    Frontal view of the chest.     COMPARISON:    3/25/2024     FINDINGS:    Lungs and pleural spaces:  Unremarkable.  No consolidation.  No  pneumothorax.    Heart:  Unremarkable.  No cardiomegaly.    Mediastinum:  Unremarkable.  Normal mediastinal contour.    Bones/joints:  Unremarkable.  No acute fracture.       Impression:        Unremarkable exam with no acute cardiopulmonary findings identified.     This report was finalized on 7/17/2025 2:37 PM by Dr. Shakeel Kemp MD.       XR Shoulder 2+ View Left [[id number]] Collected: 07/17/25 1434     Updated: 07/17/25 1437    Narrative:      EXAM:    XR Left Shoulder Complete, 2 or More Views     EXAM DATE:    7/17/2025 1:54 PM     CLINICAL HISTORY:    fall     TECHNIQUE:    Two or more views of the left shoulder.     COMPARISON:    No relevant prior studies available.     FINDINGS:    Bones/joints:  Unremarkable.  No acute fracture.  No dislocation.    Soft tissues:  Unremarkable.       Impression:        No acute findings in the left shoulder.     This report was finalized on 7/17/2025 2:35 PM by Dr. Shakeel Kemp MD.       XR Elbow 3+ View Left [[id number]] Collected: 07/17/25 1434     Updated: 07/17/25 1436    Narrative:      EXAM:    XR Left Elbow Complete, 3 or  More Views     EXAM DATE:    7/17/2025 1:55 PM     CLINICAL HISTORY:    pain     TECHNIQUE:    Frontal, lateral and oblique views of the left elbow.     COMPARISON:    No relevant prior studies available.     FINDINGS:    Bones/joints:  Unremarkable.  No fracture or dislocation.    Soft tissues:  Mild olecranon soft tissue swelling may indicate mild  olecranon bursitis.       Impression:      1.  No fracture or dislocation.  2.  Mild olecranon soft tissue swelling may indicate mild olecranon  bursitis.     This report was finalized on 7/17/2025 2:34 PM by Dr. Shakeel Kemp MD.       XR Hip With or Without Pelvis 2 - 3 View Left [[id number]] Collected: 07/17/25 1428     Updated: 07/17/25 1431    Narrative:      EXAM:    XR Left Hip With Pelvis When Performed, 2 or 3 Views     EXAM DATE:    7/17/2025 1:54 PM     CLINICAL HISTORY:    fall     TECHNIQUE:    Two or three views of the left hip with pelvis when performed.     COMPARISON:    No relevant prior studies available.     FINDINGS:    Bones/joints:  Acute angulated left intertrochanteric fracture with  varus angulation of distal fracture fragments and mild superior  displacement of distal fracture fragments.  No dislocation.    Soft tissues:  Unremarkable.       Impression:        Acute angulated left intertrochanteric fracture with varus angulation  of distal fracture fragments and mild superior displacement of distal  fracture fragments.     This report was finalized on 7/17/2025 2:29 PM by Dr. Shakeel Kemp MD.               Physical Exam:   Physical Exam  Constitutional:       General: She is not in acute distress.     Appearance: Normal appearance.   HENT:      Head: Normocephalic.   Cardiovascular:      Pulses: Normal pulses.   Pulmonary:      Effort: Pulmonary effort is normal. No respiratory distress.   Musculoskeletal:         General: Swelling present.      Comments: Large bulky dressing CDI left lower extremity.  Thigh appears supple at this time.   +PP,+ dorsi plantarflexion   Skin:     General: Skin is warm and dry.   Neurological:      General: No focal deficit present.      Mental Status: She is alert. Mental status is at baseline.   Psychiatric:         Mood and Affect: Mood normal.         Behavior: Behavior normal.          Results Review:     I reviewed the patient's new clinical results.      Assessment & Plan     Principal Problem:    Hip fracture  Active Problems:    Closed intertrochanteric fracture of hip, left, initial encounter    Postoperative day 1 left proximal femur, subtrochanteric fracture status post ORIF with TFNA        Monitor H&H-plan for transfusion today  Pain control  PT/OT: TTWB x 6 weeks  DVT/PPx: Xarelto 10 mg home medication, currently on hold  Wound care: Maintain place of dressing at this time  Make follow-up appointment in orthopedic office in 2 weeks upon discharge.      Plan for disposition: TBD hospitalist    RUIZ Kahn  07/20/25  09:12 EDT

## 2025-07-20 NOTE — PROGRESS NOTES
[facility] HOSPITALIST PROGRESS NOTE     Patient Identification:  Name:  Lashae Benitez  Age:  64 y.o.  Sex:  female  :  1960  MRN:  [id number]  Visit Number:  [id number]  ROOM: [address] [place] [address]     Primary Care Provider:  Kreis, Samuel Duane, MD    Length of stay in inpatient status:  3    Subjective     Chief Compliant:    Chief Complaint   Patient presents with    Hip Injury    Fall     History of Presenting Illness:    Patient remains ill but stable today, no acute events overnight, no new complaints, denies any fevers or chills, tolerated procedure well yesterday, having some anemia today, also on IVFs, suspect is partly post-operative, partly hemodilution, holding home Xarelto 1 more day, Orthopedics consulted and following, encouraged out of bed to chair, PT/OT evals ordered and pending. Possibly will need Short Term Rehab.   Objective     Current Hospital Meds:  amitriptyline, 25 mg, Oral, Nightly  budesonide-formoterol, 2 puff, Inhalation, BID - RT  cetirizine, 10 mg, Oral, Daily  cholecalciferol, 50,000 Units, Oral, Weekly  cyanocobalamin, 1,000 mcg, Intramuscular, Weekly  DULoxetine, 30 mg, Oral, QAM  DULoxetine, 60 mg, Oral, Daily  flecainide, 50 mg, Oral, BID  folic acid, 1 mg, Oral, Daily  gabapentin, 600 mg, Oral, Q8H  insulin lispro, 2-7 Units, Subcutaneous, 4x Daily PC & at Bedtime  levothyroxine, 125 mcg, Oral, Q AM  metoprolol tartrate, 50 mg, Oral, BID  nitroglycerin, 0.4 mg, Sublingual, Once in imaging  [Held by provider] rivaroxaban, 10 mg, Oral, Daily  sodium chloride, 125 mL/hr, Last Rate: 125 mL/hr (25 0539)    ----------------------------------------------------------------------------------------------------------------------  Vital Signs:  Temp:  [97.5 °F (36.4 °C)-99.3 °F (37.4 °C)] 98.8 °F (37.1 °C)  Heart Rate:  [66-97] 90  Resp:  [10-20] 17  BP: ()/(53-75) 147/62  SpO2:  [90 %-100 %] 94 %  on  Flow (L/min) (Oxygen Therapy):  [2] 2;   Device (Oxygen Therapy):  "room air  Body mass index is 24.13 kg/m².      Intake/Output Summary (Last 24 hours) at 7/20/2025 1025  Last data filed at 7/20/2025 0930  Gross per 24 hour   Intake 720 ml   Output 700 ml   Net 20 ml      ----------------------------------------------------------------------------------------------------------------------  Physical exam:  Constitutional:  Elderly.  No acute distress. Mild discomfort   HENT:  Head:  Normocephalic and atraumatic.  Mouth:  Moist mucous membranes.    Eyes:  Conjunctivae and EOM are normal. No scleral icterus.    Neck:  Neck supple.  No JVD present.    Cardiovascular:  regular rate, regular rhythm and normal heart sounds with no murmur.  Pulmonary/Chest:  No respiratory distress, no wheezes, on room air   Abdominal:  Soft. No distension and no tenderness.   Musculoskeletal:  Mild tenderness, L lower extremity deformity present.  No red or swollen joints anywhere.    Neurological:  Alert and oriented to person, place, and time.  No gross focal deficits   Skin:  Skin is warm and dry. No rash noted. mild pallor.   Peripheral vascular:  No clubbing, no cyanosis, no edema.  Psychiatric: Appropriate mood and affect     Edited by: Gigi Anguiano MD at 7/20/2025 1025  ----------------------------------------------------------------------------------------------------------------------  WBC/HGB/HCT/PLT   8.35/6.9/21.1/230 (07/20 0519)  BUN/CREAT/GLUC/ALT/AST/ARMIDA/LIP    20.3/0.98/210/21/18/--/-- (07/20 0139)  LYTES - Na/K/Cl/CO2: 133*/4.4/102/20.1* (07/20 0139)     No results found for: \"URINECX\"  No results found for: \"BLOODCX\"    I have personally looked at the labs and they are summarized above.  ----------------------------------------------------------------------------------------------------------------------  Detailed radiology reports for the last 24 hours:  FL Surgery Fluoro  Result Date: 7/19/2025  As above.  This report was finalized on 7/19/2025 11:15 AM by Dr. Shakeel Kemp MD.  "     Assessment & Plan    #Acute, Closed, Angulated, Mildly Displaced, Non-comminuted, L Hip Fracture  - Orthopedics consulted and following, status post OR 7/19  - Post op anemia and received PRBCs, holding home Xarelto 1 more day.   - Continue Tylenol and IV Narcotics as needed for breakthrough  - Admit to telemetry, follow up Orthopedics recommendations on surgical intervention, order PT/OT evaluations post-procedure     #Pre-operative Risk Stratification/Clearance  #Hypertension/Hyperlipidemia   #Paroxysmal Atrial Fibrillation, on chronic anticoagulation   - EKG read as junctional rhythm, appears to be normal sinus rhythm per my read  - RCRI = 1, conferring 1.1% 30 day risk of death, MI, or cardiac arrest  - At this time surgery is non-emergent, Orthopedic surgery carries inherent intermediate risk, but patient does not have any active cardiac issues or modifiable risk factors; Recommend to proceed with surgery per Orthopedics  - Continue home beta blocker at lower dose, home flecainide  - Holding other home antihypertensives roxi-operatively, resume as indicated  - Hold Xarelto for now, likely hep drip vs resume Xarelto post procedure  - Continue to monitor on telemetry    #Insulin Dependent Diabetes Mellitus Type II, uncontrolled, unknown complications  - Hgb A1c = 10.5%  - Holding home oral agents, continue FSBG and SSI, add long acting as indicated.    #Hypothyroid  - Continue home Levothyroxine     #Rheumatoid Arthritis/Psoriatic Arthritis/Sjogren's Syndrome, on DMARD  - Supportive Care, Hold medications for now     #Anxiety/Depression/Fibromyalgia   - Continue home regimen     #Gastroesophageal Reflux Disease/History Peptic Ulcer Disease   - Continue home PPI     #Chronic Pain  - Hold home regimen for now pending IV treatment for acute processes.    F: Oral   E: Monitor & Replace as needed   N: Diet: Regular/House; Fluid Consistency: Thin (IDDSI 0)   VTE Prophylaxis:Mechanical VTE prophylaxis orders are  signed & held.    Code Status (Patient has no pulse and is not breathing): CPR  Medical Interventions (Patient has pulse or is breathing): Full Support      Dispo: Pending definitive surgical fixation, PT/OT evals ordered and pending, possibly Short Term Rehab     *This patient is considered high risk secondary to Acute Hip Fx requiring surgical intervention, requiring IV narcotics for pain control.    Edited by: Gigi Anguiano MD at 7/20/2025 1025  [facility]

## 2025-07-20 NOTE — NURSING NOTE
RN went to  the pts first unit of blood, they stated the blood band numbers did not match. Asked RN to put in another order for type and screen. RN placed the orders for new type and screen, and MD was made aware of the delay. Waited awhile for new type and screen, no new blood band was placed after new type and screen was collected. RN called blood bank again, they stated to placed a new order set and restart the whole process again. New blood band finally placed and first unit of blood was started.

## 2025-07-20 NOTE — PLAN OF CARE
Goal Outcome Evaluation:                 Pt resting in bed, VSS on RA. Pt encouraged to ambulated, refused at this time, was educated on the importance. Pt is receiving 1 unit of PRBC at this time. Pt has c/o pain, prn pain medication given per MAR. Pt voices no concerns or complaints at this time , will continue with POC.

## 2025-07-21 LAB
BH BB BLOOD EXPIRATION DATE: NORMAL
BH BB BLOOD EXPIRATION DATE: NORMAL
BH BB BLOOD TYPE BARCODE: 5100
BH BB BLOOD TYPE BARCODE: 5100
BH BB DISPENSE STATUS: NORMAL
BH BB DISPENSE STATUS: NORMAL
BH BB PRODUCT CODE: NORMAL
BH BB PRODUCT CODE: NORMAL
BH BB UNIT NUMBER: NORMAL
BH BB UNIT NUMBER: NORMAL
CROSSMATCH INTERPRETATION: NORMAL
CROSSMATCH INTERPRETATION: NORMAL
GLUCOSE BLDC GLUCOMTR-MCNC: 161 MG/DL (ref 70–130)
GLUCOSE BLDC GLUCOMTR-MCNC: 172 MG/DL (ref 70–130)
GLUCOSE BLDC GLUCOMTR-MCNC: 181 MG/DL (ref 70–130)
GLUCOSE BLDC GLUCOMTR-MCNC: 191 MG/DL (ref 70–130)
HCT VFR BLD AUTO: 26.9 % (ref 34–46.6)
HGB BLD-MCNC: 9.1 G/DL (ref 12–15.9)
UNIT  ABO: NORMAL
UNIT  ABO: NORMAL
UNIT  RH: NORMAL
UNIT  RH: NORMAL

## 2025-07-21 PROCEDURE — 97530 THERAPEUTIC ACTIVITIES: CPT

## 2025-07-21 PROCEDURE — 94761 N-INVAS EAR/PLS OXIMETRY MLT: CPT

## 2025-07-21 PROCEDURE — 94799 UNLISTED PULMONARY SVC/PX: CPT

## 2025-07-21 PROCEDURE — 94664 DEMO&/EVAL PT USE INHALER: CPT

## 2025-07-21 PROCEDURE — 99232 SBSQ HOSP IP/OBS MODERATE 35: CPT | Performed by: INTERNAL MEDICINE

## 2025-07-21 PROCEDURE — 85014 HEMATOCRIT: CPT | Performed by: INTERNAL MEDICINE

## 2025-07-21 PROCEDURE — 63710000001 INSULIN LISPRO (HUMAN) PER 5 UNITS: Performed by: ORTHOPAEDIC SURGERY

## 2025-07-21 PROCEDURE — 97167 OT EVAL HIGH COMPLEX 60 MIN: CPT

## 2025-07-21 PROCEDURE — 97110 THERAPEUTIC EXERCISES: CPT

## 2025-07-21 PROCEDURE — 85018 HEMOGLOBIN: CPT | Performed by: INTERNAL MEDICINE

## 2025-07-21 PROCEDURE — 82948 REAGENT STRIP/BLOOD GLUCOSE: CPT

## 2025-07-21 RX ORDER — CASTOR OIL AND BALSAM, PERU 788; 87 MG/G; MG/G
1 OINTMENT TOPICAL EVERY 12 HOURS SCHEDULED
Status: DISCONTINUED | OUTPATIENT
Start: 2025-07-21 | End: 2025-07-22 | Stop reason: HOSPADM

## 2025-07-21 RX ADMIN — METOPROLOL TARTRATE 50 MG: 50 TABLET, FILM COATED ORAL at 20:16

## 2025-07-21 RX ADMIN — INSULIN LISPRO 2 UNITS: 100 INJECTION, SOLUTION INTRAVENOUS; SUBCUTANEOUS at 13:30

## 2025-07-21 RX ADMIN — BUDESONIDE AND FORMOTEROL FUMARATE DIHYDRATE 2 PUFF: 160; 4.5 AEROSOL RESPIRATORY (INHALATION) at 19:19

## 2025-07-21 RX ADMIN — METOPROLOL TARTRATE 50 MG: 50 TABLET, FILM COATED ORAL at 08:04

## 2025-07-21 RX ADMIN — BUDESONIDE AND FORMOTEROL FUMARATE DIHYDRATE 2 PUFF: 160; 4.5 AEROSOL RESPIRATORY (INHALATION) at 06:56

## 2025-07-21 RX ADMIN — INSULIN LISPRO 2 UNITS: 100 INJECTION, SOLUTION INTRAVENOUS; SUBCUTANEOUS at 08:04

## 2025-07-21 RX ADMIN — LEVOTHYROXINE SODIUM 125 MCG: 0.12 TABLET ORAL at 05:08

## 2025-07-21 RX ADMIN — FLECAINIDE ACETATE 50 MG: 50 TABLET ORAL at 08:04

## 2025-07-21 RX ADMIN — GABAPENTIN 600 MG: 300 CAPSULE ORAL at 05:08

## 2025-07-21 RX ADMIN — INSULIN LISPRO 2 UNITS: 100 INJECTION, SOLUTION INTRAVENOUS; SUBCUTANEOUS at 20:16

## 2025-07-21 RX ADMIN — HYDROCODONE BITARTRATE AND ACETAMINOPHEN 1 TABLET: 7.5; 325 TABLET ORAL at 13:31

## 2025-07-21 RX ADMIN — Medication 10 ML: at 20:17

## 2025-07-21 RX ADMIN — DULOXETINE 30 MG: 30 CAPSULE, DELAYED RELEASE ORAL at 05:08

## 2025-07-21 RX ADMIN — Medication 10 ML: at 08:05

## 2025-07-21 RX ADMIN — Medication 1 APPLICATION: at 20:17

## 2025-07-21 RX ADMIN — RIVAROXABAN 20 MG: 20 TABLET, FILM COATED ORAL at 18:29

## 2025-07-21 RX ADMIN — GABAPENTIN 600 MG: 300 CAPSULE ORAL at 20:16

## 2025-07-21 RX ADMIN — CETIRIZINE HYDROCHLORIDE 10 MG: 10 TABLET, FILM COATED ORAL at 08:04

## 2025-07-21 RX ADMIN — FLECAINIDE ACETATE 50 MG: 50 TABLET ORAL at 20:17

## 2025-07-21 RX ADMIN — FOLIC ACID 1 MG: 1 TABLET ORAL at 08:04

## 2025-07-21 RX ADMIN — AMITRIPTYLINE HYDROCHLORIDE 25 MG: 50 TABLET, FILM COATED ORAL at 20:16

## 2025-07-21 RX ADMIN — DULOXETINE 60 MG: 60 CAPSULE, DELAYED RELEASE ORAL at 08:04

## 2025-07-21 RX ADMIN — HYDROCODONE BITARTRATE AND ACETAMINOPHEN 1 TABLET: 7.5; 325 TABLET ORAL at 09:25

## 2025-07-21 RX ADMIN — INSULIN LISPRO 2 UNITS: 100 INJECTION, SOLUTION INTRAVENOUS; SUBCUTANEOUS at 18:29

## 2025-07-21 RX ADMIN — Medication 1 APPLICATION: at 13:31

## 2025-07-21 RX ADMIN — GABAPENTIN 600 MG: 300 CAPSULE ORAL at 13:31

## 2025-07-21 NOTE — PLAN OF CARE
Goal Outcome Evaluation:           Progress: no change  Outcome Evaluation: pt satting well on r/a. 1 unit of RBCs administered per orders. wound care refused; pt educated on the importance of wound care. no other changes to note at this time; poc ongoing

## 2025-07-21 NOTE — THERAPY TREATMENT NOTE
Acute Care - Physical Therapy Treatment Note   [facility]     Patient Name: Lashae Benitez  : 1960  MRN: [id number]  Today's Date: 2025   Onset of Illness/Injury or Date of Surgery: 25  Visit Dx:     ICD-10-CM ICD-9-CM   1. Closed intertrochanteric fracture of hip, left, initial encounter  S72.142A 820.21   2. Post-operative state  Z98.890 V45.89   3. Precordial pain  R07.2 786.51   4. Abnormal nuclear stress test  R94.39 794.39     Patient Active Problem List   Diagnosis    Hiatal hernia    Essential hypertension    Sjogren's syndrome    Multiple sclerosis    Psoriasis    Fibromyalgia    Osteoarthritis    Psoriatic arthritis    RA (rheumatoid arthritis)    Degenerative disc disease, lumbar    TMJ arthritis    Dupuytren's disease    H. pylori infection    Family history of coronary artery disease    Type 2 diabetes mellitus    Edema    Bilateral leg edema    Isolated corticotropin deficiency    Hyponatremia    Paroxysmal atrial fibrillation    Sepsis    Bacteremia, escherichia coli    Iron deficiency anemia    Malabsorption due to intolerance, not elsewhere classified    Chronic chest pain with high risk for CAD    Abnormal nuclear stress test    Abnormal computed tomography angiography (CTA)    Hip fracture    Closed intertrochanteric fracture of hip, left, initial encounter     Past Medical History:   Diagnosis Date    Acid reflux     Allergic     Anemia     Anxiety     Atrial fibrillation     Cancer     thyroid, skin    Cervical disc disorder     Chronic pain disorder     Claustrophobia     CTS (carpal tunnel syndrome)     Degenerative disc disease, lumbar     Depression     Dupuytren's disease     Essential hypertension     Family history of coronary artery disease     Fibromyalgia     Gallbladder abscess     H. pylori infection     Hiatal hernia     Hyperlipidemia     Hypothyroidism     Low back pain     Lumbosacral disc disease     Migraines     migraines    Multiple sclerosis     Osteoarthritis      Peripheral neuropathy     Psoriasis     Psoriatic arthritis     RA (rheumatoid arthritis)     Rheumatoid arthritis     Sinusitis     Sjogren's syndrome     Stomach ulcer     Thoracic disc disorder     TMJ arthritis     Type 2 diabetes mellitus     Urinary tract infection      Past Surgical History:   Procedure Laterality Date    BACK SURGERY      BREAST LUMPECTOMY Left 11/1993    CARDIAC CATHETERIZATION Left 09/21/2009    Normal     CARDIAC CATHETERIZATION N/A 6/24/2025    Procedure: Left Heart Cath;  Surgeon: Teodora Barron MD;  Location:  [facility];  Service: Cardiovascular;  Laterality: N/A;    CARPAL TUNNEL RELEASE  03/2012    CERVICAL POLYPECTOMY  12/2015    CHOLECYSTECTOMY  05/2007    COLONOSCOPY      ENDOSCOPY      EPIDURAL BLOCK      GASTRIC BYPASS  09/2007    JOINT REPLACEMENT      KNEE ARTHROPLASTY      LUMBAR DISC SURGERY      C5-6    NECK SURGERY      REPLACEMENT TOTAL KNEE Right 06/2016    SACRAL NERVE STIMULATOR PLACEMENT  09/2014    SACRAL NERVE STIMULATOR PLACEMENT  04/11/2024    [facility] in [place]    SACRAL NERVE STIMULATOR PLACEMENT Right 04/17/2024    THYROIDECTOMY  03/2016    TRIGGER POINT INJECTION       PT Assessment (Last 12 Hours)       PT Evaluation and Treatment       Row Name 07/21/25 0419          Physical Therapy Time and Intention    Subjective Information complains of;pain  -HC     Document Type therapy note (daily note)  -HC     Mode of Treatment physical therapy  -HC     Patient Effort good  -HC     Comment Pt and RN in agrement for PT. Pt stood EOB x 2 once with HHA and once with RW, max A x 2. Pt was able to maintain WB status well. Sitting exercises EOB to tolerance.  -HC       Row Name 07/21/25 0493          General Information    Patient Profile Reviewed yes  -HC     Equipment Currently Used at Home cane, straight  -HC     Existing Precautions/Restrictions fall;weight bearing  -       Row Name 07/21/25 6895          Living Environment    Primary Care  Provided by self  -       Row Name 07/21/25 1551          Home Use of Assistive/Adaptive Equipment    Equipment Currently Used at Home cane, straight  -       Row Name 07/21/25 1551          Pain    Pain Side/Orientation left  -       Row Name 07/21/25 1551          Pain Scale: FACES Pre/Post-Treatment    Pain: FACES Scale, Pretreatment 4-->hurts little more  -HC     Posttreatment Pain Rating 8-->hurts whole lot  -       Row Name 07/21/25 1551          Cognition    Affect/Mental Status (Cognition) WNL  -     Orientation Status (Cognition) oriented x 3  -     Follows Commands (Cognition) verbal cues/prompting required  -     Personal Safety Interventions fall prevention program maintained;gait belt;nonskid shoes/slippers when out of bed;supervised activity  -       Row Name 07/21/25 1551          Mobility    Extremity Weight-bearing Status left lower extremity  -     Left Lower Extremity (Weight-bearing Status) toe touch weight-bearing (TTWB)  -       Row Name 07/21/25 1551          Bed Mobility    Bed Mobility rolling left;rolling right;scooting/bridging;supine-sit;sit-supine  -     Rolling Right [place] (Bed Mobility) verbal cues;nonverbal cues (demo/gesture);maximum assist (25% patient effort);2 person assist  -HC     Scooting/Bridging [place] (Bed Mobility) verbal cues;nonverbal cues (demo/gesture);maximum assist (25% patient effort);2 person assist  -HC     Supine-Sit [place] (Bed Mobility) verbal cues;nonverbal cues (demo/gesture);maximum assist (25% patient effort);2 person assist  -HC     Sit-Supine [place] (Bed Mobility) verbal cues;nonverbal cues (demo/gesture);maximum assist (25% patient effort);2 person assist  -HC     Bed Mobility, Safety Issues decreased use of arms for pushing/pulling;decreased use of legs for bridging/pushing  -     Assistive Device (Bed Mobility) repositioning sheet  -       Row Name 07/21/25 1551          Transfers    Transfers sit-stand  transfer;stand-sit transfer  -HC       Row Name 07/21/25 1551          Sit-Stand Transfer    Sit-Stand [place] (Transfers) verbal cues;nonverbal cues (demo/gesture);2 person assist;maximum assist (25% patient effort)  -HC     Assistive Device (Sit-Stand Transfers) walker, front-wheeled  -HC     Comment, (Sit-Stand Transfer) once with HHA  -HC       Row Name 07/21/25 1551          Stand-Sit Transfer    Stand-Sit [place] (Transfers) verbal cues;nonverbal cues (demo/gesture);2 person assist;maximum assist (25% patient effort)  -HC     Assistive Device (Stand-Sit Transfers) walker, front-wheeled  -HC       Row Name 07/21/25 1551          Safety Issues/Impairments Affecting Functional Mobility    Impairments Affecting Function (Mobility) balance;endurance/activity tolerance;pain;range of motion (ROM);strength  -HC       Row Name 07/21/25 1551          Motor Skills    Therapeutic Exercise other (see comments)  Sitting: Ap, LAQ,March (r)), Knees in/out  -HC       Row Name             Wound 07/19/25 0921 Left gluteal Other (Comments)    Wound - Properties Group Placement Date: 07/19/25  -JG Placement Time: 0921 -JG Side: Left  -JG Location: gluteal  -JG Primary Wound Type: Other  -JG Additional Comments: blister noted on right buttock during skin assessment prior to surgery  -JG    Retired Wound - Properties Group Placement Date: 07/19/25  -JG Placement Time: 0921 -JG Side: Left  -JG Location: gluteal  -JG Additional Comments: blister noted on right buttock during skin assessment prior to surgery  -JG    Retired Wound - Properties Group Placement Date: 07/19/25  -JG Placement Time: 0921 -JG Side: Left  -JG Location: gluteal  -JG Additional Comments: blister noted on right buttock during skin assessment prior to surgery  -JG    Retired Wound - Properties Group Date first assessed: 07/19/25  -JG Time first assessed: 0921 -JG Side: Left  -JG Location: gluteal  -JG Additional Comments: blister noted on right  buttock during skin assessment prior to surgery  -JG      Row Name             Wound 07/19/25 0938 Left anterior thigh Surgical Open Surgical Incision    Wound - Properties Group Placement Date: 07/19/25  -JG Placement Time: 0938 -JG Side: Left  -JG Orientation: anterior  -JG Location: thigh  -JG Primary Wound Type: Surgical  -JG Secondary Wound Type - Surgical: Open Surg  -JG    Retired Wound - Properties Group Placement Date: 07/19/25  -JG Placement Time: 0938 -JG Side: Left  -JG Orientation: anterior  -JG Location: thigh  -JG    Retired Wound - Properties Group Placement Date: 07/19/25  -JG Placement Time: 0938  -JG Side: Left  -JG Orientation: anterior  -JG Location: thigh  -JG    Retired Wound - Properties Group Date first assessed: 07/19/25  -JG Time first assessed: 0938  -JG Side: Left  -JG Location: thigh  -JG      Row Name             Wound 07/21/25 0930 Right coccyx Pressure Injury    Wound - Properties Group Placement Date: 07/21/25 -TW Placement Time: 0930 -TW Side: Right  -TW Location: coccyx  -TW Primary Wound Type: Pressure Inj  -TW    Retired Wound - Properties Group Placement Date: 07/21/25 -TW Placement Time: 0930 -TW Side: Right  -TW Location: coccyx  -TW    Retired Wound - Properties Group Placement Date: 07/21/25 -TW Placement Time: 0930 -TW Side: Right  -TW Location: coccyx  -TW    Retired Wound - Properties Group Date first assessed: 07/21/25 -TW Time first assessed: 0930 -TW Side: Right  -TW Location: coccyx  -TW      Row Name 07/21/25 1551          Coping    Observed Emotional State cooperative  -HC     Verbalized Emotional State acceptance  -HC     Family/Support Persons spouse  -HC     Involvement in Care at bedside;attentive to patient  -HC       Row Name 07/21/25 1558          Positioning and Restraints    Pre-Treatment Position in bed  -HC     Post Treatment Position bed  -HC     In Bed fowlers;encouraged to call for assist;call light within reach;exit alarm on;with  family/caregiver;side rails up x2  -       Row Name 07/21/25 1551          Therapy Assessment/Plan (PT)    Rehab Potential (PT) good  -     Criteria for Skilled Interventions Met (PT) yes;skilled treatment is necessary  -     Therapy Frequency (PT) 6 times/wk  Monday-Saturday  -     Problem List (PT) problems related to;balance;mobility;pain;strength;range of motion (ROM)  -     Activity Limitations Related to Problem List (PT) unable to ambulate safely;unable to transfer safely;BADLs not performed adequately or safely  -       Row Name 07/21/25 1551          Physical Therapy Goals    Bed Mobility Goal Selection (PT) bed mobility, PT goal 1  -     Transfer Goal Selection (PT) transfer, PT goal 1  -     Gait Training Goal Selection (PT) gait training, PT goal 1  -       Row Name 07/21/25 1551          Bed Mobility Goal 1 (PT)    Activity/Assistive Device (Bed Mobility Goal 1, PT) rolling to left;rolling to right;scooting;sit to supine;supine to sit  -     [place] Level/Cues Needed (Bed Mobility Goal 1, PT) moderate assist (50-74% patient effort)  -HC     Time Frame (Bed Mobility Goal 1, PT) by discharge  -       Row Name 07/21/25 1551          Transfer Goal 1 (PT)    Activity/Assistive Device (Transfer Goal 1, PT) sit-to-stand/stand-to-sit;bed-to-chair/chair-to-bed;toilet;walker, rolling  -     [place] Level/Cues Needed (Transfer Goal 1, PT) moderate assist (50-74% patient effort)  -HC     Time Frame (Transfer Goal 1, PT) by discharge  -[facility] Name 07/21/25 1551          Gait Training Goal 1 (PT)    Activity/Assistive Device (Gait Training Goal 1, PT) gait (walking locomotion);assistive device use;decrease fall risk;diminish gait deviation;improve balance and speed;increase endurance/gait distance;maintain weight-bearing status;walker, rolling  -     [place] Level (Gait Training Goal 1, PT) moderate assist (50-74% patient effort)  -HC     Distance (Gait Training Goal 1,  PT) 5  -HC     Time Frame (Gait Training Goal 1, PT) by discharge  -HC               User Key  (r) = Recorded By, (t) = Taken By, (c) = Cosigned By      Initials Name Provider Type    Rebekah Martin, RN Registered Nurse    Shu Aguilera, RN Registered Nurse    Desire Graff, ERNESTINE Physical Therapist Assistant                    Physical Therapy Education       Title: PT OT SLP Therapies (Done)       Topic: Physical Therapy (Done)       Point: Mobility training (Done)       Learning Progress Summary            Patient Acceptance, E,D,TB, VU,NR by AG at 7/19/2025 1600    Acceptance, E,TB, VU by AW at 7/18/2025 2335    Acceptance, E,TB, VU by AW at 7/18/2025 0008   Significant Other Acceptance, E,D,TB, VU,NR by  at 7/19/2025 1600                      Point: Home exercise program (Done)       Learning Progress Summary            Patient Acceptance, E,D,TB, VU,NR by AG at 7/19/2025 1600    Acceptance, E,TB, VU by AW at 7/18/2025 2335    Acceptance, E,TB, VU by AW at 7/18/2025 0008   Significant Other Acceptance, E,D,TB, VU,NR by AG at 7/19/2025 1600                      Point: Body mechanics (Done)       Learning Progress Summary            Patient Acceptance, E,D,TB, VU,NR by AG at 7/19/2025 1600    Acceptance, E,TB, VU by AW at 7/18/2025 2335    Acceptance, E,TB, VU by AW at 7/18/2025 0008   Significant Other Acceptance, E,D,TB, VU,NR by  at 7/19/2025 1600                      Point: Precautions (Done)       Learning Progress Summary            Patient Acceptance, E,D,TB, VU,NR by AG at 7/19/2025 1600    Acceptance, E,TB, VU by AW at 7/18/2025 2335    Acceptance, E,TB, VU by AW at 7/18/2025 0008   Significant Other Acceptance, E,D,TB, VU,NR by  at 7/19/2025 1600                                      User Key       Initials Effective Dates Name Provider Type Discipline     06/16/21 -  Anum Stark, PT Physical Therapist PT    AW 11/20/24 -  Rylie Elaine, RN Registered Nurse Nurse                   PT Recommendation and Plan  Anticipated Discharge Disposition (PT): inpatient rehabilitation facility  Therapy Frequency (PT): 6 times/wk (Monday-Saturday)          Time Calculation:    PT Charges       Row Name 07/21/25 1602             Time Calculation    PT Received On 07/21/25  -HC         Time Calculation- PT    Total Timed Code Minutes- PT 38 minute(s)  -HC                User Key  (r) = Recorded By, (t) = Taken By, (c) = Cosigned By      Initials Name Provider Type     Desire Murphy PTA Physical Therapist Assistant                  Therapy Charges for Today       Code Description Service Date Service Provider Modifiers Qty    [id number]  PT THER PROC EA 15 MIN 7/21/2025 Desire Murphy PTA GP, CQ 1    [id number]  PT THERAPEUTIC ACT EA 15 MIN 7/21/2025 Desire Murphy PTA GP, CQ 2            PT G-Codes  AM-PAC 6 Clicks Score (PT): 12    Desire Murphy PTA  7/21/2025

## 2025-07-21 NOTE — PLAN OF CARE
Goal Outcome Evaluation:              Outcome Evaluation: Pt resting in bed, VSS on room air. Pt worked with PT and was able to stand at bedside. Wound care completed per orders. Pt voices no concerns or complaints at this time, will continue with POC.   Pt has refused turns on and off throughout shift, educated on the importance.

## 2025-07-21 NOTE — THERAPY EVALUATION
Acute Care - Occupational Therapy Initial Evaluation   [place]     Patient Name: Lashae Benitez  : 1960  MRN: [id number]  Today's Date: 2025  Onset of Illness/Injury or Date of Surgery: 25     Referring Physician: Dr. Lovelace    Admit Date: 2025       ICD-10-CM ICD-9-CM   1. Closed intertrochanteric fracture of hip, left, initial encounter  S72.142A 820.21   2. Post-operative state  Z98.890 V45.89   3. Precordial pain  R07.2 786.51   4. Abnormal nuclear stress test  R94.39 794.39     Patient Active Problem List   Diagnosis    Hiatal hernia    Essential hypertension    Sjogren's syndrome    Multiple sclerosis    Psoriasis    Fibromyalgia    Osteoarthritis    Psoriatic arthritis    RA (rheumatoid arthritis)    Degenerative disc disease, lumbar    TMJ arthritis    Dupuytren's disease    H. pylori infection    Family history of coronary artery disease    Type 2 diabetes mellitus    Edema    Bilateral leg edema    Isolated corticotropin deficiency    Hyponatremia    Paroxysmal atrial fibrillation    Sepsis    Bacteremia, escherichia coli    Iron deficiency anemia    Malabsorption due to intolerance, not elsewhere classified    Chronic chest pain with high risk for CAD    Abnormal nuclear stress test    Abnormal computed tomography angiography (CTA)    Hip fracture    Closed intertrochanteric fracture of hip, left, initial encounter     Past Medical History:   Diagnosis Date    Acid reflux     Allergic     Anemia     Anxiety     Atrial fibrillation     Cancer     thyroid, skin    Cervical disc disorder     Chronic pain disorder     Claustrophobia     CTS (carpal tunnel syndrome)     Degenerative disc disease, lumbar     Depression     Dupuytren's disease     Essential hypertension     Family history of coronary artery disease     Fibromyalgia     Gallbladder abscess     H. pylori infection     Hiatal hernia     Hyperlipidemia     Hypothyroidism     Low back pain     Lumbosacral disc disease      Migraines     migraines    Multiple sclerosis     Osteoarthritis     Peripheral neuropathy     Psoriasis     Psoriatic arthritis     RA (rheumatoid arthritis)     Rheumatoid arthritis     Sinusitis     Sjogren's syndrome     Stomach ulcer     Thoracic disc disorder     TMJ arthritis     Type 2 diabetes mellitus     Urinary tract infection      Past Surgical History:   Procedure Laterality Date    BACK SURGERY      BREAST LUMPECTOMY Left 11/1993    CARDIAC CATHETERIZATION Left 09/21/2009    Normal     CARDIAC CATHETERIZATION N/A 6/24/2025    Procedure: Left Heart Cath;  Surgeon: Teodora Barron MD;  Location:  [facility];  Service: Cardiovascular;  Laterality: N/A;    CARPAL TUNNEL RELEASE  03/2012    CERVICAL POLYPECTOMY  12/2015    CHOLECYSTECTOMY  05/2007    COLONOSCOPY      ENDOSCOPY      EPIDURAL BLOCK      GASTRIC BYPASS  09/2007    JOINT REPLACEMENT      KNEE ARTHROPLASTY      LUMBAR DISC SURGERY      C5-6    NECK SURGERY      REPLACEMENT TOTAL KNEE Right 06/2016    SACRAL NERVE STIMULATOR PLACEMENT  09/2014    SACRAL NERVE STIMULATOR PLACEMENT  04/11/2024    [facility] in [place]    SACRAL NERVE STIMULATOR PLACEMENT Right 04/17/2024    THYROIDECTOMY  03/2016    TRIGGER POINT INJECTION           OT ASSESSMENT FLOWSHEET (Last 12 Hours)       OT Evaluation and Treatment       Row Name 07/21/25 0858                   OT Time and Intention    Document Type evaluation  -KR        Mode of Treatment occupational therapy  -KR        Patient Effort adequate  -KR           General Information    Prior Level of Function independent:  -KR        Equipment Currently Used at Home cane, straight  -KR           Living Environment    Current Living Arrangements home  -KR        People in Home spouse  -KR        Primary Care Provided by self  -KR           Cognition    Affect/Mental Status (Cognition) WFL  -KR        Orientation Status (Cognition) oriented x 3  -KR        Follows Commands (Cognition) WFL  -KR            Range of Motion Comprehensive    Comment, General Range of Motion BUE WFL  -KR           Strength Comprehensive (MMT)    Comment, General Manual Muscle Testing (MMT) Assessment E WFL  -KR           Activities of Daily Living    BADL Assessment/Intervention bathing;upper body dressing;lower body dressing;grooming;feeding;toileting  -KR           Bathing Assessment/Intervention    [place] Level (Bathing) bathing skills;maximum assist (25% patient effort)  -KR           Upper Body Dressing Assessment/Training    [place] Level (Upper Body Dressing) upper body dressing skills;moderate assist (50% patient effort)  -KR           Lower Body Dressing Assessment/Training    [place] Level (Lower Body Dressing) lower body dressing skills;maximum assist (25% patient effort)  -KR           Grooming Assessment/Training    [place] Level (Grooming) grooming skills;minimum assist (75% patient effort)  -KR           Self-Feeding Assessment/Training    [place] Level (Feeding) feeding skills;set up  -KR           Toileting Assessment/Training    [place] Level (Toileting) toileting skills;maximum assist (25% patient effort)  -KR           Wound 07/19/25 0921 Left gluteal Other (Comments)    Wound - Properties Group Placement Date: 07/19/25  -JG Placement Time: 0921 -JG Side: Left  -JG Location: gluteal  -JG Primary Wound Type: Other  -JG Additional Comments: blister noted on right buttock during skin assessment prior to surgery  -JG    Retired Wound - Properties Group Placement Date: 07/19/25  -JG Placement Time: 0921 -JG Side: Left  -JG Location: gluteal  -JG Additional Comments: blister noted on right buttock during skin assessment prior to surgery  -JG    Retired Wound - Properties Group Placement Date: 07/19/25  -JG Placement Time: 0921 -JG Side: Left  -JG Location: gluteal  -JG Additional Comments: blister noted on right buttock during skin assessment prior to surgery  -JG    Retired Wound -  Properties Group Date first assessed: 07/19/25  -JG Time first assessed: 0921 -JG Side: Left  -JG Location: gluteal  -JG Additional Comments: blister noted on right buttock during skin assessment prior to surgery  -JG       Wound 07/19/25 0938 Left anterior thigh Surgical Open Surgical Incision    Wound - Properties Group Placement Date: 07/19/25  -JG Placement Time: 0938 -JG Side: Left  -JG Orientation: anterior  -JG Location: thigh  -JG Primary Wound Type: Surgical  -JG Secondary Wound Type - Surgical: Open Surg  -JG    Retired Wound - Properties Group Placement Date: 07/19/25  -JG Placement Time: 0938 -JG Side: Left  -JG Orientation: anterior  -JG Location: thigh  -JG    Retired Wound - Properties Group Placement Date: 07/19/25  -JG Placement Time: 0938 -JG Side: Left  -JG Orientation: anterior  -JG Location: thigh  -JG    Retired Wound - Properties Group Date first assessed: 07/19/25  -JG Time first assessed: 0938 -JG Side: Left  -JG Location: thigh  -JG       Plan of Care Review    Plan of Care Reviewed With patient  -KR           Therapy Assessment/Plan (OT)    Rehab Potential (OT) good  -KR        Criteria for Skilled Therapeutic Interventions Met (OT) yes;skilled treatment is necessary  -KR        Problem List (OT) inability to direct their own care;mobility  -KR        Planned Therapy Interventions (OT) activity tolerance training;adaptive equipment training;BADL retraining  -KR        Therapy Assessment/Plan (OT) Pt would benefit from admission to physical rehab unit to promote safety/independence with daily activity  -KR           Therapy Plan Review/Discharge Plan (OT)    Anticipated Discharge Disposition (OT) inpatient rehabilitation facility  -KR           OT Goals    Dressing Goal Selection (OT) dressing, OT goal 1  -KR        Activity Tolerance Goal Selection (OT) activity tolerance, OT goal 1  -KR           Dressing Goal 1 (OT)    Activity/Device (Dressing Goal 1, OT) dressing skills, all  -KR         Lamar/Cues Needed (Dressing Goal 1, OT) minimum assist (75% or more patient effort)  -KR        Time Frame (Dressing Goal 1, OT) by discharge  -KR            Activity Tolerance Goal 1 (OT)    Activity Tolerance Goal 1 (OT) Increase to enhance ADL performance  -KR        Activity Level (Endurance Goal 1, OT) 20 min activity  -KR        Time Frame (Activity Tolerance Goal 1, OT) by discharge  -KR           Patient Education Goal (OT)    Activity (Patient Education Goal, OT) AE/DME training as needed to enhance safety/independence in home environment  -KR        Lamar/Cues/Accuracy (Memory Goal 2, OT) verbalizes understanding  -KR        Time Frame (Patient Education Goal, OT) by discharge  -KR                  User Key  (r) = Recorded By, (t) = Taken By, (c) = Cosigned By      Initials Name Effective Dates    Rebekah Martin RN 08/29/23 -     Shakeel Walls OT 06/16/21 -                            OT Recommendation and Plan  Planned Therapy Interventions (OT): activity tolerance training, adaptive equipment training, BADL retraining  Plan of Care Review  Plan of Care Reviewed With: patient  Plan of Care Reviewed With: patient        Time Calculation:     Therapy Charges for Today       Code Description Service Date Service Provider Modifiers Qty    [id number] HC OT EVAL HIGH COMPLEXITY 4 7/21/2025 Shakeel Santiago OT GO 1                 Shakeel Santiago OT  7/21/2025

## 2025-07-21 NOTE — NURSING NOTE
WOCN consulted for left gluteal blister. Assessed patient with PCT.     Silicone bordered foam dressing removed from left gluteal - ruptured blister noted, skin is loose, small amount of clear drainage noted to the dressing. Wound base is blanchable, moist, pink/red. Lorie wound is moist, pink/red.     The right coccyx is noted with a DTPI that does extend down onto the upper right gluteal  it is a purple discoloration that is slow to ernestina/nonblanchable, moist and intact with a lorie wound skin that is red, moist and slow to ernestina. It measures 4.2x1.2x0cm. Tenderness noted upon palpation.    Order to assess every shift, gently cleanse with foam cleanser, pat dry and apply venelex to bilateral gluteals and coccyx BID; leave left gluteal open to air and cover right coccyx/upper right gluteal with a small silicone bordered dressing.     Stepan score 17. PI prevention orders initiated. Discussed patient with SOFIA Blake.      07/21/25 0930   Wound 07/19/25 0921 Left gluteal Other (Comments)   Placement Date/Time: 07/19/25 0921   Side: Left  Location: gluteal  Primary Wound Type: Other (Comments)  Additional Comments: blister noted on right buttock during skin assessment prior to surgery   Pressure Injury Stage OTH  (not a pressure injury)   Dressing Appearance intact;moist drainage   Dressing Removed Type silicone border foam   Confirmed Empty Wound Bed Yes, visual inspection of wound bed   Closure None   Base blanchable;moist;pink;red   Periwound moist;pink;redness   Periwound Temperature warm   Periwound Skin Turgor soft   Edges open  (blister ruptured)   Drainage Characteristics/Odor clear   Drainage Amount small   Wound 07/21/25 0930 Right coccyx Pressure Injury   Placement Date/Time: 07/21/25 0930   Side: Right  Location: coccyx  Primary Wound Type: Pressure Injury   Pressure Injury Stage DTPI   Dressing Appearance intact;moist drainage   Dressing Removed Type silicone border foam   Confirmed Empty Wound Bed Yes,  visual inspection of wound bed   Closure None   Base nonblanchable;purple   Periwound moist;redness  (slow to ernestina)   Periwound Temperature warm   Periwound Skin Turgor soft   Wound Length (cm) 4.2 cm   Wound Width (cm) 1.2 cm   Wound Depth (cm) 0 cm   Wound Surface Area (cm^2) 3.96 cm^2   Wound Volume (cm^3) 0 cm^3   Drainage Characteristics/Odor clear   Drainage Amount scant

## 2025-07-21 NOTE — CASE MANAGEMENT/SOCIAL WORK
Discharge Planning Assessment   [place]     Patient Name: Lashae Benitez  MRN: [id number]  Today's Date: 7/21/2025    Admit Date: 7/17/2025            Discharge Plan       Row Name 07/21/25 1415       Plan    Plan Pt has a  inpaitent acute rehab. SS spoke with Pt and spouse at bedside. Pt is agreeable to  inpatient rehab. Pt discussed with Provider and  IPR possible [facility] on 07/22/25. SS to follow.                 Continued Care and Services - Admitted Since 7/17/2025       Destination       Service Provider Request Status Services Address Phone Fax Patient Preferred     [facility] - No Request Sent -- [address] [place] [address] [phone] --                    DEANDRA Hernández

## 2025-07-21 NOTE — PROGRESS NOTES
Inpatient Progress Note  Lashae Benitez  Date: 07/21/25  MRN: [id number]      Subjective:  Patient seen and evaluated. She is resting comfortably in the bed. She states that she is doing a little better today. She has walked to the bathroom with PT.  She reports feeling sore, but overall her pain is well controlled. She had a blood transfusion yesterday. Hb is 9.1 today. She denies chest pain or shortness of breath.       Objective:      Vitals:    07/20/25 2226 07/21/25 0200 07/21/25 0656 07/21/25 0700   BP: 118/62 138/67  152/71   BP Location:  Right arm  Right arm   Patient Position:  Lying  Lying   Pulse: 86 87 86    Resp: 20 16 16 16   Temp: 98.8 °F (37.1 °C) 98.8 °F (37.1 °C)  98.8 °F (37.1 °C)   TempSrc: Oral Oral  Oral   SpO2: 99%  96%    Weight:       Height:              Physical Exam:  Constitutional:  Well-developed, well-nourished.  No acute distress.        Musculoskeletal:  Left hip: Bulky surgical dressing is CDI. Thigh is supple. SILT. Calf is soft and non-tender. DF/PF intact. +DP pulse.     Labs:    Results from last 7 days   Lab Units 07/21/25  0036 07/20/25  0519 07/19/25  0106 07/18/25  1703 07/18/25  0338 07/17/25  1418   WBC 10*3/mm3  --  8.35 9.59  --  8.42  --    HEMOGLOBIN g/dL 9.1* 6.9* 10.3*  --  10.2*  --    HEMATOCRIT % 26.9* 21.1* 31.6*  --  31.2*  --    MCV fL  --  108.2* 108.2*  --  106.5*  --    MCHC g/dL  --  32.7 32.6  --  32.7  --    PLATELETS 10*3/mm3  --  230 251  --  276  --    INR   --   --   --  1.15*  --  1.26*         Results from last 7 days   Lab Units 07/20/25  0139 07/19/25  1608 07/19/25  0106 07/18/25  0338   SODIUM mmol/L 133*  --  134* 136   POTASSIUM mmol/L 4.4 4.9 3.6 3.9   CHLORIDE mmol/L 102  --  100 104   CO2 mmol/L 20.1*  --  22.0 22.6   BUN mg/dL 20.3  --  10.9 11.2   CREATININE mg/dL 0.98  --  0.62 0.58   CALCIUM mg/dL 7.5*  --  8.1* 8.1*   GLUCOSE mg/dL 210*  --  177* 203*   ALBUMIN g/dL 2.6*  --  3.0* 3.2*   BILIRUBIN mg/dL 0.2  --  0.4 0.4   ALK PHOS U/L  "92  --  128* 134*   AST (SGOT) U/L 18  --  17 23   ALT (SGPT) U/L 21  --  26 33   Estimated Creatinine Clearance: 61 mL/min (by C-G formula based on SCr of 0.98 mg/dL).  No results found for: \"AMMONIA\"          No results found for: \"HGBA1C\"  Lab Results   Component Value Date    TSH 15.900 (H) 03/12/2025    FREET4 1.58 12/07/2023         Pain Management Panel  More data exists         Latest Ref Rng & Units 5/12/2025 3/17/2025   Pain Management Panel   Creatinine, Urine mg/dL  mg/dL 12.4  12.4  7.9  7.9       Details          Multiple values from one day are sorted in reverse-chronological order               No results found for: \"BLOODCX\"  No results found for: \"URINECX\"  No results found for: \"WOUNDCX\"  No results found for: \"STOOLCX\"              Radiology:  Imaging Results (Last 72 Hours)       Procedure Component Value Units Date/Time    FL Surgery Fluoro [[id number]] Collected: 07/19/25 1115     Updated: 07/19/25 1118    Narrative:      EXAMINATION: FL SURGERY FLUORO-      CLINICAL INDICATION:     Hip; S72.142A-Displaced intertrochanteric  fracture of left femur, initial encounter for closed fracture     TECHNIQUE:  FL SURGERY FLUORO-      FLUOROSCOPY TIME: 2.1 minutes     FINDINGS:   Intraoperative fluoroscopy for hip fixation.       Impression:      As above.     This report was finalized on 7/19/2025 11:15 AM by Dr. Shakeel Kemp MD.                 Assessment:  POD#2 s/p left hip ORIF with TFNA          Plan:      Continue to monitor H&H and transfuse as needed.     PT/OT: TTWB x 6 weeks to LLE.     DVT/Ppx: Xarelto 10 mg home medication on hold currently.     Maintain current surgical dressing.     Pain control.     Discharge planning to be determined by the medical hospitalist service.       RUIZ Clark   07/21/25   09:46 EDT  "

## 2025-07-21 NOTE — NURSING NOTE
Pt refused skin assessment and turns this AM for Primary RN. Patient then allowed wound care RN to complete skin assessment. Wound care RN found DTI and reported findings to primary RN. MD was made aware.

## 2025-07-22 ENCOUNTER — HOSPITAL ENCOUNTER (INPATIENT)
Facility: HOSPITAL | Age: 65
DRG: 536 | End: 2025-07-22
Attending: FAMILY MEDICINE | Admitting: FAMILY MEDICINE
Payer: MEDICARE

## 2025-07-22 VITALS
BODY MASS INDEX: 24.16 KG/M2 | TEMPERATURE: 98.6 F | WEIGHT: 145 LBS | HEIGHT: 65 IN | SYSTOLIC BLOOD PRESSURE: 147 MMHG | OXYGEN SATURATION: 99 % | HEART RATE: 78 BPM | DIASTOLIC BLOOD PRESSURE: 77 MMHG | RESPIRATION RATE: 20 BRPM

## 2025-07-22 DIAGNOSIS — S72.002S CLOSED LEFT HIP FRACTURE, SEQUELA: Primary | ICD-10-CM

## 2025-07-22 LAB
BASOPHILS # BLD AUTO: 0.03 10*3/MM3 (ref 0–0.2)
BASOPHILS NFR BLD AUTO: 0.5 % (ref 0–1.5)
DEPRECATED RDW RBC AUTO: 66.4 FL (ref 37–54)
EOSINOPHIL # BLD AUTO: 0.07 10*3/MM3 (ref 0–0.4)
EOSINOPHIL NFR BLD AUTO: 1.2 % (ref 0.3–6.2)
ERYTHROCYTE [DISTWIDTH] IN BLOOD BY AUTOMATED COUNT: 18.2 % (ref 12.3–15.4)
GLUCOSE BLDC GLUCOMTR-MCNC: 137 MG/DL (ref 70–130)
GLUCOSE BLDC GLUCOMTR-MCNC: 145 MG/DL (ref 70–130)
GLUCOSE BLDC GLUCOMTR-MCNC: 151 MG/DL (ref 70–130)
GLUCOSE BLDC GLUCOMTR-MCNC: 162 MG/DL (ref 70–130)
HCT VFR BLD AUTO: 26.1 % (ref 34–46.6)
HGB BLD-MCNC: 8.6 G/DL (ref 12–15.9)
IMM GRANULOCYTES # BLD AUTO: 0.03 10*3/MM3 (ref 0–0.05)
IMM GRANULOCYTES NFR BLD AUTO: 0.5 % (ref 0–0.5)
LYMPHOCYTES # BLD AUTO: 1.01 10*3/MM3 (ref 0.7–3.1)
LYMPHOCYTES NFR BLD AUTO: 16.9 % (ref 19.6–45.3)
MCH RBC QN AUTO: 33.2 PG (ref 26.6–33)
MCHC RBC AUTO-ENTMCNC: 33 G/DL (ref 31.5–35.7)
MCV RBC AUTO: 100.8 FL (ref 79–97)
MONOCYTES # BLD AUTO: 0.74 10*3/MM3 (ref 0.1–0.9)
MONOCYTES NFR BLD AUTO: 12.4 % (ref 5–12)
NEUTROPHILS NFR BLD AUTO: 4.11 10*3/MM3 (ref 1.7–7)
NEUTROPHILS NFR BLD AUTO: 68.5 % (ref 42.7–76)
NRBC BLD AUTO-RTO: 0 /100 WBC (ref 0–0.2)
PLATELET # BLD AUTO: 259 10*3/MM3 (ref 140–450)
PMV BLD AUTO: 9.6 FL (ref 6–12)
RBC # BLD AUTO: 2.59 10*6/MM3 (ref 3.77–5.28)
WBC NRBC COR # BLD AUTO: 5.99 10*3/MM3 (ref 3.4–10.8)

## 2025-07-22 PROCEDURE — 85025 COMPLETE CBC W/AUTO DIFF WBC: CPT | Performed by: INTERNAL MEDICINE

## 2025-07-22 PROCEDURE — 99239 HOSP IP/OBS DSCHRG MGMT >30: CPT | Performed by: INTERNAL MEDICINE

## 2025-07-22 PROCEDURE — 94664 DEMO&/EVAL PT USE INHALER: CPT

## 2025-07-22 PROCEDURE — 97110 THERAPEUTIC EXERCISES: CPT

## 2025-07-22 PROCEDURE — 99223 1ST HOSP IP/OBS HIGH 75: CPT | Performed by: FAMILY MEDICINE

## 2025-07-22 PROCEDURE — 63710000001 INSULIN LISPRO (HUMAN) PER 5 UNITS: Performed by: ORTHOPAEDIC SURGERY

## 2025-07-22 PROCEDURE — 94799 UNLISTED PULMONARY SVC/PX: CPT

## 2025-07-22 PROCEDURE — 82948 REAGENT STRIP/BLOOD GLUCOSE: CPT

## 2025-07-22 PROCEDURE — 82948 REAGENT STRIP/BLOOD GLUCOSE: CPT | Performed by: FAMILY MEDICINE

## 2025-07-22 PROCEDURE — 97535 SELF CARE MNGMENT TRAINING: CPT

## 2025-07-22 PROCEDURE — 94761 N-INVAS EAR/PLS OXIMETRY MLT: CPT

## 2025-07-22 PROCEDURE — 97530 THERAPEUTIC ACTIVITIES: CPT

## 2025-07-22 PROCEDURE — 94760 N-INVAS EAR/PLS OXIMETRY 1: CPT

## 2025-07-22 PROCEDURE — 63710000001 METHOTREXATE 2.5 MG TABLET: Performed by: INTERNAL MEDICINE

## 2025-07-22 RX ORDER — NALOXONE HCL 0.4 MG/ML
0.4 VIAL (ML) INJECTION
Status: ACTIVE | OUTPATIENT
Start: 2025-07-22

## 2025-07-22 RX ORDER — CYANOCOBALAMIN 1000 UG/ML
1000 INJECTION, SOLUTION INTRAMUSCULAR; SUBCUTANEOUS WEEKLY
Status: DISPENSED | OUTPATIENT
Start: 2025-07-27

## 2025-07-22 RX ORDER — FOLIC ACID 1 MG/1
1 TABLET ORAL DAILY
Status: CANCELLED | OUTPATIENT
Start: 2025-07-23

## 2025-07-22 RX ORDER — NITROGLYCERIN 0.4 MG/1
0.4 TABLET SUBLINGUAL
Status: ACTIVE | OUTPATIENT
Start: 2025-07-22

## 2025-07-22 RX ORDER — INSULIN LISPRO 100 [IU]/ML
2-7 INJECTION, SOLUTION INTRAVENOUS; SUBCUTANEOUS
Status: CANCELLED | OUTPATIENT
Start: 2025-07-22

## 2025-07-22 RX ORDER — POLYETHYLENE GLYCOL 3350 17 G/17G
17 POWDER, FOR SOLUTION ORAL DAILY PRN
Status: ACTIVE | OUTPATIENT
Start: 2025-07-22

## 2025-07-22 RX ORDER — CYANOCOBALAMIN 1000 UG/ML
1000 INJECTION, SOLUTION INTRAMUSCULAR; SUBCUTANEOUS WEEKLY
Status: CANCELLED | OUTPATIENT
Start: 2025-07-27

## 2025-07-22 RX ORDER — NITROGLYCERIN 0.4 MG/1
0.4 TABLET SUBLINGUAL
Status: CANCELLED | OUTPATIENT
Start: 2025-07-22

## 2025-07-22 RX ORDER — SODIUM CHLORIDE 0.9 % (FLUSH) 0.9 %
10 SYRINGE (ML) INJECTION AS NEEDED
Status: ACTIVE | OUTPATIENT
Start: 2025-07-22

## 2025-07-22 RX ORDER — METOPROLOL TARTRATE 50 MG
50 TABLET ORAL 2 TIMES DAILY
Status: CANCELLED | OUTPATIENT
Start: 2025-07-22

## 2025-07-22 RX ORDER — ONDANSETRON 2 MG/ML
4 INJECTION INTRAMUSCULAR; INTRAVENOUS EVERY 6 HOURS PRN
Status: CANCELLED | OUTPATIENT
Start: 2025-07-22

## 2025-07-22 RX ORDER — HYDROCODONE BITARTRATE AND ACETAMINOPHEN 7.5; 325 MG/1; MG/1
1 TABLET ORAL EVERY 4 HOURS PRN
Status: DISPENSED | OUTPATIENT
Start: 2025-07-22 | End: 2025-07-24

## 2025-07-22 RX ORDER — BUDESONIDE AND FORMOTEROL FUMARATE DIHYDRATE 160; 4.5 UG/1; UG/1
2 AEROSOL RESPIRATORY (INHALATION)
Status: CANCELLED | OUTPATIENT
Start: 2025-07-22

## 2025-07-22 RX ORDER — AMITRIPTYLINE HYDROCHLORIDE 50 MG/1
25 TABLET ORAL NIGHTLY
Status: CANCELLED | OUTPATIENT
Start: 2025-07-22

## 2025-07-22 RX ORDER — AMOXICILLIN 250 MG
2 CAPSULE ORAL 2 TIMES DAILY PRN
Status: ACTIVE | OUTPATIENT
Start: 2025-07-22

## 2025-07-22 RX ORDER — SODIUM CHLORIDE 9 MG/ML
40 INJECTION, SOLUTION INTRAVENOUS AS NEEDED
Status: CANCELLED | OUTPATIENT
Start: 2025-07-22

## 2025-07-22 RX ORDER — METHOTREXATE 2.5 MG/1
25 TABLET ORAL WEEKLY
Status: DISPENSED | OUTPATIENT
Start: 2025-07-29

## 2025-07-22 RX ORDER — BISACODYL 5 MG/1
5 TABLET, DELAYED RELEASE ORAL DAILY PRN
Status: CANCELLED | OUTPATIENT
Start: 2025-07-22

## 2025-07-22 RX ORDER — SODIUM CHLORIDE 0.9 % (FLUSH) 0.9 %
10 SYRINGE (ML) INJECTION EVERY 12 HOURS SCHEDULED
Status: DISCONTINUED | OUTPATIENT
Start: 2025-07-22 | End: 2025-07-24

## 2025-07-22 RX ORDER — CASTOR OIL AND BALSAM, PERU 788; 87 MG/G; MG/G
1 OINTMENT TOPICAL EVERY 12 HOURS SCHEDULED
Status: CANCELLED | OUTPATIENT
Start: 2025-07-22

## 2025-07-22 RX ORDER — SODIUM CHLORIDE 0.9 % (FLUSH) 0.9 %
10 SYRINGE (ML) INJECTION AS NEEDED
Status: CANCELLED | OUTPATIENT
Start: 2025-07-22

## 2025-07-22 RX ORDER — FUROSEMIDE 20 MG/1
20 TABLET ORAL DAILY PRN
Status: CANCELLED | OUTPATIENT
Start: 2025-07-22

## 2025-07-22 RX ORDER — SODIUM CHLORIDE 0.9 % (FLUSH) 0.9 %
10 SYRINGE (ML) INJECTION EVERY 12 HOURS SCHEDULED
Status: CANCELLED | OUTPATIENT
Start: 2025-07-22

## 2025-07-22 RX ORDER — BISACODYL 10 MG
10 SUPPOSITORY, RECTAL RECTAL DAILY PRN
Status: CANCELLED | OUTPATIENT
Start: 2025-07-22

## 2025-07-22 RX ORDER — POLYETHYLENE GLYCOL 3350 17 G/17G
17 POWDER, FOR SOLUTION ORAL DAILY PRN
Status: CANCELLED | OUTPATIENT
Start: 2025-07-22

## 2025-07-22 RX ORDER — DEXTROSE MONOHYDRATE 25 G/50ML
25 INJECTION, SOLUTION INTRAVENOUS
Status: ACTIVE | OUTPATIENT
Start: 2025-07-22

## 2025-07-22 RX ORDER — NICOTINE POLACRILEX 4 MG
15 LOZENGE BUCCAL
Status: CANCELLED | OUTPATIENT
Start: 2025-07-22

## 2025-07-22 RX ORDER — METHOTREXATE 2.5 MG/1
25 TABLET ORAL WEEKLY
Status: DISCONTINUED | OUTPATIENT
Start: 2025-07-22 | End: 2025-07-22 | Stop reason: HOSPADM

## 2025-07-22 RX ORDER — NALOXONE HCL 0.4 MG/ML
0.4 VIAL (ML) INJECTION
Status: CANCELLED | OUTPATIENT
Start: 2025-07-22

## 2025-07-22 RX ORDER — GABAPENTIN 300 MG/1
600 CAPSULE ORAL EVERY 8 HOURS SCHEDULED
Status: CANCELLED | OUTPATIENT
Start: 2025-07-22

## 2025-07-22 RX ORDER — FLECAINIDE ACETATE 50 MG/1
50 TABLET ORAL 2 TIMES DAILY
Status: DISPENSED | OUTPATIENT
Start: 2025-07-22

## 2025-07-22 RX ORDER — METOPROLOL TARTRATE 50 MG
50 TABLET ORAL 2 TIMES DAILY
Status: DISCONTINUED | OUTPATIENT
Start: 2025-07-22 | End: 2025-07-27

## 2025-07-22 RX ORDER — DULOXETIN HYDROCHLORIDE 30 MG/1
30 CAPSULE, DELAYED RELEASE ORAL EVERY MORNING
Status: CANCELLED | OUTPATIENT
Start: 2025-07-23

## 2025-07-22 RX ORDER — CETIRIZINE HYDROCHLORIDE 10 MG/1
10 TABLET ORAL DAILY
Status: CANCELLED | OUTPATIENT
Start: 2025-07-23

## 2025-07-22 RX ORDER — DULOXETIN HYDROCHLORIDE 60 MG/1
60 CAPSULE, DELAYED RELEASE ORAL DAILY
Status: CANCELLED | OUTPATIENT
Start: 2025-07-23

## 2025-07-22 RX ORDER — FOLIC ACID 1 MG/1
1 TABLET ORAL DAILY
Status: DISPENSED | OUTPATIENT
Start: 2025-07-23

## 2025-07-22 RX ORDER — BUDESONIDE AND FORMOTEROL FUMARATE DIHYDRATE 160; 4.5 UG/1; UG/1
2 AEROSOL RESPIRATORY (INHALATION)
Status: ACTIVE | OUTPATIENT
Start: 2025-07-22

## 2025-07-22 RX ORDER — HYDROCODONE BITARTRATE AND ACETAMINOPHEN 7.5; 325 MG/1; MG/1
1 TABLET ORAL EVERY 4 HOURS PRN
Refills: 0 | Status: CANCELLED | OUTPATIENT
Start: 2025-07-22 | End: 2025-07-24

## 2025-07-22 RX ORDER — SODIUM CHLORIDE 0.9 % (FLUSH) 0.9 %
10 SYRINGE (ML) INJECTION AS NEEDED
Status: DISCONTINUED | OUTPATIENT
Start: 2025-07-22 | End: 2025-07-24

## 2025-07-22 RX ORDER — NICOTINE POLACRILEX 4 MG
15 LOZENGE BUCCAL
Status: ACTIVE | OUTPATIENT
Start: 2025-07-22

## 2025-07-22 RX ORDER — CASTOR OIL AND BALSAM, PERU 788; 87 MG/G; MG/G
1 OINTMENT TOPICAL EVERY 12 HOURS SCHEDULED
Status: DISCONTINUED | OUTPATIENT
Start: 2025-07-22 | End: 2025-07-22

## 2025-07-22 RX ORDER — DULOXETIN HYDROCHLORIDE 60 MG/1
60 CAPSULE, DELAYED RELEASE ORAL DAILY
Status: DISPENSED | OUTPATIENT
Start: 2025-07-23

## 2025-07-22 RX ORDER — FLECAINIDE ACETATE 50 MG/1
50 TABLET ORAL 2 TIMES DAILY
Status: CANCELLED | OUTPATIENT
Start: 2025-07-22

## 2025-07-22 RX ORDER — ONDANSETRON 2 MG/ML
4 INJECTION INTRAMUSCULAR; INTRAVENOUS EVERY 6 HOURS PRN
Status: ACTIVE | OUTPATIENT
Start: 2025-07-22

## 2025-07-22 RX ORDER — CETIRIZINE HYDROCHLORIDE 10 MG/1
10 TABLET ORAL DAILY
Status: DISPENSED | OUTPATIENT
Start: 2025-07-23

## 2025-07-22 RX ORDER — DEXTROSE MONOHYDRATE 25 G/50ML
25 INJECTION, SOLUTION INTRAVENOUS
Status: CANCELLED | OUTPATIENT
Start: 2025-07-22

## 2025-07-22 RX ORDER — INSULIN LISPRO 100 [IU]/ML
2-7 INJECTION, SOLUTION INTRAVENOUS; SUBCUTANEOUS
Status: DISPENSED | OUTPATIENT
Start: 2025-07-22

## 2025-07-22 RX ORDER — GLUCAGON 1 MG/ML
1 KIT INJECTION
Status: ACTIVE | OUTPATIENT
Start: 2025-07-22

## 2025-07-22 RX ORDER — PANTOPRAZOLE SODIUM 40 MG/1
40 TABLET, DELAYED RELEASE ORAL DAILY PRN
Status: ACTIVE | OUTPATIENT
Start: 2025-07-22

## 2025-07-22 RX ORDER — PANTOPRAZOLE SODIUM 40 MG/1
40 TABLET, DELAYED RELEASE ORAL DAILY PRN
Status: CANCELLED | OUTPATIENT
Start: 2025-07-22

## 2025-07-22 RX ORDER — SODIUM CHLORIDE 9 MG/ML
40 INJECTION, SOLUTION INTRAVENOUS AS NEEDED
Status: DISCONTINUED | OUTPATIENT
Start: 2025-07-22 | End: 2025-07-24

## 2025-07-22 RX ORDER — AMOXICILLIN 250 MG
2 CAPSULE ORAL 2 TIMES DAILY PRN
Status: CANCELLED | OUTPATIENT
Start: 2025-07-22

## 2025-07-22 RX ORDER — GLUCAGON 1 MG/ML
1 KIT INJECTION
Status: CANCELLED | OUTPATIENT
Start: 2025-07-22

## 2025-07-22 RX ORDER — BISACODYL 5 MG/1
5 TABLET, DELAYED RELEASE ORAL DAILY PRN
Status: ACTIVE | OUTPATIENT
Start: 2025-07-22

## 2025-07-22 RX ORDER — DULOXETIN HYDROCHLORIDE 30 MG/1
30 CAPSULE, DELAYED RELEASE ORAL EVERY MORNING
Status: DISCONTINUED | OUTPATIENT
Start: 2025-07-23 | End: 2025-07-23

## 2025-07-22 RX ORDER — LEVOTHYROXINE SODIUM 125 UG/1
125 TABLET ORAL
Status: CANCELLED | OUTPATIENT
Start: 2025-07-23

## 2025-07-22 RX ORDER — GABAPENTIN 300 MG/1
600 CAPSULE ORAL EVERY 8 HOURS SCHEDULED
Status: DISPENSED | OUTPATIENT
Start: 2025-07-22

## 2025-07-22 RX ORDER — BISACODYL 10 MG
10 SUPPOSITORY, RECTAL RECTAL DAILY PRN
Status: ACTIVE | OUTPATIENT
Start: 2025-07-22

## 2025-07-22 RX ORDER — FUROSEMIDE 20 MG/1
20 TABLET ORAL DAILY PRN
Status: ACTIVE | OUTPATIENT
Start: 2025-07-22

## 2025-07-22 RX ORDER — BUTALBITAL, ACETAMINOPHEN AND CAFFEINE 300; 40; 50 MG/1; MG/1; MG/1
1 CAPSULE ORAL EVERY 4 HOURS PRN
Status: ON HOLD | COMMUNITY

## 2025-07-22 RX ADMIN — Medication 10 ML: at 08:32

## 2025-07-22 RX ADMIN — FOLIC ACID 1 MG: 1 TABLET ORAL at 08:31

## 2025-07-22 RX ADMIN — Medication 1 APPLICATION: at 08:31

## 2025-07-22 RX ADMIN — INSULIN LISPRO 2 UNITS: 100 INJECTION, SOLUTION INTRAVENOUS; SUBCUTANEOUS at 12:05

## 2025-07-22 RX ADMIN — HYDROCODONE BITARTRATE AND ACETAMINOPHEN 1 TABLET: 7.5; 325 TABLET ORAL at 20:20

## 2025-07-22 RX ADMIN — CETIRIZINE HYDROCHLORIDE 10 MG: 10 TABLET, FILM COATED ORAL at 08:31

## 2025-07-22 RX ADMIN — GABAPENTIN 600 MG: 300 CAPSULE ORAL at 05:00

## 2025-07-22 RX ADMIN — INSULIN LISPRO 2 UNITS: 100 INJECTION, SOLUTION INTRAVENOUS; SUBCUTANEOUS at 08:32

## 2025-07-22 RX ADMIN — BUDESONIDE AND FORMOTEROL FUMARATE DIHYDRATE 2 PUFF: 160; 4.5 AEROSOL RESPIRATORY (INHALATION) at 07:01

## 2025-07-22 RX ADMIN — AMITRIPTYLINE HYDROCHLORIDE 25 MG: 25 TABLET ORAL at 20:20

## 2025-07-22 RX ADMIN — METOPROLOL TARTRATE 50 MG: 50 TABLET, FILM COATED ORAL at 08:31

## 2025-07-22 RX ADMIN — GABAPENTIN 600 MG: 300 CAPSULE ORAL at 21:02

## 2025-07-22 RX ADMIN — FLECAINIDE ACETATE 50 MG: 50 TABLET ORAL at 08:31

## 2025-07-22 RX ADMIN — HYDROCODONE BITARTRATE AND ACETAMINOPHEN 1 TABLET: 7.5; 325 TABLET ORAL at 12:05

## 2025-07-22 RX ADMIN — DULOXETINE 30 MG: 30 CAPSULE, DELAYED RELEASE ORAL at 05:00

## 2025-07-22 RX ADMIN — METHOTREXATE 25 MG: 2.5 TABLET ORAL at 12:05

## 2025-07-22 RX ADMIN — RIVAROXABAN 20 MG: 20 TABLET, FILM COATED ORAL at 17:16

## 2025-07-22 RX ADMIN — DULOXETINE 60 MG: 60 CAPSULE, DELAYED RELEASE ORAL at 08:31

## 2025-07-22 RX ADMIN — Medication 10 ML: at 20:21

## 2025-07-22 RX ADMIN — FLECAINIDE ACETATE 50 MG: 50 TABLET ORAL at 20:20

## 2025-07-22 RX ADMIN — METOPROLOL TARTRATE 50 MG: 50 TABLET, FILM COATED ORAL at 20:20

## 2025-07-22 RX ADMIN — BUDESONIDE AND FORMOTEROL FUMARATE DIHYDRATE 2 PUFF: 160; 4.5 AEROSOL RESPIRATORY (INHALATION) at 20:45

## 2025-07-22 RX ADMIN — LEVOTHYROXINE SODIUM 125 MCG: 0.12 TABLET ORAL at 05:00

## 2025-07-22 RX ADMIN — GABAPENTIN 600 MG: 300 CAPSULE ORAL at 17:16

## 2025-07-22 NOTE — CASE MANAGEMENT/SOCIAL WORK
Discharge Planning Assessment   [place]     Patient Name: Lashae Benitez  MRN: [id number]  Today's Date: 7/22/2025    Admit Date: 7/17/2025    Plan: Pt has a  inpaitent acute rehab. SS spoke with Pt and spouse at bedside. Pt is agreeable to  inpatient rehab. Pt discussed with Provider and  IPR possible [facility] on 07/22/25. SS to follow.     Discharge Plan       Row Name 07/22/25 1057       Plan    Final Discharge Disposition Code 62 - inpatient rehab facility    Final Note Pt is being discharged to [facility] on this date.  provided RN with number to call report. No other needs identified at this time.        Continued Care and Services - Admitted Since 7/17/2025       Destination       Service Provider Request Status Services Address Phone Fax Patient Preferred    [facility] - No Request Sent -- [address] [address] [phone] --        Expected Discharge Date and Time       Expected Discharge Date Expected Discharge Time    Jul 22, 2025      DEANDRA Aguilar

## 2025-07-22 NOTE — CASE MANAGEMENT/SOCIAL WORK
Discharge Planning Assessment   [place]     Patient Name: Lashae Benitez  MRN: [id number]  Today's Date: 7/22/2025    Admit Date: 7/17/2025    Plan: SS followed up [facility] admissions who states pt can be admitted on this date. SS notified provider and CM. SS to follow.     Discharge Plan       Row Name 07/22/25 0932       Plan    Plan SS followed up [facility] admissions who states pt can be admitted on this date. SS notified provider and CM. SS to follow.        Continued Care and Services - Admitted Since 7/17/2025       Destination       Service Provider Request Status Services Address Phone Fax Patient Preferred     [facility]ending - No Request Sent -- [address] [place] [address] [phone] --        Expected Discharge Date and Time       Expected Discharge Date Expected Discharge Time    Jul 22, 2025      DEANDRA Aguilar

## 2025-07-22 NOTE — NURSING NOTE
Report called to rehab RN,  aware patient is going to rehab. Notified RN that the dressing on patients left hip needed aquaseal because it had been 72 hours. RN stated she would do it when the patient arrived to unit.

## 2025-07-22 NOTE — NURSING NOTE
WOCN consulted admission to rehab for a right gluteal, right coccyx and left gluteal pressure injuries. Assessed patient with SOFIA Iglesias.     There is a dark purple discoloration on the right coccyx and a dark purple discoloration on the right upper gluteal that is connected by slow to ernestina/non-blanching skin; this is documented as a right coccyx DTPI which extends down onto the right upper gluteal. Skin is intact.    The left gluteal has a ruptured blister that was blanching yesterday; however, it is now a stage 1 pressure injury. Wound base is pink/red and nonblanchable. Lorie wound skin is red but blanching at this time.    Order changed to assess every shift, gently cleanse with foam cleanser, pat dry and apply Z guard to the right coccyx and bilateral gluteals; leave open to air.    Discussed the importance of turning/repositioning with patient. Patient verbalized understanding.       Stepan score 15. PI prevention orders in place.     07/22/25 1450   Wound 07/21/25 0930 Right coccyx Pressure Injury   Placement Date/Time: 07/21/25 0930   Side: Right  Location: coccyx  Primary Wound Type: Pressure Injury   Pressure Injury Stage DTPI   Dressing Appearance open to air   Closure None   Base nonblanchable;purple;red;pink   Periwound redness  (slow to ernestina)   Periwound Temperature warm   Periwound Skin Turgor soft   Edges rolled/closed   Drainage Amount none   Wound 07/19/25 0921 Left gluteal Other (Comments)   Placement Date/Time: 07/19/25 0921   Side: Left  Location: gluteal  Primary Wound Type: Other (Comments)  Additional Comments: blister noted on right buttock during skin assessment prior to surgery   Pressure Injury Stage 1   Dressing Appearance open to air   Confirmed Empty Wound Bed Yes, visual inspection of wound bed   Closure None   Base pink;red;nonblanchable   Periwound dry;pink;redness   Periwound Temperature warm   Periwound Skin Turgor soft   Drainage Amount none

## 2025-07-22 NOTE — PROGRESS NOTES
Inpatient Progress Note  Lashae Benitez  Date: 07/22/25  MRN: [id number]      Subjective: Patient seen and evaluated.  She is getting ready to be transferred to the rehab unit.  She continues to complain of moderate pain to the left hip with toe-touch weightbearing.  She denies chest pain or shortness of breath. She states that she feels weak and shaky at times when out of bed.    Objective:      Vitals:    07/21/25 1919 07/22/25 0200 07/22/25 0701 07/22/25 0702   BP:  143/82  147/77   BP Location:  Right arm  Right arm   Patient Position:  Lying  Lying   Pulse: 86  78    Resp: 18 16 18 20   Temp:  98.9 °F (37.2 °C)  98.6 °F (37 °C)   TempSrc:  Oral  Oral   SpO2: 99%  99%    Weight:       Height:              Physical Exam:  Constitutional:  Well-developed, well-nourished.  No acute distress.        Musculoskeletal:  Bulky surgical dressing is CDI. Thigh is supple. SILT. Calf is soft and non-tender. DF/PF intact. +DP pulse.     Labs:    Results from last 7 days   Lab Units 07/22/25  0254 07/21/25  0036 07/20/25  0519 07/19/25  0106 07/18/25  1703 07/18/25  0338 07/17/25  1418   WBC 10*3/mm3 5.99  --  8.35 9.59  --    < >  --    HEMOGLOBIN g/dL 8.6* 9.1* 6.9* 10.3*  --    < >  --    HEMATOCRIT % 26.1* 26.9* 21.1* 31.6*  --    < >  --    MCV fL 100.8*  --  108.2* 108.2*  --    < >  --    MCHC g/dL 33.0  --  32.7 32.6  --    < >  --    PLATELETS 10*3/mm3 259  --  230 251  --    < >  --    INR   --   --   --   --  1.15*  --  1.26*    < > = values in this interval not displayed.         Results from last 7 days   Lab Units 07/20/25  0139 07/19/25  1608 07/19/25  0106 07/18/25  0338   SODIUM mmol/L 133*  --  134* 136   POTASSIUM mmol/L 4.4 4.9 3.6 3.9   CHLORIDE mmol/L 102  --  100 104   CO2 mmol/L 20.1*  --  22.0 22.6   BUN mg/dL 20.3  --  10.9 11.2   CREATININE mg/dL 0.98  --  0.62 0.58   CALCIUM mg/dL 7.5*  --  8.1* 8.1*   GLUCOSE mg/dL 210*  --  177* 203*   ALBUMIN g/dL 2.6*  --  3.0* 3.2*   BILIRUBIN mg/dL 0.2  --  0.4  "0.4   ALK PHOS U/L 92  --  128* 134*   AST (SGOT) U/L 18  --  17 23   ALT (SGPT) U/L 21  --  26 33   Estimated Creatinine Clearance: 61 mL/min (by C-G formula based on SCr of 0.98 mg/dL).  No results found for: \"AMMONIA\"          No results found for: \"HGBA1C\"  Lab Results   Component Value Date    TSH 15.900 (H) 03/12/2025    FREET4 1.58 12/07/2023         Pain Management Panel  More data exists         Latest Ref Rng & Units 5/12/2025 3/17/2025   Pain Management Panel   Creatinine, Urine mg/dL  mg/dL 12.4  12.4  7.9  7.9       Details          Multiple values from one day are sorted in reverse-chronological order               No results found for: \"BLOODCX\"  No results found for: \"URINECX\"  No results found for: \"WOUNDCX\"  No results found for: \"STOOLCX\"              Radiology:  Imaging Results (Last 72 Hours)       ** No results found for the last 72 hours. **              Assessment: POD #3 status post left hip ORIF with TFN a          Plan:      PT/OT: TTWB x 6 weeks to LLE.      DVT/Ppx: Xarelto 10 mg home medication on hold currently due to acute blood loss anemia.      Continue to trend H&H and transfuse as needed.     Maintain current surgical dressing until follow-up.    Pain control.     Patient to be discharged to the rehabilitation unit today.    Please schedule outpatient follow-up in 2 weeks.    Shivani Vasquez, RUIZ   07/22/25   12:50 EDT  "

## 2025-07-22 NOTE — PLAN OF CARE
Goal Outcome Evaluation:           Progress: no change  Outcome Evaluation: wound care complete. pt visited with family. pt refused turns at time; pt educated on the importance of turns. pt appears to be resting. no other changes to note  at this time; poc ongoing

## 2025-07-22 NOTE — PROGRESS NOTES
"Section A. Administrative Information - Admission    Ethnicity: A. Not of , /a, or Cymraes origin    Race:  A. White    A. Preferred Language:  english    B.  Does patient need or want an  to communicate with a doctor or  health care staff?  0. No        Section B.  Hearing, Speech, and Vision - Admission  . Hearin. Adequate - no difficulty in normal conversation, social interaction,  listening to TV    . Vision:  0. Adequate - sees fine detail, such as regular print in newspapers/books    . Health Literacy - Frequency of requiring assistance reading instructions,  pamphlets, other written material from doctor or pharmacy:  0. Never    [id number]. Expression of Ideas and Wants:  4. Expresses complex messages without difficulty and with speech that is clear  and easy to understand    [id number]. Understanding Verbal and Non-Verbal Content:  4. Understands: Clear comprehension without cues or repetitions    Section C.  Cognitive Patterns - Admission  . Should Brief Interview for Mental Status (-) be conducted?  (1) Yes        Number of words repeated by patient after first attempt:  3. Three        A.  ?Please tell me what year it is right now.?    A. Able to report correct year:  3. Correct    B.  ?What month are we in right now??    B. Able to report correct month:  2. Accurate within 5 days    C.  ?What day of the week is today??    C. Able to report correct day of the week:  1. Correct            A.  Able to recall ?sock?:  1. Yes, after cueing ('something to wear\")    B.  Able to recall ?blue?  1. Yes, after cueing (\"a color\")    C.  Able to recall ?bed?:  2. Yes, no cue required        BIMS Score:  13    Code: 0    Description: Patient was able to complete BIMS.    A. Is there evidence of an acute change in mental status from the patient's  baseline?  0. No    B. Inattention:  0. Behavior not present    C. Disorganized thinkin. Behavior not present    D. " "Altered level of consciousness:  0. Behavior not present    Section D. Mood - Admission  \"Over the last 2 weeks, have you been bothered by any of the following  problems?\"    . Patient Mood Interview (PHQ-2 to 9) (from [employer]?)                            1. Symptom Presence       2. Symptom Frequency  A. Little interest or pleasure in doing things  0. No                     0.  Never or 1 day  B. Feeling down, depressed, or hopeless  0. No                     0. Never or 1  day          . Total Severity Score: 0    Interpretation: Minimal depression    How often do you feel lonely or isolated from those around you?  0. Never    Section J.  Health Conditions - Admission  . Pain Effect on Sleep - ?Over the past 5 days, how much of the time has  pain made it hard for you to sleep at night??:  1. Rarely or not at all    . Pain Interference with Therapy Activities - ?Over the past 5 days, how  often have you limited your participation in rehabilitation therapy sessions due  to pain?\":  1. Rarely or not at all    . Pain Interference with Day-to-Day Activities - ?Over the past 5 days, how  often have you limited your day-to-day activities (excluding rehabilitation  therapy sessions) because of pain??:  1. Rarely or not at all    . History of Falls - Has the patient had two or more falls in the past year  or any fall with injury in the past year?  1. Yes    Section M. Skin Conditions - Admission  Does this patient have one or more unhealed pressure ulcers/injuries?  1. Yes        A 1. Number of Stage 1 pressure injuries present on admission:  1        B 1. Number of Stage 2 pressure ulcers present on admission:  0        C 1. Number of Stage 3 pressure ulcers present on admission:  0        D 1. Number of Stage 4 pressure ulcers present on admission:  0        E 1. Number of unstageable pressure ulcers/injuries due to non-removable  dressing/device present on admission:  0        F 1. " Number of unstageable pressure ulcers due to coverage of wound bed by  slough and/or eschar present on admission:  0        G 1. Number of unstageable pressure injuries presenting as deep tissue injury  present on admission:  2    Signed by: Fely Garcia Nurse

## 2025-07-22 NOTE — THERAPY TREATMENT NOTE
Acute Care - Physical Therapy Treatment Note   [facility]     Patient Name: Lashae Benitez  : 1960  MRN: [id number]  Today's Date: 2025   Onset of Illness/Injury or Date of Surgery: 25  Visit Dx:     ICD-10-CM ICD-9-CM   1. Closed intertrochanteric fracture of hip, left, initial encounter  S72.142A 820.21   2. Post-operative state  Z98.890 V45.89   3. Precordial pain  R07.2 786.51   4. Abnormal nuclear stress test  R94.39 794.39     Patient Active Problem List   Diagnosis    Hiatal hernia    Essential hypertension    Sjogren's syndrome    Multiple sclerosis    Psoriasis    Fibromyalgia    Osteoarthritis    Psoriatic arthritis    RA (rheumatoid arthritis)    Degenerative disc disease, lumbar    TMJ arthritis    Dupuytren's disease    H. pylori infection    Family history of coronary artery disease    Type 2 diabetes mellitus    Edema    Bilateral leg edema    Isolated corticotropin deficiency    Hyponatremia    Paroxysmal atrial fibrillation    Sepsis    Bacteremia, escherichia coli    Iron deficiency anemia    Malabsorption due to intolerance, not elsewhere classified    Chronic chest pain with high risk for CAD    Abnormal nuclear stress test    Abnormal computed tomography angiography (CTA)    Hip fracture    Closed intertrochanteric fracture of hip, left, initial encounter     Past Medical History:   Diagnosis Date    Acid reflux     Allergic     Anemia     Anxiety     Atrial fibrillation     Cancer     thyroid, skin    Cervical disc disorder     Chronic pain disorder     Claustrophobia     CTS (carpal tunnel syndrome)     Degenerative disc disease, lumbar     Depression     Dupuytren's disease     Essential hypertension     Family history of coronary artery disease     Fibromyalgia     Gallbladder abscess     H. pylori infection     Hiatal hernia     Hyperlipidemia     Hypothyroidism     Low back pain     Lumbosacral disc disease     Migraines     migraines    Multiple sclerosis     Osteoarthritis      Peripheral neuropathy     Psoriasis     Psoriatic arthritis     RA (rheumatoid arthritis)     Rheumatoid arthritis     Sinusitis     Sjogren's syndrome     Stomach ulcer     Thoracic disc disorder     TMJ arthritis     Type 2 diabetes mellitus     Urinary tract infection      Past Surgical History:   Procedure Laterality Date    BACK SURGERY      BREAST LUMPECTOMY Left 11/1993    CARDIAC CATHETERIZATION Left 09/21/2009    Normal     CARDIAC CATHETERIZATION N/A 6/24/2025    Procedure: Left Heart Cath;  Surgeon: Teodora Barron MD;  Location:  [facility];  Service: Cardiovascular;  Laterality: N/A;    CARPAL TUNNEL RELEASE  03/2012    CERVICAL POLYPECTOMY  12/2015    CHOLECYSTECTOMY  05/2007    COLONOSCOPY      ENDOSCOPY      EPIDURAL BLOCK      GASTRIC BYPASS  09/2007    JOINT REPLACEMENT      KNEE ARTHROPLASTY      LUMBAR DISC SURGERY      C5-6    NECK SURGERY      REPLACEMENT TOTAL KNEE Right 06/2016    SACRAL NERVE STIMULATOR PLACEMENT  09/2014    SACRAL NERVE STIMULATOR PLACEMENT  04/11/2024    [facility] in [place]    SACRAL NERVE STIMULATOR PLACEMENT Right 04/17/2024    THYROIDECTOMY  03/2016    TRIGGER POINT INJECTION       PT Assessment (Last 12 Hours)       PT Evaluation and Treatment       Row Name 07/22/25 1247          Physical Therapy Time and Intention    Subjective Information no complaints  -HC     Document Type therapy note (daily note)  -HC     Mode of Treatment physical therapy  -HC     Patient Effort good  -HC     Comment Pt and RN in agreement for PT. Pt transfered from bed to chair with max A . Sitting exercises up in chair to tolerance.  -HC       Row Name 07/22/25 1242          General Information    Patient Profile Reviewed yes  -HC     Equipment Currently Used at Home cane, straight  -HC     Existing Precautions/Restrictions fall;weight bearing  -       Row Name 07/22/25 1246          Living Environment    Primary Care Provided by self  -       Row Name 07/22/25 1244           Home Use of Assistive/Adaptive Equipment    Equipment Currently Used at Home cane, straight  -       Row Name 07/22/25 1247          Pain    Pain Side/Orientation left  -       Row Name 07/22/25 1247          Pain Scale: FACES Pre/Post-Treatment    Pain: FACES Scale, Pretreatment 4-->hurts little more  -     Posttreatment Pain Rating 8-->hurts whole lot  -       Row Name 07/22/25 1247          Cognition    Affect/Mental Status (Cognition) WNL  -     Orientation Status (Cognition) oriented x 3  -     Follows Commands (Cognition) verbal cues/prompting required  -     Personal Safety Interventions fall prevention program maintained;gait belt;nonskid shoes/slippers when out of bed;supervised activity  -       Row Name 07/22/25 1247          Mobility    Extremity Weight-bearing Status left lower extremity  -     Left Lower Extremity (Weight-bearing Status) toe touch weight-bearing (TTWB)  -       Row Name 07/22/25 1247          Bed Mobility    Bed Mobility rolling left;rolling right;scooting/bridging;supine-sit;sit-supine  -     Rolling Right [place] (Bed Mobility) verbal cues;nonverbal cues (demo/gesture);maximum assist (25% patient effort);2 person assist  -     Supine-Sit [place] (Bed Mobility) verbal cues;nonverbal cues (demo/gesture);maximum assist (25% patient effort);2 person assist  -     Bed Mobility, Safety Issues decreased use of arms for pushing/pulling;decreased use of legs for bridging/pushing  -     Assistive Device (Bed Mobility) repositioning sheet  -       Row Name 07/22/25 1247          Transfers    Transfers sit-stand transfer;stand-sit transfer;bed-chair transfer  -       Row Name 07/22/25 1247          Bed-Chair Transfer    Bed-Chair [place] (Transfers) maximum assist (25% patient effort);1 person assist;2 person assist  -       Row Name 07/22/25 1247          Sit-Stand Transfer    Sit-Stand [place] (Transfers) verbal cues;nonverbal cues  (demo/gesture);2 person assist;maximum assist (25% patient effort)  -HC     Assistive Device (Sit-Stand Transfers) walker, front-wheeled  -HC       Row Name 07/22/25 1247          Stand-Sit Transfer    Stand-Sit [place] (Transfers) verbal cues;nonverbal cues (demo/gesture);2 person assist;maximum assist (25% patient effort)  -HC     Assistive Device (Stand-Sit Transfers) walker, front-wheeled  -HC       Row Name 07/22/25 1247          Safety Issues/Impairments Affecting Functional Mobility    Impairments Affecting Function (Mobility) balance;endurance/activity tolerance;pain;range of motion (ROM);strength  -HC       Row Name 07/22/25 1247          Motor Skills    Therapeutic Exercise other (see comments)  Sitting: AP, LAQ, March, Knees in/out.  -HC       Row Name             Wound 07/19/25 0921 Left gluteal Other (Comments)    Wound - Properties Group Placement Date: 07/19/25  -JG Placement Time: 0921 -JG Side: Left  -JG Location: gluteal  -JG Primary Wound Type: Other  -JG Additional Comments: blister noted on right buttock during skin assessment prior to surgery  -JG    Retired Wound - Properties Group Placement Date: 07/19/25  -JG Placement Time: 0921 -JG Side: Left  -JG Location: gluteal  -JG Additional Comments: blister noted on right buttock during skin assessment prior to surgery  -JG    Retired Wound - Properties Group Placement Date: 07/19/25  -JG Placement Time: 0921 -JG Side: Left  -JG Location: gluteal  -JG Additional Comments: blister noted on right buttock during skin assessment prior to surgery  -JG    Retired Wound - Properties Group Date first assessed: 07/19/25  -JG Time first assessed: 0921  -JG Side: Left  -JG Location: gluteal  -JG Additional Comments: blister noted on right buttock during skin assessment prior to surgery  -JG      Row Name             Wound 07/19/25 0938 Left anterior thigh Surgical Open Surgical Incision    Wound - Properties Group Placement Date: 07/19/25  -JG  Placement Time: 0938 -JG Side: Left  -JG Orientation: anterior  -JG Location: thigh  -JG Primary Wound Type: Surgical  -JG Secondary Wound Type - Surgical: Open Surg  -JG    Retired Wound - Properties Group Placement Date: 07/19/25  -JG Placement Time: 0938 -JG Side: Left  -JG Orientation: anterior  -JG Location: thigh  -JG    Retired Wound - Properties Group Placement Date: 07/19/25  -JG Placement Time: 0938 -JG Side: Left  -JG Orientation: anterior  -JG Location: thigh  -JG    Retired Wound - Properties Group Date first assessed: 07/19/25  -JG Time first assessed: 0938 -JG Side: Left  -JG Location: thigh  -JG      Row Name             Wound 07/21/25 0930 Right coccyx Pressure Injury    Wound - Properties Group Placement Date: 07/21/25 -TW Placement Time: 0930 -TW Side: Right  -TW Location: coccyx  -TW Primary Wound Type: Pressure Inj  -TW    Retired Wound - Properties Group Placement Date: 07/21/25 -TW Placement Time: 0930 -TW Side: Right  -TW Location: coccyx  -TW    Retired Wound - Properties Group Placement Date: 07/21/25 -TW Placement Time: 0930 -TW Side: Right  -TW Location: coccyx  -TW    Retired Wound - Properties Group Date first assessed: 07/21/25 -TW Time first assessed: 0930 -TW Side: Right  -TW Location: coccyx  -TW      Row Name 07/22/25 1247          Coping    Observed Emotional State cooperative  -HC     Verbalized Emotional State acceptance  -HC     Family/Support Persons spouse  -HC     Involvement in Care at bedside;attentive to patient  -HC       Row Name 07/22/25 1247          Positioning and Restraints    Pre-Treatment Position in bed  -HC     Post Treatment Position chair  -HC     In Chair reclined;call light within reach;encouraged to call for assist;with family/caregiver  -HC       Row Name 07/22/25 1247          Therapy Assessment/Plan (PT)    Rehab Potential (PT) good  -     Criteria for Skilled Interventions Met (PT) yes;skilled treatment is necessary  -     Therapy  Frequency (PT) 6 times/wk  Monday-Saturday  -     Problem List (PT) problems related to;balance;mobility;pain;strength;range of motion (ROM)  -     Activity Limitations Related to Problem List (PT) unable to ambulate safely;unable to transfer safely;BADLs not performed adequately or safely  -       Row Name 07/22/25 1247          Physical Therapy Goals    Bed Mobility Goal Selection (PT) bed mobility, PT goal 1  -     Transfer Goal Selection (PT) transfer, PT goal 1  -     Gait Training Goal Selection (PT) gait training, PT goal 1  -       Row Name 07/22/25 1247          Bed Mobility Goal 1 (PT)    Activity/Assistive Device (Bed Mobility Goal 1, PT) rolling to left;rolling to right;scooting;sit to supine;supine to sit  -     [place] Level/Cues Needed (Bed Mobility Goal 1, PT) moderate assist (50-74% patient effort)  -HC     Time Frame (Bed Mobility Goal 1, PT) by discharge  -       Row Name 07/22/25 1247          Transfer Goal 1 (PT)    Activity/Assistive Device (Transfer Goal 1, PT) sit-to-stand/stand-to-sit;bed-to-chair/chair-to-bed;toilet;walker, rolling  -     [place] Level/Cues Needed (Transfer Goal 1, PT) moderate assist (50-74% patient effort)  -HC     Time Frame (Transfer Goal 1, PT) by discharge  -       Row Name 07/22/25 1247          Gait Training Goal 1 (PT)    Activity/Assistive Device (Gait Training Goal 1, PT) gait (walking locomotion);assistive device use;decrease fall risk;diminish gait deviation;improve balance and speed;increase endurance/gait distance;maintain weight-bearing status;walker, rolling  -     [place] Level (Gait Training Goal 1, PT) moderate assist (50-74% patient effort)  -     Distance (Gait Training Goal 1, PT) 5  -HC     Time Frame (Gait Training Goal 1, PT) by discharge  -               User Key  (r) = Recorded By, (t) = Taken By, (c) = Cosigned By      Initials Name Provider Type    Rebekah Martin RN Registered Nurse    JONATHAN Long,  Shu Paul, RN Registered Nurse    Desire Graff PTA Physical Therapist Assistant                    Physical Therapy Education       Title: PT OT SLP Therapies (Done)       Topic: Physical Therapy (Done)       Point: Mobility training (Done)       Learning Progress Summary            Patient Acceptance, E,D,TB, VU,NR by  at 7/19/2025 1600    Acceptance, E,TB, VU by AW at 7/18/2025 2335    Acceptance, E,TB, VU by AW at 7/18/2025 0008   Significant Other Acceptance, E,D,TB, VU,NR by AG at 7/19/2025 1600                      Point: Home exercise program (Done)       Learning Progress Summary            Patient Acceptance, E,D,TB, VU,NR by AG at 7/19/2025 1600    Acceptance, E,TB, VU by AW at 7/18/2025 2335    Acceptance, E,TB, VU by AW at 7/18/2025 0008   Significant Other Acceptance, E,D,TB, VU,NR by AG at 7/19/2025 1600                      Point: Body mechanics (Done)       Learning Progress Summary            Patient Acceptance, E,D,TB, VU,NR by AG at 7/19/2025 1600    Acceptance, E,TB, VU by AW at 7/18/2025 2335    Acceptance, E,TB, VU by AW at 7/18/2025 0008   Significant Other Acceptance, E,D,TB, VU,NR by  at 7/19/2025 1600                      Point: Precautions (Done)       Learning Progress Summary            Patient Acceptance, E,D,TB, VU,NR by  at 7/19/2025 1600    Acceptance, E,TB, VU by  at 7/18/2025 2335    Acceptance, E,TB, VU by  at 7/18/2025 0008   Significant Other Acceptance, E,D,TB, VU,NR by  at 7/19/2025 1600                                      User Key       Initials Effective Dates Name Provider Type Discipline     06/16/21 -  Anum Stark, PT Physical Therapist PT     11/20/24 -  Rylie Elaine, RN Registered Nurse Nurse                  PT Recommendation and Plan  Anticipated Discharge Disposition (PT): inpatient rehabilitation facility  Therapy Frequency (PT): 6 times/wk (Monday-Saturday)          Time Calculation:    PT Charges       Row Name 07/22/25 8998              Time Calculation    PT Received On 07/22/25  -         Time Calculation- PT    Total Timed Code Minutes- PT 24 minute(s)  -HC                User Key  (r) = Recorded By, (t) = Taken By, (c) = Cosigned By      Initials Name Provider Type     Desire Murphy, PTA Physical Therapist Assistant                  Therapy Charges for Today       Code Description Service Date Service Provider Modifiers Qty    [id number]  PT THER PROC EA 15 MIN 7/21/2025 Desire Murphy, ERNESTINE GP, CQ 1    [id number]  PT THERAPEUTIC ACT EA 15 MIN 7/21/2025 Desire Murphy, ERNESTINE GP, CQ 2    [id number]  PT THER PROC EA 15 MIN 7/22/2025 Desire Murphy, ERNESTINE GP, CQ 1    [id number]  PT THERAPEUTIC ACT EA 15 MIN 7/22/2025 Desire Murphy, ERNESTINE GP, CQ 1            PT G-Codes  AM-PAC 6 Clicks Score (PT): 12    Desire Murphy PTA  7/22/2025

## 2025-07-22 NOTE — THERAPY TREATMENT NOTE
Acute Care - Occupational Therapy Treatment Note  [facility]     Patient Name: Lashae Benitez  : 1960  MRN: [id number]  Today's Date: 2025  Onset of Illness/Injury or Date of Surgery: 25     Referring Physician: Dr. Lovelace    Admit Date: 2025       ICD-10-CM ICD-9-CM   1. Closed intertrochanteric fracture of hip, left, initial encounter  S72.142A 820.21   2. Post-operative state  Z98.890 V45.89   3. Precordial pain  R07.2 786.51   4. Abnormal nuclear stress test  R94.39 794.39     Patient Active Problem List   Diagnosis    Hiatal hernia    Essential hypertension    Sjogren's syndrome    Multiple sclerosis    Psoriasis    Fibromyalgia    Osteoarthritis    Psoriatic arthritis    RA (rheumatoid arthritis)    Degenerative disc disease, lumbar    TMJ arthritis    Dupuytren's disease    H. pylori infection    Family history of coronary artery disease    Type 2 diabetes mellitus    Edema    Bilateral leg edema    Isolated corticotropin deficiency    Hyponatremia    Paroxysmal atrial fibrillation    Sepsis    Bacteremia, escherichia coli    Iron deficiency anemia    Malabsorption due to intolerance, not elsewhere classified    Chronic chest pain with high risk for CAD    Abnormal nuclear stress test    Abnormal computed tomography angiography (CTA)    Hip fracture    Closed intertrochanteric fracture of hip, left, initial encounter     Past Medical History:   Diagnosis Date    Acid reflux     Allergic     Anemia     Anxiety     Atrial fibrillation     Cancer     thyroid, skin    Cervical disc disorder     Chronic pain disorder     Claustrophobia     CTS (carpal tunnel syndrome)     Degenerative disc disease, lumbar     Depression     Dupuytren's disease     Essential hypertension     Family history of coronary artery disease     Fibromyalgia     Gallbladder abscess     H. pylori infection     Hiatal hernia     Hyperlipidemia     Hypothyroidism     Low back pain     Lumbosacral disc disease      Migraines     migraines    Multiple sclerosis     Osteoarthritis     Peripheral neuropathy     Psoriasis     Psoriatic arthritis     RA (rheumatoid arthritis)     Rheumatoid arthritis     Sinusitis     Sjogren's syndrome     Stomach ulcer     Thoracic disc disorder     TMJ arthritis     Type 2 diabetes mellitus     Urinary tract infection      Past Surgical History:   Procedure Laterality Date    BACK SURGERY      BREAST LUMPECTOMY Left 11/1993    CARDIAC CATHETERIZATION Left 09/21/2009    Normal     CARDIAC CATHETERIZATION N/A 6/24/2025    Procedure: Left Heart Cath;  Surgeon: Teodora Barron MD;  Location:  [facility];  Service: Cardiovascular;  Laterality: N/A;    CARPAL TUNNEL RELEASE  03/2012    CERVICAL POLYPECTOMY  12/2015    CHOLECYSTECTOMY  05/2007    COLONOSCOPY      ENDOSCOPY      EPIDURAL BLOCK      GASTRIC BYPASS  09/2007    JOINT REPLACEMENT      KNEE ARTHROPLASTY      LUMBAR DISC SURGERY      C5-6    NECK SURGERY      REPLACEMENT TOTAL KNEE Right 06/2016    SACRAL NERVE STIMULATOR PLACEMENT  09/2014    SACRAL NERVE STIMULATOR PLACEMENT  04/11/2024    [facility] in [facility]    SACRAL NERVE STIMULATOR PLACEMENT Right 04/17/2024    THYROIDECTOMY  03/2016    TRIGGER POINT INJECTION           OT ASSESSMENT FLOWSHEET (Last 12 Hours)       OT Evaluation and Treatment       Row Name 07/22/25 1148                   OT Time and Intention    Document Type therapy note (daily note)  -KR        Mode of Treatment occupational therapy  -KR        Patient Effort good  -KR           General Information    General Observations of Patient alert/cooperative  -KR           Cognition    Follows Commands (Cognition) WFL  -KR           Bed Mobility    Rolling Right Bureau (Bed Mobility) moderate assist (50% patient effort)  -KR        Supine-Sit Bureau (Bed Mobility) maximum assist (25% patient effort)  -KR           Sit-Stand Transfer    Sit-Stand Bureau (Transfers) maximum assist (25%  patient effort)  -KR           Motor Skills    Motor Skills --  BUE fxl activity for strengthening during mobility/transfer training.  -KR           Wound 07/19/25 0921 Left gluteal Other (Comments)    Wound - Properties Group Placement Date: 07/19/25  -JG Placement Time: 0921 -JG Side: Left  -JG Location: gluteal  -JG Primary Wound Type: Other  -JG Additional Comments: blister noted on right buttock during skin assessment prior to surgery  -JG    Retired Wound - Properties Group Placement Date: 07/19/25  -JG Placement Time: 0921 -JG Side: Left  -JG Location: gluteal  -JG Additional Comments: blister noted on right buttock during skin assessment prior to surgery  -JG    Retired Wound - Properties Group Placement Date: 07/19/25  -JG Placement Time: 0921 -JG Side: Left  -JG Location: gluteal  -JG Additional Comments: blister noted on right buttock during skin assessment prior to surgery  -JG    Retired Wound - Properties Group Date first assessed: 07/19/25  -JG Time first assessed: 0921 -JG Side: Left  -JG Location: gluteal  -JG Additional Comments: blister noted on right buttock during skin assessment prior to surgery  -JG       Wound 07/19/25 0938 Left anterior thigh Surgical Open Surgical Incision    Wound - Properties Group Placement Date: 07/19/25  -JG Placement Time: 0938 -JG Side: Left  -JG Orientation: anterior  -JG Location: thigh  -JG Primary Wound Type: Surgical  -JG Secondary Wound Type - Surgical: Open Surg  -JG    Retired Wound - Properties Group Placement Date: 07/19/25  -JG Placement Time: 0938 -JG Side: Left  -JG Orientation: anterior  -JG Location: thigh  -JG    Retired Wound - Properties Group Placement Date: 07/19/25  -JG Placement Time: 0938 -JG Side: Left  -JG Orientation: anterior  -JG Location: thigh  -JG    Retired Wound - Properties Group Date first assessed: 07/19/25  -JG Time first assessed: 0938 -JG Side: Left  -JG Location: thigh  -JG       Wound 07/21/25 0930 Right coccyx  Pressure Injury    Wound - Properties Group Placement Date: 07/21/25 -TW Placement Time: 0930 -TW Side: Right  -TW Location: coccyx  -TW Primary Wound Type: Pressure Inj  -TW    Retired Wound - Properties Group Placement Date: 07/21/25 -TW Placement Time: 0930 -TW Side: Right  -TW Location: coccyx  -TW    Retired Wound - Properties Group Placement Date: 07/21/25 -TW Placement Time: 0930 -TW Side: Right  -TW Location: coccyx  -TW    Retired Wound - Properties Group Date first assessed: 07/21/25 -TW Time first assessed: 0930 -TW Side: Right  -TW Location: coccyx  -TW       Plan of Care Review    Plan of Care Reviewed With patient;family  -KR        Progress improving  -KR           Therapy Plan Review/Discharge Plan (OT)    Anticipated Discharge Disposition (OT) inpatient rehabilitation facility  -KR           Dressing Goal 1 (OT)    Progress/Outcome (Dressing Goal 1, OT) continuing progress toward goal  -KR            Activity Tolerance Goal 1 (OT)    Progress/Outcome (Activity Tolerance Goal 1, OT) continuing progress toward goal  -KR           Patient Education Goal (OT)    Progress/Outcome (Patient Education Goal, OT) continuing progress toward goal  -KR                  User Key  (r) = Recorded By, (t) = Taken By, (c) = Cosigned By      Initials Name Effective Dates    Rebekah Martin RN 08/29/23 -     TW Shu Long RN 06/16/21 -     Shakeel Walls OT 06/16/21 -                            OT Recommendation and Plan  Planned Therapy Interventions (OT): activity tolerance training, adaptive equipment training, BADL retraining  Plan of Care Review  Plan of Care Reviewed With: patient, family  Progress: improving  Plan of Care Reviewed With: patient, family        Time Calculation:     Therapy Charges for Today       Code Description Service Date Service Provider Modifiers Qty    87604181851  OT EVAL HIGH COMPLEXITY 4 7/21/2025 Shakeel Santiago OT GO 1    06372490374  OT THERAPEUTIC ACT EA  15 MIN 7/22/2025 Shakeel Santiago, OT GO 1    84527157527 HC OT SELF CARE/MGMT/TRAIN EA 15 MIN 7/22/2025 Shakeel Santiago OT GO 1                 Shakeel Santiago OT  7/22/2025

## 2025-07-22 NOTE — PROGRESS NOTES
[facility] HOSPITALIST PROGRESS NOTE     Patient Identification:  Name:  Lashae Benitez  Age:  64 y.o.  Sex:  female  :  1960  MRN:  [id number]  Visit Number:  [id number]  ROOM: [address] [place] [address]     Primary Care Provider:  Kreis, Samuel Duane, MD    Length of stay in inpatient status:  4    Subjective     Chief Compliant:    Chief Complaint   Patient presents with    Hip Injury    Fall       History of Presenting Illness:    Patient reports feeling well, excited at prospect to to to IPR tomorrow. Denies any sigsn of bleeding.     ROS:  Otherwise 10 point ROS negative other than documented above in HPI.     Objective     Current Hospital Meds:amitriptyline, 25 mg, Oral, Nightly  budesonide-formoterol, 2 puff, Inhalation, BID - RT  castor oil-balsam peru, 1 Application, Topical, Q12H  cetirizine, 10 mg, Oral, Daily  cholecalciferol, 50,000 Units, Oral, Weekly  cyanocobalamin, 1,000 mcg, Intramuscular, Weekly  DULoxetine, 30 mg, Oral, QAM  DULoxetine, 60 mg, Oral, Daily  flecainide, 50 mg, Oral, BID  folic acid, 1 mg, Oral, Daily  gabapentin, 600 mg, Oral, Q8H  insulin lispro, 2-7 Units, Subcutaneous, 4x Daily PC & at Bedtime  levothyroxine, 125 mcg, Oral, Q AM  metoprolol tartrate, 50 mg, Oral, BID  nitroglycerin, 0.4 mg, Sublingual, Once in imaging  rivaroxaban, 20 mg, Oral, Daily With Dinner  sodium chloride, 10 mL, Intravenous, Q12H  sodium chloride, 10 mL, Intravenous, Q12H         Current Antimicrobial Therapy:  Anti-Infectives (From admission, onward)      Ordered     Dose/Rate Route Frequency Start Stop    25 1203  ceFAZolin 2000 mg IVPB in 100 mL NS (VTB)        Ordering Provider: Mateo Lovelace MD    2,000 mg  over 30 Minutes Intravenous Every 8 Hours 25 1700 25 0126          Current Diuretic Therapy:  Diuretics (From admission, onward)      Ordered     Dose/Rate Route Frequency Start Stop    25 0735  [Held by provider]  furosemide (LASIX) tablet 20 mg        (On hold  since Fri 7/18/2025 at 0735 until manually unheld; held by Gigi Anguiano MDHold Reason: Hold For Procedure)   Ordering Provider: Gigi Anguiano MD    20 mg Oral Daily PRN 07/18/25 0733            ----------------------------------------------------------------------------------------------------------------------  Vital Signs:  Temp:  [98.1 °F (36.7 °C)-98.8 °F (37.1 °C)] 98.3 °F (36.8 °C)  Heart Rate:  [86-87] 86  Resp:  [16-20] 18  BP: (114-152)/(62-71) 123/69  SpO2:  [96 %-99 %] 99 %  on   ;   Device (Oxygen Therapy): room air  Body mass index is 24.13 kg/m².    Wt Readings from Last 3 Encounters:   07/17/25 65.8 kg (145 lb)   07/14/25 65.6 kg (144 lb 9.6 oz)   06/24/25 67.1 kg (148 lb)     Intake & Output (last 3 days)         07/19 0701 07/20 0700 07/20 0701 07/21 0700 07/21 0701 07/22 0700    P.O. 480 720 240    I.V. (mL/kg)  4019.2 (61.1) 0 (0)    Blood  702.8     Total Intake(mL/kg) 480 (7.3) 5442 (82.7) 240 (3.6)    Urine (mL/kg/hr) 700 (0.4) 3500 (2.2) 1400 (1.5)    Total Output 700 3500 1400    Net -220 +1942 -1160           Urine Unmeasured Occurrence  4 x 1 x          Diet: Regular/House; Fluid Consistency: Thin (IDDSI 0)  ----------------------------------------------------------------------------------------------------------------------  Physical exam:  Constitutional:  Well-developed and well-nourished.  No respiratory distress.      HENT:  Head:  Normocephalic and atraumatic.  Mouth:  Moist mucous membranes.    Eyes:  Conjunctivae and EOM are normal. No scleral icterus.    Neck:  Neck supple.  No JVD present.    Cardiovascular:  Normal rate, regular rhythm and normal heart sounds with no murmur.  Pulmonary/Chest:  No respiratory distress, no wheezes, no crackles, with normal breath sounds and good air movement.  Abdominal:  Soft.  Bowel sounds are normal.  No distension and no tenderness.   Musculoskeletal:  No edema, no tenderness, and no deformity.  No red or swollen joints anywhere.  L hip  "with bandaging,no bleeding noted.   Neurological:  Alert and oriented to person, place, and time.  No cranial nerve deficit.  No tongue deviation.  No facial droop.  No slurred speech.   Skin:  Skin is warm and dry. No rash noted. No pallor.   Peripheral vascular:  Pulses in all 4 extremities with no clubbing, no cyanosis, no edema.  ----------------------------------------------------------------------------------------------------------------------  Tele:    ----------------------------------------------------------------------------------------------------------------------  Results from last 7 days   Lab Units 07/21/25  0036 07/20/25  0519 07/19/25  0106 07/18/25  1703 07/18/25  0338 07/17/25  1418   WBC 10*3/mm3  --  8.35 9.59  --  8.42  --    HEMOGLOBIN g/dL 9.1* 6.9* 10.3*  --  10.2*  --    HEMATOCRIT % 26.9* 21.1* 31.6*  --  31.2*  --    MCV fL  --  108.2* 108.2*  --  106.5*  --    MCHC g/dL  --  32.7 32.6  --  32.7  --    PLATELETS 10*3/mm3  --  230 251  --  276  --    INR   --   --   --  1.15*  --  1.26*         Results from last 7 days   Lab Units 07/20/25  0139 07/19/25  1608 07/19/25  0106 07/18/25  0338   SODIUM mmol/L 133*  --  134* 136   POTASSIUM mmol/L 4.4 4.9 3.6 3.9   CHLORIDE mmol/L 102  --  100 104   CO2 mmol/L 20.1*  --  22.0 22.6   BUN mg/dL 20.3  --  10.9 11.2   CREATININE mg/dL 0.98  --  0.62 0.58   CALCIUM mg/dL 7.5*  --  8.1* 8.1*   GLUCOSE mg/dL 210*  --  177* 203*   ALBUMIN g/dL 2.6*  --  3.0* 3.2*   BILIRUBIN mg/dL 0.2  --  0.4 0.4   ALK PHOS U/L 92  --  128* 134*   AST (SGOT) U/L 18  --  17 23   ALT (SGPT) U/L 21  --  26 33   Estimated Creatinine Clearance: 61 mL/min (by C-G formula based on SCr of 0.98 mg/dL).  No results found for: \"AMMONIA\"              Glucose   Date/Time Value Ref Range Status   07/21/2025 2015 181 (H) 70 - 130 mg/dL Final     Comment:     Serial Number: [id number]:  [id number]   07/21/2025 1617 191 (H) 70 - 130 mg/dL Final     Comment:     Serial Number: " "[id number]:  [id number]   07/21/2025 1053 161 (H) 70 - 130 mg/dL Final     Comment:     Serial Number: [id number]:  [id number]   07/21/2025 0614 172 (H) 70 - 130 mg/dL Final     Comment:     Serial Number: [id number]:  [id number]   07/20/2025 2023 163 (H) 70 - 130 mg/dL Final     Comment:     Serial Number: [id number]:  [id number]   07/20/2025 1652 224 (H) 70 - 130 mg/dL Final     Comment:     Serial Number: [id number]:  [id number]   07/20/2025 1136 178 (H) 70 - 130 mg/dL Final     Comment:     Serial Number: [id number]:  [id number]   07/20/2025 0618 171 (H) 70 - 130 mg/dL Final     Comment:     Serial Number: [id number]:  [id number]     Lab Results   Component Value Date    TSH 15.900 (H) 03/12/2025    FREET4 1.58 12/07/2023     Lab Results   Component Value Date    PREGTESTUR Negative 01/21/2018     Pain Management Panel  More data exists         Latest Ref Rng & Units 5/12/2025 3/17/2025   Pain Management Panel   Creatinine, Urine mg/dL  mg/dL 12.4  12.4  7.9  7.9       Details          Multiple values from one day are sorted in reverse-chronological order             Brief Urine Lab Results  (Last result in the past 365 days)        Color   Clarity   Blood   Leuk Est   Nitrite   Protein   CREAT   Urine HCG        07/17/25 1538 Yellow   Clear   Negative   Negative   Negative   Negative                 No results found for: \"BLOODCX\"  No results found for: \"URINECX\"  No results found for: \"WOUNDCX\"  No results found for: \"STOOLCX\"  No results found for: \"RESPCX\"  No results found for: \"AFBCX\"        I have personally looked at the labs and they are summarized above.  ----------------------------------------------------------------------------------------------------------------------  Detailed radiology reports for the last 24 hours:    Imaging Results (Last 24 Hours)       ** No results found for the last 24 hours. **          Assessment & Plan    #Acute, Closed, " Angulated, Mildly Displaced, Non-comminuted, L Hip Fracture  - Orthopedics consulted and following, status post OR 7/19  - Post op anemia and received PRBCs, Stable today, will restart xarelto.   - Continue Tylenol and IV Narcotics as needed for breakthrough  - Admit to telemetry, follow up Orthopedics recommendations on surgical intervention, order PT/OT evaluations post-procedure      #Pre-operative Risk Stratification/Clearance  #Hypertension/Hyperlipidemia   #Paroxysmal Atrial Fibrillation, on chronic anticoagulation   - EKG read as junctional rhythm, appears to be normal sinus rhythm per my read  - RCRI = 1, conferring 1.1% 30 day risk of death, MI, or cardiac arrest  - At this time surgery is non-emergent, Orthopedic surgery carries inherent intermediate risk, but patient does not have any active cardiac issues or modifiable risk factors; Recommend to proceed with surgery per Orthopedics  - Continue home beta blocker at lower dose, home flecainide  - Holding other home antihypertensives roxi-operatively, resume as indicated  - Restarting xarelto as above.   - Continue to monitor on telemetry     #Insulin Dependent Diabetes Mellitus Type II, uncontrolled, unknown complications  - Hgb A1c = 10.5%  - Holding home oral agents, continue FSBG and SSI, add long acting as indicated.     #Hypothyroid  - Continue home Levothyroxine      #Rheumatoid Arthritis/Psoriatic Arthritis/Sjogren's Syndrome, on DMARD  - Supportive Care, Hold medications for now      #Anxiety/Depression/Fibromyalgia   - Continue home regimen      #Gastroesophageal Reflux Disease/History Peptic Ulcer Disease   - Continue home PPI      #Chronic Pain  - Hold home regimen for now pending IV treatment for acute processes.       Code status: Full     Dispo: Accepted to [facility], if hemoglobin stable, plan to transfer in AM    Alexandro Gaspar MD  [facility] Hospitalist  07/21/25  21:23 EDT

## 2025-07-22 NOTE — PROGRESS NOTES
Problems/Goals  Skin Integrity (Body Function Structure)  Current Status: Risk for further skin breakdown  Long Term Goals  07/22/2025 02:24 PM - Active  No worsening of skin breakdown/ No new skin breakdown  Potential for Injury (Safety)  Current Status: Risk for falls  Long Term Goals  07/22/2025 02:25 PM - Active  No falls this hospital stay    Signed by: Fely Garcia, Nurse

## 2025-07-22 NOTE — PROGRESS NOTES
Pre-Admission Screen Method  The following information was gathered for consideration and maintenance in the  medical record to substantiate medical necessity for an IRF level of care.      Information Obtained:  Review of electronic medical records.    Patient Information  The patient is being referred and recommended by their physician to be assessed  both medically and functionally in regard to their premorbid functional capacity  to determine whether they can benefit from a rehabilitation level of care  offered by our facility.        Acute Care Stay Diagnosis/Condition:  Left Intertrochanteric Hip Fracture    Referral Source:      Payer Source:  Primary: Medicare A & B #[id number]  Secondary:  [employer] #[id number].  Level of care will be discussed with  the payer sources if/when applicable.    Patient's Current Location:  [facility]    Room/Bed:  332/1    Pre Hospital Living Environment:  Patient lived with spouse independently at home using cane to ambulate.          Contacts            Name                Phone #             E-Mail    Patient/Self        Lashae Eli           [phone]  Significant Other/  Khanh Benitez          [phone]  Partner  Parent(s)  Guardian(s)  Family Member  Power of   Other      Support System:  Spouse    Previous Home Equipment:  Single cane    Past Medical History    acid reflux, anemia, anxiety, atrial fibrillation, thyroid cancer, cervical disc  disorder, chronic pain disorder, claustrophobia, carpal tunnel syndrome,  degenerative disc disease, depression, dupuytren's disease, essential  hypertension, fibromyalgia, H. pylori infection, gallbladder abscess,  hyperlipidemia, hypothyroidism, lumbosacral disc disease, migraines, multiple  sclerosis, osteoarthritis, peripheral neuropathy, psoriasis, sinusitis,  sjorgen's syndrome, stomach ulcer, TMJ arthritis, type 2 diabetes mellitus    History of Present Illness    The patient presented to  [facility] ED on 07/17/2025 following a fall. She reported  severe pain in her left hip, shoulder, and elbow, which worsens with any  movement. Imaging confirmed a left intertrochanteric hip fracture. No other  acute fractures were noted on plain films. On 07/19/2025 the patient underwent  left hip intertrochanteric nailing with Dr. Lovelace. The patient experienced  postop anemia of 6.9 Hemoglobin requiring transfusion of PRBCs. The patient  continued to progress medically and PT/OT evals completed which recommended  acute inpatient rehab program post hospitalization for functional mobility  intervention and re-education. Acute inpatient rehab program ordered for  continued medical monitoring and intervention post hospitalization, continued  pain management, skin monitoring and breakdown prevention, anticoagulant therapy  regulation, bowel and bladder management, surgical site monitoring and  intervention, diabetes monitoring and intervention, along with ongoing  comorbidities that require intervention for regulation.    Medications, Allergies, and Precautions  Allergies:  codeine, imuran, januvia, penicillins, sulfa antibiotics, sulfasalazine, beta  adrenergic blockers    Falls  Falls Reported in Past 12 Months (prior to hospitalization):  Yes    Arthritic Condition Consideration    Patient is not being considered for meeting arthritic condition.    Functional Status    Activity            Prior Functional S  Current Functional  Expected Level of                      tatus                Status             Functional Status    Cognition           Complete Independe  Complete Independe  Complete Independe                      nce                 nce                 nce  Communication       Complete Independe  Complete Independe  Complete Independe                      nce                 nce                 nce  Transfers           Complete Independe  Maximal Assistance  Minimal Assistance                      nce  Upper  Dressing      Complete Independe  Moderate Assistanc  Supervision                      nce                 e  Lower Dressing      Complete Independe  Maximal Assistance  Minimal Assistance                      nce  Grooming            Complete Independe  Minimal Assistance  Modified Independe                      nce                                     nce  Bathing             Complete Independe  Maximal Assistance  Minimal Assistance                      nce  Toileting           Complete Independe  Maximal Assistance  Minimal Assistance                      nce  Feeding             Complete Independe  Supervision         Complete Independe                      nce                                     nce      Comments:  The patient is toe touch weight bearing to the LLE.    Therapy Services Provided  Therapy services provided prior to IRF recommendation/admission as follows:    Physical Therapy, Occupational Therapy        Probable Impairment Group Code  Orthopedic Disorders Impairment Group Code:  08.11 Status Post Unilateral Hip Fracture    Rehab Diagnosis/Condition  Diagnosis/Conditions that caused the need for rehabilitation.      Left Intertrochanteric Hip Fracture S/P Repair    Comorbid Conditions  Comorbid conditions listed below are those that impact rehabilitation program  and require medical/rehabilitation management.    Hypertension - Controlled, Hyperlipidemia, Atrial fibrillation    Diabetes mellitus, Type 2 Uncontrolled, Hypothyroidism    Rheumatoid arthritis    Qualifying Medical Condition        Patient presents with the following medical condition that potentially qualifies  under the 60-percent compliance threshold.    Potential Compliant Medical Condition:  Fracture of femur (hip fracture)    Risk for Clinical Complications  Listed below are the patient's risk for clinical complications during the IRF  admission that will require medical/rehabilitation preventative intervention.      L Hip  Fracture- falls/safety risks/pain  Incision- pain/infection/dehiscence/edema  Diabetes- uncontrolled DM/comp r/t BG/meds/SE  Hypertension- uncontrolled HTN/stroke/comp r/t meds/SE  AFib- stroke/blood clots/comp r/t meds/SE  Anxiety/Depression- comp r/t meds/SE    Rehabilitation Therapy Program  Expected Participation in Rehabilitation Program:  At least 3 hours per day at least 5 days per week    Anticipated Interdisciplinary Team Members  Disciplines:  Physical therapy, Occupation therapy, , Physician,  Case managment, Nursing    Estimated Length of Stay  Estimated Length of Stay:  14 days    Estimated Discharge Date:   08/05/2025    Anticipated Discharge Destination  Anticipated Discharge Destination:  To community with assistance    Discharge Destination Information:  Patient will discharge home with family to assist if needed.    Rehabilitation Potential        Based on the patient's PLOF and current therapy levels the patient's  rehabilitation potential is good.    Anticipated Admit Date    Based on information gathered, it is anticipated that patient will be medically  stable to transfer to inpatient rehabilitation facility/unit as indicated below.    Anticipated Admit Date:   07/22/2025    Information and Case Discussion  Is patient willing to participate in the proposed plan of care?  Yes    Admission Recommendation        Admission Recommended: The patient's condition is sufficiently stable to allow  active participation in an intensive interdisciplinary inpatient rehabilitation  program.  The patient would benefit from interdisciplinary inpatient  rehabilitation provided by a physician, rehab-focused nursing, and a minimum of  two rehab therapies that will provide specialized care.    Physician Comments  I have reviewed the Pre-Admission Screen and concur with the findings. The  patient qualifies for inpatient rehabilitation facility care and has the  capacity to tolerate the intensive  rehabilitation program.    Signed by: Jesus Acevedo, Physician    Physician CoSigned By: Jesus Acevedo 07/22/2025 09:01:57

## 2025-07-22 NOTE — DISCHARGE SUMMARY
[facility] HOSPITALISTS DISCHARGE SUMMARY    Patient Identification:  Name:  Lashae Benitez  Age:  64 y.o.  Sex:  female  :  1960  MRN:  [id number]  Visit Number:  [id number]    Date of Admission: 2025  Date of Discharge:  2025    PCP: Kreis, Samuel Duane, MD    DISCHARGE DIAGNOSIS  #Acute, Closed, Angulated, Mildly Displaced, Non-comminuted, L Hip Fracture  #Pre-operative Risk Stratification/Clearance  #Hypertension/Hyperlipidemia   #Paroxysmal Atrial Fibrillation, on chronic anticoagulation   #Insulin Dependent Diabetes Mellitus Type II, uncontrolled, unknown complications  #Hypothyroid  #Rheumatoid Arthritis/Psoriatic Arthritis/Sjogren's Syndrome, on DMARD  #Anxiety/Depression/Fibromyalgia   #Gastroesophageal Reflux Disease/History Peptic Ulcer Disease   #Chronic Pain    CONSULTS   Orthopedic surgery     PROCEDURES PERFORMED  Left hip open reduction internal fixation with intramedullary nail Synthes TFN a long vera proximal locking with blade distal locking cortical screw      HOSPITAL COURSE  Patient is a 64 y.o. female presented on  to [facility] complaining of left hip pain.  Please see the admitting history and physical for further details.      Lashae Benitez is our pleasant 64-year-old female with a history of fibromyalgia, degenerative disk disease, multiple sclerosis, type 2 diabetes, hypertension, paroxysmal atrial fibrillation, rheumatoid arthritis, and Sjogren's syndrome, was admitted to [facility] on 2025 following a fall resulting in a left intertrochanteric hip fracture. Imaging confirmed the fracture, and orthopedics recommended surgical intervention. On 2025, she underwent a left hip open reduction internal fixation with intramedullary nailing performed by Dr. Mateo Lovelace, with no complications reported.    During her hospitalization, her anticoagulation therapy with Xarelto was held roxi-operatively due to the risk of  bleeding, and a heparin drip was initially considered but later canceled following orthopedic recommendations. Pain management included scheduled Tylenol and IV narcotics as needed. Post-surgery, she was evaluated by physical therapy, which noted significant pain and recommended inpatient rehabilitation for recovery.    Secondary issues addressed during her admission included her uncontrolled type 2 diabetes, with a noted Hgb A1c of 10.5%, and her hypothyroidism, for which she continued home levothyroxine. Her chronic conditions, including rheumatoid arthritis, psoriatic arthritis, and Sjogren's syndrome, were managed with supportive care and medication review. The patient was considered high risk due to her acute hip fracture requiring surgical intervention and was monitored on telemetry for her atrial fibrillation. Discharge planning involved discussions about transitioning to an inpatient rehabilitation facility.     Patient can be restarted on home MTX in perioperative period but would hold XelJanz with plan to restart around 14 days postoperatively if no signs of infection.     Patient has been accepted to [facility] and stable to go on 7/22. Hemoglobin stable today after xarelto being restarted yesterday. No clinical signs of bleeding. Patient denied any concerns going to inpatient rehab today.           VITAL SIGNS:  Temp:  [98.1 °F (36.7 °C)-98.9 °F (37.2 °C)] 98.6 °F (37 °C)  Heart Rate:  [78-86] 78  Resp:  [16-20] 20  BP: (114-147)/(63-82) 147/77  SpO2:  [99 %] 99 %  on   ;   Device (Oxygen Therapy): room air    Body mass index is 24.13 kg/m².  Wt Readings from Last 3 Encounters:   07/17/25 65.8 kg (145 lb)   07/14/25 65.6 kg (144 lb 9.6 oz)   06/24/25 67.1 kg (148 lb)       PHYSICAL EXAM:  Constitutional:  Well-developed and well-nourished.  No respiratory distress.      HENT:  Head:  Normocephalic and atraumatic.  Mouth:  Moist mucous membranes.    Eyes:  Conjunctivae and EOM are normal.  Pupils are equal,  round, and reactive to light.  No scleral icterus.    Cardiovascular:  Normal rate, regular rhythm and normal heart sounds with no murmur.  Pulmonary/Chest:  No respiratory distress, no wheezes, no crackles, with normal breath sounds and good air movement.  Abdominal:  Soft.  Bowel sounds are normal.  No distension and no tenderness.   Musculoskeletal:  No edema, no tenderness, and no deformity.  No red or swollen joints anywhere.    Neurological:  Alert and oriented to person, place, and time.  No gross neurological deficit.   Skin:  Skin is warm and dry. No rash noted. No pallor.   Peripheral vascular:  Strong pulses in all 4 extremities with no clubbing, no cyanosis, no edema.    DISCHARGE DISPOSITION   Stable    DISCHARGE MEDICATIONS:     Discharge Medications        New Medications        Instructions Start Date   oxyCODONE-acetaminophen 7.5-325 MG per tablet  Commonly known as: PERCOCET   1 tablet, Oral, Every 8 Hours PRN             ASK your doctor about these medications        Instructions Start Date   amitriptyline 25 MG tablet  Commonly known as: ELAVIL   25 mg, Oral, Nightly      amLODIPine 5 MG tablet  Commonly known as: NORVASC   5 mg, Oral, Daily      butalbital-acetaminophen-caffeine -40 MG per tablet  Commonly known as: FIORICET, ESGIC  Ask about: Which instructions should I use?   1 tablet, Oral, Every 4 Hours PRN      celecoxib 200 MG capsule  Commonly known as: CeleBREX   200 mg, Oral, Daily      cyanocobalamin 1000 MCG/ML injection   1,000 mcg, Intramuscular, Weekly      cyclobenzaprine 10 MG tablet  Commonly known as: FLEXERIL   10 mg, Oral, 2 Times Daily      DULoxetine 30 MG capsule  Commonly known as: CYMBALTA   30 mg, Oral, Every Morning      DULoxetine 60 MG capsule  Commonly known as: CYMBALTA   60 mg, Oral, Daily      Emgality 120 MG/ML auto-injector pen  Generic drug: galcanezumab-gnlm   120 mg, Subcutaneous, Every 30 Days      fexofenadine 180 MG tablet  Commonly known as:  ALLEGRA   180 mg, Oral, Daily      flecainide 50 MG tablet  Commonly known as: TAMBOCOR   50 mg, Oral, 2 Times Daily      fluticasone-salmeterol 115-21 MCG/ACT inhaler  Commonly known as: ADVAIR HFA   2 puffs, Inhalation, 2 Times Daily - RT      folic acid 1 MG tablet  Commonly known as: FOLVITE   1 mg, Oral, Daily      furosemide 20 MG tablet  Commonly known as: LASIX   1 tablet, Oral, Daily PRN      gabapentin 600 MG tablet  Commonly known as: NEURONTIN   600 mg, Oral, 3 Times Daily      levothyroxine 125 MCG tablet  Commonly known as: SYNTHROID, LEVOTHROID  Ask about: Which instructions should I use?   125 mcg, Oral, Every Early Morning      losartan 50 MG tablet  Commonly known as: COZAAR   50 mg, Oral, 2 Times Daily      methotrexate 2.5 MG tablet   25 mg, Oral, Weekly      metoprolol tartrate 100 MG tablet  Commonly known as: LOPRESSOR   100 mg, Oral, 2 Times Daily      nitroglycerin 0.4 MG SL tablet  Commonly known as: NITROSTAT   1 under the tongue as needed for angina, may repeat q5mins for up three doses      nystatin 513565 UNIT/GM topical powder  Generic drug: nystatin   Apply 1 Application topically to the appropriate area as directed 4 (Four) Times a Day As Needed (irritation).      omeprazole 40 MG capsule  Commonly known as: priLOSEC   Take 1 capsule by mouth Daily As Needed (acid reflux).      potassium chloride 10 MEQ CR tablet  Commonly known as: KLOR-CON M10   10 mEq, Oral, Daily      primidone 50 MG tablet  Commonly known as: MYSOLINE   50 mg, Oral, Nightly      Toujeo SoloStar 300 UNIT/ML solution pen-injector injection  Generic drug: Insulin Glargine (1 Unit Dial)   Inject 15 Units under the skin into the appropriate area as directed Every Morning.      traMADol 50 MG tablet  Commonly known as: ULTRAM   1 tablet, Oral, 3 Times Daily PRN      vitamin D 1.25 MG (10469 UT) capsule capsule  Commonly known as: ERGOCALCIFEROL   Take 1 capsule by mouth 1 (One) Time Per Week.      Xarelto 20 MG  tablet  Generic drug: rivaroxaban   20 mg, Oral, Daily      Xeljanz XR 11 MG tablet sustained-release 24 hour  Generic drug: Tofacitinib Citrate ER  Ask about: Which instructions should I use?   11 mg, Oral, Daily                  Follow-up Information       Kreis, Samuel Duane, MD .    Specialty: Family Medicine  Contact information:  [address] [place] [address] [phone]                              TEST  RESULTS PENDING AT DISCHARGE       CODE STATUS  Code Status and Medical Interventions: CPR (Attempt to Resuscitate); Full Support   Ordered at: 07/17/25 1607     Code Status (Patient has no pulse and is not breathing):    CPR (Attempt to Resuscitate)     Medical Interventions (Patient has pulse or is breathing):    Full Support       The 10-year ASCVD risk score (Natan DK, et al., 2019) is: 12.3%    Values used to calculate the score:      Age: 64 years      Sex: Female      Is Non- : No      Diabetic: Yes      Tobacco smoker: No      Systolic Blood Pressure: 147 mmHg      Is BP treated: Yes      HDL Cholesterol: 50 mg/dL      Total Cholesterol: 135 mg/dL     Alexandro Gaspar MD  [facility]  07/22/25  10:59 EDT    Please note that this discharge summary required more than 30 minutes to complete.

## 2025-07-22 NOTE — H&P
[facility] HOSPITALIST HISTORY AND PHYSICAL    Patient Identification:  Name:  Lashae Benitez  Age:  64 y.o.  Sex:  female  :  1960  MRN:  [id number]   Visit Number:  [id number]  Room number:  Room/bed info not found  Primary Care Physician:  Kreis, Samuel Duane, MD     Subjective     (Not on file)   Chief complaint: Status post left hip fracture with ORIF    History of presenting illness:  64 y.o. female with a history of psoriasis, psoriasis psoriatic arthritis, MS, diabetes mellitus, hypertension, paroxysmal atrial fibrillation, GERD, history of thyroid cancer which is now hypothyroidism, rheumatoid arthritis and Sjogren's syndrome, fibromyalgia, degenerative disc disease.  Patient stated that she had a fall at home but there was apparently no loss of consciousness or fainting.  Patient fell and had severe pain involving the left hip but also the shoulder and elbow.  Was evaluated in the emergency department and on imaging patient did have left intertrochanteric hip fracture.  Patient was admitted for further evaluation and treatment and did go to the operating room on  for ORIF with intramedullary nail and long vera proximal locking with blade distal locking cortical screw placement.  In the postoperative course patient did have acute blood loss anemia and did require 2 units of packed red blood cells.  Patient was stabilized after that and was restarted on anticoagulant for treatment of her atrial fibrillation.  She is done well with this and did not have any further evidence of bleeding.  Patient still quite sore but is amenable to acute inpatient physical rehab.  Patient seems to be a very good candidate        Patient continued to progress medically.  Physical therapy and Occupational therapy evaluations were completed with recommended acute inpatient rehabilitation referral for continued functional mobility intervention and reeducation.  Acute rehab referral ordered for continued  medical monitoring and management post prolonged hospitalization, continued respiratory status monitoring, lab monitoring, pain mgt needs, bowel/bladder care with new medication education, skin monitoring and breakdown prevention along with ongoing medical comorbidities that require ongoing care.        ---------------------------------------------------------------------------------------------------------------------   Review of Systems:  General: No fever no chills  HEENT: Denies headaches denies vision changes at this time  Heart: No chest pain no palpitations.  Does have history of atrial fibrillation which is paroxysmal.  Lungs: Denies cough denies wheezing denies shortness of breath  Abdomen: No nausea vomiting or diarrhea  : Denies dysuria  Musculoskeletal: Complains of some left hip pain at this time.  Has history of psoriatic arthritis/rheumatoid arthritis  Neuro: Negative  Skin: Negative  ---------------------------------------------------------------------------------------------------------------------   Past Medical History:   Diagnosis Date    Acid reflux     Allergic     Anemia     Anxiety     Atrial fibrillation     Cancer     thyroid, skin    Cervical disc disorder     Chronic pain disorder     Claustrophobia     CTS (carpal tunnel syndrome)     Degenerative disc disease, lumbar     Depression     Dupuytren's disease     Essential hypertension     Family history of coronary artery disease     Fibromyalgia     Gallbladder abscess     H. pylori infection     Hiatal hernia     Hyperlipidemia     Hypothyroidism     Low back pain     Lumbosacral disc disease     Migraines     migraines    Multiple sclerosis     Osteoarthritis     Peripheral neuropathy     Psoriasis     Psoriatic arthritis     RA (rheumatoid arthritis)     Rheumatoid arthritis     Sinusitis     Sjogren's syndrome     Stomach ulcer     Thoracic disc disorder     TMJ arthritis     Type 2 diabetes mellitus     Urinary tract infection       Past Surgical History:   Procedure Laterality Date    BACK SURGERY      BREAST LUMPECTOMY Left 11/1993    CARDIAC CATHETERIZATION Left 09/21/2009    Normal     CARDIAC CATHETERIZATION N/A 6/24/2025    Procedure: Left Heart Cath;  Surgeon: Teodora Barron MD;  Location: [facility] CATH INVASIVE LOC[facility];  Service: Cardiovascular;  Laterality: N/A;    CARPAL TUNNEL RELEASE  03/2012    CERVICAL POLYPECTOMY  12/2015    CHOLECYSTECTOMY  05/2007    COLONOSCOPY      ENDOSCOPY      EPIDURAL BLOCK      GASTRIC BYPASS  09/2007    JOINT REPLACEMENT      KNEE ARTHROPLASTY      LUMBAR DISC SURGERY      C5-6    NECK SURGERY      REPLACEMENT TOTAL KNEE Right 06/2016    SACRAL NERVE STIMULATOR PLACEMENT  09/2014    SACRAL NERVE STIMULATOR PLACEMENT  04/11/2024    @ [facility] in [place]    SACRAL NERVE STIMULATOR PLACEMENT Right 04/17/2024    THYROIDECTOMY  03/2016    TRIGGER POINT INJECTION       Family History   Problem Relation Age of Onset    Diabetes Mother     Stroke Mother     Hypertension Mother     Asthma Mother     Fainting Mother     Miscarriages / Stillbirths Mother     Heart attack Father         First one was in his 20's second 30's.     Heart failure Father     Heart disease Father     Stroke Father     Diabetes Sister     Cancer Maternal Grandmother      Social History     Socioeconomic History    Marital status:    Tobacco Use    Smoking status: Never     Passive exposure: Never    Smokeless tobacco: Never   Vaping Use    Vaping status: Never Used   Substance and Sexual Activity    Alcohol use: Never    Drug use: Never    Sexual activity: Yes     Partners: Male     Birth control/protection: Post-menopausal, None     ---------------------------------------------------------------------------------------------------------------------   Allergies:  Codeine, Imuran [azathioprine], Januvia [sitagliptin], Penicillins, Sulfa antibiotics, Sulfasalazine, and Beta adrenergic  blockers  ---------------------------------------------------------------------------------------------------------------------   Medications below are reported home medications pulling from within the system; at this time, these medications have not been reconciled unless otherwise specified and are in the verification process for further verifcation as current home medications.     Cannot display prior to admission medications because the patient has not been admitted in this contact.          Objective     Vital Signs:  Temp:  [98.1 °F (36.7 °C)-98.9 °F (37.2 °C)] 98.6 °F (37 °C)  Heart Rate:  [78-86] 78  Resp:  [16-20] 20  BP: (114-147)/(63-82) 147/77    No data found.  SpO2:  [99 %] 99 %  on   ;   Device (Oxygen Therapy): room air  There is no height or weight on file to calculate BMI.    Wt Readings from Last 3 Encounters:   07/17/25 65.8 kg (145 lb)   07/14/25 65.6 kg (144 lb 9.6 oz)   06/24/25 67.1 kg (148 lb)      ---------------------------------------------------------------------------------------------------------------------     Physical exam:  Constitutional: Middle-aged female in no distress  HEENT: Normocephalic atraumatic  Neck:   Supple  Cardiovascular: Regular rate and rhythm at this time  Pulmonary/Chest: Clear to auscultation  Abdominal: Positive bowel sounds soft.   Musculoskeletal: Status post left hip repair  Neurological: No focal deficits  Skin: No rash  Peripheral vascular: No edema  Genitourinary::  ---------------------------------------------------------------------------------------------------------------------    No orders to display           Last echocardiogram:  Results for orders placed during the hospital encounter of 07/17/25    Adult Transthoracic Echo Complete W/ Cont if Necessary Per Protocol 07/18/2025 12:46 PM    Interpretation Summary    Left ventricular systolic function is normal. Calculated left ventricular EF = 58.5%    Left ventricular diastolic function is  "consistent with (grade I) impaired relaxation.    Estimated right ventricular systolic pressure from tricuspid regurgitation is normal (<35 mmHg).    No significant valvular abnormalities noted    --------------------------------------------------------------------------------------------------------------------  Labs:  Results from last 7 days   Lab Units 07/22/25  0254 07/21/25  0036 07/20/25  0519 07/19/25  0106 07/18/25  1703 07/18/25  0338 07/17/25  1418   WBC 10*3/mm3 5.99  --  8.35 9.59  --    < >  --    HEMOGLOBIN g/dL 8.6* 9.1* 6.9* 10.3*  --    < >  --    HEMATOCRIT % 26.1* 26.9* 21.1* 31.6*  --    < >  --    MCV fL 100.8*  --  108.2* 108.2*  --    < >  --    MCHC g/dL 33.0  --  32.7 32.6  --    < >  --    PLATELETS 10*3/mm3 259  --  230 251  --    < >  --    INR   --   --   --   --  1.15*  --  1.26*    < > = values in this interval not displayed.         Results from last 7 days   Lab Units 07/20/25  0139 07/19/25  1608 07/19/25  0106 07/18/25  0338   SODIUM mmol/L 133*  --  134* 136   POTASSIUM mmol/L 4.4 4.9 3.6 3.9   CHLORIDE mmol/L 102  --  100 104   CO2 mmol/L 20.1*  --  22.0 22.6   BUN mg/dL 20.3  --  10.9 11.2   CREATININE mg/dL 0.98  --  0.62 0.58   CALCIUM mg/dL 7.5*  --  8.1* 8.1*   GLUCOSE mg/dL 210*  --  177* 203*   ALBUMIN g/dL 2.6*  --  3.0* 3.2*   BILIRUBIN mg/dL 0.2  --  0.4 0.4   ALK PHOS U/L 92  --  128* 134*   AST (SGOT) U/L 18  --  17 23   ALT (SGPT) U/L 21  --  26 33   Estimated Creatinine Clearance: 61 mL/min (by C-G formula based on SCr of 0.98 mg/dL).  No results found for: \"AMMONIA\"          Glucose   Date/Time Value Ref Range Status   07/22/2025 1127 151 (H) 70 - 130 mg/dL Final     Comment:     Serial Number: [id number]:  [id number]   07/22/2025 0619 162 (H) 70 - 130 mg/dL Final     Comment:     Serial Number: [id number]:  [id number]   07/21/2025 2015 181 (H) 70 - 130 mg/dL Final     Comment:     Serial Number: [id number]:  [id number]   07/21/2025 1617 191 (H) " "70 - 130 mg/dL Final     Comment:     Serial Number: [id number]:  [id number]   07/21/2025 1053 161 (H) 70 - 130 mg/dL Final     Comment:     Serial Number: [id number]:  [id number]   07/21/2025 0614 172 (H) 70 - 130 mg/dL Final     Comment:     Serial Number: [id number]:  [id number]   07/20/2025 2023 163 (H) 70 - 130 mg/dL Final     Comment:     Serial Number: [id number]:  [id number]   07/20/2025 1652 224 (H) 70 - 130 mg/dL Final     Comment:     Serial Number: [id number]:  [id number]     Lab Results   Component Value Date    TSH 15.900 (H) 03/12/2025    FREET4 1.58 12/07/2023     Lab Results   Component Value Date    PREGTESTUR Negative 01/21/2018     Pain Management Panel  More data exists         Latest Ref Rng & Units 5/12/2025 3/17/2025   Pain Management Panel   Creatinine, Urine mg/dL  mg/dL 12.4  12.4  7.9  7.9       Details          Multiple values from one day are sorted in reverse-chronological order             Brief Urine Lab Results  (Last result in the past 365 days)        Color   Clarity   Blood   Leuk Est   Nitrite   Protein   CREAT   Urine HCG        07/17/25 1538 Yellow   Clear   Negative   Negative   Negative   Negative                 No results found for: \"BLOODCX\"  No results found for: \"URINECX\"  No results found for: \"WOUNDCX\"  No results found for: \"STOOLCX\"    Last Urine Toxicity  More data exists         Latest Ref Rng & Units 5/12/2025 3/17/2025   LAST URINE TOXICITY RESULTS   Creatinine, Urine mg/dL  mg/dL 12.4  12.4  7.9  7.9       Details          Multiple values from one day are sorted in reverse-chronological order               I have personally looked at the labs and they are summarized above.  ----------------------------------------------------------------------------------------------------------------------  Detailed radiology reports for the last 24 hours:    Imaging Results (Last 24 Hours)       ** No results found for the last 24 hours. **      "     Final impressions for the last 30 days of radiology reports:    FL Surgery Fluoro  Result Date: 7/19/2025  As above.  This report was finalized on 7/19/2025 11:15 AM by Dr. Shakeel Kemp MD.      XR Femur 2 View Left  Result Date: 7/17/2025    Left intertrochanteric hip fracture again noted. No mid or distal femur fracture identified.  This report was finalized on 7/17/2025 2:42 PM by Dr. Shakeel Kemp MD.      XR Chest AP  Result Date: 7/17/2025    Unremarkable exam with no acute cardiopulmonary findings identified.  This report was finalized on 7/17/2025 2:37 PM by Dr. Shakeel Kemp MD.      XR Shoulder 2+ View Left  Result Date: 7/17/2025    No acute findings in the left shoulder.  This report was finalized on 7/17/2025 2:35 PM by Dr. Shakeel Kemp MD.      XR Elbow 3+ View Left  Result Date: 7/17/2025  1.  No fracture or dislocation. 2.  Mild olecranon soft tissue swelling may indicate mild olecranon bursitis.  This report was finalized on 7/17/2025 2:34 PM by Dr. Shakeel Kemp MD.      XR Hip With or Without Pelvis 2 - 3 View Left  Result Date: 7/17/2025    Acute angulated left intertrochanteric fracture with varus angulation of distal fracture fragments and mild superior displacement of distal fracture fragments.  This report was finalized on 7/17/2025 2:29 PM by Dr. Shakeel Kemp MD.      I have personally looked at the radiology images and read the final radiology report.    Assessment & Plan    Status post left hip fracture with ORIF--patient will require physical therapy 90 minutes/day 5 to 6 days/week for help with strengthening, balance training, transfer training, stair navigation, gait training, range of motion therapy, endurance training.  Patient will require occupational therapy 90 minutes/day 5 to 6 days/week for help with transfer training, ADL training, transfer training, strengthening, coordination activities.  This is all being done in order that patient might be able to safely  navigate environment with decreased risk for falls.  Also that patient might be able to perform ADLs safely in her environment with necessary assistive devices.  Patient may require further PT OT with home health at discharge.  May require equipment at discharge as well.    Paroxysmal atrial fibrillation--rate is well-controlled at this time.  Continue flecainide, metoprolol, Xarelto.    Depression with history of chronic pain--continue Elavil 25 mg nightly duloxetine 90 mg daily.    Diabetes mellitus currently on sliding scale insulin coverage.    Hypothyroidism Synthroid 125 mcg daily    Neuropathic pain/peripheral neuropathy continue gabapentin 600 mg 3 times daily    Respiratory allergies cetirizine 10 mg daily    Psoriasis/rheumatoid arthritis/Sjogren's syndrome--patient has been on methotrexate and Xeljanz.  Will likely build to restart methotrexate during her admission.  Xeljanz should be held for at least 14 days postop.    Questional history of COPD continue budesonide/formoterol    Acute blood loss anemia--hemoglobin stable over the last 24 hours.  Patient did require 2 units of packed red blood cells during acute admission in the postoperative course.      VTE Prophylaxis:   Xarelto  Falls Risk Assessment high risk secondary to recent hip fracture as well as recent fall causing hip fracture.      Jesus Acevedo MD  [facility] Hospitalist  07/22/25  12:21 EDT

## 2025-07-23 LAB
ANION GAP SERPL CALCULATED.3IONS-SCNC: 9.3 MMOL/L (ref 5–15)
BASOPHILS # BLD AUTO: 0.03 10*3/MM3 (ref 0–0.2)
BASOPHILS NFR BLD AUTO: 0.6 % (ref 0–1.5)
BUN SERPL-MCNC: 8.7 MG/DL (ref 8–23)
BUN/CREAT SERPL: 17.4 (ref 7–25)
CALCIUM SPEC-SCNC: 7.8 MG/DL (ref 8.6–10.5)
CHLORIDE SERPL-SCNC: 104 MMOL/L (ref 98–107)
CO2 SERPL-SCNC: 24.7 MMOL/L (ref 22–29)
CREAT SERPL-MCNC: 0.5 MG/DL (ref 0.57–1)
DEPRECATED RDW RBC AUTO: 65 FL (ref 37–54)
EGFRCR SERPLBLD CKD-EPI 2021: 105.5 ML/MIN/1.73
EOSINOPHIL # BLD AUTO: 0.08 10*3/MM3 (ref 0–0.4)
EOSINOPHIL NFR BLD AUTO: 1.7 % (ref 0.3–6.2)
ERYTHROCYTE [DISTWIDTH] IN BLOOD BY AUTOMATED COUNT: 17.6 % (ref 12.3–15.4)
GLUCOSE BLDC GLUCOMTR-MCNC: 142 MG/DL (ref 70–130)
GLUCOSE BLDC GLUCOMTR-MCNC: 183 MG/DL (ref 70–130)
GLUCOSE BLDC GLUCOMTR-MCNC: 249 MG/DL (ref 70–130)
GLUCOSE BLDC GLUCOMTR-MCNC: 256 MG/DL (ref 70–130)
GLUCOSE SERPL-MCNC: 142 MG/DL (ref 65–99)
HCT VFR BLD AUTO: 25.9 % (ref 34–46.6)
HGB BLD-MCNC: 8.4 G/DL (ref 12–15.9)
IMM GRANULOCYTES # BLD AUTO: 0.04 10*3/MM3 (ref 0–0.05)
IMM GRANULOCYTES NFR BLD AUTO: 0.8 % (ref 0–0.5)
LYMPHOCYTES # BLD AUTO: 0.81 10*3/MM3 (ref 0.7–3.1)
LYMPHOCYTES NFR BLD AUTO: 17 % (ref 19.6–45.3)
MAGNESIUM SERPL-MCNC: 2 MG/DL (ref 1.6–2.4)
MCH RBC QN AUTO: 33.1 PG (ref 26.6–33)
MCHC RBC AUTO-ENTMCNC: 32.4 G/DL (ref 31.5–35.7)
MCV RBC AUTO: 102 FL (ref 79–97)
MONOCYTES # BLD AUTO: 0.57 10*3/MM3 (ref 0.1–0.9)
MONOCYTES NFR BLD AUTO: 11.9 % (ref 5–12)
NEUTROPHILS NFR BLD AUTO: 3.24 10*3/MM3 (ref 1.7–7)
NEUTROPHILS NFR BLD AUTO: 68 % (ref 42.7–76)
NRBC BLD AUTO-RTO: 0 /100 WBC (ref 0–0.2)
PLATELET # BLD AUTO: 295 10*3/MM3 (ref 140–450)
PMV BLD AUTO: 9.1 FL (ref 6–12)
POTASSIUM SERPL-SCNC: 3.8 MMOL/L (ref 3.5–5.2)
RBC # BLD AUTO: 2.54 10*6/MM3 (ref 3.77–5.28)
SODIUM SERPL-SCNC: 138 MMOL/L (ref 136–145)
WBC NRBC COR # BLD AUTO: 4.77 10*3/MM3 (ref 3.4–10.8)

## 2025-07-23 PROCEDURE — 82948 REAGENT STRIP/BLOOD GLUCOSE: CPT

## 2025-07-23 PROCEDURE — 63710000001 INSULIN LISPRO (HUMAN) PER 5 UNITS: Performed by: FAMILY MEDICINE

## 2025-07-23 PROCEDURE — 83735 ASSAY OF MAGNESIUM: CPT | Performed by: FAMILY MEDICINE

## 2025-07-23 PROCEDURE — 97110 THERAPEUTIC EXERCISES: CPT

## 2025-07-23 PROCEDURE — 94761 N-INVAS EAR/PLS OXIMETRY MLT: CPT

## 2025-07-23 PROCEDURE — 99231 SBSQ HOSP IP/OBS SF/LOW 25: CPT | Performed by: FAMILY MEDICINE

## 2025-07-23 PROCEDURE — 94799 UNLISTED PULMONARY SVC/PX: CPT

## 2025-07-23 PROCEDURE — 97535 SELF CARE MNGMENT TRAINING: CPT

## 2025-07-23 PROCEDURE — 94664 DEMO&/EVAL PT USE INHALER: CPT

## 2025-07-23 PROCEDURE — 97167 OT EVAL HIGH COMPLEX 60 MIN: CPT

## 2025-07-23 PROCEDURE — 97530 THERAPEUTIC ACTIVITIES: CPT

## 2025-07-23 PROCEDURE — 80048 BASIC METABOLIC PNL TOTAL CA: CPT | Performed by: FAMILY MEDICINE

## 2025-07-23 PROCEDURE — 97161 PT EVAL LOW COMPLEX 20 MIN: CPT

## 2025-07-23 PROCEDURE — 85025 COMPLETE CBC W/AUTO DIFF WBC: CPT | Performed by: FAMILY MEDICINE

## 2025-07-23 RX ORDER — DULOXETIN HYDROCHLORIDE 30 MG/1
30 CAPSULE, DELAYED RELEASE ORAL DAILY
Status: DISPENSED | OUTPATIENT
Start: 2025-07-24

## 2025-07-23 RX ADMIN — BUDESONIDE AND FORMOTEROL FUMARATE DIHYDRATE 2 PUFF: 160; 4.5 AEROSOL RESPIRATORY (INHALATION) at 07:23

## 2025-07-23 RX ADMIN — LEVOTHYROXINE SODIUM 125 MCG: 0.1 TABLET ORAL at 05:49

## 2025-07-23 RX ADMIN — DULOXETINE 60 MG: 60 CAPSULE, DELAYED RELEASE ORAL at 08:55

## 2025-07-23 RX ADMIN — INSULIN LISPRO 2 UNITS: 100 INJECTION, SOLUTION INTRAVENOUS; SUBCUTANEOUS at 19:07

## 2025-07-23 RX ADMIN — GABAPENTIN 600 MG: 300 CAPSULE ORAL at 13:05

## 2025-07-23 RX ADMIN — CETIRIZINE HYDROCHLORIDE 10 MG: 10 TABLET, FILM COATED ORAL at 08:53

## 2025-07-23 RX ADMIN — INSULIN LISPRO 4 UNITS: 100 INJECTION, SOLUTION INTRAVENOUS; SUBCUTANEOUS at 13:05

## 2025-07-23 RX ADMIN — METOPROLOL TARTRATE 50 MG: 50 TABLET, FILM COATED ORAL at 08:52

## 2025-07-23 RX ADMIN — GABAPENTIN 600 MG: 300 CAPSULE ORAL at 05:49

## 2025-07-23 RX ADMIN — AMITRIPTYLINE HYDROCHLORIDE 25 MG: 25 TABLET ORAL at 20:53

## 2025-07-23 RX ADMIN — GABAPENTIN 600 MG: 300 CAPSULE ORAL at 21:05

## 2025-07-23 RX ADMIN — HYDROCODONE BITARTRATE AND ACETAMINOPHEN 1 TABLET: 7.5; 325 TABLET ORAL at 09:30

## 2025-07-23 RX ADMIN — DULOXETINE 30 MG: 30 CAPSULE, DELAYED RELEASE ORAL at 06:11

## 2025-07-23 RX ADMIN — RIVAROXABAN 20 MG: 20 TABLET, FILM COATED ORAL at 17:33

## 2025-07-23 RX ADMIN — Medication 10 ML: at 21:05

## 2025-07-23 RX ADMIN — FOLIC ACID 1 MG: 1 TABLET ORAL at 08:53

## 2025-07-23 RX ADMIN — Medication 10 ML: at 08:53

## 2025-07-23 RX ADMIN — METOPROLOL TARTRATE 50 MG: 50 TABLET, FILM COATED ORAL at 20:53

## 2025-07-23 RX ADMIN — BUDESONIDE AND FORMOTEROL FUMARATE DIHYDRATE 2 PUFF: 160; 4.5 AEROSOL RESPIRATORY (INHALATION) at 19:14

## 2025-07-23 RX ADMIN — FLECAINIDE ACETATE 50 MG: 50 TABLET ORAL at 20:54

## 2025-07-23 RX ADMIN — FLECAINIDE ACETATE 50 MG: 50 TABLET ORAL at 08:51

## 2025-07-23 RX ADMIN — INSULIN LISPRO 3 UNITS: 100 INJECTION, SOLUTION INTRAVENOUS; SUBCUTANEOUS at 20:52

## 2025-07-23 NOTE — SIGNIFICANT NOTE
07/23/25 0848   Living Environment   People in Home spouse   Name(s) of People in Home Khanh Benitez-spouse   Current Living Arrangements home   Potentially Unsafe Housing Conditions none   In the past 12 months has the electric, gas, oil, or water company threatened to shut off services in your home? No   Primary Care Provided by self   Provides Primary Care For no one   Family Caregiver if Needed spouse   Family Caregiver Names Spouse Khanh Benitez   Quality of Family Relationships helpful;involved;supportive   Able to Return to Prior Arrangements yes   Living Arrangement Comments Pt is a 63 Y/O male admitted to physical rehab on 7-22-25 with dx of left hip fx from [facility].  Spoke to pt who states living at home with spouse in [place], [place].  Pt and spouse have no children.  Pt lives in a one-story house with basement and has three steps to enter the home with no handrails.  Pt has 12 or more stairs to enter the basement with handrail on ride side; pt says she does not go to the basement.  Friend may be able to build a W/C ramp.  Pt has two younger sisters who are supportive and they work full-time.  Sisters are Gabi Waters and Rebekah Ku who live in [place].  Pt receives Social Security disability income, Medicare A&B and mobile [employer].  Pt receives 3 month supply of maintenance medications from [facility] and [facility].  PCP is Dr. Paul Araujo.  Pt does not have POA or living will.  Pt used standard cane for mobility and was independent with ADL's.  Pt and spouse drive.  Spouse also uses a hurrycane.   Resource/Environmental Concerns   Resource/Environmental Concerns none   Transportation Concerns none   Transition Planning   Patient/Family Anticipates Transition to home with family   Patient/Family Anticipated Services at Transition   (To be recommended closer to discharge.)   Transportation Anticipated family or friend will provide   Discharge  Needs Assessment (IRF)   Concerns to be Addressed adjustment to diagnosis/illness   Equipment Currently Used at Home cane, straight;shower chair;pulse ox;bp cuff;glucometer;other (see comments)  (Pt has a lift chair that belonged to her mother in storage.  Pt purchased standard cane from [facility].)   Outpatient/Agency/Support Group Needs   (Pt did not receive home health or outpatient therapy prior to admission.)   Discharge Coordination/Progress Pt plans to return home with spouse providing assistance at discharge.  Team conference will be held on 7-24-25.  SS will follow and assist with discharge planning.

## 2025-07-23 NOTE — PROGRESS NOTES
IRF-AC[facility] Section GG Coding Self Care (Admission)                                            Coding  PM7860.A  Eating                        05. Setup or clean-up assistance - Hel                                          per SETS UP or CLEANS UP; patient comp                                          letes activity. [place] assists only pr                                          ior to or following the activity.  GD6683.B  Oral Hygiene                  04. Supervision or touching assistance                                          - [place] provides VERBAL CUES or TOUCH                                          ING/STEADYING assistance as patient co                                          mpletes activity. Assistance may be pr                                          ovided throughout the activity or inte                                          rmittently.  CO3680.C  Toileting Hygiene             01. Dependent - [place] does ALL of the                                          effort. Patient does none of the effor                                          t to complete the activity. Or, the as                                          sistance of 2 or more helpers is requi                                          red for the patient to complete the ac                                          tivity.  FN5937.E  Shower/Bathe Self             02. Substantial/maximal assistance - H                                          elper does MORE THAN HALF the effort.                                          [place] lifts or holds trunk or limbs a                                          nd provides more than half the effort.  TA9047.F  Upper Body Dressing           04. Supervision or touching assistance                                          - [place] provides VERBAL CUES or TOUCH                                          ING/STEADYING assistance as patient co                                          mpletes activity. Assistance may  be pr                                          ovided throughout the activity or inte                                          rmittently.  OQ4426.G  Lower Body Dressing           01. Dependent - [place] does ALL of the                                          effort. Patient does none of the effor                                          t to complete the activity. Or, the as                                          sistance of 2 or more helpers is requi                                          red for the patient to complete the ac                                          tivity.  SV2679.H  Putting On/Taking Off Footwear  01. Dependent - [place] does ALL of the                                            effort. Patient does none of the effor                                          t to complete the activity. Or, the as                                          sistance of 2 or more helpers is requi                                          red for the patient to complete the ac                                          tivity.    Signed by: Amber Badillo, Occupational Therapist

## 2025-07-23 NOTE — PROGRESS NOTES
[facility]  PROGRESS NOTE     Patient Identification:  Name:  Lashae Benitez  Age:  64 y.o.  Sex:  female  :  1960  MRN:  [id number]  Visit Number:  [id number]  ROOM: [facility] [[email or web address]     Primary Care Provider:  Kreis, Samuel Duane, MD    Length of stay in inpatient status:  1    Subjective     Chief Compliant: Status post left hip fracture with ORIF    History of Presenting Illness: Patient is a 64-year-old female with recent left hip fracture with ORIF.  Patient does have history of paroxysmal atrial fibrillation, diabetes mellitus, hypothyroidism, psoriasis/rheumatoid arthritis, neuropathic pain.  Patient did have acute blood loss anemia in the postoperative course and did require 2 units of packed red blood cells while in the acute care unit.  Patient doing well this morning is understandably sore in the left hip area.  No other complaints at this time    Objective     Current Hospital Meds:amitriptyline, 25 mg, Oral, Nightly  budesonide-formoterol, 2 puff, Inhalation, BID - RT  cetirizine, 10 mg, Oral, Daily  [START ON 2025] cholecalciferol, 50,000 Units, Oral, Weekly  [START ON 2025] cyanocobalamin, 1,000 mcg, Intramuscular, Weekly  DULoxetine, 30 mg, Oral, QAM  DULoxetine, 60 mg, Oral, Daily  flecainide, 50 mg, Oral, BID  folic acid, 1 mg, Oral, Daily  gabapentin, 600 mg, Oral, Q8H  insulin lispro, 2-7 Units, Subcutaneous, 4x Daily PC & at Bedtime  levothyroxine, 125 mcg, Oral, Q AM  [START ON 2025] methotrexate, 25 mg, Oral, Weekly  metoprolol tartrate, 50 mg, Oral, BID  rivaroxaban, 20 mg, Oral, Daily With Dinner  sodium chloride, 10 mL, Intravenous, Q12H       ----------------------------------------------------------------------------------------------------------------------  Vital Signs:  Temp:  [98.1 °F (36.7 °C)-98.4 °F (36.9 °C)] 98.1 °F (36.7 °C)  Heart Rate:  [74-89] 89  Resp:  [16-20] 16  BP: (120-131)/(65-77) 120/71  SpO2:  [93 %-98 %] 96 %  on   ;   Device (Oxygen  Therapy): room air  Body mass index is 24.13 kg/m².    Wt Readings from Last 3 Encounters:   07/22/25 65.8 kg (145 lb)   07/17/25 65.8 kg (145 lb)   07/14/25 65.6 kg (144 lb 9.6 oz)     Intake & Output (last 3 days)         07/20 0701 07/21 0700 07/21 0701 07/22 0700 07/22 0701 07/23 0700 07/23 0701 07/24 0700    P.O.   600     Total Intake(mL/kg)   600 (9.1)     Net   +600             Urine Unmeasured Occurrence   1 x           Diet: Regular/House; Fluid Consistency: Thin (IDDSI 0)  ----------------------------------------------------------------------------------------------------------------------  Physical exam:  Constitutional: No acute distress  HEENT: Normocephalic atraumatic  Neck:   Supple  Cardiovascular: Regular rate and rhythm at this time  Pulmonary/Chest: Clear to auscultation  Abdominal: Positive bowel sounds soft.   Musculoskeletal: Status post left hip repair  Neurological: No focal deficits  Skin: No rash  Peripheral vascular: No edema  Genitourinary:  ----------------------------------------------------------------------------------------------------------------------    Last echocardiogram:  Results for orders placed during the hospital encounter of 07/17/25    Adult Transthoracic Echo Complete W/ Cont if Necessary Per Protocol 07/18/2025 12:46 PM    Interpretation Summary    Left ventricular systolic function is normal. Calculated left ventricular EF = 58.5%    Left ventricular diastolic function is consistent with (grade I) impaired relaxation.    Estimated right ventricular systolic pressure from tricuspid regurgitation is normal (<35 mmHg).    No significant valvular abnormalities noted    ----------------------------------------------------------------------------------------------------------------------  Results from last 7 days   Lab Units 07/23/25  0408 07/22/25  0254 07/21/25  0036 07/20/25  0519 07/19/25  0106 07/18/25  1703 07/18/25  0338 07/17/25  1418   WBC 10*3/mm3 4.77 5.99   "--  8.35   < >  --    < >  --    HEMOGLOBIN g/dL 8.4* 8.6* 9.1* 6.9*   < >  --    < >  --    HEMATOCRIT % 25.9* 26.1* 26.9* 21.1*   < >  --    < >  --    MCV fL 102.0* 100.8*  --  108.2*   < >  --    < >  --    MCHC g/dL 32.4 33.0  --  32.7   < >  --    < >  --    PLATELETS 10*3/mm3 295 259  --  230   < >  --    < >  --    INR   --   --   --   --   --  1.15*  --  1.26*    < > = values in this interval not displayed.         Results from last 7 days   Lab Units 07/23/25  0408 07/20/25  0139 07/19/25  1608 07/19/25  0106 07/18/25  0338   SODIUM mmol/L 138 133*  --  134* 136   POTASSIUM mmol/L 3.8 4.4 4.9 3.6 3.9   MAGNESIUM mg/dL 2.0  --   --   --   --    CHLORIDE mmol/L 104 102  --  100 104   CO2 mmol/L 24.7 20.1*  --  22.0 22.6   BUN mg/dL 8.7 20.3  --  10.9 11.2   CREATININE mg/dL 0.50* 0.98  --  0.62 0.58   CALCIUM mg/dL 7.8* 7.5*  --  8.1* 8.1*   GLUCOSE mg/dL 142* 210*  --  177* 203*   ALBUMIN g/dL  --  2.6*  --  3.0* 3.2*   BILIRUBIN mg/dL  --  0.2  --  0.4 0.4   ALK PHOS U/L  --  92  --  128* 134*   AST (SGOT) U/L  --  18  --  17 23   ALT (SGPT) U/L  --  21  --  26 33   Estimated Creatinine Clearance: 119.6 mL/min (A) (by C-G formula based on SCr of 0.5 mg/dL (L)).  No results found for: \"AMMONIA\"              Glucose   Date/Time Value Ref Range Status   07/23/2025 0646 142 (H) 70 - 130 mg/dL Final     Comment:     Serial Number: [id number]:  [id number]   07/22/2025 1945 145 (H) 70 - 130 mg/dL Final     Comment:     Serial Number: [id number]:  [id number]   07/22/2025 1620 137 (H) 70 - 130 mg/dL Final     Comment:     Serial Number: [id number]:  [id number]   07/22/2025 1127 151 (H) 70 - 130 mg/dL Final     Comment:     Serial Number: [id number]:  [id number]   07/22/2025 0619 162 (H) 70 - 130 mg/dL Final     Comment:     Serial Number: [id number]:  [id number]   07/21/2025 2015 181 (H) 70 - 130 mg/dL Final     Comment:     Serial Number: [id number]:  [id number]   07/21/2025 " "1617 191 (H) 70 - 130 mg/dL Final     Comment:     Serial Number: [id number]:  [id number]   07/21/2025 1053 161 (H) 70 - 130 mg/dL Final     Comment:     Serial Number: [id number]:  [id number]     Lab Results   Component Value Date    TSH 15.900 (H) 03/12/2025    FREET4 1.58 12/07/2023     Lab Results   Component Value Date    PREGTESTUR Negative 01/21/2018     Pain Management Panel  More data exists         Latest Ref Rng & Units 5/12/2025 3/17/2025   Pain Management Panel   Creatinine, Urine mg/dL  mg/dL 12.4  12.4  7.9  7.9       Details          Multiple values from one day are sorted in reverse-chronological order             Brief Urine Lab Results  (Last result in the past 365 days)        Color   Clarity   Blood   Leuk Est   Nitrite   Protein   CREAT   Urine HCG        07/17/25 1538 Yellow   Clear   Negative   Negative   Negative   Negative                 No results found for: \"BLOODCX\"  Results from last 7 days   Lab Units 07/17/25  1538   NITRITE UA  Negative     No results found for: \"URINECX\"  No results found for: \"WOUNDCX\"  No results found for: \"STOOLCX\"        I have personally looked at the labs and they are summarized above.  ----------------------------------------------------------------------------------------------------------------------  Detailed radiology reports for the last 24 hours:    Imaging Results (Last 24 Hours)       ** No results found for the last 24 hours. **          Final impressions for the last 30 days of radiology reports:    FL Surgery Fluoro  Result Date: 7/19/2025  As above.  This report was finalized on 7/19/2025 11:15 AM by Dr. Shakeel Kemp MD.      XR Femur 2 View Left  Result Date: 7/17/2025    Left intertrochanteric hip fracture again noted. No mid or distal femur fracture identified.  This report was finalized on 7/17/2025 2:42 PM by Dr. Shakeel Kemp MD.      XR Chest AP  Result Date: 7/17/2025    Unremarkable exam with no acute cardiopulmonary " findings identified.  This report was finalized on 7/17/2025 2:37 PM by Dr. Shakeel Kemp MD.      XR Shoulder 2+ View Left  Result Date: 7/17/2025    No acute findings in the left shoulder.  This report was finalized on 7/17/2025 2:35 PM by Dr. Shakeel Kemp MD.      XR Elbow 3+ View Left  Result Date: 7/17/2025  1.  No fracture or dislocation. 2.  Mild olecranon soft tissue swelling may indicate mild olecranon bursitis.  This report was finalized on 7/17/2025 2:34 PM by Dr. Shakeel Kemp MD.      XR Hip With or Without Pelvis 2 - 3 View Left  Result Date: 7/17/2025    Acute angulated left intertrochanteric fracture with varus angulation of distal fracture fragments and mild superior displacement of distal fracture fragments.  This report was finalized on 7/17/2025 2:29 PM by Dr. Shakeel Kemp MD.      I have personally looked at the radiology images and read the final radiology report.    Assessment & Plan    Status post left hip fracture with ORIF--patient to get PT and OT evaluation today.    Paroxysmal atrial fibrillation rate is controlled.  Patient currently on flecainide, metoprolol and Xarelto    Psoriasis/rheumatoid arthritis/psoriatic arthritis and Sjogren's syndrome--Xeljanz will be held for 14 days postoperatively.  Can restart methotrexate this week.    Depression with history of chronic pain continue Elavil/duloxetine    Diabetes mellitus sliding scale coverage and has reasonably good glycemic control at this time    Hypothyroidism continue Synthroid    Neuropathic pain gabapentin 600 mg 3 times daily    Questionable history of COPD continue Symbicort    Acute blood loss anemia hemoglobin relatively stable.  No evidence of further bleeding we will monitor closely    VTE Prophylaxis:   Xarelto        Jesus Acevedo MD  [facility]  07/23/25  09:32 EDT

## 2025-07-23 NOTE — PLAN OF CARE
Goal Outcome Evaluation:              Outcome Summary: New Admit       Problem: Rehabilitation (IRF) Plan of Care  Goal: Plan of Care Review  Outcome: Progressing  Flowsheets  Taken 7/23/2025 1132 by Meenakshi Davis RN  Outcome Summary: New Admit  Taken 7/23/2025 0301 by Debo Kurtz RN  Progress: no change  Plan of Care Reviewed With: patient  Goal: Patient-Specific Goal (Individualized)  Outcome: Progressing  Goal: Absence of New-Onset Illness or Injury  Outcome: Progressing  Intervention: Identify and Manage Fall Risk  Recent Flowsheet Documentation  Taken 7/23/2025 1000 by Meenakshi Davis RN  Safety Promotion/Fall Prevention:   nonskid shoes/slippers when out of bed   safety round/check completed  Taken 7/23/2025 0800 by Meenakshi Davis RN  Safety Promotion/Fall Prevention:   nonskid shoes/slippers when out of bed   safety round/check completed  Intervention: Prevent VTE (Venous Thromboembolism)  Recent Flowsheet Documentation  Taken 7/23/2025 0930 by Meenakshi Davis RN  VTE Prevention/Management:   bilateral   SCDs (sequential compression devices) off  Goal: Optimal Comfort and Wellbeing  Outcome: Progressing  Intervention: Provide Person-Centered Care  Recent Flowsheet Documentation  Taken 7/23/2025 0930 by Meenakshi Davis RN  Trust Relationship/Rapport:   care explained   questions encouraged  Goal: Home and Community Transition Plan Established  Outcome: Progressing

## 2025-07-23 NOTE — PROGRESS NOTES
Patient Name: Lashae Benitez  YOB: 1960  MRN: [id number]  Admission date: 7/22/2025  Reason for Encounter: new rehab    [facility] Clinical Nutrition Assessment     Subjective    Subjective Information     Called to room, no answer at this time.     Objective   H&P and Current Problems      H&P  Past Medical History:   Diagnosis Date    Acid reflux     Allergic     Anemia     Anxiety     Atrial fibrillation     Cancer     thyroid, skin    Cervical disc disorder     Chronic pain disorder     Claustrophobia     CTS (carpal tunnel syndrome)     Degenerative disc disease, lumbar     Depression     Dupuytren's disease     Essential hypertension     Family history of coronary artery disease     Fibromyalgia     Gallbladder abscess     H. pylori infection     Hiatal hernia     Hyperlipidemia     Hypothyroidism     Low back pain     Lumbosacral disc disease     Migraines     migraines    Multiple sclerosis     Osteoarthritis     Peripheral neuropathy     Psoriasis     Psoriatic arthritis     RA (rheumatoid arthritis)     Rheumatoid arthritis     Sinusitis     Sjogren's syndrome     Stomach ulcer     Thoracic disc disorder     TMJ arthritis     Type 2 diabetes mellitus     Urinary tract infection       Past Surgical History:   Procedure Laterality Date    BACK SURGERY      BREAST LUMPECTOMY Left 11/1993    CARDIAC CATHETERIZATION Left 09/21/2009    Normal     CARDIAC CATHETERIZATION N/A 6/24/2025    Procedure: Left Heart Cath;  Surgeon: Teodora Barron MD;  Location: [facility] INVASIVE LOCATION;  Service: Cardiovascular;  Laterality: N/A;    CARPAL TUNNEL RELEASE  03/2012    CERVICAL POLYPECTOMY  12/2015    CHOLECYSTECTOMY  05/2007    COLONOSCOPY      ENDOSCOPY      EPIDURAL BLOCK      GASTRIC BYPASS  09/2007    JOINT REPLACEMENT      KNEE ARTHROPLASTY      LUMBAR DISC SURGERY      C5-6    NECK SURGERY      REPLACEMENT TOTAL KNEE Right 06/2016    SACRAL NERVE STIMULATOR PLACEMENT  09/2014    SACRAL NERVE  "STIMULATOR PLACEMENT  04/11/2024    @ [facility] in [place]    SACRAL NERVE STIMULATOR PLACEMENT Right 04/17/2024    THYROIDECTOMY  03/2016    TRIGGER POINT INJECTION        Current Problems   Admission Diagnosis:  Closed left hip fracture, sequela [S72.002S]    Problem List:    Closed left hip fracture, sequela      Other Applicable Nutrition Information:   NA     Anthropometrics       Height: 165.1 cm (65\")  Weight: 65.8 kg (145 lb) (07/22/25 1314)  Weight Method: Bed scale  BMI (Calculated): 24.1       Trending Weight Changes 07/23/25: wt varies, 4 # down from 7 months ago per EMR data but higher than 4 months ago       Weight History  Wt Readings from Last 20 Encounters:   07/22/25 1314 65.8 kg (145 lb)   07/17/25 1358 65.8 kg (145 lb)   07/14/25 0851 65.6 kg (144 lb 9.6 oz)   06/24/25 0756 67.1 kg (148 lb)   06/18/25 1321 67.8 kg (149 lb 6.4 oz)   06/11/25 1109 66.5 kg (146 lb 9.6 oz)   06/09/25 1317 65.6 kg (144 lb 9.6 oz)   03/17/25 0929 63.5 kg (140 lb)   03/11/25 0905 64.1 kg (141 lb 6.4 oz)   12/09/24 0753 67.5 kg (148 lb 12.8 oz)   12/06/24 0826 68.4 kg (150 lb 14.4 oz)   12/04/24 1104 67.6 kg (149 lb)   12/03/24 0846 67.7 kg (149 lb 3.2 oz)   11/26/24 0908 68.7 kg (151 lb 6.4 oz)   08/22/24 0855 73.6 kg (162 lb 4.8 oz)   05/28/24 0820 72.9 kg (160 lb 12.8 oz)   05/22/24 0911 73.7 kg (162 lb 6.4 oz)   03/25/24 1033 77.7 kg (171 lb 3.2 oz)   02/13/24 0904 74.1 kg (163 lb 6.4 oz)   12/18/23 0853 73.8 kg (162 lb 11.2 oz)            Labs      Comment: BG noted     Results from last 7 days   Lab Units 07/23/25  0408 07/20/25  0139 07/19/25  1608 07/19/25  0106 07/18/25  0338   SODIUM mmol/L 138 133*  --  134* 136   POTASSIUM mmol/L 3.8 4.4 4.9 3.6 3.9   GLUCOSE mg/dL 142* 210*  --  177* 203*   BUN mg/dL 8.7 20.3  --  10.9 11.2   CREATININE mg/dL 0.50* 0.98  --  0.62 0.58   CALCIUM mg/dL 7.8* 7.5*  --  8.1* 8.1*   MAGNESIUM mg/dL 2.0  --   --   --   --    ALBUMIN g/dL  --  2.6*  --  3.0* 3.2*   BILIRUBIN mg/dL  --  0.2 "  --  0.4 0.4   ALK PHOS U/L  --  92  --  128* 134*   AST (SGOT) U/L  --  18  --  17 23   ALT (SGPT) U/L  --  21  --  26 33     Results from last 7 days   Lab Units 07/23/25  0408 07/22/25  0254 07/21/25  0036 07/20/25  0519   PLATELETS 10*3/mm3 295 259  --  230   HEMOGLOBIN g/dL 8.4* 8.6* 9.1* 6.9*   HEMATOCRIT % 25.9* 26.1* 26.9* 21.1*     Lab Results   Component Value Date    HGBA1C 9.10 (H) 07/17/2025          Medications       Scheduled Medications amitriptyline, 25 mg, Oral, Nightly  budesonide-formoterol, 2 puff, Inhalation, BID - RT  cetirizine, 10 mg, Oral, Daily  [START ON 7/26/2025] cholecalciferol, 50,000 Units, Oral, Weekly  [START ON 7/27/2025] cyanocobalamin, 1,000 mcg, Intramuscular, Weekly  [START ON 7/24/2025] DULoxetine, 30 mg, Oral, Daily  DULoxetine, 60 mg, Oral, Daily  flecainide, 50 mg, Oral, BID  folic acid, 1 mg, Oral, Daily  gabapentin, 600 mg, Oral, Q8H  insulin lispro, 2-7 Units, Subcutaneous, 4x Daily PC & at Bedtime  levothyroxine, 125 mcg, Oral, Q AM  [START ON 7/29/2025] methotrexate, 25 mg, Oral, Weekly  metoprolol tartrate, 50 mg, Oral, BID  rivaroxaban, 20 mg, Oral, Daily With Dinner  sodium chloride, 10 mL, Intravenous, Q12H        Infusions      PRN Medications   senna-docusate sodium **AND** polyethylene glycol **AND** bisacodyl **AND** bisacodyl    Calcium Replacement - Follow Nurse / BPA Driven Protocol    dextrose    dextrose    furosemide    glucagon (human recombinant)    HYDROcodone-acetaminophen    Magnesium Standard Dose Replacement - Follow Nurse / BPA Driven Protocol    miconazole    naloxone    nitroglycerin    ondansetron    pantoprazole    Phosphorus Replacement - Follow Nurse / BPA Driven Protocol    Potassium Replacement - Follow Nurse / BPA Driven Protocol    sodium chloride    sodium chloride    sodium chloride     Physical Findings      Chewing/Swallowing  Teeth Status: Mouth/Teeth WDL: .WDL except Teeth Symptoms: tooth/teeth missing  Chewing/Swallowing Issues:  pt on ground meat   Edema                            Bowel Function  Stool Output  Stool Unmeasured Occurrence: 1 (07/23/25 1020)  Bowel Incontinence: No (07/23/25 1020)  Stool Amount: Large (07/23/25 1020)  Stool Consistency: formed (07/23/25 1020)  Last Bowel Movement: 07/23/25   I/Os  Intake & Output (last 3 days)         07/20 0701 07/21 0700 07/21 0701 07/22 0700 07/22 0701 07/23 0700 07/23 0701  07/24 0700    P.O.   600 240    Total Intake(mL/kg)   600 (9.1) 240 (3.6)    Urine (mL/kg/hr)    300 (0.7)    Stool    0    Total Output    300    Net   +600 -60            Urine Unmeasured Occurrence   1 x 1 x    Stool Unmeasured Occurrence    1 x           Lines, Drains, Airways, & Wounds       Active LDAs       Name Placement date Placement time Site Days Last dressing change    Peripheral IV 07/20/25 1832 20 G Anterior;Distal;Left Forearm 07/20/25  1832  Forearm  2     External Urinary Catheter 07/20/25  0002  --  3     Wound 07/19/25 0921 Left gluteal Other (Comments) 07/19/25  0921  -- 4     Wound 07/19/25 0938 Left anterior thigh Surgical Open Surgical Incision 07/19/25  0938  -- 4     Wound 07/21/25 0930 Right coccyx Pressure Injury 07/21/25  0930  -- 2                           Estimated Needs      Energy Requirements    Weight for Calculation 65.8kg   Method for Estimation  MSJ   Daily Needs (kcal/day) 3732-0862 kcal ( miff 1.2-1.4)    Protein Requirements    Weight for Calculation 65.8kg   Method for Estimation 1.0-1.2 gm/kg   Daily Needs (g/day) 66-79 gm pro (1-1.2 gm/kg pro)   Fluid Requirements     Method for Estimation 1 mL/kcal    Daily Needs (mL/day) 7115-4754     Current Nutrition Orders & Evaluation of Intake      Oral Nutrition     Food Allergies  and Intolerances None to note   Current PO Diet Diet: Regular/House; Texture: Mechanical Ground (NDD 2); Fluid Consistency: Thin (IDDSI 0)   Oral Nutrition Supplement None     Trending % PO Intake 07/23/25:59% ave of meals x 8     Enteral Nutrition      Current EN Order Patient isn't on Tube Feeding  Patient doesn't have any tube feeding modular orders     EN Route      EN Tolerance     EN Observation/Intake         Parenteral Nutrition     Current TPN Order    TPN Route    Lipids (mL/%/frequency)     Total # Days on TPN    TPN Observation/Intake       Assessment & Plan   Nutrition Diagnosis and Goals       Nutrition Diagnosis 1 Increased Nutrient Needs (protein) related to increased demand for healing as evidenced by pressure injury/wound      Nutrition Diagnosis 2 None         Goal(s) Increase PO Intake  and maintain skin integrity      Nutrition Intervention and Prescription       Intervention  Recommend: MVT daily aid with skin healing, add Boost 1 per day      Diet Prescription Soft ground thin    Supplement Prescription Add Boost 1 per day    Education Provided  NA                   Monitoring/Evaluation       Monitor/Evaluation I&O, PO Intake, Pertinent Labs, Weight, and Skin Status     RD Follow-Up Encounter 3-5 days     Electronically signed by:  Jenn Avila RD  07/23/25 13:19 EDT

## 2025-07-23 NOTE — PROGRESS NOTES
Problems/Goals  Skin Integrity (Body Function Structure)  Current Status: Risk for further skin breakdown  Long Term Goals  07/22/2025 02:24 PM - Active  No worsening of skin breakdown/ No new skin breakdown  Potential for Injury (Safety)  Current Status: Risk for falls  Long Term Goals  07/22/2025 02:25 PM - Active  No falls this hospital stay    Signed by: Florinda Augustin RN

## 2025-07-23 NOTE — PROGRESS NOTES
Problems/Goals  Bed/Chair/Wheelchair (Mobility)  Current Status: max A  Long Term Goals  07/23/2025 04:15 PM - Active  Sup  Walk (Mobility)  Current Status: unable  Long Term Goals  07/23/2025 04:15 PM - Active  amb 150' RW Sup    Signed by: Andreia Dove, Physical Therapist

## 2025-07-23 NOTE — PROGRESS NOTES
Patient Information    YOB: 1960      Gender:  Female    Primary Language: English    Preferred Language: English    Rehab Diagnosis/Condition  Diagnosis/Conditions that caused the need for rehabilitation.      Left Intertrochanteric Hip Fracture S/P Repair    Rehabilitation Therapy Program  Expected Participation in Rehabilitation Program:  At least 3 hours per day at least 5 days per week    Anticipated Interventions                        Expected Intensity (hours/day)  Expected Frequency  (days/week)  Expected Duration (total # days/IRF stay)  Physical Therapy    1.5 hours per day   5 days per week     14 days  Occupational Therapy  1.5 hours per day   5 days per week     14 days        Other Disciplines Participating in Plan of Care:  Case Management, Nursing,  Recreational Therapist    Estimated Length of Stay  Estimated Length of Stay:  14 days    Estimated Discharge Date:   08/05/2025    Anticipated Discharge Destination  Anticipated Discharge Destination:  To community with assistance    Discharge Destination Information:  Patient will discharge home with spouse providing assistance if needed.    Signed by: MITA Lainez

## 2025-07-23 NOTE — PROGRESS NOTES
[facility] Section GG Coding Mobility (Admission)                                            Coding  ZJ9418.A  Roll Left and Right           02. Substantial/maximal assistance - H                                          elper does MORE THAN HALF the effort.                                          [place] lifts or holds trunk or limbs a                                          nd provides more than half the effort.  PT2479.B  Sit to Lying                  02. Substantial/maximal assistance - H                                          elper does MORE THAN HALF the effort.                                          [place] lifts or holds trunk or limbs a                                          nd provides more than half the effort.  JC0480.C  Lying to Sitting on Side of Bed  02. Substantial/maximal assistance -  H                                          elper does MORE THAN HALF the effort.                                          [place] lifts or holds trunk or limbs a                                          nd provides more than half the effort.  SK0478.D  Sit to Stand                  02. Substantial/maximal assistance - H                                          elper does MORE THAN HALF the effort.                                          [place] lifts or holds trunk or limbs a                                          nd provides more than half the effort.  UZ7676.E  Chair/Bed-to-Chair Transfer   02. Substantial/maximal assistance - H                                          elper does MORE THAN HALF the effort.                                          [place] lifts or holds trunk or limbs a                                          nd provides more than half the effort.  WA2254.F  Toilet Transfer               88. Not attempted due to medical condi                                          tion or safety concerns  WM3189.G  Car Transfer                  88. Not attempted due to medical condi                                           tion or safety concerns  RV2037.I  Walk 10 Feet                  88. Not attempted due to medical condi                                          tion or safety concerns  YZ6696.J  Walk 50 Feet with Two Turns   88. Not attempted due to medical condi                                          tion or safety concerns  EP6092.K  Walk 150 Feet                 88. Not attempted due to medical condi                                          tion or safety concerns  VH8864.L  Walking 10 Feet on Uneven Surface  88. Not attempted due to medical  condi                                          tion or safety concerns  TY4945.M  1 Step (curb)                 88. Not attempted due to medical condi                                          tion or safety concerns  LL2158.N  4 Steps                       88. Not attempted due to medical condi                                          tion or safety concerns  SE2992.O  12 Steps                      88. Not attempted due to medical condi                                          tion or safety concerns  BZ3948.P  Picking Up Object             88. Not attempted due to medical condi                                          tion or safety concerns  QG1832.Q1  Does the patient use a wheelchair and/or scooter?  0. No    AQ3283.R  Wheel 50 Feet with Two Turns  88. Not attempted due to medical condi                                          tion or safety concerns  NH2017.RR1  Type of Wheelchair/Scooter for 50 Feet with Two Turns  1. Manual    EG3892.S  Wheel 150 Feet                88. Not attempted due to medical condi                                          tion or safety concerns  NS4755.SS1  Type of Wheelchair/Scooter for 150 Feet  1. Manual    Signed by: Andreia Dove, Physical Therapist

## 2025-07-23 NOTE — THERAPY EVALUATION
Inpatient Rehabilitation - Occupational Therapy Initial Evaluation and Treatment Note    GUANACO Portillo     Patient Name: Lashae Benitez  : 1960  MRN: [id number]    Today's Date: 2025                 Admit Date: 2025       No diagnosis found.    Patient Active Problem List   Diagnosis    Hiatal hernia    Essential hypertension    Sjogren's syndrome    Multiple sclerosis    Psoriasis    Fibromyalgia    Osteoarthritis    Psoriatic arthritis    RA (rheumatoid arthritis)    Degenerative disc disease, lumbar    TMJ arthritis    Dupuytren's disease    H. pylori infection    Family history of coronary artery disease    Type 2 diabetes mellitus    Edema    Bilateral leg edema    Isolated corticotropin deficiency    Hyponatremia    Paroxysmal atrial fibrillation    Sepsis    Bacteremia, escherichia coli    Iron deficiency anemia    Malabsorption due to intolerance, not elsewhere classified    Chronic chest pain with high risk for CAD    Abnormal nuclear stress test    Abnormal computed tomography angiography (CTA)    Hip fracture    Closed intertrochanteric fracture of hip, left, initial encounter    Closed left hip fracture, sequela       Past Medical History:   Diagnosis Date    Acid reflux     Allergic     Anemia     Anxiety     Atrial fibrillation     Cancer     thyroid, skin    Cervical disc disorder     Chronic pain disorder     Claustrophobia     CTS (carpal tunnel syndrome)     Degenerative disc disease, lumbar     Depression     Dupuytren's disease     Essential hypertension     Family history of coronary artery disease     Fibromyalgia     Gallbladder abscess     H. pylori infection     Hiatal hernia     Hyperlipidemia     Hypothyroidism     Low back pain     Lumbosacral disc disease     Migraines     migraines    Multiple sclerosis     Osteoarthritis     Peripheral neuropathy     Psoriasis     Psoriatic arthritis     RA (rheumatoid arthritis)     Rheumatoid arthritis     Sinusitis     Sjogren's syndrome      Stomach ulcer     Thoracic disc disorder     TMJ arthritis     Type 2 diabetes mellitus     Urinary tract infection        Past Surgical History:   Procedure Laterality Date    BACK SURGERY      BREAST LUMPECTOMY Left 11/1993    CARDIAC CATHETERIZATION Left 09/21/2009    Normal     CARDIAC CATHETERIZATION N/A 6/24/2025    Procedure: Left Heart Cath;  Surgeon: Teodora Barron MD;  Location:  [facility];  Service: Cardiovascular;  Laterality: N/A;    CARPAL TUNNEL RELEASE  03/2012    CERVICAL POLYPECTOMY  12/2015    CHOLECYSTECTOMY  05/2007    COLONOSCOPY      ENDOSCOPY      EPIDURAL BLOCK      GASTRIC BYPASS  09/2007    JOINT REPLACEMENT      KNEE ARTHROPLASTY      LUMBAR DISC SURGERY      C5-6    NECK SURGERY      REPLACEMENT TOTAL KNEE Right 06/2016    SACRAL NERVE STIMULATOR PLACEMENT  09/2014    SACRAL NERVE STIMULATOR PLACEMENT  04/11/2024    @ [facility] in [place]    SACRAL NERVE STIMULATOR PLACEMENT Right 04/17/2024    THYROIDECTOMY  03/2016    TRIGGER POINT INJECTION               IRF OT ASSESSMENT FLOWSHEET (Last 12 Hours)       IRF OT Evaluation and Treatment       Row Name 07/23/25 1526          OT Time and Intention    Document Type initial evaluation;daily treatment  -     Mode of Treatment occupational therapy  -     Symptoms Noted During/After Treatment --  c/o LLE pain; RN notified  -       Row Name 07/23/25 1526          General Information    Patient Profile Reviewed yes  -     Patient/Family/Caregiver Comments/Observations agreeable to therapy  -     Existing Precautions/Restrictions fall;weight bearing  TTWB LLE  -       Row Name 07/23/25 1526          Previous Level of Function/Home Environm    BADLs, Previous Functional Level independent;uses device or equipment  -       Row Name 07/23/25 1526          Living Environment    Current Living Arrangements home  -CJ     People in Home spouse  -     Primary Care Provided by self  -       Row Name 07/23/25 1526           Home Use of Assistive/Adaptive Equipment    Equipment Currently Used at Home cane, straight;shower chair  -[facility] Name 07/23/25 1526          Cognition/Psychosocial    Follows Commands (Cognition) [facility]     Personal Safety Interventions gait belt;nonskid shoes/slippers when out of bed;supervised activity;fall prevention program maintained  -CJ       Row Name 07/23/25 1526          Range of Motion Comprehensive    General Range of Motion bilateral upper extremity ROM [facility]  -CJ       Row Name 07/23/25 1526          Strength Comprehensive (MMT)    Comment, General Manual Muscle Testing (MMT) Assessment BUE - 3+ to 4-/5  -CJ       Row Name 07/23/25 1526          Sensory    Additional Documentation --  BUE light touch - [facility]  -CJ       Row Name 07/23/25 1526          Vision Assessment/Intervention    Visual Impairment/Limitations corrective lenses full-time  -CJ       Row Name 07/23/25 1526          Sensory Assessment (Somatosensory)    Sensory Subjective Reports numbness;tingling  BUE  -CJ       Row Name 07/23/25 1526          Basic Activities of Daily Living (BADLs)    Basic Activities of Daily Living bathing;upper body dressing;lower body dressing;grooming;toileting;self-feeding  -CJ       Row Name 07/23/25 1526          Bathing    [place] Level (Bathing) moderate assist (50% patient effort);maximum assist (25% patient effort);verbal cues  -CJ       Row Name 07/23/25 1526          Upper Body Dressing    [place] Level (Upper Body Dressing) set up assistance;supervision;pull over garment  -CJ       Row Name 07/23/25 1526          Lower Body Dressing    [place] Level (Lower Body Dressing) maximum assist (25% patient effort);dependent (less than 25% patient effort);verbal cues  -CJ       Row Name 07/23/25 1526          Grooming    [place] Level (Grooming) set up;supervision  -CJ       Row Name 07/23/25 1526          Toileting    [place] Level (Toileting) dependent (less than 25% patient  effort);maximum assist (25% patient effort);verbal cues  -     Assistive Device Use (Toileting) grab bar/safety frame;raised toilet seat  -       Row Name 07/23/25 1526          Self-Feeding    [place] Level (Self-Feeding) set up  -       Row Name 07/23/25 1526          Mobility    Left Lower Extremity (Weight-bearing Status) toe touch weight-bearing (TTWB)  -       Row Name 07/23/25 1526          Transfer Assessment/Treatment    Transfers bed-chair transfer;toilet transfer  -       Row Name 07/23/25 1526          Bed-Chair Transfer    Bed-Chair [place] (Transfers) maximum assist (25% patient effort);verbal cues  -     Assistive Device (Bed-Chair Transfers) wheelchair  -       Row Name 07/23/25 1526          Toilet Transfer    [place] Level (Toilet Transfer) maximum assist (25% patient effort);verbal cues  -     Assistive Device (Toilet Transfer) grab bars/safety frame;raised toilet seat;wheelchair  -       Row Name 07/23/25 1526          Motor Skills    Motor Skills functional endurance;motor control/coordination interventions  -     Functional Endurance decreased  -     Motor Control/Coordination Interventions therapeutic exercise/ROM  BUE ther ex, AROM  -     Therapeutic Exercise --  UBE  -       Row Name 07/23/25 1526          Positioning and Restraints    Post Treatment Position wheelchair  -     In Wheelchair sitting;with nsg;encouraged to call for assist  -     Bathroom --  -       Row Name 07/23/25 1526          Therapy Assessment/Plan (OT)    Patient's Goals For Discharge return home;take care of myself at home  -       Row Name 07/23/25 1526          Therapy Assessment/Plan (OT)    OT Diagnosis L intertrochanteric hip fx s/p ORIF w/ IMN  -     Rehab Potential/Prognosis (OT) good;fair  -     Frequency of Treatment (OT) 5 times per week  -     Estimated Duration of Therapy (OT) 2 weeks;3 weeks  -     Activity Limitations Related to Problem List (OT)  BADLs not performed adequately or safely;unable to transfer safely  -     Planned Therapy Interventions (OT) activity tolerance training;adaptive equipment training;BADL retraining;occupation/activity based interventions;patient/caregiver education/training;ROM/therapeutic exercise;strengthening exercise  impaired ADL's, strength, fxl mobility, and activity tolerance  -       Row Name 07/23/25 1526          Therapy Plan Review/Discharge Plan (OT)    Anticipated Equipment Needs At Discharge (OT) --  TBD  -     Anticipated Discharge Disposition (OT) home with 24/7 care  -       Row Name 07/23/25 1526          IRF OT Goals    LB Dressing Goal Selection (OT-IRF) LB dressing, OT goal 1;LB dressing, OT goal 2  -     Toileting Goal Selection (OT-IRF) toileting, OT goal 1;toileting, OT goal 2  -       Row Name 07/23/25 1526          LB Dressing Goal 1 (OT-IRF)    [place] (LB Dressing Goal 1, OT-IRF) maximum assist (25-49% patient effort)  -     Time Frame (LB Dressing Goal 1, OT-IRF) short-term goal (STG)  -       Row Name 07/23/25 1526          LB Dressing Goal 2 (OT-IRF)    [place] (LB Dressing Goal 2, OT-IRF) moderate assist (50-74% patient effort)  -     Time Frame (LB Dressing Goal 2, OT-IRF) long-term goal (LTG);by discharge  -       Row Name 07/23/25 1526          Toileting Goal 1 (OT-IRF)    [place] Level (Toileting Goal 1, OT-IRF) maximum assist (25-49% patient effort)  -     Time Frame (Toileting Goal 1, OT-IRF) short-term goal (STG)  -       Row Name 07/23/25 1526          Toileting Goal 2 (OT-IRF)    [place] Level (Toileting Goal 2, OT-IRF) moderate assist (50-74% patient effort)  -     Time Frame (Toileting Goal 2, OT-IRF) long-term goal (LTG);by discharge  -               User Key  (r) = Recorded By, (t) = Taken By, (c) = Cosigned By      Initials Name Effective Dates     Ambre Badillo, OT 06/16/21 -                      Occupational Therapy Education        Title: PT OT SLP Therapies (In Progress)       Topic: Occupational Therapy (Done)       Point: ADL training (Done)       Learning Progress Summary            Patient Acceptance, E,D, VU,NR by  at 7/23/2025 1541                      Point: Precautions (Done)       Learning Progress Summary            Patient Acceptance, E,D, VU,NR by  at 7/23/2025 1541                                      User Key       Initials Effective Dates Name Provider Type [facility] 06/16/21 -  Amber Badillo OT Occupational Therapist OT                        OT Recommendation and Plan    Planned Therapy Interventions (OT): activity tolerance training, adaptive equipment training, BADL retraining, occupation/activity based interventions, patient/caregiver education/training, ROM/therapeutic exercise, strengthening exercise (impaired ADL's, strength, fxl mobility, and activity tolerance)                    Time Calculation:      Time Calculation- OT       Row Name 07/23/25 1541             Time Calculation- OT    OT Start Time 0815  -      OT Stop Time 0955  -      OT Time Calculation (min) 100 min  -      Total Timed Code Minutes-  minute(s)  -                User Key  (r) = Recorded By, (t) = Taken By, (c) = Cosigned By      Initials Name Provider Type     Amber Badillo OT Occupational Therapist                  Therapy Charges for Today       Code Description Service Date Service Provider Modifiers Qty    27512231602 HC OT EVAL HIGH COMPLEXITY 3 7/23/2025 Amber Badillo OT GO 1    15246812713 HC OT THER PROC EA 15 MIN 7/23/2025 Amber Badillo OT GO 2    39177095738 HC OT SELF CARE/MGMT/TRAIN EA 15 MIN 7/23/2025 Amber Badillo OT GO 2                     Amber Badillo OT  7/23/2025

## 2025-07-23 NOTE — PROGRESS NOTES
Problems/Goals  Dressing (Lower) (Self Care)  Current Status: Max/Dependent  Long Term Goals  07/23/2025 12:26 PM - Active  ModA    Signed by: Amber Badillo, Occupational Therapist

## 2025-07-23 NOTE — PLAN OF CARE
Problem: Rehabilitation (IRF) Plan of Care  Goal: Plan of Care Review  Outcome: Progressing  Flowsheets (Taken 7/23/2025 0301)  Progress: no change  Plan of Care Reviewed With: patient  Goal: Patient-Specific Goal (Individualized)  Outcome: Progressing  Goal: Absence of New-Onset Illness or Injury  Outcome: Progressing  Intervention: Identify and Manage Fall Risk  Recent Flowsheet Documentation  Taken 7/23/2025 0200 by Debo Kurtz, RN  Safety Promotion/Fall Prevention:   room organization consistent   safety round/check completed   nonskid shoes/slippers when out of bed   mobility aid in reach   lighting adjusted   fall prevention program maintained   clutter free environment maintained   assistive device/personal items within reach  Taken 7/23/2025 0000 by Debo Kurtz, RN  Safety Promotion/Fall Prevention:   room organization consistent   safety round/check completed   nonskid shoes/slippers when out of bed   mobility aid in reach   lighting adjusted   fall prevention program maintained   clutter free environment maintained   assistive device/personal items within reach  Taken 7/22/2025 2159 by Debo Kurtz, RN  Safety Promotion/Fall Prevention:   room organization consistent   safety round/check completed   nonskid shoes/slippers when out of bed   mobility aid in reach   lighting adjusted   fall prevention program maintained   clutter free environment maintained   assistive device/personal items within reach  Taken 7/22/2025 2000 by Debo Kurtz, RN  Safety Promotion/Fall Prevention:   room organization consistent   safety round/check completed   nonskid shoes/slippers when out of bed   mobility aid in reach   lighting adjusted   fall prevention program maintained   clutter free environment maintained   assistive device/personal items within reach  Intervention: Prevent Infection  Recent Flowsheet Documentation  Taken 7/23/2025 0200 by Debo Kurtz,  RN  Infection Prevention:   rest/sleep promoted   personal protective equipment utilized   hand hygiene promoted   equipment surfaces disinfected   environmental surveillance performed  Taken 7/23/2025 0000 by Debo Kurtz RN  Infection Prevention:   rest/sleep promoted   personal protective equipment utilized   hand hygiene promoted   equipment surfaces disinfected   environmental surveillance performed  Taken 7/22/2025 2159 by Debo Kurtz, RN  Infection Prevention:   rest/sleep promoted   personal protective equipment utilized   hand hygiene promoted   equipment surfaces disinfected   environmental surveillance performed  Taken 7/22/2025 2000 by Debo Kurtz, RN  Infection Prevention:   rest/sleep promoted   personal protective equipment utilized   hand hygiene promoted   equipment surfaces disinfected   environmental surveillance performed  Goal: Optimal Comfort and Wellbeing  Outcome: Progressing  Intervention: Monitor Pain and Promote Comfort  Recent Flowsheet Documentation  Taken 7/22/2025 2020 by Debo Kurtz, RN  Pain Management Interventions: pain medication given  Goal: Home and Community Transition Plan Established  Outcome: Progressing     Problem: Skin Injury Risk Increased  Goal: Skin Health and Integrity  Outcome: Progressing     Problem: Comorbidity Management  Goal: Blood Pressure in Desired Range  Outcome: Progressing   Goal Outcome Evaluation:  Plan of Care Reviewed With: patient        Progress: no change

## 2025-07-23 NOTE — THERAPY EVALUATION
Inpatient Rehabilitation - Physical Therapy Initial Evaluation        [facility]     Patient Name: Lashae Benitez  : 1960  MRN: [id number]    Today's Date: 2025                    Admit Date: 2025      Visit Dx:   No diagnosis found.    Patient Active Problem List   Diagnosis    Hiatal hernia    Essential hypertension    Sjogren's syndrome    Multiple sclerosis    Psoriasis    Fibromyalgia    Osteoarthritis    Psoriatic arthritis    RA (rheumatoid arthritis)    Degenerative disc disease, lumbar    TMJ arthritis    Dupuytren's disease    H. pylori infection    Family history of coronary artery disease    Type 2 diabetes mellitus    Edema    Bilateral leg edema    Isolated corticotropin deficiency    Hyponatremia    Paroxysmal atrial fibrillation    Sepsis    Bacteremia, escherichia coli    Iron deficiency anemia    Malabsorption due to intolerance, not elsewhere classified    Chronic chest pain with high risk for CAD    Abnormal nuclear stress test    Abnormal computed tomography angiography (CTA)    Hip fracture    Closed intertrochanteric fracture of hip, left, initial encounter    Closed left hip fracture, sequela       Past Medical History:   Diagnosis Date    Acid reflux     Allergic     Anemia     Anxiety     Atrial fibrillation     Cancer     thyroid, skin    Cervical disc disorder     Chronic pain disorder     Claustrophobia     CTS (carpal tunnel syndrome)     Degenerative disc disease, lumbar     Depression     Dupuytren's disease     Essential hypertension     Family history of coronary artery disease     Fibromyalgia     Gallbladder abscess     H. pylori infection     Hiatal hernia     Hyperlipidemia     Hypothyroidism     Low back pain     Lumbosacral disc disease     Migraines     migraines    Multiple sclerosis     Osteoarthritis     Peripheral neuropathy     Psoriasis     Psoriatic arthritis     RA (rheumatoid arthritis)     Rheumatoid arthritis     Sinusitis     Sjogren's syndrome      Stomach ulcer     Thoracic disc disorder     TMJ arthritis     Type 2 diabetes mellitus     Urinary tract infection        Past Surgical History:   Procedure Laterality Date    BACK SURGERY      BREAST LUMPECTOMY Left 11/1993    CARDIAC CATHETERIZATION Left 09/21/2009    Normal     CARDIAC CATHETERIZATION N/A 6/24/2025    Procedure: Left Heart Cath;  Surgeon: Teodora Barron MD;  Location:  [facility];  Service: Cardiovascular;  Laterality: N/A;    CARPAL TUNNEL RELEASE  03/2012    CERVICAL POLYPECTOMY  12/2015    CHOLECYSTECTOMY  05/2007    COLONOSCOPY      ENDOSCOPY      EPIDURAL BLOCK      GASTRIC BYPASS  09/2007    JOINT REPLACEMENT      KNEE ARTHROPLASTY      LUMBAR DISC SURGERY      C5-6    NECK SURGERY      REPLACEMENT TOTAL KNEE Right 06/2016    SACRAL NERVE STIMULATOR PLACEMENT  09/2014    SACRAL NERVE STIMULATOR PLACEMENT  04/11/2024    @ [facility] in [place]    SACRAL NERVE STIMULATOR PLACEMENT Right 04/17/2024    THYROIDECTOMY  03/2016    TRIGGER POINT INJECTION         PT ASSESSMENT (Last 12 Hours)       IRF PT Evaluation and Treatment       Row Name 07/23/25 1534          PT Time and Intention    Document Type initial evaluation;daily treatment  -LB     Mode of Treatment individual therapy;physical therapy  -LB       Row Name 07/23/25 1534          General Information    Existing Precautions/Restrictions fall;weight bearing  LLE TTWB  -LB       Row Name 07/23/25 1534          Living Environment    Home Accessibility --  has 2-3 steps to enter  -LB     People in Home spouse  -LB       Row Name 07/23/25 1534          Pain    Pain Location --  L hip and leg  -LB     Pain Management Interventions --  rest, repositioned, states she already had her pain medicine  -LB     Additional Documentation Pain Scale: FACES Pre/Post-Treatment (Group)  -LB       Row Name 07/23/25 1534          Pain Scale: FACES Pre/Post-Treatment    Pain: FACES Scale, Pretreatment 6-->hurts even more  -LB      Posttreatment Pain Rating 8-->hurts whole lot  -LB     Pre/Posttreatment Pain Comment pain increases with movement  -LB       Row Name 07/23/25 1534          Cognition/Psychosocial    Affect/Mental Status (Cognition) WFL  -LB     Follows Commands (Cognition) verbal cues/prompting required;physical/tactile prompts required  -LB     Personal Safety Interventions gait belt;nonskid shoes/slippers when out of bed;supervised activity  -LB       [facility] Name 07/23/25 1534          Range of Motion (ROM)    Range of Motion --  RLE WFL, LLE limited 25-50% due to pain  -LB       Row Name 07/23/25 1534          Strength (Manual Muscle Testing)    Strength (Manual Muscle Testing) --  RLE WFL, LLE needs mod A to complete ex and max A for bed mobility  -LB       Row Name 07/23/25 1534          Mobility    Left Lower Extremity (Weight-bearing Status) toe touch weight-bearing (TTWB)  -LB       Row Name 07/23/25 1534          Bed Mobility    Bed Mobility supine-sit;sit-supine;supine-sit-supine  -LB     Supine-Sit-Supine [place] (Bed Mobility) maximum assist (25% patient effort);verbal cues;nonverbal cues (demo/gesture)  -LB     Comment, (Bed Mobility) assist to lift and scoot her LLE  -LB       Row Name 07/23/25 1534          Transfer Assessment/Treatment    Transfers bed-chair transfer;chair-bed transfer;sit-stand transfer;stand-sit transfer  -LB       Row Name 07/23/25 1534          Bed-Chair Transfer    Bed-Chair [place] (Transfers) maximum assist (25% patient effort);verbal cues;nonverbal cues (demo/gesture)  -LB     Assistive Device (Bed-Chair Transfers) wheelchair  -LB       Row Name 07/23/25 1534          Chair-Bed Transfer    Chair-Bed [place] (Transfers) maximum assist (25% patient effort);verbal cues;nonverbal cues (demo/gesture)  -LB     Assistive Device (Chair-Bed Transfers) wheelchair  -LB       Row Name 07/23/25 1534          Sit-Stand Transfer    Sit-Stand [place] (Transfers) moderate assist (50%  patient effort);verbal cues;nonverbal cues (demo/gesture)  -LB     Assistive Device (Sit-Stand Transfers) parallel bars  -LB       Row Name 07/23/25 1534          Stand-Sit Transfer    Stand-Sit [place] (Transfers) moderate assist (50% patient effort);verbal cues;nonverbal cues (demo/gesture)  -LB     Assistive Device (Stand-Sit Transfers) parallel bars  -LB       Row Name 07/23/25 1534          Gait/Stairs (Locomotion)    Comment, (Gait/Stairs) she was unable to take a step due to increased pain and TTWB restriction  -LB       Row Name 07/23/25 1534          Safety Issues/Impairments Affecting Functional Mobility    Impairments Affecting Function (Mobility) balance;endurance/activity tolerance;pain;range of motion (ROM);strength  -LB       Row Name 07/23/25 1534          Balance    Balance Assessment sitting static balance;sitting dynamic balance;standing static balance;standing dynamic balance  -LB     Static Sitting Balance standby assist  -LB     Dynamic Sitting Balance --  bag toss BUE at edge of mat, to facilitate weight shifting for transfers  -LB     Static Standing Balance --  min A in PB, she was able to maintain TTWB on LLE. she stood 1 min x2  -LB       Row Name 07/23/25 1534          Motor Skills    Therapeutic Exercise --  sitting ex 2 sets, supine ex 1 set on mat. she needed assist for LLE ex.  -LB       Row Name 07/23/25 1534          Positioning and Restraints    Pre-Treatment Position sitting in chair/recliner  -LB     Post Treatment Position wheelchair  -LB     In Wheelchair call light within reach;encouraged to call for assist;exit alarm on;with family/caregiver;legs elevated  -LB       Row Name 07/23/25 1534          Vital Signs    Intra Systolic BP Rehab 106  -LB     Intra Treatment Diastolic BP 58  -LB     Intratreatment Heart Rate (beats/min) 79  -LB     Intra SpO2 (%) 95  -LB     O2 Delivery Intra Treatment room air  -LB     Intra Patient Position --  after standing in PB  -LB       Row  Name 07/23/25 1534          Therapy Assessment/Plan (PT)    Patient's Goals For Discharge return home  -LB       Row Name 07/23/25 1534          Therapy Assessment/Plan (PT)    PT Diagnosis (PT) s/p L hip nailing  -LB     Rehab Potential/Prognosis (PT) fair;good  -LB     Frequency of Treatment (PT) 5 times per week  -LB     Estimated Duration of Therapy (PT) 2 weeks  -LB     Problem List (PT) balance;mobility;range of motion (ROM);strength;pain;postural control  -LB     Activity Limitations Related to Problem List (PT) unable to ambulate safely;unable to transfer safely  -LB       Row Name 07/23/25 1534          Therapy Plan Review/Discharge Plan (PT)    Therapy Plan Review (PT) evaluation/treatment results reviewed;care plan/treatment goals reviewed;risks/benefits reviewed;participants voiced agreement with care plan  -LB     Anticipated Equipment Needs at Discharge (PT Eval) --  tbd  -LB     Anticipated Discharge Disposition (PT) home with assist;home with home health;home with outpatient therapy services  -LB       Row Name 07/23/25 1534          IRF PT Goals    Bed Mobility Goal Selection (PT-IRF) bed mobility, PT goal 1  -LB     Transfer Goal Selection (PT-IRF) transfers, PT goal 1  -LB     Gait (Walking Locomotion) Goal Selection (PT-IRF) gait, PT goal 1  -LB       Row Name 07/23/25 1534          Bed Mobility Goal 1 (PT-IRF)    Activity/Assistive Device (Bed Mobility Goal 1, PT-IRF) sit to supine/supine to sit  -LB     [place] Level (Bed Mobility Goal 1, PT-IRF) supervision required  -LB     Time Frame (Bed Mobility Goal 1, PT-IRF) by discharge  -LB       Row Name 07/23/25 1534          Transfer Goal 1 (PT-IRF)    Activity/Assistive Device (Transfer Goal 1, PT-IRF) sit-to-stand/stand-to-sit;bed-to-chair/chair-to-bed  -LB     [place] Level (Transfer Goal 1, PT-IRF) supervision required  -LB     Time Frame (Transfer Goal 1, PT-IRF) by discharge  -LB       Row Name 07/23/25 1534          Gait/Walking  Locomotion Goal 1 (PT-IRF)    Activity/Assistive Device (Gait/Walking Locomotion Goal 1, PT-IRF) walker, rolling  -LB     Gait/Walking Locomotion Distance Goal 1 (PT-IRF) 150'  -LB     [place] Level (Gait/Walking Locomotion Goal 1, PT-IRF) supervision required  -LB     Time Frame (Gait/Walking Locomotion Goal 1, PT-IRF) by discharge  -LB               User Key  (r) = Recorded By, (t) = Taken By, (c) = Cosigned By      Initials Name Provider Type    LB Andreia Dove, PT Physical Therapist                  Wound 07/19/25 0921 Left gluteal Other (Comments) (Active)   Dressing Appearance open to air 07/23/25 0930   Dressing Removed Type silicone border foam 07/22/25 2200   Closure Adhesive bandage;Unable to assess 07/22/25 2200   Base red 07/23/25 0930   Periwound warm;pink;redness 07/23/25 0930   Periwound Temperature warm 07/23/25 0930   Periwound Skin Turgor soft 07/23/25 0930   Care, Wound barrier applied 07/23/25 0930       Wound 07/19/25 0938 Left anterior thigh Surgical Open Surgical Incision (Active)   Dressing Appearance dry;intact 07/23/25 0930   Closure Adhesive bandage;Unable to assess 07/22/25 2200   Base pink;red 07/23/25 0930   Periwound pink;redness 07/23/25 0930   Periwound Temperature warm 07/23/25 0930   Periwound Skin Turgor soft 07/23/25 0930   Edges rolled/closed 07/23/25 0930       Wound 07/21/25 0930 Right coccyx Pressure Injury (Active)   Dressing Appearance open to air 07/23/25 0930   Base pink;red;purple 07/23/25 0930   Periwound redness 07/23/25 0930   Periwound Temperature warm 07/23/25 0930   Drainage Amount none 07/23/25 0930   Care, Wound cleansed with;soap and water;barrier applied;pressure removed 07/22/25 2200   Dressing Care open to air 07/23/25 0930     Physical Therapy Education       Title: PT OT SLP Therapies (Done)       Topic: Physical Therapy (Done)       Point: Mobility training (Done)       Learning Progress Summary            Patient Acceptance, E, VU,NR by TIM  at 7/23/2025 1612                      Point: Home exercise program (Done)       Learning Progress Summary            Patient Acceptance, E, VU,NR by LB at 7/23/2025 1612                      Point: Body mechanics (Done)       Learning Progress Summary            Patient Acceptance, E, VU,NR by LB at 7/23/2025 1612                      Point: Precautions (Done)       Learning Progress Summary            Patient Acceptance, E, VU,NR by LB at 7/23/2025 1612                                      User Key       Initials Effective Dates Name Provider Type Discipline     06/16/21 -  Andreia Dove, PT Physical Therapist PT                    PT Recommendation and Plan    Planned Therapy Interventions (PT): balance training, bed mobility training, gait training, home exercise program, patient/family education, postural re-education, ROM (range of motion), strengthening, transfer training  Frequency of Treatment (PT): 5 times per week  Anticipated Equipment Needs at Discharge (PT Eval):  (tbd)                  Time Calculation:      PT Charges       Row Name 07/23/25 1612             Time Calculation    Start Time 1000  -LB      Stop Time 1130  -LB      Time Calculation (min) 90 min  -LB      PT Received On 07/23/25  -LB      PT Goal Re-Cert Due Date 07/30/25  -LB                User Key  (r) = Recorded By, (t) = Taken By, (c) = Cosigned By      Initials Name Provider Type    Andreia Bhatia, PT Physical Therapist                    Therapy Charges for Today       Code Description Service Date Service Provider Modifiers Qty    83201683961 HC PT EVAL LOW COMPLEXITY 1 7/23/2025 Andreia Dove, PT GP 1    65219737353 HC PT THER PROC EA 15 MIN 7/23/2025 Andreia Dove, PT GP 3    95761208814 HC PT THERAPEUTIC ACT EA 15 MIN 7/23/2025 Andreia Dove, PT GP 2              PT G-Codes  AM-PAC 6 Clicks Score (PT): 12      Andreia Dove PT  7/23/2025

## 2025-07-24 ENCOUNTER — APPOINTMENT (OUTPATIENT)
Dept: GENERAL RADIOLOGY | Facility: HOSPITAL | Age: 65
DRG: 536 | End: 2025-07-24
Payer: MEDICARE

## 2025-07-24 LAB
GLUCOSE BLDC GLUCOMTR-MCNC: 135 MG/DL (ref 70–130)
GLUCOSE BLDC GLUCOMTR-MCNC: 181 MG/DL (ref 70–130)
GLUCOSE BLDC GLUCOMTR-MCNC: 199 MG/DL (ref 70–130)
GLUCOSE BLDC GLUCOMTR-MCNC: 205 MG/DL (ref 70–130)
HCT VFR BLD AUTO: 29.3 % (ref 34–46.6)
HGB BLD-MCNC: 9.5 G/DL (ref 12–15.9)

## 2025-07-24 PROCEDURE — 82948 REAGENT STRIP/BLOOD GLUCOSE: CPT

## 2025-07-24 PROCEDURE — 99232 SBSQ HOSP IP/OBS MODERATE 35: CPT | Performed by: FAMILY MEDICINE

## 2025-07-24 PROCEDURE — 85018 HEMOGLOBIN: CPT | Performed by: FAMILY MEDICINE

## 2025-07-24 PROCEDURE — 97110 THERAPEUTIC EXERCISES: CPT

## 2025-07-24 PROCEDURE — 97535 SELF CARE MNGMENT TRAINING: CPT

## 2025-07-24 PROCEDURE — 74230 X-RAY XM SWLNG FUNCJ C+: CPT

## 2025-07-24 PROCEDURE — 63710000001 INSULIN LISPRO (HUMAN) PER 5 UNITS: Performed by: FAMILY MEDICINE

## 2025-07-24 PROCEDURE — 94799 UNLISTED PULMONARY SVC/PX: CPT

## 2025-07-24 PROCEDURE — 74230 X-RAY XM SWLNG FUNCJ C+: CPT | Performed by: RADIOLOGY

## 2025-07-24 PROCEDURE — 94664 DEMO&/EVAL PT USE INHALER: CPT

## 2025-07-24 PROCEDURE — 85014 HEMATOCRIT: CPT | Performed by: FAMILY MEDICINE

## 2025-07-24 PROCEDURE — 97530 THERAPEUTIC ACTIVITIES: CPT

## 2025-07-24 PROCEDURE — 94760 N-INVAS EAR/PLS OXIMETRY 1: CPT

## 2025-07-24 PROCEDURE — 92611 MOTION FLUOROSCOPY/SWALLOW: CPT

## 2025-07-24 RX ORDER — HYDROCODONE BITARTRATE AND ACETAMINOPHEN 7.5; 325 MG/1; MG/1
1 TABLET ORAL EVERY 4 HOURS PRN
Status: DISCONTINUED | OUTPATIENT
Start: 2025-07-24 | End: 2025-07-28

## 2025-07-24 RX ADMIN — GABAPENTIN 600 MG: 300 CAPSULE ORAL at 13:16

## 2025-07-24 RX ADMIN — FLECAINIDE ACETATE 50 MG: 50 TABLET ORAL at 20:49

## 2025-07-24 RX ADMIN — METOPROLOL TARTRATE 50 MG: 50 TABLET, FILM COATED ORAL at 20:49

## 2025-07-24 RX ADMIN — GABAPENTIN 600 MG: 300 CAPSULE ORAL at 05:24

## 2025-07-24 RX ADMIN — FLECAINIDE ACETATE 50 MG: 50 TABLET ORAL at 09:16

## 2025-07-24 RX ADMIN — GABAPENTIN 600 MG: 300 CAPSULE ORAL at 21:05

## 2025-07-24 RX ADMIN — INSULIN LISPRO 3 UNITS: 100 INJECTION, SOLUTION INTRAVENOUS; SUBCUTANEOUS at 12:47

## 2025-07-24 RX ADMIN — DULOXETINE 60 MG: 60 CAPSULE, DELAYED RELEASE ORAL at 09:18

## 2025-07-24 RX ADMIN — BUDESONIDE AND FORMOTEROL FUMARATE DIHYDRATE 2 PUFF: 160; 4.5 AEROSOL RESPIRATORY (INHALATION) at 19:49

## 2025-07-24 RX ADMIN — METOPROLOL TARTRATE 50 MG: 50 TABLET, FILM COATED ORAL at 09:16

## 2025-07-24 RX ADMIN — BUDESONIDE AND FORMOTEROL FUMARATE DIHYDRATE 2 PUFF: 160; 4.5 AEROSOL RESPIRATORY (INHALATION) at 08:50

## 2025-07-24 RX ADMIN — LEVOTHYROXINE SODIUM 125 MCG: 0.1 TABLET ORAL at 05:24

## 2025-07-24 RX ADMIN — DULOXETINE 30 MG: 60 CAPSULE, DELAYED RELEASE ORAL at 09:16

## 2025-07-24 RX ADMIN — FOLIC ACID 1 MG: 1 TABLET ORAL at 09:16

## 2025-07-24 RX ADMIN — HYDROCODONE BITARTRATE AND ACETAMINOPHEN 1 TABLET: 7.5; 325 TABLET ORAL at 05:24

## 2025-07-24 RX ADMIN — AMITRIPTYLINE HYDROCHLORIDE 25 MG: 25 TABLET ORAL at 20:49

## 2025-07-24 RX ADMIN — INSULIN LISPRO 2 UNITS: 100 INJECTION, SOLUTION INTRAVENOUS; SUBCUTANEOUS at 20:48

## 2025-07-24 RX ADMIN — HYDROCODONE BITARTRATE AND ACETAMINOPHEN 1 TABLET: 7.5; 325 TABLET ORAL at 20:49

## 2025-07-24 RX ADMIN — INSULIN LISPRO 2 UNITS: 100 INJECTION, SOLUTION INTRAVENOUS; SUBCUTANEOUS at 09:16

## 2025-07-24 RX ADMIN — CETIRIZINE HYDROCHLORIDE 10 MG: 10 TABLET, FILM COATED ORAL at 09:16

## 2025-07-24 RX ADMIN — HYDROCODONE BITARTRATE AND ACETAMINOPHEN 1 TABLET: 7.5; 325 TABLET ORAL at 14:02

## 2025-07-24 NOTE — THERAPY TREATMENT NOTE
Inpatient Rehabilitation - Physical Therapy Treatment Note       GUANACO Portillo     Patient Name: Lashae Benitez  : 1960  MRN: [id number]    Today's Date: 2025                    Admit Date: 2025      Visit Dx:   No diagnosis found.    Patient Active Problem List   Diagnosis    Hiatal hernia    Essential hypertension    Sjogren's syndrome    Multiple sclerosis    Psoriasis    Fibromyalgia    Osteoarthritis    Psoriatic arthritis    RA (rheumatoid arthritis)    Degenerative disc disease, lumbar    TMJ arthritis    Dupuytren's disease    H. pylori infection    Family history of coronary artery disease    Type 2 diabetes mellitus    Edema    Bilateral leg edema    Isolated corticotropin deficiency    Hyponatremia    Paroxysmal atrial fibrillation    Sepsis    Bacteremia, escherichia coli    Iron deficiency anemia    Malabsorption due to intolerance, not elsewhere classified    Chronic chest pain with high risk for CAD    Abnormal nuclear stress test    Abnormal computed tomography angiography (CTA)    Hip fracture    Closed intertrochanteric fracture of hip, left, initial encounter    Closed left hip fracture, sequela       Past Medical History:   Diagnosis Date    Acid reflux     Allergic     Anemia     Anxiety     Atrial fibrillation     Cancer     thyroid, skin    Cervical disc disorder     Chronic pain disorder     Claustrophobia     CTS (carpal tunnel syndrome)     Degenerative disc disease, lumbar     Depression     Dupuytren's disease     Essential hypertension     Family history of coronary artery disease     Fibromyalgia     Gallbladder abscess     H. pylori infection     Hiatal hernia     Hyperlipidemia     Hypothyroidism     Low back pain     Lumbosacral disc disease     Migraines     migraines    Multiple sclerosis     Osteoarthritis     Peripheral neuropathy     Psoriasis     Psoriatic arthritis     RA (rheumatoid arthritis)     Rheumatoid arthritis     Sinusitis     Sjogren's syndrome      Stomach ulcer     Thoracic disc disorder     TMJ arthritis     Type 2 diabetes mellitus     Urinary tract infection        Past Surgical History:   Procedure Laterality Date    BACK SURGERY      BREAST LUMPECTOMY Left 11/1993    CARDIAC CATHETERIZATION Left 09/21/2009    Normal     CARDIAC CATHETERIZATION N/A 6/24/2025    Procedure: Left Heart Cath;  Surgeon: Teodora Barron MD;  Location:  [facility];  Service: Cardiovascular;  Laterality: N/A;    CARPAL TUNNEL RELEASE  03/2012    CERVICAL POLYPECTOMY  12/2015    CHOLECYSTECTOMY  05/2007    COLONOSCOPY      ENDOSCOPY      EPIDURAL BLOCK      GASTRIC BYPASS  09/2007    JOINT REPLACEMENT      KNEE ARTHROPLASTY      LUMBAR DISC SURGERY      C5-6    NECK SURGERY      REPLACEMENT TOTAL KNEE Right 06/2016    SACRAL NERVE STIMULATOR PLACEMENT  09/2014    SACRAL NERVE STIMULATOR PLACEMENT  04/11/2024    @ [facility] in [place]    SACRAL NERVE STIMULATOR PLACEMENT Right 04/17/2024    THYROIDECTOMY  03/2016    TRIGGER POINT INJECTION         PT ASSESSMENT (Last 12 Hours)       IRF PT Evaluation and Treatment       Row Name 07/24/25 1456          PT Time and Intention    Document Type daily treatment  -RG     Mode of Treatment individual therapy;physical therapy  -RG     Patient/Family/Caregiver Comments/Observations Pt and nursing in agreement for skilled PT on this date.  -RG       Row Name 07/24/25 1456          General Information    Existing Precautions/Restrictions fall;weight bearing  LLE TTWB  -RG       Row Name 07/24/25 1456          Pain Scale: FACES Pre/Post-Treatment    Pain: FACES Scale, Pretreatment 6-->hurts even more  -RG     Posttreatment Pain Rating 8-->hurts whole lot  -RG       Row Name 07/24/25 1456          Cognition/Psychosocial    Affect/Mental Status (Cognition) WFL  -RG     Follows Commands (Cognition) verbal cues/prompting required;physical/tactile prompts required  -RG     Personal Safety Interventions fall prevention program  maintained;gait belt;nonskid shoes/slippers when out of bed  -[facility] Name 07/24/25 1456          Mobility    Left Lower Extremity (Weight-bearing Status) toe touch weight-bearing (TTWB)  -[facility] Name 07/24/25 1456          Bed Mobility    Bed Mobility supine-sit;sit-supine;supine-sit-supine  -     Supine-Sit-Supine Bryan (Bed Mobility) maximum assist (25% patient effort);verbal cues;nonverbal cues (demo/gesture)  -[facility] Name 07/24/25 1456          Transfer Assessment/Treatment    Transfers bed-chair transfer;chair-bed transfer;sit-stand transfer;stand-sit transfer  -[facility] Name 07/24/25 1456          Bed-Chair Transfer    Bed-Chair Bryan (Transfers) maximum assist (25% patient effort);verbal cues;nonverbal cues (demo/gesture)  -     Assistive Device (Bed-Chair Transfers) wheelchair  -[facility] Name 07/24/25 1456          Chair-Bed Transfer    Chair-Bed Bryan (Transfers) maximum assist (25% patient effort);verbal cues;nonverbal cues (demo/gesture)  -RG     Assistive Device (Chair-Bed Transfers) wheelchair  -[facility] Name 07/24/25 1456          Sit-Stand Transfer    Sit-Stand Bryan (Transfers) moderate assist (50% patient effort);verbal cues;nonverbal cues (demo/gesture)  -RG     Assistive Device (Sit-Stand Transfers) parallel bars  -[facility] Name 07/24/25 1456          Stand-Sit Transfer    Stand-Sit Bryan (Transfers) moderate assist (50% patient effort);verbal cues;nonverbal cues (demo/gesture)  -RG     Assistive Device (Stand-Sit Transfers) parallel bars  -       Row Name 07/24/25 1456          Gait/Stairs (Locomotion)    Patient was able to Ambulate no, other medical factors prevent ambulation  -     Comment, (Gait/Stairs) declined to attempt secondary to pain and TTWB  -[facility] Name 07/24/25 1456          Safety Issues/Impairments Affecting Functional Mobility    Impairments Affecting Function (Mobility) balance;endurance/activity  tolerance;pain;range of motion (ROM);strength  -RG       Row Name 07/24/25 1456          Motor Skills    Therapeutic Exercise knee  -RG       Row Name 07/24/25 1456          Hip (Therapeutic Exercise)    Hip (Therapeutic Exercise) strengthening exercise  -RG     Hip Strengthening (Therapeutic Exercise) bilateral;flexion;aBduction;aDduction;marching while seated;sitting;supine;resistance band;green;10 repetitions;2 sets  -RG       Row Name 07/24/25 1456          Knee (Therapeutic Exercise)    Knee (Therapeutic Exercise) strengthening exercise  -RG     Knee Strengthening (Therapeutic Exercise) bilateral;flexion;extension;heel slides;marching while seated;SAQ (short arc quad);LAQ (long arc quad);sitting;supine;10 repetitions;2 sets;resistance band;green  -RG       Row Name 07/24/25 1456          Ankle (Therapeutic Exercise)    Ankle Strengthening (Therapeutic Exercise) bilateral;dorsiflexion;plantarflexion;sitting;supine;10 repetitions;2 sets  -       Row Name 07/24/25 1456          Positioning and Restraints    Pre-Treatment Position in bed  -RG     Post Treatment Position bed  -RG     In Bed notified nsg;supine;call light within reach;encouraged to call for assist;exit alarm on;with family/caregiver  -RG       Row Name 07/24/25 1456          Therapy Assessment/Plan (PT)    Patient's Goals For Discharge return home  -       Row Name 07/24/25 1456          Therapy Assessment/Plan (PT)    Rehab Potential/Prognosis (PT) fair;good  -RG     Frequency of Treatment (PT) 5 times per week  -RG     Estimated Duration of Therapy (PT) 2 weeks  -RG     Problem List (PT) balance;mobility;range of motion (ROM);strength;pain;postural control  -RG     Activity Limitations Related to Problem List (PT) unable to ambulate safely;unable to transfer safely  -RG       Row Name 07/24/25 1456          Therapy Plan Review/Discharge Plan (PT)    Anticipated Equipment Needs at Discharge (PT Eval) --  tbd  -RG     Anticipated Discharge  Disposition (PT) home with assist;home with home health;home with outpatient therapy services  -RG       Row Name 07/24/25 1456          IRF PT Goals    Bed Mobility Goal Selection (PT-IRF) bed mobility, PT goal 1  -RG     Transfer Goal Selection (PT-IRF) transfers, PT goal 1  -RG     Gait (Walking Locomotion) Goal Selection (PT-IRF) gait, PT goal 1  -RG       Row Name 07/24/25 1456          Bed Mobility Goal 1 (PT-IRF)    Activity/Assistive Device (Bed Mobility Goal 1, PT-IRF) sit to supine/supine to sit  -RG     Gilliam Level (Bed Mobility Goal 1, PT-IRF) supervision required  -RG     Time Frame (Bed Mobility Goal 1, PT-IRF) by discharge  -RG       Row Name 07/24/25 1456          Transfer Goal 1 (PT-IRF)    Activity/Assistive Device (Transfer Goal 1, PT-IRF) sit-to-stand/stand-to-sit;bed-to-chair/chair-to-bed  -RG     Gilliam Level (Transfer Goal 1, PT-IRF) supervision required  -RG     Time Frame (Transfer Goal 1, PT-IRF) by discharge  -RG       Row Name 07/24/25 1456          Gait/Walking Locomotion Goal 1 (PT-IRF)    Activity/Assistive Device (Gait/Walking Locomotion Goal 1, PT-IRF) walker, rolling  -RG     Gait/Walking Locomotion Distance Goal 1 (PT-IRF) 150'  -RG     [place]am Level (Gait/Walking Locomotion Goal 1, PT-IRF) supervision required  -RG     Time Frame (Gait/Walking Locomotion Goal 1, PT-IRF) by discharge  -RG               User Key  (r) = Recorded By, (t) = Taken By, (c) = Cosigned By      Initials Name Provider Type    RG Neri Cornelius, PTA Physical Therapist Assistant                  Wound 07/19/25 0921 Left gluteal Other (Comments) (Active)   Dressing Appearance open to air 07/24/25 0916   Closure Open to air 07/23/25 1953   Base red 07/24/25 0916   Periwound warm;pink;redness 07/24/25 0916   Periwound Temperature warm 07/24/25 0916   Periwound Skin Turgor soft 07/24/25 0916   Drainage Amount none 07/23/25 1953   Care, Wound cleansed with;soap and water 07/24/25 0916   Dressing  Care open to air 07/24/25 0916       Wound 07/19/25 0938 Left anterior thigh Surgical Open Surgical Incision (Active)   Dressing Appearance dry;intact 07/24/25 0916   Closure Adhesive bandage 07/23/25 2053   Base pink;red 07/24/25 0916   Periwound pink;redness 07/24/25 0916   Periwound Temperature warm 07/24/25 0916   Periwound Skin Turgor soft 07/24/25 0916   Edges rolled/closed 07/24/25 0916   Drainage Characteristics/Odor sanguineous 07/24/25 0916   Drainage Amount moderate 07/24/25 0916   Care, Wound cleansed with;sterile normal saline 07/24/25 0401   Dressing Care dressing changed 07/24/25 0916       Wound 07/21/25 0930 Right coccyx Pressure Injury (Active)   Dressing Appearance open to air 07/24/25 0916   Closure Open to air;None 07/23/25 1955   Base pink;red;purple 07/24/25 0916   Periwound redness 07/24/25 0916   Periwound Temperature warm 07/24/25 0916   Periwound Skin Turgor soft 07/23/25 1955   Edges callused 07/23/25 1955   Drainage Amount none 07/24/25 0916   Care, Wound cleansed with;soap and water 07/24/25 0916   Dressing Care open to air 07/24/25 0916     Physical Therapy Education       Title: PT OT SLP Therapies (Done)       Topic: Physical Therapy (Done)       Point: Mobility training (Done)       Learning Progress Summary            Patient Acceptance, E,D, VU,NR by RG at 7/24/2025 1502    Acceptance, E, VU,NR by LB at 7/23/2025 1612                      Point: Home exercise program (Done)       Learning Progress Summary            Patient Acceptance, E,D, VU,NR by RG at 7/24/2025 1502    Acceptance, E, VU,NR by LB at 7/23/2025 1612                      Point: Body mechanics (Done)       Learning Progress Summary            Patient Acceptance, E,D, VU,NR by RG at 7/24/2025 1502    Acceptance, E, VU,NR by LB at 7/23/2025 1612                      Point: Precautions (Done)       Learning Progress Summary            Patient Acceptance, E,D, VU,NR by RG at 7/24/2025 1502    Acceptance, E, VU,NR by LB  at 7/23/2025 1612                                      User Key       Initials Effective Dates Name Provider Type Discipline    LB 06/16/21 -  Andreia Dove, PT Physical Therapist PT    RG 06/16/21 -  Neri Cornelius PTA Physical Therapist Assistant PT                    PT Recommendation and Plan    Frequency of Treatment (PT): 5 times per week  Anticipated Equipment Needs at Discharge (PT Eval):  (tbd)                  Time Calculation:      PT Charges       Row Name 07/24/25 1503 07/24/25 1502          Time Calculation    Start Time 1330  -RG 1000  -RG     Stop Time 1415  -RG 1045  -RG     Time Calculation (min) 45 min  -RG 45 min  -RG     PT Received On 07/24/25  -RG 07/24/25  -RG        Time Calculation- PT    Total Timed Code Minutes- PT 45 minute(s)  -RG 45 minute(s)  -RG               User Key  (r) = Recorded By, (t) = Taken By, (c) = Cosigned By      Initials Name Provider Type    RG Neri Cornelius PTA Physical Therapist Assistant                    Therapy Charges for Today       Code Description Service Date Service Provider Modifiers Qty    34937873023 HC PT THERAPEUTIC ACT EA 15 MIN 7/24/2025 Neri Cornelius PTA GP, CQ 2    82795927606 HC PT THER PROC EA 15 MIN 7/24/2025 Neri Cornelius PTA GP, CQ 4              PT G-Codes  AM-PAC 6 Clicks Score (PT): 12      Neri Cornelius PTA  7/24/2025

## 2025-07-24 NOTE — PROGRESS NOTES
[facility]  PROGRESS NOTE     Patient Identification:  Name:  Lashae Benitez  Age:  64 y.o.  Sex:  female  :  1960  MRN:  [id number]  Visit Number:  [id number]  ROOM: [facility]     Primary Care Provider:  Kreis, Samuel Duane, MD    Length of stay in inpatient status:  2    Subjective     Chief Compliant: Status post left hip fracture with ORIF    History of Presenting Illness: 64-year-old female with a history of recent left hip fracture requiring open reduction internal fixation.  Patient has multiple other issues with history of diabetes, atrial fibrillation, psoriatic arthritis/rheumatoid arthritis.  Patient states she is doing reasonably well today but did have some difficulty swallowing she states it feels like food is getting stuck in her throat.  She denies any other difficulties at this time    Objective     Current Hospital Meds:amitriptyline, 25 mg, Oral, Nightly  budesonide-formoterol, 2 puff, Inhalation, BID - RT  cetirizine, 10 mg, Oral, Daily  [START ON 2025] cholecalciferol, 50,000 Units, Oral, Weekly  [START ON 2025] cyanocobalamin, 1,000 mcg, Intramuscular, Weekly  DULoxetine, 30 mg, Oral, Daily  DULoxetine, 60 mg, Oral, Daily  flecainide, 50 mg, Oral, BID  folic acid, 1 mg, Oral, Daily  gabapentin, 600 mg, Oral, Q8H  insulin lispro, 2-7 Units, Subcutaneous, 4x Daily PC & at Bedtime  levothyroxine, 125 mcg, Oral, Q AM  [START ON 2025] methotrexate, 25 mg, Oral, Weekly  metoprolol tartrate, 50 mg, Oral, BID  rivaroxaban, 20 mg, Oral, Daily With Dinner       ----------------------------------------------------------------------------------------------------------------------  Vital Signs:  Temp:  [97.5 °F (36.4 °C)-98.3 °F (36.8 °C)] 97.5 °F (36.4 °C)  Heart Rate:  [81-93] 81  Resp:  [16-18] 18  BP: (144-149)/(71-72) 149/72  SpO2:  [97 %-100 %] 98 %  on   ;   Device (Oxygen Therapy): room air  Body mass index is 24.13 kg/m².    Wt Readings from Last 3 Encounters:    07/22/25 65.8 kg (145 lb)   07/17/25 65.8 kg (145 lb)   07/14/25 65.6 kg (144 lb 9.6 oz)     Intake & Output (last 3 days)         07/21 0701 07/22 0700 07/22 0701 07/23 0700 07/23 0701 07/24 0700 07/24 0701 07/25 0700    P.O.  600 660 120    Total Intake(mL/kg)  600 (9.1) 660 (10) 120 (1.8)    Urine (mL/kg/hr)   300 (0.2)     Stool   0     Total Output   300     Net  +600 +360 +120            Urine Unmeasured Occurrence  1 x 5 x     Stool Unmeasured Occurrence   1 x           Diet: Regular/House; Texture: Mechanical Ground (NDD 2); Fluid Consistency: Thin (IDDSI 0)  ----------------------------------------------------------------------------------------------------------------------  Physical exam:  Constitutional: No acute distress  HEENT: Normocephalic atraumatic  Neck: Supple  Cardiovascular: Regular rate and rhythm  Pulmonary/Chest: Clear to auscultation  Abdominal: Positive bowel sounds soft.   Musculoskeletal: Status post hip repair  Neurological: No focal deficits  Skin: No rash  Peripheral vascular: No edema  Genitourinary:  ----------------------------------------------------------------------------------------------------------------------    Last echocardiogram:  Results for orders placed during the hospital encounter of 07/17/25    Adult Transthoracic Echo Complete W/ Cont if Necessary Per Protocol 07/18/2025 12:46 PM    Interpretation Summary    Left ventricular systolic function is normal. Calculated left ventricular EF = 58.5%    Left ventricular diastolic function is consistent with (grade I) impaired relaxation.    Estimated right ventricular systolic pressure from tricuspid regurgitation is normal (<35 mmHg).    No significant valvular abnormalities noted    ----------------------------------------------------------------------------------------------------------------------  Results from last 7 days   Lab Units 07/23/25  0408 07/22/25  0254 07/21/25  0036 07/20/25  0519 07/19/25  0106  "07/18/25  1703 07/18/25  0338 07/17/25  1418   WBC 10*3/mm3 4.77 5.99  --  8.35   < >  --    < >  --    HEMOGLOBIN g/dL 8.4* 8.6* 9.1* 6.9*   < >  --    < >  --    HEMATOCRIT % 25.9* 26.1* 26.9* 21.1*   < >  --    < >  --    MCV fL 102.0* 100.8*  --  108.2*   < >  --    < >  --    MCHC g/dL 32.4 33.0  --  32.7   < >  --    < >  --    PLATELETS 10*3/mm3 295 259  --  230   < >  --    < >  --    INR   --   --   --   --   --  1.15*  --  1.26*    < > = values in this interval not displayed.         Results from last 7 days   Lab Units 07/23/25  0408 07/20/25  0139 07/19/25  1608 07/19/25  0106 07/18/25  0338   SODIUM mmol/L 138 133*  --  134* 136   POTASSIUM mmol/L 3.8 4.4 4.9 3.6 3.9   MAGNESIUM mg/dL 2.0  --   --   --   --    CHLORIDE mmol/L 104 102  --  100 104   CO2 mmol/L 24.7 20.1*  --  22.0 22.6   BUN mg/dL 8.7 20.3  --  10.9 11.2   CREATININE mg/dL 0.50* 0.98  --  0.62 0.58   CALCIUM mg/dL 7.8* 7.5*  --  8.1* 8.1*   GLUCOSE mg/dL 142* 210*  --  177* 203*   ALBUMIN g/dL  --  2.6*  --  3.0* 3.2*   BILIRUBIN mg/dL  --  0.2  --  0.4 0.4   ALK PHOS U/L  --  92  --  128* 134*   AST (SGOT) U/L  --  18  --  17 23   ALT (SGPT) U/L  --  21  --  26 33   Estimated Creatinine Clearance: 119.6 mL/min (A) (by C-G formula based on SCr of 0.5 mg/dL (L)).  No results found for: \"AMMONIA\"              Glucose   Date/Time Value Ref Range Status   07/24/2025 0608 199 (H) 70 - 130 mg/dL Final     Comment:     Serial Number: [id number]:  [id number]   07/23/2025 2000 249 (H) 70 - 130 mg/dL Final     Comment:     Serial Number: [id number]:  [id number]   07/23/2025 1652 183 (H) 70 - 130 mg/dL Final     Comment:     Serial Number: [id number]:  [id number]   07/23/2025 1108 256 (H) 70 - 130 mg/dL Final     Comment:     Serial Number: [id number]:  [id number]   07/23/2025 0646 142 (H) 70 - 130 mg/dL Final     Comment:     Serial Number: [id number]:  [id number]   07/22/2025 1945 145 (H) 70 - 130 mg/dL Final     " "Comment:     Serial Number: [id number]:  [id number]   07/22/2025 1620 137 (H) 70 - 130 mg/dL Final     Comment:     Serial Number: [id number]:  [id number]   07/22/2025 1127 151 (H) 70 - 130 mg/dL Final     Comment:     Serial Number: [id number]:  [id number]     Lab Results   Component Value Date    TSH 15.900 (H) 03/12/2025    FREET4 1.58 12/07/2023     Lab Results   Component Value Date    PREGTESTUR Negative 01/21/2018     Pain Management Panel  More data exists         Latest Ref Rng & Units 5/12/2025 3/17/2025   Pain Management Panel   Creatinine, Urine mg/dL  mg/dL 12.4  12.4  7.9  7.9       Details          Multiple values from one day are sorted in reverse-chronological order             Brief Urine Lab Results  (Last result in the past 365 days)        Color   Clarity   Blood   Leuk Est   Nitrite   Protein   CREAT   Urine HCG        07/17/25 1538 Yellow   Clear   Negative   Negative   Negative   Negative                 No results found for: \"BLOODCX\"  Results from last 7 days   Lab Units 07/17/25  1538   NITRITE UA  Negative     No results found for: \"URINECX\"  No results found for: \"WOUNDCX\"  No results found for: \"STOOLCX\"        I have personally looked at the labs and they are summarized above.  ----------------------------------------------------------------------------------------------------------------------  Detailed radiology reports for the last 24 hours:    Imaging Results (Last 24 Hours)       ** No results found for the last 24 hours. **          Final impressions for the last 30 days of radiology reports:    FL Surgery Fluoro  Result Date: 7/19/2025  As above.  This report was finalized on 7/19/2025 11:15 AM by Dr. Shakeel Kemp MD.      XR Femur 2 View Left  Result Date: 7/17/2025    Left intertrochanteric hip fracture again noted. No mid or distal femur fracture identified.  This report was finalized on 7/17/2025 2:42 PM by Dr. Shakeel Kemp MD.      XR Chest AP  Result " Date: 7/17/2025    Unremarkable exam with no acute cardiopulmonary findings identified.  This report was finalized on 7/17/2025 2:37 PM by Dr. Shakeel Kemp MD.      XR Shoulder 2+ View Left  Result Date: 7/17/2025    No acute findings in the left shoulder.  This report was finalized on 7/17/2025 2:35 PM by Dr. Shakeel Kemp MD.      XR Elbow 3+ View Left  Result Date: 7/17/2025  1.  No fracture or dislocation. 2.  Mild olecranon soft tissue swelling may indicate mild olecranon bursitis.  This report was finalized on 7/17/2025 2:34 PM by Dr. Shakeel Kemp MD.      XR Hip With or Without Pelvis 2 - 3 View Left  Result Date: 7/17/2025    Acute angulated left intertrochanteric fracture with varus angulation of distal fracture fragments and mild superior displacement of distal fracture fragments.  This report was finalized on 7/17/2025 2:29 PM by Dr. Shakeel Kemp MD.      I have personally looked at the radiology images and read the final radiology report.    Assessment & Plan    Status post left hip fracture with ORIF--patient was evaluated by PT and OT yesterday.  Toe-touch weightbearing on affected leg x 6 weeks postop.  Was requiring maximum assistance for bed mobility.  Maximum assistance for bed to chair, chair to bed transfers requiring moderate assistance for sit to stand and stand to sit transfers.  Was unable to ambulate and does have toe-touch weightbearing restriction.  Requires moderate assistance to maximum assistance for bathing; set up for upper body dressing; maximum assist to dependent for lower body dressing; set up for grooming; dependent to maximum assist for toileting.    Paroxysmal atrial fibrillation continue flecainide, metoprolol and Xarelto.  Rate is well-controlled    Depression with history of chronic pain continue Elavil and duloxetine    Diabetes mellitus good glycemic control continue sliding scale coverage    Hypothyroidism Synthroid 125 mcg daily    Neuropathic pain continue  gabapentin    Psoriatic arthritis/rheumatoid arthritis.  Continue methotrexate.  Xeljanz has been held and would hold this until least 2 weeks postop    Acute blood loss anemia appears to be stable.  Recheck CBC in the a.m.    Questional history of COPD continue budesonide/formoterol    Mild dysphagia--speech therapy to evaluate patient today.    VTE Prophylaxis:   Starr Acevedo MD  [facility] Hospitalist  07/24/25  09:36 EDT

## 2025-07-24 NOTE — MBS/VFSS/FEES
"Inpatient Rehabilitation - Speech Language Pathology   Swallow Initial Evaluation [facility]  MODIFIED BARIUM SWALLOW STUDY     Patient Name: Lashae Benitez  : 1960  MRN: [id number]  Today's Date: 2025     Admit Date: 2025    Lashae Benitez  presented to the radiology suite this date from  to participate in an instrumental MBS to objectively evaluate safety/efficacy of swallowing fnx, determine safest/least restrictive diet. She has a PMH significant for hx of thyroid cancer s/p thyroidectomy w/o chemo and/or radiation, Sjogren's syndrome, MS, GERD,  DDD, and Wyatt-en-Y gastric bypass in .     Ms Benitez is currently admitted to [facility]'s IPR unit on 25 following a fall resulting in left hip fracture and s/p surgical repair 25 for ORIF w/ intramedullary nail an long vera proximal locking w/ blade distal locking cortical screw placement. She was initially admitted to acute care of [facility] from  to 25.     During acute care admission she tolerated a least restrictive po diet of regular textures and thin liquids w/o complications noted in EMR review. This date, Ms Benitez reports she has been having some difficulty swallow w/ feelings of food \"getting stuck\" in her throat.     Per her medical hx including Sjogren's syndrome, MS, hx of thyroid cancer w/ thyroidectomy, and GERD, she is felt to most benefit from instrumental dysphagia evaluation.         Social History     Socioeconomic History    Marital status:    Tobacco Use    Smoking status: Never     Passive exposure: Never    Smokeless tobacco: Never   Vaping Use    Vaping status: Never Used   Substance and Sexual Activity    Alcohol use: Never    Drug use: Never    Sexual activity: Yes     Partners: Male     Birth control/protection: Post-menopausal, None   Imaging:  XR Chest AP [[id number]] Lasha as Reviewed   Order Status: Completed Collected: 25 1435    Updated: 25 143   Narrative:     EXAM:    XR Chest, 1 View   "   EXAM DATE:    7/17/2025 2:10 PM     CLINICAL HISTORY:    pre op     TECHNIQUE:    Frontal view of the chest.     COMPARISON:    3/25/2024     FINDINGS:    Lungs and pleural spaces:  Unremarkable.  No consolidation.  No  pneumothorax.    Heart:  Unremarkable.  No cardiomegaly.    Mediastinum:  Unremarkable.  Normal mediastinal contour.    Bones/joints:  Unremarkable.  No acute fracture.      Impression:       Unremarkable exam with no acute cardiopulmonary findings identified.     This report was finalized on 7/17/2025 2:37 PM by Dr. Shakeel Kemp MD.     Labs:   Latest Reference Range & Units 07/22/25 02:54 07/23/25 04:08   WBC 3.40 - 10.80 10*3/mm3 5.99 4.77   RBC 3.77 - 5.28 10*6/mm3 2.59 (L) 2.54 (L)   Hemoglobin 12.0 - 15.9 g/dL 8.6 (L) 8.4 (L)   Hematocrit 34.0 - 46.6 % 26.1 (L) 25.9 (L)   Platelets 140 - 450 10*3/mm3 259 295   RDW 12.3 - 15.4 % 18.2 (H) 17.6 (H)   MCV 79.0 - 97.0 fL 100.8 (H) 102.0 (H)   MCH 26.6 - 33.0 pg 33.2 (H) 33.1 (H)   MCHC 31.5 - 35.7 g/dL 33.0 32.4   MPV 6.0 - 12.0 fL 9.6 9.1   RDW-SD 37.0 - 54.0 fl 66.4 (H) 65.0 (H)   (L): Data is abnormally low  (H): Data is abnormally high    Diet Orders (active) (From admission, onward)       Start     Ordered    07/24/25 1253  Diet: Regular/House; Texture: Regular (IDDSI 7); Fluid Consistency: Thin (IDDSI 0)  Diet Effective Now         07/24/25 1253    07/23/25 1800  Dietary Nutrition Supplements Other (See Comment); Boost  Daily With Dinner       07/23/25 1339                  Ms Benitez is observed on room air w/o complications across this evaluation.     Per discussion w/ Ms Benitez and her spouse prior to evaluation, she reports this intermitting sticking sensation as primarily occurring w/ solid textures and that this has been ongoing for several years. She reports this has been most noticed w/ items such as breads and steak fries as well as meats or extremely dense foods. She denies this occurring w/ liquids. She endorses intermittent  "difficulty w/ medications as well. She denies any coughing/choking w/ po intake which she has noticed and denies frequent pneumonia or respiratory illnesses.     Risks and benefits of the procedure were explained w/ patient verbalizing understanding/agreement to participate. Proceed per protocol.     Patient was positioned upright and centered in a soft strap supportive chair to accept multiple po presentations of solid cracker, puree, honey thick, nectar thick, and thin liquids via spoon, cup and straw, along w/ whole placebo pill in puree. Patient was able to self provide po trials.      All views are from the lateral plane.     Facial/oral structures were symmetrical upon observation w/o lingual deviation upon protrusion. Oral mucosa were slightly xerostomic, pink and clean. She reports a consistent feeling of her mouth being dry and \"drinking water and tea nearly constantly\". Secretions were clear, thin, and well controlled. OROM/LAINEY was wfl to imitate oral postures. Gag was not assessed. Volitional cough was adequate in intensity, clear in quality, nonproductive. Vocal quality was weak in intensity, mildly harsh in quality w/ intelligible speech. She was a/a and cooperative to participate, oriented to person, place, and time, follows simple directives, and participates in simple conversational exchanges.     Upon po presentations, adequate bolus anticipation w/ good labial seal for bolus clearance via spoon bowl, cup rim stability and suction via straw. Bolus formation, manipulation, and control were wfl w/ rotary mastication pattern. A-p transit was timely w/o significant oral residue. Tongue base retraction and linguavelar seal were adequate w/o premature spillage. No laryngeal penetration or aspiration evidenced before the swallow.     Whole placebo pill is able to be orally manipulated for swallow.     Pharyngeal swallow was timely w/ adequate hyolaryngeal elevation and epiglottic inversion. Pharyngeal " contraction was adequate w/o significant residue. No laryngeal penetration or aspiration evidenced during or after the swallow.                Penetration-Aspiration Scale Scoring  Consistency: Teaspoon Bolus Cup Bolus Straw Bolus   Puree 1     Honey 1 1    Nectar 1 1 1   Thin 1 1 1   Solid 1, 1       *Reference*      Full esophageal sweep reveals no mucosal abnormalities. Motility is wfl w/o retrograde flow noted.      Visit Dx:   No diagnosis found.  Patient Active Problem List   Diagnosis    Hiatal hernia    Essential hypertension    Sjogren's syndrome    Multiple sclerosis    Psoriasis    Fibromyalgia    Osteoarthritis    Psoriatic arthritis    RA (rheumatoid arthritis)    Degenerative disc disease, lumbar    TMJ arthritis    Dupuytren's disease    H. pylori infection    Family history of coronary artery disease    Type 2 diabetes mellitus    Edema    Bilateral leg edema    Isolated corticotropin deficiency    Hyponatremia    Paroxysmal atrial fibrillation    Sepsis    Bacteremia, escherichia coli    Iron deficiency anemia    Malabsorption due to intolerance, not elsewhere classified    Chronic chest pain with high risk for CAD    Abnormal nuclear stress test    Abnormal computed tomography angiography (CTA)    Hip fracture    Closed intertrochanteric fracture of hip, left, initial encounter    Closed left hip fracture, sequela     Past Medical History:   Diagnosis Date    Acid reflux     Allergic     Anemia     Anxiety     Atrial fibrillation     Cancer     thyroid, skin    Cervical disc disorder     Chronic pain disorder     Claustrophobia     CTS (carpal tunnel syndrome)     Degenerative disc disease, lumbar     Depression     Dupuytren's disease     Essential hypertension     Family history of coronary artery disease     Fibromyalgia     Gallbladder abscess     H. pylori infection     Hiatal hernia     Hyperlipidemia     Hypothyroidism     Low back pain     Lumbosacral disc disease     Migraines      migraines    Multiple sclerosis     Osteoarthritis     Peripheral neuropathy     Psoriasis     Psoriatic arthritis     RA (rheumatoid arthritis)     Rheumatoid arthritis     Sinusitis     Sjogren's syndrome     Stomach ulcer     Thoracic disc disorder     TMJ arthritis     Type 2 diabetes mellitus     Urinary tract infection      Past Surgical History:   Procedure Laterality Date    BACK SURGERY      BREAST LUMPECTOMY Left 11/1993    CARDIAC CATHETERIZATION Left 09/21/2009    Normal     CARDIAC CATHETERIZATION N/A 6/24/2025    Procedure: Left Heart Cath;  Surgeon: Teodora Barron MD;  Location:  [facility];  Service: Cardiovascular;  Laterality: N/A;    CARPAL TUNNEL RELEASE  03/2012    CERVICAL POLYPECTOMY  12/2015    CHOLECYSTECTOMY  05/2007    COLONOSCOPY      ENDOSCOPY      EPIDURAL BLOCK      GASTRIC BYPASS  09/2007    JOINT REPLACEMENT      KNEE ARTHROPLASTY      LUMBAR DISC SURGERY      C5-6    NECK SURGERY      REPLACEMENT TOTAL KNEE Right 06/2016    SACRAL NERVE STIMULATOR PLACEMENT  09/2014    SACRAL NERVE STIMULATOR PLACEMENT  04/11/2024    [facility]    SACRAL NERVE STIMULATOR PLACEMENT Right 04/17/2024    THYROIDECTOMY  03/2016    TRIGGER POINT INJECTION       Impression:     Ms Benitez presented w/ a wfl oropharyngeal swallow w/o laryngeal penetration or aspiration across this evaluation. Per this evaluation, and following discussion w/ pt related to consistencies, she is recommended for diet to be advanced to regular textures and thin liquids.     Per this evaluation there were no incidents of po presentations lodging, or residue remaining, in visualized area. No significant residue remains post po presentations. In consideration of Ms Benitez's PMH w/ Sjogren's syndrome and surgical hx w/ lisbeth-en-y and its accompanying recommendations, she is recommended to use additional gravy/sauces to aid w/ moistening of solid textures w/ thorough mastication of all solids. Recommend  "medications whole in puree/thins per pt preference. She is further recommended for frequent sips of thin liquids to aid w/ oropharyngeal hydration to aid w/ diminishing \"sticking\" sensation. No further formal SLP f/u is recommended for dysphagia at this time.     SLP Recommendation and Plan     1. Regular textures, thin liquids.   2. Medications whole in puree/thins.   3. MARTY precautions.   4. Oral care protocol.   5. Frequent sips/drinks  6. Additional gravy/sauces for moistening of solid textures    No further SLP f/u warranted at this time.     D/w patient results and recommendations w/ verbal understanding and agreement.     D/w RN results and recommendations w/ verbal understanding and agreement.     Thank you for allowing me to participate in the care of your patient-  Nel Arenas M.S., CCC-SLP        EDUCATION  The patient has been educated in the following areas:   Dysphagia (Swallowing Impairment).        Time Calculation:    Time Calculation- SLP       Row Name 07/24/25 1225             Time Calculation- SLP    SLP Start Time 1120  -      SLP Stop Time 1223  -      SLP Time Calculation (min) 63 min  -                User Key  (r) = Recorded By, (t) = Taken By, (c) = Cosigned By      Initials Name Provider Type    Nel Amaral MS CCC-SLP Speech and Language Pathologist                    Therapy Charges for Today       Code Description Service Date Service Provider Modifiers Qty    [id number] HC ST MOTION FLUORO EVAL SWALLOW 4 7/24/2025 Nel Arenas MS CCC-SLP GN 1                 Nel Arenas MS CCC-ANA  7/24/2025  "

## 2025-07-24 NOTE — PROGRESS NOTES
Identification Information  10. Marital Status:  2 -     14. Admission Class:  Initial Rehab (1)    15A. Admit From:  [facility] (02)    16A. Pre-hospital Living Setting:  Home ( private home/apt., board/care, assisted living, group home, transitional  living, other residential care arrangements) (01)    17. Pre-hospital Living With:  Family/Relatives (02)    Impairment Group Code  Orthopedic Disorders Impairment Group Code:  08.11 Status Post Unilateral Hip Fracture    Medical Information  22. Etiologic Diagnosis:  Etiologic Diagnosis  S72.142A - Displaced intertrochanteric fracture of left femur, initial encounter  for closed fracture      23. Date of Onset of Impairment:   07/17/2025    24. Comorbid Conditions:  Comorbidities  E11.40 - Type 2 diabetes mellitus with diabetic neuropathy, unspecified  E78.5 - Hyperlipidemia, unspecified  E89.0 - Postprocedural hypothyroidism  M35.00 - Sjogren syndrome, unspecified  I48.0 - Paroxysmal atrial fibrillation  G35      - Multiple sclerosis  I10      - Essential (primary) hypertension  J44.9 - Chronic obstructive pulmonary disease, unspecified  F32.A - Depression, unspecified  G89.29 - Other chronic pain  M06.9 - Rheumatoid arthritis, unspecified  F40.240 - Claustrophobia  L40.50 - Arthropathic psoriasis, unspecified  Z96.651 - Presence of right artificial knee joint  M79.7 - Fibromyalgia  Z79.890 - Hormone replacement therapy  Z85.850 - Personal history of malignant neoplasm of thyroid  Z98.84 - Bariatric surgery status  Z79.899 - Other long term (current) drug therapy  Z87.11 - Personal history of peptic ulcer disease      24A. Are there any arthritis conditions recorded for Impairment Group, Etiologic  Diagnosis, or Comorbid Conditions that meet all of the regulatory requirements  for IRF classification (in 42 .29(b)(2)(x), (xi), and xii)):  No (0)    25A. Height on Admission (in Inches):  65 inches    26A. Weight on Admission (in Pounds):   145 pounds    Signed by: Lilian Prado, Nurse

## 2025-07-24 NOTE — PROGRESS NOTES
"Section A. Administrative Information - Admission    Ethnicity: A. Not of , /a, or Israeli origin    Race:  A. White    A. Preferred Language:  english    B.  Does patient need or want an  to communicate with a doctor or  health care staff?  0. No        Has lack of transportation kept patient from medical appointments, meetings,  work, or from getting things needed for daily living?  C. No      Current Payment Source for IRF Services: A. Medicare (traditional  fee-for-service)    Additional Payment Source(s) for IRF Services: G. Other government (e.g.  , VA, etc.)    Section B.  Hearing, Speech, and Vision - Admission  . Hearin. Adequate - no difficulty in normal conversation, social interaction,  listening to TV    . Vision:  0. Adequate - sees fine detail, such as regular print in newspapers/books    . Health Literacy - Frequency of requiring assistance reading instructions,  pamphlets, other written material from doctor or pharmacy:  0. Never    [id number]. Expression of Ideas and Wants:  4. Expresses complex messages without difficulty and with speech that is clear  and easy to understand    [id number]. Understanding Verbal and Non-Verbal Content:  4. Understands: Clear comprehension without cues or repetitions    Section C.  Cognitive Patterns - Admission  . Should Brief Interview for Mental Status (-) be conducted?  (1) Yes        Number of words repeated by patient after first attempt:  3. Three        A.  ?Please tell me what year it is right now.?    A. Able to report correct year:  3. Correct    B.  ?What month are we in right now??    B. Able to report correct month:  2. Accurate within 5 days    C.  ?What day of the week is today??    C. Able to report correct day of the week:  1. Correct            A.  Able to recall ?sock?:  1. Yes, after cueing ('something to wear\")    B.  Able to recall ?blue?  1. Yes, after cueing (\"a color\")    C.  Able to " "recall ?bed?:  2. Yes, no cue required        BIMS Score:  13    Code: 0    Description: Patient was able to complete BIMS.    A. Is there evidence of an acute change in mental status from the patient's  baseline?  0. No    B. Inattention:  0. Behavior not present    C. Disorganized thinkin. Behavior not present    D. Altered level of consciousness:  0. Behavior not present    Section D. Mood - Admission  \"Over the last 2 weeks, have you been bothered by any of the following  problems?\"    . Patient Mood Interview (PHQ-2 to 9) (from [employer]?)                            1. Symptom Presence       2. Symptom Frequency  A. Little interest or pleasure in doing things  0. No                     0.  Never or 1 day  B. Feeling down, depressed, or hopeless  0. No                     0. Never or 1  day            PHQ interview ended, as above answers do not meet criteria to continue    . Total Severity Score: 0    Interpretation: Minimal depression    How often do you feel lonely or isolated from those around you?  0. Never    Section GG.  Prior Functioning/Device Use  Patient's usual ability with everyday activities prior to the current illness,  exacerbation, or injury as follows:        A. Self-Care: (3) Independent - Patient completed the activities by him/herself,  with or without an assistive device, with no assistance from a helper.        B: Indoor Mobility (Ambulation): (3) Independent - Patient completed the  activities by him/herself, with or without an assistive device, with no  assistance from a helper.        C. Stairs: (3) Independent - Patient completed the activities by him/herself,  with or without an assistive device, with no assistance from a helper.        D. Functional Cognition: (3) Independent - Patient completed the activities by  him/herself, with or without an assistive device, with no assistance from a  helper.      Z. Patient did not use a manual wheelchair, motorized " wheelchair/scooter,  mechanical lift, walker or orthotics/prosthetics prior to current illness,  exacerbation, or injury.    [facility] Section GG Coding Self Care (Admission)                                            Coding  XV2812.A  Eating                        05. Setup or clean-up assistance - Hel                                          per SETS UP or CLEANS UP; patient comp                                          letes activity. Currie assists only pr                                          ior to or following the activity.  GV1243.B  Oral Hygiene                  04. Supervision or touching assistance                                          - Currie provides VERBAL CUES or TOUCH                                          ING/STEADYING assistance as patient co                                          mpletes activity. Assistance may be pr                                          ovided throughout the activity or inte                                          rmittently.  LN6515.C  Toileting Hygiene             01. Dependent - Currie does ALL of the                                          effort. Patient does none of the effor                                          t to complete the activity. Or, the as                                          sistance of 2 or more helpers is requi                                          red for the patient to complete the ac                                          tivity.  HT6011.E  Shower/Bathe Self             02. Substantial/maximal assistance - H                                          elper does MORE THAN HALF the effort.                                          Currie lifts or holds trunk or limbs a                                          nd provides more than half the effort.  YO7735.F  Upper Body Dressing           04. Supervision or touching assistance                                          - Currie provides VERBAL CUES or TOUCH                                           ING/STEADYING assistance as patient co                                          mpletes activity. Assistance may be pr                                          ovided throughout the activity or inte                                          rmittently.  XM1554.G  Lower Body Dressing           01. Dependent - Julian does ALL of the                                          effort. Patient does none of the effor                                          t to complete the activity. Or, the as                                          sistance of 2 or more helpers is requi                                          red for the patient to complete the ac                                          tivity.  CN7834.H  Putting On/Taking Off Footwear  01. Dependent - Julian does ALL of the                                            effort. Patient does none of the effor                                          t to complete the activity. Or, the as                                          sistance of 2 or more helpers is requi                                          red for the patient to complete the ac                                          tivity.      [facility] Section GG Coding Mobility (Admission)                                            Coding  MI3185.A  Roll Left and Right           02. Substantial/maximal assistance - H                                          elper does MORE THAN HALF the effort.                                          Julian lifts or holds trunk or limbs a                                          nd provides more than half the effort.  KZ3375.B  Sit to Lying                  02. Substantial/maximal assistance - H                                          elper does MORE THAN HALF the effort.                                          Julian lifts or holds trunk or limbs a                                          nd provides more than half the effort.  EL2286.C  Lying to Sitting on Side of Bed   02. Substantial/maximal assistance -  H                                          elper does MORE THAN HALF the effort.                                          [place] lifts or holds trunk or limbs a                                          nd provides more than half the effort.  DM4189.D  Sit to Stand                  02. Substantial/maximal assistance - H                                          elper does MORE THAN HALF the effort.                                          [place] lifts or holds trunk or limbs a                                          nd provides more than half the effort.  HI7280.E  Chair/Bed-to-Chair Transfer   02. Substantial/maximal assistance - H                                          elper does MORE THAN HALF the effort.                                          [place] lifts or holds trunk or limbs a                                          nd provides more than half the effort.  GS6460.F  Toilet Transfer               88. Not attempted due to medical condi                                          tion or safety concerns  QY4086.G  Car Transfer                  88. Not attempted due to medical condi                                          tion or safety concerns  QT9871.I  Walk 10 Feet                  88. Not attempted due to medical condi                                          tion or safety concerns  WX2641.J  Walk 50 Feet with Two Turns   88. Not attempted due to medical condi                                          tion or safety concerns  DR5576.K  Walk 150 Feet                 88. Not attempted due to medical condi                                          tion or safety concerns  GO6260.L  Walking 10 Feet on Uneven Surface  88. Not attempted due to medical  condi                                          tion or safety concerns  CT4849.M  1 Step (curb)                 88. Not attempted due to medical condi                                          tion or safety concerns  VO2282.N   "4 Steps                       88. Not attempted due to medical condi                                          tion or safety concerns  MW1087.O  12 Steps                      88. Not attempted due to medical condi                                          tion or safety concerns  ZZ0396.P  Picking Up Object             88. Not attempted due to medical condi                                          tion or safety concerns  FE0209.Q1  Does the patient use a wheelchair and/or scooter?  0. No    AL6043.R  Wheel 50 Feet with Two Turns  88. Not attempted due to medical condi                                          tion or safety concerns  XP6568.RR1  Type of Wheelchair/Scooter for 50 Feet with Two Turns  1. Manual    TW4676.S  Wheel 150 Feet                88. Not attempted due to medical condi                                          tion or safety concerns  KE9408.SS1  Type of Wheelchair/Scooter for 150 Feet  1. Manual        Section H.  Bladder and Bowel      . Bladder Continence:  2. Incontinent less than daily (e.g., once or twice during the 3-day assessment  period)    . Bowel Continence:  0. Always continent    Section I.  Active Diagnosis    See below for the active diagnoses patient presented with on admission:        . Peripheral Vascular Disease (PVD) or Peripheral Arterial Disease (PAD):  No    . Diabetes Mellitus (DM) (e.g., diabetic retinopathy, nephropathy, and  neuropathy):  Yes    Section J.  Health Conditions - Admission  . Pain Effect on Sleep - ?Over the past 5 days, how much of the time has  pain made it hard for you to sleep at night??:  1. Rarely or not at all    . Pain Interference with Therapy Activities - ?Over the past 5 days, how  often have you limited your participation in rehabilitation therapy sessions due  to pain?\":  1. Rarely or not at all    . Pain Interference with Day-to-Day Activities - ?Over the past 5 days, how  often have you limited your " day-to-day activities (excluding rehabilitation  therapy sessions) because of pain??:  1. Rarely or not at all    . History of Falls - Has the patient had two or more falls in the past year  or any fall with injury in the past year?  1. Yes    . Prior Surgery - Did the patient have major surgery during the 100 days  prior to admission?  1. Yes    Section K.  Swallowing/Nutritional Status - Admission  . Nutritional Approaches On Admission:  Z. None    Section M. Skin Conditions - Admission  Does this patient have one or more unhealed pressure ulcers/injuries?  1. Yes        A 1. Number of Stage 1 pressure injuries present on admission:  1        B 1. Number of Stage 2 pressure ulcers present on admission:  0        C 1. Number of Stage 3 pressure ulcers present on admission:  0        D 1. Number of Stage 4 pressure ulcers present on admission:  0        E 1. Number of unstageable pressure ulcers/injuries due to non-removable  dressing/device present on admission:  0        F 1. Number of unstageable pressure ulcers due to coverage of wound bed by  slough and/or eschar present on admission:  0        G 1. Number of unstageable pressure injuries presenting as deep tissue injury  present on admission:  2    Section N. Medication - Admission                                  Is taking                 Indication noted  E. Anticoagulant          Yes                       Yes  H. Opioid                 Yes                       No  J. Hypoglycemic (including insulin)  Yes                       Yes        Did a complete drug regimen review identify potential clinically significant  medication issues?  0. No - No issues found during review    Section O. Special Treatments, Procedures, and Programs - Admission      O1. IV Access, O2. Peripheral    Signed by: Josette Mc, Supervisor

## 2025-07-24 NOTE — PLAN OF CARE
Goal Outcome Evaluation:           Progress: improving  Outcome Summary: New Admit            Problem: Rehabilitation (IRF) Plan of Care  Goal: Plan of Care Review  Outcome: Progressing  Flowsheets  Taken 7/24/2025 0831 by Meenakshi Davis RN  Progress: improving  Taken 7/23/2025 6352 by Katie Sanford RN  Plan of Care Reviewed With: patient  Goal: Patient-Specific Goal (Individualized)  Outcome: Progressing  Goal: Absence of New-Onset Illness or Injury  Outcome: Progressing  Goal: Optimal Comfort and Wellbeing  Outcome: Progressing  Goal: Home and Community Transition Plan Established  Outcome: Progressing

## 2025-07-24 NOTE — PLAN OF CARE
Problem: Rehabilitation (IR[facility]) Plan of Care  Goal: Plan of Care Review  Outcome: Progressing  Flowsheets (Taken 7/23/2025 2352)  Progress: no change  Plan of Care Reviewed With: patient  Goal: Patient-Specific Goal (Individualized)  Outcome: Progressing  Goal: Absence of New-Onset Illness or Injury  Outcome: Progressing  Intervention: Identify and Manage Fall Risk  Description: Perform standard risk assessment on admission using a validated tool or comprehensive approach appropriate to the patient; reassess fall risk frequently, with change in status or transfer to another level of care.Communicate risk to interprofessional healthcare team; ensure fall risk visible cue.Determine need for increased observation, equipment and environmental modification, as well as use of supportive, nonskid footwear.Adjust safety measures to individual needs and identified risk factors.Reinforce the importance of active participation with fall risk prevention, safety and physical activity with the patient and family.Perform regular intentional rounding to assess need for position change, pain management, attention to personal needs and assistance with toileting.  Recent Flowsheet Documentation  Taken 7/23/2025 2200 by Katie Sanford RN  Safety Promotion/Fall Prevention:   nonskid shoes/slippers when out of bed   safety round/check completed  Taken 7/23/2025 2000 by Katie Sanford RN  Safety Promotion/Fall Prevention:   nonskid shoes/slippers when out of bed   safety round/check completed  Goal: Optimal Comfort and Wellbeing  Outcome: Progressing  Intervention: Provide Person-Centered Care  Description: Use a family-focused approach to care; incorporate family/significant others in assessment, planning, education and support.Develop trust and rapport by proactively providing information, encouraging questions, addressing concerns and offering reassurance.Acknowledge emotional response; convey safety and acceptance.Recognize and  utilize personal coping strategies and strengths; develop goals via shared decision-making.Identify patient's preferred routines, habits and personal preferences; respect privacy and personal space.Recognize progress and patient effort to facilitate motivation; provide opportunities for success.Honor spiritual and cultural preferences.Screen and monitor ongoing safety risks, such as self-harm, violence and elopement.  Recent Flowsheet Documentation  Taken 7/23/2025 2053 by Katie aSnford RN  Trust Relationship/Rapport:   care explained   questions answered  Goal: Home and Community Transition Plan Established  Outcome: Progressing   Goal Outcome Evaluation:  Plan of Care Reviewed With: patient        Progress: no change

## 2025-07-24 NOTE — PROGRESS NOTES
Problems/Goals  Skin Integrity (Body Function Structure)  Current Status: Risk for further skin breakdown  Long Term Goals  07/22/2025 02:24 PM - Active  No worsening of skin breakdown/ No new skin breakdown  Potential for Injury (Safety)  Current Status: Risk for falls  Long Term Goals  07/22/2025 02:25 PM - Active  No falls this hospital stay    Signed by: Eusebia Davis RN

## 2025-07-24 NOTE — THERAPY TREATMENT NOTE
Inpatient Rehabilitation - Occupational Therapy Treatment Note    GUANACO Portillo     Patient Name: Lashae Benitez  : 1960  MRN: [id number]    Today's Date: 2025                 Admit Date: 2025       No diagnosis found.    Patient Active Problem List   Diagnosis    Hiatal hernia    Essential hypertension    Sjogren's syndrome    Multiple sclerosis    Psoriasis    Fibromyalgia    Osteoarthritis    Psoriatic arthritis    RA (rheumatoid arthritis)    Degenerative disc disease, lumbar    TMJ arthritis    Dupuytren's disease    H. pylori infection    Family history of coronary artery disease    Type 2 diabetes mellitus    Edema    Bilateral leg edema    Isolated corticotropin deficiency    Hyponatremia    Paroxysmal atrial fibrillation    Sepsis    Bacteremia, escherichia coli    Iron deficiency anemia    Malabsorption due to intolerance, not elsewhere classified    Chronic chest pain with high risk for CAD    Abnormal nuclear stress test    Abnormal computed tomography angiography (CTA)    Hip fracture    Closed intertrochanteric fracture of hip, left, initial encounter    Closed left hip fracture, sequela       Past Medical History:   Diagnosis Date    Acid reflux     Allergic     Anemia     Anxiety     Atrial fibrillation     Cancer     thyroid, skin    Cervical disc disorder     Chronic pain disorder     Claustrophobia     CTS (carpal tunnel syndrome)     Degenerative disc disease, lumbar     Depression     Dupuytren's disease     Essential hypertension     Family history of coronary artery disease     Fibromyalgia     Gallbladder abscess     H. pylori infection     Hiatal hernia     Hyperlipidemia     Hypothyroidism     Low back pain     Lumbosacral disc disease     Migraines     migraines    Multiple sclerosis     Osteoarthritis     Peripheral neuropathy     Psoriasis     Psoriatic arthritis     RA (rheumatoid arthritis)     Rheumatoid arthritis     Sinusitis     Sjogren's syndrome     Stomach ulcer      Thoracic disc disorder     TMJ arthritis     Type 2 diabetes mellitus     Urinary tract infection        Past Surgical History:   Procedure Laterality Date    BACK SURGERY      BREAST LUMPECTOMY Left 11/1993    CARDIAC CATHETERIZATION Left 09/21/2009    Normal     CARDIAC CATHETERIZATION N/A 6/24/2025    Procedure: Left Heart Cath;  Surgeon: Teodora Barron MD;  Location:  [facility];  Service: Cardiovascular;  Laterality: N/A;    CARPAL TUNNEL RELEASE  03/2012    CERVICAL POLYPECTOMY  12/2015    CHOLECYSTECTOMY  05/2007    COLONOSCOPY      ENDOSCOPY      EPIDURAL BLOCK      GASTRIC BYPASS  09/2007    JOINT REPLACEMENT      KNEE ARTHROPLASTY      LUMBAR DISC SURGERY      C5-6    NECK SURGERY      REPLACEMENT TOTAL KNEE Right 06/2016    SACRAL NERVE STIMULATOR PLACEMENT  09/2014    SACRAL NERVE STIMULATOR PLACEMENT  04/11/2024    @ [facility] in [place]    SACRAL NERVE STIMULATOR PLACEMENT Right 04/17/2024    THYROIDECTOMY  03/2016    TRIGGER POINT INJECTION               IRF OT ASSESSMENT FLOWSHEET (Last 12 Hours)       IRF OT Evaluation and Treatment       Row Name 07/24/25 1426          OT Time and Intention    Document Type daily treatment  -     Mode of Treatment occupational therapy  -     Symptoms Noted During/After Treatment --  c/o LLE pain; RN notified  -       Row Name 07/24/25 1426          General Information    Patient/Family/Caregiver Comments/Observations agreeable to therapy  -     Existing Precautions/Restrictions fall;weight bearing  TTWB LLE  -       Row Name 07/24/25 1426          Lower Body Dressing    [place] Level (Lower Body Dressing) maximum assist (25% patient effort);verbal cues  -       Row Name 07/24/25 1426          Grooming    [place] Level (Grooming) set up  -       Row Name 07/24/25 1426          Toileting    [place] Level (Toileting) maximum assist (25% patient effort);verbal cues  -     Assistive Device Use (Toileting) grab bar/safety  frame;raised toilet seat  -       Row Name 07/24/25 1426          Bed-Chair Transfer    Bed-Chair [place] (Transfers) maximum assist (25% patient effort);moderate assist (50% patient effort);verbal cues  -     Assistive Device (Bed-Chair Transfers) wheelchair  -       Row Name 07/24/25 1426          Toilet Transfer    [place] Level (Toilet Transfer) moderate assist (50% patient effort);maximum assist (25% patient effort);verbal cues  -     Assistive Device (Toilet Transfer) grab bars/safety frame;raised toilet seat;wheelchair  -       Row Name 07/24/25 1426          Motor Skills    Motor Skills functional endurance  -     Motor Control/Coordination Interventions therapeutic exercise/ROM  BUE ther ex/act, AROM, bilat coord ex  -     Therapeutic Exercise --  UBE, light flexbar, dowel ex/ wrist rolls  -       Row Name 07/24/25 1426          Positioning and Restraints    Post Treatment Position wheelchair  -     In Wheelchair sitting;call light within reach;encouraged to call for assist;exit alarm on;notified nsg;with PT  room- am; PT- pm  -               User Key  (r) = Recorded By, (t) = Taken By, (c) = Cosigned By      Initials Name Effective Dates     Amber Badillo OT 06/16/21 -                      Occupational Therapy Education       Title: PT OT SLP Therapies (Done)       Topic: Occupational Therapy (Done)       Point: ADL training (Done)       Learning Progress Summary            Patient Acceptance, E,D, VU,NR by  at 7/24/2025 1435    Acceptance, E,D, VU,NR by  at 7/23/2025 1541                      Point: Precautions (Done)       Learning Progress Summary            Patient Acceptance, E,D, VU,NR by  at 7/24/2025 1435    Acceptance, E,D, VU,NR by  at 7/23/2025 1541                                      User Key       Initials Effective Dates Name Provider Type Discipline     06/16/21 -  Amber Badillo, OT Occupational Therapist OT                        OT  Recommendation and Plan    Planned Therapy Interventions (OT): activity tolerance training, adaptive equipment training, BADL retraining, occupation/activity based interventions, patient/caregiver education/training, ROM/therapeutic exercise, strengthening exercise (impaired ADL's, strength, fxl mobility, and activity tolerance)                    Time Calculation:      Time Calculation- OT       Row Name 07/24/25 1436 07/24/25 1435          Time Calculation- OT    OT Start Time 1315  - 0805  -     OT Stop Time 1330  - 0920  -     OT Time Calculation (min) 15 min  - 75 min  -     Total Timed Code Minutes- OT 15 minute(s)  - 75 minute(s)  -               User Key  (r) = Recorded By, (t) = Taken By, (c) = Cosigned By      Initials Name Provider Type     Amber Badillo OT Occupational Therapist                  Therapy Charges for Today       Code Description Service Date Service Provider Modifiers Qty    11377933371 HC OT EVAL HIGH COMPLEXITY 3 7/23/2025 Amber Badillo OT GO 1    06066811138 HC OT THER PROC EA 15 MIN 7/23/2025 Amber Badillo OT GO 2    02699114618 HC OT SELF CARE/MGMT/TRAIN EA 15 MIN 7/23/2025 Amber Badillo OT GO 2    24112503663 HC OT SELF CARE/MGMT/TRAIN EA 15 MIN 7/24/2025 Amber Badillo OT GO 2    18608697273 HC OT THER PROC EA 15 MIN 7/24/2025 Amber Badillo OT GO 3    00373234975 HC OT THERAPEUTIC ACT EA 15 MIN 7/24/2025 Amber Badillo OT GO 1                     Amber Badillo OT  7/24/2025

## 2025-07-24 NOTE — SIGNIFICANT NOTE
07/24/25 1430   Plan   Plan Team conference held today.  Spoke to pt and spouse about how she is doing in therapy and recommendation for discharge on 8-6-25.  Discussed spouse coming to rehab to observe therapy/receive education closer to discharge date and he is agreeable.  Pt to return home with spouse providing assistance at discharge.  Pt's sister will help as needed and live close to pt.  Will follow and assist with discharge planning.   Patient/Family in Agreement with Plan yes

## 2025-07-25 LAB
ANION GAP SERPL CALCULATED.3IONS-SCNC: 10 MMOL/L (ref 5–15)
BASOPHILS # BLD AUTO: 0.03 10*3/MM3 (ref 0–0.2)
BASOPHILS NFR BLD AUTO: 0.4 % (ref 0–1.5)
BUN SERPL-MCNC: 11 MG/DL (ref 8–23)
BUN/CREAT SERPL: 16.2 (ref 7–25)
CALCIUM SPEC-SCNC: 7.8 MG/DL (ref 8.6–10.5)
CHLORIDE SERPL-SCNC: 98 MMOL/L (ref 98–107)
CO2 SERPL-SCNC: 24 MMOL/L (ref 22–29)
CREAT SERPL-MCNC: 0.68 MG/DL (ref 0.57–1)
DEPRECATED RDW RBC AUTO: 61.2 FL (ref 37–54)
EGFRCR SERPLBLD CKD-EPI 2021: 98 ML/MIN/1.73
EOSINOPHIL # BLD AUTO: 0.06 10*3/MM3 (ref 0–0.4)
EOSINOPHIL NFR BLD AUTO: 0.8 % (ref 0.3–6.2)
ERYTHROCYTE [DISTWIDTH] IN BLOOD BY AUTOMATED COUNT: 16.4 % (ref 12.3–15.4)
GLUCOSE BLDC GLUCOMTR-MCNC: 155 MG/DL (ref 70–130)
GLUCOSE BLDC GLUCOMTR-MCNC: 169 MG/DL (ref 70–130)
GLUCOSE BLDC GLUCOMTR-MCNC: 227 MG/DL (ref 70–130)
GLUCOSE BLDC GLUCOMTR-MCNC: 268 MG/DL (ref 70–130)
GLUCOSE SERPL-MCNC: 197 MG/DL (ref 65–99)
HCT VFR BLD AUTO: 24.7 % (ref 34–46.6)
HCT VFR BLD AUTO: 27 % (ref 34–46.6)
HGB BLD-MCNC: 7.8 G/DL (ref 12–15.9)
HGB BLD-MCNC: 8.5 G/DL (ref 12–15.9)
IMM GRANULOCYTES # BLD AUTO: 0.06 10*3/MM3 (ref 0–0.05)
IMM GRANULOCYTES NFR BLD AUTO: 0.8 % (ref 0–0.5)
LYMPHOCYTES # BLD AUTO: 0.93 10*3/MM3 (ref 0.7–3.1)
LYMPHOCYTES NFR BLD AUTO: 13 % (ref 19.6–45.3)
Lab: ABNORMAL
MCH RBC QN AUTO: 32.9 PG (ref 26.6–33)
MCHC RBC AUTO-ENTMCNC: 31.6 G/DL (ref 31.5–35.7)
MCV RBC AUTO: 104.2 FL (ref 79–97)
MONOCYTES # BLD AUTO: 0.56 10*3/MM3 (ref 0.1–0.9)
MONOCYTES NFR BLD AUTO: 7.8 % (ref 5–12)
NEUTROPHILS NFR BLD AUTO: 5.5 10*3/MM3 (ref 1.7–7)
NEUTROPHILS NFR BLD AUTO: 77.2 % (ref 42.7–76)
NRBC BLD AUTO-RTO: 0 /100 WBC (ref 0–0.2)
PLATELET # BLD AUTO: 437 10*3/MM3 (ref 140–450)
PMV BLD AUTO: 9.3 FL (ref 6–12)
POTASSIUM SERPL-SCNC: 3.8 MMOL/L (ref 3.5–5.2)
RBC # BLD AUTO: 2.37 10*6/MM3 (ref 3.77–5.28)
SODIUM SERPL-SCNC: 132 MMOL/L (ref 136–145)
WBC NRBC COR # BLD AUTO: 7.14 10*3/MM3 (ref 3.4–10.8)

## 2025-07-25 PROCEDURE — 94799 UNLISTED PULMONARY SVC/PX: CPT

## 2025-07-25 PROCEDURE — 80048 BASIC METABOLIC PNL TOTAL CA: CPT | Performed by: FAMILY MEDICINE

## 2025-07-25 PROCEDURE — 63710000001 INSULIN LISPRO (HUMAN) PER 5 UNITS: Performed by: FAMILY MEDICINE

## 2025-07-25 PROCEDURE — 97535 SELF CARE MNGMENT TRAINING: CPT

## 2025-07-25 PROCEDURE — 94760 N-INVAS EAR/PLS OXIMETRY 1: CPT

## 2025-07-25 PROCEDURE — 94664 DEMO&/EVAL PT USE INHALER: CPT

## 2025-07-25 PROCEDURE — 85014 HEMATOCRIT: CPT | Performed by: FAMILY MEDICINE

## 2025-07-25 PROCEDURE — 85018 HEMOGLOBIN: CPT | Performed by: FAMILY MEDICINE

## 2025-07-25 PROCEDURE — 63710000001 ONDANSETRON ODT 4 MG TABLET DISPERSIBLE: Performed by: FAMILY MEDICINE

## 2025-07-25 PROCEDURE — 82948 REAGENT STRIP/BLOOD GLUCOSE: CPT | Performed by: FAMILY MEDICINE

## 2025-07-25 PROCEDURE — 94640 AIRWAY INHALATION TREATMENT: CPT

## 2025-07-25 PROCEDURE — 97110 THERAPEUTIC EXERCISES: CPT

## 2025-07-25 PROCEDURE — 85025 COMPLETE CBC W/AUTO DIFF WBC: CPT | Performed by: FAMILY MEDICINE

## 2025-07-25 PROCEDURE — 99232 SBSQ HOSP IP/OBS MODERATE 35: CPT | Performed by: FAMILY MEDICINE

## 2025-07-25 PROCEDURE — 25810000003 SODIUM CHLORIDE 0.9 % SOLUTION: Performed by: FAMILY MEDICINE

## 2025-07-25 PROCEDURE — 97116 GAIT TRAINING THERAPY: CPT

## 2025-07-25 PROCEDURE — 97530 THERAPEUTIC ACTIVITIES: CPT

## 2025-07-25 PROCEDURE — 82948 REAGENT STRIP/BLOOD GLUCOSE: CPT

## 2025-07-25 PROCEDURE — 94761 N-INVAS EAR/PLS OXIMETRY MLT: CPT

## 2025-07-25 RX ORDER — ONDANSETRON 4 MG/1
4 TABLET, ORALLY DISINTEGRATING ORAL EVERY 6 HOURS PRN
Status: DISPENSED | OUTPATIENT
Start: 2025-07-25

## 2025-07-25 RX ADMIN — FLECAINIDE ACETATE 50 MG: 50 TABLET ORAL at 21:15

## 2025-07-25 RX ADMIN — FOLIC ACID 1 MG: 1 TABLET ORAL at 08:51

## 2025-07-25 RX ADMIN — GABAPENTIN 600 MG: 300 CAPSULE ORAL at 14:44

## 2025-07-25 RX ADMIN — FLECAINIDE ACETATE 50 MG: 50 TABLET ORAL at 08:51

## 2025-07-25 RX ADMIN — GABAPENTIN 600 MG: 300 CAPSULE ORAL at 05:47

## 2025-07-25 RX ADMIN — BUDESONIDE AND FORMOTEROL FUMARATE DIHYDRATE 2 PUFF: 160; 4.5 AEROSOL RESPIRATORY (INHALATION) at 07:27

## 2025-07-25 RX ADMIN — BUDESONIDE AND FORMOTEROL FUMARATE DIHYDRATE 2 PUFF: 160; 4.5 AEROSOL RESPIRATORY (INHALATION) at 19:44

## 2025-07-25 RX ADMIN — HYDROCODONE BITARTRATE AND ACETAMINOPHEN 1 TABLET: 7.5; 325 TABLET ORAL at 12:26

## 2025-07-25 RX ADMIN — METOPROLOL TARTRATE 50 MG: 50 TABLET, FILM COATED ORAL at 08:50

## 2025-07-25 RX ADMIN — INSULIN LISPRO 3 UNITS: 100 INJECTION, SOLUTION INTRAVENOUS; SUBCUTANEOUS at 21:15

## 2025-07-25 RX ADMIN — RIVAROXABAN 20 MG: 20 TABLET, FILM COATED ORAL at 17:02

## 2025-07-25 RX ADMIN — ONDANSETRON 4 MG: 4 TABLET, ORALLY DISINTEGRATING ORAL at 06:08

## 2025-07-25 RX ADMIN — INSULIN LISPRO 2 UNITS: 100 INJECTION, SOLUTION INTRAVENOUS; SUBCUTANEOUS at 18:20

## 2025-07-25 RX ADMIN — CETIRIZINE HYDROCHLORIDE 10 MG: 10 TABLET, FILM COATED ORAL at 08:51

## 2025-07-25 RX ADMIN — LEVOTHYROXINE SODIUM 125 MCG: 0.1 TABLET ORAL at 05:47

## 2025-07-25 RX ADMIN — DULOXETINE 60 MG: 60 CAPSULE, DELAYED RELEASE ORAL at 08:50

## 2025-07-25 RX ADMIN — AMITRIPTYLINE HYDROCHLORIDE 25 MG: 25 TABLET ORAL at 21:15

## 2025-07-25 RX ADMIN — GABAPENTIN 600 MG: 300 CAPSULE ORAL at 21:15

## 2025-07-25 RX ADMIN — INSULIN LISPRO 2 UNITS: 100 INJECTION, SOLUTION INTRAVENOUS; SUBCUTANEOUS at 08:50

## 2025-07-25 RX ADMIN — HYDROCODONE BITARTRATE AND ACETAMINOPHEN 1 TABLET: 7.5; 325 TABLET ORAL at 05:59

## 2025-07-25 RX ADMIN — INSULIN LISPRO 4 UNITS: 100 INJECTION, SOLUTION INTRAVENOUS; SUBCUTANEOUS at 12:25

## 2025-07-25 RX ADMIN — SODIUM CHLORIDE 500 ML: 9 INJECTION, SOLUTION INTRAVENOUS at 11:10

## 2025-07-25 RX ADMIN — DULOXETINE 30 MG: 60 CAPSULE, DELAYED RELEASE ORAL at 08:51

## 2025-07-25 NOTE — PROGRESS NOTES
Patient Information    YOB: 1960      Gender:  Female    Primary Language: English    Preferred Language: English    Rehab Diagnosis/Condition  Diagnosis/Conditions that caused the need for rehabilitation.      Left Intertrochanteric Hip Fracture S/P Repair    Medical Status    stable    Medical Prognosis        good--healing repaired hip fx    Impairment Group Code  Orthopedic Disorders Impairment Group Code:  08.11 Status Post Unilateral Hip Fracture    Rehabilitation Therapy Program  Expected Participation in Rehabilitation Program:  At least 3 hours per day at least 5 days per week    Anticipated Interventions                        Expected Intensity (hours/day)  Expected Frequency  (days/week)  Expected Duration (total # days/IRF stay)  Physical Therapy    1.5 hours per day   5 days per week     14 days  Occupational Therapy  1.5 hours per day   5 days per week     14 days        Other Disciplines Participating in Plan of Care:  Case Management, Nursing,  Recreational Therapist    Estimated Length of Stay  Estimated Length of Stay:  14 days    Estimated Discharge Date:   08/06/2025    Anticipated Discharge Destination  Anticipated Discharge Destination:  To community with assistance    Discharge Destination Information:  Patient will discharge home with spouse providing assistance if needed.    Rehabilitation Potential        Based on the patient's PLOF and current therapy levels the patient's  rehabilitation potential is good.    Problems/Goals  Mobility Discussion    PT - Bed/Chair/Wheelchair (Mobility)  Current Status: max A  Long Term Goals  07/23/2025 04:15 PM - Active  Sup  PT - Walk (Mobility)  Current Status: unable  Long Term Goals  07/23/2025 04:15 PM - Active  amb 150' RW Sup  Self Care Discussion    OT - Dressing (Lower) (Self Care)  Current Status: Max/Dependent  Long Term Goals  07/23/2025 12:26 PM - Active  ModA  Body Function Structure Discussion    NURS - Skin Integrity (Body  Function Structure)  Current Status: Risk for further skin breakdown  Long Term Goals  07/22/2025 02:24 PM - Active  No worsening of skin breakdown/ No new skin breakdown  Safety Discussion    NURS - Potential for Injury (Safety)  Current Status: Risk for falls  Long Term Goals  07/22/2025 02:25 PM - Active  No falls this hospital stay    Signed by: Jesus Acevedo, Physician

## 2025-07-25 NOTE — THERAPY TREATMENT NOTE
Inpatient Rehabilitation - Physical Therapy Treatment Note       GUANACO Portillo     Patient Name: Lashae Benitez  : 1960  MRN: [id number]    Today's Date: 2025                    Admit Date: 2025      Visit Dx:   No diagnosis found.    Patient Active Problem List   Diagnosis    Hiatal hernia    Essential hypertension    Sjogren's syndrome    Multiple sclerosis    Psoriasis    Fibromyalgia    Osteoarthritis    Psoriatic arthritis    RA (rheumatoid arthritis)    Degenerative disc disease, lumbar    TMJ arthritis    Dupuytren's disease    H. pylori infection    Family history of coronary artery disease    Type 2 diabetes mellitus    Edema    Bilateral leg edema    Isolated corticotropin deficiency    Hyponatremia    Paroxysmal atrial fibrillation    Sepsis    Bacteremia, escherichia coli    Iron deficiency anemia    Malabsorption due to intolerance, not elsewhere classified    Chronic chest pain with high risk for CAD    Abnormal nuclear stress test    Abnormal computed tomography angiography (CTA)    Hip fracture    Closed intertrochanteric fracture of hip, left, initial encounter    Closed left hip fracture, sequela       Past Medical History:   Diagnosis Date    Acid reflux     Allergic     Anemia     Anxiety     Atrial fibrillation     Cancer     thyroid, skin    Cervical disc disorder     Chronic pain disorder     Claustrophobia     CTS (carpal tunnel syndrome)     Degenerative disc disease, lumbar     Depression     Dupuytren's disease     Essential hypertension     Family history of coronary artery disease     Fibromyalgia     Gallbladder abscess     H. pylori infection     Hiatal hernia     Hyperlipidemia     Hypothyroidism     Low back pain     Lumbosacral disc disease     Migraines     migraines    Multiple sclerosis     Osteoarthritis     Peripheral neuropathy     Psoriasis     Psoriatic arthritis     RA (rheumatoid arthritis)     Rheumatoid arthritis     Sinusitis     Sjogren's syndrome      Stomach ulcer     Thoracic disc disorder     TMJ arthritis     Type 2 diabetes mellitus     Urinary tract infection        Past Surgical History:   Procedure Laterality Date    BACK SURGERY      BREAST LUMPECTOMY Left 11/1993    CARDIAC CATHETERIZATION Left 09/21/2009    Normal     CARDIAC CATHETERIZATION N/A 6/24/2025    Procedure: Left Heart Cath;  Surgeon: Teodora Barron MD;  Location:  [facility];  Service: Cardiovascular;  Laterality: N/A;    CARPAL TUNNEL RELEASE  03/2012    CERVICAL POLYPECTOMY  12/2015    CHOLECYSTECTOMY  05/2007    COLONOSCOPY      ENDOSCOPY      EPIDURAL BLOCK      GASTRIC BYPASS  09/2007    JOINT REPLACEMENT      KNEE ARTHROPLASTY      LUMBAR DISC SURGERY      C5-6    NECK SURGERY      REPLACEMENT TOTAL KNEE Right 06/2016    SACRAL NERVE STIMULATOR PLACEMENT  09/2014    SACRAL NERVE STIMULATOR PLACEMENT  04/11/2024    @ [facility] in [place]    SACRAL NERVE STIMULATOR PLACEMENT Right 04/17/2024    THYROIDECTOMY  03/2016    TRIGGER POINT INJECTION         PT ASSESSMENT (Last 12 Hours)       IRF PT Evaluation and Treatment       Row Name 07/25/25 1359          PT Time and Intention    Document Type daily treatment  -RG     Mode of Treatment individual therapy;physical therapy  -RG     Patient/Family/Caregiver Comments/Observations Pt and nursing in agreement for skilled PT on this date. Pts BP dropped when standing but quickly recovered when seated. BP with nurse present seated 160/54, standing 79/46, seated 111/62, standing 60/38, seated 101/63.  -RG       Row Name 07/25/25 1359          General Information    Existing Precautions/Restrictions fall;weight bearing  LLE TTWB  -RG       Row Name 07/25/25 1359          Pain Scale: FACES Pre/Post-Treatment    Pain: FACES Scale, Pretreatment 6-->hurts even more  -RG     Posttreatment Pain Rating 8-->hurts whole lot  -RG       Row Name 07/25/25 1931          Cognition/Psychosocial    Affect/Mental Status (Cognition) WFL  -RG      Follows Commands (Cognition) verbal cues/prompting required;physical/tactile prompts required  -     Personal Safety Interventions fall prevention program maintained;gait belt;nonskid shoes/slippers when out of bed  -       Row Name 07/25/25 1359          Mobility    Left Lower Extremity (Weight-bearing Status) toe touch weight-bearing (TTWB)  -       Row Name 07/25/25 1359          Bed Mobility    Bed Mobility supine-sit;sit-supine;supine-sit-supine  -     Supine-Sit-Supine [place] (Bed Mobility) maximum assist (25% patient effort);verbal cues;nonverbal cues (demo/gesture)  -       Row Name 07/25/25 1359          Transfer Assessment/Treatment    Transfers bed-chair transfer;chair-bed transfer;sit-stand transfer;stand-sit transfer  -[facility] Name 07/25/25 1359          Bed-Chair Transfer    Bed-Chair [place] (Transfers) maximum assist (25% patient effort);verbal cues;nonverbal cues (demo/gesture)  -     Assistive Device (Bed-Chair Transfers) wheelchair  -       Row Name 07/25/25 1359          Chair-Bed Transfer    Chair-Bed [place] (Transfers) maximum assist (25% patient effort);verbal cues;nonverbal cues (demo/gesture)  -     Assistive Device (Chair-Bed Transfers) wheelchair  -       Row Name 07/25/25 1359          Sit-Stand Transfer    Sit-Stand [place] (Transfers) moderate assist (50% patient effort);verbal cues;nonverbal cues (demo/gesture)  -     Assistive Device (Sit-Stand Transfers) parallel bars  -       Row Name 07/25/25 1359          Stand-Sit Transfer    Stand-Sit [place] (Transfers) moderate assist (50% patient effort);verbal cues;nonverbal cues (demo/gesture)  -     Assistive Device (Stand-Sit Transfers) parallel bars  -       Row Name 07/25/25 1359          Safety Issues/Impairments Affecting Functional Mobility    Impairments Affecting Function (Mobility) balance;endurance/activity tolerance;pain;range of motion (ROM);strength  -[facility] Name  07/25/25 1359          Hip (Therapeutic Exercise)    Hip Strengthening (Therapeutic Exercise) bilateral;flexion;aBduction;aDduction;marching while seated;sitting;resistance band;green;10 repetitions;2 sets  -RG       Row Name 07/25/25 1359          Knee (Therapeutic Exercise)    Knee Strengthening (Therapeutic Exercise) bilateral;flexion;extension;marching while seated;LAQ (long arc quad);sitting;2 lb free weight;resistance band;green;10 repetitions;2 sets  -RG       Row Name 07/25/25 1359          Ankle (Therapeutic Exercise)    Ankle Strengthening (Therapeutic Exercise) bilateral;dorsiflexion;plantarflexion;sitting;10 repetitions;2 sets  -RG       Row Name 07/25/25 1359          Positioning and Restraints    Pre-Treatment Position in bed  -RG     Post Treatment Position bed  -RG     In Bed notified nsg;supine;sitting;call light within reach;encouraged to call for assist;exit alarm on;with nsg  -RG       Row Name 07/25/25 8456          Therapy Assessment/Plan (PT)    Patient's Goals For Discharge return home  -RG       Row Name 07/25/25 2909          Therapy Assessment/Plan (PT)    Rehab Potential/Prognosis (PT) fair;good  -RG     Frequency of Treatment (PT) 5 times per week  -RG     Estimated Duration of Therapy (PT) 2 weeks  -RG     Problem List (PT) balance;mobility;range of motion (ROM);strength;pain;postural control  -RG     Activity Limitations Related to Problem List (PT) unable to ambulate safely;unable to transfer safely  -RG       Row Name 07/25/25 3195          Therapy Plan Review/Discharge Plan (PT)    Anticipated Equipment Needs at Discharge (PT Eval) --  tbd  -RG     Anticipated Discharge Disposition (PT) home with assist;home with home health;home with outpatient therapy services  -RG       Row Name 07/25/25 8846          IRF PT Goals    Bed Mobility Goal Selection (PT-IRF) bed mobility, PT goal 1  -RG     Transfer Goal Selection (PT-IRF) transfers, PT goal 1  -RG     Gait (Walking Locomotion) Goal  Selection (PT-IRF) gait, PT goal 1  -RG       Row Name 07/25/25 1359          Bed Mobility Goal 1 (PT-IRF)    Activity/Assistive Device (Bed Mobility Goal 1, PT-IRF) sit to supine/supine to sit  -RG     [place] (Bed Mobility Goal 1, PT-IRF) supervision required  -RG     Time Frame (Bed Mobility Goal 1, PT-IRF) by discharge  -RG       Row Name 07/25/25 1359          Transfer Goal 1 (PT-IRF)    Activity/Assistive Device (Transfer Goal 1, PT-IRF) sit-to-stand/stand-to-sit;bed-to-chair/chair-to-bed  -RG     [place] (Transfer Goal 1, PT-IRF) supervision required  -RG     Time Frame (Transfer Goal 1, PT-IRF) by discharge  -RG       Row Name 07/25/25 4969          Gait/Walking Locomotion Goal 1 (PT-IRF)    Activity/Assistive Device (Gait/Walking Locomotion Goal 1, PT-IRF) walker, rolling  -RG     Gait/Walking Locomotion Distance Goal 1 (PT-IRF) 150'  -RG     [place] (Gait/Walking Locomotion Goal 1, PT-IRF) supervision required  -RG     Time Frame (Gait/Walking Locomotion Goal 1, PT-IRF) by discharge  -RG               User Key  (r) = Recorded By, (t) = Taken By, (c) = Cosigned By      Initials Name Provider Type    RG Neri Cornelius PTA Physical Therapist Assistant                  Wound 07/19/25 0921 Left gluteal Other (Comments) (Active)   Dressing Appearance open to air 07/25/25 0851   Closure Open to air 07/24/25 1910   Base red 07/25/25 0851   Periwound warm;pink;redness 07/25/25 0851   [place] Temperature warm 07/25/25 0851   [place] Skin Turgor soft 07/25/25 0851   Drainage Amount none 07/25/25 0851   Care, Wound cleansed with;soap and water 07/25/25 0851   Dressing Care open to air 07/25/25 0851       Wound 07/19/25 0938 Left anterior thigh Surgical Open Surgical Incision (Active)   Dressing Appearance dry;intact 07/25/25 0851   Closure Unable to assess;Adhesive bandage 07/24/25 1910   Base pink;red 07/25/25 0851   Periwound pink;redness 07/25/25 0851   Periwound Temperature  warm 07/25/25 0851   Periwound Skin Turgor soft 07/25/25 0851   Edges rolled/closed 07/25/25 0851   Drainage Characteristics/Odor sanguineous 07/25/25 0851   Drainage Amount moderate 07/25/25 0851   Care, Wound cleansed with 07/25/25 0851   Dressing Care dressing changed 07/25/25 0851       Wound 07/21/25 0930 Right coccyx Pressure Injury (Active)   Dressing Appearance open to air 07/25/25 0851   Closure Open to air;None 07/24/25 1910   Base pink;red;purple 07/25/25 0851   Periwound redness 07/25/25 0851   Periwound Temperature warm 07/25/25 0851   Drainage Amount none 07/25/25 0851   Care, Wound cleansed with;pressure removed;soap and water;barrier applied 07/25/25 0851   Dressing Care open to air 07/25/25 0851     Physical Therapy Education       Title: PT OT SLP Therapies (Done)       Topic: Physical Therapy (Done)       Point: Mobility training (Done)       Learning Progress Summary            Patient Acceptance, E,D, VU,NR by RG at 7/25/2025 1407    Acceptance, E,D, VU,NR by RG at 7/24/2025 1502    Acceptance, E, VU,NR by LB at 7/23/2025 1612                      Point: Home exercise program (Done)       Learning Progress Summary            Patient Acceptance, E,D, VU,NR by RG at 7/25/2025 1407    Acceptance, E,D, VU,NR by RG at 7/24/2025 1502    Acceptance, E, VU,NR by LB at 7/23/2025 1612                      Point: Body mechanics (Done)       Learning Progress Summary            Patient Acceptance, E,D, VU,NR by RG at 7/25/2025 1407    Acceptance, E,D, VU,NR by RG at 7/24/2025 1502    Acceptance, E, VU,NR by LB at 7/23/2025 1612                      Point: Precautions (Done)       Learning Progress Summary            Patient Acceptance, E,D, VU,NR by RG at 7/25/2025 1407    Acceptance, E,D, VU,NR by RG at 7/24/2025 1502    Acceptance, E, VU,NR by LB at 7/23/2025 1612                                      User Key       Initials Effective Dates Name Provider Type Discipline     06/16/21 -  Andreia Dove  JAMISON Sanders Physical Therapist PT    RG 06/16/21 -  Neri Cornelius PTA Physical Therapist Assistant PT                    PT Recommendation and Plan    Frequency of Treatment (PT): 5 times per week  Anticipated Equipment Needs at Discharge (PT Eval):  (tbd)                  Time Calculation:      PT Charges       Row Name 07/25/25 1407             Time Calculation    Start Time 1000  -RG      Stop Time 1130  -RG      Time Calculation (min) 90 min  -RG      PT Received On 07/25/25  -RG         Time Calculation- PT    Total Timed Code Minutes- PT 90 minute(s)  -RG                User Key  (r) = Recorded By, (t) = Taken By, (c) = Cosigned By      Initials Name Provider Type    RG Neri Cornelius PTA Physical Therapist Assistant                    Therapy Charges for Today       Code Description Service Date Service Provider Modifiers Qty    31595357351 HC PT THERAPEUTIC ACT EA 15 MIN 7/24/2025 Neri Cornelius PTA GP, CQ 2    66594128020 HC PT THER PROC EA 15 MIN 7/24/2025 Neri Cornelius PTA GP, CQ 4    93602414965 HC GAIT TRAINING EA 15 MIN 7/25/2025 Neri Cornelius PTA GP, CQ 1    11552155067 HC PT THERAPEUTIC ACT EA 15 MIN 7/25/2025 Neri Cornelius PTA GP, CQ 2    29597560807 HC PT THER PROC EA 15 MIN 7/25/2025 Neri Cornelius PTA GP, CQ 3              PT G-Codes  AM-PAC 6 Clicks Score (PT): 12      Neri Cornelius PTA  7/25/2025

## 2025-07-25 NOTE — PLAN OF CARE
Goal Outcome Evaluation:            Patient is progressing medically and physically with all therapies, continue with current plan of care.

## 2025-07-25 NOTE — PROGRESS NOTES
Problems/Goals  Skin Integrity (Body Function Structure)  Current Status: Risk for further skin breakdown  Long Term Goals  07/22/2025 02:24 PM - Active  No worsening of skin breakdown/ No new skin breakdown  Potential for Injury (Safety)  Current Status: Risk for falls  Long Term Goals  07/22/2025 02:25 PM - Active  No falls this hospital stay    Signed by: Rachel Negrete, Nurse

## 2025-07-25 NOTE — THERAPY TREATMENT NOTE
Inpatient Rehabilitation - Occupational Therapy Treatment Note    GUANACO Portillo     Patient Name: Lashae Benitez  : 1960  MRN: [id number]    Today's Date: 2025                 Admit Date: 2025       No diagnosis found.    Patient Active Problem List   Diagnosis    Hiatal hernia    Essential hypertension    Sjogren's syndrome    Multiple sclerosis    Psoriasis    Fibromyalgia    Osteoarthritis    Psoriatic arthritis    RA (rheumatoid arthritis)    Degenerative disc disease, lumbar    TMJ arthritis    Dupuytren's disease    H. pylori infection    Family history of coronary artery disease    Type 2 diabetes mellitus    Edema    Bilateral leg edema    Isolated corticotropin deficiency    Hyponatremia    Paroxysmal atrial fibrillation    Sepsis    Bacteremia, escherichia coli    Iron deficiency anemia    Malabsorption due to intolerance, not elsewhere classified    Chronic chest pain with high risk for CAD    Abnormal nuclear stress test    Abnormal computed tomography angiography (CTA)    Hip fracture    Closed intertrochanteric fracture of hip, left, initial encounter    Closed left hip fracture, sequela       Past Medical History:   Diagnosis Date    Acid reflux     Allergic     Anemia     Anxiety     Atrial fibrillation     Cancer     thyroid, skin    Cervical disc disorder     Chronic pain disorder     Claustrophobia     CTS (carpal tunnel syndrome)     Degenerative disc disease, lumbar     Depression     Dupuytren's disease     Essential hypertension     Family history of coronary artery disease     Fibromyalgia     Gallbladder abscess     H. pylori infection     Hiatal hernia     Hyperlipidemia     Hypothyroidism     Low back pain     Lumbosacral disc disease     Migraines     migraines    Multiple sclerosis     Osteoarthritis     Peripheral neuropathy     Psoriasis     Psoriatic arthritis     RA (rheumatoid arthritis)     Rheumatoid arthritis     Sinusitis     Sjogren's syndrome     Stomach ulcer      Thoracic disc disorder     TMJ arthritis     Type 2 diabetes mellitus     Urinary tract infection        Past Surgical History:   Procedure Laterality Date    BACK SURGERY      BREAST LUMPECTOMY Left 11/1993    CARDIAC CATHETERIZATION Left 09/21/2009    Normal     CARDIAC CATHETERIZATION N/A 6/24/2025    Procedure: Left Heart Cath;  Surgeon: Teodora Barron MD;  Location:  [facility];  Service: Cardiovascular;  Laterality: N/A;    CARPAL TUNNEL RELEASE  03/2012    CERVICAL POLYPECTOMY  12/2015    CHOLECYSTECTOMY  05/2007    COLONOSCOPY      ENDOSCOPY      EPIDURAL BLOCK      GASTRIC BYPASS  09/2007    JOINT REPLACEMENT      KNEE ARTHROPLASTY      LUMBAR DISC SURGERY      C5-6    NECK SURGERY      REPLACEMENT TOTAL KNEE Right 06/2016    SACRAL NERVE STIMULATOR PLACEMENT  09/2014    SACRAL NERVE STIMULATOR PLACEMENT  04/11/2024    @ [facility] in [place]    SACRAL NERVE STIMULATOR PLACEMENT Right 04/17/2024    THYROIDECTOMY  03/2016    TRIGGER POINT INJECTION               IRF OT ASSESSMENT FLOWSHEET (Last 12 Hours)       IRF OT Evaluation and Treatment       Row Name 07/25/25 1419          OT Time and Intention    Document Type daily treatment  -CJ     Mode of Treatment occupational therapy  -     Symptoms Noted During/After Treatment --  c/o LLE pain in am; RN notified  -       Row Name 07/25/25 1419          General Information    Patient/Family/Caregiver Comments/Observations RN / pt agreeable to therapy; pm tx completed at bedside d/t low bp  -     Existing Precautions/Restrictions fall;weight bearing  TTWB LLE, ABD binder (bp)  -       Row Name 07/25/25 1419          Bathing    [place] Level (Bathing) moderate assist (50% patient effort);verbal cues  -       Row Name 07/25/25 1419          Upper Body Dressing    [place] Level (Upper Body Dressing) set up assistance;pull over garment  -       Row Name 07/25/25 1419          Lower Body Dressing    [place] Level (Lower Body  Dressing) moderate assist (50% patient effort);verbal cues;pants/bottoms;socks  -     Assistive Device Use (Lower Body Dressing) reacher;sock-aid  -       Row Name 07/25/25 1419          Grooming    [place] Level (Grooming) set up  -       Row Name 07/25/25 1419          Toileting    [place] Level (Toileting) maximum assist (25% patient effort);verbal cues  -     Assistive Device Use (Toileting) grab bar/safety frame;raised toilet seat  -       Row Name 07/25/25 1419          Bed-Chair Transfer    Bed-Chair [place] (Transfers) maximum assist (25% patient effort);verbal cues  -     Assistive Device (Bed-Chair Transfers) wheelchair  -       Row Name 07/25/25 1419          Chair-Bed Transfer    Chair-Bed [place] (Transfers) maximum assist (25% patient effort);verbal cues  -     Assistive Device (Chair-Bed Transfers) wheelchair  -       Row Name 07/25/25 1419          Toilet Transfer    [place] Level (Toilet Transfer) maximum assist (25% patient effort);moderate assist (50% patient effort);verbal cues  -     Assistive Device (Toilet Transfer) grab bars/safety frame;wheelchair  -       Row Name 07/25/25 1419          Motor Skills    Motor Skills functional endurance  -     Motor Control/Coordination Interventions therapeutic exercise/ROM  BUE ther ex, AROM, PRE  -     Therapeutic Exercise --  UBE, dowel ex, wrist rolls, light flexbar  -       Row Name 07/25/25 1419          Positioning and Restraints    Post Treatment Position bed  -     In Bed call light within reach;encouraged to call for assist;exit alarm on;with family/caregiver;notified nsg  both sessions; w/family- pm  -               User Key  (r) = Recorded By, (t) = Taken By, (c) = Cosigned By      Initials Name Effective Dates    CJ Amber Badillo OT 06/16/21 -                      Occupational Therapy Education       Title: PT OT SLP Therapies (Done)       Topic: Occupational Therapy (Done)        Point: ADL training (Done)       Learning Progress Summary            Patient Acceptance, E,D, VU,NR by  at 7/25/2025 1426    Acceptance, E,D, VU,NR by  at 7/24/2025 1435    Acceptance, E,D, VU,NR by  at 7/23/2025 1541                      Point: Precautions (Done)       Learning Progress Summary            Patient Acceptance, E,D, VU,NR by  at 7/25/2025 1426    Acceptance, E,D, VU,NR by  at 7/24/2025 1435    Acceptance, E,D, VU,NR by  at 7/23/2025 1541                                      User Key       Initials Effective Dates Name Provider Type [facility] 06/16/21 -  Amber Badillo OT Occupational Therapist OT                        OT Recommendation and Plan    Planned Therapy Interventions (OT): activity tolerance training, adaptive equipment training, BADL retraining, occupation/activity based interventions, patient/caregiver education/training, ROM/therapeutic exercise, strengthening exercise (impaired ADL's, strength, fxl mobility, and activity tolerance)                    Time Calculation:      Time Calculation- OT       Row Name 07/25/25 1427 07/25/25 1425          Time Calculation- OT    OT Start Time 1330  - 0810  -     OT Stop Time 1355  - 0915  -     OT Time Calculation (min) 25 min  - 65 min  -     Total Timed Code Minutes- OT 25 minute(s)  - 65 minute(s)  -               User Key  (r) = Recorded By, (t) = Taken By, (c) = Cosigned By      Initials Name Provider Type     Amber Badillo OT Occupational Therapist                  Therapy Charges for Today       Code Description Service Date Service Provider Modifiers Qty    85062011014 HC OT SELF CARE/MGMT/TRAIN EA 15 MIN 7/24/2025 Amber Badillo OT GO 2    63502033900 HC OT THER PROC EA 15 MIN 7/24/2025 Amber Badillo OT GO 3    01308731692 HC OT THERAPEUTIC ACT EA 15 MIN 7/24/2025 Amber Badillo OT GO 1    96860549627 HC OT SELF CARE/MGMT/TRAIN EA 15 MIN 7/25/2025 Amber Badillo OT GO 3     [id number]  OT THER PROC EA 15 MIN 7/25/2025 Amber Badillo, OT GO 3                     Amber Badillo OT  7/25/2025

## 2025-07-25 NOTE — PROGRESS NOTES
Team Members Attending Conference    Name                                    Professional Designation    Dr. Jesus Acevedo                            Physician  Darlene Adam, MSW                    Josette Mc, RN                        Nurse  Andreia Dove, PT                         Physical Therapist  Jesica Bowling, OT                        Occupational Therapist  Eusebia Davis RN                          Nurse      Patient Information    YOB: 1960      Gender:  Female    Primary Language: English    Preferred Language: English    Admission Date/Time  07/24/2025 01:14 PM    Rehab Diagnosis/Condition  Diagnosis/Conditions that caused the need for rehabilitation.      Left Intertrochanteric Hip Fracture S/P Repair    Rehabilitation Potential        Based on the patient's PLOF and current therapy levels the patient's  rehabilitation potential is good.    Rehabilitation Therapy Program  Expected Participation in Rehabilitation Program:  At least 3 hours per day at least 5 days per week    Anticipated Interventions                        Expected Intensity (hours/day)  Expected Frequency  (days/week)  Expected Duration (total # days/IRF stay)  Physical Therapy    1.5 hours per day   5 days per week     14 days  Occupational Therapy  1.5 hours per day   5 days per week     14 days        Other Disciplines Participating in Plan of Care:  Case Management, Nursing,  Recreational Therapist    Estimated Length of Stay  Estimated Length of Stay:  14 days    Estimated Discharge Date:   08/06/2025    Anticipated Discharge Destination  Anticipated Discharge Destination:  To community with assistance    Discharge Destination Information:  Patient will discharge home with spouse providing assistance if needed.    Problems/Goals  Mobility Discussion    PT - Bed/Chair/Wheelchair (Mobility)  Current Status: max A  Long Term Goals  07/23/2025 04:15 PM - Active  Sup  PT - Walk (Mobility)  Current Status:  unable  Long Term Goals  07/23/2025 04:15 PM - Active  amb 150' RW Sup  Self Care Discussion    OT - Dressing (Lower) (Self Care)  Current Status: Max/Dependent  Long Term Goals  07/23/2025 12:26 PM - Active  ModA  Body Function Structure Discussion    NURS - Skin Integrity (Body Function Structure)  Current Status: Risk for further skin breakdown  Long Term Goals  07/22/2025 02:24 PM - Active  No worsening of skin breakdown/ No new skin breakdown  Safety Discussion    NURS - Potential for Injury (Safety)  Current Status: Risk for falls  Long Term Goals  07/22/2025 02:25 PM - Active  No falls this hospital stay    Physician Concurrence    I attended the weekly team conference documented above and agree with the  documented findings, discussion and decisions, and current plan of care.    Signed by: Jesus Acevedo, Physician    Physician CoSigned By: Jesus Acevedo 07/25/2025 07:18:00

## 2025-07-25 NOTE — PLAN OF CARE
Goal Outcome Evaluation:           Progress: improving  Outcome Summary: New Admit         Problem: Rehabilitation (IRF) Plan of Care  Goal: Plan of Care Review  Outcome: Progressing  Flowsheets  Taken 7/25/2025 1200 by Meenakshi Davis RN  Progress: improving  Taken 7/23/2025 2352 by Katie Sanford RN  Plan of Care Reviewed With: patient  Goal: Patient-Specific Goal (Individualized)  Outcome: Progressing  Goal: Absence of New-Onset Illness or Injury  Outcome: Progressing  Intervention: Identify and Manage Fall Risk  Recent Flowsheet Documentation  Taken 7/25/2025 1000 by Meenakshi Davis RN  Safety Promotion/Fall Prevention:   nonskid shoes/slippers when out of bed   safety round/check completed  Taken 7/25/2025 0800 by Meenakshi Davis RN  Safety Promotion/Fall Prevention:   nonskid shoes/slippers when out of bed   safety round/check completed  Intervention: Prevent VTE (Venous Thromboembolism)  Recent Flowsheet Documentation  Taken 7/25/2025 0851 by Meenakshi Davis RN  VTE Prevention/Management:   bilateral   SCDs (sequential compression devices) off   patient refused intervention  Goal: Optimal Comfort and Wellbeing  Outcome: Progressing  Intervention: Provide Person-Centered Care  Recent Flowsheet Documentation  Taken 7/25/2025 0851 by Meenakshi Davis RN  Trust Relationship/Rapport:   care explained   questions answered  Intervention: Monitor Pain and Promote Comfort  Recent Flowsheet Documentation  Taken 7/25/2025 0851 by Meenakshi Davis RN  Pain Management Interventions:   pillow support provided   position adjusted  Goal: Home and Community Transition Plan Established  Outcome: Progressing

## 2025-07-25 NOTE — PROGRESS NOTES
[facility]  PROGRESS NOTE     Patient Identification:  Name:  Lashae Benitez  Age:  64 y.o.  Sex:  female  :  1960  MRN:  [id number]  Visit Number:  [id number]  ROOM: [facility]     Primary Care Provider:  Kreis, Samuel Duane, MD    Length of stay in inpatient status:  3    Subjective     Chief Compliant: Status post left hip fracture with ORIF    History of Presenting Illness: Patient 64-year-old female being followed for recent hip fracture and getting PT and OT.  Patient had orthostatic episode this morning when being raised up from the bed to walk.  No other complaints at this time    Objective     Current Hospital Meds:amitriptyline, 25 mg, Oral, Nightly  budesonide-formoterol, 2 puff, Inhalation, BID - RT  cetirizine, 10 mg, Oral, Daily  [START ON 2025] cholecalciferol, 50,000 Units, Oral, Weekly  [START ON 2025] cyanocobalamin, 1,000 mcg, Intramuscular, Weekly  DULoxetine, 30 mg, Oral, Daily  DULoxetine, 60 mg, Oral, Daily  flecainide, 50 mg, Oral, BID  folic acid, 1 mg, Oral, Daily  gabapentin, 600 mg, Oral, Q8H  insulin lispro, 2-7 Units, Subcutaneous, 4x Daily PC & at Bedtime  levothyroxine, 125 mcg, Oral, Q AM  [START ON 2025] methotrexate, 25 mg, Oral, Weekly  metoprolol tartrate, 50 mg, Oral, BID  rivaroxaban, 20 mg, Oral, Daily With Dinner  sodium chloride, 500 mL, Intravenous, Once       ----------------------------------------------------------------------------------------------------------------------  Vital Signs:  Temp:  [97.5 °F (36.4 °C)-98.3 °F (36.8 °C)] 97.5 °F (36.4 °C)  Heart Rate:  [79-82] 79  Resp:  [16-18] 18  BP: (116-150)/(75-77) 116/75  SpO2:  [98 %-99 %] 98 %  on   ;   Device (Oxygen Therapy): room air  Body mass index is 24.13 kg/m².    Wt Readings from Last 3 Encounters:   25 65.8 kg (145 lb)   25 65.8 kg (145 lb)   25 65.6 kg (144 lb 9.6 oz)     Intake & Output (last 3 days)          0701   0700  07 07  07/24 0701 07/25 0700 07/25 0701 07/26 0700    P.O. 600 660 540 240    Total Intake(mL/kg) 600 (9.1) 660 (10) 540 (8.2) 240 (3.6)    Urine (mL/kg/hr)  300 (0.2)      Stool  0      Total Output  300      Net +600 +360 +540 +240            Urine Unmeasured Occurrence 1 x 5 x 4 x     Stool Unmeasured Occurrence  1 x            Diet: Regular/House; Texture: Regular (IDDSI 7); Fluid Consistency: Thin (IDDSI 0)  ----------------------------------------------------------------------------------------------------------------------  Physical exam:  Constitutional: No acute distress  HEENT: Normocephalic atraumatic  Neck:   Supple  Cardiovascular: Regular rate and rhythm  Pulmonary/Chest: Clear to auscultation  Abdominal: Positive bowel sounds soft.   Musculoskeletal: Status post hip repair--incision site has a small amount of sanguinous drainage noted.  No real erythema surrounding the incision sites.  Staples are in place  Neurological: No focal deficits  Skin: As above  Peripheral vascular: No edema  Genitourinary:  ----------------------------------------------------------------------------------------------------------------------    Last echocardiogram:  Results for orders placed during the hospital encounter of 07/17/25    Adult Transthoracic Echo Complete W/ Cont if Necessary Per Protocol 07/18/2025 12:46 PM    Interpretation Summary    Left ventricular systolic function is normal. Calculated left ventricular EF = 58.5%    Left ventricular diastolic function is consistent with (grade I) impaired relaxation.    Estimated right ventricular systolic pressure from tricuspid regurgitation is normal (<35 mmHg).    No significant valvular abnormalities noted    ----------------------------------------------------------------------------------------------------------------------  Results from last 7 days   Lab Units 07/24/25 2034 07/23/25  0408 07/22/25  0254 07/21/25  0036 07/20/25  0519 07/19/25  0106 07/18/25  1703  "  WBC 10*3/mm3  --  4.77 5.99  --  8.35   < >  --    HEMOGLOBIN g/dL 9.5* 8.4* 8.6*   < > 6.9*   < >  --    HEMATOCRIT % 29.3* 25.9* 26.1*   < > 21.1*   < >  --    MCV fL  --  102.0* 100.8*  --  108.2*   < >  --    MCHC g/dL  --  32.4 33.0  --  32.7   < >  --    PLATELETS 10*3/mm3  --  295 259  --  230   < >  --    INR   --   --   --   --   --   --  1.15*    < > = values in this interval not displayed.         Results from last 7 days   Lab Units 07/23/25  0408 07/20/25  0139 07/19/25  1608 07/19/25  0106   SODIUM mmol/L 138 133*  --  134*   POTASSIUM mmol/L 3.8 4.4 4.9 3.6   MAGNESIUM mg/dL 2.0  --   --   --    CHLORIDE mmol/L 104 102  --  100   CO2 mmol/L 24.7 20.1*  --  22.0   BUN mg/dL 8.7 20.3  --  10.9   CREATININE mg/dL 0.50* 0.98  --  0.62   CALCIUM mg/dL 7.8* 7.5*  --  8.1*   GLUCOSE mg/dL 142* 210*  --  177*   ALBUMIN g/dL  --  2.6*  --  3.0*   BILIRUBIN mg/dL  --  0.2  --  0.4   ALK PHOS U/L  --  92  --  128*   AST (SGOT) U/L  --  18  --  17   ALT (SGPT) U/L  --  21  --  26   Estimated Creatinine Clearance: 119.6 mL/min (A) (by C-G formula based on SCr of 0.5 mg/dL (L)).  No results found for: \"AMMONIA\"              Glucose   Date/Time Value Ref Range Status   07/25/2025 1048 268 (H) 70 - 130 mg/dL Final     Comment:     Serial Number: [id number]:  [id number]   07/25/2025 0607 169 (H) 70 - 130 mg/dL Final     Comment:     Serial Number: [id number]:  [id number]   07/24/2025 1911 181 (H) 70 - 130 mg/dL Final     Comment:     Serial Number: [id number]:  [id number]   07/24/2025 1612 135 (H) 70 - 130 mg/dL Final     Comment:     Serial Number: [id number]:  [id number]   07/24/2025 1058 205 (H) 70 - 130 mg/dL Final     Comment:     Serial Number: [id number]:  [id number]   07/24/2025 0608 199 (H) 70 - 130 mg/dL Final     Comment:     Serial Number: [id number]:  [id number]   07/23/2025 2000 249 (H) 70 - 130 mg/dL Final     Comment:     Serial Number: [id number]:  " "[id number]   07/23/2025 1652 183 (H) 70 - 130 mg/dL Final     Comment:     Serial Number: [id number]:  [id number]     Lab Results   Component Value Date    TSH 15.900 (H) 03/12/2025    FREET4 1.58 12/07/2023     Lab Results   Component Value Date    PREGTESTUR Negative 01/21/2018     Pain Management Panel  More data exists         Latest Ref Rng & Units 5/12/2025 3/17/2025   Pain Management Panel   Creatinine, Urine mg/dL  mg/dL 12.4  12.4  7.9  7.9       Details          Multiple values from one day are sorted in reverse-chronological order             Brief Urine Lab Results  (Last result in the past 365 days)        Color   Clarity   Blood   Leuk Est   Nitrite   Protein   CREAT   Urine HCG        07/17/25 1538 Yellow   Clear   Negative   Negative   Negative   Negative                 No results found for: \"BLOODCX\"      No results found for: \"URINECX\"  No results found for: \"WOUNDCX\"  No results found for: \"STOOLCX\"        I have personally looked at the labs and they are summarized above.  ----------------------------------------------------------------------------------------------------------------------  Detailed radiology reports for the last 24 hours:    Imaging Results (Last 24 Hours)       Procedure Component Value Units Date/Time    FL Video Swallow Single Contrast [[id number]] Collected: 07/24/25 1418     Updated: 07/24/25 1421    Narrative:      EXAM:    FL Swallowing Function With Video/Cine     EXAM DATE:    7/24/2025 2:18 PM     CLINICAL HISTORY:    Aspiration r/o     TECHNIQUE:    Fluoroscopy of the oral cavity through upper esophagus with video/cine  recording.  The study was performed in conjunction with speech  pathology.  Various consistencies of liquid and food were administered  orally by the speech pathologist.     Fluoroscopy exposure time of  approximately 1 minute or less.     COMPARISON:    No relevant prior studies available.     FINDINGS:    Pharyngeal phase:  No penetration or " aspiration.       Impression:        Normal fluoroscopic video swallow exam.  Please see the speech  pathologist's report for additional information.     This report was finalized on 7/24/2025 2:18 PM by Dr. Jerrell Garcia MD.             Final impressions for the last 30 days of radiology reports:    FL Video Swallow Single Contrast  Result Date: 7/24/2025    Normal fluoroscopic video swallow exam.  Please see the speech pathologist's report for additional information.  This report was finalized on 7/24/2025 2:18 PM by Dr. Jerrell Garcia MD.      FL Surgery Fluoro  Result Date: 7/19/2025  As above.  This report was finalized on 7/19/2025 11:15 AM by Dr. Shakeel Kemp MD.      XR Femur 2 View Left  Result Date: 7/17/2025    Left intertrochanteric hip fracture again noted. No mid or distal femur fracture identified.  This report was finalized on 7/17/2025 2:42 PM by Dr. Shakeel Kemp MD.      XR Chest AP  Result Date: 7/17/2025    Unremarkable exam with no acute cardiopulmonary findings identified.  This report was finalized on 7/17/2025 2:37 PM by Dr. Shakeel Kemp MD.      XR Shoulder 2+ View Left  Result Date: 7/17/2025    No acute findings in the left shoulder.  This report was finalized on 7/17/2025 2:35 PM by Dr. Shakeel Kemp MD.      XR Elbow 3+ View Left  Result Date: 7/17/2025  1.  No fracture or dislocation. 2.  Mild olecranon soft tissue swelling may indicate mild olecranon bursitis.  This report was finalized on 7/17/2025 2:34 PM by Dr. Shakeel Kemp MD.      XR Hip With or Without Pelvis 2 - 3 View Left  Result Date: 7/17/2025    Acute angulated left intertrochanteric fracture with varus angulation of distal fracture fragments and mild superior displacement of distal fracture fragments.  This report was finalized on 7/17/2025 2:29 PM by Dr. Shakeel Kemp MD.      I have personally looked at the radiology images and read the final radiology report.    Assessment & Plan    Status post left hip  fracture with ORIF--patient required maximum assist for bed mobility.  Maximum assistance for bed to chair chair to bed transfers.  Moderate assistance for sit to stand and stand to sit transfers.  Was not able to ambulate secondary to pain and toe-touch weightbearing.  X1 assist for toileting.  Set up for grooming; maximum assist for lower body dressing.    Dysphagia patient did well with modified barium swallow.  Continue to monitor closely    Orthostatic hypotension will give patient 500 cc bolus of IV fluids.  Patient did require 2 units of packed red blood cells during her acute care admission.  Had just a small amount of blood from the incision site yesterday.  Hemoglobin was 9.5 last evening.    Psoriatic arthritis/rheumatoid arthritis--continue methotrexate.  Have held Xeljanz for for now--recommended that this be held for 14 days postop.    Paroxysmal atrial fibrillation currently on flecainide, metoprolol and Xarelto.  Will monitor for now but may have to hold metoprolol if orthostasis becomes more of an issue    Depression continue duloxetine and Elavil    Diabetes mellitus sliding scale coverage    Hypothyroidism continue Synthroid    Neuropathic pain controlled with gabapentin    Acute blood loss anemia stable    Questionable history of COPD continue budesonide/formoterol    VTE Prophylaxis:   Xarelto        Jesus Acevedo MD  [facility]  07/25/25  10:56 EDT

## 2025-07-26 LAB
ANION GAP SERPL CALCULATED.3IONS-SCNC: 9.1 MMOL/L (ref 5–15)
BASOPHILS # BLD AUTO: 0.04 10*3/MM3 (ref 0–0.2)
BASOPHILS NFR BLD AUTO: 0.5 % (ref 0–1.5)
BUN SERPL-MCNC: 7.8 MG/DL (ref 8–23)
BUN/CREAT SERPL: 14.4 (ref 7–25)
CALCIUM SPEC-SCNC: 7.8 MG/DL (ref 8.6–10.5)
CHLORIDE SERPL-SCNC: 100 MMOL/L (ref 98–107)
CO2 SERPL-SCNC: 23.9 MMOL/L (ref 22–29)
CREAT SERPL-MCNC: 0.54 MG/DL (ref 0.57–1)
DEPRECATED RDW RBC AUTO: 59.5 FL (ref 37–54)
EGFRCR SERPLBLD CKD-EPI 2021: 103.6 ML/MIN/1.73
EOSINOPHIL # BLD AUTO: 0.09 10*3/MM3 (ref 0–0.4)
EOSINOPHIL NFR BLD AUTO: 1.2 % (ref 0.3–6.2)
ERYTHROCYTE [DISTWIDTH] IN BLOOD BY AUTOMATED COUNT: 15.9 % (ref 12.3–15.4)
GLUCOSE BLDC GLUCOMTR-MCNC: 160 MG/DL (ref 70–130)
GLUCOSE BLDC GLUCOMTR-MCNC: 200 MG/DL (ref 70–130)
GLUCOSE BLDC GLUCOMTR-MCNC: 200 MG/DL (ref 70–130)
GLUCOSE BLDC GLUCOMTR-MCNC: 216 MG/DL (ref 70–130)
GLUCOSE BLDC GLUCOMTR-MCNC: 275 MG/DL (ref 70–130)
GLUCOSE SERPL-MCNC: 181 MG/DL (ref 65–99)
HCT VFR BLD AUTO: 25.7 % (ref 34–46.6)
HGB BLD-MCNC: 8.2 G/DL (ref 12–15.9)
IMM GRANULOCYTES # BLD AUTO: 0.1 10*3/MM3 (ref 0–0.05)
IMM GRANULOCYTES NFR BLD AUTO: 1.3 % (ref 0–0.5)
LYMPHOCYTES # BLD AUTO: 1.09 10*3/MM3 (ref 0.7–3.1)
LYMPHOCYTES NFR BLD AUTO: 14.6 % (ref 19.6–45.3)
MCH RBC QN AUTO: 32.7 PG (ref 26.6–33)
MCHC RBC AUTO-ENTMCNC: 31.9 G/DL (ref 31.5–35.7)
MCV RBC AUTO: 102.4 FL (ref 79–97)
MONOCYTES # BLD AUTO: 0.77 10*3/MM3 (ref 0.1–0.9)
MONOCYTES NFR BLD AUTO: 10.3 % (ref 5–12)
NEUTROPHILS NFR BLD AUTO: 5.36 10*3/MM3 (ref 1.7–7)
NEUTROPHILS NFR BLD AUTO: 72.1 % (ref 42.7–76)
NRBC BLD AUTO-RTO: 0 /100 WBC (ref 0–0.2)
PLATELET # BLD AUTO: 453 10*3/MM3 (ref 140–450)
PMV BLD AUTO: 8.9 FL (ref 6–12)
POTASSIUM SERPL-SCNC: 4.3 MMOL/L (ref 3.5–5.2)
RBC # BLD AUTO: 2.51 10*6/MM3 (ref 3.77–5.28)
SODIUM SERPL-SCNC: 133 MMOL/L (ref 136–145)
T4 FREE SERPL-MCNC: 1.3 NG/DL (ref 0.92–1.68)
TSH SERPL DL<=0.05 MIU/L-ACNC: 14.4 UIU/ML (ref 0.27–4.2)
WBC NRBC COR # BLD AUTO: 7.45 10*3/MM3 (ref 3.4–10.8)

## 2025-07-26 PROCEDURE — 84439 ASSAY OF FREE THYROXINE: CPT | Performed by: FAMILY MEDICINE

## 2025-07-26 PROCEDURE — 94799 UNLISTED PULMONARY SVC/PX: CPT

## 2025-07-26 PROCEDURE — 99232 SBSQ HOSP IP/OBS MODERATE 35: CPT | Performed by: FAMILY MEDICINE

## 2025-07-26 PROCEDURE — 80048 BASIC METABOLIC PNL TOTAL CA: CPT | Performed by: FAMILY MEDICINE

## 2025-07-26 PROCEDURE — 94760 N-INVAS EAR/PLS OXIMETRY 1: CPT

## 2025-07-26 PROCEDURE — 97110 THERAPEUTIC EXERCISES: CPT

## 2025-07-26 PROCEDURE — 85025 COMPLETE CBC W/AUTO DIFF WBC: CPT | Performed by: FAMILY MEDICINE

## 2025-07-26 PROCEDURE — 84443 ASSAY THYROID STIM HORMONE: CPT | Performed by: FAMILY MEDICINE

## 2025-07-26 PROCEDURE — 94664 DEMO&/EVAL PT USE INHALER: CPT

## 2025-07-26 PROCEDURE — 82948 REAGENT STRIP/BLOOD GLUCOSE: CPT

## 2025-07-26 PROCEDURE — 97530 THERAPEUTIC ACTIVITIES: CPT

## 2025-07-26 PROCEDURE — 97535 SELF CARE MNGMENT TRAINING: CPT

## 2025-07-26 PROCEDURE — 94761 N-INVAS EAR/PLS OXIMETRY MLT: CPT

## 2025-07-26 PROCEDURE — 63710000001 INSULIN LISPRO (HUMAN) PER 5 UNITS: Performed by: FAMILY MEDICINE

## 2025-07-26 RX ORDER — FLUDROCORTISONE ACETATE 0.1 MG/1
50 TABLET ORAL DAILY
Status: DISPENSED | OUTPATIENT
Start: 2025-07-26

## 2025-07-26 RX ADMIN — HYDROCODONE BITARTRATE AND ACETAMINOPHEN 1 TABLET: 7.5; 325 TABLET ORAL at 05:41

## 2025-07-26 RX ADMIN — FLECAINIDE ACETATE 50 MG: 50 TABLET ORAL at 21:09

## 2025-07-26 RX ADMIN — HYDROCODONE BITARTRATE AND ACETAMINOPHEN 1 TABLET: 7.5; 325 TABLET ORAL at 09:52

## 2025-07-26 RX ADMIN — LEVOTHYROXINE SODIUM 125 MCG: 0.1 TABLET ORAL at 05:42

## 2025-07-26 RX ADMIN — INSULIN LISPRO 4 UNITS: 100 INJECTION, SOLUTION INTRAVENOUS; SUBCUTANEOUS at 21:09

## 2025-07-26 RX ADMIN — INSULIN LISPRO 3 UNITS: 100 INJECTION, SOLUTION INTRAVENOUS; SUBCUTANEOUS at 18:15

## 2025-07-26 RX ADMIN — RIVAROXABAN 20 MG: 20 TABLET, FILM COATED ORAL at 18:15

## 2025-07-26 RX ADMIN — CETIRIZINE HYDROCHLORIDE 10 MG: 10 TABLET, FILM COATED ORAL at 09:23

## 2025-07-26 RX ADMIN — BUDESONIDE AND FORMOTEROL FUMARATE DIHYDRATE 2 PUFF: 160; 4.5 AEROSOL RESPIRATORY (INHALATION) at 07:36

## 2025-07-26 RX ADMIN — FLUDROCORTISONE ACETATE 50 MCG: 0.1 TABLET ORAL at 09:52

## 2025-07-26 RX ADMIN — HYDROCODONE BITARTRATE AND ACETAMINOPHEN 1 TABLET: 7.5; 325 TABLET ORAL at 00:06

## 2025-07-26 RX ADMIN — GABAPENTIN 600 MG: 300 CAPSULE ORAL at 15:12

## 2025-07-26 RX ADMIN — FLECAINIDE ACETATE 50 MG: 50 TABLET ORAL at 09:23

## 2025-07-26 RX ADMIN — OFLOXACIN 50000 UNITS: 300 TABLET, COATED ORAL at 09:23

## 2025-07-26 RX ADMIN — GABAPENTIN 600 MG: 300 CAPSULE ORAL at 05:41

## 2025-07-26 RX ADMIN — INSULIN LISPRO 3 UNITS: 100 INJECTION, SOLUTION INTRAVENOUS; SUBCUTANEOUS at 09:24

## 2025-07-26 RX ADMIN — FOLIC ACID 1 MG: 1 TABLET ORAL at 09:23

## 2025-07-26 RX ADMIN — BUDESONIDE AND FORMOTEROL FUMARATE DIHYDRATE 2 PUFF: 160; 4.5 AEROSOL RESPIRATORY (INHALATION) at 19:53

## 2025-07-26 RX ADMIN — AMITRIPTYLINE HYDROCHLORIDE 25 MG: 25 TABLET ORAL at 21:09

## 2025-07-26 RX ADMIN — HYDROCODONE BITARTRATE AND ACETAMINOPHEN 1 TABLET: 7.5; 325 TABLET ORAL at 21:09

## 2025-07-26 RX ADMIN — DULOXETINE 60 MG: 60 CAPSULE, DELAYED RELEASE ORAL at 09:23

## 2025-07-26 RX ADMIN — INSULIN LISPRO 3 UNITS: 100 INJECTION, SOLUTION INTRAVENOUS; SUBCUTANEOUS at 12:36

## 2025-07-26 RX ADMIN — GABAPENTIN 600 MG: 300 CAPSULE ORAL at 21:09

## 2025-07-26 RX ADMIN — DULOXETINE 30 MG: 60 CAPSULE, DELAYED RELEASE ORAL at 09:22

## 2025-07-26 NOTE — THERAPY TREATMENT NOTE
Inpatient Rehabilitation - Physical Therapy Treatment Note       GUANACO Portillo     Patient Name: Lashae Benitez  : 1960  MRN: [id number]    Today's Date: 2025                    Admit Date: 2025      Visit Dx:   No diagnosis found.    Patient Active Problem List   Diagnosis    Hiatal hernia    Essential hypertension    Sjogren's syndrome    Multiple sclerosis    Psoriasis    Fibromyalgia    Osteoarthritis    Psoriatic arthritis    RA (rheumatoid arthritis)    Degenerative disc disease, lumbar    TMJ arthritis    Dupuytren's disease    H. pylori infection    Family history of coronary artery disease    Type 2 diabetes mellitus    Edema    Bilateral leg edema    Isolated corticotropin deficiency    Hyponatremia    Paroxysmal atrial fibrillation    Sepsis    Bacteremia, escherichia coli    Iron deficiency anemia    Malabsorption due to intolerance, not elsewhere classified    Chronic chest pain with high risk for CAD    Abnormal nuclear stress test    Abnormal computed tomography angiography (CTA)    Hip fracture    Closed intertrochanteric fracture of hip, left, initial encounter    Closed left hip fracture, sequela       Past Medical History:   Diagnosis Date    Acid reflux     Allergic     Anemia     Anxiety     Atrial fibrillation     Cancer     thyroid, skin    Cervical disc disorder     Chronic pain disorder     Claustrophobia     CTS (carpal tunnel syndrome)     Degenerative disc disease, lumbar     Depression     Dupuytren's disease     Essential hypertension     Family history of coronary artery disease     Fibromyalgia     Gallbladder abscess     H. pylori infection     Hiatal hernia     Hyperlipidemia     Hypothyroidism     Low back pain     Lumbosacral disc disease     Migraines     migraines    Multiple sclerosis     Osteoarthritis     Peripheral neuropathy     Psoriasis     Psoriatic arthritis     RA (rheumatoid arthritis)     Rheumatoid arthritis     Sinusitis     Sjogren's syndrome      Stomach ulcer     Thoracic disc disorder     TMJ arthritis     Type 2 diabetes mellitus     Urinary tract infection        Past Surgical History:   Procedure Laterality Date    BACK SURGERY      BREAST LUMPECTOMY Left 11/1993    CARDIAC CATHETERIZATION Left 09/21/2009    Normal     CARDIAC CATHETERIZATION N/A 6/24/2025    Procedure: Left Heart Cath;  Surgeon: Teodora Barron MD;  Location:  [facility];  Service: Cardiovascular;  Laterality: N/A;    CARPAL TUNNEL RELEASE  03/2012    CERVICAL POLYPECTOMY  12/2015    CHOLECYSTECTOMY  05/2007    COLONOSCOPY      ENDOSCOPY      EPIDURAL BLOCK      GASTRIC BYPASS  09/2007    JOINT REPLACEMENT      KNEE ARTHROPLASTY      LUMBAR DISC SURGERY      C5-6    NECK SURGERY      REPLACEMENT TOTAL KNEE Right 06/2016    SACRAL NERVE STIMULATOR PLACEMENT  09/2014    SACRAL NERVE STIMULATOR PLACEMENT  04/11/2024    @ [facility] in [place]    SACRAL NERVE STIMULATOR PLACEMENT Right 04/17/2024    THYROIDECTOMY  03/2016    TRIGGER POINT INJECTION         PT ASSESSMENT (Last 12 Hours)       IRF PT Evaluation and Treatment       Row Name 07/26/25 1608          PT Time and Intention    Document Type daily treatment  BID treatment  -LL     Mode of Treatment individual therapy;physical therapy  -LL     Patient/Family/Caregiver Comments/Observations Patient was having BP issues in AM and was agreeable to bed activities; in PM, SOFIA Burton stated that patient was medicaly stable for OOB activities and patient was agreeable.  -LL       Row Name 07/26/25 1608          General Information    Existing Precautions/Restrictions fall;weight bearing  LLE TTWB; abdominal binder for BP  -LL       Row Name 07/26/25 1608          Pain Scale: FACES Pre/Post-Treatment    Pain: FACES Scale, Pretreatment 6-->hurts even more  -LL     Posttreatment Pain Rating 8-->hurts whole lot  -LL       Row Name 07/26/25 1608          Cognition/Psychosocial    Affect/Mental Status (Cognition) WFL  -LL      Orientation Status (Cognition) oriented x 3  -LL     Follows Commands (Cognition) verbal cues/prompting required;physical/tactile prompts required  -LL     Personal Safety Interventions fall prevention program maintained;gait belt;nonskid shoes/slippers when out of bed;supervised activity  -LL     Cognitive Function (Cognition) WFL  -LL       Row Name 07/26/25 1608          Mobility    Left Lower Extremity (Weight-bearing Status) toe touch weight-bearing (TTWB)  -       Row Name 07/26/25 1608          Bed Mobility    Bed Mobility supine-sit;sit-supine;supine-sit-supine  -LL     Supine-Sit-Supine Ness (Bed Mobility) verbal cues;nonverbal cues (demo/gesture);moderate assist (50% patient effort)  -     Assistive Device (Bed Mobility) repositioning sheet;bed rails  -       Row Name 07/26/25 1608          Transfer Assessment/Treatment    Transfers bed-chair transfer;chair-bed transfer;sit-stand transfer;stand-sit transfer;toilet transfer  -       Row Name 07/26/25 1608          Bed-Chair Transfer    Bed-Chair Ness (Transfers) verbal cues;nonverbal cues (demo/gesture);moderate assist (50% patient effort);2 person assist  -     Assistive Device (Bed-Chair Transfers) wheelchair;walker, front-wheeled  -LL       Row Name 07/26/25 1608          Chair-Bed Transfer    Chair-Bed Ness (Transfers) verbal cues;nonverbal cues (demo/gesture);moderate assist (50% patient effort);2 person assist  -     Assistive Device (Chair-Bed Transfers) wheelchair;walker, front-wheeled  -       Row Name 07/26/25 1608          Toilet Transfer    Type (Toilet Transfer) stand pivot/stand step  -LL     Ness Level (Toilet Transfer) moderate assist (50% patient effort);2 person assist;verbal cues;nonverbal cues (demo/gesture)  -     Assistive Device (Toilet Transfer) wheelchair;walker, front-wheeled  -LL       Row Name 07/26/25 1608          Gait/Stairs (Locomotion)    Comment, (Gait/Stairs) Not attempted  this date.  -       Row Name 07/26/25 1608          Safety Issues/Impairments Affecting Functional Mobility    Impairments Affecting Function (Mobility) balance;endurance/activity tolerance;pain;range of motion (ROM);strength  -       Row Name 07/26/25 1608          Motor Skills    Therapeutic Exercise --  GS/QS  -       Row Name 07/26/25 1608          Hip (Therapeutic Exercise)    Hip Strengthening (Therapeutic Exercise) bilateral;flexion;extension;aBduction;aDduction;marching while seated;sitting;heel slides;resistance band;green;supine  -[facility] Name 07/26/25 1608          Knee (Therapeutic Exercise)    Knee Strengthening (Therapeutic Exercise) bilateral;flexion;extension;marching while seated;SAQ (short arc quad);LAQ (long arc quad);hamstring curls;sitting;supine;resistance band;green  -[facility] Name 07/26/25 1608          Ankle (Therapeutic Exercise)    Ankle Strengthening (Therapeutic Exercise) bilateral;dorsiflexion;plantarflexion;sitting;supine  -[facility] Name 07/26/25 1608          Positioning and Restraints    Pre-Treatment Position in bed  -     Post Treatment Position bed  -     In Bed fowlers;call light within reach;encouraged to call for assist;exit alarm on;side rails up x3;heels elevated  BID  -       Row Name 07/26/25 1608          Therapy Assessment/Plan (PT)    Patient's Goals For Discharge return home  -[facility] Name 07/26/25 1608          Therapy Assessment/Plan (PT)    Rehab Potential/Prognosis (PT) fair;good  -LL     Frequency of Treatment (PT) 5 times per week  -LL     Estimated Duration of Therapy (PT) 2 weeks  -     Problem List (PT) balance;mobility;range of motion (ROM);strength;pain;postural control  -     Activity Limitations Related to Problem List (PT) unable to ambulate safely;unable to transfer safely  -       Row Name 07/26/25 1608          Therapy Plan Review/Discharge Plan (PT)    Anticipated Equipment Needs at Discharge (PT Eval) --  tbd  -LL      Anticipated Discharge Disposition (PT) home with assist;home with home health;home with outpatient therapy services  -LL       Row Name 07/26/25 1608          IRF PT Goals    Bed Mobility Goal Selection (PT-IRF) bed mobility, PT goal 1  -LL     Transfer Goal Selection (PT-IRF) transfers, PT goal 1  -LL     Gait (Walking Locomotion) Goal Selection (PT-IRF) gait, PT goal 1  -LL       Row Name 07/26/25 1608          Bed Mobility Goal 1 (PT-IRF)    Activity/Assistive Device (Bed Mobility Goal 1, PT-IRF) sit to supine/supine to sit  -LL     Aldie Level (Bed Mobility Goal 1, PT-IRF) supervision required  -LL     Time Frame (Bed Mobility Goal 1, PT-IRF) by discharge  -LL       Row Name 07/26/25 1608          Transfer Goal 1 (PT-IRF)    Activity/Assistive Device (Transfer Goal 1, PT-IRF) sit-to-stand/stand-to-sit;bed-to-chair/chair-to-bed  -LL     Aldie Level (Transfer Goal 1, PT-IRF) supervision required  -LL     Time Frame (Transfer Goal 1, PT-IRF) by discharge  -LL       Row Name 07/26/25 1608          Gait/Walking Locomotion Goal 1 (PT-IRF)    Activity/Assistive Device (Gait/Walking Locomotion Goal 1, PT-IRF) walker, rolling  -LL     Gait/Walking Locomotion Distance Goal 1 (PT-IRF) 150'  -LL     Aldie Level (Gait/Walking Locomotion Goal 1, PT-IRF) supervision required  -LL     Time Frame (Gait/Walking Locomotion Goal 1, PT-IRF) by discharge  -LL               User Key  (r) = Recorded By, (t) = Taken By, (c) = Cosigned By      Initials Name Provider Type    LL Beverly Goff PTA Physical Therapist Assistant                  Wound 07/19/25 0921 Left gluteal Other (Comments) (Active)   Closure Open to air 07/25/25 1920   Base red;nonblanchable 07/26/25 0800   Drainage Characteristics/Odor serosanguineous 07/26/25 0800   Drainage Amount small 07/26/25 0800   Care, Wound cleansed with;soap and water;barrier applied 07/26/25 0800   Dressing Care dressing changed 07/26/25 0800       Wound 07/19/25  0938 Left anterior thigh Surgical Open Surgical Incision (Active)   Dressing Appearance dried drainage;intact 07/26/25 0800   Closure Unable to assess;Adhesive bandage 07/25/25 1920       Wound 07/21/25 0930 Right coccyx Pressure Injury (Active)   Closure Open to air 07/26/25 0800   Base pink;red;purple 07/26/25 0800   Periwound redness 07/26/25 0800   Periwound Temperature warm 07/26/25 0800   Drainage Amount none 07/26/25 0800   Care, Wound cleansed with;soap and water;barrier applied 07/26/25 0800     Physical Therapy Education       Title: PT OT SLP Therapies (Done)       Topic: Physical Therapy (Done)       Point: Mobility training (Done)       Learning Progress Summary            Patient Acceptance, E,D, VU,NR by LL at 7/26/2025 1615    Acceptance, E,D, VU,NR by RG at 7/25/2025 1407    Acceptance, E,D, VU,NR by RG at 7/24/2025 1502    Acceptance, E, VU,NR by LB at 7/23/2025 1612                      Point: Home exercise program (Done)       Learning Progress Summary            Patient Acceptance, E,D, VU,NR by LL at 7/26/2025 1615    Acceptance, E,D, VU,NR by RG at 7/25/2025 1407    Acceptance, E,D, VU,NR by RG at 7/24/2025 1502    Acceptance, E, VU,NR by LB at 7/23/2025 1612                      Point: Body mechanics (Done)       Learning Progress Summary            Patient Acceptance, E,D, VU,NR by LL at 7/26/2025 1615    Acceptance, E,D, VU,NR by RG at 7/25/2025 1407    Acceptance, E,D, VU,NR by RG at 7/24/2025 1502    Acceptance, E, VU,NR by LB at 7/23/2025 1612                      Point: Precautions (Done)       Learning Progress Summary            Patient Acceptance, E,D, VU,NR by LL at 7/26/2025 1615    Acceptance, E,D, VU,NR by RG at 7/25/2025 1407    Acceptance, E,D, VU,NR by RG at 7/24/2025 1502    Acceptance, E, VU,NR by LB at 7/23/2025 1612                                      User Key       Initials Effective Dates Name Provider Type Discipline    TIM 06/16/21 -  Andreia Dove, PT Physical  Therapist PT    LL 05/02/16 -  Beverly Goff PTA Physical Therapist Assistant PT    RG 06/16/21 -  Neri Cornelius PTA Physical Therapist Assistant PT                    PT Recommendation and Plan    Frequency of Treatment (PT): 5 times per week  Anticipated Equipment Needs at Discharge (PT Eval):  (tbd)                  Time Calculation:      PT Charges       Row Name 07/26/25 1618 07/26/25 1616          Time Calculation    Start Time 1415  -LL 1045  -LL     Stop Time 1500  -LL 1130  -LL     Time Calculation (min) 45 min  -LL 45 min  -LL     PT Received On -- 07/26/25  -LL        Time Calculation- PT    Total Timed Code Minutes- PT 45 minute(s)  -LL 45 minute(s)  -LL               User Key  (r) = Recorded By, (t) = Taken By, (c) = Cosigned By      Initials Name Provider Type    LL Beverly Goff PTA Physical Therapist Assistant                    Therapy Charges for Today       Code Description Service Date Service Provider Modifiers Qty    77767830538 HC PT THERAPEUTIC ACT EA 15 MIN 7/26/2025 Beverly Goff PTA GP, CQ 3    86281509647 HC PT THER PROC EA 15 MIN 7/26/2025 Beverly Goff PTA GP, CQ 3              PT G-Codes  AM-PAC 6 Clicks Score (PT): 16      Junie Goff PTA  7/26/2025

## 2025-07-26 NOTE — PROGRESS NOTES
Problems/Goals  Skin Integrity (Body Function Structure)  Current Status: Risk for further skin breakdown  Long Term Goals  07/22/2025 02:24 PM - Active  No worsening of skin breakdown/ No new skin breakdown  Potential for Injury (Safety)  Current Status: Risk for falls  Long Term Goals  07/22/2025 02:25 PM - Active  No falls this hospital stay    Signed by: Josephine Correa, Nurse

## 2025-07-26 NOTE — PLAN OF CARE
Goal Outcome Evaluation:  Plan of Care Reviewed With: patient           Outcome Summary: patient had therapies this shift. abd binder and med added for orthostatic hypotension. prn meds given as requested. continue plan of care.

## 2025-07-26 NOTE — THERAPY TREATMENT NOTE
Inpatient Rehabilitation - Occupational Therapy Treatment Note    GUANACO Portillo     Patient Name: Lashae Benitez  : 1960  MRN: [id number]    Today's Date: 2025                 Admit Date: 2025       No diagnosis found.    Patient Active Problem List   Diagnosis    Hiatal hernia    Essential hypertension    Sjogren's syndrome    Multiple sclerosis    Psoriasis    Fibromyalgia    Osteoarthritis    Psoriatic arthritis    RA (rheumatoid arthritis)    Degenerative disc disease, lumbar    TMJ arthritis    Dupuytren's disease    H. pylori infection    Family history of coronary artery disease    Type 2 diabetes mellitus    Edema    Bilateral leg edema    Isolated corticotropin deficiency    Hyponatremia    Paroxysmal atrial fibrillation    Sepsis    Bacteremia, escherichia coli    Iron deficiency anemia    Malabsorption due to intolerance, not elsewhere classified    Chronic chest pain with high risk for CAD    Abnormal nuclear stress test    Abnormal computed tomography angiography (CTA)    Hip fracture    Closed intertrochanteric fracture of hip, left, initial encounter    Closed left hip fracture, sequela       Past Medical History:   Diagnosis Date    Acid reflux     Allergic     Anemia     Anxiety     Atrial fibrillation     Cancer     thyroid, skin    Cervical disc disorder     Chronic pain disorder     Claustrophobia     CTS (carpal tunnel syndrome)     Degenerative disc disease, lumbar     Depression     Dupuytren's disease     Essential hypertension     Family history of coronary artery disease     Fibromyalgia     Gallbladder abscess     H. pylori infection     Hiatal hernia     Hyperlipidemia     Hypothyroidism     Low back pain     Lumbosacral disc disease     Migraines     migraines    Multiple sclerosis     Osteoarthritis     Peripheral neuropathy     Psoriasis     Psoriatic arthritis     RA (rheumatoid arthritis)     Rheumatoid arthritis     Sinusitis     Sjogren's syndrome     Stomach ulcer      "Thoracic disc disorder     TMJ arthritis     Type 2 diabetes mellitus     Urinary tract infection        Past Surgical History:   Procedure Laterality Date    BACK SURGERY      BREAST LUMPECTOMY Left 11/1993    CARDIAC CATHETERIZATION Left 09/21/2009    Normal     CARDIAC CATHETERIZATION N/A 6/24/2025    Procedure: Left Heart Cath;  Surgeon: Teodora Barron MD;  Location:  [facility];  Service: Cardiovascular;  Laterality: N/A;    CARPAL TUNNEL RELEASE  03/2012    CERVICAL POLYPECTOMY  12/2015    CHOLECYSTECTOMY  05/2007    COLONOSCOPY      ENDOSCOPY      EPIDURAL BLOCK      GASTRIC BYPASS  09/2007    JOINT REPLACEMENT      KNEE ARTHROPLASTY      LUMBAR DISC SURGERY      C5-6    NECK SURGERY      REPLACEMENT TOTAL KNEE Right 06/2016    SACRAL NERVE STIMULATOR PLACEMENT  09/2014    SACRAL NERVE STIMULATOR PLACEMENT  04/11/2024    @ [facility] in [place]    SACRAL NERVE STIMULATOR PLACEMENT Right 04/17/2024    THYROIDECTOMY  03/2016    TRIGGER POINT INJECTION               IRF OT ASSESSMENT FLOWSHEET (Last 12 Hours)       IRF OT Evaluation and Treatment       Row Name 07/26/25 0901          OT Time and Intention    Document Type daily treatment  -TM     Mode of Treatment occupational therapy  -TM     Patient Effort adequate  -TM       Row Name 07/26/25 0901          General Information    General Observations of Patient Pt agreeable for therapy. RN present at beginning of tx session monitoring BP (refer to nsg for BP numbers). Pt completed LB dressing at edge of bed with RN present monitoring BP. Once pt completed LB dressing and transfered from bed to w/c, pt c/o of BUE tremors and requested to complete tx session in bed.  OT and RN transfered pt back to bed from w/c and OT session completed at bedside with RN cleared pt for bedside therapy. Pt reported feeling \"better\" once back in bed. RN addressing  -TM     Existing Precautions/Restrictions fall;weight bearing;other (see comments)  TTWB LLE, " abdominal binder for BP per RN  -TM       Row Name 07/26/25 0901          Cognition/Psychosocial    Orientation Status (Cognition) oriented to;person;place;situation  -TM     Follows Commands (Cognition) follows one-step commands;verbal cues/prompting required  -       Row Name 07/26/25 0901          Lower Body Dressing    [place] Level (Lower Body Dressing) maximum assist (25% patient effort);verbal cues  -TM     Position (Lower Body Dressing) edge of bed sitting  -       Row Name 07/26/25 0901          Bed-Chair Transfer    Bed-Chair Sanger (Transfers) moderate assist (50% patient effort);minimum assist (75% patient effort);2 person assist;verbal cues  -TM     Assistive Device (Bed-Chair Transfers) wheelchair;walker, front-wheeled  -       Row Name 07/26/25 0901          Chair-Bed Transfer    Chair-Bed [place] (Transfers) moderate assist (50% patient effort);minimum assist (75% patient effort);2 person assist;verbal cues  -TM     Assistive Device (Chair-Bed Transfers) wheelchair;walker, front-wheeled  -       Row Name 07/26/25 0901          Motor Skills    Motor Skills coordination;functional endurance;motor control/coordination interventions  -     Motor Control/Coordination Interventions therapeutic exercise/ROM;fine motor manipulation/dexterity activities;gross motor coordination activities;other (see comments)  light table top BUE ther GMC/FMC activities  -               User Key  (r) = Recorded By, (t) = Taken By, (c) = Cosigned By      Initials Name Effective Dates     RomeliaKayla nogueira Autumn, OT 06/16/21 -                      Occupational Therapy Education       Title: PT OT SLP Therapies (Done)       Topic: Occupational Therapy (Done)       Point: ADL training (Done)       Learning Progress Summary            Patient Acceptance, E,D, VU,NR by TM at 7/26/2025 0859    Acceptance, E,D, VU,NR by  at 7/25/2025 1426    Acceptance, E,D, VU,NR by  at 7/24/2025 1435    Acceptance,  E,D, VU,NR by  at 7/23/2025 1541                      Point: Precautions (Done)       Learning Progress Summary            Patient Acceptance, E,D, VU,NR by  at 7/26/2025 0859    Acceptance, E,D, VU,NR by  at 7/25/2025 1426    Acceptance, E,D, VU,NR by  at 7/24/2025 1435    Acceptance, E,D, VU,NR by  at 7/23/2025 1541                                      User Key       Initials Effective Dates Name Provider Type Discipline     06/16/21 -  Amber Badillo OT Occupational Therapist OT     06/16/21 -  Kayla León OT Occupational Therapist OT                        OT Recommendation and Plan                         Time Calculation:      Time Calculation- OT       Row Name 07/26/25 0859             Time Calculation- OT    OT Start Time 0815  -TM      OT Stop Time 0945  -TM      OT Time Calculation (min) 90 min  -TM      Total Timed Code Minutes- OT 90 minute(s)  -TM      OT Non-Billable Time (min) 15 min  -TM                User Key  (r) = Recorded By, (t) = Taken By, (c) = Cosigned By      Initials Name Provider Type     Kayla León, OT Occupational Therapist                  Therapy Charges for Today       Code Description Service Date Service Provider Modifiers Qty    06464501186 HC OT SELF CARE/MGMT/TRAIN EA 15 MIN 7/26/2025 Kayla León, OT GO 2    41990563055 HC OT THERAPEUTIC ACT EA 15 MIN 7/26/2025 Kayla León OT GO 3    96673104338 HC OT THER PROC EA 15 MIN 7/26/2025 Kayla León OT GO 1                     Kayla León OT  7/26/2025

## 2025-07-26 NOTE — PROGRESS NOTES
[facility]  PROGRESS NOTE     Patient Identification:  Name:  Lashae Benitez  Age:  64 y.o.  Sex:  female  :  1960  MRN:  [id number]  Visit Number:  [id number]  ROOM: [facility]     Primary Care Provider:  Kreis, Samuel Duane, MD    Length of stay in inpatient status:  4    Subjective     Chief Compliant: Status post left hip fracture    History of Presenting Illness: 64-year-old female who is being followed for recent hip fracture with ORIF.  Patient did have or some orthostasis yesterday and on check this morning also still somewhat orthostatic.  Patient was less symptomatic today.  No other complaints at this time    Objective     Current Hospital Meds:amitriptyline, 25 mg, Oral, Nightly  budesonide-formoterol, 2 puff, Inhalation, BID - RT  cetirizine, 10 mg, Oral, Daily  cholecalciferol, 50,000 Units, Oral, Weekly  [START ON 2025] cyanocobalamin, 1,000 mcg, Intramuscular, Weekly  DULoxetine, 30 mg, Oral, Daily  DULoxetine, 60 mg, Oral, Daily  flecainide, 50 mg, Oral, BID  folic acid, 1 mg, Oral, Daily  gabapentin, 600 mg, Oral, Q8H  insulin lispro, 2-7 Units, Subcutaneous, 4x Daily PC & at Bedtime  levothyroxine, 125 mcg, Oral, Q AM  [START ON 2025] methotrexate, 25 mg, Oral, Weekly  [Held by provider] metoprolol tartrate, 50 mg, Oral, BID  rivaroxaban, 20 mg, Oral, Daily With Dinner       ----------------------------------------------------------------------------------------------------------------------  Vital Signs:  Temp:  [97.6 °F (36.4 °C)-98.1 °F (36.7 °C)] 97.6 °F (36.4 °C)  Heart Rate:  [] 110  Resp:  [16-18] 18  BP: ()/(50-69) 108/57  SpO2:  [98 %-100 %] 98 %  on   ;   Device (Oxygen Therapy): room air  Body mass index is 24.13 kg/m².    Wt Readings from Last 3 Encounters:   25 65.8 kg (145 lb)   25 65.8 kg (145 lb)   25 65.6 kg (144 lb 9.6 oz)     Intake & Output (last 3 days)          07 07  0701 07/26 0700 07/26 0701 07/27 0700    P.O.      Total Intake(mL/kg) 660 (10) 540 (8.2) 1320 (20.1)     Urine (mL/kg/hr) 300 (0.2)       Stool 0       Total Output 300       Net +360 +540 +1320             Urine Unmeasured Occurrence 5 x 4 x 5 x     Stool Unmeasured Occurrence 1 x             Diet: Regular/House; Texture: Regular (IDDSI 7); Fluid Consistency: Thin (IDDSI 0)  ----------------------------------------------------------------------------------------------------------------------  Physical exam:  Constitutional: No acute distress  HEENT: Normocephalic atraumatic  Neck:   Supple  Cardiovascular: Regular rate and rhythm  Pulmonary/Chest: Clear to auscultation  Abdominal:  .  Positive bowel sound soft  Musculoskeletal: Status post left hip repair  Neurological: No focal deficits  Skin: No rash  Peripheral vascular: No edema  Genitourinary:  ----------------------------------------------------------------------------------------------------------------------    Last echocardiogram:  Results for orders placed during the hospital encounter of 07/17/25    Adult Transthoracic Echo Complete W/ Cont if Necessary Per Protocol 07/18/2025 12:46 PM    Interpretation Summary    Left ventricular systolic function is normal. Calculated left ventricular EF = 58.5%    Left ventricular diastolic function is consistent with (grade I) impaired relaxation.    Estimated right ventricular systolic pressure from tricuspid regurgitation is normal (<35 mmHg).    No significant valvular abnormalities noted    ----------------------------------------------------------------------------------------------------------------------  Results from last 7 days   Lab Units 07/26/25  0129 07/25/25  1736 07/25/25  1356 07/24/25  2034 07/23/25  0408   WBC 10*3/mm3 7.45  --  7.14  --  4.77   HEMOGLOBIN g/dL 8.2* 8.5* 7.8*   < > 8.4*   HEMATOCRIT % 25.7* 27.0* 24.7*   < > 25.9*   MCV fL 102.4*  --  104.2*  --  102.0*   MCHC g/dL  "31.9  --  31.6  --  32.4   PLATELETS 10*3/mm3 453*  --  437  --  295    < > = values in this interval not displayed.         Results from last 7 days   Lab Units 07/26/25  0129 07/25/25  1356 07/23/25  0408 07/20/25  0139   SODIUM mmol/L 133* 132* 138 133*   POTASSIUM mmol/L 4.3 3.8 3.8 4.4   MAGNESIUM mg/dL  --   --  2.0  --    CHLORIDE mmol/L 100 98 104 102   CO2 mmol/L 23.9 24.0 24.7 20.1*   BUN mg/dL 7.8* 11.0 8.7 20.3   CREATININE mg/dL 0.54* 0.68 0.50* 0.98   CALCIUM mg/dL 7.8* 7.8* 7.8* 7.5*   GLUCOSE mg/dL 181* 197* 142* 210*   ALBUMIN g/dL  --   --   --  2.6*   BILIRUBIN mg/dL  --   --   --  0.2   ALK PHOS U/L  --   --   --  92   AST (SGOT) U/L  --   --   --  18   ALT (SGPT) U/L  --   --   --  21   Estimated Creatinine Clearance: 110.8 mL/min (A) (by C-G formula based on SCr of 0.54 mg/dL (L)).  No results found for: \"AMMONIA\"              Glucose   Date/Time Value Ref Range Status   07/26/2025 0616 160 (H) 70 - 130 mg/dL Final     Comment:     Serial Number: [id number]:  [id number]   07/25/2025 2014 227 (H) 70 - 130 mg/dL Final     Comment:     Serial Number: [id number]:  [id number]   07/25/2025 1556 155 (H) 70 - 130 mg/dL Final     Comment:     Serial Number: [id number]:  [id number]   07/25/2025 1048 268 (H) 70 - 130 mg/dL Final     Comment:     Serial Number: [id number]:  [id number]   07/25/2025 0607 169 (H) 70 - 130 mg/dL Final     Comment:     Serial Number: [id number]:  [id number]   07/24/2025 1911 181 (H) 70 - 130 mg/dL Final     Comment:     Serial Number: [id number]:  [id number]   07/24/2025 1612 135 (H) 70 - 130 mg/dL Final     Comment:     Serial Number: [id number]:  [id number]   07/24/2025 1058 205 (H) 70 - 130 mg/dL Final     Comment:     Serial Number: [id number]:  [id number]     Lab Results   Component Value Date    TSH 15.900 (H) 03/12/2025    FREET4 1.58 12/07/2023     Lab Results   Component Value Date    PREGTESTUR Negative 01/21/2018 " "    Pain Management Panel  More data exists         Latest Ref Rng & Units 5/12/2025 3/17/2025   Pain Management Panel   Creatinine, Urine mg/dL  mg/dL 12.4  12.4  7.9  7.9       Details          Multiple values from one day are sorted in reverse-chronological order             Brief Urine Lab Results  (Last result in the past 365 days)        Color   Clarity   Blood   Leuk Est   Nitrite   Protein   CREAT   Urine HCG        07/17/25 1538 Yellow   Clear   Negative   Negative   Negative   Negative                 No results found for: \"BLOODCX\"      No results found for: \"URINECX\"  No results found for: \"WOUNDCX\"  No results found for: \"STOOLCX\"        I have personally looked at the labs and they are summarized above.  ----------------------------------------------------------------------------------------------------------------------  Detailed radiology reports for the last 24 hours:    Imaging Results (Last 24 Hours)       ** No results found for the last 24 hours. **          Final impressions for the last 30 days of radiology reports:    FL Video Swallow Single Contrast  Result Date: 7/24/2025    Normal fluoroscopic video swallow exam.  Please see the speech pathologist's report for additional information.  This report was finalized on 7/24/2025 2:18 PM by Dr. Jerrell Garcia MD.      FL Surgery Fluoro  Result Date: 7/19/2025  As above.  This report was finalized on 7/19/2025 11:15 AM by Dr. Shakeel Kemp MD.      XR Femur 2 View Left  Result Date: 7/17/2025    Left intertrochanteric hip fracture again noted. No mid or distal femur fracture identified.  This report was finalized on 7/17/2025 2:42 PM by Dr. Shakeel Kemp MD.      XR Chest AP  Result Date: 7/17/2025    Unremarkable exam with no acute cardiopulmonary findings identified.  This report was finalized on 7/17/2025 2:37 PM by Dr. Shakeel Kemp MD.      XR Shoulder 2+ View Left  Result Date: 7/17/2025    No acute findings in the left shoulder.  This " report was finalized on 7/17/2025 2:35 PM by Dr. Shakeel Kemp MD.      XR Elbow 3+ View Left  Result Date: 7/17/2025  1.  No fracture or dislocation. 2.  Mild olecranon soft tissue swelling may indicate mild olecranon bursitis.  This report was finalized on 7/17/2025 2:34 PM by Dr. Shakeel Kemp MD.      XR Hip With or Without Pelvis 2 - 3 View Left  Result Date: 7/17/2025    Acute angulated left intertrochanteric fracture with varus angulation of distal fracture fragments and mild superior displacement of distal fracture fragments.  This report was finalized on 7/17/2025 2:29 PM by Dr. Shakeel Kemp MD.      I have personally looked at the radiology images and read the final radiology report.    Assessment & Plan    Status post left hip fracture with ORIF--patient requiring moderate assistance for bathing; set up for upper body dressing and moderate assistance for lower body dressing.  Maximum assist for toileting.  Requires maximum assist for bed to chair chair to bed transfers.  Moderate assistance for sit to stand and stand to sit transfer.  Working on strengthening exercises to improve functional mobility and ADLs.    Orthostatic hypotension.  Seems to be less symptomatic today.  Have encouraged increased fluid intake, will use abdominal binder when doing therapy.  I did hold metoprolol yesterday as well.  Will follow closely and if does not improve we will have to consider adding midodrine.  Patient was given 500 cc bolus of normal saline yesterday as well.    Psoriatic arthritis/rheumatoid arthritis will continue methotrexate per her typical schedule.  Xeljanz has been held as recommendation is that this be held for 14 days postoperatively    Paroxysmal atrial fibrillation currently on flecainide, Xarelto.  I have held metoprolol secondary to orthostasis    Depression continue duloxetine and Elavil    Diabetes mellitus sliding scale coverage with reasonably good control    Hypothyroidism continue  Synthroid; will check TSH and free T4 today    Neuropathic pain gabapentin    Acute blood loss anemia stable.  No evidence of further bleeding    Questional history of COPD continue budesonide/formoterol    Addendum: Patient was feeling weak with therapy today likely secondary to orthostasis.  Have decided to add fludrocortisone as I suspect some of this has to do with decreased volume from her recent hip repair with bleed.  Encouraging increase fluid intake.  I will monitor for results      VTE Prophylaxis:   Starr Acevedo MD  [facility] Hospitalist  07/26/25  08:34 EDT

## 2025-07-27 LAB
GLUCOSE BLDC GLUCOMTR-MCNC: 153 MG/DL (ref 70–130)
GLUCOSE BLDC GLUCOMTR-MCNC: 184 MG/DL (ref 70–130)
GLUCOSE BLDC GLUCOMTR-MCNC: 217 MG/DL (ref 70–130)
GLUCOSE BLDC GLUCOMTR-MCNC: 270 MG/DL (ref 70–130)

## 2025-07-27 PROCEDURE — 63710000001 INSULIN LISPRO (HUMAN) PER 5 UNITS: Performed by: FAMILY MEDICINE

## 2025-07-27 PROCEDURE — 94664 DEMO&/EVAL PT USE INHALER: CPT

## 2025-07-27 PROCEDURE — 94799 UNLISTED PULMONARY SVC/PX: CPT

## 2025-07-27 PROCEDURE — 25010000002 CYANOCOBALAMIN PER 1000 MCG: Performed by: FAMILY MEDICINE

## 2025-07-27 PROCEDURE — 82948 REAGENT STRIP/BLOOD GLUCOSE: CPT

## 2025-07-27 PROCEDURE — 94761 N-INVAS EAR/PLS OXIMETRY MLT: CPT

## 2025-07-27 PROCEDURE — 99231 SBSQ HOSP IP/OBS SF/LOW 25: CPT | Performed by: FAMILY MEDICINE

## 2025-07-27 PROCEDURE — 82948 REAGENT STRIP/BLOOD GLUCOSE: CPT | Performed by: FAMILY MEDICINE

## 2025-07-27 RX ORDER — METOPROLOL TARTRATE 25 MG/1
25 TABLET, FILM COATED ORAL 2 TIMES DAILY
Status: DISPENSED | OUTPATIENT
Start: 2025-07-27

## 2025-07-27 RX ORDER — LEVOTHYROXINE SODIUM 75 UG/1
150 TABLET ORAL
Status: DISPENSED | OUTPATIENT
Start: 2025-07-28

## 2025-07-27 RX ADMIN — LEVOTHYROXINE SODIUM 125 MCG: 0.1 TABLET ORAL at 06:10

## 2025-07-27 RX ADMIN — AMITRIPTYLINE HYDROCHLORIDE 25 MG: 25 TABLET ORAL at 21:23

## 2025-07-27 RX ADMIN — FLUDROCORTISONE ACETATE 50 MCG: 0.1 TABLET ORAL at 09:00

## 2025-07-27 RX ADMIN — CETIRIZINE HYDROCHLORIDE 10 MG: 10 TABLET, FILM COATED ORAL at 09:02

## 2025-07-27 RX ADMIN — INSULIN LISPRO 3 UNITS: 100 INJECTION, SOLUTION INTRAVENOUS; SUBCUTANEOUS at 12:28

## 2025-07-27 RX ADMIN — GABAPENTIN 600 MG: 300 CAPSULE ORAL at 21:23

## 2025-07-27 RX ADMIN — DULOXETINE 30 MG: 60 CAPSULE, DELAYED RELEASE ORAL at 09:02

## 2025-07-27 RX ADMIN — DULOXETINE 60 MG: 60 CAPSULE, DELAYED RELEASE ORAL at 09:02

## 2025-07-27 RX ADMIN — FLECAINIDE ACETATE 50 MG: 50 TABLET ORAL at 09:01

## 2025-07-27 RX ADMIN — HYDROCODONE BITARTRATE AND ACETAMINOPHEN 1 TABLET: 7.5; 325 TABLET ORAL at 21:46

## 2025-07-27 RX ADMIN — GABAPENTIN 600 MG: 300 CAPSULE ORAL at 06:10

## 2025-07-27 RX ADMIN — GABAPENTIN 600 MG: 300 CAPSULE ORAL at 14:15

## 2025-07-27 RX ADMIN — FLECAINIDE ACETATE 50 MG: 50 TABLET ORAL at 21:23

## 2025-07-27 RX ADMIN — INSULIN LISPRO 2 UNITS: 100 INJECTION, SOLUTION INTRAVENOUS; SUBCUTANEOUS at 09:02

## 2025-07-27 RX ADMIN — CYANOCOBALAMIN 1000 MCG: 1000 INJECTION, SOLUTION INTRAMUSCULAR; SUBCUTANEOUS at 09:00

## 2025-07-27 RX ADMIN — RIVAROXABAN 20 MG: 20 TABLET, FILM COATED ORAL at 17:30

## 2025-07-27 RX ADMIN — BUDESONIDE AND FORMOTEROL FUMARATE DIHYDRATE 2 PUFF: 160; 4.5 AEROSOL RESPIRATORY (INHALATION) at 07:31

## 2025-07-27 RX ADMIN — BUDESONIDE AND FORMOTEROL FUMARATE DIHYDRATE 2 PUFF: 160; 4.5 AEROSOL RESPIRATORY (INHALATION) at 20:36

## 2025-07-27 RX ADMIN — INSULIN LISPRO 2 UNITS: 100 INJECTION, SOLUTION INTRAVENOUS; SUBCUTANEOUS at 18:10

## 2025-07-27 RX ADMIN — INSULIN LISPRO 4 UNITS: 100 INJECTION, SOLUTION INTRAVENOUS; SUBCUTANEOUS at 21:22

## 2025-07-27 RX ADMIN — FOLIC ACID 1 MG: 1 TABLET ORAL at 09:00

## 2025-07-27 NOTE — PROGRESS NOTES
Problems/Goals  Skin Integrity (Body Function Structure)  Current Status: Risk for further skin breakdown  Long Term Goals  07/22/2025 02:24 PM - Active  No worsening of skin breakdown/ No new skin breakdown  Potential for Injury (Safety)  Current Status: Risk for falls  Long Term Goals  07/22/2025 02:25 PM - Active  No falls this hospital stay    Signed by: Josephine Parker RN

## 2025-07-27 NOTE — PLAN OF CARE
Goal Outcome Evaluation:  Plan of Care Reviewed With: patient           Problem: Rehabilitation (IRF) Plan of Care  Goal: Plan of Care Review  Outcome: Progressing  Flowsheets (Taken 7/27/2025 0050)  Plan of Care Reviewed With: patient  Goal: Patient-Specific Goal (Individualized)  Outcome: Progressing  Goal: Absence of New-Onset Illness or Injury  Outcome: Progressing  Intervention: Identify and Manage Fall Risk  Description: Perform standard risk assessment on admission using a validated tool or comprehensive approach appropriate to the patient; reassess fall risk frequently, with change in status or transfer to another level of care.Communicate risk to interprofessional healthcare team; ensure fall risk visible cue.Determine need for increased observation, equipment and environmental modification, as well as use of supportive, nonskid footwear.Adjust safety measures to individual needs and identified risk factors.Reinforce the importance of active participation with fall risk prevention, safety and physical activity with the patient and family.Perform regular intentional rounding to assess need for position change, pain management, attention to personal needs and assistance with toileting.  Recent Flowsheet Documentation  Taken 7/27/2025 0000 by Josephine Parker RN  Safety Promotion/Fall Prevention: safety round/check completed  Taken 7/26/2025 2200 by Josephine Parker RN  Safety Promotion/Fall Prevention: safety round/check completed  Taken 7/26/2025 2000 by Josephine Parker RN  Safety Promotion/Fall Prevention: safety round/check completed  Intervention: Prevent VTE (Venous Thromboembolism)  Description: Provide ongoing assessment for signs and symptoms of VTE.Encourage and assist with early and ongoing mobilization.Initiate and maintain compression therapy when indicated.Recognize the patient's individual risk for bleeding before initiating pharmacologic thromboprophylaxis.Provide prophylactic anticoagulation  when prescribed.  Recent Flowsheet Documentation  Taken 7/26/2025 2000 by Josephine Parker RN  VTE Prevention/Management: (See MAR) other (see comments)  Goal: Optimal Comfort and Wellbeing  Outcome: Progressing  Intervention: Provide Person-Centered Care  Description: Use a family-focused approach to care; incorporate family/significant others in assessment, planning, education and support.Develop trust and rapport by proactively providing information, encouraging questions, addressing concerns and offering reassurance.Acknowledge emotional response; convey safety and acceptance.Recognize and utilize personal coping strategies and strengths; develop goals via shared decision-making.Identify patient's preferred routines, habits and personal preferences; respect privacy and personal space.Recognize progress and patient effort to facilitate motivation; provide opportunities for success.Honor spiritual and cultural preferences.Screen and monitor ongoing safety risks, such as self-harm, violence and elopement.  Recent Flowsheet Documentation  Taken 7/26/2025 2000 by Josephine Parker RN  Trust Relationship/Rapport:   care explained   choices provided   questions answered   reassurance provided   thoughts/feelings acknowledged  Goal: Home and Community Transition Plan Established  Outcome: Progressing     Problem: Skin Injury Risk Increased  Goal: Skin Health and Integrity  Outcome: Progressing  Intervention: Optimize Skin Protection  Description: Perform a full pressure injury risk assessment, as indicated by screening, upon admission to care unit.Reassess skin (full inspection and injury risk, including skin temperature, consistency and color) frequently (e.g., scheduled interval, with change in condition) to provide optimal early detection and prevention.Maintain adequate tissue perfusion (e.g., encourage fluid balance; avoid crossing legs, constrictive clothing or devices) to promote tissue oxygenation.Maintain head  of bed at lowest degree of elevation tolerated, considering medical condition and other restrictions. Use positioning supports to prevent sliding and friction. Consider low friction textiles.Avoid positioning onto an area that remains reddened or on bony prominences.Minimize incontinence and moisture (e.g., toileting schedule; moisture-wicking pad, diaper or incontinence collection device; skin moisture barrier).Cleanse skin promptly and gently, when soiled, utilizing a pH-balanced cleanser.Relieve and redistribute pressure (e.g., scheduled position changes, weight shifts, use of support surface, medical device repositioning, protective dressing application, use of positioning device, microclimate control, use of pressure-injury-monitorEncourage increased activity, such as sitting in a chair at the bedside or early mobilization, when able to tolerate. Avoid prolonged sitting.  Recent Flowsheet Documentation  Taken 7/26/2025 2000 by Josephine Parker RN  Pressure Reduction Techniques:   weight shift assistance provided   frequent weight shift encouraged   heels elevated off bed  Pressure Reduction Devices: pressure-redistributing mattress utilized     Problem: Comorbidity Management  Goal: Blood Pressure in Desired Range  Outcome: Progressing  Intervention: Maintain Blood Pressure Management  Description: Evaluate adherence to home antihypertensive regimen (e.g., exercise and activity, diet modification, medication).Provide scheduled antihypertensive medication; consider administration time and effects (e.g., avoid giving diuretic prior to bedtime).Monitor response to antihypertensive medication therapy (e.g., blood pressure, electrolyte levels, medication effects).Minimize risk of orthostatic hypotension; encourage caution with position changes, particularly if elderly.  Recent Flowsheet Documentation  Taken 7/27/2025 0000 by Josephine Parker RN  Medication Review/Management: medications reviewed  Taken 7/26/2025  2200 by Josephine Parker RN  Medication Review/Management: medications reviewed  Taken 7/26/2025 2000 by Josephine Parker RN  Medication Review/Management: medications reviewed     Problem: Fall Injury Risk  Goal: Absence of Fall and Fall-Related Injury  Outcome: Progressing  Intervention: Identify and Manage Contributors  Description: Develop a fall prevention plan, considering patient-centered interventions and family/caregiver involvement; identify and address patient's facilitators and barriers.Provide reorientation, appropriate sensory stimulation and routines with changes in mental status to decrease risk of fall.Promote use of personal vision and auditory aids.Assess assistance level required for safe and effective self-care; provide support as needed, such as toileting and mobilization. For age 65 and older, implement timed toileting with assistance.Encourage physical activity, such as performance of mobility and self-care at highest level of patient ability, multicomponent exercise program and provision of appropriate assistive devices.If fall occurs, assess the severity of injury; implement fall injury protocol. Determine the cause and revise fall injury prevention plan.Regularly review and advocate for medication adjustment to decrease fall risk; consider administration times, polypharmacy and age.Balance adequate pain management with potential for oversedation.  Recent Flowsheet Documentation  Taken 7/27/2025 0000 by Josephine Parker RN  Medication Review/Management: medications reviewed  Taken 7/26/2025 2200 by Josephine Parker RN  Medication Review/Management: medications reviewed  Taken 7/26/2025 2000 by Josephine Parker RN  Medication Review/Management: medications reviewed  Intervention: Promote Injury-Free Environment  Description: Provide a safe, barrier-free environment that encourages independent activity.Keep care area uncluttered and well-lighted.Determine need for increased observation or  monitoring.Avoid use of devices that minimize mobility, such as restraints or indwelling urinary catheter.  Recent Flowsheet Documentation  Taken 7/27/2025 0000 by Josephine Parker RN  Safety Promotion/Fall Prevention: safety round/check completed  Taken 7/26/2025 2200 by Josephine Parker, RN  Safety Promotion/Fall Prevention: safety round/check completed  Taken 7/26/2025 2000 by Josephine Parker, RN  Safety Promotion/Fall Prevention: safety round/check completed

## 2025-07-27 NOTE — PLAN OF CARE
Goal Outcome Evaluation:           Progress: no change  Outcome Summary: monitor

## 2025-07-27 NOTE — PROGRESS NOTES
64-year-old female who is status post left hip fracture.  Patient has had some issues with orthostatic hypotension during the admission.  Patient did have acute blood loss anemia with the surgery and did require 2 units of packed red blood cells while in the acute care service.  Suspect this is the underlying issue.  Yesterday we started patient on fludrocortisone 0.5 mg daily.  Blood pressure seems to be improved today.  I also held metoprolol yesterday.  Patient with minimal sinus tachycardia.  Decided to restart metoprolol but at a decreased dose of 25 mg twice daily today.  Will follow orthostatics.  Also of note patient's TSH was elevated at 14 and I did increase Synthroid to 150 mcg daily would recommend follow-up TSH in 6 weeks.  Patient having no complaints at this time.  Will check a.m. cortisol level as well as part of evaluation for orthostatic hypotension

## 2025-07-28 VITALS
SYSTOLIC BLOOD PRESSURE: 111 MMHG | TEMPERATURE: 98 F | HEART RATE: 119 BPM | OXYGEN SATURATION: 99 % | WEIGHT: 145 LBS | RESPIRATION RATE: 16 BRPM | BODY MASS INDEX: 24.16 KG/M2 | DIASTOLIC BLOOD PRESSURE: 61 MMHG | HEIGHT: 65 IN

## 2025-07-28 LAB
CORTIS SERPL-MCNC: 9.42 MCG/DL
GLUCOSE BLDC GLUCOMTR-MCNC: 122 MG/DL (ref 70–130)
GLUCOSE BLDC GLUCOMTR-MCNC: 166 MG/DL (ref 70–130)
GLUCOSE BLDC GLUCOMTR-MCNC: 254 MG/DL (ref 70–130)
GLUCOSE BLDC GLUCOMTR-MCNC: 271 MG/DL (ref 70–130)
QT INTERVAL: 336 MS
QTC INTERVAL: 444 MS

## 2025-07-28 PROCEDURE — 63710000001 INSULIN GLARGINE PER 5 UNITS: Performed by: INTERNAL MEDICINE

## 2025-07-28 PROCEDURE — 94799 UNLISTED PULMONARY SVC/PX: CPT

## 2025-07-28 PROCEDURE — 93005 ELECTROCARDIOGRAM TRACING: CPT | Performed by: INTERNAL MEDICINE

## 2025-07-28 PROCEDURE — 99232 SBSQ HOSP IP/OBS MODERATE 35: CPT | Performed by: INTERNAL MEDICINE

## 2025-07-28 PROCEDURE — 97110 THERAPEUTIC EXERCISES: CPT

## 2025-07-28 PROCEDURE — 97530 THERAPEUTIC ACTIVITIES: CPT

## 2025-07-28 PROCEDURE — 82533 TOTAL CORTISOL: CPT | Performed by: FAMILY MEDICINE

## 2025-07-28 PROCEDURE — 82948 REAGENT STRIP/BLOOD GLUCOSE: CPT | Performed by: FAMILY MEDICINE

## 2025-07-28 PROCEDURE — 93010 ELECTROCARDIOGRAM REPORT: CPT | Performed by: INTERNAL MEDICINE

## 2025-07-28 PROCEDURE — 94760 N-INVAS EAR/PLS OXIMETRY 1: CPT

## 2025-07-28 PROCEDURE — 63710000001 INSULIN LISPRO (HUMAN) PER 5 UNITS: Performed by: FAMILY MEDICINE

## 2025-07-28 PROCEDURE — 82948 REAGENT STRIP/BLOOD GLUCOSE: CPT

## 2025-07-28 PROCEDURE — 97535 SELF CARE MNGMENT TRAINING: CPT

## 2025-07-28 RX ORDER — HYDROCODONE BITARTRATE AND ACETAMINOPHEN 7.5; 325 MG/1; MG/1
1 TABLET ORAL EVERY 4 HOURS PRN
Refills: 0 | Status: DISPENSED | OUTPATIENT
Start: 2025-07-28 | End: 2025-08-02

## 2025-07-28 RX ADMIN — FLUDROCORTISONE ACETATE 50 MCG: 0.1 TABLET ORAL at 10:08

## 2025-07-28 RX ADMIN — INSULIN LISPRO 4 UNITS: 100 INJECTION, SOLUTION INTRAVENOUS; SUBCUTANEOUS at 12:46

## 2025-07-28 RX ADMIN — DULOXETINE 30 MG: 60 CAPSULE, DELAYED RELEASE ORAL at 10:08

## 2025-07-28 RX ADMIN — CETIRIZINE HYDROCHLORIDE 10 MG: 10 TABLET, FILM COATED ORAL at 10:09

## 2025-07-28 RX ADMIN — HYDROCODONE BITARTRATE AND ACETAMINOPHEN 1 TABLET: 7.5; 325 TABLET ORAL at 20:48

## 2025-07-28 RX ADMIN — INSULIN LISPRO 2 UNITS: 100 INJECTION, SOLUTION INTRAVENOUS; SUBCUTANEOUS at 10:10

## 2025-07-28 RX ADMIN — GABAPENTIN 600 MG: 300 CAPSULE ORAL at 21:21

## 2025-07-28 RX ADMIN — INSULIN GLARGINE 10 UNITS: 100 INJECTION, SOLUTION SUBCUTANEOUS at 13:59

## 2025-07-28 RX ADMIN — FOLIC ACID 1 MG: 1 TABLET ORAL at 10:09

## 2025-07-28 RX ADMIN — GABAPENTIN 600 MG: 300 CAPSULE ORAL at 13:59

## 2025-07-28 RX ADMIN — RIVAROXABAN 20 MG: 20 TABLET, FILM COATED ORAL at 17:19

## 2025-07-28 RX ADMIN — FLECAINIDE ACETATE 50 MG: 50 TABLET ORAL at 20:48

## 2025-07-28 RX ADMIN — LEVOTHYROXINE SODIUM 150 MCG: 0.07 TABLET ORAL at 05:37

## 2025-07-28 RX ADMIN — AMITRIPTYLINE HYDROCHLORIDE 25 MG: 25 TABLET ORAL at 20:48

## 2025-07-28 RX ADMIN — METOPROLOL TARTRATE 25 MG: 25 TABLET, FILM COATED ORAL at 10:07

## 2025-07-28 RX ADMIN — FLECAINIDE ACETATE 50 MG: 50 TABLET ORAL at 10:09

## 2025-07-28 RX ADMIN — GABAPENTIN 600 MG: 300 CAPSULE ORAL at 05:37

## 2025-07-28 RX ADMIN — DULOXETINE 60 MG: 60 CAPSULE, DELAYED RELEASE ORAL at 10:09

## 2025-07-28 RX ADMIN — INSULIN LISPRO 4 UNITS: 100 INJECTION, SOLUTION INTRAVENOUS; SUBCUTANEOUS at 20:52

## 2025-07-28 RX ADMIN — METOPROLOL TARTRATE 25 MG: 25 TABLET, FILM COATED ORAL at 20:48

## 2025-07-28 NOTE — NURSING NOTE
WOCN was asked to re-assess right coccyx pressure injury and left gluteal. Assessed patient with SOFIA Iglesias.    There is MASD with shearing noted to the bilateral coccyx and gluteals. There continues to be a pressure injury to the right coccyx and left gluteal. Please continue current wound care orders.     Stepan score 16. PI prevention orders in place.

## 2025-07-28 NOTE — PROGRESS NOTES
Problems/Goals  Dressing (Lower) (Self Care)  Current Status: Mod/MaxA  Long Term Goals  07/23/2025 12:26 PM - Active  ModA    Signed by: Amber Badillo, Occupational Therapist

## 2025-07-28 NOTE — PROGRESS NOTES
Assisted By: RN, seen at bedside    CC: Follow-up on left hip fracture    Interview History/HPI: Patient states she is still having pain in her left hip especially with movement.  Otherwise she says she is having no pain complaints, states she is breathing okay, denies any chest pain, denies palpitations.  She has been somewhat tachycardic through the night.  Her Lopressor however has been on hold because of some orthostasis.  Because of this orthostasis she was started on Florinef earlier in the stay.  She has a history of atrial fibrillation, her rhythm has remained stable on Tambocor.          Current Hospital Meds:  amitriptyline, 25 mg, Oral, Nightly  budesonide-formoterol, 2 puff, Inhalation, BID - RT  cetirizine, 10 mg, Oral, Daily  cholecalciferol, 50,000 Units, Oral, Weekly  cyanocobalamin, 1,000 mcg, Intramuscular, Weekly  DULoxetine, 30 mg, Oral, Daily  DULoxetine, 60 mg, Oral, Daily  flecainide, 50 mg, Oral, BID  fludrocortisone, 50 mcg, Oral, Daily  folic acid, 1 mg, Oral, Daily  gabapentin, 600 mg, Oral, Q8H  insulin lispro, 2-7 Units, Subcutaneous, 4x Daily PC & at Bedtime  levothyroxine, 150 mcg, Oral, Q AM  [START ON 7/29/2025] methotrexate, 25 mg, Oral, Weekly  metoprolol tartrate, 25 mg, Oral, BID  rivaroxaban, 20 mg, Oral, Daily With Dinner         Vitals:    07/28/25 1007   BP: 154/83   Pulse: 106   Resp:    Temp:    SpO2:          Intake/Output Summary (Last 24 hours) at 7/28/2025 1250  Last data filed at 7/28/2025 0900  Gross per 24 hour   Intake 720 ml   Output --   Net 720 ml       EXAM: She is pleasant, appears in no distress,  strength is about 4/5, she can lift both arms.  She can dorsi and plantarflex her feet, she does not have any edema, there is an Aquacel dressing on her 2 incisions on the left hip, there is about 50% soiling.  Nursing is going to change this later today and notify me if there is any erythema or changes in the wound.  Her surgery was on 7/19.      Diet:  Regular/House; Texture: Regular (IDDSI 7); Fluid Consistency: Thin (IDDSI 0)        LABS:     Lab Results (last 48 hours)       Procedure Component Value Units Date/Time    POC Glucose Once [[id number]]  (Abnormal) Collected: 07/28/25 1106    Specimen: Blood Updated: 07/28/25 1109     Glucose 254 mg/dL      Comment: Serial Number: [id number]:  [id number]       Cortisol [[id number]] Collected: 07/28/25 0352    Specimen: Blood Updated: 07/28/25 0620     Cortisol 9.42 mcg/dL     Narrative:      Cortisol Reference Ranges:    Cortisol 6AM - 10AM Range: 6.02-18.40 mcg/dl  Cortisol 4PM - 8PM Range: 2.68-10.50 mcg/dl      Results may be falsely increased if patient taking Biotin.      POC Glucose Once [[id number]]  (Abnormal) Collected: 07/28/25 0604    Specimen: Blood Updated: 07/28/25 0606     Glucose 166 mg/dL      Comment: Serial Number: [id number]:  [id number]       POC Glucose 4x Daily PC & at Bedtime [[id number]]  (Abnormal) Collected: 07/27/25 2049    Specimen: Blood Updated: 07/27/25 2050     Glucose 270 mg/dL      Comment: Serial Number: [id number]:  [id number]       POC Glucose Once [[id number]]  (Abnormal) Collected: 07/27/25 1629    Specimen: Blood Updated: 07/27/25 1632     Glucose 184 mg/dL      Comment: Serial Number: [id number]:  [id number]       POC Glucose Once [[id number]]  (Abnormal) Collected: 07/27/25 1116    Specimen: Blood Updated: 07/27/25 1118     Glucose 217 mg/dL      Comment: Serial Number: [id number]:  [id number]       POC Glucose Once [[id number]]  (Abnormal) Collected: 07/27/25 0639    Specimen: Blood Updated: 07/27/25 0643     Glucose 153 mg/dL      Comment: Serial Number: [id number]:  [id number]       POC Glucose Once [[id number]]  (Abnormal) Collected: 07/26/25 2027    Specimen: Blood Updated: 07/26/25 2030     Glucose 275 mg/dL      Comment: Serial Number: [id number]:  [id number]       POC Glucose Once [[id number]]  (Abnormal) Collected: 07/26/25 2713     Specimen: Blood Updated: 07/26/25 1621     Glucose 216 mg/dL      Comment: Serial Number: [id number]:  [id number]                    Radiology:    Imaging Results (Last 72 Hours)       ** No results found for the last 72 hours. **            Results for orders placed during the hospital encounter of 07/17/25    Adult Transthoracic Echo Complete W/ Cont if Necessary Per Protocol 07/18/2025 12:46 PM    Interpretation Summary    Left ventricular systolic function is normal. Calculated left ventricular EF = 58.5%    Left ventricular diastolic function is consistent with (grade I) impaired relaxation.    Estimated right ventricular systolic pressure from tricuspid regurgitation is normal (<35 mmHg).    No significant valvular abnormalities noted      Assessment/Plan:   Status post left hip surgery.  Patient did have acute blood loss anemia postop on 7/20 with hemoglobin 6.9 and received 2 units of packed red blood cells.  Since that time allow for day-to-day variation hemoglobin has been stable.  Last hemoglobin 2 days ago was 8.2.  Patient is attempting to work with OT/PT.  With PT, patient is mod assist for bed mobility, two-person mod assist bed to chair and chair to bed, patient was max assist lower body dressing, min to mod with bed to chair transfer.  Min to mod chair to bed, OT continues to work on coordination, functional endurance, motor coordination and control activities, therapeutic exercise, range of motion, ADLs, fine motor manipulation and dexterity activities.    Orthostasis on Florinef, vital signs were acceptable today, I did restart her Lopressor because of tachycardia.  Continue Florinef.  Synthroid has been adjusted as well for TSH of 14.  Seems to be controlled better at this time.    Tachycardia, I checked an EKG, image reviewed, sinus tachycardia, Lopressor restarted, follow    Acute blood loss anemia, as above, now stable    Hypothyroidism, Synthroid adjusted, repeat TSH in 4 to 6  weeks.    Depression, controlled on Cymbalta and Elavil    Diabetes, not well-controlled, patient is on low-dose sliding scale, I have added Lantus    History of RA, continue methotrexate    A-fib, sinus by EKG, on Xarelto for stroke prevention, continue Tambocor, QTc 444    Peripheral neuropathy, associated with diabetes, continue current dose of gabapentin.    Recheck CBC/BMP in AM.    Jr Carrero MD

## 2025-07-28 NOTE — PROGRESS NOTES
Patient Name: Lashae Benitez  YOB: 1960  MRN: [id number]  Admission date: 7/22/2025  Reason for Encounter: Follow-up/Progress Note    [facility] Clinical Nutrition Assessment     Subjective    Subjective Information     Called and spoke w pt via phone reports appetite low, doesn't prefer chicken breast asking about leg, thigh, wing. Prefers jeramy reg boost not glucose despite hx gastric bypass '07, wants each meal.  Declined fortified foods at this time.     Objective   H&P and Current Problems      H&P  Past Medical History:   Diagnosis Date    Acid reflux     Allergic     Anemia     Anxiety     Atrial fibrillation     Cancer     thyroid, skin    Cervical disc disorder     Chronic pain disorder     Claustrophobia     CTS (carpal tunnel syndrome)     Degenerative disc disease, lumbar     Depression     Dupuytren's disease     Essential hypertension     Family history of coronary artery disease     Fibromyalgia     Gallbladder abscess     H. pylori infection     Hiatal hernia     Hyperlipidemia     Hypothyroidism     Low back pain     Lumbosacral disc disease     Migraines     migraines    Multiple sclerosis     Osteoarthritis     Peripheral neuropathy     Psoriasis     Psoriatic arthritis     RA (rheumatoid arthritis)     Rheumatoid arthritis     Sinusitis     Sjogren's syndrome     Stomach ulcer     Thoracic disc disorder     TMJ arthritis     Type 2 diabetes mellitus     Urinary tract infection       Past Surgical History:   Procedure Laterality Date    BACK SURGERY      BREAST LUMPECTOMY Left 11/1993    CARDIAC CATHETERIZATION Left 09/21/2009    Normal     CARDIAC CATHETERIZATION N/A 6/24/2025    Procedure: Left Heart Cath;  Surgeon: Teodora Barron MD;  Location: [facility] CATH INVASIVE LOC[facility];  Service: Cardiovascular;  Laterality: N/A;    CARPAL TUNNEL RELEASE  03/2012    CERVICAL POLYPECTOMY  12/2015    CHOLECYSTECTOMY  05/2007    COLONOSCOPY      ENDOSCOPY      EPIDURAL BLOCK      GASTRIC  "BYPASS  09/2007    JOINT REPLACEMENT      KNEE ARTHROPLASTY      LUMBAR DISC SURGERY      C5-6    NECK SURGERY      REPLACEMENT TOTAL KNEE Right 06/2016    SACRAL NERVE STIMULATOR PLACEMENT  09/2014    SACRAL NERVE STIMULATOR PLACEMENT  04/11/2024    @ [facility] in [place]    SACRAL NERVE STIMULATOR PLACEMENT Right 04/17/2024    THYROIDECTOMY  03/2016    TRIGGER POINT INJECTION        Current Problems   Admission Diagnosis:  Closed left hip fracture, sequela [S72.002S]    Problem List:    Closed left hip fracture, sequela      Other Applicable Nutrition Information:   Hx gastric bypass 2007      Anthropometrics       Height: 165.1 cm (65\")  Weight: 65.8 kg (145 lb) (07/22/25 1314)  Weight Method: Bed scale  BMI (Calculated): 24.1       Trending Weight Changes 07/28/25:no new wt       Weight History  Wt Readings from Last 20 Encounters:   07/22/25 1314 65.8 kg (145 lb)   07/17/25 1358 65.8 kg (145 lb)   07/14/25 0851 65.6 kg (144 lb 9.6 oz)   06/24/25 0756 67.1 kg (148 lb)   06/18/25 1321 67.8 kg (149 lb 6.4 oz)   06/11/25 1109 66.5 kg (146 lb 9.6 oz)   06/09/25 1317 65.6 kg (144 lb 9.6 oz)   03/17/25 0929 63.5 kg (140 lb)   03/11/25 0905 64.1 kg (141 lb 6.4 oz)   12/09/24 0753 67.5 kg (148 lb 12.8 oz)   12/06/24 0826 68.4 kg (150 lb 14.4 oz)   12/04/24 1104 67.6 kg (149 lb)   12/03/24 0846 67.7 kg (149 lb 3.2 oz)   11/26/24 0908 68.7 kg (151 lb 6.4 oz)   08/22/24 0855 73.6 kg (162 lb 4.8 oz)   05/28/24 0820 72.9 kg (160 lb 12.8 oz)   05/22/24 0911 73.7 kg (162 lb 6.4 oz)   03/25/24 1033 77.7 kg (171 lb 3.2 oz)   02/13/24 0904 74.1 kg (163 lb 6.4 oz)   12/18/23 0853 73.8 kg (162 lb 11.2 oz)        Labs      Comment: Na low      Results from last 7 days   Lab Units 07/26/25  0129 07/25/25  1356 07/23/25  0408   SODIUM mmol/L 133* 132* 138   POTASSIUM mmol/L 4.3 3.8 3.8   GLUCOSE mg/dL 181* 197* 142*   BUN mg/dL 7.8* 11.0 8.7   CREATININE mg/dL 0.54* 0.68 0.50*   CALCIUM mg/dL 7.8* 7.8* 7.8*   MAGNESIUM mg/dL  --   --  2.0 "     Results from last 7 days   Lab Units 07/26/25  0129 07/25/25  1736 07/25/25  1356 07/24/25  2034 07/23/25  0408   PLATELETS 10*3/mm3 453*  --  437  --  295   HEMOGLOBIN g/dL 8.2* 8.5* 7.8*   < > 8.4*   HEMATOCRIT % 25.7* 27.0* 24.7*   < > 25.9*    < > = values in this interval not displayed.     Lab Results   Component Value Date    HGBA1C 9.10 (H) 07/17/2025          Medications       Scheduled Medications amitriptyline, 25 mg, Oral, Nightly  budesonide-formoterol, 2 puff, Inhalation, BID - RT  cetirizine, 10 mg, Oral, Daily  cholecalciferol, 50,000 Units, Oral, Weekly  cyanocobalamin, 1,000 mcg, Intramuscular, Weekly  DULoxetine, 30 mg, Oral, Daily  DULoxetine, 60 mg, Oral, Daily  flecainide, 50 mg, Oral, BID  fludrocortisone, 50 mcg, Oral, Daily  folic acid, 1 mg, Oral, Daily  gabapentin, 600 mg, Oral, Q8H  insulin glargine, 10 Units, Subcutaneous, Daily  insulin lispro, 2-7 Units, Subcutaneous, 4x Daily PC & at Bedtime  levothyroxine, 150 mcg, Oral, Q AM  [START ON 7/29/2025] methotrexate, 25 mg, Oral, Weekly  metoprolol tartrate, 25 mg, Oral, BID  rivaroxaban, 20 mg, Oral, Daily With Dinner                 Physical Findings      Chewing/Swallowing  Teeth Status: Mouth/Teeth WDL: .WDL except Teeth Symptoms: tooth/teeth missing  Chewing/Swallowing Issues: No issues identified at this time   Edema            Leg, Left Edema: 2+ (Mild)               Bowel Function  Stool Output  Stool Unmeasured Occurrence: 1 (07/28/25 1300)  Bowel Incontinence: No (07/28/25 1300)  Stool Amount: Medium (07/28/25 1300)  Stool Consistency: formed (07/28/25 1300)  Perineal Care: perineum cleansed (07/28/25 0900)  Last Bowel Movement: 07/28/25   I/Os  Intake & Output (last 3 days)         07/25 0701 07/26 0700 07/26 0701 07/27 0700 07/27 0701 07/28 0700 07/28 0701 07/29 0700    P.O. 1440 1200 840 360    Total Intake(mL/kg) 1440 (21.9) 1200 (18.2) 840 (12.8) 360 (5.5)    Net +1440 +1200 +840 +360            Urine Unmeasured  Occurrence 6 x 4 x 2 x 2 x    Stool Unmeasured Occurrence    1 x           Lines, Drains, Airways, & Wounds       Active LDAs       Name Placement date Placement time Site Days Last dressing change    Wound 07/19/25 0921 Left gluteal Other (Comments) 07/19/25  0921  -- 9     Wound 07/19/25 0938 Left anterior thigh Surgical Open Surgical Incision 07/19/25  0938  -- 9     Wound 07/21/25 0930 Right coccyx Pressure Injury 07/21/25  0930  -- 7     Wound 07/28/25 0829 coccyx 07/28/25  0829  -- less than 1                       Nutrition Focused Physical Exam     Trending NFPE 07/28/25:none  at this time     Malnutrition Severity Assessment            (1)   Current Nutrition Orders & Evaluation of Intake      Oral Nutrition     Food Allergies  and Intolerances NKFA   Current PO Diet Diet: Regular/House; Texture: Regular (IDDSI 7); Fluid Consistency: Thin (IDDSI 0)   Oral Nutrition Supplement Boost Original    Trending % PO Intake 07/28/25:38% ave x 10 meals    (2)  Assessment & Plan   Nutrition Diagnosis and Goals       Nutrition Diagnosis 1 Increased Nutrient Needs (protein) related to increased demand for healing as evidenced by pressure injury/wound         Nutrition Diagnosis 2 Inadequate energy intake related to fx with ORIF as evidenced by po less 50% of meals        Goal(s) Increase PO Intake  and Accepts Oral Nutrition Supplement     Nutrition Intervention and Prescription       Intervention  Adjust oral nutrition supplement, Obtain food preferences, and Advised alternate menu selections      Diet Prescription     Supplement Prescription     Education Provided     (3)  Monitoring/Evaluation       Monitor/Evaluation I&O, PO Intake, Oral Nutrition Supplement Intake, Pertinent Labs, and Weight     RD Follow-Up Encounter 3 days      Electronically signed by:  Jenn Avila RD  07/28/25 16:30 EDT

## 2025-07-28 NOTE — PLAN OF CARE
Goal Outcome Evaluation:  Plan of Care Reviewed With: patient           Problem: Rehabilitation (IRF) Plan of Care  Goal: Plan of Care Review  Outcome: Progressing  Flowsheets (Taken 7/28/2025 0019)  Plan of Care Reviewed With: patient  Goal: Patient-Specific Goal (Individualized)  Outcome: Progressing  Goal: Absence of New-Onset Illness or Injury  Outcome: Progressing  Intervention: Identify and Manage Fall Risk  Description: Perform standard risk assessment on admission using a validated tool or comprehensive approach appropriate to the patient; reassess fall risk frequently, with change in status or transfer to another level of care.Communicate risk to interprofessional healthcare team; ensure fall risk visible cue.Determine need for increased observation, equipment and environmental modification, as well as use of supportive, nonskid footwear.Adjust safety measures to individual needs and identified risk factors.Reinforce the importance of active participation with fall risk prevention, safety and physical activity with the patient and family.Perform regular intentional rounding to assess need for position change, pain management, attention to personal needs and assistance with toileting.  Recent Flowsheet Documentation  Taken 7/28/2025 0000 by Josephine Parker RN  Safety Promotion/Fall Prevention: safety round/check completed  Taken 7/27/2025 2200 by Josephine Parker RN  Safety Promotion/Fall Prevention: safety round/check completed  Taken 7/27/2025 2000 by Josephine Parker RN  Safety Promotion/Fall Prevention: safety round/check completed  Taken 7/27/2025 1930 by Josephine Parker RN  Safety Promotion/Fall Prevention: safety round/check completed  Intervention: Prevent VTE (Venous Thromboembolism)  Description: Provide ongoing assessment for signs and symptoms of VTE.Encourage and assist with early and ongoing mobilization.Initiate and maintain compression therapy when indicated.Recognize the patient's  individual risk for bleeding before initiating pharmacologic thromboprophylaxis.Provide prophylactic anticoagulation when prescribed.  Recent Flowsheet Documentation  Taken 7/27/2025 1930 by Josephine Parker RN  VTE Prevention/Management: (See MAR) other (see comments)  Goal: Optimal Comfort and Wellbeing  Outcome: Progressing  Intervention: Provide Person-Centered Care  Description: Use a family-focused approach to care; incorporate family/significant others in assessment, planning, education and support.Develop trust and rapport by proactively providing information, encouraging questions, addressing concerns and offering reassurance.Acknowledge emotional response; convey safety and acceptance.Recognize and utilize personal coping strategies and strengths; develop goals via shared decision-making.Identify patient's preferred routines, habits and personal preferences; respect privacy and personal space.Recognize progress and patient effort to facilitate motivation; provide opportunities for success.Honor spiritual and cultural preferences.Screen and monitor ongoing safety risks, such as self-harm, violence and elopement.  Recent Flowsheet Documentation  Taken 7/27/2025 1930 by Josephine Parker RN  Trust Relationship/Rapport:   care explained   choices provided   questions answered   reassurance provided   thoughts/feelings acknowledged  Goal: Home and Community Transition Plan Established  Outcome: Progressing     Problem: Skin Injury Risk Increased  Goal: Skin Health and Integrity  Outcome: Progressing  Intervention: Optimize Skin Protection  Description: Perform a full pressure injury risk assessment, as indicated by screening, upon admission to care unit.Reassess skin (full inspection and injury risk, including skin temperature, consistency and color) frequently (e.g., scheduled interval, with change in condition) to provide optimal early detection and prevention.Maintain adequate tissue perfusion (e.g.,  encourage fluid balance; avoid crossing legs, constrictive clothing or devices) to promote tissue oxygenation.Maintain head of bed at lowest degree of elevation tolerated, considering medical condition and other restrictions. Use positioning supports to prevent sliding and friction. Consider low friction textiles.Avoid positioning onto an area that remains reddened or on bony prominences.Minimize incontinence and moisture (e.g., toileting schedule; moisture-wicking pad, diaper or incontinence collection device; skin moisture barrier).Cleanse skin promptly and gently, when soiled, utilizing a pH-balanced cleanser.Relieve and redistribute pressure (e.g., scheduled position changes, weight shifts, use of support surface, medical device repositioning, protective dressing application, use of positioning device, microclimate control, use of pressure-injury-monitorEncourage increased activity, such as sitting in a chair at the bedside or early mobilization, when able to tolerate. Avoid prolonged sitting.  Recent Flowsheet Documentation  Taken 7/27/2025 1930 by Josephine Parker RN  Pressure Reduction Techniques:   weight shift assistance provided   frequent weight shift encouraged   heels elevated off bed  Pressure Reduction Devices: pressure-redistributing mattress utilized     Problem: Fall Injury Risk  Goal: Absence of Fall and Fall-Related Injury  Outcome: Progressing  Intervention: Identify and Manage Contributors  Description: Develop a fall prevention plan, considering patient-centered interventions and family/caregiver involvement; identify and address patient's facilitators and barriers.Provide reorientation, appropriate sensory stimulation and routines with changes in mental status to decrease risk of fall.Promote use of personal vision and auditory aids.Assess assistance level required for safe and effective self-care; provide support as needed, such as toileting and mobilization. For age 65 and older, implement  timed toileting with assistance.Encourage physical activity, such as performance of mobility and self-care at highest level of patient ability, multicomponent exercise program and provision of appropriate assistive devices.If fall occurs, assess the severity of injury; implement fall injury protocol. Determine the cause and revise fall injury prevention plan.Regularly review and advocate for medication adjustment to decrease fall risk; consider administration times, polypharmacy and age.Balance adequate pain management with potential for oversedation.  Recent Flowsheet Documentation  Taken 7/28/2025 0000 by Josephine Parker RN  Medication Review/Management: medications reviewed  Taken 7/27/2025 2200 by Josephine Parker RN  Medication Review/Management: medications reviewed  Taken 7/27/2025 2000 by Josephine Parker RN  Medication Review/Management: medications reviewed  Taken 7/27/2025 1930 by Josephine Parker RN  Medication Review/Management: medications reviewed  Intervention: Promote Injury-Free Environment  Description: Provide a safe, barrier-free environment that encourages independent activity.Keep care area uncluttered and well-lighted.Determine need for increased observation or monitoring.Avoid use of devices that minimize mobility, such as restraints or indwelling urinary catheter.  Recent Flowsheet Documentation  Taken 7/28/2025 0000 by Josephine Parker RN  Safety Promotion/Fall Prevention: safety round/check completed  Taken 7/27/2025 2200 by Josephine Parker RN  Safety Promotion/Fall Prevention: safety round/check completed  Taken 7/27/2025 2000 by Josephine Parker RN  Safety Promotion/Fall Prevention: safety round/check completed  Taken 7/27/2025 1930 by Josephine Parker RN  Safety Promotion/Fall Prevention: safety round/check completed

## 2025-07-28 NOTE — PLAN OF CARE
Goal Outcome Evaluation:           Progress: no change  Outcome Summary: monitor                              Pt presents with mild visual processing delay/100% of the time

## 2025-07-28 NOTE — PROGRESS NOTES
Problems/Goals  Skin Integrity (Body Function Structure)  Current Status: Risk for further skin breakdown  Long Term Goals  07/22/2025 02:24 PM - Active  No worsening of skin breakdown/ No new skin breakdown  Potential for Injury (Safety)  Current Status: Risk for falls  Long Term Goals  07/22/2025 02:25 PM - Active  No falls this hospital stay    Signed by: Kelsie Garcia, Nurse

## 2025-07-28 NOTE — THERAPY TREATMENT NOTE
Inpatient Rehabilitation - Physical Therapy Treatment Note       GUANACO Portillo     Patient Name: Lashae Benitez  : 1960  MRN: [id number]    Today's Date: 2025                    Admit Date: 2025      Visit Dx:   No diagnosis found.    Patient Active Problem List   Diagnosis    Hiatal hernia    Essential hypertension    Sjogren's syndrome    Multiple sclerosis    Psoriasis    Fibromyalgia    Osteoarthritis    Psoriatic arthritis    RA (rheumatoid arthritis)    Degenerative disc disease, lumbar    TMJ arthritis    Dupuytren's disease    H. pylori infection    Family history of coronary artery disease    Type 2 diabetes mellitus    Edema    Bilateral leg edema    Isolated corticotropin deficiency    Hyponatremia    Paroxysmal atrial fibrillation    Sepsis    Bacteremia, escherichia coli    Iron deficiency anemia    Malabsorption due to intolerance, not elsewhere classified    Chronic chest pain with high risk for CAD    Abnormal nuclear stress test    Abnormal computed tomography angiography (CTA)    Hip fracture    Closed intertrochanteric fracture of hip, left, initial encounter    Closed left hip fracture, sequela       Past Medical History:   Diagnosis Date    Acid reflux     Allergic     Anemia     Anxiety     Atrial fibrillation     Cancer     thyroid, skin    Cervical disc disorder     Chronic pain disorder     Claustrophobia     CTS (carpal tunnel syndrome)     Degenerative disc disease, lumbar     Depression     Dupuytren's disease     Essential hypertension     Family history of coronary artery disease     Fibromyalgia     Gallbladder abscess     H. pylori infection     Hiatal hernia     Hyperlipidemia     Hypothyroidism     Low back pain     Lumbosacral disc disease     Migraines     migraines    Multiple sclerosis     Osteoarthritis     Peripheral neuropathy     Psoriasis     Psoriatic arthritis     RA (rheumatoid arthritis)     Rheumatoid arthritis     Sinusitis     Sjogren's syndrome      Stomach ulcer     Thoracic disc disorder     TMJ arthritis     Type 2 diabetes mellitus     Urinary tract infection        Past Surgical History:   Procedure Laterality Date    BACK SURGERY      BREAST LUMPECTOMY Left 11/1993    CARDIAC CATHETERIZATION Left 09/21/2009    Normal     CARDIAC CATHETERIZATION N/A 6/24/2025    Procedure: Left Heart Cath;  Surgeon: Teodora Barron MD;  Location:  [facility];  Service: Cardiovascular;  Laterality: N/A;    CARPAL TUNNEL RELEASE  03/2012    CERVICAL POLYPECTOMY  12/2015    CHOLECYSTECTOMY  05/2007    COLONOSCOPY      ENDOSCOPY      EPIDURAL BLOCK      GASTRIC BYPASS  09/2007    JOINT REPLACEMENT      KNEE ARTHROPLASTY      LUMBAR DISC SURGERY      C5-6    NECK SURGERY      REPLACEMENT TOTAL KNEE Right 06/2016    SACRAL NERVE STIMULATOR PLACEMENT  09/2014    SACRAL NERVE STIMULATOR PLACEMENT  04/11/2024    @ [facility] in [place]    SACRAL NERVE STIMULATOR PLACEMENT Right 04/17/2024    THYROIDECTOMY  03/2016    TRIGGER POINT INJECTION         PT ASSESSMENT (Last 12 Hours)       IRF PT Evaluation and Treatment       Row Name 07/28/25 1434          PT Time and Intention    Document Type daily treatment  -RG     Mode of Treatment individual therapy;physical therapy  -RG     Patient/Family/Caregiver Comments/Observations Pt and nursing in agreement for skilled PT on this date. Pts BP is still dropping when standing. Her BP was 94/58 while in sitting position.  -RG       Row Name 07/28/25 1434          General Information    Existing Precautions/Restrictions fall;weight bearing  LLE TTWB; abdominal binder for BP  -RG       Row Name 07/28/25 1434          Pain Scale: FACES Pre/Post-Treatment    Pain: FACES Scale, Pretreatment 6-->hurts even more  -RG     Posttreatment Pain Rating 8-->hurts whole lot  -RG       Row Name 07/28/25 1434          Cognition/Psychosocial    Affect/Mental Status (Cognition) WFL  -RG     Orientation Status (Cognition) oriented x 3  -RG      Follows Commands (Cognition) verbal cues/prompting required;physical/tactile prompts required  -     Cognitive Function (Cognition) WFL  -       Row Name 07/28/25 1434          Mobility    Left Lower Extremity (Weight-bearing Status) toe touch weight-bearing (TTWB)  -[facility] Name 07/28/25 1434          Bed Mobility    Bed Mobility supine-sit;sit-supine;supine-sit-supine  -RG     Supine-Sit-Supine [place] (Bed Mobility) verbal cues;nonverbal cues (demo/gesture);moderate assist (50% patient effort)  -     Assistive Device (Bed Mobility) repositioning sheet;bed rails  -[facility] Name 07/28/25 1434          Transfer Assessment/Treatment    Transfers bed-chair transfer;chair-bed transfer;sit-stand transfer;stand-sit transfer;toilet transfer  -[facility] Name 07/28/25 1434          Bed-Chair Transfer    Bed-Chair [place] (Transfers) verbal cues;nonverbal cues (demo/gesture);moderate assist (50% patient effort);2 person assist  -RG     Assistive Device (Bed-Chair Transfers) wheelchair;walker, front-wheeled  -[facility] Name 07/28/25 1434          Chair-Bed Transfer    Chair-Bed [place] (Transfers) verbal cues;nonverbal cues (demo/gesture);moderate assist (50% patient effort);2 person assist  -RG     Assistive Device (Chair-Bed Transfers) wheelchair;walker, front-wheeled  -[facility] Name 07/28/25 1434          Gait/Stairs (Locomotion)    Patient was able to Ambulate no, other medical factors prevent ambulation  -     Reason Patient was unable to Ambulate Hypotension  -[facility] Name 07/28/25 1434          Safety Issues/Impairments Affecting Functional Mobility    Impairments Affecting Function (Mobility) balance;endurance/activity tolerance;pain;range of motion (ROM);strength  -[facility] Name 07/28/25 1434          Hip (Therapeutic Exercise)    Hip Strengthening (Therapeutic Exercise) bilateral;flexion;aBduction;aDduction;marching while seated;sitting;supine;external  rotation;internal rotation;heel slides;2 lb free weight;resistance band;green;10 repetitions;2 sets  -RG       Row Name 07/28/25 1434          Knee (Therapeutic Exercise)    Knee Strengthening (Therapeutic Exercise) flexion;bilateral;extension;marching while seated;SAQ (short arc quad);heel slides;LAQ (long arc quad);sitting;supine;2 lb free weight;resistance band;green;10 repetitions;2 sets  -RG       Row Name 07/28/25 1434          Ankle (Therapeutic Exercise)    Ankle Strengthening (Therapeutic Exercise) bilateral;dorsiflexion;plantarflexion;sitting;supine;10 repetitions;2 sets  -RG       Row Name 07/28/25 1434          Positioning and Restraints    Pre-Treatment Position in bed  -RG     Post Treatment Position bed  -RG     In Bed notified nsg;supine;call light within reach;encouraged to call for assist;exit alarm on;patient within staff view  -RG       Row Name 07/28/25 1433          Therapy Assessment/Plan (PT)    Patient's Goals For Discharge return home  -       Row Name 07/28/25 1438          Therapy Assessment/Plan (PT)    Rehab Potential/Prognosis (PT) fair;good  -RG     Frequency of Treatment (PT) 5 times per week  -RG     Estimated Duration of Therapy (PT) 2 weeks  -RG     Problem List (PT) balance;mobility;range of motion (ROM);strength;pain;postural control  -RG     Activity Limitations Related to Problem List (PT) unable to ambulate safely;unable to transfer safely  -RG       Row Name 07/28/25 1432          Therapy Plan Review/Discharge Plan (PT)    Anticipated Equipment Needs at Discharge (PT Eval) --  tbd  -RG     Anticipated Discharge Disposition (PT) home with assist;home with home health;home with outpatient therapy services  -       Row Name 07/28/25 1439          IRF PT Goals    Bed Mobility Goal Selection (PT-IRF) bed mobility, PT goal 1  -RG     Transfer Goal Selection (PT-IRF) transfers, PT goal 1  -RG     Gait (Walking Locomotion) Goal Selection (PT-IRF) gait, PT goal 1  -RG       Row  Name 07/28/25 1434          Bed Mobility Goal 1 (PT-IRF)    Activity/Assistive Device (Bed Mobility Goal 1, PT-IRF) sit to supine/supine to sit  -RG     [place] Level (Bed Mobility Goal 1, PT-IRF) supervision required  -RG     Time Frame (Bed Mobility Goal 1, PT-IRF) by discharge  -RG       Row Name 07/28/25 1434          Transfer Goal 1 (PT-IRF)    Activity/Assistive Device (Transfer Goal 1, PT-IRF) sit-to-stand/stand-to-sit;bed-to-chair/chair-to-bed  -RG     [place] Level (Transfer Goal 1, PT-IRF) supervision required  -RG     Time Frame (Transfer Goal 1, PT-IRF) by discharge  -RG       Row Name 07/28/25 1434          Gait/Walking Locomotion Goal 1 (PT-IRF)    Activity/Assistive Device (Gait/Walking Locomotion Goal 1, PT-IRF) walker, rolling  -RG     Gait/Walking Locomotion Distance Goal 1 (PT-IRF) 150'  -RG     [place] Level (Gait/Walking Locomotion Goal 1, PT-IRF) supervision required  -RG     Time Frame (Gait/Walking Locomotion Goal 1, PT-IRF) by discharge  -RG               User Key  (r) = Recorded By, (t) = Taken By, (c) = Cosigned By      Initials Name Provider Type    RG Neri Cornelius PTA Physical Therapist Assistant                  Wound 07/19/25 0921 Left gluteal Other (Comments) (Active)   Dressing Appearance dry;intact 07/27/25 1930   Closure Open to air 07/28/25 1106   Base pink;red;nonblanchable 07/28/25 1106   Periwound warm;pink;redness 07/28/25 1106   Periwound Temperature warm 07/28/25 1106   Periwound Skin Turgor soft 07/28/25 1106       Wound 07/19/25 0938 Left anterior thigh Surgical Open Surgical Incision (Active)   Dressing Appearance dry;intact;dried drainage 07/28/25 1106       Wound 07/21/25 0930 Right coccyx Pressure Injury (Active)   Dressing Appearance open to air 07/28/25 1106   Closure Open to air 07/27/25 1930   Base pink;red;maroon/purple 07/28/25 1106   Periwound redness 07/28/25 1106   Periwound Temperature warm 07/28/25 1106   Periwound Skin Turgor soft 07/28/25  1106   Edges rolled/closed 07/28/25 1106   Drainage Amount none 07/28/25 1106   Care, Wound barrier applied 07/28/25 1106   Dressing Care open to air 07/28/25 1106       Wound 07/28/25 0829 coccyx (Active)   Wound Image   07/28/25 0830   Dressing Appearance open to air 07/28/25 1106   Base dry;pink;clean 07/28/25 1106   Periwound pink 07/28/25 1106   Periwound Temperature warm 07/28/25 1106   Periwound Skin Turgor soft 07/28/25 1106   Edges irregular 07/28/25 1106   Wound Length (cm) 1 cm 07/28/25 0833   Wound Width (cm) 1.5 cm 07/28/25 0833   Wound Surface Area (cm^2) 1.18 cm^2 07/28/25 0833     Physical Therapy Education       Title: PT OT SLP Therapies (Done)       Topic: Physical Therapy (Done)       Point: Mobility training (Done)       Learning Progress Summary            Patient Acceptance, E,D, VU,NR by RG at 7/28/2025 1438    Acceptance, E,D, VU,NR by LL at 7/26/2025 1615    Acceptance, E,D, VU,NR by RG at 7/25/2025 1407    Acceptance, E,D, VU,NR by RG at 7/24/2025 1502    Acceptance, E, VU,NR by LB at 7/23/2025 1612                      Point: Home exercise program (Done)       Learning Progress Summary            Patient Acceptance, E,D, VU,NR by RG at 7/28/2025 1438    Acceptance, E,D, VU,NR by LL at 7/26/2025 1615    Acceptance, E,D, VU,NR by RG at 7/25/2025 1407    Acceptance, E,D, VU,NR by RG at 7/24/2025 1502    Acceptance, E, VU,NR by LB at 7/23/2025 1612                      Point: Body mechanics (Done)       Learning Progress Summary            Patient Acceptance, E,D, VU,NR by RG at 7/28/2025 1438    Acceptance, E,D, VU,NR by LL at 7/26/2025 1615    Acceptance, E,D, VU,NR by RG at 7/25/2025 1407    Acceptance, E,D, VU,NR by RG at 7/24/2025 1502    Acceptance, E, VU,NR by LB at 7/23/2025 1612                      Point: Precautions (Done)       Learning Progress Summary            Patient Acceptance, E,D, VU,NR by RG at 7/28/2025 1438    Acceptance, E,D, VU,NR by LL at 7/26/2025 1615     Acceptance, E,D, VU,NR by RG at 7/25/2025 1407    Acceptance, E,D, VU,NR by RG at 7/24/2025 1502    Acceptance, E, VU,NR by LB at 7/23/2025 1612                                      User Key       Initials Effective Dates Name Provider Type Discipline    LB 06/16/21 -  Andreia Dove, PT Physical Therapist PT    LL 05/02/16 -  Beverly Goff PTA Physical Therapist Assistant PT    RG 06/16/21 -  Neri Cornelius PTA Physical Therapist Assistant PT                    PT Recommendation and Plan    Frequency of Treatment (PT): 5 times per week  Anticipated Equipment Needs at Discharge (PT Eval):  (tbd)                  Time Calculation:      PT Charges       Row Name 07/28/25 1439 07/28/25 1438          Time Calculation    Start Time 1245  -RG 1045  -RG     Stop Time 1330  -RG 1130  -RG     Time Calculation (min) 45 min  -RG 45 min  -RG     PT Received On 07/28/25  -RG 07/28/25  -RG        Time Calculation- PT    Total Timed Code Minutes- PT 45 minute(s)  -RG 45 minute(s)  -RG               User Key  (r) = Recorded By, (t) = Taken By, (c) = Cosigned By      Initials Name Provider Type    RG Neri Cornelius PTA Physical Therapist Assistant                    Therapy Charges for Today       Code Description Service Date Service Provider Modifiers Qty    68170948036 HC PT THERAPEUTIC ACT EA 15 MIN 7/28/2025 Neri Cornelius PTA GP, CQ 2    60526730755 HC PT THER PROC EA 15 MIN 7/28/2025 Neri Cornelius PTA GP, CQ 4              PT G-Codes  AM-PAC 6 Clicks Score (PT): 16      Neri Cornelius PTA  7/28/2025

## 2025-07-28 NOTE — THERAPY TREATMENT NOTE
Inpatient Rehabilitation - Occupational Therapy Treatment Note    GUANACO Portillo     Patient Name: Lashae Benitez  : 1960  MRN: [id number]    Today's Date: 2025                 Admit Date: 2025       No diagnosis found.    Patient Active Problem List   Diagnosis    Hiatal hernia    Essential hypertension    Sjogren's syndrome    Multiple sclerosis    Psoriasis    Fibromyalgia    Osteoarthritis    Psoriatic arthritis    RA (rheumatoid arthritis)    Degenerative disc disease, lumbar    TMJ arthritis    Dupuytren's disease    H. pylori infection    Family history of coronary artery disease    Type 2 diabetes mellitus    Edema    Bilateral leg edema    Isolated corticotropin deficiency    Hyponatremia    Paroxysmal atrial fibrillation    Sepsis    Bacteremia, escherichia coli    Iron deficiency anemia    Malabsorption due to intolerance, not elsewhere classified    Chronic chest pain with high risk for CAD    Abnormal nuclear stress test    Abnormal computed tomography angiography (CTA)    Hip fracture    Closed intertrochanteric fracture of hip, left, initial encounter    Closed left hip fracture, sequela       Past Medical History:   Diagnosis Date    Acid reflux     Allergic     Anemia     Anxiety     Atrial fibrillation     Cancer     thyroid, skin    Cervical disc disorder     Chronic pain disorder     Claustrophobia     CTS (carpal tunnel syndrome)     Degenerative disc disease, lumbar     Depression     Dupuytren's disease     Essential hypertension     Family history of coronary artery disease     Fibromyalgia     Gallbladder abscess     H. pylori infection     Hiatal hernia     Hyperlipidemia     Hypothyroidism     Low back pain     Lumbosacral disc disease     Migraines     migraines    Multiple sclerosis     Osteoarthritis     Peripheral neuropathy     Psoriasis     Psoriatic arthritis     RA (rheumatoid arthritis)     Rheumatoid arthritis     Sinusitis     Sjogren's syndrome     Stomach ulcer      Thoracic disc disorder     TMJ arthritis     Type 2 diabetes mellitus     Urinary tract infection        Past Surgical History:   Procedure Laterality Date    BACK SURGERY      BREAST LUMPECTOMY Left 11/1993    CARDIAC CATHETERIZATION Left 09/21/2009    Normal     CARDIAC CATHETERIZATION N/A 6/24/2025    Procedure: Left Heart Cath;  Surgeon: Teodora Barron MD;  Location:  [facility];  Service: Cardiovascular;  Laterality: N/A;    CARPAL TUNNEL RELEASE  03/2012    CERVICAL POLYPECTOMY  12/2015    CHOLECYSTECTOMY  05/2007    COLONOSCOPY      ENDOSCOPY      EPIDURAL BLOCK      GASTRIC BYPASS  09/2007    JOINT REPLACEMENT      KNEE ARTHROPLASTY      LUMBAR DISC SURGERY      C5-6    NECK SURGERY      REPLACEMENT TOTAL KNEE Right 06/2016    SACRAL NERVE STIMULATOR PLACEMENT  09/2014    SACRAL NERVE STIMULATOR PLACEMENT  04/11/2024    @ [facility] in [place]    SACRAL NERVE STIMULATOR PLACEMENT Right 04/17/2024    THYROIDECTOMY  03/2016    TRIGGER POINT INJECTION               IRF OT ASSESSMENT FLOWSHEET (Last 12 Hours)       IRF OT Evaluation and Treatment       Row Name 07/28/25 1433          OT Time and Intention    Document Type daily treatment  -     Mode of Treatment occupational therapy  -     Symptoms Noted During/After Treatment dizziness  c/o dizziness w/ standing during toileting; b/p 144/82 once seated; RN aware  -       Row Name 07/28/25 1436          General Information    Patient/Family/Caregiver Comments/Observations agreeable to therapy  -     Existing Precautions/Restrictions fall;weight bearing  TTWB LLE, ABD binder- bp  -       Row Name 07/28/25 1433 07/28/25 1400       Lower Body Dressing    [place] Level (Lower Body Dressing) moderate assist (50% patient effort);maximum assist (25% patient effort);verbal cues  - --  -    Assistive Device Use (Lower Body Dressing) reacher;sock-aid  - --  -      Row Name 07/28/25 1433 07/28/25 1400       Grooming    [place]  Level (Grooming) set up  - --  -      Row Name 07/28/25 1433 07/28/25 1400       Toileting    [place] Level (Toileting) maximum assist (25% patient effort);verbal cues  - --  -    Assistive Device Use (Toileting) grab bar/safety frame;raised toilet seat  - --  -      Row Name 07/28/25 1433          Bed-Chair Transfer    Bed-Chair [place] (Transfers) moderate assist (50% patient effort);verbal cues  -     Assistive Device (Bed-Chair Transfers) wheelchair  -       Row Name 07/28/25 1433          Toilet Transfer    [place] Level (Toilet Transfer) moderate assist (50% patient effort);verbal cues  -     Assistive Device (Toilet Transfer) grab bars/safety frame;raised toilet seat;wheelchair  -       Row Name 07/28/25 1433          Motor Skills    Motor Skills functional endurance  -     Motor Control/Coordination Interventions therapeutic exercise/ROM;gross motor coordination activities;fine motor manipulation/dexterity activities  BUE ther ex/act, AROM, bilat coord ex, GMC/FMC  -     Therapeutic Exercise --  UBE, light flexbar, hand exerciser  -     Additional Documentation --  TA to increase fxl act leticia / strength to improve ADL performance.  -       Row Name 07/28/25 1433          Positioning and Restraints    In Wheelchair sitting;call light within reach;encouraged to call for assist;exit alarm on;with family/caregiver;notified nsg  -       Row Name 07/28/25 1433          Vital Signs    Pre Systolic BP Rehab 116  EOB  -     Pre Treatment Diastolic BP 55  -     Intra Systolic BP Rehab 144  seated  -     Intra Treatment Diastolic BP 82  -               User Key  (r) = Recorded By, (t) = Taken By, (c) = Cosigned By      Initials Name Effective Dates     Amber Badillo OT 06/16/21 -                      Occupational Therapy Education       Title: PT OT SLP Therapies (Done)       Topic: Occupational Therapy (Done)       Point: ADL training (Done)       Learning  Progress Summary            Patient Acceptance, E,D, VU,NR by  at 7/28/2025 1443    Acceptance, E,D, VU,NR by TM at 7/26/2025 0859    Acceptance, E,D, VU,NR by  at 7/25/2025 1426                      Point: Precautions (Done)       Learning Progress Summary            Patient Acceptance, E,D, VU,NR by  at 7/28/2025 1443    Acceptance, E,D, VU,NR by TM at 7/26/2025 0859    Acceptance, E,D, VU,NR by  at 7/25/2025 1426                                      User Key       Initials Effective Dates Name Provider Type Discipline     06/16/21 -  Amber Badillo, OT Occupational Therapist OT    TM 06/16/21 -  Kayla León OT Occupational Therapist OT                        OT Recommendation and Plan    Planned Therapy Interventions (OT): activity tolerance training, adaptive equipment training, BADL retraining, occupation/activity based interventions, patient/caregiver education/training, ROM/therapeutic exercise, strengthening exercise (impaired ADL's, strength, fxl mobility, and activity tolerance)                    Time Calculation:      Time Calculation- OT       Row Name 07/28/25 1442             Time Calculation- OT    OT Start Time 0900  -      OT Stop Time 1040  -      OT Time Calculation (min) 100 min  -      Total Timed Code Minutes-  minute(s)  -                User Key  (r) = Recorded By, (t) = Taken By, (c) = Cosigned By      Initials Name Provider Type     Amber Badillo OT Occupational Therapist                  Therapy Charges for Today       Code Description Service Date Service Provider Modifiers Qty    35826897854 HC OT SELF CARE/MGMT/TRAIN EA 15 MIN 7/28/2025 Amber Badillo OT GO 2    24481699395 HC OT THER PROC EA 15 MIN 7/28/2025 Amber Badillo OT GO 3    63331524612 HC OT THERAPEUTIC ACT EA 15 MIN 7/28/2025 Amber Badillo OT GO 2                     Amber Badillo OT  7/28/2025

## 2025-07-29 LAB
ANION GAP SERPL CALCULATED.3IONS-SCNC: 10.1 MMOL/L (ref 5–15)
BASOPHILS # BLD AUTO: 0.03 10*3/MM3 (ref 0–0.2)
BASOPHILS NFR BLD AUTO: 0.5 % (ref 0–1.5)
BUN SERPL-MCNC: 9 MG/DL (ref 8–23)
BUN/CREAT SERPL: 13.2 (ref 7–25)
CALCIUM SPEC-SCNC: 8.5 MG/DL (ref 8.6–10.5)
CHLORIDE SERPL-SCNC: 101 MMOL/L (ref 98–107)
CO2 SERPL-SCNC: 27.9 MMOL/L (ref 22–29)
CORTIS SERPL-MCNC: 14.2 MCG/DL
CORTIS SERPL-MCNC: 30.8 MCG/DL
CORTIS SERPL-MCNC: 37.2 MCG/DL
CREAT SERPL-MCNC: 0.68 MG/DL (ref 0.57–1)
DEPRECATED RDW RBC AUTO: 62.5 FL (ref 37–54)
EGFRCR SERPLBLD CKD-EPI 2021: 98 ML/MIN/1.73
EOSINOPHIL # BLD AUTO: 0.15 10*3/MM3 (ref 0–0.4)
EOSINOPHIL NFR BLD AUTO: 2.3 % (ref 0.3–6.2)
ERYTHROCYTE [DISTWIDTH] IN BLOOD BY AUTOMATED COUNT: 16.5 % (ref 12.3–15.4)
GLUCOSE BLDC GLUCOMTR-MCNC: 157 MG/DL (ref 70–130)
GLUCOSE BLDC GLUCOMTR-MCNC: 198 MG/DL (ref 70–130)
GLUCOSE BLDC GLUCOMTR-MCNC: 223 MG/DL (ref 70–130)
GLUCOSE BLDC GLUCOMTR-MCNC: 381 MG/DL (ref 70–130)
GLUCOSE SERPL-MCNC: 152 MG/DL (ref 65–99)
HCT VFR BLD AUTO: 26.6 % (ref 34–46.6)
HGB BLD-MCNC: 8.2 G/DL (ref 12–15.9)
IMM GRANULOCYTES # BLD AUTO: 0.07 10*3/MM3 (ref 0–0.05)
IMM GRANULOCYTES NFR BLD AUTO: 1.1 % (ref 0–0.5)
LYMPHOCYTES # BLD AUTO: 1.31 10*3/MM3 (ref 0.7–3.1)
LYMPHOCYTES NFR BLD AUTO: 20.3 % (ref 19.6–45.3)
MCH RBC QN AUTO: 32.3 PG (ref 26.6–33)
MCHC RBC AUTO-ENTMCNC: 30.8 G/DL (ref 31.5–35.7)
MCV RBC AUTO: 104.7 FL (ref 79–97)
MONOCYTES # BLD AUTO: 0.77 10*3/MM3 (ref 0.1–0.9)
MONOCYTES NFR BLD AUTO: 11.9 % (ref 5–12)
NEUTROPHILS NFR BLD AUTO: 4.12 10*3/MM3 (ref 1.7–7)
NEUTROPHILS NFR BLD AUTO: 63.9 % (ref 42.7–76)
NRBC BLD AUTO-RTO: 0 /100 WBC (ref 0–0.2)
PLATELET # BLD AUTO: 504 10*3/MM3 (ref 140–450)
PMV BLD AUTO: 8.5 FL (ref 6–12)
POTASSIUM SERPL-SCNC: 4.3 MMOL/L (ref 3.5–5.2)
RBC # BLD AUTO: 2.54 10*6/MM3 (ref 3.77–5.28)
SODIUM SERPL-SCNC: 139 MMOL/L (ref 136–145)
WBC NRBC COR # BLD AUTO: 6.45 10*3/MM3 (ref 3.4–10.8)

## 2025-07-29 PROCEDURE — 63710000001 METHOTREXATE 2.5 MG TABLET: Performed by: FAMILY MEDICINE

## 2025-07-29 PROCEDURE — 94799 UNLISTED PULMONARY SVC/PX: CPT

## 2025-07-29 PROCEDURE — 97110 THERAPEUTIC EXERCISES: CPT

## 2025-07-29 PROCEDURE — 85025 COMPLETE CBC W/AUTO DIFF WBC: CPT | Performed by: INTERNAL MEDICINE

## 2025-07-29 PROCEDURE — 25010000002 COSYNTROPIN PER 0.25 MG: Performed by: INTERNAL MEDICINE

## 2025-07-29 PROCEDURE — 99232 SBSQ HOSP IP/OBS MODERATE 35: CPT | Performed by: INTERNAL MEDICINE

## 2025-07-29 PROCEDURE — 80048 BASIC METABOLIC PNL TOTAL CA: CPT | Performed by: INTERNAL MEDICINE

## 2025-07-29 PROCEDURE — 97112 NEUROMUSCULAR REEDUCATION: CPT

## 2025-07-29 PROCEDURE — 82948 REAGENT STRIP/BLOOD GLUCOSE: CPT

## 2025-07-29 PROCEDURE — 97530 THERAPEUTIC ACTIVITIES: CPT

## 2025-07-29 PROCEDURE — 63710000001 INSULIN LISPRO (HUMAN) PER 5 UNITS: Performed by: FAMILY MEDICINE

## 2025-07-29 PROCEDURE — 94760 N-INVAS EAR/PLS OXIMETRY 1: CPT

## 2025-07-29 PROCEDURE — 82533 TOTAL CORTISOL: CPT | Performed by: INTERNAL MEDICINE

## 2025-07-29 PROCEDURE — 63710000001 INSULIN GLARGINE PER 5 UNITS: Performed by: INTERNAL MEDICINE

## 2025-07-29 PROCEDURE — 97535 SELF CARE MNGMENT TRAINING: CPT

## 2025-07-29 RX ORDER — GABAPENTIN 400 MG/1
400 CAPSULE ORAL EVERY 8 HOURS SCHEDULED
Status: DISCONTINUED | OUTPATIENT
Start: 2025-07-29 | End: 2025-08-06 | Stop reason: HOSPADM

## 2025-07-29 RX ORDER — MIDODRINE HYDROCHLORIDE 2.5 MG/1
2.5 TABLET ORAL
Status: DISCONTINUED | OUTPATIENT
Start: 2025-07-29 | End: 2025-08-02

## 2025-07-29 RX ORDER — COSYNTROPIN 0.25 MG/ML
0.25 INJECTION, POWDER, FOR SOLUTION INTRAMUSCULAR; INTRAVENOUS ONCE
Status: COMPLETED | OUTPATIENT
Start: 2025-07-29 | End: 2025-07-29

## 2025-07-29 RX ADMIN — FLECAINIDE ACETATE 50 MG: 50 TABLET ORAL at 21:01

## 2025-07-29 RX ADMIN — INSULIN LISPRO 2 UNITS: 100 INJECTION, SOLUTION INTRAVENOUS; SUBCUTANEOUS at 12:34

## 2025-07-29 RX ADMIN — INSULIN LISPRO 3 UNITS: 100 INJECTION, SOLUTION INTRAVENOUS; SUBCUTANEOUS at 19:15

## 2025-07-29 RX ADMIN — CETIRIZINE HYDROCHLORIDE 10 MG: 10 TABLET, FILM COATED ORAL at 09:03

## 2025-07-29 RX ADMIN — RIVAROXABAN 20 MG: 20 TABLET, FILM COATED ORAL at 17:57

## 2025-07-29 RX ADMIN — FOLIC ACID 1 MG: 1 TABLET ORAL at 09:03

## 2025-07-29 RX ADMIN — GABAPENTIN 400 MG: 400 CAPSULE ORAL at 14:36

## 2025-07-29 RX ADMIN — INSULIN GLARGINE 10 UNITS: 100 INJECTION, SOLUTION SUBCUTANEOUS at 09:04

## 2025-07-29 RX ADMIN — FLECAINIDE ACETATE 50 MG: 50 TABLET ORAL at 09:03

## 2025-07-29 RX ADMIN — METHOTREXATE 25 MG: 2.5 TABLET ORAL at 09:07

## 2025-07-29 RX ADMIN — MIDODRINE HYDROCHLORIDE 2.5 MG: 2.5 TABLET ORAL at 12:35

## 2025-07-29 RX ADMIN — GABAPENTIN 400 MG: 400 CAPSULE ORAL at 21:01

## 2025-07-29 RX ADMIN — INSULIN LISPRO 2 UNITS: 100 INJECTION, SOLUTION INTRAVENOUS; SUBCUTANEOUS at 09:04

## 2025-07-29 RX ADMIN — FLUDROCORTISONE ACETATE 50 MCG: 0.1 TABLET ORAL at 09:04

## 2025-07-29 RX ADMIN — DULOXETINE 60 MG: 60 CAPSULE, DELAYED RELEASE ORAL at 09:04

## 2025-07-29 RX ADMIN — DULOXETINE 30 MG: 60 CAPSULE, DELAYED RELEASE ORAL at 09:03

## 2025-07-29 RX ADMIN — COSYNTROPIN 0.25 MG: 0.25 INJECTION, POWDER, LYOPHILIZED, FOR SOLUTION INTRAMUSCULAR; INTRAVENOUS at 12:33

## 2025-07-29 RX ADMIN — LEVOTHYROXINE SODIUM 150 MCG: 0.07 TABLET ORAL at 05:18

## 2025-07-29 RX ADMIN — INSULIN LISPRO 6 UNITS: 100 INJECTION, SOLUTION INTRAVENOUS; SUBCUTANEOUS at 21:01

## 2025-07-29 RX ADMIN — MIDODRINE HYDROCHLORIDE 2.5 MG: 2.5 TABLET ORAL at 17:57

## 2025-07-29 RX ADMIN — GABAPENTIN 600 MG: 300 CAPSULE ORAL at 05:18

## 2025-07-29 RX ADMIN — HYDROCODONE BITARTRATE AND ACETAMINOPHEN 1 TABLET: 7.5; 325 TABLET ORAL at 05:18

## 2025-07-29 RX ADMIN — METOPROLOL TARTRATE 25 MG: 25 TABLET, FILM COATED ORAL at 09:03

## 2025-07-29 NOTE — PROGRESS NOTES
Team Members Attending Conference    Name                                    Professional Designation    Dr. Jr Carrero                         Physician  Josette Mc, RN                          Andreia Dove, PT                         Physical Therapist  Amber Badillo, OT                       Occupational Therapist  Kelsie Garcia, RN                    Nurse      Patient Information    YOB: 1960      Gender:  Female    Primary Language: English    Preferred Language: English    Admission Date/Time  07/24/2025 01:14 PM    Rehab Diagnosis/Condition  Diagnosis/Conditions that caused the need for rehabilitation.      Left Intertrochanteric Hip Fracture S/P Repair    Impairment Group Code  Orthopedic Disorders Impairment Group Code:  08.11 Status Post Unilateral Hip Fracture    Medical Status    stable    Medical Prognosis        good--healing repaired hip fx    Rehabilitation Potential        Based on the patient's PLOF and current therapy levels the patient's  rehabilitation potential is good.    Rehabilitation Therapy Program  Expected Participation in Rehabilitation Program:  At least 3 hours per day at least 5 days per week    Anticipated Interventions                        Expected Intensity (hours/day)  Expected Frequency  (days/week)  Expected Duration (total # days/IRF stay)  Physical Therapy    1.5 hours per day   5 days per week     14 days  Occupational Therapy  1.5 hours per day   5 days per week     14 days        Other Disciplines Participating in Plan of Care:  Case Management, Nursing,  Recreational Therapist    Estimated Length of Stay  Estimated Length of Stay:  14 days    Estimated Discharge Date:   08/06/2025    Anticipated Discharge Destination  Anticipated Discharge Destination:  To community with assistance    Discharge Destination Information:  Patient will discharge home with spouse providing assistance if needed.    Problems/Goals  Mobility Discussion    PT -  Bed/Chair/Wheelchair (Mobility)  Current Status: max A  Long Term Goals  07/23/2025 04:15 PM - Active  Sup  PT - Walk (Mobility)  Current Status: unable  Long Term Goals  07/23/2025 04:15 PM - Active  amb 150' RW Sup  Self Care Discussion    OT - Dressing (Lower) (Self Care)  Current Status: Mod/MaxA  Long Term Goals  07/23/2025 12:26 PM - Active  ModA  Body Function Structure Discussion    NURS - Skin Integrity (Body Function Structure)  Current Status: Risk for further skin breakdown  Long Term Goals  07/22/2025 02:24 PM - Active  No worsening of skin breakdown/ No new skin breakdown  Safety Discussion    NURS - Potential for Injury (Safety)  Current Status: Risk for falls  Long Term Goals  07/22/2025 02:25 PM - Active  No falls this hospital stay    Physician Concurrence    I attended the weekly team conference documented above and agree with the  documented findings, discussion and decisions, and current plan of care.    Signed by: Jr Carrero MD    Physician CoSigned By: Jr Carrero 07/29/2025 16:20:00

## 2025-07-29 NOTE — SIGNIFICANT NOTE
07/29/25 1113   Plan   Plan Team conference was held this morning and discharge date is still planned for 08/06/25 with plans to return home with spouse.  Attempted to contact spouse Khanh at [phone] without success and unable to leave a message as mailbox is full.  Spoke to pt about her progress in therapy and discharge date of 08/06, and she is agreeable as long as she is medically ready.  She has been having hypotension in therapy which has been a concern.  PT says pt's spouse has been present for therapy on several days to observe therapy.  SS will continue to follow for discharge planning needs.

## 2025-07-29 NOTE — THERAPY TREATMENT NOTE
Inpatient Rehabilitation - Occupational Therapy Treatment Note    GUANACO Portillo     Patient Name: Lashae Benitez  : 1960  MRN: [id number]    Today's Date: 2025                 Admit Date: 2025       No diagnosis found.    Patient Active Problem List   Diagnosis    Hiatal hernia    Essential hypertension    Sjogren's syndrome    Multiple sclerosis    Psoriasis    Fibromyalgia    Osteoarthritis    Psoriatic arthritis    RA (rheumatoid arthritis)    Degenerative disc disease, lumbar    TMJ arthritis    Dupuytren's disease    H. pylori infection    Family history of coronary artery disease    Type 2 diabetes mellitus    Edema    Bilateral leg edema    Isolated corticotropin deficiency    Hyponatremia    Paroxysmal atrial fibrillation    Sepsis    Bacteremia, escherichia coli    Iron deficiency anemia    Malabsorption due to intolerance, not elsewhere classified    Chronic chest pain with high risk for CAD    Abnormal nuclear stress test    Abnormal computed tomography angiography (CTA)    Hip fracture    Closed intertrochanteric fracture of hip, left, initial encounter    Closed left hip fracture, sequela       Past Medical History:   Diagnosis Date    Acid reflux     Allergic     Anemia     Anxiety     Atrial fibrillation     Cancer     thyroid, skin    Cervical disc disorder     Chronic pain disorder     Claustrophobia     CTS (carpal tunnel syndrome)     Degenerative disc disease, lumbar     Depression     Dupuytren's disease     Essential hypertension     Family history of coronary artery disease     Fibromyalgia     Gallbladder abscess     H. pylori infection     Hiatal hernia     Hyperlipidemia     Hypothyroidism     Low back pain     Lumbosacral disc disease     Migraines     migraines    Multiple sclerosis     Osteoarthritis     Peripheral neuropathy     Psoriasis     Psoriatic arthritis     RA (rheumatoid arthritis)     Rheumatoid arthritis     Sinusitis     Sjogren's syndrome     Stomach ulcer      "Thoracic disc disorder     TMJ arthritis     Type 2 diabetes mellitus     Urinary tract infection        Past Surgical History:   Procedure Laterality Date    BACK SURGERY      BREAST LUMPECTOMY Left 11/1993    CARDIAC CATHETERIZATION Left 09/21/2009    Normal     CARDIAC CATHETERIZATION N/A 6/24/2025    Procedure: Left Heart Cath;  Surgeon: Teodora Barron MD;  Location:  [facility];  Service: Cardiovascular;  Laterality: N/A;    CARPAL TUNNEL RELEASE  03/2012    CERVICAL POLYPECTOMY  12/2015    CHOLECYSTECTOMY  05/2007    COLONOSCOPY      ENDOSCOPY      EPIDURAL BLOCK      GASTRIC BYPASS  09/2007    JOINT REPLACEMENT      KNEE ARTHROPLASTY      LUMBAR DISC SURGERY      C5-6    NECK SURGERY      REPLACEMENT TOTAL KNEE Right 06/2016    SACRAL NERVE STIMULATOR PLACEMENT  09/2014    SACRAL NERVE STIMULATOR PLACEMENT  04/11/2024    @ [facility] in [place]    SACRAL NERVE STIMULATOR PLACEMENT Right 04/17/2024    THYROIDECTOMY  03/2016    TRIGGER POINT INJECTION               IRF OT ASSESSMENT FLOWSHEET (Last 12 Hours)       IRF OT Evaluation and Treatment       Row Name 07/29/25 1159          OT Time and Intention    Document Type daily treatment  -     Mode of Treatment occupational therapy  -     Symptoms Noted During/After Treatment --  c/o \"woozy head\" while standing during toileting; bp 123/82 once seated; RN notified  -       Row Name 07/29/25 0097          General Information    Patient/Family/Caregiver Comments/Observations agreeable to therapy  -     Existing Precautions/Restrictions fall;weight bearing  TTWB LLE, ABD binder- bp  -       Row Name 07/29/25 4046          Bathing    [place] Level (Bathing) minimum assist (75% patient effort);moderate assist (50% patient effort);verbal cues  -       Row Name 07/29/25 9747          Upper Body Dressing    [place] Level (Upper Body Dressing) set up assistance;bra/undergarment;pull over garment  -       Row Name 07/29/25 1159       "    Lower Body Dressing    [place] Level (Lower Body Dressing) moderate assist (50% patient effort);verbal cues  -     Assistive Device Use (Lower Body Dressing) reacher;sock-aid  -       Row Name 07/29/25 1159          Grooming    [place] Level (Grooming) set up  -       Row Name 07/29/25 1159          Toileting    [place] Level (Toileting) maximum assist (25% patient effort);moderate assist (50% patient effort);verbal cues  -     Assistive Device Use (Toileting) grab bar/safety frame;raised toilet seat  -       Row Name 07/29/25 1159          Bed-Chair Transfer    Bed-Chair [place] (Transfers) moderate assist (50% patient effort);verbal cues  -     Assistive Device (Bed-Chair Transfers) wheelchair  -       Row Name 07/29/25 1159          Toilet Transfer    [place] Level (Toilet Transfer) moderate assist (50% patient effort);verbal cues  -     Assistive Device (Toilet Transfer) grab bars/safety frame;wheelchair;raised toilet seat  -       Row Name 07/29/25 1159          Motor Skills    Motor Skills functional endurance  -     Motor Control/Coordination Interventions therapeutic exercise/ROM  BUE ther ex/act, AROM, PRE,  sttrengthening  -     Therapeutic Exercise --  UBE, hand exerciser, light flexbar  -       Row Name 07/29/25 1159          Positioning and Restraints    Post Treatment Position wheelchair  -     In Wheelchair sitting;call light within reach;encouraged to call for assist;exit alarm on;notified nsg  -       Row Name 07/29/25 1159          Vital Signs    Intra Systolic BP Rehab 123  -     Intra Treatment Diastolic BP 82  -               User Key  (r) = Recorded By, (t) = Taken By, (c) = Cosigned By      Initials Name Effective Dates     Amebr Badillo OT 06/16/21 -                      Occupational Therapy Education       Title: PT OT SLP Therapies (Done)       Topic: Occupational Therapy (Done)       Point: ADL training (Done)        Learning Progress Summary            Patient Acceptance, E,D, VU,NR by  at 7/29/2025 1207    Acceptance, E,D, VU,NR by  at 7/28/2025 1443    Acceptance, E,D, VU,NR by  at 7/26/2025 0859                      Point: Precautions (Done)       Learning Progress Summary            Patient Acceptance, E,D, VU,NR by  at 7/29/2025 1207    Acceptance, E,D, VU,NR by  at 7/28/2025 1443    Acceptance, E,D, VU,NR by TM at 7/26/2025 0859                                      User Key       Initials Effective Dates Name Provider Type [facility] 06/16/21 -  Amber Badillo, OT Occupational Therapist OT    TM 06/16/21 -  Kayla León OT Occupational Therapist OT                        OT Recommendation and Plan    Planned Therapy Interventions (OT): activity tolerance training, adaptive equipment training, BADL retraining, occupation/activity based interventions, patient/caregiver education/training, ROM/therapeutic exercise, strengthening exercise (impaired ADL's, strength, fxl mobility, and activity tolerance)                    Time Calculation:      Time Calculation- OT       Row Name 07/29/25 1208             Time Calculation- OT    OT Start Time 0750  -      OT Stop Time 0920  -      OT Time Calculation (min) 90 min  -      Total Timed Code Minutes- OT 90 minute(s)  -                User Key  (r) = Recorded By, (t) = Taken By, (c) = Cosigned By      Initials Name Provider Type     Amber Badillo, OT Occupational Therapist                  Therapy Charges for Today       Code Description Service Date Service Provider Modifiers Qty    49453540487 HC OT SELF CARE/MGMT/TRAIN EA 15 MIN 7/28/2025 Amber Badillo OT GO 2    82237344506 HC OT THER PROC EA 15 MIN 7/28/2025 Amber Badillo, OT GO 3    95487870602 HC OT THERAPEUTIC ACT EA 15 MIN 7/28/2025 Amber Badillo OT GO 2    90992531763 HC OT SELF CARE/MGMT/TRAIN EA 15 MIN 7/29/2025 Amber Badillo OT GO 3    57217171755 HC OT THER PROC  EA 15 MIN 7/29/2025 Amber Badillo, OT GO 3                     Amber Badillo, OT  7/29/2025

## 2025-07-29 NOTE — PROGRESS NOTES
Assisted By: Patient seen at bedside, she was sitting in a wheelchair and had eaten her breakfast just finished her OT session    CC: Follow-up on left hip fracture    Interview History/HPI: Patient was sitting up in a chair and states that she felt okay.  She had been taken to the bathroom by OT and had felt lightheaded but her blood pressure was okay at that time.  She states she is still having some left hip pain but denies any other pain and no shortness of breath.          Current Hospital Meds:  [Held by provider] amitriptyline, 25 mg, Oral, Nightly  budesonide-formoterol, 2 puff, Inhalation, BID - RT  cetirizine, 10 mg, Oral, Daily  cholecalciferol, 50,000 Units, Oral, Weekly  cyanocobalamin, 1,000 mcg, Intramuscular, Weekly  DULoxetine, 30 mg, Oral, Daily  DULoxetine, 60 mg, Oral, Daily  flecainide, 50 mg, Oral, BID  fludrocortisone, 50 mcg, Oral, Daily  folic acid, 1 mg, Oral, Daily  gabapentin, 400 mg, Oral, Q8H  insulin glargine, 10 Units, Subcutaneous, Daily  insulin lispro, 2-7 Units, Subcutaneous, 4x Daily PC & at Bedtime  levothyroxine, 150 mcg, Oral, Q AM  methotrexate, 25 mg, Oral, Weekly  [Held by provider] metoprolol tartrate, 25 mg, Oral, BID  midodrine, 2.5 mg, Oral, TID AC  rivaroxaban, 20 mg, Oral, Daily With Dinner         Vitals:    07/29/25 1235   BP: 99/65   Pulse: 88   Resp:    Temp:    SpO2:          Intake/Output Summary (Last 24 hours) at 7/29/2025 1325  Last data filed at 7/29/2025 0903  Gross per 24 hour   Intake 720 ml   Output --   Net 720 ml       EXAM: I was notified after I saw the patient the patient was up with therapy and while pedal blood pressure in the 60s over 40s.  She was placed back in bed and this quickly recovered.  Patient was symptomatic with this.  When I saw her sitting in chair skin warm and dry she was mentating normally, lungs were clear heart regular rate and rhythm, strength was symmetric no edema      Diet: Regular/House; Texture: Regular (IDDSI 7); Fluid  Consistency: Thin (IDDSI 0)        LABS:     Lab Results (last 48 hours)       Procedure Component Value Units Date/Time    Cortisol [[id number]] Collected: 07/29/25 1309    Specimen: Blood Updated: 07/29/25 1313    Cortisol [[id number]] Collected: 07/29/25 1133    Specimen: Blood Updated: 07/29/25 1216     Cortisol 14.20 mcg/dL     Narrative:      Cortisol Reference Ranges:    Cortisol 6AM - 10AM Range: 6.02-18.40 mcg/dl  Cortisol 4PM - 8PM Range: 2.68-10.50 mcg/dl      Results may be falsely increased if patient taking Biotin.      POC Glucose Once [[id number]]  (Abnormal) Collected: 07/29/25 1103    Specimen: Blood Updated: 07/29/25 1106     Glucose 198 mg/dL      Comment: Serial Number: [id number]:  [id number]       POC Glucose Once [[id number]]  (Abnormal) Collected: 07/29/25 0620    Specimen: Blood Updated: 07/29/25 0622     Glucose 157 mg/dL      Comment: Serial Number: [id number]:  [id number]       Basic Metabolic Panel [[id number]]  (Abnormal) Collected: 07/29/25 0422    Specimen: Blood Updated: 07/29/25 0453     Glucose 152 mg/dL      BUN 9.0 mg/dL      Creatinine 0.68 mg/dL      Sodium 139 mmol/L      Potassium 4.3 mmol/L      Chloride 101 mmol/L      CO2 27.9 mmol/L      Calcium 8.5 mg/dL      BUN/Creatinine Ratio 13.2     Anion Gap 10.1 mmol/L      eGFR 98.0 mL/min/1.73     Narrative:      GFR Categories in Chronic Kidney Disease (CKD)              GFR Category          GFR (mL/min/1.73)    Interpretation  G1                    90 or greater        Normal or high (1)  G2                    60-89                Mild decrease (1)  G3a                   45-59                Mild to moderate decrease  G3b                   30-44                Moderate to severe decrease  G4                    15-29                Severe decrease  G5                    14 or less           Kidney failure    (1)In the absence of evidence of kidney disease, neither GFR category G1 or G2 fulfill the criteria for  CKD.    eGFR calculation 2021 CKD-EPI creatinine equation, which does not include race as a factor    CBC & Differential [[id number]]  (Abnormal) Collected: 07/29/25 0421    Specimen: Blood Updated: 07/29/25 0434    Narrative:      The following orders were created for panel order CBC & Differential.  Procedure                               Abnormality         Status                     ---------                               -----------         ------                     CBC Auto Differential[[id number]]        Abnormal            Final result                 Please view results for these tests on the individual orders.    CBC Auto Differential [[id number]]  (Abnormal) Collected: 07/29/25 0421    Specimen: Blood Updated: 07/29/25 0434     WBC 6.45 10*3/mm3      RBC 2.54 10*6/mm3      Hemoglobin 8.2 g/dL      Hematocrit 26.6 %      .7 fL      MCH 32.3 pg      MCHC 30.8 g/dL      RDW 16.5 %      RDW-SD 62.5 fl      MPV 8.5 fL      Platelets 504 10*3/mm3      Neutrophil % 63.9 %      Lymphocyte % 20.3 %      Monocyte % 11.9 %      Eosinophil % 2.3 %      Basophil % 0.5 %      Immature Grans % 1.1 %      Neutrophils, Absolute 4.12 10*3/mm3      Lymphocytes, Absolute 1.31 10*3/mm3      Monocytes, Absolute 0.77 10*3/mm3      Eosinophils, Absolute 0.15 10*3/mm3      Basophils, Absolute 0.03 10*3/mm3      Immature Grans, Absolute 0.07 10*3/mm3      nRBC 0.0 /100 WBC     POC Glucose 4x Daily PC & at Bedtime [[id number]]  (Abnormal) Collected: 07/28/25 2045    Specimen: Blood Updated: 07/28/25 2047     Glucose 271 mg/dL      Comment: Serial Number: [id number]:  [id number]       POC Glucose Once [[id number]]  (Normal) Collected: 07/28/25 1619    Specimen: Blood Updated: 07/28/25 1622     Glucose 122 mg/dL      Comment: Serial Number: [id number]:  [id number]       POC Glucose Once [[id number]]  (Abnormal) Collected: 07/28/25 1106    Specimen: Blood Updated: 07/28/25 1109     Glucose 254 mg/dL      Comment: Serial  Number: [id number]:  [id number]       Cortisol [[id number]] Collected: 07/28/25 0352    Specimen: Blood Updated: 07/28/25 0620     Cortisol 9.42 mcg/dL     Narrative:      Cortisol Reference Ranges:    Cortisol 6AM - 10AM Range: 6.02-18.40 mcg/dl  Cortisol 4PM - 8PM Range: 2.68-10.50 mcg/dl      Results may be falsely increased if patient taking Biotin.      POC Glucose Once [[id number]]  (Abnormal) Collected: 07/28/25 0604    Specimen: Blood Updated: 07/28/25 0606     Glucose 166 mg/dL      Comment: Serial Number: [id number]:  [id number]       POC Glucose 4x Daily PC & at Bedtime [[id number]]  (Abnormal) Collected: 07/27/25 2049    Specimen: Blood Updated: 07/27/25 2050     Glucose 270 mg/dL      Comment: Serial Number: [id number]:  [id number]       POC Glucose Once [[id number]]  (Abnormal) Collected: 07/27/25 1629    Specimen: Blood Updated: 07/27/25 1632     Glucose 184 mg/dL      Comment: Serial Number: [id number]:  [id number]                    Radiology:    Imaging Results (Last 72 Hours)       ** No results found for the last 72 hours. **            Results for orders placed during the hospital encounter of 07/17/25    Adult Transthoracic Echo Complete W/ Cont if Necessary Per Protocol 07/18/2025 12:46 PM    Interpretation Summary    Left ventricular systolic function is normal. Calculated left ventricular EF = 58.5%    Left ventricular diastolic function is consistent with (grade I) impaired relaxation.    Estimated right ventricular systolic pressure from tricuspid regurgitation is normal (<35 mmHg).    No significant valvular abnormalities noted      Assessment/Plan:   Status post left hip surgery.  Patient did have acute blood loss anemia postop on 7/20 with hemoglobin 6.9 and received 2 units of packed red blood cells.  Since that time allow for day-to-day variation hemoglobin has been stable.  Patient is working with OT/PT.  We have been somewhat limited by her orthostasis.  Patient  was discussed in team conference today.  With OT, she is mod assist with bathing lower body dressing, max assist with toileting.  With PT, mod assist for transfers, she walked 8 feet in the parallel bars mod assist.     Orthostasis on Florinef, I have weaned down her gabapentin from her home dose of 600 3 times daily to 400 3 times daily, I do not feel like I can abruptly stop this.  Patient is on Florinef, I have started low-dose midodrine.  I have held her Lopressor and I am checking a cosyntropin stimulation test.  We will continue to try to work through this.  TSH was elevated and Synthroid adjusted.  Patient may have an element of autonomic neuropathy from her diabetes     Tachycardia, Lopressor being held for the above reasons, this being the case I have held her amitriptyline at night as this may have a tendency toward reflex tachycardia.    Acute blood loss anemia, as above,  stable     Hypothyroidism, Synthroid adjusted, repeat TSH in 4 to 6 weeks.     Depression, controlled on Cymbalta, Elavil on hold for the above reasons.     Diabetes, seems to be improving with the addition of basal insulin, follow    History of RA, continue methotrexate, no active joint inflammation     A-fib, pulses regular on Xarelto for stroke prevention, continue Tambocor, QTc 444     Peripheral neuropathy, associated with diabetes, continue current dose of gabapentin.    Jr Carrero MD

## 2025-07-29 NOTE — PLAN OF CARE
Goal Outcome Evaluation:  Plan of Care Reviewed With: patient           Problem: Rehabilitation (IRF) Plan of Care  Goal: Plan of Care Review  Outcome: Progressing  Flowsheets (Taken 7/29/2025 0036)  Plan of Care Reviewed With: patient  Goal: Patient-Specific Goal (Individualized)  Outcome: Progressing  Goal: Absence of New-Onset Illness or Injury  Outcome: Progressing  Intervention: Identify and Manage Fall Risk  Description: Perform standard risk assessment on admission using a validated tool or comprehensive approach appropriate to the patient; reassess fall risk frequently, with change in status or transfer to another level of care.Communicate risk to interprofessional healthcare team; ensure fall risk visible cue.Determine need for increased observation, equipment and environmental modification, as well as use of supportive, nonskid footwear.Adjust safety measures to individual needs and identified risk factors.Reinforce the importance of active participation with fall risk prevention, safety and physical activity with the patient and family.Perform regular intentional rounding to assess need for position change, pain management, attention to personal needs and assistance with toileting.  Recent Flowsheet Documentation  Taken 7/28/2025 2200 by Josephine Parker RN  Safety Promotion/Fall Prevention: safety round/check completed  Taken 7/28/2025 2015 by Josephine Parker RN  Safety Promotion/Fall Prevention: safety round/check completed  Taken 7/28/2025 2000 by Josephine Parker RN  Safety Promotion/Fall Prevention: safety round/check completed  Intervention: Prevent VTE (Venous Thromboembolism)  Description: Provide ongoing assessment for signs and symptoms of VTE.Encourage and assist with early and ongoing mobilization.Initiate and maintain compression therapy when indicated.Recognize the patient's individual risk for bleeding before initiating pharmacologic thromboprophylaxis.Provide prophylactic anticoagulation  when prescribed.  Recent Flowsheet Documentation  Taken 7/28/2025 2015 by Josephine Parker RN  VTE Prevention/Management: (See MAR) other (see comments)  Goal: Optimal Comfort and Wellbeing  Outcome: Progressing  Intervention: Provide Person-Centered Care  Description: Use a family-focused approach to care; incorporate family/significant others in assessment, planning, education and support.Develop trust and rapport by proactively providing information, encouraging questions, addressing concerns and offering reassurance.Acknowledge emotional response; convey safety and acceptance.Recognize and utilize personal coping strategies and strengths; develop goals via shared decision-making.Identify patient's preferred routines, habits and personal preferences; respect privacy and personal space.Recognize progress and patient effort to facilitate motivation; provide opportunities for success.Honor spiritual and cultural preferences.Screen and monitor ongoing safety risks, such as self-harm, violence and elopement.  Recent Flowsheet Documentation  Taken 7/28/2025 2015 by Josephine Parker RN  Trust Relationship/Rapport:   care explained   choices provided   questions answered   reassurance provided   thoughts/feelings acknowledged  Goal: Home and Community Transition Plan Established  Outcome: Progressing     Problem: Skin Injury Risk Increased  Goal: Skin Health and Integrity  Outcome: Progressing  Intervention: Optimize Skin Protection  Description: Perform a full pressure injury risk assessment, as indicated by screening, upon admission to care unit.Reassess skin (full inspection and injury risk, including skin temperature, consistency and color) frequently (e.g., scheduled interval, with change in condition) to provide optimal early detection and prevention.Maintain adequate tissue perfusion (e.g., encourage fluid balance; avoid crossing legs, constrictive clothing or devices) to promote tissue oxygenation.Maintain head  of bed at lowest degree of elevation tolerated, considering medical condition and other restrictions. Use positioning supports to prevent sliding and friction. Consider low friction textiles.Avoid positioning onto an area that remains reddened or on bony prominences.Minimize incontinence and moisture (e.g., toileting schedule; moisture-wicking pad, diaper or incontinence collection device; skin moisture barrier).Cleanse skin promptly and gently, when soiled, utilizing a pH-balanced cleanser.Relieve and redistribute pressure (e.g., scheduled position changes, weight shifts, use of support surface, medical device repositioning, protective dressing application, use of positioning device, microclimate control, use of pressure-injury-monitorEncourage increased activity, such as sitting in a chair at the bedside or early mobilization, when able to tolerate. Avoid prolonged sitting.  Recent Flowsheet Documentation  Taken 7/28/2025 2015 by Josephine Parker RN  Pressure Reduction Techniques:   weight shift assistance provided   frequent weight shift encouraged   heels elevated off bed  Pressure Reduction Devices: pressure-redistributing mattress utilized     Problem: Comorbidity Management  Goal: Blood Pressure in Desired Range  Outcome: Progressing  Intervention: Maintain Blood Pressure Management  Description: Evaluate adherence to home antihypertensive regimen (e.g., exercise and activity, diet modification, medication).Provide scheduled antihypertensive medication; consider administration time and effects (e.g., avoid giving diuretic prior to bedtime).Monitor response to antihypertensive medication therapy (e.g., blood pressure, electrolyte levels, medication effects).Minimize risk of orthostatic hypotension; encourage caution with position changes, particularly if elderly.  Recent Flowsheet Documentation  Taken 7/28/2025 2200 by Josephine Parker RN  Medication Review/Management: medications reviewed  Taken 7/28/2025  2015 by Josephine Parker RN  Medication Review/Management: medications reviewed  Taken 7/28/2025 2000 by Josephine Parker RN  Medication Review/Management: medications reviewed     Problem: Fall Injury Risk  Goal: Absence of Fall and Fall-Related Injury  Outcome: Progressing  Intervention: Identify and Manage Contributors  Description: Develop a fall prevention plan, considering patient-centered interventions and family/caregiver involvement; identify and address patient's facilitators and barriers.Provide reorientation, appropriate sensory stimulation and routines with changes in mental status to decrease risk of fall.Promote use of personal vision and auditory aids.Assess assistance level required for safe and effective self-care; provide support as needed, such as toileting and mobilization. For age 65 and older, implement timed toileting with assistance.Encourage physical activity, such as performance of mobility and self-care at highest level of patient ability, multicomponent exercise program and provision of appropriate assistive devices.If fall occurs, assess the severity of injury; implement fall injury protocol. Determine the cause and revise fall injury prevention plan.Regularly review and advocate for medication adjustment to decrease fall risk; consider administration times, polypharmacy and age.Balance adequate pain management with potential for oversedation.  Recent Flowsheet Documentation  Taken 7/28/2025 2200 by Josephine Parker RN  Medication Review/Management: medications reviewed  Taken 7/28/2025 2015 by Josephine Parker RN  Medication Review/Management: medications reviewed  Taken 7/28/2025 2000 by Josephine Parker RN  Medication Review/Management: medications reviewed  Intervention: Promote Injury-Free Environment  Description: Provide a safe, barrier-free environment that encourages independent activity.Keep care area uncluttered and well-lighted.Determine need for increased observation or  monitoring.Avoid use of devices that minimize mobility, such as restraints or indwelling urinary catheter.  Recent Flowsheet Documentation  Taken 7/28/2025 2200 by Josephine Parker RN  Safety Promotion/Fall Prevention: safety round/check completed  Taken 7/28/2025 2015 by Josephine Parker RN  Safety Promotion/Fall Prevention: safety round/check completed  Taken 7/28/2025 2000 by Josephine Parker, RN  Safety Promotion/Fall Prevention: safety round/check completed

## 2025-07-29 NOTE — THERAPY TREATMENT NOTE
Inpatient Rehabilitation - Physical Therapy Treatment Note       GUANACO Portillo     Patient Name: Lashae Benitez  : 1960  MRN: [id number]    Today's Date: 2025                    Admit Date: 2025      Visit Dx:   No diagnosis found.    Patient Active Problem List   Diagnosis    Hiatal hernia    Essential hypertension    Sjogren's syndrome    Multiple sclerosis    Psoriasis    Fibromyalgia    Osteoarthritis    Psoriatic arthritis    RA (rheumatoid arthritis)    Degenerative disc disease, lumbar    TMJ arthritis    Dupuytren's disease    H. pylori infection    Family history of coronary artery disease    Type 2 diabetes mellitus    Edema    Bilateral leg edema    Isolated corticotropin deficiency    Hyponatremia    Paroxysmal atrial fibrillation    Sepsis    Bacteremia, escherichia coli    Iron deficiency anemia    Malabsorption due to intolerance, not elsewhere classified    Chronic chest pain with high risk for CAD    Abnormal nuclear stress test    Abnormal computed tomography angiography (CTA)    Hip fracture    Closed intertrochanteric fracture of hip, left, initial encounter    Closed left hip fracture, sequela       Past Medical History:   Diagnosis Date    Acid reflux     Allergic     Anemia     Anxiety     Atrial fibrillation     Cancer     thyroid, skin    Cervical disc disorder     Chronic pain disorder     Claustrophobia     CTS (carpal tunnel syndrome)     Degenerative disc disease, lumbar     Depression     Dupuytren's disease     Essential hypertension     Family history of coronary artery disease     Fibromyalgia     Gallbladder abscess     H. pylori infection     Hiatal hernia     Hyperlipidemia     Hypothyroidism     Low back pain     Lumbosacral disc disease     Migraines     migraines    Multiple sclerosis     Osteoarthritis     Peripheral neuropathy     Psoriasis     Psoriatic arthritis     RA (rheumatoid arthritis)     Rheumatoid arthritis     Sinusitis     Sjogren's syndrome      Stomach ulcer     Thoracic disc disorder     TMJ arthritis     Type 2 diabetes mellitus     Urinary tract infection        Past Surgical History:   Procedure Laterality Date    BACK SURGERY      BREAST LUMPECTOMY Left 11/1993    CARDIAC CATHETERIZATION Left 09/21/2009    Normal     CARDIAC CATHETERIZATION N/A 6/24/2025    Procedure: Left Heart Cath;  Surgeon: Teodora Barron MD;  Location:  [facility];  Service: Cardiovascular;  Laterality: N/A;    CARPAL TUNNEL RELEASE  03/2012    CERVICAL POLYPECTOMY  12/2015    CHOLECYSTECTOMY  05/2007    COLONOSCOPY      ENDOSCOPY      EPIDURAL BLOCK      GASTRIC BYPASS  09/2007    JOINT REPLACEMENT      KNEE ARTHROPLASTY      LUMBAR DISC SURGERY      C5-6    NECK SURGERY      REPLACEMENT TOTAL KNEE Right 06/2016    SACRAL NERVE STIMULATOR PLACEMENT  09/2014    SACRAL NERVE STIMULATOR PLACEMENT  04/11/2024    @ [facility] in [place]    SACRAL NERVE STIMULATOR PLACEMENT Right 04/17/2024    THYROIDECTOMY  03/2016    TRIGGER POINT INJECTION         PT ASSESSMENT (Last 12 Hours)       IRF PT Evaluation and Treatment       Row Name 07/29/25 1427          PT Time and Intention    Document Type daily treatment  -RG     Mode of Treatment individual therapy;physical therapy  -RG     Patient/Family/Caregiver Comments/Observations Pt BP monitored in sitting and standing. BP sitting was 109/68, standing 66/42, sitting 93/58, sitting 110/69  -RG       Row Name 07/29/25 1427          General Information    Existing Precautions/Restrictions fall;weight bearing  LLE TTWB; abdominal binder for BP  -RG       Row Name 07/29/25 1427          Pain Scale: FACES Pre/Post-Treatment    Pain: FACES Scale, Pretreatment 6-->hurts even more  -RG     Posttreatment Pain Rating 8-->hurts whole lot  -RG       Row Name 07/29/25 1427          Cognition/Psychosocial    Affect/Mental Status (Cognition) WFL  -RG     Orientation Status (Cognition) oriented x 3  -RG     Follows Commands (Cognition)  verbal cues/prompting required;physical/tactile prompts required  -     Personal Safety Interventions fall prevention program maintained;gait belt;nonskid shoes/slippers when out of bed  -RG     Cognitive Function (Cognition) WFL  -RG       Row Name 07/29/25 1427          Mobility    Left Lower Extremity (Weight-bearing Status) toe touch weight-bearing (TTWB)  -       Row Name 07/29/25 1427          Bed Mobility    Bed Mobility supine-sit;sit-supine;supine-sit-supine  -RG     Supine-Sit-Supine [place] (Bed Mobility) verbal cues;nonverbal cues (demo/gesture);moderate assist (50% patient effort)  -RG     Assistive Device (Bed Mobility) repositioning sheet;bed rails  -       Row Name 07/29/25 1427          Transfer Assessment/Treatment    Transfers bed-chair transfer;chair-bed transfer;sit-stand transfer;stand-sit transfer;toilet transfer  -       Row Name 07/29/25 1427          Bed-Chair Transfer    Bed-Chair [place] (Transfers) verbal cues;nonverbal cues (demo/gesture);moderate assist (50% patient effort);2 person assist  -RG     Assistive Device (Bed-Chair Transfers) wheelchair;walker, front-wheeled  -RG       Row Name 07/29/25 1427          Chair-Bed Transfer    Chair-Bed [place] (Transfers) verbal cues;nonverbal cues (demo/gesture);moderate assist (50% patient effort);2 person assist  -RG     Assistive Device (Chair-Bed Transfers) wheelchair;walker, front-wheeled  -       Row Name 07/29/25 1427          Sit-Stand Transfer    Sit-Stand [place] (Transfers) verbal cues;nonverbal cues (demo/gesture);moderate assist (50% patient effort)  -RG     Assistive Device (Sit-Stand Transfers) parallel bars  -RG     Comment, (Sit-Stand Transfer) to take BP, c/o dizziness  -RG       Row Name 07/29/25 1427          Stand-Sit Transfer    Stand-Sit [place] (Transfers) verbal cues;nonverbal cues (demo/gesture);minimum assist (75% patient effort)  -RG     Assistive Device (Stand-Sit Transfers)  parallel bars  -[facility] Name 07/29/25 1427          Gait/Stairs (Locomotion)    Patient was able to Ambulate no, other medical factors prevent ambulation  -RG     Reason Patient was unable to Ambulate Hypotension  -[facility] Name 07/29/25 1427          Safety Issues/Impairments Affecting Functional Mobility    Impairments Affecting Function (Mobility) balance;endurance/activity tolerance;pain;range of motion (ROM);strength  -[facility] Name 07/29/25 1427          Balance    Static Standing Balance verbal cues;non-verbal cues (demo/gesture);minimal assist;contact guard  -[facility] Name 07/29/25 1427          Hip (Therapeutic Exercise)    Hip Isometrics (Therapeutic Exercise) bilateral;flexion;aBduction;aDduction;sitting;10 repetitions;2 sets;supine;internal rotation;external rotation  -[facility] Name 07/29/25 1427          Knee (Therapeutic Exercise)    Knee Strengthening (Therapeutic Exercise) bilateral;flexion;heel slides;marching while seated;LAQ (long arc quad);sitting;2 lb free weight;resistance band;green;10 repetitions;2 sets;extension;SLR (straight leg raise);SAQ (short arc quad);supine  -[facility] Name 07/29/25 1427          Ankle (Therapeutic Exercise)    Ankle Strengthening (Therapeutic Exercise) bilateral;dorsiflexion;plantarflexion;sitting;10 repetitions;2 sets;supine  -[facility] Name 07/29/25 1427          Therapy Assessment/Plan (PT)    Patient's Goals For Discharge return home  -[facility] Name 07/29/25 1427          Therapy Assessment/Plan (PT)    Rehab Potential/Prognosis (PT) fair;good  -RG     Frequency of Treatment (PT) 5 times per week  -RG     Estimated Duration of Therapy (PT) 2 weeks  -RG     Problem List (PT) balance;mobility;range of motion (ROM);strength;pain;postural control  -RG     Activity Limitations Related to Problem List (PT) unable to ambulate safely;unable to transfer safely  -       Row Name 07/29/25 1427          Therapy Plan Review/Discharge Plan (PT)     Anticipated Equipment Needs at Discharge (PT Eval) --  tbd  -RG     Anticipated Discharge Disposition (PT) home with assist;home with home health;home with outpatient therapy services  -RG       Row Name 07/29/25 1427          IRF PT Goals    Bed Mobility Goal Selection (PT-IRF) bed mobility, PT goal 1  -RG     Transfer Goal Selection (PT-IRF) transfers, PT goal 1  -RG     Gait (Walking Locomotion) Goal Selection (PT-IRF) gait, PT goal 1  -RG       Row Name 07/29/25 1427          Bed Mobility Goal 1 (PT-IRF)    Activity/Assistive Device (Bed Mobility Goal 1, PT-IRF) sit to supine/supine to sit  -RG     [place] Level (Bed Mobility Goal 1, PT-IRF) supervision required  -RG     Time Frame (Bed Mobility Goal 1, PT-IRF) by discharge  -RG       Row Name 07/29/25 1427          Transfer Goal 1 (PT-IRF)    Activity/Assistive Device (Transfer Goal 1, PT-IRF) sit-to-stand/stand-to-sit;bed-to-chair/chair-to-bed  -RG     [place] Level (Transfer Goal 1, PT-IRF) supervision required  -RG     Time Frame (Transfer Goal 1, PT-IRF) by discharge  -RG       Row Name 07/29/25 1427          Gait/Walking Locomotion Goal 1 (PT-IRF)    Activity/Assistive Device (Gait/Walking Locomotion Goal 1, PT-IRF) walker, rolling  -RG     Gait/Walking Locomotion Distance Goal 1 (PT-IRF) 150'  -RG     [place] Level (Gait/Walking Locomotion Goal 1, PT-IRF) supervision required  -RG     Time Frame (Gait/Walking Locomotion Goal 1, PT-IRF) by discharge  -RG               User Key  (r) = Recorded By, (t) = Taken By, (c) = Cosigned By      Initials Name Provider Type    RG Neri Cornelius, PTA Physical Therapist Assistant                  Wound 07/19/25 0921 Left gluteal Other (Comments) (Active)   Base red;nonblanchable 07/28/25 2015   Care, Wound barrier applied 07/28/25 2015       Wound 07/19/25 0938 Left anterior thigh Surgical Open Surgical Incision (Active)   Dressing Appearance dry;intact 07/28/25 2015       Wound 07/21/25 0930 Right  coccyx Pressure Injury (Active)   Closure Open to air 07/28/25 2015   Base pink;red;purple 07/28/25 2015   Periwound redness 07/28/25 2015   Periwound Temperature warm 07/28/25 2015   Drainage Amount none 07/28/25 2015   Care, Wound barrier applied 07/28/25 2015       Wound 07/28/25 0829 coccyx (Active)   Dressing Appearance open to air 07/28/25 2015   Base pink;dry 07/28/25 2015   Periwound pink 07/28/25 2015   Periwound Temperature warm 07/28/25 2015   Periwound Skin Turgor soft 07/28/25 2015   Care, Wound barrier applied 07/28/25 2015     Physical Therapy Education       Title: PT OT SLP Therapies (Done)       Topic: Physical Therapy (Done)       Point: Mobility training (Done)       Learning Progress Summary            Patient Acceptance, E,D, VU,NR by RG at 7/29/2025 1434    Acceptance, E,D, VU,NR by RG at 7/28/2025 1438    Acceptance, E,D, VU,NR by  at 7/26/2025 1615    Acceptance, E,D, VU,NR by RG at 7/25/2025 1407    Acceptance, E,D, VU,NR by RG at 7/24/2025 1502    Acceptance, E, VU,NR by LB at 7/23/2025 1612                      Point: Home exercise program (Done)       Learning Progress Summary            Patient Acceptance, E,D, VU,NR by RG at 7/29/2025 1434    Acceptance, E,D, VU,NR by RG at 7/28/2025 1438    Acceptance, E,D, VU,NR by LL at 7/26/2025 1615    Acceptance, E,D, VU,NR by RG at 7/25/2025 1407    Acceptance, E,D, VU,NR by RG at 7/24/2025 1502    Acceptance, E, VU,NR by LB at 7/23/2025 1612                      Point: Body mechanics (Done)       Learning Progress Summary            Patient Acceptance, E,D, VU,NR by RG at 7/29/2025 1434    Acceptance, E,D, VU,NR by RG at 7/28/2025 1438    Acceptance, E,D, VU,NR by LL at 7/26/2025 1615    Acceptance, E,D, VU,NR by RG at 7/25/2025 1407    Acceptance, E,D, VU,NR by RG at 7/24/2025 1502    Acceptance, E, VU,NR by LB at 7/23/2025 1612                      Point: Precautions (Done)       Learning Progress Summary            Patient Acceptance, E,D,  VU,NR by RG at 7/29/2025 1434    Acceptance, E,D, VU,NR by RG at 7/28/2025 1438    Acceptance, E,D, VU,NR by LL at 7/26/2025 1615    Acceptance, E,D, VU,NR by RG at 7/25/2025 1407    Acceptance, E,D, VU,NR by RG at 7/24/2025 1502    Acceptance, E, VU,NR by LB at 7/23/2025 1612                                      User Key       Initials Effective Dates Name Provider Type Discipline    LB 06/16/21 -  Andreia Dove, PT Physical Therapist PT    LL 05/02/16 -  Beverly Goff PTA Physical Therapist Assistant PT    RG 06/16/21 -  Neri Cornelius PTA Physical Therapist Assistant PT                    PT Recommendation and Plan    Frequency of Treatment (PT): 5 times per week  Anticipated Equipment Needs at Discharge (PT Eval):  (tbd)                  Time Calculation:      PT Charges       Row Name 07/29/25 1436 07/29/25 1434          Time Calculation    Start Time 1330  -RG 1045  -RG     Stop Time 1415  -RG 1130  -RG     Time Calculation (min) 45 min  -RG 45 min  -RG     PT Received On 07/29/25  -RG 07/29/25  -RG        Time Calculation- PT    Total Timed Code Minutes- PT 45 minute(s)  -RG 45 minute(s)  -RG               User Key  (r) = Recorded By, (t) = Taken By, (c) = Cosigned By      Initials Name Provider Type    RG Neri Cornelius PTA Physical Therapist Assistant                    Therapy Charges for Today       Code Description Service Date Service Provider Modifiers Qty    88116198366 HC PT THERAPEUTIC ACT EA 15 MIN 7/28/2025 Neri Cornelius PTA GP, CQ 2    47913848441 HC PT THER PROC EA 15 MIN 7/28/2025 Neri Cornelius PTA GP, CQ 4    96154510583 HC PT NEUROMUSC RE EDUCATION EA 15 MIN 7/29/2025 Neri Cornelius PTA GP, CQ 1    92513354814 HC PT THERAPEUTIC ACT EA 15 MIN 7/29/2025 Neri Cornelius PTA GP, CQ 2    85081249769 HC PT THER PROC EA 15 MIN 7/29/2025 Neri Cornelius PTA GP, CQ 3              PT G-Codes  AM-PAC 6 Clicks Score (PT): 16      Neri Cornelius, PTA  7/29/2025

## 2025-07-30 LAB
ALBUMIN SERPL-MCNC: 2.8 G/DL (ref 3.5–5.2)
ALBUMIN/GLOB SERPL: 1.3 G/DL
ALP SERPL-CCNC: 192 U/L (ref 39–117)
ALT SERPL W P-5'-P-CCNC: 9 U/L (ref 1–33)
AMYLASE SERPL-CCNC: 13 U/L (ref 28–100)
ANION GAP SERPL CALCULATED.3IONS-SCNC: 8.7 MMOL/L (ref 5–15)
AST SERPL-CCNC: 18 U/L (ref 1–32)
BILIRUB SERPL-MCNC: 0.7 MG/DL (ref 0–1.2)
BUN SERPL-MCNC: 12.7 MG/DL (ref 8–23)
BUN/CREAT SERPL: 16.7 (ref 7–25)
CALCIUM SPEC-SCNC: 7.9 MG/DL (ref 8.6–10.5)
CHLORIDE SERPL-SCNC: 100 MMOL/L (ref 98–107)
CO2 SERPL-SCNC: 25.3 MMOL/L (ref 22–29)
CREAT SERPL-MCNC: 0.76 MG/DL (ref 0.57–1)
EGFRCR SERPLBLD CKD-EPI 2021: 88.2 ML/MIN/1.73
GLOBULIN UR ELPH-MCNC: 2.2 GM/DL
GLUCOSE BLDC GLUCOMTR-MCNC: 137 MG/DL (ref 70–130)
GLUCOSE BLDC GLUCOMTR-MCNC: 140 MG/DL (ref 70–130)
GLUCOSE BLDC GLUCOMTR-MCNC: 200 MG/DL (ref 70–130)
GLUCOSE BLDC GLUCOMTR-MCNC: 223 MG/DL (ref 70–130)
GLUCOSE SERPL-MCNC: 260 MG/DL (ref 65–99)
LIPASE SERPL-CCNC: 21 U/L (ref 13–60)
POTASSIUM SERPL-SCNC: 4.2 MMOL/L (ref 3.5–5.2)
PROT SERPL-MCNC: 5 G/DL (ref 6–8.5)
SODIUM SERPL-SCNC: 134 MMOL/L (ref 136–145)

## 2025-07-30 PROCEDURE — 83690 ASSAY OF LIPASE: CPT | Performed by: INTERNAL MEDICINE

## 2025-07-30 PROCEDURE — 80053 COMPREHEN METABOLIC PANEL: CPT | Performed by: INTERNAL MEDICINE

## 2025-07-30 PROCEDURE — 94760 N-INVAS EAR/PLS OXIMETRY 1: CPT

## 2025-07-30 PROCEDURE — 97535 SELF CARE MNGMENT TRAINING: CPT

## 2025-07-30 PROCEDURE — 25810000003 SODIUM CHLORIDE 0.9 % SOLUTION: Performed by: INTERNAL MEDICINE

## 2025-07-30 PROCEDURE — 63710000001 ONDANSETRON ODT 4 MG TABLET DISPERSIBLE: Performed by: FAMILY MEDICINE

## 2025-07-30 PROCEDURE — 94799 UNLISTED PULMONARY SVC/PX: CPT

## 2025-07-30 PROCEDURE — 63710000001 INSULIN GLARGINE PER 5 UNITS: Performed by: INTERNAL MEDICINE

## 2025-07-30 PROCEDURE — 97530 THERAPEUTIC ACTIVITIES: CPT

## 2025-07-30 PROCEDURE — 99232 SBSQ HOSP IP/OBS MODERATE 35: CPT | Performed by: INTERNAL MEDICINE

## 2025-07-30 PROCEDURE — 82948 REAGENT STRIP/BLOOD GLUCOSE: CPT

## 2025-07-30 PROCEDURE — 82150 ASSAY OF AMYLASE: CPT | Performed by: INTERNAL MEDICINE

## 2025-07-30 PROCEDURE — 97110 THERAPEUTIC EXERCISES: CPT

## 2025-07-30 PROCEDURE — 63710000001 INSULIN LISPRO (HUMAN) PER 5 UNITS: Performed by: FAMILY MEDICINE

## 2025-07-30 RX ORDER — PANTOPRAZOLE SODIUM 40 MG/1
40 TABLET, DELAYED RELEASE ORAL
Status: DISCONTINUED | OUTPATIENT
Start: 2025-07-30 | End: 2025-08-06 | Stop reason: HOSPADM

## 2025-07-30 RX ORDER — SODIUM CHLORIDE 9 MG/ML
60 INJECTION, SOLUTION INTRAVENOUS CONTINUOUS
Status: DISCONTINUED | OUTPATIENT
Start: 2025-07-30 | End: 2025-07-31

## 2025-07-30 RX ADMIN — MIDODRINE HYDROCHLORIDE 2.5 MG: 2.5 TABLET ORAL at 12:34

## 2025-07-30 RX ADMIN — MIDODRINE HYDROCHLORIDE 2.5 MG: 2.5 TABLET ORAL at 08:35

## 2025-07-30 RX ADMIN — GABAPENTIN 400 MG: 400 CAPSULE ORAL at 13:06

## 2025-07-30 RX ADMIN — DULOXETINE 60 MG: 60 CAPSULE, DELAYED RELEASE ORAL at 08:34

## 2025-07-30 RX ADMIN — INSULIN GLARGINE 10 UNITS: 100 INJECTION, SOLUTION SUBCUTANEOUS at 08:34

## 2025-07-30 RX ADMIN — MIDODRINE HYDROCHLORIDE 2.5 MG: 2.5 TABLET ORAL at 18:01

## 2025-07-30 RX ADMIN — GABAPENTIN 400 MG: 400 CAPSULE ORAL at 05:55

## 2025-07-30 RX ADMIN — FOLIC ACID 1 MG: 1 TABLET ORAL at 08:35

## 2025-07-30 RX ADMIN — LEVOTHYROXINE SODIUM 150 MCG: 0.07 TABLET ORAL at 05:55

## 2025-07-30 RX ADMIN — DULOXETINE 30 MG: 60 CAPSULE, DELAYED RELEASE ORAL at 08:35

## 2025-07-30 RX ADMIN — CETIRIZINE HYDROCHLORIDE 10 MG: 10 TABLET, FILM COATED ORAL at 08:34

## 2025-07-30 RX ADMIN — INSULIN LISPRO 3 UNITS: 100 INJECTION, SOLUTION INTRAVENOUS; SUBCUTANEOUS at 12:34

## 2025-07-30 RX ADMIN — INSULIN LISPRO 3 UNITS: 100 INJECTION, SOLUTION INTRAVENOUS; SUBCUTANEOUS at 20:48

## 2025-07-30 RX ADMIN — PANTOPRAZOLE SODIUM 40 MG: 40 TABLET, DELAYED RELEASE ORAL at 09:37

## 2025-07-30 RX ADMIN — FLECAINIDE ACETATE 50 MG: 50 TABLET ORAL at 08:34

## 2025-07-30 RX ADMIN — SODIUM CHLORIDE 60 ML/HR: 9 INJECTION, SOLUTION INTRAVENOUS at 23:58

## 2025-07-30 RX ADMIN — GABAPENTIN 400 MG: 400 CAPSULE ORAL at 21:02

## 2025-07-30 RX ADMIN — FLUDROCORTISONE ACETATE 50 MCG: 0.1 TABLET ORAL at 08:35

## 2025-07-30 RX ADMIN — RIVAROXABAN 20 MG: 20 TABLET, FILM COATED ORAL at 18:01

## 2025-07-30 RX ADMIN — ONDANSETRON 4 MG: 4 TABLET, ORALLY DISINTEGRATING ORAL at 08:34

## 2025-07-30 RX ADMIN — SODIUM CHLORIDE 75 ML/HR: 9 INJECTION, SOLUTION INTRAVENOUS at 09:38

## 2025-07-30 RX ADMIN — FLECAINIDE ACETATE 50 MG: 50 TABLET ORAL at 20:48

## 2025-07-30 NOTE — PLAN OF CARE
Problem: Rehabilitation (IRF) Plan of Care  Goal: Plan of Care Review  Outcome: Progressing  Flowsheets (Taken 7/29/2025 2230)  Progress: improving  Plan of Care Reviewed With: patient  Goal: Patient-Specific Goal (Individualized)  Outcome: Progressing  Goal: Absence of New-Onset Illness or Injury  Outcome: Progressing  Intervention: Identify and Manage Fall Risk  Description: Perform standard risk assessment on admission using a validated tool or comprehensive approach appropriate to the patient; reassess fall risk frequently, with change in status or transfer to another level of care.Communicate risk to interprofessional healthcare team; ensure fall risk visible cue.Determine need for increased observation, equipment and environmental modification, as well as use of supportive, nonskid footwear.Adjust safety measures to individual needs and identified risk factors.Reinforce the importance of active participation with fall risk prevention, safety and physical activity with the patient and family.Perform regular intentional rounding to assess need for position change, pain management, attention to personal needs and assistance with toileting.  Recent Flowsheet Documentation  Taken 7/29/2025 2200 by Katie Sanford, RN  Safety Promotion/Fall Prevention:   nonskid shoes/slippers when out of bed   safety round/check completed  Taken 7/29/2025 2000 by Katie Sanford RN  Safety Promotion/Fall Prevention:   nonskid shoes/slippers when out of bed   safety round/check completed  Goal: Optimal Comfort and Wellbeing  Outcome: Progressing  Goal: Home and Community Transition Plan Established  Outcome: Progressing   Goal Outcome Evaluation:  Plan of Care Reviewed With: patient        Progress: improving

## 2025-07-30 NOTE — THERAPY PROGRESS REPORT/RE-CERT
Inpatient Rehabilitation - Physical Therapy Progress Note       GUANACO Portillo     Patient Name: Lashae Benitez  : 1960  MRN: [id number]    Today's Date: 2025                    Admit Date: 2025      Visit Dx:   No diagnosis found.    Patient Active Problem List   Diagnosis    Hiatal hernia    Essential hypertension    Sjogren's syndrome    Multiple sclerosis    Psoriasis    Fibromyalgia    Osteoarthritis    Psoriatic arthritis    RA (rheumatoid arthritis)    Degenerative disc disease, lumbar    TMJ arthritis    Dupuytren's disease    H. pylori infection    Family history of coronary artery disease    Type 2 diabetes mellitus    Edema    Bilateral leg edema    Isolated corticotropin deficiency    Hyponatremia    Paroxysmal atrial fibrillation    Sepsis    Bacteremia, escherichia coli    Iron deficiency anemia    Malabsorption due to intolerance, not elsewhere classified    Chronic chest pain with high risk for CAD    Abnormal nuclear stress test    Abnormal computed tomography angiography (CTA)    Hip fracture    Closed intertrochanteric fracture of hip, left, initial encounter    Closed left hip fracture, sequela       Past Medical History:   Diagnosis Date    Acid reflux     Allergic     Anemia     Anxiety     Atrial fibrillation     Cancer     thyroid, skin    Cervical disc disorder     Chronic pain disorder     Claustrophobia     CTS (carpal tunnel syndrome)     Degenerative disc disease, lumbar     Depression     Dupuytren's disease     Essential hypertension     Family history of coronary artery disease     Fibromyalgia     Gallbladder abscess     H. pylori infection     Hiatal hernia     Hyperlipidemia     Hypothyroidism     Low back pain     Lumbosacral disc disease     Migraines     migraines    Multiple sclerosis     Osteoarthritis     Peripheral neuropathy     Psoriasis     Psoriatic arthritis     RA (rheumatoid arthritis)     Rheumatoid arthritis     Sinusitis     Sjogren's syndrome     Stomach  ulcer     Thoracic disc disorder     TMJ arthritis     Type 2 diabetes mellitus     Urinary tract infection        Past Surgical History:   Procedure Laterality Date    BACK SURGERY      BREAST LUMPECTOMY Left 11/1993    CARDIAC CATHETERIZATION Left 09/21/2009    Normal     CARDIAC CATHETERIZATION N/A 6/24/2025    Procedure: Left Heart Cath;  Surgeon: Teodora Barron MD;  Location:  [facility];  Service: Cardiovascular;  Laterality: N/A;    CARPAL TUNNEL RELEASE  03/2012    CERVICAL POLYPECTOMY  12/2015    CHOLECYSTECTOMY  05/2007    COLONOSCOPY      ENDOSCOPY      EPIDURAL BLOCK      GASTRIC BYPASS  09/2007    JOINT REPLACEMENT      KNEE ARTHROPLASTY      LUMBAR DISC SURGERY      C5-6    NECK SURGERY      REPLACEMENT TOTAL KNEE Right 06/2016    SACRAL NERVE STIMULATOR PLACEMENT  09/2014    SACRAL NERVE STIMULATOR PLACEMENT  04/11/2024    @ [facility] in [place]    SACRAL NERVE STIMULATOR PLACEMENT Right 04/17/2024    THYROIDECTOMY  03/2016    TRIGGER POINT INJECTION         PT ASSESSMENT (Last 12 Hours)       IRF PT Evaluation and Treatment       Row Name 07/30/25 1338          PT Time and Intention    Document Type daily treatment;progress note  -RG     Mode of Treatment individual therapy;physical therapy  -RG     Patient/Family/Caregiver Comments/Observations Pt stated that she was nauseous and having BP issues again. Pt unable for 1st session. During her 2nd session she was able to perform minimal LE exercises in sitting with frequent rest breaks. Pts BP monitored throughout session. BP was 93/57 in seated position. Nursing was notified. Standing not attempted today due to hypotension.  -RG       Row Name 07/30/25 1338          General Information    Existing Precautions/Restrictions fall;weight bearing  LLE TTWB; abdominal binder for BP  -RG       Row Name 07/30/25 1338          Pain Scale: FACES Pre/Post-Treatment    Pain: FACES Scale, Pretreatment 6-->hurts even more  -RG     Posttreatment Pain  Rating 8-->hurts whole lot  -RG       Row Name 07/30/25 1338          Cognition/Psychosocial    Affect/Mental Status (Cognition) WFL  -RG     Orientation Status (Cognition) oriented x 3  -RG     Follows Commands (Cognition) verbal cues/prompting required;physical/tactile prompts required  -RG     Personal Safety Interventions gait belt;fall prevention program maintained;nonskid shoes/slippers when out of bed  -RG     Cognitive Function (Cognition) WFL  -RG       Row Name 07/30/25 1338          Mobility    Left Lower Extremity (Weight-bearing Status) toe touch weight-bearing (TTWB)  -[facility] Name 07/30/25 1338          Bed Mobility    Bed Mobility supine-sit;sit-supine;supine-sit-supine  -RG     Supine-Sit-Supine [place] (Bed Mobility) verbal cues;nonverbal cues (demo/gesture);moderate assist (50% patient effort)  -RG     Assistive Device (Bed Mobility) repositioning sheet;bed rails  -[facility] Name 07/30/25 1338          Transfer Assessment/Treatment    Transfers bed-chair transfer;chair-bed transfer;sit-stand transfer;stand-sit transfer;toilet transfer  -[facility] Name 07/30/25 1338          Bed-Chair Transfer    Bed-Chair [place] (Transfers) verbal cues;nonverbal cues (demo/gesture);moderate assist (50% patient effort)  -RG     Assistive Device (Bed-Chair Transfers) wheelchair;walker, front-wheeled  -RG       [facility] Name 07/30/25 1338          Chair-Bed Transfer    Chair-Bed [place] (Transfers) verbal cues;nonverbal cues (demo/gesture);moderate assist (50% patient effort)  -RG     Assistive Device (Chair-Bed Transfers) wheelchair;walker, front-wheeled  -RG       [facility] Name 07/30/25 1338          Gait/Stairs (Locomotion)    Patient was able to Ambulate no, other medical factors prevent ambulation  -RG     Reason Patient was unable to Ambulate Hypotension  -[facility] Name 07/30/25 1338          Safety Issues/Impairments Affecting Functional Mobility    Impairments Affecting Function  (Mobility) balance;endurance/activity tolerance;pain;range of motion (ROM);strength  -       Row Name 07/30/25 1338          Hip (Therapeutic Exercise)    Hip Strengthening (Therapeutic Exercise) bilateral;flexion;aBduction;aDduction;marching while seated;sitting;10 repetitions  -RG       Row Name 07/30/25 1338          Knee (Therapeutic Exercise)    Knee Strengthening (Therapeutic Exercise) bilateral;flexion;extension;marching while seated;LAQ (long arc quad);sitting;10 repetitions  -RG       Row Name 07/30/25 1338          Ankle (Therapeutic Exercise)    Ankle Strengthening (Therapeutic Exercise) bilateral;dorsiflexion;plantarflexion;sitting;10 repetitions  -RG       Row Name 07/30/25 1336          Positioning and Restraints    Pre-Treatment Position in bed  -RG     Post Treatment Position bed  -RG     In Bed notified nsg;supine;call light within reach;encouraged to call for assist;with family/caregiver  -       Row Name 07/30/25 2118          Therapy Assessment/Plan (PT)    Patient's Goals For Discharge return home  -       Row Name 07/30/25 0885          Therapy Assessment/Plan (PT)    Rehab Potential/Prognosis (PT) fair;good  -RG     Frequency of Treatment (PT) 5 times per week  -RG     Estimated Duration of Therapy (PT) 2 weeks  -RG     Problem List (PT) balance;mobility;range of motion (ROM);strength;pain;postural control  -RG     Activity Limitations Related to Problem List (PT) unable to ambulate safely;unable to transfer safely  -       Row Name 07/30/25 5842          Therapy Plan Review/Discharge Plan (PT)    Anticipated Equipment Needs at Discharge (PT Eval) --  tbd  -RG     Anticipated Discharge Disposition (PT) home with assist;home with home health;home with outpatient therapy services  -       Row Name 07/30/25 0051          IRF PT Goals    Bed Mobility Goal Selection (PT-IRF) bed mobility, PT goal 1  -RG     Transfer Goal Selection (PT-IRF) transfers, PT goal 1  -RG     Gait (Walking  Locomotion) Goal Selection (PT-IRF) gait, PT goal 1  -RG       Row Name 07/30/25 1338          Bed Mobility Goal 1 (PT-IRF)    Activity/Assistive Device (Bed Mobility Goal 1, PT-IRF) sit to supine/supine to sit  -RG     [place] Level (Bed Mobility Goal 1, PT-IRF) supervision required  -RG     Time Frame (Bed Mobility Goal 1, PT-IRF) by discharge  -RG     Progress/Outcomes (Bed Mobility Goal 1, PT-IRF) goal ongoing  -RG       Row Name 07/30/25 1338          Transfer Goal 1 (PT-IRF)    Activity/Assistive Device (Transfer Goal 1, PT-IRF) sit-to-stand/stand-to-sit;bed-to-chair/chair-to-bed  -RG     [place] Level (Transfer Goal 1, PT-IRF) supervision required  -RG     Time Frame (Transfer Goal 1, PT-IRF) by discharge  -RG     Progress/Outcomes (Transfer Goal 1, PT-IRF) goal ongoing  -RG       Row Name 07/30/25 1338          Gait/Walking Locomotion Goal 1 (PT-IRF)    Activity/Assistive Device (Gait/Walking Locomotion Goal 1, PT-IRF) walker, rolling  -RG     Gait/Walking Locomotion Distance Goal 1 (PT-IRF) 150'  -RG     [place] Level (Gait/Walking Locomotion Goal 1, PT-IRF) supervision required  -RG     Time Frame (Gait/Walking Locomotion Goal 1, PT-IRF) by discharge  -RG     Progress/Outcomes (Gait/Walking Locomotion Goal 1, PT-IRF) goal ongoing  -RG               User Key  (r) = Recorded By, (t) = Taken By, (c) = Cosigned By      Initials Name Provider Type    RG Neri Cornelius PTA Physical Therapist Assistant                  Wound 07/19/25 0921 Left gluteal Other (Comments) (Active)   Dressing Appearance dry;intact 07/30/25 0834   Closure Open to air 07/30/25 0834   Base red;nonblanchable 07/30/25 0834   Periwound warm;pink;redness 07/30/25 0834   Periwound Temperature warm 07/30/25 0834   Periwound Skin Turgor soft 07/30/25 0834   Drainage Amount none 07/29/25 2000   Care, Wound barrier applied 07/30/25 0834   Periwound Care dry periwound area maintained 07/29/25 2000       Wound 07/19/25 0938 Left  anterior thigh Surgical Open Surgical Incision (Active)   Dressing Appearance dry;intact 07/30/25 0834   Base pink;red 07/30/25 0834       Wound 07/21/25 0930 Right coccyx Pressure Injury (Active)   Dressing Appearance no drainage;open to air 07/29/25 2000   Closure Open to air 07/30/25 0834   Base red;purple 07/29/25 2000   Periwound redness 07/29/25 2000   Periwound Temperature warm 07/29/25 2000   Periwound Skin Turgor soft 07/29/25 2000   Edges rolled/closed 07/29/25 2000   Drainage Amount none 07/29/25 2000   Care, Wound barrier applied 07/30/25 0834   Dressing Care open to air 07/30/25 0834   Periwound Care dry periwound area maintained 07/29/25 2000       Wound 07/28/25 0829 coccyx (Active)   Dressing Appearance open to air 07/30/25 0834   Base pink 07/30/25 0834   Periwound pink 07/30/25 0834   Periwound Temperature warm 07/30/25 0834   Periwound Skin Turgor soft 07/30/25 0834   Edges irregular 07/30/25 0834   Care, Wound barrier applied 07/30/25 0834     Physical Therapy Education       Title: PT OT SLP Therapies (Done)       Topic: Physical Therapy (Done)       Point: Mobility training (Done)       Learning Progress Summary            Patient Acceptance, E,D, VU,NR by RG at 7/30/2025 1348    Acceptance, E,D, VU,NR by RG at 7/29/2025 1434    Acceptance, E,D, VU,NR by RG at 7/28/2025 1438    Acceptance, E,D, VU,NR by LL at 7/26/2025 1615    Acceptance, E,D, VU,NR by RG at 7/25/2025 1407    Acceptance, E,D, VU,NR by RG at 7/24/2025 1502    Acceptance, E, VU,NR by LB at 7/23/2025 1612                      Point: Home exercise program (Done)       Learning Progress Summary            Patient Acceptance, E,D, VU,NR by RG at 7/30/2025 1348    Acceptance, E,D, VU,NR by RG at 7/29/2025 1434    Acceptance, E,D, VU,NR by RG at 7/28/2025 1438    Acceptance, E,D, VU,NR by MABLE at 7/26/2025 1615    Acceptance, E,D, VU,NR by RG at 7/25/2025 1407    Acceptance, E,D, VU,NR by RG at 7/24/2025 1502    Acceptance, E, VU,NR by  LB at 7/23/2025 1612                      Point: Body mechanics (Done)       Learning Progress Summary            Patient Acceptance, E,D, VU,NR by RG at 7/30/2025 1348    Acceptance, E,D, VU,NR by RG at 7/29/2025 1434    Acceptance, E,D, VU,NR by RG at 7/28/2025 1438    Acceptance, E,D, VU,NR by LL at 7/26/2025 1615    Acceptance, E,D, VU,NR by RG at 7/25/2025 1407    Acceptance, E,D, VU,NR by RG at 7/24/2025 1502    Acceptance, E, VU,NR by LB at 7/23/2025 1612                      Point: Precautions (Done)       Learning Progress Summary            Patient Acceptance, E,D, VU,NR by RG at 7/30/2025 1348    Acceptance, E,D, VU,NR by RG at 7/29/2025 1434    Acceptance, E,D, VU,NR by RG at 7/28/2025 1438    Acceptance, E,D, VU,NR by LL at 7/26/2025 1615    Acceptance, E,D, VU,NR by RG at 7/25/2025 1407    Acceptance, E,D, VU,NR by RG at 7/24/2025 1502    Acceptance, E, VU,NR by LB at 7/23/2025 1612                                      User Key       Initials Effective Dates Name Provider Type Discipline     06/16/21 -  Andreia Dove, PT Physical Therapist PT     05/02/16 -  Beverly Goff PTA Physical Therapist Assistant PT     06/16/21 -  Neri Cornelius PTA Physical Therapist Assistant PT                    PT Recommendation and Plan    Frequency of Treatment (PT): 5 times per week  Anticipated Equipment Needs at Discharge (PT Eval):  (tbd)                  Time Calculation:      PT Charges       Row Name 07/30/25 1348             Time Calculation    Start Time 1245  -RG      Stop Time 1330  -RG      Time Calculation (min) 45 min  -RG      PT Received On 07/30/25  -RG         Time Calculation- PT    Total Timed Code Minutes- PT 45 minute(s)  -RG                User Key  (r) = Recorded By, (t) = Taken By, (c) = Cosigned By      Initials Name Provider Type    RG Neri Cornelius, PTA Physical Therapist Assistant                    Therapy Charges for Today       Code Description Service Date Service  Provider Modifiers Qty    65442693685 HC PT NEUROMUSC RE EDUCATION EA 15 MIN 7/29/2025 Neri Cornelius, ERNESTINE GP, CQ 1    00505565871 HC PT THERAPEUTIC ACT EA 15 MIN 7/29/2025 Neri Cornelius, PTA GP, CQ 2    09259014263 HC PT THER PROC EA 15 MIN 7/29/2025 Neri Cornelius PTA GP, CQ 3    24824210849 HC PT THERAPEUTIC ACT EA 15 MIN 7/30/2025 Neri Cornelius, PTA GP, CQ 1    85359828803 HC PT THER PROC EA 15 MIN 7/30/2025 Neri Cornelius PTA GP, CQ 2              PT G-Codes  AM-PAC 6 Clicks Score (PT): 16      Neri Cornelius PTA  7/30/2025

## 2025-07-30 NOTE — PLAN OF CARE
Goal Outcome Evaluation:           Progress: improving  Outcome Summary: New Admit       Problem: Rehabilitation (IRF) Plan of Care  Goal: Plan of Care Review  Outcome: Progressing  Flowsheets  Taken 7/30/2025 1300 by Meenakshi Davis RN  Progress: improving  Taken 7/29/2025 2230 by Katie Sanford RN  Plan of Care Reviewed With: patient  Goal: Patient-Specific Goal (Individualized)  Outcome: Progressing  Goal: Absence of New-Onset Illness or Injury  Outcome: Progressing  Intervention: Identify and Manage Fall Risk  Recent Flowsheet Documentation  Taken 7/30/2025 1000 by Meenakshi Davis RN  Safety Promotion/Fall Prevention:   nonskid shoes/slippers when out of bed   safety round/check completed  Taken 7/30/2025 0800 by Meenakshi Davis RN  Safety Promotion/Fall Prevention:   nonskid shoes/slippers when out of bed   safety round/check completed  Intervention: Prevent VTE (Venous Thromboembolism)  Recent Flowsheet Documentation  Taken 7/30/2025 0834 by Meenakshi Davis RN  VTE Prevention/Management: (see mar) other (see comments)  Goal: Optimal Comfort and Wellbeing  Outcome: Progressing  Intervention: Provide Person-Centered Care  Recent Flowsheet Documentation  Taken 7/30/2025 0834 by Meenakshi Davis RN  Trust Relationship/Rapport: care explained  Goal: Home and Community Transition Plan Established  Outcome: Progressing

## 2025-07-30 NOTE — THERAPY TREATMENT NOTE
Inpatient Rehabilitation - Occupational Therapy Progress Note and Treatment Note    GUANACO Portillo     Patient Name: Lashae Benitez  : 1960  MRN: [id number]    Today's Date: 2025                 Admit Date: 2025       No diagnosis found.    Patient Active Problem List   Diagnosis    Hiatal hernia    Essential hypertension    Sjogren's syndrome    Multiple sclerosis    Psoriasis    Fibromyalgia    Osteoarthritis    Psoriatic arthritis    RA (rheumatoid arthritis)    Degenerative disc disease, lumbar    TMJ arthritis    Dupuytren's disease    H. pylori infection    Family history of coronary artery disease    Type 2 diabetes mellitus    Edema    Bilateral leg edema    Isolated corticotropin deficiency    Hyponatremia    Paroxysmal atrial fibrillation    Sepsis    Bacteremia, escherichia coli    Iron deficiency anemia    Malabsorption due to intolerance, not elsewhere classified    Chronic chest pain with high risk for CAD    Abnormal nuclear stress test    Abnormal computed tomography angiography (CTA)    Hip fracture    Closed intertrochanteric fracture of hip, left, initial encounter    Closed left hip fracture, sequela       Past Medical History:   Diagnosis Date    Acid reflux     Allergic     Anemia     Anxiety     Atrial fibrillation     Cancer     thyroid, skin    Cervical disc disorder     Chronic pain disorder     Claustrophobia     CTS (carpal tunnel syndrome)     Degenerative disc disease, lumbar     Depression     Dupuytren's disease     Essential hypertension     Family history of coronary artery disease     Fibromyalgia     Gallbladder abscess     H. pylori infection     Hiatal hernia     Hyperlipidemia     Hypothyroidism     Low back pain     Lumbosacral disc disease     Migraines     migraines    Multiple sclerosis     Osteoarthritis     Peripheral neuropathy     Psoriasis     Psoriatic arthritis     RA (rheumatoid arthritis)     Rheumatoid arthritis     Sinusitis     Sjogren's syndrome      Stomach ulcer     Thoracic disc disorder     TMJ arthritis     Type 2 diabetes mellitus     Urinary tract infection        Past Surgical History:   Procedure Laterality Date    BACK SURGERY      BREAST LUMPECTOMY Left 11/1993    CARDIAC CATHETERIZATION Left 09/21/2009    Normal     CARDIAC CATHETERIZATION N/A 6/24/2025    Procedure: Left Heart Cath;  Surgeon: Teodora Barron MD;  Location:  [facility];  Service: Cardiovascular;  Laterality: N/A;    CARPAL TUNNEL RELEASE  03/2012    CERVICAL POLYPECTOMY  12/2015    CHOLECYSTECTOMY  05/2007    COLONOSCOPY      ENDOSCOPY      EPIDURAL BLOCK      GASTRIC BYPASS  09/2007    JOINT REPLACEMENT      KNEE ARTHROPLASTY      LUMBAR DISC SURGERY      C5-6    NECK SURGERY      REPLACEMENT TOTAL KNEE Right 06/2016    SACRAL NERVE STIMULATOR PLACEMENT  09/2014    SACRAL NERVE STIMULATOR PLACEMENT  04/11/2024    @ [facility] in [place]    SACRAL NERVE STIMULATOR PLACEMENT Right 04/17/2024    THYROIDECTOMY  03/2016    TRIGGER POINT INJECTION               IRF OT ASSESSMENT FLOWSHEET (Last 12 Hours)       IRF OT Evaluation and Treatment       Row Name 07/30/25 1215          OT Time and Intention    Document Type progress note;daily treatment  -CJ     Mode of Treatment occupational therapy  -CJ     Symptoms Noted During/After Treatment dizziness;nausea  c/o dizziness and nausea while on BSC at end of tx; b/p-113/59; pt returned to bed; RN aware  -CJ     Evaluation/Treatment Not Performed patient/family declined, not feeling well  pt declined pm tx d/t feeling ill; RN aware  -CJ       Row Name 07/30/25 1215          General Information    Patient/Family/Caregiver Comments/Observations agreeable to am therapy  -CJ     Existing Precautions/Restrictions fall;weight bearing  TTWB LLE, ABD binder- bp  -CJ       Row Name 07/30/25 121          Bathing    [place] Level (Bathing) minimum assist (75% patient effort);moderate assist (50% patient effort);verbal cues  -CJ        Row Name 07/30/25 1215          Upper Body Dressing    [place] Level (Upper Body Dressing) set up assistance;bra/undergarment;pull over garment  -       Row Name 07/30/25 1215          Lower Body Dressing    [place] Level (Lower Body Dressing) moderate assist (50% patient effort);verbal cues  -     Assistive Device Use (Lower Body Dressing) reacher;sock-aid  -       Row Name 07/30/25 1215          Grooming    [place] Level (Grooming) set up  -       Row Name 07/30/25 1215          Toileting    [place] Level (Toileting) maximum assist (25% patient effort);verbal cues  -     Assistive Device Use (Toileting) commode chair  -       Row Name 07/30/25 1215          Toilet Transfer    [place] Level (Toilet Transfer) moderate assist (50% patient effort);verbal cues  -     Assistive Device (Toilet Transfer) commode chair  HB <> BSC  -       Row Name 07/30/25 1215          Positioning and Restraints    Post Treatment Position bed  -     In Bed call light within reach;encouraged to call for assist;exit alarm on;with nsg  -       Row Name 07/30/25 1215          Vital Signs    Intra Systolic BP Rehab 113  -     Intra Treatment Diastolic BP 59  -       Row Name 07/30/25 1228 07/30/25 1215       LB Dressing Goal 1 (OT-IRF)    [place] (LB Dressing Goal 1, OT-IRF) minimum assist (75% or more patient effort);moderate assist (50-74% patient effort)  - maximum assist (25-49% patient effort)  -    Time Frame (LB Dressing Goal 1, OT-IRF) short-term goal (STG)  - --    Progress/Outcomes (LB Dressing Goal 1, OT-IRF) -- goal met  -      Row Name 07/30/25 1228 07/30/25 1215       Toileting Goal 1 (OT-IRF)    [place] Level (Toileting Goal 1, OT-IRF) moderate assist (50-74% patient effort)  - maximum assist (25-49% patient effort)  -    Progress/Outcomes (Toileting Goal 1, OT-IRF) new goal  - goal met  -    Time Frame (Toileting Goal 1, OT-IRF) short-term goal (STG)   - --              User Key  (r) = Recorded By, (t) = Taken By, (c) = Cosigned By      Initials Name Effective Dates     Amber Badillo OT 06/16/21 -                      Occupational Therapy Education       Title: PT OT SLP Therapies (Done)       Topic: Occupational Therapy (Done)       Point: ADL training (Done)       Learning Progress Summary            Patient Acceptance, E,D, VU,NR by  at 7/30/2025 1459    Acceptance, E,D, VU,NR by  at 7/29/2025 1207    Acceptance, E,D, VU,NR by  at 7/28/2025 1443                      Point: Precautions (Done)       Learning Progress Summary            Patient Acceptance, E,D, VU,NR by  at 7/30/2025 1459    Acceptance, E,D, VU,NR by  at 7/29/2025 1207    Acceptance, E,D, VU,NR by  at 7/28/2025 1443                                      User Key       Initials Effective Dates Name Provider Type Discipline     06/16/21 -  Amber Badillo OT Occupational Therapist OT                        OT Recommendation and Plan    Planned Therapy Interventions (OT): activity tolerance training, adaptive equipment training, BADL retraining, occupation/activity based interventions, patient/caregiver education/training, ROM/therapeutic exercise, strengthening exercise (impaired ADL's, strength, fxl mobility, and activity tolerance)                    Time Calculation:      Time Calculation- OT       Row Name 07/30/25 1459             Time Calculation- OT    OT Start Time 0805  -      OT Stop Time 0850  -      OT Time Calculation (min) 45 min  -      Total Timed Code Minutes- OT 45 minute(s)  -                User Key  (r) = Recorded By, (t) = Taken By, (c) = Cosigned By      Initials Name Provider Type     Amber Badillo OT Occupational Therapist                  Therapy Charges for Today       Code Description Service Date Service Provider Modifiers Qty    36095574308  OT SELF CARE/MGMT/TRAIN EA 15 MIN 7/29/2025 Amber Badillo OT GO 3    [id number]  OT  THER PROC EA 15 MIN 7/29/2025 Amber Badillo OT GO 3    33741667204 HC OT SELF CARE/MGMT/TRAIN EA 15 MIN 7/30/2025 Amber Badillo OT GO 3                     Amber Badillo OT  7/30/2025

## 2025-07-30 NOTE — PROGRESS NOTES
Assisted By: RN as well as OT    CC: Follow-up on left hip fracture    Interview History/HPI: This morning patient got up with OT, she went to the bathroom and did have a small BM.  Patient felt lightheaded, she was placed back to bed.  She then was complaining of fairly severe nausea with some epigastric discomfort.  She states she has had intermittent as epigastric discomfort on and off for some time.  There was no blood in her stool.  She did eat some of her breakfast, she has not had any emesis          Current Hospital Meds:  [Held by provider] amitriptyline, 25 mg, Oral, Nightly  budesonide-formoterol, 2 puff, Inhalation, BID - RT  cetirizine, 10 mg, Oral, Daily  cholecalciferol, 50,000 Units, Oral, Weekly  cyanocobalamin, 1,000 mcg, Intramuscular, Weekly  DULoxetine, 30 mg, Oral, Daily  DULoxetine, 60 mg, Oral, Daily  flecainide, 50 mg, Oral, BID  fludrocortisone, 50 mcg, Oral, Daily  folic acid, 1 mg, Oral, Daily  gabapentin, 400 mg, Oral, Q8H  insulin glargine, 10 Units, Subcutaneous, Daily  insulin lispro, 2-7 Units, Subcutaneous, 4x Daily PC & at Bedtime  levothyroxine, 150 mcg, Oral, Q AM  methotrexate, 25 mg, Oral, Weekly  [Held by provider] metoprolol tartrate, 25 mg, Oral, BID  midodrine, 2.5 mg, Oral, TID AC  pantoprazole, 40 mg, Oral, Q AM  rivaroxaban, 20 mg, Oral, Daily With Dinner    sodium chloride, 75 mL/hr, Last Rate: 75 mL/hr (07/30/25 0938)        Vitals:    07/30/25 0700   BP: 137/70   Pulse: 95   Resp: 16   Temp: 98.2 °F (36.8 °C)   SpO2: 99%         Intake/Output Summary (Last 24 hours) at 7/30/2025 1107  Last data filed at 7/30/2025 0700  Gross per 24 hour   Intake 600 ml   Output --   Net 600 ml       EXAM: She really was not absolutely orthostatic other blood pressure did drop some, she was complaining of some dizziness.  Pupils are equal, she became upset because she thinks she is not doing very well from a rehabilitation standpoint.  No joint effusions noted.  Lungs were clear today  heart was a regular rate and rhythm, she was not tachycardic, she was in the 90s when I felt her pulse.  Abdomen is soft, she has some minimal epigastric tenderness to palpation without rebound or guarding.  No masses were felt, no edema.  She moves all extremities on command.  Skin warm and dry, no distress      Diet: Regular/House; Texture: Regular (IDDSI 7); Fluid Consistency: Thin (IDDSI 0)        LABS:     Lab Results (last 48 hours)       Procedure Component Value Units Date/Time    Amylase [[id number]]  (Abnormal) Collected: 07/30/25 1009    Specimen: Blood Updated: 07/30/25 1042     Amylase 13 U/L     Lipase [[id number]]  (Normal) Collected: 07/30/25 1009    Specimen: Blood Updated: 07/30/25 1042     Lipase 21 U/L     Comprehensive Metabolic Panel [[id number]]  (Abnormal) Collected: 07/30/25 1009    Specimen: Blood Updated: 07/30/25 1042     Glucose 260 mg/dL      BUN 12.7 mg/dL      Creatinine 0.76 mg/dL      Sodium 134 mmol/L      Potassium 4.2 mmol/L      Chloride 100 mmol/L      CO2 25.3 mmol/L      Calcium 7.9 mg/dL      Total Protein 5.0 g/dL      Albumin 2.8 g/dL      ALT (SGPT) 9 U/L      AST (SGOT) 18 U/L      Alkaline Phosphatase 192 U/L      Total Bilirubin 0.7 mg/dL      Globulin 2.2 gm/dL      A/G Ratio 1.3 g/dL      BUN/Creatinine Ratio 16.7     Anion Gap 8.7 mmol/L      eGFR 88.2 mL/min/1.73     Narrative:      GFR Categories in Chronic Kidney Disease (CKD)              GFR Category          GFR (mL/min/1.73)    Interpretation  G1                    90 or greater        Normal or high (1)  G2                    60-89                Mild decrease (1)  G3a                   45-59                Mild to moderate decrease  G3b                   30-44                Moderate to severe decrease  G4                    15-29                Severe decrease  G5                    14 or less           Kidney failure    (1)In the absence of evidence of kidney disease, neither GFR category G1 or G2 fulfill  the criteria for CKD.    eGFR calculation 2021 CKD-EPI creatinine equation, which does not include race as a factor    POC Glucose Once [[id number]]  (Abnormal) Collected: 07/30/25 0703    Specimen: Blood Updated: 07/30/25 0705     Glucose 140 mg/dL      Comment: Serial Number: [id number]:  [id number]       POC Glucose Once [[id number]]  (Abnormal) Collected: 07/29/25 2034    Specimen: Blood Updated: 07/29/25 2037     Glucose 381 mg/dL      Comment: Serial Number: [id number]:  [id number]       POC Glucose Once [[id number]]  (Abnormal) Collected: 07/29/25 1625    Specimen: Blood Updated: 07/29/25 1627     Glucose 223 mg/dL      Comment: Serial Number: [id number]:  [id number]       Cortisol [[id number]] Collected: 07/29/25 1350    Specimen: Blood Updated: 07/29/25 1456     Cortisol 37.20 mcg/dL     Narrative:      Cortisol Reference Ranges:    Cortisol 6AM - 10AM Range: 6.02-18.40 mcg/dl  Cortisol 4PM - 8PM Range: 2.68-10.50 mcg/dl      Results may be falsely increased if patient taking Biotin.      Cortisol [[id number]] Collected: 07/29/25 1309    Specimen: Blood Updated: 07/29/25 1347     Cortisol 30.80 mcg/dL     Narrative:      Cortisol Reference Ranges:    Cortisol 6AM - 10AM Range: 6.02-18.40 mcg/dl  Cortisol 4PM - 8PM Range: 2.68-10.50 mcg/dl      Results may be falsely increased if patient taking Biotin.      Cortisol [[id number]] Collected: 07/29/25 1133    Specimen: Blood Updated: 07/29/25 1216     Cortisol 14.20 mcg/dL     Narrative:      Cortisol Reference Ranges:    Cortisol 6AM - 10AM Range: 6.02-18.40 mcg/dl  Cortisol 4PM - 8PM Range: 2.68-10.50 mcg/dl      Results may be falsely increased if patient taking Biotin.      POC Glucose Once [[id number]]  (Abnormal) Collected: 07/29/25 1103    Specimen: Blood Updated: 07/29/25 1106     Glucose 198 mg/dL      Comment: Serial Number: [id number]:  [id number]       POC Glucose Once [[id number]]  (Abnormal) Collected: 07/29/25 0620    Specimen:  Blood Updated: 07/29/25 0622     Glucose 157 mg/dL      Comment: Serial Number: [id number]:  [id number]       Basic Metabolic Panel [[id number]]  (Abnormal) Collected: 07/29/25 0422    Specimen: Blood Updated: 07/29/25 0453     Glucose 152 mg/dL      BUN 9.0 mg/dL      Creatinine 0.68 mg/dL      Sodium 139 mmol/L      Potassium 4.3 mmol/L      Chloride 101 mmol/L      CO2 27.9 mmol/L      Calcium 8.5 mg/dL      BUN/Creatinine Ratio 13.2     Anion Gap 10.1 mmol/L      eGFR 98.0 mL/min/1.73     Narrative:      GFR Categories in Chronic Kidney Disease (CKD)              GFR Category          GFR (mL/min/1.73)    Interpretation  G1                    90 or greater        Normal or high (1)  G2                    60-89                Mild decrease (1)  G3a                   45-59                Mild to moderate decrease  G3b                   30-44                Moderate to severe decrease  G4                    15-29                Severe decrease  G5                    14 or less           Kidney failure    (1)In the absence of evidence of kidney disease, neither GFR category G1 or G2 fulfill the criteria for CKD.    eGFR calculation 2021 CKD-EPI creatinine equation, which does not include race as a factor    CBC & Differential [[id number]]  (Abnormal) Collected: 07/29/25 0421    Specimen: Blood Updated: 07/29/25 0434    Narrative:      The following orders were created for panel order CBC & Differential.  Procedure                               Abnormality         Status                     ---------                               -----------         ------                     CBC Auto Differential[[id number]]        Abnormal            Final result                 Please view results for these tests on the individual orders.    CBC Auto Differential [[id number]]  (Abnormal) Collected: 07/29/25 0421    Specimen: Blood Updated: 07/29/25 0434     WBC 6.45 10*3/mm3      RBC 2.54 10*6/mm3      Hemoglobin 8.2 g/dL       Hematocrit 26.6 %      .7 fL      MCH 32.3 pg      MCHC 30.8 g/dL      RDW 16.5 %      RDW-SD 62.5 fl      MPV 8.5 fL      Platelets 504 10*3/mm3      Neutrophil % 63.9 %      Lymphocyte % 20.3 %      Monocyte % 11.9 %      Eosinophil % 2.3 %      Basophil % 0.5 %      Immature Grans % 1.1 %      Neutrophils, Absolute 4.12 10*3/mm3      Lymphocytes, Absolute 1.31 10*3/mm3      Monocytes, Absolute 0.77 10*3/mm3      Eosinophils, Absolute 0.15 10*3/mm3      Basophils, Absolute 0.03 10*3/mm3      Immature Grans, Absolute 0.07 10*3/mm3      nRBC 0.0 /100 WBC     POC Glucose 4x Daily PC & at Bedtime [[id number]]  (Abnormal) Collected: 07/28/25 2045    Specimen: Blood Updated: 07/28/25 2047     Glucose 271 mg/dL      Comment: Serial Number: [id number]:  [id number]       POC Glucose Once [[id number]]  (Normal) Collected: 07/28/25 1619    Specimen: Blood Updated: 07/28/25 1622     Glucose 122 mg/dL      Comment: Serial Number: [id number]:  [id number]       POC Glucose Once [[id number]]  (Abnormal) Collected: 07/28/25 1106    Specimen: Blood Updated: 07/28/25 1109     Glucose 254 mg/dL      Comment: Serial Number: [id number]:  [id number]                    Radiology:    Imaging Results (Last 72 Hours)       ** No results found for the last 72 hours. **            Results for orders placed during the hospital encounter of 07/17/25    Adult Transthoracic Echo Complete W/ Cont if Necessary Per Protocol 07/18/2025 12:46 PM    Interpretation Summary    Left ventricular systolic function is normal. Calculated left ventricular EF = 58.5%    Left ventricular diastolic function is consistent with (grade I) impaired relaxation.    Estimated right ventricular systolic pressure from tricuspid regurgitation is normal (<35 mmHg).    No significant valvular abnormalities noted      Assessment/Plan:   Status post left hip surgery.  Patient did have acute blood loss anemia postop on 7/20 with hemoglobin 6.9 and received  2 units of packed red blood cells.  Hemoglobin has remained stable, patient however has continued to have difficulty with therapy sessions secondary to orthostasis and some nausea.  With PT, yesterday patient was mod assist bed mobility, mod assist of 2 bed to chair and chair to bed, mod assist sit to stand, min assist stand to sit patient was unable to ambulate due to hypotension.  With OT, patient min to mod bathing, set up for upper body dressing, mod assist lower body dressing, set up for grooming and mod to max with toileting.  Continue current therapy plans as tolerated.     Orthostasis on Florinef, I have added midodrine.  I have held her Lopressor.  I am giving her some IV fluids today.  Synthroid has been adjusted, I checked a cosyntropin test yesterday to make sure she did not have iatrogenic [place]'s from past prednisone use from inflammatory arthritis, she was not adrenally insufficient.  EF is normal, hemoglobin has been stable.  I do not see any obvious cause, continue symptomatic treatment for now    Nausea with some epigastric pain, on Protonix, but this was as needed, scheduled this.  Amylase and lipase were not elevated.  Electrolytes are okay, transaminases normal.  Patient did have her methotrexate weekly yesterday which may be playing some role in her nausea     Tachycardia, improved, as above Lopressor on hold because of orthostatic symptoms, difficult problem.     Acute blood loss anemia, as above,  stable     Hypothyroidism, Synthroid adjusted, repeat TSH in 4 to 6 weeks.     Depression, controlled on Cymbalta, Elavil on hold due to the possibility of reflex tachycardia.     Diabetes, not well-controlled, basal insulin increased, follow-up     History of RA, continue methotrexate, no active joint inflammation     A-fib, pulses regular on Xarelto for stroke prevention, continue Tambocor, QTc 444     Peripheral neuropathy, associated with diabetes, continue current dose of gabapentin.  I did  decrease the dose of the gabapentin because he is orthostatic type symptoms.  Cannot decrease this too quickly.    Jr Carrero MD

## 2025-07-31 LAB
ALBUMIN SERPL-MCNC: 2.9 G/DL (ref 3.5–5.2)
ALBUMIN/GLOB SERPL: 1.2 G/DL
ALP SERPL-CCNC: 212 U/L (ref 39–117)
ALT SERPL W P-5'-P-CCNC: 12 U/L (ref 1–33)
ANION GAP SERPL CALCULATED.3IONS-SCNC: 8.9 MMOL/L (ref 5–15)
AST SERPL-CCNC: 23 U/L (ref 1–32)
BASOPHILS # BLD AUTO: 0.05 10*3/MM3 (ref 0–0.2)
BASOPHILS NFR BLD AUTO: 0.8 % (ref 0–1.5)
BILIRUB SERPL-MCNC: 0.7 MG/DL (ref 0–1.2)
BUN SERPL-MCNC: 9.7 MG/DL (ref 8–23)
BUN/CREAT SERPL: 11.8 (ref 7–25)
CALCIUM SPEC-SCNC: 8.1 MG/DL (ref 8.6–10.5)
CHLORIDE SERPL-SCNC: 104 MMOL/L (ref 98–107)
CO2 SERPL-SCNC: 26.1 MMOL/L (ref 22–29)
CREAT SERPL-MCNC: 0.82 MG/DL (ref 0.57–1)
DEPRECATED RDW RBC AUTO: 63.7 FL (ref 37–54)
EGFRCR SERPLBLD CKD-EPI 2021: 80.5 ML/MIN/1.73
EOSINOPHIL # BLD AUTO: 0.11 10*3/MM3 (ref 0–0.4)
EOSINOPHIL NFR BLD AUTO: 1.7 % (ref 0.3–6.2)
ERYTHROCYTE [DISTWIDTH] IN BLOOD BY AUTOMATED COUNT: 16.4 % (ref 12.3–15.4)
GLOBULIN UR ELPH-MCNC: 2.4 GM/DL
GLUCOSE BLDC GLUCOMTR-MCNC: 108 MG/DL (ref 70–130)
GLUCOSE BLDC GLUCOMTR-MCNC: 132 MG/DL (ref 70–130)
GLUCOSE BLDC GLUCOMTR-MCNC: 140 MG/DL (ref 70–130)
GLUCOSE BLDC GLUCOMTR-MCNC: 292 MG/DL (ref 70–130)
GLUCOSE SERPL-MCNC: 92 MG/DL (ref 65–99)
HCT VFR BLD AUTO: 28.4 % (ref 34–46.6)
HGB BLD-MCNC: 8.8 G/DL (ref 12–15.9)
IMM GRANULOCYTES # BLD AUTO: 0.04 10*3/MM3 (ref 0–0.05)
IMM GRANULOCYTES NFR BLD AUTO: 0.6 % (ref 0–0.5)
LYMPHOCYTES # BLD AUTO: 1.35 10*3/MM3 (ref 0.7–3.1)
LYMPHOCYTES NFR BLD AUTO: 20.8 % (ref 19.6–45.3)
MCH RBC QN AUTO: 32.7 PG (ref 26.6–33)
MCHC RBC AUTO-ENTMCNC: 31 G/DL (ref 31.5–35.7)
MCV RBC AUTO: 105.6 FL (ref 79–97)
MONOCYTES # BLD AUTO: 0.31 10*3/MM3 (ref 0.1–0.9)
MONOCYTES NFR BLD AUTO: 4.8 % (ref 5–12)
NEUTROPHILS NFR BLD AUTO: 4.62 10*3/MM3 (ref 1.7–7)
NEUTROPHILS NFR BLD AUTO: 71.3 % (ref 42.7–76)
NRBC BLD AUTO-RTO: 0 /100 WBC (ref 0–0.2)
PLATELET # BLD AUTO: 656 10*3/MM3 (ref 140–450)
PMV BLD AUTO: 8.5 FL (ref 6–12)
POTASSIUM SERPL-SCNC: 4.2 MMOL/L (ref 3.5–5.2)
PROT SERPL-MCNC: 5.3 G/DL (ref 6–8.5)
RBC # BLD AUTO: 2.69 10*6/MM3 (ref 3.77–5.28)
SODIUM SERPL-SCNC: 139 MMOL/L (ref 136–145)
WBC NRBC COR # BLD AUTO: 6.48 10*3/MM3 (ref 3.4–10.8)

## 2025-07-31 PROCEDURE — 97110 THERAPEUTIC EXERCISES: CPT

## 2025-07-31 PROCEDURE — 82948 REAGENT STRIP/BLOOD GLUCOSE: CPT | Performed by: FAMILY MEDICINE

## 2025-07-31 PROCEDURE — 63710000001 INSULIN GLARGINE PER 5 UNITS: Performed by: INTERNAL MEDICINE

## 2025-07-31 PROCEDURE — 80053 COMPREHEN METABOLIC PANEL: CPT | Performed by: INTERNAL MEDICINE

## 2025-07-31 PROCEDURE — 85025 COMPLETE CBC W/AUTO DIFF WBC: CPT | Performed by: INTERNAL MEDICINE

## 2025-07-31 PROCEDURE — 94799 UNLISTED PULMONARY SVC/PX: CPT

## 2025-07-31 PROCEDURE — 97116 GAIT TRAINING THERAPY: CPT

## 2025-07-31 PROCEDURE — 97530 THERAPEUTIC ACTIVITIES: CPT

## 2025-07-31 PROCEDURE — 82948 REAGENT STRIP/BLOOD GLUCOSE: CPT

## 2025-07-31 PROCEDURE — 94760 N-INVAS EAR/PLS OXIMETRY 1: CPT

## 2025-07-31 PROCEDURE — 99232 SBSQ HOSP IP/OBS MODERATE 35: CPT | Performed by: INTERNAL MEDICINE

## 2025-07-31 PROCEDURE — 63710000001 INSULIN LISPRO (HUMAN) PER 5 UNITS: Performed by: FAMILY MEDICINE

## 2025-07-31 PROCEDURE — 97535 SELF CARE MNGMENT TRAINING: CPT

## 2025-07-31 RX ORDER — HYDROCODONE BITARTRATE AND ACETAMINOPHEN 7.5; 325 MG/1; MG/1
1 TABLET ORAL EVERY 8 HOURS PRN
Refills: 0 | Status: DISCONTINUED | OUTPATIENT
Start: 2025-07-31 | End: 2025-08-06 | Stop reason: HOSPADM

## 2025-07-31 RX ADMIN — GABAPENTIN 400 MG: 400 CAPSULE ORAL at 05:30

## 2025-07-31 RX ADMIN — POLYVINYL ALCOHOL, POVIDONE 2 DROP: 14; 6 SOLUTION/ DROPS OPHTHALMIC at 16:01

## 2025-07-31 RX ADMIN — DULOXETINE 30 MG: 60 CAPSULE, DELAYED RELEASE ORAL at 08:51

## 2025-07-31 RX ADMIN — FLUDROCORTISONE ACETATE 50 MCG: 0.1 TABLET ORAL at 08:50

## 2025-07-31 RX ADMIN — FLECAINIDE ACETATE 50 MG: 50 TABLET ORAL at 08:51

## 2025-07-31 RX ADMIN — PANTOPRAZOLE SODIUM 40 MG: 40 TABLET, DELAYED RELEASE ORAL at 05:29

## 2025-07-31 RX ADMIN — RIVAROXABAN 20 MG: 20 TABLET, FILM COATED ORAL at 17:40

## 2025-07-31 RX ADMIN — MIDODRINE HYDROCHLORIDE 2.5 MG: 2.5 TABLET ORAL at 11:50

## 2025-07-31 RX ADMIN — FOLIC ACID 1 MG: 1 TABLET ORAL at 08:51

## 2025-07-31 RX ADMIN — FLECAINIDE ACETATE 50 MG: 50 TABLET ORAL at 21:02

## 2025-07-31 RX ADMIN — GABAPENTIN 400 MG: 400 CAPSULE ORAL at 14:38

## 2025-07-31 RX ADMIN — LEVOTHYROXINE SODIUM 150 MCG: 0.07 TABLET ORAL at 05:29

## 2025-07-31 RX ADMIN — CETIRIZINE HYDROCHLORIDE 10 MG: 10 TABLET, FILM COATED ORAL at 08:52

## 2025-07-31 RX ADMIN — MIDODRINE HYDROCHLORIDE 2.5 MG: 2.5 TABLET ORAL at 18:37

## 2025-07-31 RX ADMIN — INSULIN LISPRO 4 UNITS: 100 INJECTION, SOLUTION INTRAVENOUS; SUBCUTANEOUS at 21:02

## 2025-07-31 RX ADMIN — INSULIN GLARGINE 10 UNITS: 100 INJECTION, SOLUTION SUBCUTANEOUS at 08:50

## 2025-07-31 RX ADMIN — DULOXETINE 60 MG: 60 CAPSULE, DELAYED RELEASE ORAL at 08:51

## 2025-07-31 RX ADMIN — MIDODRINE HYDROCHLORIDE 2.5 MG: 2.5 TABLET ORAL at 08:51

## 2025-07-31 RX ADMIN — GABAPENTIN 400 MG: 400 CAPSULE ORAL at 21:02

## 2025-07-31 NOTE — THERAPY TREATMENT NOTE
Inpatient Rehabilitation - Occupational Therapy Treatment Note    GUANACO Portillo     Patient Name: Lashae Benitez  : 1960  MRN: [id number]    Today's Date: 2025                 Admit Date: 2025       No diagnosis found.    Patient Active Problem List   Diagnosis    Hiatal hernia    Essential hypertension    Sjogren's syndrome    Multiple sclerosis    Psoriasis    Fibromyalgia    Osteoarthritis    Psoriatic arthritis    RA (rheumatoid arthritis)    Degenerative disc disease, lumbar    TMJ arthritis    Dupuytren's disease    H. pylori infection    Family history of coronary artery disease    Type 2 diabetes mellitus    Edema    Bilateral leg edema    Isolated corticotropin deficiency    Hyponatremia    Paroxysmal atrial fibrillation    Sepsis    Bacteremia, escherichia coli    Iron deficiency anemia    Malabsorption due to intolerance, not elsewhere classified    Chronic chest pain with high risk for CAD    Abnormal nuclear stress test    Abnormal computed tomography angiography (CTA)    Hip fracture    Closed intertrochanteric fracture of hip, left, initial encounter    Closed left hip fracture, sequela       Past Medical History:   Diagnosis Date    Acid reflux     Allergic     Anemia     Anxiety     Atrial fibrillation     Cancer     thyroid, skin    Cervical disc disorder     Chronic pain disorder     Claustrophobia     CTS (carpal tunnel syndrome)     Degenerative disc disease, lumbar     Depression     Dupuytren's disease     Essential hypertension     Family history of coronary artery disease     Fibromyalgia     Gallbladder abscess     H. pylori infection     Hiatal hernia     Hyperlipidemia     Hypothyroidism     Low back pain     Lumbosacral disc disease     Migraines     migraines    Multiple sclerosis     Osteoarthritis     Peripheral neuropathy     Psoriasis     Psoriatic arthritis     RA (rheumatoid arthritis)     Rheumatoid arthritis     Sinusitis     Sjogren's syndrome     Stomach ulcer      Thoracic disc disorder     TMJ arthritis     Type 2 diabetes mellitus     Urinary tract infection        Past Surgical History:   Procedure Laterality Date    BACK SURGERY      BREAST LUMPECTOMY Left 11/1993    CARDIAC CATHETERIZATION Left 09/21/2009    Normal     CARDIAC CATHETERIZATION N/A 6/24/2025    Procedure: Left Heart Cath;  Surgeon: Teodora Barron MD;  Location:  [facility];  Service: Cardiovascular;  Laterality: N/A;    CARPAL TUNNEL RELEASE  03/2012    CERVICAL POLYPECTOMY  12/2015    CHOLECYSTECTOMY  05/2007    COLONOSCOPY      ENDOSCOPY      EPIDURAL BLOCK      GASTRIC BYPASS  09/2007    JOINT REPLACEMENT      KNEE ARTHROPLASTY      LUMBAR DISC SURGERY      C5-6    NECK SURGERY      REPLACEMENT TOTAL KNEE Right 06/2016    SACRAL NERVE STIMULATOR PLACEMENT  09/2014    SACRAL NERVE STIMULATOR PLACEMENT  04/11/2024    @ [facility] in [place]    SACRAL NERVE STIMULATOR PLACEMENT Right 04/17/2024    THYROIDECTOMY  03/2016    TRIGGER POINT INJECTION               IRF OT ASSESSMENT FLOWSHEET (Last 12 Hours)       IRF OT Evaluation and Treatment       Row Name 07/31/25 0905          OT Time and Intention    Document Type daily treatment  -CJ     Mode of Treatment occupational therapy  -     Symptoms Noted During/After Treatment none  -       Row Name 07/31/25 0905          General Information    Patient/Family/Caregiver Comments/Observations agreeable to therapy; reported feeling better today; VSS  -CJ     Existing Precautions/Restrictions fall;weight bearing  TTWB LLE, ABD binder- bp  -       Row Name 07/31/25 0905          Bathing    [place] Level (Bathing) minimum assist (75% patient effort);verbal cues  -       Row Name 07/31/25 0905          Upper Body Dressing    [place] Level (Upper Body Dressing) set up assistance;bra/undergarment;pull over garment  -       Row Name 07/31/25 0905          Lower Body Dressing    [place] Level (Lower Body Dressing) minimum assist  (75% patient effort);moderate assist (50% patient effort);verbal cues  -     Assistive Device Use (Lower Body Dressing) reacher;sock-aid  -       Row Name 07/31/25 0905          Grooming    [place] Level (Grooming) set up  -       Row Name 07/31/25 0905          Toileting    [place] Level (Toileting) maximum assist (25% patient effort);verbal cues  -     Assistive Device Use (Toileting) grab bar/safety frame;raised toilet seat  -       Row Name 07/31/25 0905          Bed-Chair Transfer    Bed-Chair [place] (Transfers) minimum assist (75% patient effort);verbal cues  -     Assistive Device (Bed-Chair Transfers) wheelchair  -       Row Name 07/31/25 0905          Toilet Transfer    [place] Level (Toilet Transfer) minimum assist (75% patient effort);moderate assist (50% patient effort);verbal cues  -     Assistive Device (Toilet Transfer) grab bars/safety frame;raised toilet seat;wheelchair  -       Row Name 07/31/25 0905          Motor Skills    Motor Skills functional endurance  -     Motor Control/Coordination Interventions therapeutic exercise/ROM  BUE ther ex, AROM, bilat coord ex, PRE  -     Therapeutic Exercise --  UBE, hand exerciser  -     Additional Documentation --  TA to increase fxl act leticia / strength to improve ADL status.  -       Row Name 07/31/25 0905          Positioning and Restraints    Post Treatment Position wheelchair  -     In Wheelchair sitting;with PT;exit alarm on  -       Row Name 07/31/25 0905          Vital Signs    Pre Systolic BP Rehab 110  -     Pre Treatment Diastolic BP 71  -     Intra Systolic BP Rehab 132  -     Intra Treatment Diastolic BP 70  -               User Key  (r) = Recorded By, (t) = Taken By, (c) = Cosigned By      Initials Name Effective Dates     Amber Badillo OT 06/16/21 -                      Occupational Therapy Education       Title: PT OT SLP Therapies (Done)       Topic: Occupational Therapy (Done)        Point: ADL training (Done)       Learning Progress Summary            Patient Acceptance, E,D, VU,NR by  at 7/31/2025 0911    Acceptance, E,D, VU,NR by  at 7/30/2025 1459    Acceptance, E,D, VU,NR by  at 7/29/2025 1207    Acceptance, E,D, VU,NR by  at 7/28/2025 1443                      Point: Precautions (Done)       Learning Progress Summary            Patient Acceptance, E,D, VU,NR by  at 7/31/2025 0911    Acceptance, E,D, VU,NR by  at 7/30/2025 1459    Acceptance, E,D, VU,NR by  at 7/29/2025 1207    Acceptance, E,D, VU,NR by  at 7/28/2025 1443                                      User Key       Initials Effective Dates Name Provider Type [facility] 06/16/21 -  Amber Badillo OT Occupational Therapist OT                        OT Recommendation and Plan    Planned Therapy Interventions (OT): activity tolerance training, adaptive equipment training, BADL retraining, occupation/activity based interventions, patient/caregiver education/training, ROM/therapeutic exercise, strengthening exercise (impaired ADL's, strength, fxl mobility, and activity tolerance)                    Time Calculation:      Time Calculation- OT       Row Name 07/31/25 0745             Time Calculation-     OT Start Time 0745  -      OT Stop Time 0915  -      OT Time Calculation (min) 90 min  -      Total Timed Code Minutes- OT 90 minute(s)  -                User Key  (r) = Recorded By, (t) = Taken By, (c) = Cosigned By      Initials Name Provider Type     Amber Badillo OT Occupational Therapist                  Therapy Charges for Today       Code Description Service Date Service Provider Modifiers Qty    44708034738 HC OT SELF CARE/MGMT/TRAIN EA 15 MIN 7/30/2025 Amber Badillo OT GO 3    9196185 HC OT SELF CARE/MGMT/TRAIN EA 15 MIN 7/31/2025 Amber Badillo OT GO 3    22546967823 HC OT THER PROC EA 15 MIN 7/31/2025 Amber Badillo OT GO 2    51122967083 HC OT THERAPEUTIC ACT EA 15 MIN  7/31/2025 Amber Badillo, OT GO 1                     Amber Badillo, OT  7/31/2025

## 2025-07-31 NOTE — PLAN OF CARE
Goal Outcome Evaluation:           Progress: improving  Outcome Summary: New Admit         Problem: Rehabilitation (IRF) Plan of Care  Goal: Plan of Care Review  Outcome: Progressing  Flowsheets (Taken 7/30/2025 0682 by aKtie Sanford RN)  Progress: improving  Plan of Care Reviewed With: patient  Goal: Patient-Specific Goal (Individualized)  Outcome: Progressing  Goal: Absence of New-Onset Illness or Injury  Outcome: Progressing  Goal: Optimal Comfort and Wellbeing  Outcome: Progressing  Goal: Home and Community Transition Plan Established  Outcome: Progressing

## 2025-07-31 NOTE — THERAPY TREATMENT NOTE
Inpatient Rehabilitation - Physical Therapy Treatment Note       GUANACO Portillo     Patient Name: Lashae Benitez  : 1960  MRN: [id number]    Today's Date: 2025                    Admit Date: 2025      Visit Dx:   No diagnosis found.    Patient Active Problem List   Diagnosis    Hiatal hernia    Essential hypertension    Sjogren's syndrome    Multiple sclerosis    Psoriasis    Fibromyalgia    Osteoarthritis    Psoriatic arthritis    RA (rheumatoid arthritis)    Degenerative disc disease, lumbar    TMJ arthritis    Dupuytren's disease    H. pylori infection    Family history of coronary artery disease    Type 2 diabetes mellitus    Edema    Bilateral leg edema    Isolated corticotropin deficiency    Hyponatremia    Paroxysmal atrial fibrillation    Sepsis    Bacteremia, escherichia coli    Iron deficiency anemia    Malabsorption due to intolerance, not elsewhere classified    Chronic chest pain with high risk for CAD    Abnormal nuclear stress test    Abnormal computed tomography angiography (CTA)    Hip fracture    Closed intertrochanteric fracture of hip, left, initial encounter    Closed left hip fracture, sequela       Past Medical History:   Diagnosis Date    Acid reflux     Allergic     Anemia     Anxiety     Atrial fibrillation     Cancer     thyroid, skin    Cervical disc disorder     Chronic pain disorder     Claustrophobia     CTS (carpal tunnel syndrome)     Degenerative disc disease, lumbar     Depression     Dupuytren's disease     Essential hypertension     Family history of coronary artery disease     Fibromyalgia     Gallbladder abscess     H. pylori infection     Hiatal hernia     Hyperlipidemia     Hypothyroidism     Low back pain     Lumbosacral disc disease     Migraines     migraines    Multiple sclerosis     Osteoarthritis     Peripheral neuropathy     Psoriasis     Psoriatic arthritis     RA (rheumatoid arthritis)     Rheumatoid arthritis     Sinusitis     Sjogren's syndrome      Stomach ulcer     Thoracic disc disorder     TMJ arthritis     Type 2 diabetes mellitus     Urinary tract infection        Past Surgical History:   Procedure Laterality Date    BACK SURGERY      BREAST LUMPECTOMY Left 11/1993    CARDIAC CATHETERIZATION Left 09/21/2009    Normal     CARDIAC CATHETERIZATION N/A 6/24/2025    Procedure: Left Heart Cath;  Surgeon: Teodora Barron MD;  Location:  [facility];  Service: Cardiovascular;  Laterality: N/A;    CARPAL TUNNEL RELEASE  03/2012    CERVICAL POLYPECTOMY  12/2015    CHOLECYSTECTOMY  05/2007    COLONOSCOPY      ENDOSCOPY      EPIDURAL BLOCK      GASTRIC BYPASS  09/2007    JOINT REPLACEMENT      KNEE ARTHROPLASTY      LUMBAR DISC SURGERY      C5-6    NECK SURGERY      REPLACEMENT TOTAL KNEE Right 06/2016    SACRAL NERVE STIMULATOR PLACEMENT  09/2014    SACRAL NERVE STIMULATOR PLACEMENT  04/11/2024    @ [facility] in [place]    SACRAL NERVE STIMULATOR PLACEMENT Right 04/17/2024    THYROIDECTOMY  03/2016    TRIGGER POINT INJECTION         PT ASSESSMENT (Last 12 Hours)       [facility] PT Evaluation and Treatment       Row Name 07/31/25 0926          PT Time and Intention    Document Type daily treatment  -     Mode of Treatment physical therapy  -     Total Minutes, Physical Therapy 45  -       Row Name 07/31/25 0926          General Information    General Observations of Patient Pt agreeable to PT, pleasant and cooperative.  -     Existing Precautions/Restrictions fall;weight bearing  L LE TTWB, abdominal binder w/ lower BP  -       Row Name 07/31/25 0926          Pain    Additional Documentation Pain Scale: FACES Pre/Post-Treatment (Group)  -       Row Name 07/31/25 0926          Pain Scale: FACES Pre/Post-Treatment    Pain: FACES Scale, Pretreatment 2-->hurts little bit  -KH     Posttreatment Pain Rating 2-->hurts little bit  -KH     Pre/Posttreatment Pain Comment based on PT observation  -       Row Name 07/31/25 0926          Mobility     Extremity Weight-bearing Status left lower extremity  -     Left Lower Extremity (Weight-bearing Status) toe touch weight-bearing (TTWB)  -       Row Name 07/31/25 0926          Bed Mobility    Bed Mobility sit-supine  -     Sit-Supine [place] (Bed Mobility) standby assist;contact guard  -       Row Name 07/31/25 0926          Transfer Assessment/Treatment    Transfers sit-stand transfer;stand-sit transfer;chair-bed transfer  -       Row Name 07/31/25 0926          Chair-Bed Transfer    Chair-Bed [place] (Transfers) minimum assist (75% patient effort)  -     Assistive Device (Chair-Bed Transfers) wheelchair  bed rail  -       Row Name 07/31/25 0926          Sit-Stand Transfer    Sit-Stand [place] (Transfers) contact guard;minimum assist (75% patient effort);1 person assist;2 person assist;1 person to manage equipment  -     Assistive Device (Sit-Stand Transfers) parallel bars;wheelchair;walker, front-wheeled  -[facility] Name 07/31/25 0926          Stand-Sit Transfer    Stand-Sit [place] (Transfers) contact guard;minimum assist (75% patient effort)  -     Assistive Device (Stand-Sit Transfers) wheelchair;parallel bars;walker, front-wheeled  -[facility] Name 07/31/25 0926          Gait/Stairs (Locomotion)    Gait/Stairs Locomotion gait/ambulation assistive device  -     [place] Level (Gait) minimum assist (75% patient effort);contact guard  -     Assistive Device (Gait) walker, front-wheeled;parallel bars  -     Patient was able to Ambulate yes  -     Distance in Feet (Gait) 1.5  approx., 3 hops in // bars  -     Comment, (Gait/Stairs) Pt does fairly well to maintain TTWB  -[facility] Name 07/31/25 0926          Motor Skills    Therapeutic Exercise hip;knee;ankle  hip flex, hip add, S/LAQ, AP/HR/TR  -[facility] Name 07/31/25 0926          Positioning and Restraints    Pre-Treatment Position sitting in chair/recliner  -     Post Treatment Position bed  -      In Bed supine;call light within reach  pt understands to not get up w/o assist, expresses she is hesitant to anyway  -       Row Name 07/31/25 0926          Vital Signs    Pre Systolic BP Rehab 133  -KH     Pre Treatment Diastolic BP 70  -KH     Intra Systolic BP Rehab 136  -KH     Intra Treatment Diastolic BP 77  with 3 hops  -KH     Post Systolic BP Rehab 116  -KH     Post Treatment Diastolic BP 79  sitting after multiple stands and hops  -KH     Pre Patient Position Sitting  -KH     Intra Patient Position Standing  89/54 was the 3rd stand vitals but improved to 116/79  -KH     Post Patient Position Sitting  -KH     Vitals Comment Post tx BP was recorded when pt was sitting in W/C but continued with therapy session seated  -       Row Name 07/31/25 0926          Daily Progress Summary (PT)    Daily Progress Summary (PT) Pt tolerated therapy well with good BP readings (all on flow sheet). No change to POC, prognosis fair to good.  -               User Key  (r) = Recorded By, (t) = Taken By, (c) = Cosigned By      Initials Name Provider Type    Aleisah Baxter, PT Physical Therapist                  Wound 07/19/25 0921 Left gluteal Other (Comments) (Active)   Dressing Appearance dry;intact 07/31/25 0852   Closure Open to air 07/31/25 0852   Base red;nonblanchable 07/31/25 0852   Periwound warm;pink;redness 07/31/25 0852   Periwound Temperature warm 07/31/25 0852   Periwound Skin Turgor soft 07/31/25 0852   Drainage Amount none 07/30/25 2048   Care, Wound cleansed with;soap and water;barrier applied 07/30/25 2048   Dressing Care other (see comments) 07/30/25 2000   Periwound Care dry periwound area maintained 07/30/25 2000       Wound 07/19/25 0938 Left anterior thigh Surgical Open Surgical Incision (Active)   Dressing Appearance dry;intact 07/31/25 0530   Closure Staples 07/31/25 0530   Base pink;red 07/31/25 0852   Periwound pink;dry 07/31/25 0530   Drainage Amount none 07/31/25 0530   Care, Wound  cleansed with;sterile normal saline 07/31/25 0530   Dressing Care dressing changed 07/31/25 0530       Wound 07/21/25 0930 Right coccyx Pressure Injury (Active)   Closure Open to air 07/31/25 0852   Periwound redness 07/30/25 2000   Periwound Temperature warm 07/30/25 2000   Periwound Skin Turgor soft 07/30/25 2000   Drainage Amount none 07/30/25 2048   Care, Wound cleansed with;barrier applied 07/31/25 0852   Dressing Care open to air 07/31/25 0852   Periwound Care dry periwound area maintained 07/30/25 2000       Wound 07/28/25 0829 coccyx (Active)   Dressing Appearance open to air 07/31/25 0852   Base pink 07/31/25 0852   Periwound pink 07/31/25 0852   Periwound Temperature warm 07/31/25 0852   Periwound Skin Turgor soft 07/31/25 0852   Edges irregular 07/31/25 0852   Care, Wound cleansed with;soap and water;barrier applied 07/31/25 0852     Physical Therapy Education       Title: PT OT SLP Therapies (Done)       Topic: Physical Therapy (Done)       Point: Mobility training (Done)       Learning Progress Summary            Patient Acceptance, E,D, VU,NR by RG at 7/30/2025 1348    Acceptance, E,D, VU,NR by RG at 7/29/2025 1434    Acceptance, E,D, VU,NR by RG at 7/28/2025 1438    Acceptance, E,D, VU,NR by LL at 7/26/2025 1615    Acceptance, E,D, VU,NR by RG at 7/25/2025 1407    Acceptance, E,D, VU,NR by RG at 7/24/2025 1502    Acceptance, E, VU,NR by LB at 7/23/2025 1612                      Point: Home exercise program (Done)       Learning Progress Summary            Patient Acceptance, E,D, VU,NR by RG at 7/30/2025 1348    Acceptance, E,D, VU,NR by RG at 7/29/2025 1434    Acceptance, E,D, VU,NR by RG at 7/28/2025 1438    Acceptance, E,D, VU,NR by LL at 7/26/2025 1615    Acceptance, E,D, VU,NR by RG at 7/25/2025 1407    Acceptance, E,D, VU,NR by RG at 7/24/2025 1502    Acceptance, E, VU,NR by LB at 7/23/2025 1612                      Point: Body mechanics (Done)       Learning Progress Summary            Patient  Acceptance, E,D, VU,NR by RG at 7/30/2025 1348    Acceptance, E,D, VU,NR by RG at 7/29/2025 1434    Acceptance, E,D, VU,NR by RG at 7/28/2025 1438    Acceptance, E,D, VU,NR by LL at 7/26/2025 1615    Acceptance, E,D, VU,NR by RG at 7/25/2025 1407    Acceptance, E,D, VU,NR by RG at 7/24/2025 1502    Acceptance, E, VU,NR by LB at 7/23/2025 1612                      Point: Precautions (Done)       Learning Progress Summary            Patient Acceptance, E,D, VU,NR by RG at 7/30/2025 1348    Acceptance, E,D, VU,NR by RG at 7/29/2025 1434    Acceptance, E,D, VU,NR by RG at 7/28/2025 1438    Acceptance, E,D, VU,NR by LL at 7/26/2025 1615    Acceptance, E,D, VU,NR by RG at 7/25/2025 1407    Acceptance, E,D, VU,NR by RG at 7/24/2025 1502    Acceptance, E, VU,NR by LB at 7/23/2025 1612                                      User Key       Initials Effective Dates Name Provider Type Discipline     06/16/21 -  Andreia Dove, PT Physical Therapist PT     05/02/16 -  Beverly Goff PTA Physical Therapist Assistant PT     06/16/21 -  Neri Cornelius PTA Physical Therapist Assistant PT                    PT Recommendation and Plan          Daily Progress Summary (PT)  Daily Progress Summary (PT): Pt tolerated therapy well with good BP readings (all on flow sheet). No change to POC, prognosis fair to good.               Time Calculation:      PT Charges       Row Name 07/31/25 1238             Time Calculation    Start Time 0926  -      Stop Time 1011  AM treatment  -      Time Calculation (min) 45 min  -      PT Received On 07/31/25  -                User Key  (r) = Recorded By, (t) = Taken By, (c) = Cosigned By      Initials Name Provider Type     Aleisha Jaimes, PT Physical Therapist                    Therapy Charges for Today       Code Description Service Date Service Provider Modifiers Qty    08268015273 HC PT THER PROC EA 15 MIN 7/31/2025 Aleisha Jaimes, PT GP 1    [id number]  GAIT TRAINING EA  15 MIN 7/31/2025 Aleisha Jaimes, PT GP 1    44634660343 HC PT THERAPEUTIC ACT EA 15 MIN 7/31/2025 Aleisha Jaimes, PT GP 1              PT G-Codes  AM-PAC 6 Clicks Score (PT): 16      Aleisha Jaimes, PT  7/31/2025

## 2025-07-31 NOTE — PLAN OF CARE
Problem: Rehabilitation (IR[facility]) Plan of Care  Goal: Plan of Care Review  Outcome: Progressing  Flowsheets (Taken 7/30/2025 5208)  Progress: improving  Plan of Care Reviewed With: patient  Goal: Patient-Specific Goal (Individualized)  Outcome: Progressing  Goal: Absence of New-Onset Illness or Injury  Outcome: Progressing  Intervention: Identify and Manage Fall Risk  Description: Perform standard risk assessment on admission using a validated tool or comprehensive approach appropriate to the patient; reassess fall risk frequently, with change in status or transfer to another level of care.Communicate risk to interprofessional healthcare team; ensure fall risk visible cue.Determine need for increased observation, equipment and environmental modification, as well as use of supportive, nonskid footwear.Adjust safety measures to individual needs and identified risk factors.Reinforce the importance of active participation with fall risk prevention, safety and physical activity with the patient and family.Perform regular intentional rounding to assess need for position change, pain management, attention to personal needs and assistance with toileting.  Recent Flowsheet Documentation  Taken 7/30/2025 2200 by Katie Sanford, RN  Safety Promotion/Fall Prevention:   nonskid shoes/slippers when out of bed   safety round/check completed  Taken 7/30/2025 2000 by Katie Sanford RN  Safety Promotion/Fall Prevention:   nonskid shoes/slippers when out of bed   safety round/check completed  Goal: Optimal Comfort and Wellbeing  Outcome: Progressing  Goal: Home and Community Transition Plan Established  Outcome: Progressing   Goal Outcome Evaluation:  Plan of Care Reviewed With: patient        Progress: improving

## 2025-07-31 NOTE — THERAPY TREATMENT NOTE
Inpatient Rehabilitation - Physical Therapy Treatment Note       GUANACO Portillo     Patient Name: Lashae Benitez  : 1960  MRN: [id number]    Today's Date: 2025                    Admit Date: 2025      Visit Dx:   No diagnosis found.    Patient Active Problem List   Diagnosis    Hiatal hernia    Essential hypertension    Sjogren's syndrome    Multiple sclerosis    Psoriasis    Fibromyalgia    Osteoarthritis    Psoriatic arthritis    RA (rheumatoid arthritis)    Degenerative disc disease, lumbar    TMJ arthritis    Dupuytren's disease    H. pylori infection    Family history of coronary artery disease    Type 2 diabetes mellitus    Edema    Bilateral leg edema    Isolated corticotropin deficiency    Hyponatremia    Paroxysmal atrial fibrillation    Sepsis    Bacteremia, escherichia coli    Iron deficiency anemia    Malabsorption due to intolerance, not elsewhere classified    Chronic chest pain with high risk for CAD    Abnormal nuclear stress test    Abnormal computed tomography angiography (CTA)    Hip fracture    Closed intertrochanteric fracture of hip, left, initial encounter    Closed left hip fracture, sequela       Past Medical History:   Diagnosis Date    Acid reflux     Allergic     Anemia     Anxiety     Atrial fibrillation     Cancer     thyroid, skin    Cervical disc disorder     Chronic pain disorder     Claustrophobia     CTS (carpal tunnel syndrome)     Degenerative disc disease, lumbar     Depression     Dupuytren's disease     Essential hypertension     Family history of coronary artery disease     Fibromyalgia     Gallbladder abscess     H. pylori infection     Hiatal hernia     Hyperlipidemia     Hypothyroidism     Low back pain     Lumbosacral disc disease     Migraines     migraines    Multiple sclerosis     Osteoarthritis     Peripheral neuropathy     Psoriasis     Psoriatic arthritis     RA (rheumatoid arthritis)     Rheumatoid arthritis     Sinusitis     Sjogren's syndrome      Stomach ulcer     Thoracic disc disorder     TMJ arthritis     Type 2 diabetes mellitus     Urinary tract infection        Past Surgical History:   Procedure Laterality Date    BACK SURGERY      BREAST LUMPECTOMY Left 11/1993    CARDIAC CATHETERIZATION Left 09/21/2009    Normal     CARDIAC CATHETERIZATION N/A 6/24/2025    Procedure: Left Heart Cath;  Surgeon: Teodora Barron MD;  Location:  [facility];  Service: Cardiovascular;  Laterality: N/A;    CARPAL TUNNEL RELEASE  03/2012    CERVICAL POLYPECTOMY  12/2015    CHOLECYSTECTOMY  05/2007    COLONOSCOPY      ENDOSCOPY      EPIDURAL BLOCK      GASTRIC BYPASS  09/2007    JOINT REPLACEMENT      KNEE ARTHROPLASTY      LUMBAR DISC SURGERY      C5-6    NECK SURGERY      REPLACEMENT TOTAL KNEE Right 06/2016    SACRAL NERVE STIMULATOR PLACEMENT  09/2014    SACRAL NERVE STIMULATOR PLACEMENT  04/11/2024    @ [facility] in [place]    SACRAL NERVE STIMULATOR PLACEMENT Right 04/17/2024    THYROIDECTOMY  03/2016    TRIGGER POINT INJECTION         PT ASSESSMENT (Last 12 Hours)       [facility] PT Evaluation and Treatment       Row Name 07/31/25 1540 07/31/25 0926       PT Time and Intention    Document Type daily treatment  PM Session  -LL daily treatment  -    Mode of Treatment physical therapy;individual therapy  - physical therapy  -    Patient/Family/Caregiver Comments/Observations Patient states that she is feeling much better and was agreeable to PT participation.  -LL --    Total Minutes, Physical Therapy -- 45  -      Row Name 07/31/25 1540 07/31/25 0926       General Information    General Observations of Patient -- Pt agreeable to PT, pleasant and cooperative.  -    Existing Precautions/Restrictions fall;weight bearing  L LE TTWB, abdominal binder w/ lower BP  - fall;weight bearing  L LE TTWB, abdominal binder w/ lower BP  -      Row Name 07/31/25 0926          Pain    Additional Documentation Pain Scale: FACES Pre/Post-Treatment (Group)  -        [facility] Name 07/31/25 1540 07/31/25 0926       Pain Scale: FACES Pre/Post-Treatment    Pain: FACES Scale, Pretreatment 2-->hurts little bit  -LL 2-->hurts little bit  -KH    Posttreatment Pain Rating 2-->hurts little bit  -LL 2-->hurts little bit  -KH    Pre/Posttreatment Pain Comment -- based on PT observation  -KH      Row Name 07/31/25 1540          Cognition/Psychosocial    Affect/Mental Status (Cognition) WFL  -LL     Orientation Status (Cognition) oriented x 3  -LL     Follows Commands (Cognition) verbal cues/prompting required;physical/tactile prompts required  -LL     Personal Safety Interventions fall prevention program maintained;gait belt;nonskid shoes/slippers when out of bed;supervised activity  -     Cognitive Function (Cognition) WFL  -LL       [facility] Name 07/31/25 1540 07/31/25 0926       Mobility    Extremity Weight-bearing Status left lower extremity  - left lower extremity  -    Left Lower Extremity (Weight-bearing Status) toe touch weight-bearing (TTWB)  -LL toe touch weight-bearing (TTWB)  -KH      Row Name 07/31/25 1540 07/31/25 0926       Bed Mobility    Bed Mobility sit-supine  -LL sit-supine  -    Sit-Supine Gillespie (Bed Mobility) -- standby assist;contact guard  -    Supine-Sit-Supine Gillespie (Bed Mobility) contact guard  - --      [facility] Name 07/31/25 1540 07/31/25 0926       Transfer Assessment/Treatment    Transfers sit-stand transfer;stand-sit transfer;chair-bed transfer  - sit-stand transfer;stand-sit transfer;chair-bed transfer  -KH      Row Name 07/31/25 1540          Bed-Chair Transfer    Bed-Chair Gillespie (Transfers) minimum assist (75% patient effort);verbal cues  -     Assistive Device (Bed-Chair Transfers) wheelchair  arm rest  -[facility] Name 07/31/25 1540 07/31/25 0926       Chair-Bed Transfer    Chair-Bed Gillespie (Transfers) minimum assist (75% patient effort)  - minimum assist (75% patient effort)  -    Assistive Device (Chair-Bed Transfers)  wheelchair  bed rail  -LL wheelchair  bed rail  -      Row Name 07/31/25 1540 07/31/25 0926       Sit-Stand Transfer    Sit-Stand [place] (Transfers) contact guard;minimum assist (75% patient effort);1 person assist;2 person assist;1 person to manage equipment  -LL contact guard;minimum assist (75% patient effort);1 person assist;2 person assist;1 person to manage equipment  -    Assistive Device (Sit-Stand Transfers) wheelchair;walker, front-wheeled  -LL parallel bars;wheelchair;walker, front-wheeled  -KH      Row Name 07/31/25 1540 07/31/25 0926       Stand-Sit Transfer    Stand-Sit [place] (Transfers) contact guard;minimum assist (75% patient effort)  - contact guard;minimum assist (75% patient effort)  -    Assistive Device (Stand-Sit Transfers) wheelchair;walker, front-wheeled  -LL wheelchair;parallel bars;walker, front-wheeled  -      Row Name 07/31/25 1540 07/31/25 0926       Gait/Stairs (Locomotion)    Gait/Stairs Locomotion gait/ambulation assistive device  -LL gait/ambulation assistive device  -    [place] Level (Gait) minimum assist (75% patient effort);2 person assist;verbal cues;nonverbal cues (demo/gesture)  -LL minimum assist (75% patient effort);contact guard  -    Assistive Device (Gait) walker, front-wheeled  -LL walker, front-wheeled;parallel bars  -    Patient was able to Ambulate -- yes  -    Distance in Feet (Gait) --  15', 25'  -LL 1.5  approx., 3 hops in // bars  -    Pattern (Gait) step-to  TTWB on L LE  -LL --    Comment, (Gait/Stairs) -- Pt does fairly well to maintain TTWB  -      Row Name 07/31/25 1540 07/31/25 0926       Motor Skills    Therapeutic Exercise --  Sitting: ball squeeze, GTB ham curl, GTB hip abd/add  -LL hip;knee;ankle  hip flex, hip add, S/LAQ, AP/HR/TR  -      Row Name 07/31/25 1540 07/31/25 0926       Positioning and Restraints    Pre-Treatment Position in bed  -LL sitting in chair/recliner  -    Post Treatment Position bed  -LL  bed  -KH    In Bed fowlers;call light within reach;encouraged to call for assist;exit alarm on;side rails up x2;heels elevated;with family/caregiver  -LL supine;call light within reach  pt understands to not get up w/o assist, expresses she is hesitant to anyway  -GONZALEZ      Row Name 07/31/25 1540 07/31/25 0926       Vital Signs    Pre Systolic BP Rehab 138  -  -KH    Pre Treatment Diastolic BP 75  -LL 70  -KH    Intra Systolic BP Rehab 134  -  -KH    Intra Treatment Diastolic BP 77  -LL 77  with 3 hops  -KH    Post Systolic BP Rehab -- 116  -KH    Post Treatment Diastolic BP -- 79  sitting after multiple stands and hops  -KH    Pretreatment Heart Rate (beats/min) 113  -LL --    Intratreatment Heart Rate (beats/min) 120  -LL --    Pre Patient Position Sitting  -LL Sitting  -KH    Intra Patient Position Sitting  -LL Standing  89/54 was the 3rd stand vitals but improved to 116/79  -KH    Post Patient Position -- Sitting  -KH    Vitals Comment BP was taking right after patient got into WC (138/75) and after 1st (137/80) & 2nd (134/77) trial of ambulation.  -LL Post tx BP was recorded when pt was sitting in W/C but continued with therapy session seated  -      Row Name 07/31/25 0926          Daily Progress Summary (PT)    Daily Progress Summary (PT) Pt tolerated therapy well with good BP readings (all on flow sheet). No change to POC, prognosis fair to good.  -               User Key  (r) = Recorded By, (t) = Taken By, (c) = Cosigned By      Initials Name Provider Type    Beverly Mckeon, PTA Physical Therapist Assistant    Aleisha Baxter, PT Physical Therapist                  Wound 07/19/25 0921 Left gluteal Other (Comments) (Active)   Dressing Appearance dry;intact 07/31/25 0852   Closure Open to air 07/31/25 0852   Base red;nonblanchable 07/31/25 0852   Periwound warm;pink;redness 07/31/25 0852   Periwound Temperature warm 07/31/25 0852   Periwound Skin Turgor soft 07/31/25 0852   Drainage  Amount none 07/30/25 2048   Care, Wound cleansed with;soap and water;barrier applied 07/30/25 2048   Dressing Care other (see comments) 07/30/25 2000   Periwound Care dry periwound area maintained 07/30/25 2000       Wound 07/19/25 0938 Left anterior thigh Surgical Open Surgical Incision (Active)   Dressing Appearance dry;intact 07/31/25 0530   Closure Staples 07/31/25 0530   Base pink;red 07/31/25 0852   Periwound pink;dry 07/31/25 0530   Drainage Amount none 07/31/25 0530   Care, Wound cleansed with;sterile normal saline 07/31/25 0530   Dressing Care dressing changed 07/31/25 0530       Wound 07/21/25 0930 Right coccyx Pressure Injury (Active)   Closure Open to air 07/31/25 0852   Periwound redness 07/30/25 2000   Periwound Temperature warm 07/30/25 2000   Periwound Skin Turgor soft 07/30/25 2000   Drainage Amount none 07/30/25 2048   Care, Wound cleansed with;barrier applied 07/31/25 0852   Dressing Care open to air 07/31/25 0852   Periwound Care dry periwound area maintained 07/30/25 2000       Wound 07/28/25 0829 coccyx (Active)   Dressing Appearance open to air 07/31/25 0852   Base pink 07/31/25 0852   Periwound pink 07/31/25 0852   Periwound Temperature warm 07/31/25 0852   Periwound Skin Turgor soft 07/31/25 0852   Edges irregular 07/31/25 0852   Care, Wound cleansed with;soap and water;barrier applied 07/31/25 0852     Physical Therapy Education       Title: PT OT SLP Therapies (Done)       Topic: Physical Therapy (Done)       Point: Mobility training (Done)       Learning Progress Summary            Patient Acceptance, E,D, VU by LL at 7/31/2025 1546    Acceptance, E,D, VU,NR by RG at 7/30/2025 1348    Acceptance, E,D, VU,NR by RG at 7/29/2025 1434    Acceptance, E,D, VU,NR by RG at 7/28/2025 1438    Acceptance, E,D, VU,NR by LL at 7/26/2025 1615    Acceptance, E,D, VU,NR by RG at 7/25/2025 1407    Acceptance, E,D, VU,NR by RG at 7/24/2025 1502    Acceptance, E, VU,NR by LB at 7/23/2025 1612   Significant  Other Acceptance, E,D, VU by LL at 7/31/2025 1546                      Point: Home exercise program (Done)       Learning Progress Summary            Patient Acceptance, E,D, VU by LL at 7/31/2025 1546    Acceptance, E,D, VU,NR by RG at 7/30/2025 1348    Acceptance, E,D, VU,NR by RG at 7/29/2025 1434    Acceptance, E,D, VU,NR by RG at 7/28/2025 1438    Acceptance, E,D, VU,NR by LL at 7/26/2025 1615    Acceptance, E,D, VU,NR by RG at 7/25/2025 1407    Acceptance, E,D, VU,NR by RG at 7/24/2025 1502    Acceptance, E, VU,NR by LB at 7/23/2025 1612   Significant Other Acceptance, E,D, VU by LL at 7/31/2025 1546                      Point: Body mechanics (Done)       Learning Progress Summary            Patient Acceptance, E,D, VU by LL at 7/31/2025 1546    Acceptance, E,D, VU,NR by RG at 7/30/2025 1348    Acceptance, E,D, VU,NR by RG at 7/29/2025 1434    Acceptance, E,D, VU,NR by RG at 7/28/2025 1438    Acceptance, E,D, VU,NR by LL at 7/26/2025 1615    Acceptance, E,D, VU,NR by RG at 7/25/2025 1407    Acceptance, E,D, VU,NR by RG at 7/24/2025 1502    Acceptance, E, VU,NR by LB at 7/23/2025 1612   Significant Other Acceptance, E,D, VU by LL at 7/31/2025 1546                      Point: Precautions (Done)       Learning Progress Summary            Patient Acceptance, E,D, VU by LL at 7/31/2025 1546    Acceptance, E,D, VU,NR by RG at 7/30/2025 1348    Acceptance, E,D, VU,NR by RG at 7/29/2025 1434    Acceptance, E,D, VU,NR by RG at 7/28/2025 1438    Acceptance, E,D, VU,NR by LL at 7/26/2025 1615    Acceptance, E,D, VU,NR by RG at 7/25/2025 1407    Acceptance, E,D, VU,NR by RG at 7/24/2025 1502    Acceptance, E, VU,NR by LB at 7/23/2025 1612   Significant Other Acceptance, E,D, VU by LL at 7/31/2025 1546                                      User Key       Initials Effective Dates Name Provider Type Discipline    LB 06/16/21 -  Andreia Dove, PT Physical Therapist PT    LL 05/02/16 -  Beverly Goff, PTA Physical  Therapist Assistant PT    RG 06/16/21 -  Neri Cornelius PTA Physical Therapist Assistant PT                    PT Recommendation and Plan    Frequency of Treatment (PT): 5 times per week  Anticipated Equipment Needs at Discharge (PT Eval):  (tbd)                  Time Calculation:      PT Charges       Row Name 07/31/25 1550 07/31/25 1238          Time Calculation    Start Time 1330  -LL 0926  -     Stop Time 1415  -LL 1011  AM treatment  -     Time Calculation (min) 45 min  -LL 45 min  -KH     PT Received On -- 07/31/25  -     PT Goal Re-Cert Due Date 08/06/25  - --        Time Calculation- PT    Total Timed Code Minutes- PT 45 minute(s)  - --               User Key  (r) = Recorded By, (t) = Taken By, (c) = Cosigned By      Initials Name Provider Type     Beverly Goff PTA Physical Therapist Assistant    Aleisha Baxter, PT Physical Therapist                    Therapy Charges for Today       Code Description Service Date Service Provider Modifiers Qty    14275237076 HC GAIT TRAINING EA 15 MIN 7/31/2025 Beverly Goff PTA GP, CQ 1    17502774584 HC PT THERAPEUTIC ACT EA 15 MIN 7/31/2025 Beverly Goff PTA GP, CQ 1    10385003975 HC PT THER PROC EA 15 MIN 7/31/2025 Beverly Goff PTA GP, CQ 1              PT G-Codes  AM-PAC 6 Clicks Score (PT): 16      Junie Goff PTA  7/31/2025

## 2025-07-31 NOTE — PROGRESS NOTES
Assisted By: Patient seen with OT at the patient's sink doing grooming as well as OT when she was in the gym, also seen in PT.    CC: Follow-up on left hip fracture    Interview History/HPI: Patient states she was feeling better today and overall her color was better today she did appear to feel significantly better today after receiving IV fluids.  As of right now we have not seen orthostasis.  She does not feel nauseated this morning.  She thinks she slept fairly well.          Current Hospital Meds:  [Held by provider] amitriptyline, 25 mg, Oral, Nightly  budesonide-formoterol, 2 puff, Inhalation, BID - RT  cetirizine, 10 mg, Oral, Daily  cholecalciferol, 50,000 Units, Oral, Weekly  cyanocobalamin, 1,000 mcg, Intramuscular, Weekly  DULoxetine, 30 mg, Oral, Daily  DULoxetine, 60 mg, Oral, Daily  flecainide, 50 mg, Oral, BID  fludrocortisone, 50 mcg, Oral, Daily  folic acid, 1 mg, Oral, Daily  gabapentin, 400 mg, Oral, Q8H  insulin glargine, 10 Units, Subcutaneous, Daily  insulin lispro, 2-7 Units, Subcutaneous, 4x Daily PC & at Bedtime  levothyroxine, 150 mcg, Oral, Q AM  methotrexate, 25 mg, Oral, Weekly  [Held by provider] metoprolol tartrate, 25 mg, Oral, BID  midodrine, 2.5 mg, Oral, TID AC  pantoprazole, 40 mg, Oral, Q AM  rivaroxaban, 20 mg, Oral, Daily With Dinner         Vitals:    07/31/25 0703   BP:    Pulse: 96   Resp: 16   Temp:    SpO2: 98%         Intake/Output Summary (Last 24 hours) at 7/31/2025 1105  Last data filed at 7/31/2025 0900  Gross per 24 hour   Intake 1680 ml   Output --   Net 1680 ml       EXAM: 128/69, room air saturation 98 200, temperature 98.1.  Skin color better today, she is more alert and conversant today.  That to be set up appears to feel better today.  Lungs are clear, heart regular rate and rhythm, no edema strength is symmetric      Diet: Regular/House; Texture: Regular (IDDSI 7); Fluid Consistency: Thin (IDDSI 0)        LABS:     Lab Results (last 48 hours)       Procedure  Component Value Units Date/Time    POC Glucose 4x Daily PC & at Bedtime [[id number]]  (Abnormal) Collected: 07/31/25 1041    Specimen: Blood Updated: 07/31/25 1043     Glucose 132 mg/dL      Comment: Serial Number: [id number]:  [id number]       POC Glucose Once [[id number]]  (Normal) Collected: 07/31/25 0634    Specimen: Blood Updated: 07/31/25 0639     Glucose 108 mg/dL      Comment: Serial Number: [id number]:  [id number]       Comprehensive Metabolic Panel [[id number]]  (Abnormal) Collected: 07/31/25 0300    Specimen: Blood Updated: 07/31/25 0415     Glucose 92 mg/dL      BUN 9.7 mg/dL      Creatinine 0.82 mg/dL      Sodium 139 mmol/L      Potassium 4.2 mmol/L      Chloride 104 mmol/L      CO2 26.1 mmol/L      Calcium 8.1 mg/dL      Total Protein 5.3 g/dL      Albumin 2.9 g/dL      ALT (SGPT) 12 U/L      AST (SGOT) 23 U/L      Alkaline Phosphatase 212 U/L      Total Bilirubin 0.7 mg/dL      Globulin 2.4 gm/dL      A/G Ratio 1.2 g/dL      BUN/Creatinine Ratio 11.8     Anion Gap 8.9 mmol/L      eGFR 80.5 mL/min/1.73     Narrative:      GFR Categories in Chronic Kidney Disease (CKD)              GFR Category          GFR (mL/min/1.73)    Interpretation  G1                    90 or greater        Normal or high (1)  G2                    60-89                Mild decrease (1)  G3a                   45-59                Mild to moderate decrease  G3b                   30-44                Moderate to severe decrease  G4                    15-29                Severe decrease  G5                    14 or less           Kidney failure    (1)In the absence of evidence of kidney disease, neither GFR category G1 or G2 fulfill the criteria for CKD.    eGFR calculation 2021 CKD-EPI creatinine equation, which does not include race as a factor    CBC & Differential [[id number]]  (Abnormal) Collected: 07/31/25 0300    Specimen: Blood Updated: 07/31/25 0359    Narrative:      The following orders were created for panel  order CBC & Differential.  Procedure                               Abnormality         Status                     ---------                               -----------         ------                     CBC Auto Differential[[id number]]        Abnormal            Final result               Scan Slide[[id number]]                                                                    Please view results for these tests on the individual orders.    CBC Auto Differential [[id number]]  (Abnormal) Collected: 07/31/25 0300    Specimen: Blood Updated: 07/31/25 0359     WBC 6.48 10*3/mm3      RBC 2.69 10*6/mm3      Hemoglobin 8.8 g/dL      Hematocrit 28.4 %      .6 fL      MCH 32.7 pg      MCHC 31.0 g/dL      RDW 16.4 %      RDW-SD 63.7 fl      MPV 8.5 fL      Platelets 656 10*3/mm3      Neutrophil % 71.3 %      Lymphocyte % 20.8 %      Monocyte % 4.8 %      Eosinophil % 1.7 %      Basophil % 0.8 %      Immature Grans % 0.6 %      Neutrophils, Absolute 4.62 10*3/mm3      Lymphocytes, Absolute 1.35 10*3/mm3      Monocytes, Absolute 0.31 10*3/mm3      Eosinophils, Absolute 0.11 10*3/mm3      Basophils, Absolute 0.05 10*3/mm3      Immature Grans, Absolute 0.04 10*3/mm3      nRBC 0.0 /100 WBC     POC Glucose Once [[id number]]  (Abnormal) Collected: 07/30/25 2002    Specimen: Blood Updated: 07/30/25 2005     Glucose 200 mg/dL      Comment: Serial Number: [id number]:  [id number]       POC Glucose Once [[id number]]  (Abnormal) Collected: 07/30/25 1613    Specimen: Blood Updated: 07/30/25 1615     Glucose 137 mg/dL      Comment: Serial Number: [id number]:  [id number]       POC Glucose Once [[id number]]  (Abnormal) Collected: 07/30/25 1105    Specimen: Blood Updated: 07/30/25 1110     Glucose 223 mg/dL      Comment: Serial Number: [id number]:  [id number]       Amylase [[id number]]  (Abnormal) Collected: 07/30/25 1009    Specimen: Blood Updated: 07/30/25 1042     Amylase 13 U/L     Lipase [[id number]]  (Normal) Collected:  07/30/25 1009    Specimen: Blood Updated: 07/30/25 1042     Lipase 21 U/L     Comprehensive Metabolic Panel [[id number]]  (Abnormal) Collected: 07/30/25 1009    Specimen: Blood Updated: 07/30/25 1042     Glucose 260 mg/dL      BUN 12.7 mg/dL      Creatinine 0.76 mg/dL      Sodium 134 mmol/L      Potassium 4.2 mmol/L      Chloride 100 mmol/L      CO2 25.3 mmol/L      Calcium 7.9 mg/dL      Total Protein 5.0 g/dL      Albumin 2.8 g/dL      ALT (SGPT) 9 U/L      AST (SGOT) 18 U/L      Alkaline Phosphatase 192 U/L      Total Bilirubin 0.7 mg/dL      Globulin 2.2 gm/dL      A/G Ratio 1.3 g/dL      BUN/Creatinine Ratio 16.7     Anion Gap 8.7 mmol/L      eGFR 88.2 mL/min/1.73     Narrative:      GFR Categories in Chronic Kidney Disease (CKD)              GFR Category          GFR (mL/min/1.73)    Interpretation  G1                    90 or greater        Normal or high (1)  G2                    60-89                Mild decrease (1)  G3a                   45-59                Mild to moderate decrease  G3b                   30-44                Moderate to severe decrease  G4                    15-29                Severe decrease  G5                    14 or less           Kidney failure    (1)In the absence of evidence of kidney disease, neither GFR category G1 or G2 fulfill the criteria for CKD.    eGFR calculation 2021 CKD-EPI creatinine equation, which does not include race as a factor    POC Glucose Once [[id number]]  (Abnormal) Collected: 07/30/25 0703    Specimen: Blood Updated: 07/30/25 0705     Glucose 140 mg/dL      Comment: Serial Number: [id number]:  [id number]       POC Glucose Once [[id number]]  (Abnormal) Collected: 07/29/25 2034    Specimen: Blood Updated: 07/29/25 2037     Glucose 381 mg/dL      Comment: Serial Number: [id number]:  [id number]       POC Glucose Once [[id number]]  (Abnormal) Collected: 07/29/25 1625    Specimen: Blood Updated: 07/29/25 1627     Glucose 223 mg/dL      Comment:  Serial Number: [id number]:  [id number]       Cortisol [[id number]] Collected: 07/29/25 1350    Specimen: Blood Updated: 07/29/25 1456     Cortisol 37.20 mcg/dL     Narrative:      Cortisol Reference Ranges:    Cortisol 6AM - 10AM Range: 6.02-18.40 mcg/dl  Cortisol 4PM - 8PM Range: 2.68-10.50 mcg/dl      Results may be falsely increased if patient taking Biotin.      Cortisol [[id number]] Collected: 07/29/25 1309    Specimen: Blood Updated: 07/29/25 1347     Cortisol 30.80 mcg/dL     Narrative:      Cortisol Reference Ranges:    Cortisol 6AM - 10AM Range: 6.02-18.40 mcg/dl  Cortisol 4PM - 8PM Range: 2.68-10.50 mcg/dl      Results may be falsely increased if patient taking Biotin.      Cortisol [[id number]] Collected: 07/29/25 1133    Specimen: Blood Updated: 07/29/25 1216     Cortisol 14.20 mcg/dL     Narrative:      Cortisol Reference Ranges:    Cortisol 6AM - 10AM Range: 6.02-18.40 mcg/dl  Cortisol 4PM - 8PM Range: 2.68-10.50 mcg/dl      Results may be falsely increased if patient taking Biotin.      POC Glucose Once [[id number]]  (Abnormal) Collected: 07/29/25 1103    Specimen: Blood Updated: 07/29/25 1106     Glucose 198 mg/dL      Comment: Serial Number: [id number]:  [id number]                    Radiology:    Imaging Results (Last 72 Hours)       ** No results found for the last 72 hours. **            Results for orders placed during the hospital encounter of 07/17/25    Adult Transthoracic Echo Complete W/ Cont if Necessary Per Protocol 07/18/2025 12:46 PM    Interpretation Summary    Left ventricular systolic function is normal. Calculated left ventricular EF = 58.5%    Left ventricular diastolic function is consistent with (grade I) impaired relaxation.    Estimated right ventricular systolic pressure from tricuspid regurgitation is normal (<35 mmHg).    No significant valvular abnormalities noted      Assessment/Plan:   Status post left hip surgery.  Patient did have acute blood loss anemia postop  on 7/20 with hemoglobin 6.9 and received 2 units of packed red blood cells.  Hemoglobin has remained stable, patient has had difficulty with orthostasis which is limited to her sessions however she feels better today and is participating.  With OT, patient is min to mod bathing, set up upper body dressing, mod assist lower body dressing, set up for grooming and max assist for toileting.  With PT, yesterday patient mod assist with bed to chair and chair to bed, she could not ambulate due to hypotension yesterday.     Orthostasis on Florinef and midodrine.  She received fluids over the last 24 hours, have stopped them now, Lopressor on hold.  I did explain this morning that we may have to deal with a little bit of a faster heart rate in order to limit her orthostasis.  I checked a cosyntropin test  to make sure she did not have iatrogenic Lefors's from past prednisone use from inflammatory arthritis, she was not adrenally insufficient.  EF is normal, hemoglobin has been stable.  I do not see any obvious cause for her orthostasis.     Nausea with some epigastric pain, on Protonix, improved amylase and lipase were not elevated.     Tachycardia, acceptable rate with the Lopressor on hold at this time     Acute blood loss anemia, as above,  stable     Hypothyroidism, Synthroid adjusted, repeat TSH in 4 to 6 weeks.     Depression, controlled on Cymbalta, Elavil on hold due to the possibility of reflex tachycardia.     Diabetes, morning glucose was 108 with a glucose of 92 last night through the night, this being the case I have again decreased her Lantus to try to avoid any hypoglycemia.     History of RA, continue methotrexate, no active joint inflammation the dose 2 days ago may have contributed to her nausea.     A-fib, pulse is regular on Xarelto for stroke prevention, continue Tambocor, QTc 444     Peripheral neuropathy, associated with diabetes, continue current dose of gabapentin.  I did decrease the dose of the  gabapentin because he is orthostatic type symptoms.     Jr Carrero MD

## 2025-07-31 NOTE — PROGRESS NOTES
Problems/Goals  Skin Integrity (Body Function Structure)  Current Status: Risk for further skin breakdown  Long Term Goals  07/22/2025 02:24 PM - Active  No worsening of skin breakdown/ No new skin breakdown  Potential for Injury (Safety)  Current Status: Risk for falls  Long Term Goals  07/22/2025 02:25 PM - Active  No falls this hospital stay    Signed by: Debo Kurtz RN

## 2025-08-01 LAB
GLUCOSE BLDC GLUCOMTR-MCNC: 112 MG/DL (ref 70–130)
GLUCOSE BLDC GLUCOMTR-MCNC: 143 MG/DL (ref 70–130)
GLUCOSE BLDC GLUCOMTR-MCNC: 145 MG/DL (ref 70–130)
GLUCOSE BLDC GLUCOMTR-MCNC: 159 MG/DL (ref 70–130)

## 2025-08-01 PROCEDURE — 97535 SELF CARE MNGMENT TRAINING: CPT

## 2025-08-01 PROCEDURE — 82948 REAGENT STRIP/BLOOD GLUCOSE: CPT

## 2025-08-01 PROCEDURE — 94799 UNLISTED PULMONARY SVC/PX: CPT

## 2025-08-01 PROCEDURE — 63710000001 INSULIN LISPRO (HUMAN) PER 5 UNITS: Performed by: FAMILY MEDICINE

## 2025-08-01 PROCEDURE — 97110 THERAPEUTIC EXERCISES: CPT

## 2025-08-01 PROCEDURE — 97530 THERAPEUTIC ACTIVITIES: CPT

## 2025-08-01 PROCEDURE — 94760 N-INVAS EAR/PLS OXIMETRY 1: CPT

## 2025-08-01 PROCEDURE — 99232 SBSQ HOSP IP/OBS MODERATE 35: CPT | Performed by: INTERNAL MEDICINE

## 2025-08-01 PROCEDURE — 63710000001 INSULIN GLARGINE PER 5 UNITS: Performed by: INTERNAL MEDICINE

## 2025-08-01 PROCEDURE — 97116 GAIT TRAINING THERAPY: CPT

## 2025-08-01 PROCEDURE — 94664 DEMO&/EVAL PT USE INHALER: CPT

## 2025-08-01 RX ORDER — ACETAMINOPHEN 325 MG/1
650 TABLET ORAL EVERY 6 HOURS PRN
Status: DISCONTINUED | OUTPATIENT
Start: 2025-08-01 | End: 2025-08-06 | Stop reason: HOSPADM

## 2025-08-01 RX ADMIN — MIDODRINE HYDROCHLORIDE 2.5 MG: 2.5 TABLET ORAL at 12:03

## 2025-08-01 RX ADMIN — FLUDROCORTISONE ACETATE 50 MCG: 0.1 TABLET ORAL at 08:40

## 2025-08-01 RX ADMIN — FLECAINIDE ACETATE 50 MG: 50 TABLET ORAL at 21:15

## 2025-08-01 RX ADMIN — PANTOPRAZOLE SODIUM 40 MG: 40 TABLET, DELAYED RELEASE ORAL at 05:29

## 2025-08-01 RX ADMIN — INSULIN LISPRO 2 UNITS: 100 INJECTION, SOLUTION INTRAVENOUS; SUBCUTANEOUS at 18:23

## 2025-08-01 RX ADMIN — LEVOTHYROXINE SODIUM 150 MCG: 0.07 TABLET ORAL at 05:29

## 2025-08-01 RX ADMIN — DULOXETINE 60 MG: 60 CAPSULE, DELAYED RELEASE ORAL at 08:39

## 2025-08-01 RX ADMIN — CETIRIZINE HYDROCHLORIDE 10 MG: 10 TABLET, FILM COATED ORAL at 08:43

## 2025-08-01 RX ADMIN — FOLIC ACID 1 MG: 1 TABLET ORAL at 08:40

## 2025-08-01 RX ADMIN — RIVAROXABAN 20 MG: 20 TABLET, FILM COATED ORAL at 17:41

## 2025-08-01 RX ADMIN — ACETAMINOPHEN 650 MG: 325 TABLET, FILM COATED ORAL at 12:03

## 2025-08-01 RX ADMIN — HYDROCODONE BITARTRATE AND ACETAMINOPHEN 1 TABLET: 7.5; 325 TABLET ORAL at 21:14

## 2025-08-01 RX ADMIN — GABAPENTIN 400 MG: 400 CAPSULE ORAL at 13:21

## 2025-08-01 RX ADMIN — GABAPENTIN 400 MG: 400 CAPSULE ORAL at 21:15

## 2025-08-01 RX ADMIN — GABAPENTIN 400 MG: 400 CAPSULE ORAL at 05:29

## 2025-08-01 RX ADMIN — MIDODRINE HYDROCHLORIDE 2.5 MG: 2.5 TABLET ORAL at 08:39

## 2025-08-01 RX ADMIN — DULOXETINE 30 MG: 60 CAPSULE, DELAYED RELEASE ORAL at 08:40

## 2025-08-01 RX ADMIN — FLECAINIDE ACETATE 50 MG: 50 TABLET ORAL at 08:39

## 2025-08-01 RX ADMIN — INSULIN GLARGINE 10 UNITS: 100 INJECTION, SOLUTION SUBCUTANEOUS at 08:39

## 2025-08-01 NOTE — PROGRESS NOTES
Assisted By: Patient seen at bedside    CC: Follow-up on left fractured hip    Interview History/HPI: Patient states the soreness in the left hip is improving and actually she was able to take some steps yesterday and she did not get orthostatic significantly enough to hinder her therapy.  Patient overall appears to be improving, she complains of no chest pain or shortness of breath, she did tolerate some of her diet this morning.          Current Hospital Meds:  budesonide-formoterol, 2 puff, Inhalation, BID - RT  cetirizine, 10 mg, Oral, Daily  cholecalciferol, 50,000 Units, Oral, Weekly  cyanocobalamin, 1,000 mcg, Intramuscular, Weekly  DULoxetine, 30 mg, Oral, Daily  DULoxetine, 60 mg, Oral, Daily  flecainide, 50 mg, Oral, BID  fludrocortisone, 50 mcg, Oral, Daily  folic acid, 1 mg, Oral, Daily  gabapentin, 400 mg, Oral, Q8H  insulin glargine, 10 Units, Subcutaneous, Daily  insulin lispro, 2-7 Units, Subcutaneous, 4x Daily PC & at Bedtime  levothyroxine, 150 mcg, Oral, Q AM  methotrexate, 25 mg, Oral, Weekly  [Held by provider] metoprolol tartrate, 25 mg, Oral, BID  midodrine, 2.5 mg, Oral, TID AC  pantoprazole, 40 mg, Oral, Q AM  rivaroxaban, 20 mg, Oral, Daily With Dinner         Vitals:    08/01/25 0844   BP: 111/62   Pulse:    Resp:    Temp:    SpO2:          Intake/Output Summary (Last 24 hours) at 8/1/2025 1127  Last data filed at 8/1/2025 0900  Gross per 24 hour   Intake 1440 ml   Output --   Net 1440 ml       EXAM: 98.4, 107, 16, room air saturation 98% lungs are clear, heart regular rate and rhythm, no edema, she can dorsi and plantarflex her ankles, abdomen is soft and nontender.      Diet: Regular/House; Texture: Regular (IDDSI 7); Fluid Consistency: Thin (IDDSI 0)        LABS:     Lab Results (last 48 hours)       Procedure Component Value Units Date/Time    POC Glucose Once [[id number]]  (Abnormal) Collected: 08/01/25 1103    Specimen: Blood Updated: 08/01/25 1106     Glucose 143 mg/dL      Comment:  Serial Number: [id number]:  [id number]       POC Glucose Once [[id number]]  (Normal) Collected: 08/01/25 0622    Specimen: Blood Updated: 08/01/25 0625     Glucose 112 mg/dL      Comment: Serial Number: [id number]:  [id number]       POC Glucose Once [[id number]]  (Abnormal) Collected: 07/31/25 1941    Specimen: Blood Updated: 07/31/25 1943     Glucose 292 mg/dL      Comment: Serial Number: [id number]:  [id number]       POC Glucose Once [[id number]]  (Abnormal) Collected: 07/31/25 1612    Specimen: Blood Updated: 07/31/25 1616     Glucose 140 mg/dL      Comment: Serial Number: [id number]:  [id number]       POC Glucose 4x Daily PC & at Bedtime [[id number]]  (Abnormal) Collected: 07/31/25 1041    Specimen: Blood Updated: 07/31/25 1043     Glucose 132 mg/dL      Comment: Serial Number: [id number]:  [id number]       POC Glucose Once [[id number]]  (Normal) Collected: 07/31/25 0634    Specimen: Blood Updated: 07/31/25 0639     Glucose 108 mg/dL      Comment: Serial Number: [id number]:  [id number]       Comprehensive Metabolic Panel [[id number]]  (Abnormal) Collected: 07/31/25 0300    Specimen: Blood Updated: 07/31/25 0415     Glucose 92 mg/dL      BUN 9.7 mg/dL      Creatinine 0.82 mg/dL      Sodium 139 mmol/L      Potassium 4.2 mmol/L      Chloride 104 mmol/L      CO2 26.1 mmol/L      Calcium 8.1 mg/dL      Total Protein 5.3 g/dL      Albumin 2.9 g/dL      ALT (SGPT) 12 U/L      AST (SGOT) 23 U/L      Alkaline Phosphatase 212 U/L      Total Bilirubin 0.7 mg/dL      Globulin 2.4 gm/dL      A/G Ratio 1.2 g/dL      BUN/Creatinine Ratio 11.8     Anion Gap 8.9 mmol/L      eGFR 80.5 mL/min/1.73     Narrative:      GFR Categories in Chronic Kidney Disease (CKD)              GFR Category          GFR (mL/min/1.73)    Interpretation  G1                    90 or greater        Normal or high (1)  G2                    60-89                Mild decrease (1)  G3a                   45-59                 Mild to moderate decrease  G3b                   30-44                Moderate to severe decrease  G4                    15-29                Severe decrease  G5                    14 or less           Kidney failure    (1)In the absence of evidence of kidney disease, neither GFR category G1 or G2 fulfill the criteria for CKD.    eGFR calculation 2021 CKD-EPI creatinine equation, which does not include race as a factor    CBC & Differential [[id number]]  (Abnormal) Collected: 07/31/25 0300    Specimen: Blood Updated: 07/31/25 0359    Narrative:      The following orders were created for panel order CBC & Differential.  Procedure                               Abnormality         Status                     ---------                               -----------         ------                     CBC Auto Differential[[id number]]        Abnormal            Final result               Scan Slide[[id number]]                                                                    Please view results for these tests on the individual orders.    CBC Auto Differential [[id number]]  (Abnormal) Collected: 07/31/25 0300    Specimen: Blood Updated: 07/31/25 0359     WBC 6.48 10*3/mm3      RBC 2.69 10*6/mm3      Hemoglobin 8.8 g/dL      Hematocrit 28.4 %      .6 fL      MCH 32.7 pg      MCHC 31.0 g/dL      RDW 16.4 %      RDW-SD 63.7 fl      MPV 8.5 fL      Platelets 656 10*3/mm3      Neutrophil % 71.3 %      Lymphocyte % 20.8 %      Monocyte % 4.8 %      Eosinophil % 1.7 %      Basophil % 0.8 %      Immature Grans % 0.6 %      Neutrophils, Absolute 4.62 10*3/mm3      Lymphocytes, Absolute 1.35 10*3/mm3      Monocytes, Absolute 0.31 10*3/mm3      Eosinophils, Absolute 0.11 10*3/mm3      Basophils, Absolute 0.05 10*3/mm3      Immature Grans, Absolute 0.04 10*3/mm3      nRBC 0.0 /100 WBC     POC Glucose Once [[id number]]  (Abnormal) Collected: 07/30/25 2002    Specimen: Blood Updated: 07/30/25 2005     Glucose 200 mg/dL      Comment:  Serial Number: [id number]:  [id number]       POC Glucose Once [[id number]]  (Abnormal) Collected: 07/30/25 1613    Specimen: Blood Updated: 07/30/25 1615     Glucose 137 mg/dL      Comment: Serial Number: [id number]:  [id number]                    Radiology:    Imaging Results (Last 72 Hours)       ** No results found for the last 72 hours. **            Results for orders placed during the hospital encounter of 07/17/25    Adult Transthoracic Echo Complete W/ Cont if Necessary Per Protocol 07/18/2025 12:46 PM    Interpretation Summary    Left ventricular systolic function is normal. Calculated left ventricular EF = 58.5%    Left ventricular diastolic function is consistent with (grade I) impaired relaxation.    Estimated right ventricular systolic pressure from tricuspid regurgitation is normal (<35 mmHg).    No significant valvular abnormalities noted      Assessment/Plan:   Status post left hip surgery.  Patient did have acute blood loss anemia postop on 7/20 with hemoglobin 6.9 and received 2 units of packed red blood cells.  Hemoglobin has remained stable, patient has had difficulty with orthostasis which is limited to her sessions but patient did do better yesterday.  With PT she was able to walk 15 and 25 feet min assist of 2.  She was min assist contact-guard for transfers.  With OT, patient was yesterday min assist bathing, set up for upper body dressing, min to mod lower body dressing, set up for grooming, max assist for toileting.  Continue to follow, patient did make some progress yesterday.     Orthostasis on Florinef and midodrine.  Lopressor on hold, no other obvious source for her orthostasis, possible autonomic in nature.     GERD, on Protonix, no further significant abdominal complaints in the last 48 hours.     Tachycardia, at least for the current time we are going to have to except some tachycardia to hold her Lopressor to avoid orthostasis.     Acute blood loss stable      Hypothyroidism, Synthroid adjusted, repeat TSH in 4 to 6 weeks.     Depression, controlled on Cymbalta because Elavil can cause reflex tachycardia, this has been stopped    Diabetes, controlled with exception of 19: 40 glucose of 292 which was postprandial essentially.     History of RA, continue methotrexate, no active joint inflammation the dose on Tuesday may have contributed to her abdominal complaints.     A-fib, pulse is regular on Xarelto for stroke prevention, continue Tambocor, QTc 444     Peripheral neuropathy, associated with diabetes, continue current dose of gabapentin.  I did decrease the dose of the gabapentin because he is orthostatic type symptoms.    CMP/CBC essentially stable as of yesterday.    Macrocytosis, B12 was 243 in December of last year which was low normal, folate was acceptable but I am going to recheck these with a.m. labs with a BMP/CBC    Jr Carrero MD

## 2025-08-01 NOTE — THERAPY TREATMENT NOTE
Inpatient Rehabilitation - Physical Therapy Treatment Note       GUANACO Portillo     Patient Name: Lashae Benitez  : 1960  MRN: [id number]    Today's Date: 2025                    Admit Date: 2025      Visit Dx:   No diagnosis found.    Patient Active Problem List   Diagnosis    Hiatal hernia    Essential hypertension    Sjogren's syndrome    Multiple sclerosis    Psoriasis    Fibromyalgia    Osteoarthritis    Psoriatic arthritis    RA (rheumatoid arthritis)    Degenerative disc disease, lumbar    TMJ arthritis    Dupuytren's disease    H. pylori infection    Family history of coronary artery disease    Type 2 diabetes mellitus    Edema    Bilateral leg edema    Isolated corticotropin deficiency    Hyponatremia    Paroxysmal atrial fibrillation    Sepsis    Bacteremia, escherichia coli    Iron deficiency anemia    Malabsorption due to intolerance, not elsewhere classified    Chronic chest pain with high risk for CAD    Abnormal nuclear stress test    Abnormal computed tomography angiography (CTA)    Hip fracture    Closed intertrochanteric fracture of hip, left, initial encounter    Closed left hip fracture, sequela       Past Medical History:   Diagnosis Date    Acid reflux     Allergic     Anemia     Anxiety     Atrial fibrillation     Cancer     thyroid, skin    Cervical disc disorder     Chronic pain disorder     Claustrophobia     CTS (carpal tunnel syndrome)     Degenerative disc disease, lumbar     Depression     Dupuytren's disease     Essential hypertension     Family history of coronary artery disease     Fibromyalgia     Gallbladder abscess     H. pylori infection     Hiatal hernia     Hyperlipidemia     Hypothyroidism     Low back pain     Lumbosacral disc disease     Migraines     migraines    Multiple sclerosis     Osteoarthritis     Peripheral neuropathy     Psoriasis     Psoriatic arthritis     RA (rheumatoid arthritis)     Rheumatoid arthritis     Sinusitis     Sjogren's syndrome     Stomach  ulcer     Thoracic disc disorder     TMJ arthritis     Type 2 diabetes mellitus     Urinary tract infection        Past Surgical History:   Procedure Laterality Date    BACK SURGERY      BREAST LUMPECTOMY Left 11/1993    CARDIAC CATHETERIZATION Left 09/21/2009    Normal     CARDIAC CATHETERIZATION N/A 6/24/2025    Procedure: Left Heart Cath;  Surgeon: Teodora Barron MD;  Location:  [facility];  Service: Cardiovascular;  Laterality: N/A;    CARPAL TUNNEL RELEASE  03/2012    CERVICAL POLYPECTOMY  12/2015    CHOLECYSTECTOMY  05/2007    COLONOSCOPY      ENDOSCOPY      EPIDURAL BLOCK      GASTRIC BYPASS  09/2007    JOINT REPLACEMENT      KNEE ARTHROPLASTY      LUMBAR DISC SURGERY      C5-6    NECK SURGERY      REPLACEMENT TOTAL KNEE Right 06/2016    SACRAL NERVE STIMULATOR PLACEMENT  09/2014    SACRAL NERVE STIMULATOR PLACEMENT  04/11/2024    @ [facility] in [place]    SACRAL NERVE STIMULATOR PLACEMENT Right 04/17/2024    THYROIDECTOMY  03/2016    TRIGGER POINT INJECTION         PT ASSESSMENT (Last 12 Hours)       IRF PT Evaluation and Treatment       Row Name 08/01/25 1439          PT Time and Intention    Document Type daily treatment  -RG     Mode of Treatment physical therapy;individual therapy  -RG     Patient/Family/Caregiver Comments/Observations Pt and nursing in agreement for skilled PT on this date.  -RG       Row Name 08/01/25 1439          General Information    Existing Precautions/Restrictions fall;weight bearing  L LE TTWB, abdominal binder w/ lower BP  -RG       Row Name 08/01/25 1439          Pain Scale: FACES Pre/Post-Treatment    Pain: FACES Scale, Pretreatment 2-->hurts little bit  -RG     Posttreatment Pain Rating 2-->hurts little bit  -RG       Row Name 08/01/25 1439          Cognition/Psychosocial    Affect/Mental Status (Cognition) WFL  -RG     Orientation Status (Cognition) oriented x 3  -RG     Follows Commands (Cognition) verbal cues/prompting required;physical/tactile prompts  required  -     Personal Safety Interventions fall prevention program maintained;gait belt;nonskid shoes/slippers when out of bed  -     Cognitive Function (Cognition) WFL  -       Row Name 08/01/25 1439          Mobility    Extremity Weight-bearing Status left lower extremity  -RG     Left Lower Extremity (Weight-bearing Status) toe touch weight-bearing (TTWB)  -       Row Name 08/01/25 1439          Bed Mobility    Bed Mobility sit-supine  -     Supine-Sit-Supine [place] (Bed Mobility) contact guard  -       Row Name 08/01/25 1439          Transfer Assessment/Treatment    Transfers sit-stand transfer;stand-sit transfer;chair-bed transfer  -       Row Name 08/01/25 1439          Bed-Chair Transfer    Bed-Chair [place] (Transfers) minimum assist (75% patient effort);verbal cues  -     Assistive Device (Bed-Chair Transfers) wheelchair  arm rest  -       Row Name 08/01/25 1439          Chair-Bed Transfer    Chair-Bed [place] (Transfers) minimum assist (75% patient effort)  -     Assistive Device (Chair-Bed Transfers) wheelchair  bed rail  -       Row Name 08/01/25 1439          Sit-Stand Transfer    Sit-Stand [place] (Transfers) contact guard;minimum assist (75% patient effort);1 person assist;2 person assist;1 person to manage equipment  -     Assistive Device (Sit-Stand Transfers) wheelchair;walker, front-wheeled  -[facility] Name 08/01/25 1439          Stand-Sit Transfer    Stand-Sit [place] (Transfers) contact guard;minimum assist (75% patient effort)  -     Assistive Device (Stand-Sit Transfers) wheelchair;walker, front-wheeled  -[facility] Name 08/01/25 1439          Gait/Stairs (Locomotion)    Gait/Stairs Locomotion gait/ambulation assistive device  -     [place] Level (Gait) minimum assist (75% patient effort);2 person assist;verbal cues;nonverbal cues (demo/gesture)  -     Assistive Device (Gait) walker, front-wheeled  -     Patient was able to  Ambulate yes  -RG     Distance in Feet (Gait) --  20, 20, 40  -RG     Pattern (Gait) step-to  TTWB on L LE  -RG     Comment, (Gait/Stairs) rest breaks as needed  -RG       Row Name 08/01/25 1439          Hip (Therapeutic Exercise)    Hip Strengthening (Therapeutic Exercise) bilateral;flexion;aBduction;aDduction;marching while seated;marching while standing;sitting;standing;2 lb free weight;resistance band;green;10 repetitions;2 sets  L only when standing  -RG       Row Name 08/01/25 1439          Knee (Therapeutic Exercise)    Knee Strengthening (Therapeutic Exercise) bilateral;flexion;extension;marching while seated;marching while standing;LAQ (long arc quad);hamstring curls;sitting;standing;2 lb free weight;resistance band;green;10 repetitions;2 sets  L only in standing  -RG       Row Name 08/01/25 1439          Ankle (Therapeutic Exercise)    Ankle Strengthening (Therapeutic Exercise) bilateral;dorsiflexion;plantarflexion;sitting;10 repetitions;2 sets  -RG       Row Name 08/01/25 1439          Positioning and Restraints    Pre-Treatment Position sitting in chair/recliner  -RG     Post Treatment Position bed  -RG     In Bed notified nsg;supine;sitting;call light within reach;encouraged to call for assist;with family/caregiver  -RG               User Key  (r) = Recorded By, (t) = Taken By, (c) = Cosigned By      Initials Name Provider Type    RG Neri Cornelius PTA Physical Therapist Assistant                  Wound 07/19/25 0921 Left gluteal Other (Comments) (Active)   Dressing Appearance dry;intact 08/01/25 0843   Closure Open to air 08/01/25 0843   Base red;nonblanchable 08/01/25 0843   Periwound warm;pink;redness 08/01/25 0843   Periwound Temperature warm 08/01/25 0843   Periwound Skin Turgor soft 08/01/25 0843   Care, Wound cleansed with;soap and water 08/01/25 0843   Periwound Care dry periwound area maintained 07/31/25 2000       Wound 07/19/25 0938 Left anterior thigh Surgical Open Surgical Incision  (Active)   Dressing Appearance dry;intact 08/01/25 0843   Base pink;red 08/01/25 0843       Wound 07/21/25 0930 Right coccyx Pressure Injury (Active)   Dressing Appearance dry 07/31/25 2000   Closure Open to air 08/01/25 0843   Base red 07/31/25 2000   Periwound redness 07/31/25 2000   Periwound Temperature warm 07/31/25 2000   Periwound Skin Turgor soft 07/31/25 2000   Drainage Amount none 07/31/25 2000   Care, Wound cleansed with;soap and water 08/01/25 0843   Dressing Care open to air 08/01/25 0843       Wound 07/28/25 0829 coccyx (Active)   Dressing Appearance open to air 08/01/25 0843   Base pink 08/01/25 0843   Periwound pink 08/01/25 0843   Periwound Temperature warm 08/01/25 0843   Periwound Skin Turgor soft 08/01/25 0843   Edges irregular 08/01/25 0843   Care, Wound cleansed with;soap and water 08/01/25 0843     Physical Therapy Education       Title: PT OT SLP Therapies (Done)       Topic: Physical Therapy (Done)       Point: Mobility training (Done)       Learning Progress Summary            Patient Acceptance, E,D, VU,NR by RG at 8/1/2025 1448    Acceptance, E,D, VU by LL at 7/31/2025 1546    Acceptance, E,D, VU,NR by RG at 7/30/2025 1348    Acceptance, E,D, VU,NR by RG at 7/29/2025 1434    Acceptance, E,D, VU,NR by RG at 7/28/2025 1438    Acceptance, E,D, VU,NR by LL at 7/26/2025 1615    Acceptance, E,D, VU,NR by RG at 7/25/2025 1407    Acceptance, E,D, VU,NR by RG at 7/24/2025 1502    Acceptance, E, VU,NR by LB at 7/23/2025 1612   Significant Other Acceptance, E,D, VU by LL at 7/31/2025 1546                      Point: Home exercise program (Done)       Learning Progress Summary            Patient Acceptance, E,D, VU,NR by RG at 8/1/2025 1448    Acceptance, E,D, VU by LL at 7/31/2025 1546    Acceptance, E,D, VU,NR by RG at 7/30/2025 1348    Acceptance, E,D, VU,NR by RG at 7/29/2025 1434    Acceptance, E,D, VU,NR by RG at 7/28/2025 1438    Acceptance, E,D, VU,NR by LL at 7/26/2025 1615    Acceptance,  E,D, VU,NR by RG at 7/25/2025 1407    Acceptance, E,D, VU,NR by RG at 7/24/2025 1502    Acceptance, E, VU,NR by LB at 7/23/2025 1612   Significant Other Acceptance, E,D, VU by LL at 7/31/2025 1546                      Point: Body mechanics (Done)       Learning Progress Summary            Patient Acceptance, E,D, VU,NR by RG at 8/1/2025 1448    Acceptance, E,D, VU by LL at 7/31/2025 1546    Acceptance, E,D, VU,NR by RG at 7/30/2025 1348    Acceptance, E,D, VU,NR by RG at 7/29/2025 1434    Acceptance, E,D, VU,NR by RG at 7/28/2025 1438    Acceptance, E,D, VU,NR by LL at 7/26/2025 1615    Acceptance, E,D, VU,NR by RG at 7/25/2025 1407    Acceptance, E,D, VU,NR by RG at 7/24/2025 1502    Acceptance, E, VU,NR by LB at 7/23/2025 1612   Significant Other Acceptance, E,D, VU by LL at 7/31/2025 1546                      Point: Precautions (Done)       Learning Progress Summary            Patient Acceptance, E,D, VU,NR by RG at 8/1/2025 1448    Acceptance, E,D, VU by LL at 7/31/2025 1546    Acceptance, E,D, VU,NR by RG at 7/30/2025 1348    Acceptance, E,D, VU,NR by RG at 7/29/2025 1434    Acceptance, E,D, VU,NR by RG at 7/28/2025 1438    Acceptance, E,D, VU,NR by LL at 7/26/2025 1615    Acceptance, E,D, VU,NR by RG at 7/25/2025 1407    Acceptance, E,D, VU,NR by RG at 7/24/2025 1502    Acceptance, E, VU,NR by LB at 7/23/2025 1612   Significant Other Acceptance, E,D, VU by LL at 7/31/2025 1546                                      User Key       Initials Effective Dates Name Provider Type Discipline    LB 06/16/21 -  Andreia Dove, PT Physical Therapist PT    LL 05/02/16 -  Beverly Goff, ERNESTINE Physical Therapist Assistant PT    RG 06/16/21 -  Neri Cornelius PTA Physical Therapist Assistant PT                    PT Recommendation and Plan    Frequency of Treatment (PT): 5 times per week  Anticipated Equipment Needs at Discharge (PT Eval):  (tbd)                  Time Calculation:      PT Charges       Row Name 08/01/25  1449             Time Calculation    Start Time 1000  -RG      Stop Time 1130  -RG      Time Calculation (min) 90 min  -RG      PT Received On 08/01/25  -RG         Time Calculation- PT    Total Timed Code Minutes- PT 90 minute(s)  -RG                User Key  (r) = Recorded By, (t) = Taken By, (c) = Cosigned By      Initials Name Provider Type     Neri Cornelius PTA Physical Therapist Assistant                    Therapy Charges for Today       Code Description Service Date Service Provider Modifiers Qty    18691129821 HC GAIT TRAINING EA 15 MIN 8/1/2025 Neri Cornelius PTA GP, CQ 1    44405597184 HC PT THERAPEUTIC ACT EA 15 MIN 8/1/2025 Neri Cornelius PTA GP, CQ 2    16095981962 HC PT THER PROC EA 15 MIN 8/1/2025 Neri Cornelius PTA GP, CQ 3              PT G-Codes  AM-PAC 6 Clicks Score (PT): 16      Neri Cornelius PTA  8/1/2025

## 2025-08-01 NOTE — THERAPY TREATMENT NOTE
Inpatient Rehabilitation - Occupational Therapy Treatment Note    GUANACO Portillo     Patient Name: Lashae Benitez  : 1960  MRN: [id number]    Today's Date: 2025                 Admit Date: 2025       No diagnosis found.    Patient Active Problem List   Diagnosis    Hiatal hernia    Essential hypertension    Sjogren's syndrome    Multiple sclerosis    Psoriasis    Fibromyalgia    Osteoarthritis    Psoriatic arthritis    RA (rheumatoid arthritis)    Degenerative disc disease, lumbar    TMJ arthritis    Dupuytren's disease    H. pylori infection    Family history of coronary artery disease    Type 2 diabetes mellitus    Edema    Bilateral leg edema    Isolated corticotropin deficiency    Hyponatremia    Paroxysmal atrial fibrillation    Sepsis    Bacteremia, escherichia coli    Iron deficiency anemia    Malabsorption due to intolerance, not elsewhere classified    Chronic chest pain with high risk for CAD    Abnormal nuclear stress test    Abnormal computed tomography angiography (CTA)    Hip fracture    Closed intertrochanteric fracture of hip, left, initial encounter    Closed left hip fracture, sequela       Past Medical History:   Diagnosis Date    Acid reflux     Allergic     Anemia     Anxiety     Atrial fibrillation     Cancer     thyroid, skin    Cervical disc disorder     Chronic pain disorder     Claustrophobia     CTS (carpal tunnel syndrome)     Degenerative disc disease, lumbar     Depression     Dupuytren's disease     Essential hypertension     Family history of coronary artery disease     Fibromyalgia     Gallbladder abscess     H. pylori infection     Hiatal hernia     Hyperlipidemia     Hypothyroidism     Low back pain     Lumbosacral disc disease     Migraines     migraines    Multiple sclerosis     Osteoarthritis     Peripheral neuropathy     Psoriasis     Psoriatic arthritis     RA (rheumatoid arthritis)     Rheumatoid arthritis     Sinusitis     Sjogren's syndrome     Stomach ulcer      Thoracic disc disorder     TMJ arthritis     Type 2 diabetes mellitus     Urinary tract infection        Past Surgical History:   Procedure Laterality Date    BACK SURGERY      BREAST LUMPECTOMY Left 11/1993    CARDIAC CATHETERIZATION Left 09/21/2009    Normal     CARDIAC CATHETERIZATION N/A 6/24/2025    Procedure: Left Heart Cath;  Surgeon: Teodora Barron MD;  Location:  [facility];  Service: Cardiovascular;  Laterality: N/A;    CARPAL TUNNEL RELEASE  03/2012    CERVICAL POLYPECTOMY  12/2015    CHOLECYSTECTOMY  05/2007    COLONOSCOPY      ENDOSCOPY      EPIDURAL BLOCK      GASTRIC BYPASS  09/2007    JOINT REPLACEMENT      KNEE ARTHROPLASTY      LUMBAR DISC SURGERY      C5-6    NECK SURGERY      REPLACEMENT TOTAL KNEE Right 06/2016    SACRAL NERVE STIMULATOR PLACEMENT  09/2014    SACRAL NERVE STIMULATOR PLACEMENT  04/11/2024    @ [facility] in [place]    SACRAL NERVE STIMULATOR PLACEMENT Right 04/17/2024    THYROIDECTOMY  03/2016    TRIGGER POINT INJECTION               IRF OT ASSESSMENT FLOWSHEET (Last 12 Hours)       IRF OT Evaluation and Treatment       Row Name 08/01/25 1220          OT Time and Intention    Document Type daily treatment  -     Mode of Treatment occupational therapy  -     Symptoms Noted During/After Treatment none  -       Row Name 08/01/25 1220          General Information    Patient/Family/Caregiver Comments/Observations agreeable to therapy; VSS  -     Existing Precautions/Restrictions fall;weight bearing  TTWB LLE, ABD binder- bp  -       Row Name 08/01/25 1220          Grooming    [place] Level (Grooming) set up  -       Row Name 08/01/25 1220          Bed-Chair Transfer    Bed-Chair [place] (Transfers) minimum assist (75% patient effort);verbal cues  -     Assistive Device (Bed-Chair Transfers) wheelchair  -       Row Name 08/01/25 1220          Motor Skills    Motor Skills functional endurance  -     Motor Control/Coordination Interventions  therapeutic exercise/ROM  BUE ther ex/act, AROM, bilat coord ex, PRE  -     Therapeutic Exercise --  UBE, inclined bilat nat, light flexbar; dowel wrist rolls  -     Additional Documentation --  TA to increase fxl act leticia / strength to improve ADL performance.  -       Row Name 08/01/25 1220          Positioning and Restraints    Post Treatment Position wheelchair  -     In Wheelchair with PT  -       Row Name 08/01/25 1220          Vital Signs    Intra Systolic BP Rehab 113  -     Intra Treatment Diastolic BP 62  -               User Key  (r) = Recorded By, (t) = Taken By, (c) = Cosigned By      Initials Name Effective Dates     Amber Badillo OT 06/16/21 -                      Occupational Therapy Education       Title: PT OT SLP Therapies (Done)       Topic: Occupational Therapy (Done)       Point: ADL training (Done)       Learning Progress Summary            Patient Acceptance, E,D, VU,NR by  at 8/1/2025 1228    Acceptance, E,D, VU,NR by  at 7/31/2025 0911    Acceptance, E,D, VU,NR by  at 7/30/2025 1459    Acceptance, E,D, VU,NR by  at 7/29/2025 1207                      Point: Precautions (Done)       Learning Progress Summary            Patient Acceptance, E,D, VU,NR by  at 8/1/2025 1228    Acceptance, E,D, VU,NR by  at 7/31/2025 0911    Acceptance, E,D, VU,NR by  at 7/30/2025 1459    Acceptance, E,D, VU,NR by  at 7/29/2025 1207                                      User Key       Initials Effective Dates Name Provider Type Discipline     06/16/21 -  Amber Badillo OT Occupational Therapist OT                        OT Recommendation and Plan    Planned Therapy Interventions (OT): activity tolerance training, adaptive equipment training, BADL retraining, occupation/activity based interventions, patient/caregiver education/training, ROM/therapeutic exercise, strengthening exercise (impaired ADL's, strength, fxl mobility, and activity tolerance)                    Time  Calculation:      Time Calculation- OT       Row Name 08/01/25 1228             Time Calculation- OT    OT Start Time 0830  -      OT Stop Time 1000  -      OT Time Calculation (min) 90 min  -      Total Timed Code Minutes- OT 90 minute(s)  -                User Key  (r) = Recorded By, (t) = Taken By, (c) = Cosigned By      Initials Name Provider Type     Amber Badillo, OT Occupational Therapist                  Therapy Charges for Today       Code Description Service Date Service Provider Modifiers Qty    80433599538 HC OT SELF CARE/MGMT/TRAIN EA 15 MIN 7/31/2025 Amber Badillo, OT GO 3    90355393855 HC OT THER PROC EA 15 MIN 7/31/2025 Amber Badillo, OT GO 2    26254387873 HC OT THERAPEUTIC ACT EA 15 MIN 7/31/2025 Amber Badillo, OT GO 1    96915616927 HC OT SELF CARE/MGMT/TRAIN EA 15 MIN 8/1/2025 Amber Badillo, OT GO 1    53927200460 HC OT THER PROC EA 15 MIN 8/1/2025 Amber Badillo OT GO 3    8196186 HC OT THERAPEUTIC ACT EA 15 MIN 8/1/2025 Amber Badillo, OT GO 2                     Amber Badillo OT  8/1/2025

## 2025-08-01 NOTE — PROGRESS NOTES
Problems/Goals  Skin Integrity (Body Function Structure)  Current Status: Risk for further skin breakdown  Long Term Goals  07/22/2025 02:24 PM - Active  No worsening of skin breakdown/ No new skin breakdown  Potential for Injury (Safety)  Current Status: Risk for falls  Long Term Goals  07/22/2025 02:25 PM - Active  No falls this hospital stay    Signed by: Melissa Chicas, Nurse

## 2025-08-01 NOTE — PLAN OF CARE
Goal Outcome Evaluation:           Progress: improving  Outcome Summary: New Admit         Problem: Rehabilitation (IRF) Plan of Care  Goal: Plan of Care Review  Outcome: Progressing  Flowsheets (Taken 8/1/2025 0000 by Katie Sanford, RN)  Progress: improving  Plan of Care Reviewed With: patient  Goal: Patient-Specific Goal (Individualized)  Outcome: Progressing  Goal: Absence of New-Onset Illness or Injury  Outcome: Progressing  Intervention: Identify and Manage Fall Risk  Recent Flowsheet Documentation  Taken 8/1/2025 0800 by Meenakshi Davis, RN  Safety Promotion/Fall Prevention:   nonskid shoes/slippers when out of bed   safety round/check completed  Goal: Optimal Comfort and Wellbeing  Outcome: Progressing  Goal: Home and Community Transition Plan Established  Outcome: Progressing

## 2025-08-01 NOTE — PLAN OF CARE
Problem: Rehabilitation (IRF) Plan of Care  Goal: Plan of Care Review  Outcome: Progressing  Flowsheets (Taken 8/1/2025 0000)  Progress: improving  Plan of Care Reviewed With: patient  Goal: Patient-Specific Goal (Individualized)  Outcome: Progressing  Goal: Absence of New-Onset Illness or Injury  Outcome: Progressing  Intervention: Identify and Manage Fall Risk  Description: Perform standard risk assessment on admission using a validated tool or comprehensive approach appropriate to the patient; reassess fall risk frequently, with change in status or transfer to another level of care.Communicate risk to interprofessional healthcare team; ensure fall risk visible cue.Determine need for increased observation, equipment and environmental modification, as well as use of supportive, nonskid footwear.Adjust safety measures to individual needs and identified risk factors.Reinforce the importance of active participation with fall risk prevention, safety and physical activity with the patient and family.Perform regular intentional rounding to assess need for position change, pain management, attention to personal needs and assistance with toileting.  Recent Flowsheet Documentation  Taken 7/31/2025 2200 by Katie Sanford RN  Safety Promotion/Fall Prevention:   nonskid shoes/slippers when out of bed   safety round/check completed  Taken 7/31/2025 2000 by Katie Sanford RN  Safety Promotion/Fall Prevention:   nonskid shoes/slippers when out of bed   safety round/check completed  Goal: Optimal Comfort and Wellbeing  Outcome: Progressing  Intervention: Provide Person-Centered Care  Description: Use a family-focused approach to care; incorporate family/significant others in assessment, planning, education and support.Develop trust and rapport by proactively providing information, encouraging questions, addressing concerns and offering reassurance.Acknowledge emotional response; convey safety and acceptance.Recognize and  utilize personal coping strategies and strengths; develop goals via shared decision-making.Identify patient's preferred routines, habits and personal preferences; respect privacy and personal space.Recognize progress and patient effort to facilitate motivation; provide opportunities for success.Honor spiritual and cultural preferences.Screen and monitor ongoing safety risks, such as self-harm, violence and elopement.  Recent Flowsheet Documentation  Taken 7/31/2025 1930 by Katie Sanford RN  Trust Relationship/Rapport:   care explained   questions answered  Goal: Home and Community Transition Plan Established  Outcome: Progressing   Goal Outcome Evaluation:  Plan of Care Reviewed With: patient        Progress: improving

## 2025-08-02 LAB
ANION GAP SERPL CALCULATED.3IONS-SCNC: 10.9 MMOL/L (ref 5–15)
BASOPHILS # BLD AUTO: 0.05 10*3/MM3 (ref 0–0.2)
BASOPHILS NFR BLD AUTO: 0.9 % (ref 0–1.5)
BUN SERPL-MCNC: 9.1 MG/DL (ref 8–23)
BUN/CREAT SERPL: 12.8 (ref 7–25)
CALCIUM SPEC-SCNC: 8.1 MG/DL (ref 8.6–10.5)
CHLORIDE SERPL-SCNC: 105 MMOL/L (ref 98–107)
CO2 SERPL-SCNC: 23.1 MMOL/L (ref 22–29)
CREAT SERPL-MCNC: 0.71 MG/DL (ref 0.57–1)
DEPRECATED RDW RBC AUTO: 60 FL (ref 37–54)
EGFRCR SERPLBLD CKD-EPI 2021: 95.7 ML/MIN/1.73
EOSINOPHIL # BLD AUTO: 0.14 10*3/MM3 (ref 0–0.4)
EOSINOPHIL NFR BLD AUTO: 2.5 % (ref 0.3–6.2)
ERYTHROCYTE [DISTWIDTH] IN BLOOD BY AUTOMATED COUNT: 16 % (ref 12.3–15.4)
FOLATE SERPL-MCNC: 18.6 NG/ML (ref 4.78–24.2)
GLUCOSE BLDC GLUCOMTR-MCNC: 105 MG/DL (ref 70–130)
GLUCOSE BLDC GLUCOMTR-MCNC: 147 MG/DL (ref 70–130)
GLUCOSE BLDC GLUCOMTR-MCNC: 392 MG/DL (ref 70–130)
GLUCOSE BLDC GLUCOMTR-MCNC: 78 MG/DL (ref 70–130)
GLUCOSE SERPL-MCNC: 79 MG/DL (ref 65–99)
HCT VFR BLD AUTO: 27.2 % (ref 34–46.6)
HGB BLD-MCNC: 8.6 G/DL (ref 12–15.9)
IMM GRANULOCYTES # BLD AUTO: 0.04 10*3/MM3 (ref 0–0.05)
IMM GRANULOCYTES NFR BLD AUTO: 0.7 % (ref 0–0.5)
LYMPHOCYTES # BLD AUTO: 1.13 10*3/MM3 (ref 0.7–3.1)
LYMPHOCYTES NFR BLD AUTO: 19.9 % (ref 19.6–45.3)
MCH RBC QN AUTO: 33 PG (ref 26.6–33)
MCHC RBC AUTO-ENTMCNC: 31.6 G/DL (ref 31.5–35.7)
MCV RBC AUTO: 104.2 FL (ref 79–97)
MONOCYTES # BLD AUTO: 0.45 10*3/MM3 (ref 0.1–0.9)
MONOCYTES NFR BLD AUTO: 7.9 % (ref 5–12)
NEUTROPHILS NFR BLD AUTO: 3.87 10*3/MM3 (ref 1.7–7)
NEUTROPHILS NFR BLD AUTO: 68.1 % (ref 42.7–76)
NRBC BLD AUTO-RTO: 0 /100 WBC (ref 0–0.2)
PLATELET # BLD AUTO: 681 10*3/MM3 (ref 140–450)
PMV BLD AUTO: 8.6 FL (ref 6–12)
POTASSIUM SERPL-SCNC: 3.8 MMOL/L (ref 3.5–5.2)
RBC # BLD AUTO: 2.61 10*6/MM3 (ref 3.77–5.28)
SODIUM SERPL-SCNC: 139 MMOL/L (ref 136–145)
VIT B12 BLD-MCNC: 1840 PG/ML (ref 211–946)
WBC NRBC COR # BLD AUTO: 5.68 10*3/MM3 (ref 3.4–10.8)

## 2025-08-02 PROCEDURE — 63710000001 INSULIN LISPRO (HUMAN) PER 5 UNITS: Performed by: FAMILY MEDICINE

## 2025-08-02 PROCEDURE — 94799 UNLISTED PULMONARY SVC/PX: CPT

## 2025-08-02 PROCEDURE — 82746 ASSAY OF FOLIC ACID SERUM: CPT | Performed by: INTERNAL MEDICINE

## 2025-08-02 PROCEDURE — 80048 BASIC METABOLIC PNL TOTAL CA: CPT | Performed by: INTERNAL MEDICINE

## 2025-08-02 PROCEDURE — 63710000001 INSULIN GLARGINE PER 5 UNITS: Performed by: INTERNAL MEDICINE

## 2025-08-02 PROCEDURE — 82948 REAGENT STRIP/BLOOD GLUCOSE: CPT

## 2025-08-02 PROCEDURE — 85025 COMPLETE CBC W/AUTO DIFF WBC: CPT | Performed by: INTERNAL MEDICINE

## 2025-08-02 PROCEDURE — 82607 VITAMIN B-12: CPT | Performed by: INTERNAL MEDICINE

## 2025-08-02 PROCEDURE — 99232 SBSQ HOSP IP/OBS MODERATE 35: CPT | Performed by: INTERNAL MEDICINE

## 2025-08-02 RX ORDER — MIDODRINE HYDROCHLORIDE 2.5 MG/1
2.5 TABLET ORAL
Status: DISCONTINUED | OUTPATIENT
Start: 2025-08-02 | End: 2025-08-06 | Stop reason: HOSPADM

## 2025-08-02 RX ADMIN — LEVOTHYROXINE SODIUM 150 MCG: 0.07 TABLET ORAL at 05:31

## 2025-08-02 RX ADMIN — DULOXETINE 60 MG: 60 CAPSULE, DELAYED RELEASE ORAL at 08:34

## 2025-08-02 RX ADMIN — FLECAINIDE ACETATE 50 MG: 50 TABLET ORAL at 08:34

## 2025-08-02 RX ADMIN — FOLIC ACID 1 MG: 1 TABLET ORAL at 08:34

## 2025-08-02 RX ADMIN — ACETAMINOPHEN 650 MG: 325 TABLET, FILM COATED ORAL at 21:33

## 2025-08-02 RX ADMIN — FLECAINIDE ACETATE 50 MG: 50 TABLET ORAL at 21:33

## 2025-08-02 RX ADMIN — GABAPENTIN 400 MG: 400 CAPSULE ORAL at 13:30

## 2025-08-02 RX ADMIN — RIVAROXABAN 20 MG: 20 TABLET, FILM COATED ORAL at 17:19

## 2025-08-02 RX ADMIN — INSULIN LISPRO 6 UNITS: 100 INJECTION, SOLUTION INTRAVENOUS; SUBCUTANEOUS at 21:33

## 2025-08-02 RX ADMIN — PANTOPRAZOLE SODIUM 40 MG: 40 TABLET, DELAYED RELEASE ORAL at 05:31

## 2025-08-02 RX ADMIN — INSULIN GLARGINE 10 UNITS: 100 INJECTION, SOLUTION SUBCUTANEOUS at 08:34

## 2025-08-02 RX ADMIN — FLUDROCORTISONE ACETATE 50 MCG: 0.1 TABLET ORAL at 08:34

## 2025-08-02 RX ADMIN — CETIRIZINE HYDROCHLORIDE 10 MG: 10 TABLET, FILM COATED ORAL at 08:34

## 2025-08-02 RX ADMIN — OFLOXACIN 50000 UNITS: 300 TABLET, COATED ORAL at 08:34

## 2025-08-02 RX ADMIN — GABAPENTIN 400 MG: 400 CAPSULE ORAL at 05:32

## 2025-08-02 RX ADMIN — DULOXETINE 30 MG: 60 CAPSULE, DELAYED RELEASE ORAL at 08:34

## 2025-08-02 RX ADMIN — MIDODRINE HYDROCHLORIDE 2.5 MG: 2.5 TABLET ORAL at 08:34

## 2025-08-02 RX ADMIN — GABAPENTIN 400 MG: 400 CAPSULE ORAL at 21:33

## 2025-08-02 NOTE — PROGRESS NOTES
Assisted By: Patient seen at bedside    CC: Follow-up on left fractured hip    Interview History/HPI: Patient states she is doing okay, she felt like she had a decent day yesterday.  She is tolerating her diet.  No abdominal complaints.  She states she did get a little lightheaded yesterday during therapy but apparently her vital signs were acceptable.          Current Hospital Meds:  budesonide-formoterol, 2 puff, Inhalation, BID - RT  cetirizine, 10 mg, Oral, Daily  cholecalciferol, 50,000 Units, Oral, Weekly  cyanocobalamin, 1,000 mcg, Intramuscular, Weekly  DULoxetine, 30 mg, Oral, Daily  DULoxetine, 60 mg, Oral, Daily  flecainide, 50 mg, Oral, BID  fludrocortisone, 50 mcg, Oral, Daily  folic acid, 1 mg, Oral, Daily  gabapentin, 400 mg, Oral, Q8H  insulin glargine, 10 Units, Subcutaneous, Daily  insulin lispro, 2-7 Units, Subcutaneous, 4x Daily PC & at Bedtime  levothyroxine, 150 mcg, Oral, Q AM  methotrexate, 25 mg, Oral, Weekly  [Held by provider] metoprolol tartrate, 25 mg, Oral, BID  midodrine, 2.5 mg, Oral, BID AC  pantoprazole, 40 mg, Oral, Q AM  rivaroxaban, 20 mg, Oral, Daily With Dinner         Vitals:    08/02/25 0700   BP: 151/89   Pulse: 98   Resp: 16   Temp: 98.1 °F (36.7 °C)   SpO2: 95%         Intake/Output Summary (Last 24 hours) at 8/2/2025 0924  Last data filed at 8/2/2025 0400  Gross per 24 hour   Intake 720 ml   Output 1000 ml   Net -280 ml       EXAM: Pleasant no distress strength symmetric lungs clear heart regular rate and rhythm abdomen soft and benign      Diet: Regular/House; Texture: Regular (IDDSI 7); Fluid Consistency: Thin (IDDSI 0)        LABS:     Lab Results (last 48 hours)       Procedure Component Value Units Date/Time    Basic Metabolic Panel [[id number]]  (Abnormal) Collected: 08/02/25 0455    Specimen: Blood Updated: 08/02/25 0641     Glucose 79 mg/dL      BUN 9.1 mg/dL      Creatinine 0.71 mg/dL      Sodium 139 mmol/L      Potassium 3.8 mmol/L      Chloride 105 mmol/L       CO2 23.1 mmol/L      Calcium 8.1 mg/dL      BUN/Creatinine Ratio 12.8     Anion Gap 10.9 mmol/L      eGFR 95.7 mL/min/1.73     Narrative:      GFR Categories in Chronic Kidney Disease (CKD)              GFR Category          GFR (mL/min/1.73)    Interpretation  G1                    90 or greater        Normal or high (1)  G2                    60-89                Mild decrease (1)  G3a                   45-59                Mild to moderate decrease  G3b                   30-44                Moderate to severe decrease  G4                    15-29                Severe decrease  G5                    14 or less           Kidney failure    (1)In the absence of evidence of kidney disease, neither GFR category G1 or G2 fulfill the criteria for CKD.    eGFR calculation 2021 CKD-EPI creatinine equation, which does not include race as a factor    POC Glucose Once [[id number]]  (Normal) Collected: 08/02/25 0627    Specimen: Blood Updated: 08/02/25 0632     Glucose 78 mg/dL      Comment: Serial Number: [id number]:  [id number]       CBC & Differential [[id number]]  (Abnormal) Collected: 08/02/25 0455    Specimen: Blood Updated: 08/02/25 0618    Narrative:      The following orders were created for panel order CBC & Differential.  Procedure                               Abnormality         Status                     ---------                               -----------         ------                     CBC Auto Differential[[id number]]        Abnormal            Final result                 Please view results for these tests on the individual orders.    CBC Auto Differential [[id number]]  (Abnormal) Collected: 08/02/25 0455    Specimen: Blood Updated: 08/02/25 0618     WBC 5.68 10*3/mm3      RBC 2.61 10*6/mm3      Hemoglobin 8.6 g/dL      Hematocrit 27.2 %      .2 fL      MCH 33.0 pg      MCHC 31.6 g/dL      RDW 16.0 %      RDW-SD 60.0 fl      MPV 8.6 fL      Platelets 681 10*3/mm3      Neutrophil % 68.1 %       Lymphocyte % 19.9 %      Monocyte % 7.9 %      Eosinophil % 2.5 %      Basophil % 0.9 %      Immature Grans % 0.7 %      Neutrophils, Absolute 3.87 10*3/mm3      Lymphocytes, Absolute 1.13 10*3/mm3      Monocytes, Absolute 0.45 10*3/mm3      Eosinophils, Absolute 0.14 10*3/mm3      Basophils, Absolute 0.05 10*3/mm3      Immature Grans, Absolute 0.04 10*3/mm3      nRBC 0.0 /100 WBC     Vitamin B12 [[id number]] Collected: 08/02/25 0455    Specimen: Blood Updated: 08/02/25 0616    Folate [[id number]] Collected: 08/02/25 0455    Specimen: Blood Updated: 08/02/25 0616    POC Glucose Once [[id number]]  (Abnormal) Collected: 08/01/25 1949    Specimen: Blood Updated: 08/01/25 1951     Glucose 145 mg/dL      Comment: Serial Number: [id number]:  [id number]       POC Glucose Once [[id number]]  (Abnormal) Collected: 08/01/25 1642    Specimen: Blood Updated: 08/01/25 1646     Glucose 159 mg/dL      Comment: Serial Number: [id number]:  [id number]       POC Glucose Once [[id number]]  (Abnormal) Collected: 08/01/25 1103    Specimen: Blood Updated: 08/01/25 1106     Glucose 143 mg/dL      Comment: Serial Number: [id number]:  [id number]       POC Glucose Once [[id number]]  (Normal) Collected: 08/01/25 0622    Specimen: Blood Updated: 08/01/25 0625     Glucose 112 mg/dL      Comment: Serial Number: [id number]:  [id number]       POC Glucose Once [[id number]]  (Abnormal) Collected: 07/31/25 1941    Specimen: Blood Updated: 07/31/25 1943     Glucose 292 mg/dL      Comment: Serial Number: [id number]:  [id number]       POC Glucose Once [[id number]]  (Abnormal) Collected: 07/31/25 1612    Specimen: Blood Updated: 07/31/25 1616     Glucose 140 mg/dL      Comment: Serial Number: [id number]:  [id number]       POC Glucose 4x Daily PC & at Bedtime [[id number]]  (Abnormal) Collected: 07/31/25 1041    Specimen: Blood Updated: 07/31/25 1043     Glucose 132 mg/dL      Comment: Serial Number: [id number]:  [id number]                     Radiology:    Imaging Results (Last 72 Hours)       ** No results found for the last 72 hours. **            Results for orders placed during the hospital encounter of 07/17/25    Adult Transthoracic Echo Complete W/ Cont if Necessary Per Protocol 07/18/2025 12:46 PM    Interpretation Summary    Left ventricular systolic function is normal. Calculated left ventricular EF = 58.5%    Left ventricular diastolic function is consistent with (grade I) impaired relaxation.    Estimated right ventricular systolic pressure from tricuspid regurgitation is normal (<35 mmHg).    No significant valvular abnormalities noted      Assessment/Plan:   Status post left hip surgery.  Her therapies have been somewhat hindered by the orthostasis but this is improving.  Patient is working with OT/PT.  With PT, patient is contact-guard for bed mobility, min assist for bed to chair, min assist chair to bed, contact-guard to min assist two-person sit to stand and stand to sit, patient was able to walk up to 40 feet yesterday front wheeled walker two-person min assist.  With OT, OT working on functional endurance, therapeutic exercise, range of motion and ADLs, patient is progressing towards her goals.  Will evaluate the hip wound tomorrow, patient will be 15 days out tomorrow and if wound looks good possibly staples out     Orthostasis on Florinef and midodrine.  Lopressor on hold, workup has revealed no other source, very well may be autonomic related to her diabetes, in any event I am actually backing off on her midodrine.  I have changed this to just breakfast and lunch, I was going to hold this today but she is planning on getting up later.  This being the case I have placed a hold parameter on the midodrine to hold greater than 155.  Electrolytes are good.     GERD, on Protonix, asymptomatic    Tachycardia, not a significant problem at this time, Lopressor held because of the orthostasis     Acute blood loss stable      Hypothyroidism, Synthroid adjusted earlier in the stay, repeat TSH in 4 to 6 weeks.     Depression, controlled on Cymbalta.  Elavil stopped earlier in the stay secondary to potential for reflex tachycardia     Diabetes, controlled on current basal bolus regimen     History of RA, continue methotrexate, no active joint inflammation.  Patient is on methotrexate, Tuesday and Wednesday she had significant abdominal complaints, this certainly may have contributed to that.     A-fib, pulse remains regular on Tambocor, patient continues on Xarelto for stroke prevention.     Peripheral neuropathy, associated with diabetes, continue current dose of gabapentin.  Earlier I had reduced the gabapentin because of its risk of orthostasis, her neuropathy appears to be stable at this time     Macrocytosis, B12/folate pending    Thrombocytosis, probably reactive, follow    VTE prophylaxis, Xarelto will serve for this  ]  Jr Carrero MD

## 2025-08-02 NOTE — PLAN OF CARE
Goal Outcome Evaluation:  Plan of Care Reviewed With: patient           Problem: Rehabilitation (IRF) Plan of Care  Goal: Plan of Care Review  Outcome: Progressing  Flowsheets (Taken 8/2/2025 0306)  Plan of Care Reviewed With: patient  Goal: Patient-Specific Goal (Individualized)  Outcome: Progressing  Goal: Absence of New-Onset Illness or Injury  Outcome: Progressing  Intervention: Identify and Manage Fall Risk  Description: Perform standard risk assessment on admission using a validated tool or comprehensive approach appropriate to the patient; reassess fall risk frequently, with change in status or transfer to another level of care.Communicate risk to interprofessional healthcare team; ensure fall risk visible cue.Determine need for increased observation, equipment and environmental modification, as well as use of supportive, nonskid footwear.Adjust safety measures to individual needs and identified risk factors.Reinforce the importance of active participation with fall risk prevention, safety and physical activity with the patient and family.Perform regular intentional rounding to assess need for position change, pain management, attention to personal needs and assistance with toileting.  Recent Flowsheet Documentation  Taken 8/2/2025 0200 by Josephine Parker RN  Safety Promotion/Fall Prevention: safety round/check completed  Taken 8/2/2025 0000 by Josephine Parker RN  Safety Promotion/Fall Prevention: safety round/check completed  Taken 8/1/2025 2200 by Josephine Parker RN  Safety Promotion/Fall Prevention: safety round/check completed  Taken 8/1/2025 2000 by Josephine Parker RN  Safety Promotion/Fall Prevention: safety round/check completed  Taken 8/1/2025 1915 by Josephine Parker RN  Safety Promotion/Fall Prevention: safety round/check completed  Intervention: Prevent VTE (Venous Thromboembolism)  Description: Provide ongoing assessment for signs and symptoms of VTE.Encourage and assist with early and ongoing  mobilization.Initiate and maintain compression therapy when indicated.Recognize the patient's individual risk for bleeding before initiating pharmacologic thromboprophylaxis.Provide prophylactic anticoagulation when prescribed.  Recent Flowsheet Documentation  Taken 8/1/2025 1915 by Josephine Parker RN  VTE Prevention/Management: (See MAR) other (see comments)  Goal: Optimal Comfort and Wellbeing  Outcome: Progressing  Intervention: Provide Person-Centered Care  Description: Use a family-focused approach to care; incorporate family/significant others in assessment, planning, education and support.Develop trust and rapport by proactively providing information, encouraging questions, addressing concerns and offering reassurance.Acknowledge emotional response; convey safety and acceptance.Recognize and utilize personal coping strategies and strengths; develop goals via shared decision-making.Identify patient's preferred routines, habits and personal preferences; respect privacy and personal space.Recognize progress and patient effort to facilitate motivation; provide opportunities for success.Honor spiritual and cultural preferences.Screen and monitor ongoing safety risks, such as self-harm, violence and elopement.  Recent Flowsheet Documentation  Taken 8/1/2025 1915 by Josephine Parker RN  Trust Relationship/Rapport:   care explained   choices provided   questions answered   reassurance provided   thoughts/feelings acknowledged  Goal: Home and Community Transition Plan Established  Outcome: Progressing     Problem: Skin Injury Risk Increased  Goal: Skin Health and Integrity  Outcome: Progressing  Intervention: Optimize Skin Protection  Description: Perform a full pressure injury risk assessment, as indicated by screening, upon admission to care unit.Reassess skin (full inspection and injury risk, including skin temperature, consistency and color) frequently (e.g., scheduled interval, with change in condition) to  provide optimal early detection and prevention.Maintain adequate tissue perfusion (e.g., encourage fluid balance; avoid crossing legs, constrictive clothing or devices) to promote tissue oxygenation.Maintain head of bed at lowest degree of elevation tolerated, considering medical condition and other restrictions. Use positioning supports to prevent sliding and friction. Consider low friction textiles.Avoid positioning onto an area that remains reddened or on bony prominences.Minimize incontinence and moisture (e.g., toileting schedule; moisture-wicking pad, diaper or incontinence collection device; skin moisture barrier).Cleanse skin promptly and gently, when soiled, utilizing a pH-balanced cleanser.Relieve and redistribute pressure (e.g., scheduled position changes, weight shifts, use of support surface, medical device repositioning, protective dressing application, use of positioning device, microclimate control, use of pressure-injury-monitorEncourage increased activity, such as sitting in a chair at the bedside or early mobilization, when able to tolerate. Avoid prolonged sitting.  Recent Flowsheet Documentation  Taken 8/1/2025 1915 by Josephine Parker RN  Pressure Reduction Techniques:   weight shift assistance provided   heels elevated off bed   frequent weight shift encouraged  Pressure Reduction Devices: pressure-redistributing mattress utilized     Problem: Comorbidity Management  Goal: Blood Pressure in Desired Range  Outcome: Progressing  Intervention: Maintain Blood Pressure Management  Description: Evaluate adherence to home antihypertensive regimen (e.g., exercise and activity, diet modification, medication).Provide scheduled antihypertensive medication; consider administration time and effects (e.g., avoid giving diuretic prior to bedtime).Monitor response to antihypertensive medication therapy (e.g., blood pressure, electrolyte levels, medication effects).Minimize risk of orthostatic hypotension;  encourage caution with position changes, particularly if elderly.  Recent Flowsheet Documentation  Taken 8/2/2025 0200 by Josephine Parker RN  Medication Review/Management: medications reviewed  Taken 8/2/2025 0000 by Josephine Parker RN  Medication Review/Management: medications reviewed  Taken 8/1/2025 2200 by Josephine Parker RN  Medication Review/Management: medications reviewed  Taken 8/1/2025 2000 by Josepihne Parker RN  Medication Review/Management: medications reviewed  Taken 8/1/2025 1915 by Josephine Parker RN  Medication Review/Management: medications reviewed     Problem: Fall Injury Risk  Goal: Absence of Fall and Fall-Related Injury  Outcome: Progressing  Intervention: Identify and Manage Contributors  Description: Develop a fall prevention plan, considering patient-centered interventions and family/caregiver involvement; identify and address patient's facilitators and barriers.Provide reorientation, appropriate sensory stimulation and routines with changes in mental status to decrease risk of fall.Promote use of personal vision and auditory aids.Assess assistance level required for safe and effective self-care; provide support as needed, such as toileting and mobilization. For age 65 and older, implement timed toileting with assistance.Encourage physical activity, such as performance of mobility and self-care at highest level of patient ability, multicomponent exercise program and provision of appropriate assistive devices.If fall occurs, assess the severity of injury; implement fall injury protocol. Determine the cause and revise fall injury prevention plan.Regularly review and advocate for medication adjustment to decrease fall risk; consider administration times, polypharmacy and age.Balance adequate pain management with potential for oversedation.  Recent Flowsheet Documentation  Taken 8/2/2025 0200 by Josephine Parker RN  Medication Review/Management: medications reviewed  Taken 8/2/2025 0000 by  Josephine Parker RN  Medication Review/Management: medications reviewed  Taken 8/1/2025 2200 by Josephine Parker RN  Medication Review/Management: medications reviewed  Taken 8/1/2025 2000 by Josephine Parker RN  Medication Review/Management: medications reviewed  Taken 8/1/2025 1915 by Josephine Parker RN  Medication Review/Management: medications reviewed  Intervention: Promote Injury-Free Environment  Description: Provide a safe, barrier-free environment that encourages independent activity.Keep care area uncluttered and well-lighted.Determine need for increased observation or monitoring.Avoid use of devices that minimize mobility, such as restraints or indwelling urinary catheter.  Recent Flowsheet Documentation  Taken 8/2/2025 0200 by Josephine Parker RN  Safety Promotion/Fall Prevention: safety round/check completed  Taken 8/2/2025 0000 by Josephine Parker RN  Safety Promotion/Fall Prevention: safety round/check completed  Taken 8/1/2025 2200 by Josephine Parker RN  Safety Promotion/Fall Prevention: safety round/check completed  Taken 8/1/2025 2000 by Josephine Parker RN  Safety Promotion/Fall Prevention: safety round/check completed  Taken 8/1/2025 1915 by Josephine Parker RN  Safety Promotion/Fall Prevention: safety round/check completed

## 2025-08-02 NOTE — PLAN OF CARE
Goal Outcome Evaluation:           Progress: improving  Outcome Summary: New Admit         Problem: Rehabilitation (IRF) Plan of Care  Goal: Plan of Care Review  Outcome: Progressing  Flowsheets  Taken 8/2/2025 0315 by Josephine Parker RN  Plan of Care Reviewed With: patient  Taken 8/1/2025 0000 by Katie Sanford RN  Progress: improving  Goal: Patient-Specific Goal (Individualized)  Outcome: Progressing  Goal: Absence of New-Onset Illness or Injury  Outcome: Progressing  Intervention: Identify and Manage Fall Risk  Recent Flowsheet Documentation  Taken 8/1/2025 1800 by Meenakshi Davis RN  Safety Promotion/Fall Prevention:   nonskid shoes/slippers when out of bed   safety round/check completed  Goal: Optimal Comfort and Wellbeing  Outcome: Progressing  Goal: Home and Community Transition Plan Established  Outcome: Progressing

## 2025-08-03 LAB
GLUCOSE BLDC GLUCOMTR-MCNC: 110 MG/DL (ref 70–130)
GLUCOSE BLDC GLUCOMTR-MCNC: 153 MG/DL (ref 70–130)
GLUCOSE BLDC GLUCOMTR-MCNC: 174 MG/DL (ref 70–130)

## 2025-08-03 PROCEDURE — 94760 N-INVAS EAR/PLS OXIMETRY 1: CPT

## 2025-08-03 PROCEDURE — 25010000002 CYANOCOBALAMIN PER 1000 MCG: Performed by: FAMILY MEDICINE

## 2025-08-03 PROCEDURE — 63710000001 INSULIN GLARGINE PER 5 UNITS: Performed by: INTERNAL MEDICINE

## 2025-08-03 PROCEDURE — 63710000001 INSULIN LISPRO (HUMAN) PER 5 UNITS: Performed by: FAMILY MEDICINE

## 2025-08-03 PROCEDURE — 82948 REAGENT STRIP/BLOOD GLUCOSE: CPT

## 2025-08-03 RX ADMIN — GABAPENTIN 400 MG: 400 CAPSULE ORAL at 21:25

## 2025-08-03 RX ADMIN — DULOXETINE 30 MG: 60 CAPSULE, DELAYED RELEASE ORAL at 09:46

## 2025-08-03 RX ADMIN — FLUDROCORTISONE ACETATE 50 MCG: 0.1 TABLET ORAL at 09:48

## 2025-08-03 RX ADMIN — INSULIN LISPRO 2 UNITS: 100 INJECTION, SOLUTION INTRAVENOUS; SUBCUTANEOUS at 18:52

## 2025-08-03 RX ADMIN — MIDODRINE HYDROCHLORIDE 2.5 MG: 2.5 TABLET ORAL at 12:34

## 2025-08-03 RX ADMIN — FOLIC ACID 1 MG: 1 TABLET ORAL at 09:46

## 2025-08-03 RX ADMIN — FLECAINIDE ACETATE 50 MG: 50 TABLET ORAL at 09:45

## 2025-08-03 RX ADMIN — GABAPENTIN 400 MG: 400 CAPSULE ORAL at 13:36

## 2025-08-03 RX ADMIN — CETIRIZINE HYDROCHLORIDE 10 MG: 10 TABLET, FILM COATED ORAL at 09:47

## 2025-08-03 RX ADMIN — ACETAMINOPHEN 650 MG: 325 TABLET, FILM COATED ORAL at 21:25

## 2025-08-03 RX ADMIN — CYANOCOBALAMIN 1000 MCG: 1000 INJECTION, SOLUTION INTRAMUSCULAR; SUBCUTANEOUS at 09:47

## 2025-08-03 RX ADMIN — PANTOPRAZOLE SODIUM 40 MG: 40 TABLET, DELAYED RELEASE ORAL at 05:35

## 2025-08-03 RX ADMIN — INSULIN GLARGINE 10 UNITS: 100 INJECTION, SOLUTION SUBCUTANEOUS at 09:47

## 2025-08-03 RX ADMIN — DULOXETINE 60 MG: 60 CAPSULE, DELAYED RELEASE ORAL at 09:46

## 2025-08-03 RX ADMIN — ACETAMINOPHEN 650 MG: 325 TABLET, FILM COATED ORAL at 05:35

## 2025-08-03 RX ADMIN — LEVOTHYROXINE SODIUM 150 MCG: 0.07 TABLET ORAL at 05:38

## 2025-08-03 RX ADMIN — MIDODRINE HYDROCHLORIDE 2.5 MG: 2.5 TABLET ORAL at 09:46

## 2025-08-03 RX ADMIN — FLECAINIDE ACETATE 50 MG: 50 TABLET ORAL at 21:25

## 2025-08-03 RX ADMIN — INSULIN LISPRO 2 UNITS: 100 INJECTION, SOLUTION INTRAVENOUS; SUBCUTANEOUS at 12:34

## 2025-08-03 RX ADMIN — RIVAROXABAN 20 MG: 20 TABLET, FILM COATED ORAL at 17:54

## 2025-08-03 RX ADMIN — GABAPENTIN 400 MG: 400 CAPSULE ORAL at 05:35

## 2025-08-03 NOTE — PLAN OF CARE
Goal Outcome Evaluation:  Plan of Care Reviewed With: patient           Problem: Rehabilitation (IR[facility]) Plan of Care  Goal: Plan of Care Review  Outcome: Progressing  Flowsheets (Taken 8/3/2025 0043)  Plan of Care Reviewed With: patient  Goal: Patient-Specific Goal (Individualized)  Outcome: Progressing  Goal: Absence of New-Onset Illness or Injury  Outcome: Progressing  Intervention: Identify and Manage Fall Risk  Description: Perform standard risk assessment on admission using a validated tool or comprehensive approach appropriate to the patient; reassess fall risk frequently, with change in status or transfer to another level of care.Communicate risk to interprofessional healthcare team; ensure fall risk visible cue.Determine need for increased observation, equipment and environmental modification, as well as use of supportive, nonskid footwear.Adjust safety measures to individual needs and identified risk factors.Reinforce the importance of active participation with fall risk prevention, safety and physical activity with the patient and family.Perform regular intentional rounding to assess need for position change, pain management, attention to personal needs and assistance with toileting.  Recent Flowsheet Documentation  Taken 8/3/2025 0000 by Josephine Parker RN  Safety Promotion/Fall Prevention: safety round/check completed  Taken 8/2/2025 2200 by Josephine Parker RN  Safety Promotion/Fall Prevention: safety round/check completed  Taken 8/2/2025 2000 by Josephine Parker RN  Safety Promotion/Fall Prevention: safety round/check completed  Taken 8/2/2025 1945 by Josephine Parker RN  Safety Promotion/Fall Prevention: safety round/check completed  Goal: Optimal Comfort and Wellbeing  Outcome: Progressing  Intervention: Provide Person-Centered Care  Description: Use a family-focused approach to care; incorporate family/significant others in assessment, planning, education and support.Develop trust and rapport by  proactively providing information, encouraging questions, addressing concerns and offering reassurance.Acknowledge emotional response; convey safety and acceptance.Recognize and utilize personal coping strategies and strengths; develop goals via shared decision-making.Identify patient's preferred routines, habits and personal preferences; respect privacy and personal space.Recognize progress and patient effort to facilitate motivation; provide opportunities for success.Honor spiritual and cultural preferences.Screen and monitor ongoing safety risks, such as self-harm, violence and elopement.  Recent Flowsheet Documentation  Taken 8/2/2025 1945 by Josephine Parker RN  Trust Relationship/Rapport:   care explained   choices provided   questions answered   reassurance provided   thoughts/feelings acknowledged  Goal: Home and Community Transition Plan Established  Outcome: Progressing     Problem: Skin Injury Risk Increased  Goal: Skin Health and Integrity  Outcome: Progressing  Intervention: Optimize Skin Protection  Description: Perform a full pressure injury risk assessment, as indicated by screening, upon admission to care unit.Reassess skin (full inspection and injury risk, including skin temperature, consistency and color) frequently (e.g., scheduled interval, with change in condition) to provide optimal early detection and prevention.Maintain adequate tissue perfusion (e.g., encourage fluid balance; avoid crossing legs, constrictive clothing or devices) to promote tissue oxygenation.Maintain head of bed at lowest degree of elevation tolerated, considering medical condition and other restrictions. Use positioning supports to prevent sliding and friction. Consider low friction textiles.Avoid positioning onto an area that remains reddened or on bony prominences.Minimize incontinence and moisture (e.g., toileting schedule; moisture-wicking pad, diaper or incontinence collection device; skin moisture barrier).Cleanse  skin promptly and gently, when soiled, utilizing a pH-balanced cleanser.Relieve and redistribute pressure (e.g., scheduled position changes, weight shifts, use of support surface, medical device repositioning, protective dressing application, use of positioning device, microclimate control, use of pressure-injury-monitorEncourage increased activity, such as sitting in a chair at the bedside or early mobilization, when able to tolerate. Avoid prolonged sitting.  Recent Flowsheet Documentation  Taken 8/2/2025 1945 by Josephine Parker RN  Pressure Reduction Techniques:   weight shift assistance provided   frequent weight shift encouraged   heels elevated off bed  Pressure Reduction Devices: pressure-redistributing mattress utilized     Problem: Comorbidity Management  Goal: Blood Pressure in Desired Range  Outcome: Progressing  Intervention: Maintain Blood Pressure Management  Description: Evaluate adherence to home antihypertensive regimen (e.g., exercise and activity, diet modification, medication).Provide scheduled antihypertensive medication; consider administration time and effects (e.g., avoid giving diuretic prior to bedtime).Monitor response to antihypertensive medication therapy (e.g., blood pressure, electrolyte levels, medication effects).Minimize risk of orthostatic hypotension; encourage caution with position changes, particularly if elderly.  Recent Flowsheet Documentation  Taken 8/3/2025 0000 by Josephine Parker RN  Medication Review/Management: medications reviewed  Taken 8/2/2025 2200 by Josephine Parker RN  Medication Review/Management: medications reviewed  Taken 8/2/2025 2000 by Josephine Parker RN  Medication Review/Management: medications reviewed  Taken 8/2/2025 1945 by Josephine Parker RN  Medication Review/Management: medications reviewed     Problem: Fall Injury Risk  Goal: Absence of Fall and Fall-Related Injury  Outcome: Progressing  Intervention: Identify and Manage  Contributors  Description: Develop a fall prevention plan, considering patient-centered interventions and family/caregiver involvement; identify and address patient's facilitators and barriers.Provide reorientation, appropriate sensory stimulation and routines with changes in mental status to decrease risk of fall.Promote use of personal vision and auditory aids.Assess assistance level required for safe and effective self-care; provide support as needed, such as toileting and mobilization. For age 65 and older, implement timed toileting with assistance.Encourage physical activity, such as performance of mobility and self-care at highest level of patient ability, multicomponent exercise program and provision of appropriate assistive devices.If fall occurs, assess the severity of injury; implement fall injury protocol. Determine the cause and revise fall injury prevention plan.Regularly review and advocate for medication adjustment to decrease fall risk; consider administration times, polypharmacy and age.Balance adequate pain management with potential for oversedation.  Recent Flowsheet Documentation  Taken 8/3/2025 0000 by Josephine Parker RN  Medication Review/Management: medications reviewed  Taken 8/2/2025 2200 by Josephine Parker RN  Medication Review/Management: medications reviewed  Taken 8/2/2025 2000 by Josephine Parker RN  Medication Review/Management: medications reviewed  Taken 8/2/2025 1945 by Josephine Parker RN  Medication Review/Management: medications reviewed  Intervention: Promote Injury-Free Environment  Description: Provide a safe, barrier-free environment that encourages independent activity.Keep care area uncluttered and well-lighted.Determine need for increased observation or monitoring.Avoid use of devices that minimize mobility, such as restraints or indwelling urinary catheter.  Recent Flowsheet Documentation  Taken 8/3/2025 0000 by Josephine Parker RN  Safety Promotion/Fall Prevention:  safety round/check completed  Taken 8/2/2025 2200 by Josephine Parker, RN  Safety Promotion/Fall Prevention: safety round/check completed  Taken 8/2/2025 2000 by Josephine Parker, RN  Safety Promotion/Fall Prevention: safety round/check completed  Taken 8/2/2025 1945 by Josephine Parker, RN  Safety Promotion/Fall Prevention: safety round/check completed

## 2025-08-03 NOTE — PROGRESS NOTES
PAIN MANAGEMENT DISCHARGE INSTRUCTION SHEET    GENERAL INSTRUCTIONS FOR ALL PROCEDURES  1. You may resume normal activities as tolerated if you did not receive sedation.   2. Resume regular diet and all previous medications at home.  3. Do not apply heat or ice to injection site if numbness is present.  4. Do not take a tub bath, or soak injection site for 24 hours until site is healed, you may take a shower.  5. You may notice discomfort after local anesthetic wears off, before the steroid takes full effect  6. Steroid may take 72 hours or more before it starts working.  7. We will call you within 24 to 48 hours after your procedure.  If you are not able to receive the call, it is your responsibility to call us.  8. If you are diabetic and steroids are used, monitor your blood sugar level for the next 48 hours.  9. Remove band-aid 4 -5 hours after injection.    FOLLOW UP   1. Please call for a follow up appointment within 2-4 weeks post procedure if you     don't already have one scheduled.  You can call [phone]    SYMPTOMS TO REPORT  1. Excessive bleeding or drainage from injection site.  2. Persistent chills or fever greater than 100 degrees.  3. Major change in pain pattern or level.  4. Loss of bowel or bladder control.  5. New onset of numbness or weakness.  6. Shortness of breath, severe nausea or vomiting, inability to urinate for 24 hours, or increased swelling or itchiness.    REPORT ANY OF THE ABOVE SYMPTOMS TO:  [facility] at [phone].  After hours, your call will be answered by a service, they will page one of the physicians. If unable to reach a physician, report to the nearest Emergency Department.    NOTE: Routine medication refills are not an emergency.      [facility]     Patient's incisions were evaluated with RN.  The lower 2 incisions well-healed some minimal erythema at the staple line, staples removed, the upper incision had some minimal drainage on the Aquacel, every other staple was removed from that incision.

## 2025-08-04 LAB
GLUCOSE BLDC GLUCOMTR-MCNC: 125 MG/DL (ref 70–130)
GLUCOSE BLDC GLUCOMTR-MCNC: 150 MG/DL (ref 70–130)
GLUCOSE BLDC GLUCOMTR-MCNC: 163 MG/DL (ref 70–130)
GLUCOSE BLDC GLUCOMTR-MCNC: 191 MG/DL (ref 70–130)
GLUCOSE BLDC GLUCOMTR-MCNC: 212 MG/DL (ref 70–130)

## 2025-08-04 PROCEDURE — 97535 SELF CARE MNGMENT TRAINING: CPT

## 2025-08-04 PROCEDURE — 94799 UNLISTED PULMONARY SVC/PX: CPT

## 2025-08-04 PROCEDURE — 97530 THERAPEUTIC ACTIVITIES: CPT

## 2025-08-04 PROCEDURE — 97110 THERAPEUTIC EXERCISES: CPT

## 2025-08-04 PROCEDURE — 99232 SBSQ HOSP IP/OBS MODERATE 35: CPT | Performed by: FAMILY MEDICINE

## 2025-08-04 PROCEDURE — 63710000001 INSULIN LISPRO (HUMAN) PER 5 UNITS: Performed by: FAMILY MEDICINE

## 2025-08-04 PROCEDURE — 82948 REAGENT STRIP/BLOOD GLUCOSE: CPT

## 2025-08-04 PROCEDURE — 97116 GAIT TRAINING THERAPY: CPT

## 2025-08-04 PROCEDURE — 63710000001 INSULIN GLARGINE PER 5 UNITS: Performed by: INTERNAL MEDICINE

## 2025-08-04 RX ADMIN — MIDODRINE HYDROCHLORIDE 2.5 MG: 2.5 TABLET ORAL at 12:52

## 2025-08-04 RX ADMIN — ACETAMINOPHEN 650 MG: 325 TABLET, FILM COATED ORAL at 05:17

## 2025-08-04 RX ADMIN — FLUDROCORTISONE ACETATE 50 MCG: 0.1 TABLET ORAL at 09:32

## 2025-08-04 RX ADMIN — GABAPENTIN 400 MG: 400 CAPSULE ORAL at 05:17

## 2025-08-04 RX ADMIN — DULOXETINE 30 MG: 60 CAPSULE, DELAYED RELEASE ORAL at 09:33

## 2025-08-04 RX ADMIN — GABAPENTIN 400 MG: 400 CAPSULE ORAL at 21:13

## 2025-08-04 RX ADMIN — INSULIN LISPRO 3 UNITS: 100 INJECTION, SOLUTION INTRAVENOUS; SUBCUTANEOUS at 12:52

## 2025-08-04 RX ADMIN — RIVAROXABAN 20 MG: 20 TABLET, FILM COATED ORAL at 18:02

## 2025-08-04 RX ADMIN — LEVOTHYROXINE SODIUM 150 MCG: 0.07 TABLET ORAL at 05:17

## 2025-08-04 RX ADMIN — MIDODRINE HYDROCHLORIDE 2.5 MG: 2.5 TABLET ORAL at 09:32

## 2025-08-04 RX ADMIN — FOLIC ACID 1 MG: 1 TABLET ORAL at 09:32

## 2025-08-04 RX ADMIN — INSULIN GLARGINE 10 UNITS: 100 INJECTION, SOLUTION SUBCUTANEOUS at 09:33

## 2025-08-04 RX ADMIN — INSULIN LISPRO 2 UNITS: 100 INJECTION, SOLUTION INTRAVENOUS; SUBCUTANEOUS at 18:02

## 2025-08-04 RX ADMIN — INSULIN LISPRO 2 UNITS: 100 INJECTION, SOLUTION INTRAVENOUS; SUBCUTANEOUS at 21:13

## 2025-08-04 RX ADMIN — CETIRIZINE HYDROCHLORIDE 10 MG: 10 TABLET, FILM COATED ORAL at 09:31

## 2025-08-04 RX ADMIN — FLECAINIDE ACETATE 50 MG: 50 TABLET ORAL at 09:32

## 2025-08-04 RX ADMIN — PANTOPRAZOLE SODIUM 40 MG: 40 TABLET, DELAYED RELEASE ORAL at 05:17

## 2025-08-04 RX ADMIN — DULOXETINE 60 MG: 60 CAPSULE, DELAYED RELEASE ORAL at 09:32

## 2025-08-04 RX ADMIN — FLECAINIDE ACETATE 50 MG: 50 TABLET ORAL at 21:13

## 2025-08-04 RX ADMIN — GABAPENTIN 400 MG: 400 CAPSULE ORAL at 14:37

## 2025-08-04 NOTE — THERAPY TREATMENT NOTE
Inpatient Rehabilitation - Physical Therapy Treatment Note       GUANACO Portillo     Patient Name: Lashae Benitez  : 1960  MRN: [id number]    Today's Date: 2025                    Admit Date: 2025      Visit Dx:   No diagnosis found.    Patient Active Problem List   Diagnosis    Hiatal hernia    Essential hypertension    Sjogren's syndrome    Multiple sclerosis    Psoriasis    Fibromyalgia    Osteoarthritis    Psoriatic arthritis    RA (rheumatoid arthritis)    Degenerative disc disease, lumbar    TMJ arthritis    Dupuytren's disease    H. pylori infection    Family history of coronary artery disease    Type 2 diabetes mellitus    Edema    Bilateral leg edema    Isolated corticotropin deficiency    Hyponatremia    Paroxysmal atrial fibrillation    Sepsis    Bacteremia, escherichia coli    Iron deficiency anemia    Malabsorption due to intolerance, not elsewhere classified    Chronic chest pain with high risk for CAD    Abnormal nuclear stress test    Abnormal computed tomography angiography (CTA)    Hip fracture    Closed intertrochanteric fracture of hip, left, initial encounter    Closed left hip fracture, sequela       Past Medical History:   Diagnosis Date    Acid reflux     Allergic     Anemia     Anxiety     Atrial fibrillation     Cancer     thyroid, skin    Cervical disc disorder     Chronic pain disorder     Claustrophobia     CTS (carpal tunnel syndrome)     Degenerative disc disease, lumbar     Depression     Dupuytren's disease     Essential hypertension     Family history of coronary artery disease     Fibromyalgia     Gallbladder abscess     H. pylori infection     Hiatal hernia     Hyperlipidemia     Hypothyroidism     Low back pain     Lumbosacral disc disease     Migraines     migraines    Multiple sclerosis     Osteoarthritis     Peripheral neuropathy     Psoriasis     Psoriatic arthritis     RA (rheumatoid arthritis)     Rheumatoid arthritis     Sinusitis     Sjogren's syndrome     Stomach  ulcer     Thoracic disc disorder     TMJ arthritis     Type 2 diabetes mellitus     Urinary tract infection        Past Surgical History:   Procedure Laterality Date    BACK SURGERY      BREAST LUMPECTOMY Left 11/1993    CARDIAC CATHETERIZATION Left 09/21/2009    Normal     CARDIAC CATHETERIZATION N/A 6/24/2025    Procedure: Left Heart Cath;  Surgeon: Teodora Barron MD;  Location:  [facility];  Service: Cardiovascular;  Laterality: N/A;    CARPAL TUNNEL RELEASE  03/2012    CERVICAL POLYPECTOMY  12/2015    CHOLECYSTECTOMY  05/2007    COLONOSCOPY      ENDOSCOPY      EPIDURAL BLOCK      GASTRIC BYPASS  09/2007    JOINT REPLACEMENT      KNEE ARTHROPLASTY      LUMBAR DISC SURGERY      C5-6    NECK SURGERY      REPLACEMENT TOTAL KNEE Right 06/2016    SACRAL NERVE STIMULATOR PLACEMENT  09/2014    SACRAL NERVE STIMULATOR PLACEMENT  04/11/2024    @ [facility] in [place]    SACRAL NERVE STIMULATOR PLACEMENT Right 04/17/2024    THYROIDECTOMY  03/2016    TRIGGER POINT INJECTION         PT ASSESSMENT (Last 12 Hours)       [facility] PT Evaluation and Treatment       Row Name 08/04/25 1427          PT Time and Intention    Document Type daily treatment  -RG     Mode of Treatment physical therapy;individual therapy  -RG     Patient/Family/Caregiver Comments/Observations Pt and nursing in agreement for skilled PT on this date.  -RG       Row Name 08/04/25 1427          General Information    Existing Precautions/Restrictions fall;weight bearing  L LE TTWB, abdominal binder w/ lower BP  -RG       Row Name 08/04/25 1427          Pain Scale: FACES Pre/Post-Treatment    Pain: FACES Scale, Pretreatment 2-->hurts little bit  -RG     Posttreatment Pain Rating 2-->hurts little bit  -RG       Row Name 08/04/25 1427          Cognition/Psychosocial    Affect/Mental Status (Cognition) WFL  -RG     Orientation Status (Cognition) oriented x 3  -RG     Follows Commands (Cognition) verbal cues/prompting required;physical/tactile prompts  required  -     Personal Safety Interventions fall prevention program maintained;gait belt;nonskid shoes/slippers when out of bed  -     Cognitive Function (Cognition) WFL  -       Row Name 08/04/25 1427          Mobility    Extremity Weight-bearing Status left lower extremity  -RG     Left Lower Extremity (Weight-bearing Status) toe touch weight-bearing (TTWB)  -       Row Name 08/04/25 1427          Bed Mobility    Bed Mobility sit-supine  -RG     Supine-Sit-Supine Clare (Bed Mobility) contact guard  -       Row Name 08/04/25 1427          Transfer Assessment/Treatment    Transfers sit-stand transfer;stand-sit transfer;chair-bed transfer  -       Row Name 08/04/25 1427          Bed-Chair Transfer    Bed-Chair Clare (Transfers) verbal cues;contact guard  -     Assistive Device (Bed-Chair Transfers) wheelchair  arm rest  -       Row Name 08/04/25 1427          Chair-Bed Transfer    Chair-Bed Clare (Transfers) contact guard;verbal cues;nonverbal cues (demo/gesture)  -     Assistive Device (Chair-Bed Transfers) wheelchair  bed rail  -       Row Name 08/04/25 1427          Sit-Stand Transfer    Sit-Stand Clare (Transfers) contact guard;minimum assist (75% patient effort);1 person assist;2 person assist;1 person to manage equipment  -     Assistive Device (Sit-Stand Transfers) wheelchair;walker, front-wheeled  -       Row Name 08/04/25 1427          Stand-Sit Transfer    Stand-Sit Clare (Transfers) contact guard;verbal cues;nonverbal cues (demo/gesture)  -     Assistive Device (Stand-Sit Transfers) wheelchair;walker, front-wheeled  -       Row Name 08/04/25 1427          Gait/Stairs (Locomotion)    Gait/Stairs Locomotion gait/ambulation assistive device  -     Clare Level (Gait) minimum assist (75% patient effort);2 person assist;verbal cues;nonverbal cues (demo/gesture)  -     Assistive Device (Gait) walker, front-wheeled  -     Patient was  able to Ambulate yes  -RG     Distance in Feet (Gait) --  20, 20, 60 in am and 20, 60 pm session  -RG     Pattern (Gait) step-to  TTWB on L LE  -RG     Comment, (Gait/Stairs) rest breaks as needed  -RG       Row Name 08/04/25 1427          Hip (Therapeutic Exercise)    Hip Strengthening (Therapeutic Exercise) bilateral;flexion;aBduction;aDduction;external rotation;internal rotation;marching while seated;sitting;supine;2 lb free weight;resistance band;green;10 repetitions;2 sets  -RG       Row Name 08/04/25 1427          Knee (Therapeutic Exercise)    Knee Strengthening (Therapeutic Exercise) bilateral;flexion;extension;heel slides;marching while seated;SAQ (short arc quad);SLR (straight leg raise);LAQ (long arc quad);sitting;supine;2 lb free weight;resistance band;green;10 repetitions;2 sets  -RG       Row Name 08/04/25 1427          Ankle (Therapeutic Exercise)    Ankle Strengthening (Therapeutic Exercise) dorsiflexion;bilateral;plantarflexion;sitting;10 repetitions;2 sets  -RG       Row Name 08/04/25 1427          Positioning and Restraints    Pre-Treatment Position in bed  -RG     Post Treatment Position wheelchair  -RG     In Wheelchair notified nsg;sitting;call light within reach;encouraged to call for assist  -RG               User Key  (r) = Recorded By, (t) = Taken By, (c) = Cosigned By      Initials Name Provider Type    RG Neri Cornelius PTA Physical Therapist Assistant                  Wound 07/19/25 0921 Left gluteal Other (Comments) (Active)   Closure Open to air 08/04/25 1142   Base nonblanchable;red 08/04/25 1142   Periwound pink;redness 08/04/25 1142   Periwound Temperature warm 08/04/25 1142   Periwound Skin Turgor soft 08/04/25 1142   Drainage Amount none 08/04/25 1142   Care, Wound barrier applied 08/04/25 1142       Wound 07/19/25 0938 Left anterior thigh Surgical Open Surgical Incision (Active)   Dressing Appearance open to air 08/04/25 1142   Closure Steri strips;Staples 08/04/25 1142   Base  pink;red 08/04/25 1142   Periwound pink;dry 08/04/25 1142   Periwound Temperature warm 08/04/25 1142   Periwound Skin Turgor soft 08/04/25 1142   Edges rolled/closed 08/04/25 1142   Drainage Amount none 08/04/25 1142       Wound 07/21/25 0930 Right coccyx Pressure Injury (Active)   Dressing Appearance open to air 08/04/25 1142   Closure Open to air 08/03/25 1945   Base pink;purple 08/04/25 1142   Periwound redness 08/04/25 1142   Periwound Temperature warm 08/04/25 1142   Periwound Skin Turgor soft 08/04/25 1142   Edges rolled/closed 08/04/25 1142   Drainage Amount none 08/04/25 1142   Care, Wound barrier applied 08/04/25 1142   Dressing Care open to air 08/04/25 1142       Wound 07/28/25 0829 coccyx (Active)   Dressing Appearance open to air 08/04/25 1142   Base dry 08/04/25 1142   Periwound pink;dry 08/04/25 1142   Periwound Temperature warm 08/04/25 1142   Periwound Skin Turgor soft 08/04/25 1142   Edges irregular 08/04/25 1142   Care, Wound barrier applied 08/04/25 1142     Physical Therapy Education       Title: PT OT SLP Therapies (Done)       Topic: Physical Therapy (Done)       Point: Mobility training (Done)       Learning Progress Summary            Patient Acceptance, E,D, VU,NR by RG at 8/4/2025 1440    Acceptance, E,D, VU,NR by RG at 8/4/2025 1440    Acceptance, E,D, VU,NR by RG at 8/1/2025 1448    Acceptance, E,D, VU by  at 7/31/2025 1546    Acceptance, E,D, VU,NR by RG at 7/30/2025 1348    Acceptance, E,D, VU,NR by RG at 7/29/2025 1434    Acceptance, E,D, VU,NR by RG at 7/28/2025 1438    Acceptance, E,D, VU,NR by  at 7/26/2025 1615    Acceptance, E,D, VU,NR by RG at 7/25/2025 1407    Acceptance, E,D, VU,NR by RG at 7/24/2025 1502    Acceptance, E, VU,NR by LB at 7/23/2025 1612   Significant Other Acceptance, E,D, VU by LL at 7/31/2025 1546                      Point: Home exercise program (Done)       Learning Progress Summary            Patient Acceptance, E,D, VU,NR by RG at 8/4/2025 1440     Acceptance, E,D, VU,NR by RG at 8/4/2025 1440    Acceptance, E,D, VU,NR by RG at 8/1/2025 1448    Acceptance, E,D, VU by LL at 7/31/2025 1546    Acceptance, E,D, VU,NR by RG at 7/30/2025 1348    Acceptance, E,D, VU,NR by RG at 7/29/2025 1434    Acceptance, E,D, VU,NR by RG at 7/28/2025 1438    Acceptance, E,D, VU,NR by LL at 7/26/2025 1615    Acceptance, E,D, VU,NR by RG at 7/25/2025 1407    Acceptance, E,D, VU,NR by RG at 7/24/2025 1502    Acceptance, E, VU,NR by LB at 7/23/2025 1612   Significant Other Acceptance, E,D, VU by LL at 7/31/2025 1546                      Point: Body mechanics (Done)       Learning Progress Summary            Patient Acceptance, E,D, VU,NR by RG at 8/4/2025 1440    Acceptance, E,D, VU,NR by RG at 8/4/2025 1440    Acceptance, E,D, VU,NR by RG at 8/1/2025 1448    Acceptance, E,D, VU by LL at 7/31/2025 1546    Acceptance, E,D, VU,NR by RG at 7/30/2025 1348    Acceptance, E,D, VU,NR by RG at 7/29/2025 1434    Acceptance, E,D, VU,NR by RG at 7/28/2025 1438    Acceptance, E,D, VU,NR by LL at 7/26/2025 1615    Acceptance, E,D, VU,NR by RG at 7/25/2025 1407    Acceptance, E,D, VU,NR by RG at 7/24/2025 1502    Acceptance, E, VU,NR by LB at 7/23/2025 1612   Significant Other Acceptance, E,D, VU by LL at 7/31/2025 1546                      Point: Precautions (Done)       Learning Progress Summary            Patient Acceptance, E,D, VU,NR by RG at 8/4/2025 1440    Acceptance, E,D, VU,NR by RG at 8/4/2025 1440    Acceptance, E,D, VU,NR by RG at 8/1/2025 1448    Acceptance, E,D, VU by LL at 7/31/2025 1546    Acceptance, E,D, VU,NR by RG at 7/30/2025 1348    Acceptance, E,D, VU,NR by RG at 7/29/2025 1434    Acceptance, E,D, VU,NR by RG at 7/28/2025 1438    Acceptance, E,D, VU,NR by LL at 7/26/2025 1615    Acceptance, E,D, VU,NR by RG at 7/25/2025 1407    Acceptance, E,D, VU,NR by RG at 7/24/2025 1502    Acceptance, E, VU,NR by LB at 7/23/2025 1612   Significant Other Acceptance, E,D, VU by LL at  7/31/2025 1546                                      User Key       Initials Effective Dates Name Provider Type Discipline    LB 06/16/21 -  Andreia Dove, PT Physical Therapist PT    LL 05/02/16 -  Beverly Goff PTA Physical Therapist Assistant PT    RG 06/16/21 -  Neri Cornelius PTA Physical Therapist Assistant PT                    PT Recommendation and Plan    Frequency of Treatment (PT): 5 times per week  Anticipated Equipment Needs at Discharge (PT Eval):  (tbd)                  Time Calculation:      PT Charges       Row Name 08/04/25 1442 08/04/25 1441          Time Calculation    Start Time 1245  -RG 0915  -RG     Stop Time 1330  -RG 1000  -RG     Time Calculation (min) 45 min  -RG 45 min  -RG     PT Received On 08/04/25  -RG 08/04/25  -RG        Time Calculation- PT    Total Timed Code Minutes- PT 45 minute(s)  -RG 45 minute(s)  -RG               User Key  (r) = Recorded By, (t) = Taken By, (c) = Cosigned By      Initials Name Provider Type    RG Neri Cornelius PTA Physical Therapist Assistant                    Therapy Charges for Today       Code Description Service Date Service Provider Modifiers Qty    80320609935 HC GAIT TRAINING EA 15 MIN 8/4/2025 Neri Cornelius PTA GP, CQ 1    95998965702 HC PT THERAPEUTIC ACT EA 15 MIN 8/4/2025 Neri Cornelius PTA GP, CQ 2    63728862476 HC PT THER PROC EA 15 MIN 8/4/2025 Neri Cornelius PTA GP, CQ 3              PT G-Codes  AM-PAC 6 Clicks Score (PT): 16      Neri Cornelius PTA  8/4/2025

## 2025-08-04 NOTE — PROGRESS NOTES
Problems/Goals  Dressing (Lower) (Self Care)  Current Status: Shanna  Long Term Goals  07/23/2025 12:26 PM - Active  CGA    Signed by: Amber Badillo, Occupational Therapist

## 2025-08-04 NOTE — THERAPY TREATMENT NOTE
Inpatient Rehabilitation - Occupational Therapy Treatment Note    GUANACO Portillo     Patient Name: Lashae Benitez  : 1960  MRN: [id number]    Today's Date: 2025                 Admit Date: 2025       No diagnosis found.    Patient Active Problem List   Diagnosis    Hiatal hernia    Essential hypertension    Sjogren's syndrome    Multiple sclerosis    Psoriasis    Fibromyalgia    Osteoarthritis    Psoriatic arthritis    RA (rheumatoid arthritis)    Degenerative disc disease, lumbar    TMJ arthritis    Dupuytren's disease    H. pylori infection    Family history of coronary artery disease    Type 2 diabetes mellitus    Edema    Bilateral leg edema    Isolated corticotropin deficiency    Hyponatremia    Paroxysmal atrial fibrillation    Sepsis    Bacteremia, escherichia coli    Iron deficiency anemia    Malabsorption due to intolerance, not elsewhere classified    Chronic chest pain with high risk for CAD    Abnormal nuclear stress test    Abnormal computed tomography angiography (CTA)    Hip fracture    Closed intertrochanteric fracture of hip, left, initial encounter    Closed left hip fracture, sequela       Past Medical History:   Diagnosis Date    Acid reflux     Allergic     Anemia     Anxiety     Atrial fibrillation     Cancer     thyroid, skin    Cervical disc disorder     Chronic pain disorder     Claustrophobia     CTS (carpal tunnel syndrome)     Degenerative disc disease, lumbar     Depression     Dupuytren's disease     Essential hypertension     Family history of coronary artery disease     Fibromyalgia     Gallbladder abscess     H. pylori infection     Hiatal hernia     Hyperlipidemia     Hypothyroidism     Low back pain     Lumbosacral disc disease     Migraines     migraines    Multiple sclerosis     Osteoarthritis     Peripheral neuropathy     Psoriasis     Psoriatic arthritis     RA (rheumatoid arthritis)     Rheumatoid arthritis     Sinusitis     Sjogren's syndrome     Stomach ulcer      Thoracic disc disorder     TMJ arthritis     Type 2 diabetes mellitus     Urinary tract infection        Past Surgical History:   Procedure Laterality Date    BACK SURGERY      BREAST LUMPECTOMY Left 11/1993    CARDIAC CATHETERIZATION Left 09/21/2009    Normal     CARDIAC CATHETERIZATION N/A 6/24/2025    Procedure: Left Heart Cath;  Surgeon: Teodora Barron MD;  Location:  [facility];  Service: Cardiovascular;  Laterality: N/A;    CARPAL TUNNEL RELEASE  03/2012    CERVICAL POLYPECTOMY  12/2015    CHOLECYSTECTOMY  05/2007    COLONOSCOPY      ENDOSCOPY      EPIDURAL BLOCK      GASTRIC BYPASS  09/2007    JOINT REPLACEMENT      KNEE ARTHROPLASTY      LUMBAR DISC SURGERY      C5-6    NECK SURGERY      REPLACEMENT TOTAL KNEE Right 06/2016    SACRAL NERVE STIMULATOR PLACEMENT  09/2014    SACRAL NERVE STIMULATOR PLACEMENT  04/11/2024    @ [facility] in [place]    SACRAL NERVE STIMULATOR PLACEMENT Right 04/17/2024    THYROIDECTOMY  03/2016    TRIGGER POINT INJECTION               IRF OT ASSESSMENT FLOWSHEET (Last 12 Hours)       IRF OT Evaluation and Treatment       Row Name 08/04/25 1226          OT Time and Intention    Document Type daily treatment  -     Mode of Treatment occupational therapy  -     Symptoms Noted During/After Treatment none  -       Row Name 08/04/25 1226          General Information    Patient/Family/Caregiver Comments/Observations agreeable to therapy; VSS  -     Existing Precautions/Restrictions fall;weight bearing  TTWB LLE; ABD binder- bp  -       Row Name 08/04/25 1226          Bathing    [place] Level (Bathing) minimum assist (75% patient effort);contact guard assist;verbal cues  -     Assistive Device (Bathing) long-handled sponge  issued LHS  -       Row Name 08/04/25 1226          Upper Body Dressing    [place] Level (Upper Body Dressing) set up assistance;bra/undergarment;pull over garment  -       Row Name 08/04/25 1226          Lower Body Dressing     [place] Level (Lower Body Dressing) minimum assist (75% patient effort);verbal cues  -     Assistive Device Use (Lower Body Dressing) reacher;sock-aid  issued AE  -       Row Name 08/04/25 1226          Grooming    [place] Level (Grooming) set up  -       Row Name 08/04/25 1226          Toileting    [place] Level (Toileting) minimum assist (75% patient effort);verbal cues  -     Assistive Device Use (Toileting) grab bar/safety frame;raised toilet seat  -       Row Name 08/04/25 1226          Bed-Chair Transfer    Bed-Chair [place] (Transfers) minimum assist (75% patient effort);contact guard;verbal cues  -     Assistive Device (Bed-Chair Transfers) wheelchair  -       Row Name 08/04/25 1226          Toilet Transfer    [place] Level (Toilet Transfer) contact guard;minimum assist (75% patient effort);verbal cues  -     Assistive Device (Toilet Transfer) grab bars/safety frame;raised toilet seat;wheelchair  -       Row Name 08/04/25 1226          Motor Skills    Motor Skills functional endurance  -     Motor Control/Coordination Interventions therapeutic exercise/ROM  BUE ther ex, AROM, PRE  -     Therapeutic Exercise --  UBE, light flexbar, dowel ex/ wrist rolls  -       Row Name 08/04/25 1226          Positioning and Restraints    Post Treatment Position wheelchair  -     In Wheelchair with PT;sitting;call light within reach;encouraged to call for assist;exit alarm on;notified nsg;with family/caregiver  PT- am; room w/ family- pm  -       Row Name 08/04/25 1226          Vital Signs    Intra Systolic BP Rehab 129  -     Intra Treatment Diastolic BP 72  -CJ               User Key  (r) = Recorded By, (t) = Taken By, (c) = Cosigned By      Initials Name Effective Dates     Amber Badillo OT 06/16/21 -                      Occupational Therapy Education       Title: PT OT SLP Therapies (Done)       Topic: Occupational Therapy (Done)       Point: ADL training  (Done)       Learning Progress Summary            Patient Acceptance, E,D, VU,NR by  at 8/4/2025 1222    Acceptance, E,D, VU,NR by  at 8/1/2025 1228                      Point: Precautions (Done)       Learning Progress Summary            Patient Acceptance, E,D, VU,NR by  at 8/4/2025 1222    Acceptance, E,D, VU,NR by  at 8/1/2025 1228                                      User Key       Initials Effective Dates Name Provider Type [facility] 06/16/21 -  Amber Badillo OT Occupational Therapist OT                        OT Recommendation and Plan    Planned Therapy Interventions (OT): activity tolerance training, adaptive equipment training, BADL retraining, occupation/activity based interventions, patient/caregiver education/training, ROM/therapeutic exercise, strengthening exercise (impaired ADL's, strength, fxl mobility, and activity tolerance)                    Time Calculation:      Time Calculation- OT       Row Name 08/04/25 1501 08/04/25 1459          Time Calculation- OT    OT Start Time 1415  - 0810  -     OT Stop Time 1440  - 0915  -     OT Time Calculation (min) 25 min  - 65 min  -     Total Timed Code Minutes- OT 25 minute(s)  - 65 minute(s)  -               User Key  (r) = Recorded By, (t) = Taken By, (c) = Cosigned By      Initials Name Provider Type     Amber Badillo OT Occupational Therapist                  Therapy Charges for Today       Code Description Service Date Service Provider Modifiers Qty    [id number]  OT SELF CARE/MGMT/TRAIN EA 15 MIN 8/4/2025 Amber Badillo OT GO 3    [id number]  OT THER PROC EA 15 MIN 8/4/2025 Amber Badillo OT GO 3                     Amber Badillo OT  8/4/2025

## 2025-08-04 NOTE — PROGRESS NOTES
[facility]  PROGRESS NOTE     Patient Identification:  Name:  Lashae Benitez  Age:  64 y.o.  Sex:  female  :  1960  MRN:  [id number]  Visit Number:  [id number]  ROOM: 110/     Primary Care Provider:  Kreis, Samuel Duane, MD    Length of stay in inpatient status:  13    Subjective     Chief Compliant: Status post left hip fracture with repair    History of Presenting Illness: Patient is a 64-year-old female who is status post left hip fracture requiring repair.  Patient has had some difficulties with orthostasis during her admission.  Currently on fludrocortisone and midodrine.  Patient seems to be doing better in this regard.  Has had some sinus tachycardia during the admission as well.    Objective     Current Hospital Meds:budesonide-formoterol, 2 puff, Inhalation, BID - RT  cetirizine, 10 mg, Oral, Daily  cholecalciferol, 50,000 Units, Oral, Weekly  cyanocobalamin, 1,000 mcg, Intramuscular, Weekly  DULoxetine, 30 mg, Oral, Daily  DULoxetine, 60 mg, Oral, Daily  flecainide, 50 mg, Oral, BID  fludrocortisone, 50 mcg, Oral, Daily  folic acid, 1 mg, Oral, Daily  gabapentin, 400 mg, Oral, Q8H  insulin glargine, 10 Units, Subcutaneous, Daily  insulin lispro, 2-7 Units, Subcutaneous, 4x Daily PC & at Bedtime  levothyroxine, 150 mcg, Oral, Q AM  methotrexate, 25 mg, Oral, Weekly  [Held by provider] metoprolol tartrate, 25 mg, Oral, BID  midodrine, 2.5 mg, Oral, BID AC  pantoprazole, 40 mg, Oral, Q AM  rivaroxaban, 20 mg, Oral, Daily With Dinner       ----------------------------------------------------------------------------------------------------------------------  Vital Signs:  Temp:  [97.7 °F (36.5 °C)] 97.7 °F (36.5 °C)  Heart Rate:  [105-116] 112  Resp:  [18] 18  BP: (123-147)/(78-94) 147/94  SpO2:  [92 %-100 %] 97 %  on   ;   Device (Oxygen Therapy): room air  Body mass index is 24.13 kg/m².    Wt Readings from Last 3 Encounters:   25 65.8 kg (145 lb)   25 65.8 kg (145 lb)    07/14/25 65.6 kg (144 lb 9.6 oz)     Intake & Output (last 3 days)         08/01 0701 08/02 0700 08/02 0701 08/03 0700 08/03 0701 08/04 0700 08/04 0701 08/05 0700    P.O. 960 1680 960 120    Total Intake(mL/kg) 960 (14.6) 1680 (25.5) 960 (14.6) 120 (1.8)    Urine (mL/kg/hr) 1000 (0.6) 1 (0) 1500 (0.9)     Stool  1      Total Output 1000 2 1500     Net -40 +1678 -540 +120            Urine Unmeasured Occurrence 4 x 5 x 7 x 2 x    Stool Unmeasured Occurrence  2 x            Diet: Regular/House; Texture: Regular (IDDSI 7); Fluid Consistency: Thin (IDDSI 0)  ----------------------------------------------------------------------------------------------------------------------  Physical exam:  Constitutional: No acute distress  HEENT: Normocephalic atraumatic  Neck:   Supple  Cardiovascular: Sinus tachycardia rate 105 on my check  Pulmonary/Chest: Clear to auscultation  Abdominal: Positive bowel sounds soft.   Musculoskeletal: Status post left hip repair  Neurological: No focal deficits  Skin: No rash  Peripheral vascular: No edema  Genitourinary:  ----------------------------------------------------------------------------------------------------------------------    Last echocardiogram:  Results for orders placed during the hospital encounter of 07/17/25    Adult Transthoracic Echo Complete W/ Cont if Necessary Per Protocol 07/18/2025 12:46 PM    Interpretation Summary    Left ventricular systolic function is normal. Calculated left ventricular EF = 58.5%    Left ventricular diastolic function is consistent with (grade I) impaired relaxation.    Estimated right ventricular systolic pressure from tricuspid regurgitation is normal (<35 mmHg).    No significant valvular abnormalities noted    ----------------------------------------------------------------------------------------------------------------------  Results from last 7 days   Lab Units 08/02/25  0455 07/31/25  0300 07/29/25  0421   WBC 10*3/mm3 5.68 6.48  "6.45   HEMOGLOBIN g/dL 8.6* 8.8* 8.2*   HEMATOCRIT % 27.2* 28.4* 26.6*   MCV fL 104.2* 105.6* 104.7*   MCHC g/dL 31.6 31.0* 30.8*   PLATELETS 10*3/mm3 681* 656* 504*         Results from last 7 days   Lab Units 08/02/25  0455 07/31/25  0300 07/30/25  1009   SODIUM mmol/L 139 139 134*   POTASSIUM mmol/L 3.8 4.2 4.2   CHLORIDE mmol/L 105 104 100   CO2 mmol/L 23.1 26.1 25.3   BUN mg/dL 9.1 9.7 12.7   CREATININE mg/dL 0.71 0.82 0.76   CALCIUM mg/dL 8.1* 8.1* 7.9*   GLUCOSE mg/dL 79 92 260*   ALBUMIN g/dL  --  2.9* 2.8*   BILIRUBIN mg/dL  --  0.7 0.7   ALK PHOS U/L  --  212* 192*   AST (SGOT) U/L  --  23 18   ALT (SGPT) U/L  --  12 9   Estimated Creatinine Clearance: 84.2 mL/min (by C-G formula based on SCr of 0.71 mg/dL).  No results found for: \"AMMONIA\"              Glucose   Date/Time Value Ref Range Status   08/04/2025 0547 125 70 - 130 mg/dL Final     Comment:     Serial Number: [id number]:  [id number]   08/04/2025 0037 150 (H) 70 - 130 mg/dL Final     Comment:     Serial Number: [id number]:  [id number]   08/03/2025 1648 174 (H) 70 - 130 mg/dL Final     Comment:     Serial Number: [id number]:  [id number]   08/03/2025 1150 153 (H) 70 - 130 mg/dL Final     Comment:     Serial Number: [id number]:  [id number]   08/03/2025 0634 110 70 - 130 mg/dL Final     Comment:     Serial Number: [id number]:  [id number]   08/02/2025 1953 392 (H) 70 - 130 mg/dL Final     Comment:     Serial Number: [id number]:  [id number]   08/02/2025 1707 147 (H) 70 - 130 mg/dL Final     Comment:     Serial Number: [id number]:  [id number]   08/02/2025 1147 105 70 - 130 mg/dL Final     Comment:     Serial Number: [id number]:  [id number]     Lab Results   Component Value Date    TSH 14.400 (H) 07/26/2025    FREET4 1.30 07/26/2025     Lab Results   Component Value Date    PREGTESTUR Negative 01/21/2018     Pain Management Panel  More data exists         Latest Ref Rng & Units 5/12/2025 3/17/2025   Pain " "Management Panel   Creatinine, Urine mg/dL  mg/dL 12.4  12.4  7.9  7.9       Details          Multiple values from one day are sorted in reverse-chronological order             Brief Urine Lab Results  (Last result in the past 365 days)        Color   Clarity   Blood   Leuk Est   Nitrite   Protein   CREAT   Urine HCG        07/17/25 1538 Yellow   Clear   Negative   Negative   Negative   Negative                 No results found for: \"BLOODCX\"      No results found for: \"URINECX\"  No results found for: \"WOUNDCX\"  No results found for: \"STOOLCX\"        I have personally looked at the labs and they are summarized above.  ----------------------------------------------------------------------------------------------------------------------  Detailed radiology reports for the last 24 hours:    Imaging Results (Last 24 Hours)       ** No results found for the last 24 hours. **          Final impressions for the last 30 days of radiology reports:    FL Video Swallow Single Contrast  Result Date: 7/24/2025    Normal fluoroscopic video swallow exam.  Please see the speech pathologist's report for additional information.  This report was finalized on 7/24/2025 2:18 PM by Dr. Jerrell Garcia MD.      FL Surgery Fluoro  Result Date: 7/19/2025  As above.  This report was finalized on 7/19/2025 11:15 AM by Dr. Shakeel Kemp MD.      XR Femur 2 View Left  Result Date: 7/17/2025    Left intertrochanteric hip fracture again noted. No mid or distal femur fracture identified.  This report was finalized on 7/17/2025 2:42 PM by Dr. Shakeel Kemp MD.      XR Chest AP  Result Date: 7/17/2025    Unremarkable exam with no acute cardiopulmonary findings identified.  This report was finalized on 7/17/2025 2:37 PM by Dr. Shakeel Kemp MD.      XR Shoulder 2+ View Left  Result Date: 7/17/2025    No acute findings in the left shoulder.  This report was finalized on 7/17/2025 2:35 PM by Dr. Shakeel Kemp MD.      XR Elbow 3+ View Left  Result " Date: 7/17/2025  1.  No fracture or dislocation. 2.  Mild olecranon soft tissue swelling may indicate mild olecranon bursitis.  This report was finalized on 7/17/2025 2:34 PM by Dr. Shakeel Kemp MD.      XR Hip With or Without Pelvis 2 - 3 View Left  Result Date: 7/17/2025    Acute angulated left intertrochanteric fracture with varus angulation of distal fracture fragments and mild superior displacement of distal fracture fragments.  This report was finalized on 7/17/2025 2:29 PM by Dr. Shakeel Kemp MD.      I have personally looked at the radiology images and read the final radiology report.    Assessment & Plan    Status post left hip fracture requiring ORIF--last week--requiring contact-guard assistance for bed mobility.  Requiring minimum assist for bed to chair chair to bed transfers.  Contact-guard to minimum assistance for sit to stand stand to sit transfers.  Ambulated 20 feet, 20 feet and 40 feet with toe-touch weightbearing on left lower extremity requiring minimum assistance and front wheel walker.  Requiring set up for grooming; working to develop motor skills improve ADLs and functional mobility.    Orthostatic hypotension currently on fludrocortisone and midodrine 2.5 mg twice daily.  Will monitor today.  Hopefully these can be weaned soon    History of atrial fibrillation--continue flecainide..  Had been on metoprolol which has been held secondary to orthostasis.  Has been continued on Xarelto    Acute blood loss anemia hemoglobin stable    History of rheumatoid arthritis currently on methotrexate.    Peripheral neuropathy current continue gabapentin.    Macrocytosis--vitamin B12 level is 1840; folate 18.6    Thrombocytosis likely reactive.    GERD continue PPI    Depression continue Cymbalta    Diabetes mellitus controlled continue basal bolus insulin therapy        VTE Prophylaxis:   Xarelto        Jesus Acevedo MD  [facility]  08/04/25  11:19 EDT

## 2025-08-04 NOTE — PLAN OF CARE
Goal Outcome Evaluation:  Plan of Care Reviewed With: patient        Problem: Rehabilitation (IRF) Plan of Care  Goal: Plan of Care Review  Outcome: Progressing  Flowsheets (Taken 8/4/2025 0242)  Plan of Care Reviewed With: patient  Goal: Patient-Specific Goal (Individualized)  Outcome: Progressing  Goal: Absence of New-Onset Illness or Injury  Outcome: Progressing  Intervention: Identify and Manage Fall Risk  Description: Perform standard risk assessment on admission using a validated tool or comprehensive approach appropriate to the patient; reassess fall risk frequently, with change in status or transfer to another level of care.Communicate risk to interprofessional healthcare team; ensure fall risk visible cue.Determine need for increased observation, equipment and environmental modification, as well as use of supportive, nonskid footwear.Adjust safety measures to individual needs and identified risk factors.Reinforce the importance of active participation with fall risk prevention, safety and physical activity with the patient and family.Perform regular intentional rounding to assess need for position change, pain management, attention to personal needs and assistance with toileting.  Recent Flowsheet Documentation  Taken 8/4/2025 0200 by Josephine Parker RN  Safety Promotion/Fall Prevention: safety round/check completed  Taken 8/4/2025 0000 by Josephine Parker RN  Safety Promotion/Fall Prevention: safety round/check completed  Taken 8/3/2025 2200 by Josephine Parker RN  Safety Promotion/Fall Prevention: safety round/check completed  Taken 8/3/2025 2000 by Josephine Parker RN  Safety Promotion/Fall Prevention: safety round/check completed  Taken 8/3/2025 1945 by Josephine Parker RN  Safety Promotion/Fall Prevention: safety round/check completed  Intervention: Prevent VTE (Venous Thromboembolism)  Description: Provide ongoing assessment for signs and symptoms of VTE.Encourage and assist with early and ongoing  mobilization.Initiate and maintain compression therapy when indicated.Recognize the patient's individual risk for bleeding before initiating pharmacologic thromboprophylaxis.Provide prophylactic anticoagulation when prescribed.  Recent Flowsheet Documentation  Taken 8/3/2025 1945 by Josephine Parker RN  VTE Prevention/Management: (See MAR) other (see comments)  Goal: Optimal Comfort and Wellbeing  Outcome: Progressing  Intervention: Provide Person-Centered Care  Description: Use a family-focused approach to care; incorporate family/significant others in assessment, planning, education and support.Develop trust and rapport by proactively providing information, encouraging questions, addressing concerns and offering reassurance.Acknowledge emotional response; convey safety and acceptance.Recognize and utilize personal coping strategies and strengths; develop goals via shared decision-making.Identify patient's preferred routines, habits and personal preferences; respect privacy and personal space.Recognize progress and patient effort to facilitate motivation; provide opportunities for success.Honor spiritual and cultural preferences.Screen and monitor ongoing safety risks, such as self-harm, violence and elopement.  Recent Flowsheet Documentation  Taken 8/3/2025 1945 by Josephine Parker RN  Trust Relationship/Rapport:   care explained   choices provided   questions answered   reassurance provided   thoughts/feelings acknowledged  Goal: Home and Community Transition Plan Established  Outcome: Progressing     Problem: Skin Injury Risk Increased  Goal: Skin Health and Integrity  Outcome: Progressing  Intervention: Optimize Skin Protection  Description: Perform a full pressure injury risk assessment, as indicated by screening, upon admission to care unit.Reassess skin (full inspection and injury risk, including skin temperature, consistency and color) frequently (e.g., scheduled interval, with change in condition) to  provide optimal early detection and prevention.Maintain adequate tissue perfusion (e.g., encourage fluid balance; avoid crossing legs, constrictive clothing or devices) to promote tissue oxygenation.Maintain head of bed at lowest degree of elevation tolerated, considering medical condition and other restrictions. Use positioning supports to prevent sliding and friction. Consider low friction textiles.Avoid positioning onto an area that remains reddened or on bony prominences.Minimize incontinence and moisture (e.g., toileting schedule; moisture-wicking pad, diaper or incontinence collection device; skin moisture barrier).Cleanse skin promptly and gently, when soiled, utilizing a pH-balanced cleanser.Relieve and redistribute pressure (e.g., scheduled position changes, weight shifts, use of support surface, medical device repositioning, protective dressing application, use of positioning device, microclimate control, use of pressure-injury-monitorEncourage increased activity, such as sitting in a chair at the bedside or early mobilization, when able to tolerate. Avoid prolonged sitting.  Recent Flowsheet Documentation  Taken 8/3/2025 1945 by Josephine Parker RN  Pressure Reduction Techniques:   weight shift assistance provided   frequent weight shift encouraged   heels elevated off bed  Pressure Reduction Devices: pressure-redistributing mattress utilized     Problem: Comorbidity Management  Goal: Blood Pressure in Desired Range  Outcome: Progressing  Intervention: Maintain Blood Pressure Management  Description: Evaluate adherence to home antihypertensive regimen (e.g., exercise and activity, diet modification, medication).Provide scheduled antihypertensive medication; consider administration time and effects (e.g., avoid giving diuretic prior to bedtime).Monitor response to antihypertensive medication therapy (e.g., blood pressure, electrolyte levels, medication effects).Minimize risk of orthostatic hypotension;  encourage caution with position changes, particularly if elderly.  Recent Flowsheet Documentation  Taken 8/4/2025 0200 by Josephine Parker RN  Medication Review/Management: medications reviewed  Taken 8/4/2025 0000 by Josephine Parker RN  Medication Review/Management: medications reviewed  Taken 8/3/2025 2200 by Josephine Parker RN  Medication Review/Management: medications reviewed  Taken 8/3/2025 2000 by Josephine Parker RN  Medication Review/Management: medications reviewed  Taken 8/3/2025 1945 by Josephine Parker RN  Medication Review/Management: medications reviewed     Problem: Fall Injury Risk  Goal: Absence of Fall and Fall-Related Injury  Outcome: Progressing  Intervention: Identify and Manage Contributors  Description: Develop a fall prevention plan, considering patient-centered interventions and family/caregiver involvement; identify and address patient's facilitators and barriers.Provide reorientation, appropriate sensory stimulation and routines with changes in mental status to decrease risk of fall.Promote use of personal vision and auditory aids.Assess assistance level required for safe and effective self-care; provide support as needed, such as toileting and mobilization. For age 65 and older, implement timed toileting with assistance.Encourage physical activity, such as performance of mobility and self-care at highest level of patient ability, multicomponent exercise program and provision of appropriate assistive devices.If fall occurs, assess the severity of injury; implement fall injury protocol. Determine the cause and revise fall injury prevention plan.Regularly review and advocate for medication adjustment to decrease fall risk; consider administration times, polypharmacy and age.Balance adequate pain management with potential for oversedation.  Recent Flowsheet Documentation  Taken 8/4/2025 0200 by Josephine Parker RN  Medication Review/Management: medications reviewed  Taken 8/4/2025 0000 by  Josephine Parker RN  Medication Review/Management: medications reviewed  Taken 8/3/2025 2200 by Josephine Parker RN  Medication Review/Management: medications reviewed  Taken 8/3/2025 2000 by Josephine Parker RN  Medication Review/Management: medications reviewed  Taken 8/3/2025 1945 by Josephine Parker RN  Medication Review/Management: medications reviewed  Intervention: Promote Injury-Free Environment  Description: Provide a safe, barrier-free environment that encourages independent activity.Keep care area uncluttered and well-lighted.Determine need for increased observation or monitoring.Avoid use of devices that minimize mobility, such as restraints or indwelling urinary catheter.  Recent Flowsheet Documentation  Taken 8/4/2025 0200 by Josephine Parker RN  Safety Promotion/Fall Prevention: safety round/check completed  Taken 8/4/2025 0000 by Josephine Parker RN  Safety Promotion/Fall Prevention: safety round/check completed  Taken 8/3/2025 2200 by Josephine Parker RN  Safety Promotion/Fall Prevention: safety round/check completed  Taken 8/3/2025 2000 by Josephine Parker RN  Safety Promotion/Fall Prevention: safety round/check completed  Taken 8/3/2025 1945 by Josephine Parker RN  Safety Promotion/Fall Prevention: safety round/check completed

## 2025-08-05 LAB
GLUCOSE BLDC GLUCOMTR-MCNC: 120 MG/DL (ref 70–130)
GLUCOSE BLDC GLUCOMTR-MCNC: 144 MG/DL (ref 70–130)
GLUCOSE BLDC GLUCOMTR-MCNC: 167 MG/DL (ref 70–130)
GLUCOSE BLDC GLUCOMTR-MCNC: 220 MG/DL (ref 70–130)

## 2025-08-05 PROCEDURE — 63710000001 INSULIN LISPRO (HUMAN) PER 5 UNITS: Performed by: FAMILY MEDICINE

## 2025-08-05 PROCEDURE — 94761 N-INVAS EAR/PLS OXIMETRY MLT: CPT

## 2025-08-05 PROCEDURE — 82948 REAGENT STRIP/BLOOD GLUCOSE: CPT

## 2025-08-05 PROCEDURE — 63710000001 INSULIN GLARGINE PER 5 UNITS: Performed by: INTERNAL MEDICINE

## 2025-08-05 PROCEDURE — 94799 UNLISTED PULMONARY SVC/PX: CPT

## 2025-08-05 PROCEDURE — 97530 THERAPEUTIC ACTIVITIES: CPT

## 2025-08-05 PROCEDURE — 97110 THERAPEUTIC EXERCISES: CPT

## 2025-08-05 PROCEDURE — 63710000001 METHOTREXATE 2.5 MG TABLET: Performed by: FAMILY MEDICINE

## 2025-08-05 PROCEDURE — 99232 SBSQ HOSP IP/OBS MODERATE 35: CPT | Performed by: FAMILY MEDICINE

## 2025-08-05 PROCEDURE — 97535 SELF CARE MNGMENT TRAINING: CPT

## 2025-08-05 PROCEDURE — 97116 GAIT TRAINING THERAPY: CPT

## 2025-08-05 RX ADMIN — GABAPENTIN 400 MG: 400 CAPSULE ORAL at 15:30

## 2025-08-05 RX ADMIN — CETIRIZINE HYDROCHLORIDE 10 MG: 10 TABLET, FILM COATED ORAL at 08:25

## 2025-08-05 RX ADMIN — ACETAMINOPHEN 650 MG: 325 TABLET, FILM COATED ORAL at 20:55

## 2025-08-05 RX ADMIN — INSULIN LISPRO 2 UNITS: 100 INJECTION, SOLUTION INTRAVENOUS; SUBCUTANEOUS at 20:56

## 2025-08-05 RX ADMIN — FLECAINIDE ACETATE 50 MG: 50 TABLET ORAL at 08:24

## 2025-08-05 RX ADMIN — FLECAINIDE ACETATE 50 MG: 50 TABLET ORAL at 20:58

## 2025-08-05 RX ADMIN — DULOXETINE 30 MG: 60 CAPSULE, DELAYED RELEASE ORAL at 08:24

## 2025-08-05 RX ADMIN — RIVAROXABAN 20 MG: 20 TABLET, FILM COATED ORAL at 18:13

## 2025-08-05 RX ADMIN — METHOTREXATE 25 MG: 2.5 TABLET ORAL at 08:26

## 2025-08-05 RX ADMIN — GABAPENTIN 400 MG: 400 CAPSULE ORAL at 05:24

## 2025-08-05 RX ADMIN — FLUDROCORTISONE ACETATE 50 MCG: 0.1 TABLET ORAL at 08:25

## 2025-08-05 RX ADMIN — GABAPENTIN 400 MG: 400 CAPSULE ORAL at 21:00

## 2025-08-05 RX ADMIN — DULOXETINE 60 MG: 60 CAPSULE, DELAYED RELEASE ORAL at 08:25

## 2025-08-05 RX ADMIN — INSULIN GLARGINE 10 UNITS: 100 INJECTION, SOLUTION SUBCUTANEOUS at 08:25

## 2025-08-05 RX ADMIN — LEVOTHYROXINE SODIUM 150 MCG: 0.07 TABLET ORAL at 05:24

## 2025-08-05 RX ADMIN — INSULIN LISPRO 3 UNITS: 100 INJECTION, SOLUTION INTRAVENOUS; SUBCUTANEOUS at 18:13

## 2025-08-05 RX ADMIN — PANTOPRAZOLE SODIUM 40 MG: 40 TABLET, DELAYED RELEASE ORAL at 05:24

## 2025-08-05 RX ADMIN — FOLIC ACID 1 MG: 1 TABLET ORAL at 08:25

## 2025-08-05 NOTE — THERAPY TREATMENT NOTE
Inpatient Rehabilitation - Physical Therapy Treatment Note       GUANACO Portillo     Patient Name: Lashae Benitez  : 1960  MRN: [id number]    Today's Date: 2025                    Admit Date: 2025      Visit Dx:   No diagnosis found.    Patient Active Problem List   Diagnosis    Hiatal hernia    Essential hypertension    Sjogren's syndrome    Multiple sclerosis    Psoriasis    Fibromyalgia    Osteoarthritis    Psoriatic arthritis    RA (rheumatoid arthritis)    Degenerative disc disease, lumbar    TMJ arthritis    Dupuytren's disease    H. pylori infection    Family history of coronary artery disease    Type 2 diabetes mellitus    Edema    Bilateral leg edema    Isolated corticotropin deficiency    Hyponatremia    Paroxysmal atrial fibrillation    Sepsis    Bacteremia, escherichia coli    Iron deficiency anemia    Malabsorption due to intolerance, not elsewhere classified    Chronic chest pain with high risk for CAD    Abnormal nuclear stress test    Abnormal computed tomography angiography (CTA)    Hip fracture    Closed intertrochanteric fracture of hip, left, initial encounter    Closed left hip fracture, sequela       Past Medical History:   Diagnosis Date    Acid reflux     Allergic     Anemia     Anxiety     Atrial fibrillation     Cancer     thyroid, skin    Cervical disc disorder     Chronic pain disorder     Claustrophobia     CTS (carpal tunnel syndrome)     Degenerative disc disease, lumbar     Depression     Dupuytren's disease     Essential hypertension     Family history of coronary artery disease     Fibromyalgia     Gallbladder abscess     H. pylori infection     Hiatal hernia     Hyperlipidemia     Hypothyroidism     Low back pain     Lumbosacral disc disease     Migraines     migraines    Multiple sclerosis     Osteoarthritis     Peripheral neuropathy     Psoriasis     Psoriatic arthritis     RA (rheumatoid arthritis)     Rheumatoid arthritis     Sinusitis     Sjogren's syndrome     Stomach  ulcer     Thoracic disc disorder     TMJ arthritis     Type 2 diabetes mellitus     Urinary tract infection        Past Surgical History:   Procedure Laterality Date    BACK SURGERY      BREAST LUMPECTOMY Left 11/1993    CARDIAC CATHETERIZATION Left 09/21/2009    Normal     CARDIAC CATHETERIZATION N/A 6/24/2025    Procedure: Left Heart Cath;  Surgeon: Teodora Barron MD;  Location:  [facility];  Service: Cardiovascular;  Laterality: N/A;    CARPAL TUNNEL RELEASE  03/2012    CERVICAL POLYPECTOMY  12/2015    CHOLECYSTECTOMY  05/2007    COLONOSCOPY      ENDOSCOPY      EPIDURAL BLOCK      GASTRIC BYPASS  09/2007    JOINT REPLACEMENT      KNEE ARTHROPLASTY      LUMBAR DISC SURGERY      C5-6    NECK SURGERY      REPLACEMENT TOTAL KNEE Right 06/2016    SACRAL NERVE STIMULATOR PLACEMENT  09/2014    SACRAL NERVE STIMULATOR PLACEMENT  04/11/2024    @ [facility] in [place]    SACRAL NERVE STIMULATOR PLACEMENT Right 04/17/2024    THYROIDECTOMY  03/2016    TRIGGER POINT INJECTION         PT ASSESSMENT (Last 12 Hours)       [facility] PT Evaluation and Treatment       Row Name 08/05/25 1452          PT Time and Intention    Document Type daily treatment  -RG     Mode of Treatment physical therapy;individual therapy  -RG     Patient/Family/Caregiver Comments/Observations Pt and nursing in agreement for skilled PT on this date.  -RG       Row Name 08/05/25 1452          General Information    Existing Precautions/Restrictions fall;weight bearing  L LE TTWB, abdominal binder w/ lower BP  -RG       Row Name 08/05/25 1452          Pain Scale: FACES Pre/Post-Treatment    Pain: FACES Scale, Pretreatment 2-->hurts little bit  -RG     Posttreatment Pain Rating 2-->hurts little bit  -RG       Row Name 08/05/25 1452          Cognition/Psychosocial    Affect/Mental Status (Cognition) WFL  -RG     Orientation Status (Cognition) oriented x 3  -RG     Follows Commands (Cognition) verbal cues/prompting required;physical/tactile prompts  required  -RG     Personal Safety Interventions fall prevention program maintained;gait belt;nonskid shoes/slippers when out of bed  -     Cognitive Function (Cognition) WFL  -       Row Name 08/05/25 1452          Mobility    Extremity Weight-bearing Status left lower extremity  -RG     Left Lower Extremity (Weight-bearing Status) toe touch weight-bearing (TTWB)  -       Row Name 08/05/25 1452          Bed Mobility    Bed Mobility sit-supine  -RG     Supine-Sit-Supine [place] (Bed Mobility) contact guard  -       Row Name 08/05/25 1452          Transfer Assessment/Treatment    Transfers sit-stand transfer;stand-sit transfer;chair-bed transfer  -       Row Name 08/05/25 1452          Bed-Chair Transfer    Bed-Chair [place] (Transfers) verbal cues;contact guard  -     Assistive Device (Bed-Chair Transfers) wheelchair  arm rest  -       Row Name 08/05/25 1452          Chair-Bed Transfer    Chair-Bed [place] (Transfers) contact guard;verbal cues;nonverbal cues (demo/gesture)  -     Assistive Device (Chair-Bed Transfers) wheelchair  bed rail  -       Row Name 08/05/25 1452          Sit-Stand Transfer    Sit-Stand [place] (Transfers) contact guard;minimum assist (75% patient effort);1 person assist;2 person assist;1 person to manage equipment  -     Assistive Device (Sit-Stand Transfers) wheelchair;walker, front-wheeled  -       Row Name 08/05/25 1452          Stand-Sit Transfer    Stand-Sit [place] (Transfers) contact guard;verbal cues;nonverbal cues (demo/gesture)  -     Assistive Device (Stand-Sit Transfers) wheelchair;walker, front-wheeled  -       Row Name 08/05/25 1452          Gait/Stairs (Locomotion)    Gait/Stairs Locomotion gait/ambulation assistive device  -     [place] Level (Gait) minimum assist (75% patient effort);2 person assist;verbal cues;nonverbal cues (demo/gesture)  -     Assistive Device (Gait) walker, front-wheeled  -     Patient was  able to Ambulate yes  -RG     Distance in Feet (Gait) --  60, 100 am and 50 x 2 pm session  -RG     Pattern (Gait) step-to  TTWB on L LE  -RG     Comment, (Gait/Stairs) rest breaks as needed  -RG       Row Name 08/05/25 1452          Balance    Dynamic Sitting Balance verbal cues;non-verbal cues (demo/gesture);supervision  -RG     Position, Sitting Balance unsupported;sitting in chair  -RG     Comment, Balance seated bean bag toss  -RG       Row Name 08/05/25 1452          Hip (Therapeutic Exercise)    Hip Strengthening (Therapeutic Exercise) bilateral;flexion;aDduction;aBduction;marching while seated;sitting;2 lb free weight;resistance band;green;10 repetitions;2 sets;marching while standing  -RG       Row Name 08/05/25 1452          Knee (Therapeutic Exercise)    Knee Strengthening (Therapeutic Exercise) bilateral;flexion;extension;marching while seated;marching while standing;LAQ (long arc quad);sitting;standing;resistance band;green;10 repetitions;2 sets  -RG       Row Name 08/05/25 1452          Ankle (Therapeutic Exercise)    Ankle Strengthening (Therapeutic Exercise) bilateral;dorsiflexion;plantarflexion;sitting;standing;10 repetitions;2 sets  -RG               User Key  (r) = Recorded By, (t) = Taken By, (c) = Cosigned By      Initials Name Provider Type    RG Neri Cornelius PTA Physical Therapist Assistant                  Wound 07/19/25 0921 Left gluteal Other (Comments) (Active)   Dressing Appearance open to air 08/05/25 1100   Closure Open to air 08/04/25 2200   Base red;nonblanchable 08/05/25 1100   Periwound redness;pink 08/05/25 1100   Periwound Temperature warm 08/05/25 1100   Periwound Skin Turgor soft 08/05/25 1100   Drainage Amount none 08/05/25 1100   Care, Wound cleansed with;soap and water 08/05/25 1100   Dressing Care open to air 08/05/25 1100   Periwound Care barrier ointment applied 08/05/25 1100       Wound 07/19/25 0938 Left anterior thigh Surgical Open Surgical Incision (Active)    Dressing Appearance open to air 08/05/25 1100   Closure Steri strips;Staples 08/05/25 1100   Base pink;red 08/05/25 1100   Periwound dry;pink 08/05/25 1100   Periwound Temperature warm 08/05/25 1100   Periwound Skin Turgor soft 08/05/25 1100   Edges rolled/closed 08/05/25 1100   Drainage Amount none 08/05/25 1100       Wound 07/21/25 0930 Right coccyx Pressure Injury (Active)   Dressing Appearance open to air 08/05/25 1100   Closure Open to air 08/05/25 1100   Base pink;purple 08/05/25 1100   Periwound redness 08/05/25 1100   Periwound Temperature warm 08/05/25 1100   Periwound Skin Turgor soft 08/04/25 2200   Edges rolled/closed 08/05/25 1100   Drainage Amount none 08/05/25 1100   Care, Wound cleansed with;soap and water;barrier applied 08/04/25 2200   Dressing Care open to air 08/05/25 1100   Periwound Care barrier ointment applied 08/05/25 1100       Wound 07/28/25 0829 coccyx (Active)   Dressing Appearance open to air 08/05/25 1100   Base red;dry 08/05/25 1100   Periwound pink;dry 08/05/25 1100   Periwound Temperature warm 08/05/25 1100   Periwound Skin Turgor soft 08/05/25 1100   Edges irregular 08/05/25 1100   Care, Wound cleansed with;soap and water;barrier applied 08/04/25 2200   Dressing Care open to air 08/05/25 1100   Periwound Care barrier ointment applied 08/05/25 1100     Physical Therapy Education       Title: PT OT SLP Therapies (Done)       Topic: Physical Therapy (Done)       Point: Mobility training (Done)       Learning Progress Summary            Patient Acceptance, E,D, VU,NR by RG at 8/5/2025 1456    Acceptance, E,D, VU,NR by RG at 8/4/2025 1440    Acceptance, E,D, VU,NR by RG at 8/4/2025 1440    Acceptance, E,D, VU,NR by RG at 8/1/2025 1448    Acceptance, E,D, VU by  at 7/31/2025 1546    Acceptance, E,D, VU,NR by RG at 7/30/2025 1348    Acceptance, E,D, VU,NR by RG at 7/29/2025 1434    Acceptance, E,D, VU,NR by RG at 7/28/2025 1438    Acceptance, E,D, VU,NR by LL at 7/26/2025 1615     Acceptance, E,D, VU,NR by RG at 7/25/2025 1407    Acceptance, E,D, VU,NR by RG at 7/24/2025 1502    Acceptance, E, VU,NR by LB at 7/23/2025 1612   Significant Other Acceptance, E,D, VU by LL at 7/31/2025 1546                      Point: Home exercise program (Done)       Learning Progress Summary            Patient Acceptance, E,D, VU,NR by RG at 8/5/2025 1456    Acceptance, E,D, VU,NR by RG at 8/4/2025 1440    Acceptance, E,D, VU,NR by RG at 8/4/2025 1440    Acceptance, E,D, VU,NR by RG at 8/1/2025 1448    Acceptance, E,D, VU by LL at 7/31/2025 1546    Acceptance, E,D, VU,NR by RG at 7/30/2025 1348    Acceptance, E,D, VU,NR by RG at 7/29/2025 1434    Acceptance, E,D, VU,NR by RG at 7/28/2025 1438    Acceptance, E,D, VU,NR by LL at 7/26/2025 1615    Acceptance, E,D, VU,NR by RG at 7/25/2025 1407    Acceptance, E,D, VU,NR by RG at 7/24/2025 1502    Acceptance, E, VU,NR by LB at 7/23/2025 1612   Significant Other Acceptance, E,D, VU by LL at 7/31/2025 1546                      Point: Body mechanics (Done)       Learning Progress Summary            Patient Acceptance, E,D, VU,NR by RG at 8/5/2025 1456    Acceptance, E,D, VU,NR by RG at 8/4/2025 1440    Acceptance, E,D, VU,NR by RG at 8/4/2025 1440    Acceptance, E,D, VU,NR by RG at 8/1/2025 1448    Acceptance, E,D, VU by LL at 7/31/2025 1546    Acceptance, E,D, VU,NR by RG at 7/30/2025 1348    Acceptance, E,D, VU,NR by RG at 7/29/2025 1434    Acceptance, E,D, VU,NR by RG at 7/28/2025 1438    Acceptance, E,D, VU,NR by LL at 7/26/2025 1615    Acceptance, E,D, VU,NR by RG at 7/25/2025 1407    Acceptance, E,D, VU,NR by RG at 7/24/2025 1502    Acceptance, E, VU,NR by LB at 7/23/2025 1612   Significant Other Acceptance, E,D, VU by LL at 7/31/2025 1546                      Point: Precautions (Done)       Learning Progress Summary            Patient Acceptance, E,D, VU,NR by RG at 8/5/2025 1456    Acceptance, E,D, VU,NR by RG at 8/4/2025 1440    Acceptance, E,D, VU,NR by RG at  8/4/2025 1440    Acceptance, E,D, VU,NR by RG at 8/1/2025 1448    Acceptance, E,D, VU by LL at 7/31/2025 1546    Acceptance, E,D, VU,NR by RG at 7/30/2025 1348    Acceptance, E,D, VU,NR by RG at 7/29/2025 1434    Acceptance, E,D, VU,NR by RG at 7/28/2025 1438    Acceptance, E,D, VU,NR by LL at 7/26/2025 1615    Acceptance, E,D, VU,NR by RG at 7/25/2025 1407    Acceptance, E,D, VU,NR by RG at 7/24/2025 1502    Acceptance, E, VU,NR by LB at 7/23/2025 1612   Significant Other Acceptance, E,D, VU by LL at 7/31/2025 1546                                      User Key       Initials Effective Dates Name Provider Type Discipline    LB 06/16/21 -  Andreia Dove, PT Physical Therapist PT     05/02/16 -  Beverly Goff PTA Physical Therapist Assistant PT     06/16/21 -  Neri Cornelius PTA Physical Therapist Assistant PT                    PT Recommendation and Plan    Frequency of Treatment (PT): 5 times per week  Anticipated Equipment Needs at Discharge (PT Eval):  (tbd)                  Time Calculation:      PT Charges       Row Name 08/05/25 1457 08/05/25 1456          Time Calculation    Start Time 1245  -RG 0915  -RG     Stop Time 1330  -RG 1000  -RG     Time Calculation (min) 45 min  -RG 45 min  -RG     PT Received On 08/05/25  -RG 08/05/25  -RG        Time Calculation- PT    Total Timed Code Minutes- PT 45 minute(s)  -RG 45 minute(s)  -RG               User Key  (r) = Recorded By, (t) = Taken By, (c) = Cosigned By      Initials Name Provider Type     Neri Cornelius PTA Physical Therapist Assistant                    Therapy Charges for Today       Code Description Service Date Service Provider Modifiers Qty    50992316564 HC GAIT TRAINING EA 15 MIN 8/4/2025 Neri Cornelius PTA GP, CQ 1    07065988101 HC PT THERAPEUTIC ACT EA 15 MIN 8/4/2025 Neri Cornelius PTA GP, CQ 2    84966767370 HC PT THER PROC EA 15 MIN 8/4/2025 Neri Cornelius, ERNESTINE GP, CQ 3    [id number]  GAIT TRAINING EA 15 MIN 8/5/2025  Neri Cornelius PTA GP, CQ 1    44888829799 HC PT THERAPEUTIC ACT EA 15 MIN 8/5/2025 Neri Cornelius PTA GP, CQ 2    79735912211 HC PT THER PROC EA 15 MIN 8/5/2025 Neri Cornelius PTA GP, CQ 3              PT G-Codes  AM-PAC 6 Clicks Score (PT): 16      Neri Cornelius PTA  8/5/2025

## 2025-08-05 NOTE — PLAN OF CARE
Goal Outcome Evaluation:  Plan of Care Reviewed With: patient        Progress: no change  Outcome Summary: Pt resting in bed at this time. No acute changes. Will continue plan of care.

## 2025-08-05 NOTE — DISCHARGE INSTR - OTHER ORDERS
Pt will receive outpatient PT 2 x week at [facility].  Pt has an appointment on Thursday 8-14-25 at 9:00 am for PT evaluation; arrive at 8:45 am for paperwork.    [facility] [phone] for rolling walker and 18 inch lightweight wheelchair with elevating leg rests, anti-tippers, basic cushion.

## 2025-08-05 NOTE — PLAN OF CARE
Goal Outcome Evaluation:  Plan of Care Reviewed With: patient        Progress: improving  Outcome Summary: BED/CHAIR ALARM NOTED; INT LEFT HAND NOTED; FALL SAFETY INTERVENTION LEVELS EDUCATION GIVEN; PROGRESSING WITH CURRENT THERAPIES; CONTINUE PLAN OF CARE; MONITOR

## 2025-08-05 NOTE — SIGNIFICANT NOTE
08/05/25 4996   Plan   Plan Team conference held today.  Spoke to pt about how she is doing in therapy, plans for discharge on 8-6-25, recommendations for outpatient PT 2 x week, OT to provide home exercise program, rolling walker, 18 inch lightweight wheelchair with elevating leg rests, anti-tippers, & basic cushion.  Pt plans to return home with spouse who will provide assistance.  Provided preferences for outpatient PT and DME and pt signed them.  [facility] and [facility] preferred.  Contacted [facility] [phone] per preference per Rene with discharge on 8-6-25 and need for outpatient PT 2 x week to evaluate and treat for strengthening, endurance, gait training, transfer training, balance, therapeutic exercise, bed mobility, ROM, steps/stairs.  Pt is scheduled for PT evaluation on 8-14-25 at 9:00 am; arrive at 8:45 am for paperwork.  Faxed face sheet, H&P, PT progress note/evaluation and MD progress note to [facility] via [employer].  Ambulatory referral for outpatient PT and discharge summary to be faxed at discharge.  Contacted [facility] [phone] per preference per Ana Maria with discharge on 8-6-25 and need for rolling walker, 18 inch lightweight wheelchair with elevating leg rests, anti-tippers, basic cushion.  DME to be delivered to rehab on 8-6-25.  Faxed face sheet, H&P, MD progress note, PT progress note/evaluation to [facility] via [employer].  MD order for DME and discharge summary to be faxed at discharge.  Reviewed outpatient PT appointment with pt.  Left message for spouse [phone].  Pt says spouse will come to rehab for discharge and transport home.   Patient/Family in Agreement with Plan yes   Provided Post Acute Provider List? Yes   Post Acute Provider List DME Supplier;Outpatient Therapy   Delivered To Patient   Method of Delivery In person

## 2025-08-05 NOTE — PROGRESS NOTES
[facility]  PROGRESS NOTE     Patient Identification:  Name:  Lashae Benitez  Age:  64 y.o.  Sex:  female  :  1960  MRN:  [id number]  Visit Number:  [id number]  ROOM: 110/     Primary Care Provider:  Kreis, Samuel Duane, MD    Length of stay in inpatient status:  14    Subjective     Chief Compliant: Status post left hip repair after fracture    History of Presenting Illness: Patient 64-year-old female who is status post left hip fracture requiring repair.  Patient states she is doing reasonably well.  Did not have any orthostasis and actually had some elevated blood pressures yesterday.    Objective     Current Hospital Meds:budesonide-formoterol, 2 puff, Inhalation, BID - RT  cetirizine, 10 mg, Oral, Daily  cholecalciferol, 50,000 Units, Oral, Weekly  cyanocobalamin, 1,000 mcg, Intramuscular, Weekly  DULoxetine, 30 mg, Oral, Daily  DULoxetine, 60 mg, Oral, Daily  flecainide, 50 mg, Oral, BID  fludrocortisone, 50 mcg, Oral, Daily  folic acid, 1 mg, Oral, Daily  gabapentin, 400 mg, Oral, Q8H  insulin glargine, 10 Units, Subcutaneous, Daily  insulin lispro, 2-7 Units, Subcutaneous, 4x Daily PC & at Bedtime  levothyroxine, 150 mcg, Oral, Q AM  methotrexate, 25 mg, Oral, Weekly  [Held by provider] metoprolol tartrate, 25 mg, Oral, BID  [Held by provider] midodrine, 2.5 mg, Oral, BID AC  pantoprazole, 40 mg, Oral, Q AM  rivaroxaban, 20 mg, Oral, Daily With Dinner       ----------------------------------------------------------------------------------------------------------------------  Vital Signs:  Temp:  [98.5 °F (36.9 °C)] 98.5 °F (36.9 °C)  Heart Rate:  [] 118  Resp:  [18] 18  BP: (137-148)/(76-91) 148/91  SpO2:  [99 %-100 %] 99 %  on   ;   Device (Oxygen Therapy): room air  Body mass index is 24.13 kg/m².    Wt Readings from Last 3 Encounters:   25 65.8 kg (145 lb)   25 65.8 kg (145 lb)   25 65.6 kg (144 lb 9.6 oz)     Intake & Output (last 3 days)           0701 08/03 0700 08/03 0701 08/04 0700 08/04 0701 08/05 0700 08/05 0701 08/06 0700    P.O. 1680 960 960     Total Intake(mL/kg) 1680 (25.5) 960 (14.6) 960 (14.6)     Urine (mL/kg/hr) 1 (0) 1500 (0.9)      Stool 1       Total Output 2 1500      Net +1678 -540 +960             Urine Unmeasured Occurrence 5 x 7 x 7 x     Stool Unmeasured Occurrence 2 x             Diet: Regular/House; Texture: Regular (IDDSI 7); Fluid Consistency: Thin (IDDSI 0)  ----------------------------------------------------------------------------------------------------------------------  Physical exam:  Constitutional: No acute distress  HEENT: Normocephalic atraumatic  Neck:   Supple  Cardiovascular: Regular rate and rhythm  Pulmonary/Chest: Clear to auscultation  Abdominal: Positive bowel sounds soft.   Musculoskeletal: Status post left hip repair  Neurological: No focal deficits  Skin: No rash, incision site healing well.  Will remove staples tomorrow  Peripheral vascular: No edema  Genitourinary:  ----------------------------------------------------------------------------------------------------------------------    Last echocardiogram:  Results for orders placed during the hospital encounter of 07/17/25    Adult Transthoracic Echo Complete W/ Cont if Necessary Per Protocol 07/18/2025 12:46 PM    Interpretation Summary  •  Left ventricular systolic function is normal. Calculated left ventricular EF = 58.5%  •  Left ventricular diastolic function is consistent with (grade I) impaired relaxation.  •  Estimated right ventricular systolic pressure from tricuspid regurgitation is normal (<35 mmHg).  •  No significant valvular abnormalities noted    ----------------------------------------------------------------------------------------------------------------------  Results from last 7 days   Lab Units 08/02/25  0455 07/31/25  0300   WBC 10*3/mm3 5.68 6.48   HEMOGLOBIN g/dL 8.6* 8.8*   HEMATOCRIT % 27.2* 28.4*   MCV fL 104.2* 105.6*   MCHC  "g/dL 31.6 31.0*   PLATELETS 10*3/mm3 681* 656*         Results from last 7 days   Lab Units 08/02/25  0455 07/31/25  0300 07/30/25  1009   SODIUM mmol/L 139 139 134*   POTASSIUM mmol/L 3.8 4.2 4.2   CHLORIDE mmol/L 105 104 100   CO2 mmol/L 23.1 26.1 25.3   BUN mg/dL 9.1 9.7 12.7   CREATININE mg/dL 0.71 0.82 0.76   CALCIUM mg/dL 8.1* 8.1* 7.9*   GLUCOSE mg/dL 79 92 260*   ALBUMIN g/dL  --  2.9* 2.8*   BILIRUBIN mg/dL  --  0.7 0.7   ALK PHOS U/L  --  212* 192*   AST (SGOT) U/L  --  23 18   ALT (SGPT) U/L  --  12 9   Estimated Creatinine Clearance: 84.2 mL/min (by C-G formula based on SCr of 0.71 mg/dL).  No results found for: \"AMMONIA\"              Glucose   Date/Time Value Ref Range Status   08/05/2025 0534 120 70 - 130 mg/dL Final     Comment:     Serial Number: [id number]:  [id number]   08/04/2025 2025 191 (H) 70 - 130 mg/dL Final     Comment:     Serial Number: [id number]:  [id number]   08/04/2025 1634 163 (H) 70 - 130 mg/dL Final     Comment:     Serial Number: [id number]:  [id number]   08/04/2025 1145 212 (H) 70 - 130 mg/dL Final     Comment:     Serial Number: [id number]:  [id number]   08/04/2025 0547 125 70 - 130 mg/dL Final     Comment:     Serial Number: [id number]:  [id number]   08/04/2025 0037 150 (H) 70 - 130 mg/dL Final     Comment:     Serial Number: [id number]:  [id number]   08/03/2025 1648 174 (H) 70 - 130 mg/dL Final     Comment:     Serial Number: [id number]:  [id number]   08/03/2025 1150 153 (H) 70 - 130 mg/dL Final     Comment:     Serial Number: [id number]:  [id number]     Lab Results   Component Value Date    TSH 14.400 (H) 07/26/2025    FREET4 1.30 07/26/2025     Lab Results   Component Value Date    PREGTESTUR Negative 01/21/2018     Pain Management Panel  More data exists         Latest Ref Rng & Units 5/12/2025 3/17/2025   Pain Management Panel   Creatinine, Urine mg/dL  mg/dL 12.4  12.4  7.9  7.9       Details          Multiple values from one " "day are sorted in reverse-chronological order             Brief Urine Lab Results  (Last result in the past 365 days)        Color   Clarity   Blood   Leuk Est   Nitrite   Protein   CREAT   Urine HCG        07/17/25 1538 Yellow   Clear   Negative   Negative   Negative   Negative                 No results found for: \"BLOODCX\"      No results found for: \"URINECX\"  No results found for: \"WOUNDCX\"  No results found for: \"STOOLCX\"        I have personally looked at the labs and they are summarized above.  ----------------------------------------------------------------------------------------------------------------------  Detailed radiology reports for the last 24 hours:    Imaging Results (Last 24 Hours)       ** No results found for the last 24 hours. **          Final impressions for the last 30 days of radiology reports:    FL Video Swallow Single Contrast  Result Date: 7/24/2025    Normal fluoroscopic video swallow exam.  Please see the speech pathologist's report for additional information.  This report was finalized on 7/24/2025 2:18 PM by Dr. Jerrell Garcia MD.      FL Surgery Fluoro  Result Date: 7/19/2025  As above.  This report was finalized on 7/19/2025 11:15 AM by Dr. Shakeel Kemp MD.      XR Femur 2 View Left  Result Date: 7/17/2025    Left intertrochanteric hip fracture again noted. No mid or distal femur fracture identified.  This report was finalized on 7/17/2025 2:42 PM by Dr. Shakeel Kemp MD.      XR Chest AP  Result Date: 7/17/2025    Unremarkable exam with no acute cardiopulmonary findings identified.  This report was finalized on 7/17/2025 2:37 PM by Dr. Shakeel Kemp MD.      XR Shoulder 2+ View Left  Result Date: 7/17/2025    No acute findings in the left shoulder.  This report was finalized on 7/17/2025 2:35 PM by Dr. Shakeel Kemp MD.      XR Elbow 3+ View Left  Result Date: 7/17/2025  1.  No fracture or dislocation. 2.  Mild olecranon soft tissue swelling may indicate mild olecranon " bursitis.  This report was finalized on 7/17/2025 2:34 PM by Dr. Shakeel Kemp MD.      XR Hip With or Without Pelvis 2 - 3 View Left  Result Date: 7/17/2025    Acute angulated left intertrochanteric fracture with varus angulation of distal fracture fragments and mild superior displacement of distal fracture fragments.  This report was finalized on 7/17/2025 2:29 PM by Dr. Shakeel Kemp MD.      I have personally looked at the radiology images and read the final radiology report.    Assessment & Plan    Status post left hip fracture requiring ORIF--patient requiring contact-guard assistance for transfers is able to ambulate 60 feet with rolling walker and minimum assistance.  Requires contact-guard to supervision for upper body lower body bathing and toileting ADLs.    Orthostatic hypotension has been on [place] fludrocortisone and midodrine.  Will hold midodrine this morning and will follow.  Hopefully these can be discontinued prior to discharge    History of atrial fibrillation continue flecainide.  Metoprolol had been held secondary to orthostasis.  Continue Xarelto as well.    Acute blood loss anemia stable    History of rheumatoid arthritis currently on methotrexate.  Have held Xeljanz at least until staples are out and patient is 14 days postop.    Peripheral neuropathy continue gabapentin    GERD continue PPI    Thrombocytosis likely reactive    Macrocytosis patient's B12 and folate levels are within normal limits    Diabetes mellitus continue basal bolus insulin therapy and is well-controlled    VTE Prophylaxis:   Xarelto        Jesus Acevedo MD  [facility]  08/05/25  09:41 EDT

## 2025-08-05 NOTE — THERAPY TREATMENT NOTE
Inpatient Rehabilitation - Occupational Therapy Treatment Note    GUANACO Portillo     Patient Name: Lashae Benitez  : 1960  MRN: [id number]    Today's Date: 2025                 Admit Date: 2025       No diagnosis found.    Patient Active Problem List   Diagnosis    Hiatal hernia    Essential hypertension    Sjogren's syndrome    Multiple sclerosis    Psoriasis    Fibromyalgia    Osteoarthritis    Psoriatic arthritis    RA (rheumatoid arthritis)    Degenerative disc disease, lumbar    TMJ arthritis    Dupuytren's disease    H. pylori infection    Family history of coronary artery disease    Type 2 diabetes mellitus    Edema    Bilateral leg edema    Isolated corticotropin deficiency    Hyponatremia    Paroxysmal atrial fibrillation    Sepsis    Bacteremia, escherichia coli    Iron deficiency anemia    Malabsorption due to intolerance, not elsewhere classified    Chronic chest pain with high risk for CAD    Abnormal nuclear stress test    Abnormal computed tomography angiography (CTA)    Hip fracture    Closed intertrochanteric fracture of hip, left, initial encounter    Closed left hip fracture, sequela       Past Medical History:   Diagnosis Date    Acid reflux     Allergic     Anemia     Anxiety     Atrial fibrillation     Cancer     thyroid, skin    Cervical disc disorder     Chronic pain disorder     Claustrophobia     CTS (carpal tunnel syndrome)     Degenerative disc disease, lumbar     Depression     Dupuytren's disease     Essential hypertension     Family history of coronary artery disease     Fibromyalgia     Gallbladder abscess     H. pylori infection     Hiatal hernia     Hyperlipidemia     Hypothyroidism     Low back pain     Lumbosacral disc disease     Migraines     migraines    Multiple sclerosis     Osteoarthritis     Peripheral neuropathy     Psoriasis     Psoriatic arthritis     RA (rheumatoid arthritis)     Rheumatoid arthritis     Sinusitis     Sjogren's syndrome     Stomach ulcer      Thoracic disc disorder     TMJ arthritis     Type 2 diabetes mellitus     Urinary tract infection        Past Surgical History:   Procedure Laterality Date    BACK SURGERY      BREAST LUMPECTOMY Left 11/1993    CARDIAC CATHETERIZATION Left 09/21/2009    Normal     CARDIAC CATHETERIZATION N/A 6/24/2025    Procedure: Left Heart Cath;  Surgeon: Teodora Barron MD;  Location:  [facility];  Service: Cardiovascular;  Laterality: N/A;    CARPAL TUNNEL RELEASE  03/2012    CERVICAL POLYPECTOMY  12/2015    CHOLECYSTECTOMY  05/2007    COLONOSCOPY      ENDOSCOPY      EPIDURAL BLOCK      GASTRIC BYPASS  09/2007    JOINT REPLACEMENT      KNEE ARTHROPLASTY      LUMBAR DISC SURGERY      C5-6    NECK SURGERY      REPLACEMENT TOTAL KNEE Right 06/2016    SACRAL NERVE STIMULATOR PLACEMENT  09/2014    SACRAL NERVE STIMULATOR PLACEMENT  04/11/2024    @ [facility] in [place]    SACRAL NERVE STIMULATOR PLACEMENT Right 04/17/2024    THYROIDECTOMY  03/2016    TRIGGER POINT INJECTION               IRF OT ASSESSMENT FLOWSHEET (Last 12 Hours)       IRF OT Evaluation and Treatment       Row Name 08/05/25 1207          OT Time and Intention    Document Type daily treatment  -     Mode of Treatment occupational therapy  -     Symptoms Noted During/After Treatment none  -       Row Name 08/05/25 1207          General Information    Patient/Family/Caregiver Comments/Observations agreeable to therapy; VSS  -     Existing Precautions/Restrictions fall;weight bearing  TTWB LLE; ABD binder- bp  -       Row Name 08/05/25 1207          Bathing    [place] Level (Bathing) standby assist;contact guard assist;verbal cues  -     Assistive Device (Bathing) long-handled sponge  issued  -       Row Name 08/05/25 1207          Upper Body Dressing    [place] Level (Upper Body Dressing) set up assistance;bra/undergarment;pull over garment  -       Row Name 08/05/25 1207          Lower Body Dressing    [place] Level (Lower  Body Dressing) standby assist;contact guard assit;verbal cues;pants/bottoms;socks  -     Assistive Device Use (Lower Body Dressing) reacher;sock-aid  -       Row Name 08/05/25 1207          Grooming    [place] Level (Grooming) set up  -       Row Name 08/05/25 1207          Toileting    [place] Level (Toileting) supervision;contact guard assist;verbal cues  -     Assistive Device Use (Toileting) grab bar/safety frame;raised toilet seat  -       Row Name 08/05/25 1207          Self-Feeding    [place] Level (Self-Feeding) modified independence  -       Row Name 08/05/25 1207          Bed-Chair Transfer    Bed-Chair [place] (Transfers) standby assist;contact guard assist;verbal cues  -     Assistive Device (Bed-Chair Transfers) wheelchair  -       Row Name 08/05/25 1207          Toilet Transfer    [place] Level (Toilet Transfer) standby assist;contact guard assist;verbal cues  -     Assistive Device (Toilet Transfer) grab bars/safety frame;wheelchair  -       Row Name 08/05/25 1207          Motor Skills    Motor Skills functional endurance  -     Motor Control/Coordination Interventions therapeutic exercise/ROM  BUE ther ex, AROM, PRE  -     Therapeutic Exercise --  UBE, light flexbar, bilat nat  -       Row Name 08/05/25 1207          Positioning and Restraints    Post Treatment Position wheelchair  -     In Bed call light within reach;encouraged to call for assist;exit alarm on;notified nsg  pm; seen at bedside in pm  -     In Wheelchair with PT  am  -               User Key  (r) = Recorded By, (t) = Taken By, (c) = Cosigned By      Initials Name Effective Dates     Amber Badillo OT 06/16/21 -                      Occupational Therapy Education       Title: PT OT SLP Therapies (Done)       Topic: Occupational Therapy (Done)       Point: ADL training (Done)       Learning Progress Summary            Patient Acceptance, E,D, VU,NR by  at 8/5/2025 7211     Acceptance, E,D, VU,NR by  at 8/4/2025 1222                      Point: Precautions (Done)       Learning Progress Summary            Patient Acceptance, E,D, VU,NR by  at 8/5/2025 1222    Acceptance, E,D, VU,NR by  at 8/4/2025 1222                                      User Key       Initials Effective Dates Name Provider Type [facility] 06/16/21 -  Amber Badillo OT Occupational Therapist OT                        OT Recommendation and Plan    Planned Therapy Interventions (OT): activity tolerance training, adaptive equipment training, BADL retraining, occupation/activity based interventions, patient/caregiver education/training, ROM/therapeutic exercise, strengthening exercise (impaired ADL's, strength, fxl mobility, and activity tolerance)                    Time Calculation:      Time Calculation- OT       Row Name 08/05/25 1446 08/05/25 1444          Time Calculation- OT    OT Start Time 1425  - 0755  -     OT Stop Time 1435  - 0915  -     OT Time Calculation (min) 10 min  - 80 min  -     Total Timed Code Minutes- OT 10 minute(s)  - 80 minute(s)  -               User Key  (r) = Recorded By, (t) = Taken By, (c) = Cosigned By      Initials Name Provider Type     Amber Badillo OT Occupational Therapist                  Therapy Charges for Today       Code Description Service Date Service Provider Modifiers Qty    16325589920 HC OT SELF CARE/MGMT/TRAIN EA 15 MIN 8/4/2025 Amber Badillo OT GO 3    25840388760 HC OT THER PROC EA 15 MIN 8/4/2025 Amber Badillo OT GO 3    81439647126 HC OT SELF CARE/MGMT/TRAIN EA 15 MIN 8/5/2025 Amber Badillo, OT GO 3    57004912519 HC OT THER PROC EA 15 MIN 8/5/2025 Amber Badillo, OT GO 3                     Amber Badillo OT  8/5/2025

## 2025-08-05 NOTE — PROGRESS NOTES
Patient Name: Lashae Benitez  YOB: 1960  MRN: [id number]  Admission date: 7/22/2025  Reason for Encounter: Follow-up/Progress Note      [facility] Clinical Nutrition Progress Note       Nutrition Intervention Updates: 8/5/25      Subjective: Pt with poor po intakes.  Boost Ordered TID for additional calories and protein.  Pt with scheduled discharge for 8/6/25.       PO Diet: Diet: Regular/House; Texture: Regular (IDDSI 7); Fluid Consistency: Thin (IDDSI 0)   PO Supplements: Boost Original   PO Intake:  25-50% x 3 days       Current nutrition support:    Nutrition support review:        Labs: WNL        GI Function:  WNL, last bm: 8/5        Brief Weight Review:    Wt Readings from Last 1 Encounters:   07/22/25 1314 65.8 kg (145 lb)        Results from last 7 days   Lab Units 08/02/25  0455 07/31/25  0300 07/30/25  1009   SODIUM mmol/L 139 139 134*   POTASSIUM mmol/L 3.8 4.2 4.2   CHLORIDE mmol/L 105 104 100   CO2 mmol/L 23.1 26.1 25.3   BUN mg/dL 9.1 9.7 12.7   CREATININE mg/dL 0.71 0.82 0.76   CALCIUM mg/dL 8.1* 8.1* 7.9*   BILIRUBIN mg/dL  --  0.7 0.7   ALK PHOS U/L  --  212* 192*   ALT (SGPT) U/L  --  12 9   AST (SGOT) U/L  --  23 18   GLUCOSE mg/dL 79 92 260*     Results from last 7 days   Lab Units 08/02/25  0455   PLATELETS 10*3/mm3 681*   HEMOGLOBIN g/dL 8.6*   HEMATOCRIT % 27.2*     Lab Results   Component Value Date    HGBA1C 9.10 (H) 07/17/2025       Electronically signed by:  Molly Salmon RD  08/05/25 19:10 EDT

## 2025-08-06 VITALS
DIASTOLIC BLOOD PRESSURE: 77 MMHG | RESPIRATION RATE: 18 BRPM | BODY MASS INDEX: 24.16 KG/M2 | WEIGHT: 145 LBS | HEIGHT: 65 IN | SYSTOLIC BLOOD PRESSURE: 123 MMHG | HEART RATE: 117 BPM | TEMPERATURE: 97.6 F | OXYGEN SATURATION: 100 %

## 2025-08-06 DIAGNOSIS — Z91.89 CHRONIC CHEST PAIN WITH HIGH RISK FOR CAD: ICD-10-CM

## 2025-08-06 DIAGNOSIS — R07.9 CHRONIC CHEST PAIN WITH HIGH RISK FOR CAD: ICD-10-CM

## 2025-08-06 DIAGNOSIS — G89.29 CHRONIC CHEST PAIN WITH HIGH RISK FOR CAD: ICD-10-CM

## 2025-08-06 LAB
GLUCOSE BLDC GLUCOMTR-MCNC: 121 MG/DL (ref 70–130)
GLUCOSE BLDC GLUCOMTR-MCNC: 198 MG/DL (ref 70–130)

## 2025-08-06 PROCEDURE — 82948 REAGENT STRIP/BLOOD GLUCOSE: CPT

## 2025-08-06 PROCEDURE — 97530 THERAPEUTIC ACTIVITIES: CPT

## 2025-08-06 PROCEDURE — 97110 THERAPEUTIC EXERCISES: CPT

## 2025-08-06 PROCEDURE — 97535 SELF CARE MNGMENT TRAINING: CPT

## 2025-08-06 PROCEDURE — 63710000001 INSULIN GLARGINE PER 5 UNITS: Performed by: INTERNAL MEDICINE

## 2025-08-06 PROCEDURE — 94761 N-INVAS EAR/PLS OXIMETRY MLT: CPT

## 2025-08-06 PROCEDURE — 94799 UNLISTED PULMONARY SVC/PX: CPT

## 2025-08-06 PROCEDURE — 99238 HOSP IP/OBS DSCHRG MGMT 30/<: CPT | Performed by: FAMILY MEDICINE

## 2025-08-06 PROCEDURE — 97116 GAIT TRAINING THERAPY: CPT

## 2025-08-06 RX ORDER — FUROSEMIDE 20 MG/1
20 TABLET ORAL DAILY PRN
Qty: 20 TABLET | Refills: 0 | Status: SHIPPED | OUTPATIENT
Start: 2025-08-06 | End: 2025-09-05

## 2025-08-06 RX ORDER — FEXOFENADINE HCL 180 MG/1
180 TABLET ORAL DAILY
Qty: 30 TABLET | Refills: 0 | Status: SHIPPED | OUTPATIENT
Start: 2025-08-06 | End: 2025-09-05

## 2025-08-06 RX ORDER — CYANOCOBALAMIN 1000 UG/ML
1000 INJECTION, SOLUTION INTRAMUSCULAR; SUBCUTANEOUS WEEKLY
Qty: 4 ML | Refills: 0 | Status: SHIPPED | OUTPATIENT
Start: 2025-08-06 | End: 2025-09-05

## 2025-08-06 RX ORDER — LEVOTHYROXINE SODIUM 150 UG/1
150 TABLET ORAL
Qty: 30 TABLET | Refills: 0 | Status: SHIPPED | OUTPATIENT
Start: 2025-08-07 | End: 2025-09-06

## 2025-08-06 RX ORDER — HYDROCODONE BITARTRATE AND ACETAMINOPHEN 7.5; 325 MG/1; MG/1
1 TABLET ORAL EVERY 8 HOURS PRN
Qty: 12 TABLET | Refills: 0 | Status: SHIPPED | OUTPATIENT
Start: 2025-08-06 | End: 2025-08-10

## 2025-08-06 RX ORDER — GABAPENTIN 400 MG/1
400 CAPSULE ORAL EVERY 8 HOURS SCHEDULED
Qty: 42 CAPSULE | Refills: 0 | Status: SHIPPED | OUTPATIENT
Start: 2025-08-06 | End: 2025-08-20

## 2025-08-06 RX ORDER — OMEPRAZOLE 40 MG/1
40 CAPSULE, DELAYED RELEASE ORAL DAILY PRN
Qty: 30 CAPSULE | Refills: 0 | Status: SHIPPED | OUTPATIENT
Start: 2025-08-06 | End: 2025-09-05

## 2025-08-06 RX ORDER — NITROGLYCERIN 0.4 MG/1
0.4 TABLET SUBLINGUAL AS NEEDED
Qty: 25 TABLET | Refills: 11 | Status: SHIPPED | OUTPATIENT
Start: 2025-08-06

## 2025-08-06 RX ORDER — ERGOCALCIFEROL 1.25 MG/1
50000 CAPSULE, LIQUID FILLED ORAL WEEKLY
Qty: 5 CAPSULE | Refills: 0 | Status: SHIPPED | OUTPATIENT
Start: 2025-08-06 | End: 2025-09-05

## 2025-08-06 RX ORDER — FLUTICASONE PROPIONATE AND SALMETEROL XINAFOATE 115; 21 UG/1; UG/1
2 AEROSOL, METERED RESPIRATORY (INHALATION)
Qty: 8 G | Refills: 0 | Status: SHIPPED | OUTPATIENT
Start: 2025-08-06 | End: 2025-09-05

## 2025-08-06 RX ORDER — FOLIC ACID 1 MG/1
1 TABLET ORAL DAILY
Qty: 30 TABLET | Refills: 0 | Status: SHIPPED | OUTPATIENT
Start: 2025-08-06 | End: 2025-09-05

## 2025-08-06 RX ORDER — DULOXETIN HYDROCHLORIDE 30 MG/1
30 CAPSULE, DELAYED RELEASE ORAL EVERY MORNING
Qty: 30 CAPSULE | Refills: 0 | Status: SHIPPED | OUTPATIENT
Start: 2025-08-06 | End: 2025-09-05

## 2025-08-06 RX ORDER — DULOXETIN HYDROCHLORIDE 60 MG/1
60 CAPSULE, DELAYED RELEASE ORAL DAILY
Qty: 30 CAPSULE | Refills: 0 | Status: SHIPPED | OUTPATIENT
Start: 2025-08-06 | End: 2025-09-05

## 2025-08-06 RX ADMIN — DULOXETINE 60 MG: 60 CAPSULE, DELAYED RELEASE ORAL at 08:16

## 2025-08-06 RX ADMIN — FLECAINIDE ACETATE 50 MG: 50 TABLET ORAL at 08:15

## 2025-08-06 RX ADMIN — LEVOTHYROXINE SODIUM 150 MCG: 0.07 TABLET ORAL at 05:00

## 2025-08-06 RX ADMIN — INSULIN GLARGINE 10 UNITS: 100 INJECTION, SOLUTION SUBCUTANEOUS at 08:14

## 2025-08-06 RX ADMIN — FOLIC ACID 1 MG: 1 TABLET ORAL at 08:15

## 2025-08-06 RX ADMIN — CETIRIZINE HYDROCHLORIDE 10 MG: 10 TABLET, FILM COATED ORAL at 08:15

## 2025-08-06 RX ADMIN — GABAPENTIN 400 MG: 400 CAPSULE ORAL at 05:00

## 2025-08-06 RX ADMIN — PANTOPRAZOLE SODIUM 40 MG: 40 TABLET, DELAYED RELEASE ORAL at 05:00

## 2025-08-06 RX ADMIN — ACETAMINOPHEN 650 MG: 325 TABLET, FILM COATED ORAL at 09:17

## 2025-08-06 RX ADMIN — FLUDROCORTISONE ACETATE 50 MCG: 0.1 TABLET ORAL at 08:15

## 2025-08-06 RX ADMIN — DULOXETINE 30 MG: 60 CAPSULE, DELAYED RELEASE ORAL at 08:16

## 2025-08-06 NOTE — THERAPY DISCHARGE NOTE
Inpatient Rehabilitation - Physical Therapy Treatment Note/Discharge  GUANACO Portillo     Patient Name: Lashae Benitez  : 1960  MRN: [id number]  Today's Date: 2025                Admit Date: 2025    Visit Dx:    ICD-10-CM ICD-9-CM   1. Closed left hip fracture, sequela  S72.002S 905.3     Patient Active Problem List   Diagnosis    Hiatal hernia    Essential hypertension    Sjogren's syndrome    Multiple sclerosis    Psoriasis    Fibromyalgia    Osteoarthritis    Psoriatic arthritis    RA (rheumatoid arthritis)    Degenerative disc disease, lumbar    TMJ arthritis    Dupuytren's disease    H. pylori infection    Family history of coronary artery disease    Type 2 diabetes mellitus    Edema    Bilateral leg edema    Isolated corticotropin deficiency    Hyponatremia    Paroxysmal atrial fibrillation    Sepsis    Bacteremia, escherichia coli    Iron deficiency anemia    Malabsorption due to intolerance, not elsewhere classified    Chronic chest pain with high risk for CAD    Abnormal nuclear stress test    Abnormal computed tomography angiography (CTA)    Hip fracture    Closed intertrochanteric fracture of hip, left, initial encounter    Closed left hip fracture, sequela     Past Medical History:   Diagnosis Date    Acid reflux     Allergic     Anemia     Anxiety     Atrial fibrillation     Cancer     thyroid, skin    Cervical disc disorder     Chronic pain disorder     Claustrophobia     CTS (carpal tunnel syndrome)     Degenerative disc disease, lumbar     Depression     Dupuytren's disease     Essential hypertension     Family history of coronary artery disease     Fibromyalgia     Gallbladder abscess     H. pylori infection     Hiatal hernia     Hyperlipidemia     Hypothyroidism     Low back pain     Lumbosacral disc disease     Migraines     migraines    Multiple sclerosis     Osteoarthritis     Peripheral neuropathy     Psoriasis     Psoriatic arthritis     RA (rheumatoid arthritis)     Rheumatoid  arthritis     Sinusitis     Sjogren's syndrome     Stomach ulcer     Thoracic disc disorder     TMJ arthritis     Type 2 diabetes mellitus     Urinary tract infection      Past Surgical History:   Procedure Laterality Date    BACK SURGERY      BREAST LUMPECTOMY Left 11/1993    CARDIAC CATHETERIZATION Left 09/21/2009    Normal     CARDIAC CATHETERIZATION N/A 6/24/2025    Procedure: Left Heart Cath;  Surgeon: Teodora Barron MD;  Location:  [facility];  Service: Cardiovascular;  Laterality: N/A;    CARPAL TUNNEL RELEASE  03/2012    CERVICAL POLYPECTOMY  12/2015    CHOLECYSTECTOMY  05/2007    COLONOSCOPY      ENDOSCOPY      EPIDURAL BLOCK      GASTRIC BYPASS  09/2007    JOINT REPLACEMENT      KNEE ARTHROPLASTY      LUMBAR DISC SURGERY      C5-6    NECK SURGERY      REPLACEMENT TOTAL KNEE Right 06/2016    SACRAL NERVE STIMULATOR PLACEMENT  09/2014    SACRAL NERVE STIMULATOR PLACEMENT  04/11/2024    @ [facility] in [place]    SACRAL NERVE STIMULATOR PLACEMENT Right 04/17/2024    THYROIDECTOMY  03/2016    TRIGGER POINT INJECTION         PT ASSESSMENT (Last 12 Hours)       IRF PT Evaluation and Treatment       Row Name 08/06/25 1556          PT Time and Intention    Document Type daily treatment;discharge evaluation  -RG     Mode of Treatment physical therapy;individual therapy  -RG     Patient/Family/Caregiver Comments/Observations Pt DC on this date. Pt and nursing in agreement for skilled PT on this date.  -RG       Row Name 08/06/25 1556          General Information    Existing Precautions/Restrictions fall;weight bearing  L LE TTWB, abdominal binder w/ lower BP  -RG       Row Name 08/06/25 1556          Pain Scale: FACES Pre/Post-Treatment    Pain: FACES Scale, Pretreatment 2-->hurts little bit  -RG     Posttreatment Pain Rating 2-->hurts little bit  -RG       Row Name 08/06/25 1552          Cognition/Psychosocial    Affect/Mental Status (Cognition) WFL  -RG     Orientation Status (Cognition) oriented x  3  -RG     Follows Commands (Cognition) verbal cues/prompting required;physical/tactile prompts required  -     Personal Safety Interventions gait belt;fall prevention program maintained;nonskid shoes/slippers when out of bed  -RG     Cognitive Function (Cognition) WFL  -       Row Name 08/06/25 1556          Mobility    Extremity Weight-bearing Status left lower extremity  -RG     Left Lower Extremity (Weight-bearing Status) toe touch weight-bearing (TTWB)  -       Row Name 08/06/25 1556          Bed Mobility    Bed Mobility sit-supine  -RG     Supine-Sit-Supine Baxter (Bed Mobility) contact guard  -       Row Name 08/06/25 1556          Transfer Assessment/Treatment    Transfers sit-stand transfer;stand-sit transfer;chair-bed transfer  -       Row Name 08/06/25 1556          Bed-Chair Transfer    Bed-Chair Baxter (Transfers) verbal cues;contact guard  -     Assistive Device (Bed-Chair Transfers) wheelchair  arm rest  -       Row Name 08/06/25 1556          Chair-Bed Transfer    Chair-Bed Baxter (Transfers) contact guard;verbal cues;nonverbal cues (demo/gesture)  -     Assistive Device (Chair-Bed Transfers) wheelchair  bed rail  -       Row Name 08/06/25 1556          Sit-Stand Transfer    Sit-Stand Baxter (Transfers) contact guard;minimum assist (75% patient effort);1 person assist;2 person assist;1 person to manage equipment  -     Assistive Device (Sit-Stand Transfers) wheelchair;walker, front-wheeled  -       Row Name 08/06/25 1556          Stand-Sit Transfer    Stand-Sit Baxter (Transfers) contact guard;verbal cues;nonverbal cues (demo/gesture)  -     Assistive Device (Stand-Sit Transfers) wheelchair;walker, front-wheeled  -       Row Name 08/06/25 1556          Gait/Stairs (Locomotion)    Gait/Stairs Locomotion gait/ambulation assistive device  -     Baxter Level (Gait) minimum assist (75% patient effort);2 person assist;verbal cues;nonverbal  cues (demo/gesture)  -RG     Assistive Device (Gait) walker, front-wheeled  -RG     Patient was able to Ambulate yes  -RG     Distance in Feet (Gait) --  60, 40  -RG     Pattern (Gait) step-to  TTWB on L LE  -RG       Row Name 08/06/25 1556          Hip (Therapeutic Exercise)    Hip Strengthening (Therapeutic Exercise) bilateral;flexion;aBduction;aDduction;marching while seated;sitting;2 lb free weight;resistance band;green;10 repetitions;2 sets;marching while standing;standing  -RG       Row Name 08/06/25 1556          Knee (Therapeutic Exercise)    Knee Strengthening (Therapeutic Exercise) bilateral;flexion;extension;marching while seated;marching while standing;LAQ (long arc quad);hamstring curls;sitting;standing;2 lb free weight;resistance band;green;10 repetitions;2 sets  -RG       Row Name 08/06/25 1556          Ankle (Therapeutic Exercise)    Ankle Strengthening (Therapeutic Exercise) bilateral;dorsiflexion;plantarflexion;sitting;10 repetitions;2 sets  -RG       Row Name 08/06/25 1556          Positioning and Restraints    Pre-Treatment Position sitting in chair/recliner  -RG     Post Treatment Position wheelchair  -RG     In Wheelchair notified nsg;sitting;call light within reach;encouraged to call for assist;with family/caregiver  -RG       Row Name 08/06/25 1556          Bed Mobility Goal 1 (PT-IRF)    Progress/Outcomes (Bed Mobility Goal 1, PT-IRF) goal partially met  -RG       Row Name 08/06/25 1556          Transfer Goal 1 (PT-IRF)    Progress/Outcomes (Transfer Goal 1, PT-IRF) goal partially met  -RG       Row Name 08/06/25 1556          Gait/Walking Locomotion Goal 1 (PT-IRF)    Progress/Outcomes (Gait/Walking Locomotion Goal 1, PT-IRF) goal partially met  -RG               User Key  (r) = Recorded By, (t) = Taken By, (c) = Cosigned By      Initials Name Provider Type    Neri Blank PTA Physical Therapist Assistant                    Physical Therapy Education       Title: PT OT SLP Therapies  (Resolved)       Topic: Physical Therapy (Resolved)       Point: Mobility training (Resolved)       Learning Progress Summary            Patient Acceptance, E,D,H, VU,DU by RG at 8/6/2025 1602    Acceptance, E,D, VU,NR by RG at 8/5/2025 1456    Acceptance, E,D, VU,NR by RG at 8/4/2025 1440    Acceptance, E,D, VU,NR by RG at 8/4/2025 1440    Acceptance, E,D, VU,NR by RG at 8/1/2025 1448    Acceptance, E,D, VU by LL at 7/31/2025 1546    Acceptance, E,D, VU,NR by RG at 7/30/2025 1348    Acceptance, E,D, VU,NR by RG at 7/29/2025 1434    Acceptance, E,D, VU,NR by RG at 7/28/2025 1438    Acceptance, E,D, VU,NR by LL at 7/26/2025 1615    Acceptance, E,D, VU,NR by RG at 7/25/2025 1407    Acceptance, E,D, VU,NR by RG at 7/24/2025 1502    Acceptance, E, VU,NR by LB at 7/23/2025 1612   Family Acceptance, E,D,H, VU,DU by RG at 8/6/2025 1602   Significant Other Acceptance, E,D, VU by LL at 7/31/2025 1546                      Point: Home exercise program (Resolved)       Learning Progress Summary            Patient Acceptance, E,D,H, VU,DU by RG at 8/6/2025 1602    Acceptance, E,D, VU,NR by RG at 8/5/2025 1456    Acceptance, E,D, VU,NR by RG at 8/4/2025 1440    Acceptance, E,D, VU,NR by RG at 8/4/2025 1440    Acceptance, E,D, VU,NR by RG at 8/1/2025 1448    Acceptance, E,D, VU by LL at 7/31/2025 1546    Acceptance, E,D, VU,NR by RG at 7/30/2025 1348    Acceptance, E,D, VU,NR by RG at 7/29/2025 1434    Acceptance, E,D, VU,NR by RG at 7/28/2025 1438    Acceptance, E,D, VU,NR by LL at 7/26/2025 1615    Acceptance, E,D, VU,NR by RG at 7/25/2025 1407    Acceptance, E,D, VU,NR by RG at 7/24/2025 1502    Acceptance, E, VU,NR by LB at 7/23/2025 1612   Family Acceptance, E,D,H, VU,DU by RG at 8/6/2025 1602   Significant Other Acceptance, E,D, VU by LL at 7/31/2025 1546                      Point: Body mechanics (Resolved)       Learning Progress Summary            Patient Acceptance, E,D,H, VU,DU by RG at 8/6/2025 1602    Acceptance, E,D,  VU,NR by RG at 8/5/2025 1456    Acceptance, E,D, VU,NR by RG at 8/4/2025 1440    Acceptance, E,D, VU,NR by RG at 8/4/2025 1440    Acceptance, E,D, VU,NR by RG at 8/1/2025 1448    Acceptance, E,D, VU by LL at 7/31/2025 1546    Acceptance, E,D, VU,NR by RG at 7/30/2025 1348    Acceptance, E,D, VU,NR by RG at 7/29/2025 1434    Acceptance, E,D, VU,NR by RG at 7/28/2025 1438    Acceptance, E,D, VU,NR by LL at 7/26/2025 1615    Acceptance, E,D, VU,NR by RG at 7/25/2025 1407    Acceptance, E,D, VU,NR by RG at 7/24/2025 1502    Acceptance, E, VU,NR by LB at 7/23/2025 1612   Family Acceptance, E,D,H, VU,DU by RG at 8/6/2025 1602   Significant Other Acceptance, E,D, VU by LL at 7/31/2025 1546                      Point: Precautions (Resolved)       Learning Progress Summary            Patient Acceptance, E,D,H, VU,DU by RG at 8/6/2025 1602    Acceptance, E,D, VU,NR by RG at 8/5/2025 1456    Acceptance, E,D, VU,NR by RG at 8/4/2025 1440    Acceptance, E,D, VU,NR by RG at 8/4/2025 1440    Acceptance, E,D, VU,NR by RG at 8/1/2025 1448    Acceptance, E,D, VU by LL at 7/31/2025 1546    Acceptance, E,D, VU,NR by RG at 7/30/2025 1348    Acceptance, E,D, VU,NR by RG at 7/29/2025 1434    Acceptance, E,D, VU,NR by RG at 7/28/2025 1438    Acceptance, E,D, VU,NR by LL at 7/26/2025 1615    Acceptance, E,D, VU,NR by RG at 7/25/2025 1407    Acceptance, E,D, VU,NR by RG at 7/24/2025 1502    Acceptance, E, VU,NR by LB at 7/23/2025 1612   Family Acceptance, E,D,H, VU,DU by RG at 8/6/2025 1602   Significant Other Acceptance, E,D, VU by  at 7/31/2025 1546                                      User Key       Initials Effective Dates Name Provider Type Discipline    LB 06/16/21 -  Andreia Dove, PT Physical Therapist PT    LL 05/02/16 -  Beverly Goff, ERNESTINE Physical Therapist Assistant PT    RG 06/16/21 -  Neri Cornelisu PTA Physical Therapist Assistant PT                    PT Recommendation and Plan  Frequency of Treatment (PT): 5  times per week  Anticipated Equipment Needs at Discharge (PT Eval):  (tbd)            Time Calculation:    PT Charges       Row Name 08/06/25 1602             Time Calculation    Start Time 0915  -RG      Stop Time 1045  -RG      Time Calculation (min) 90 min  -RG      PT Received On 08/06/25  -RG         Time Calculation- PT    Total Timed Code Minutes- PT 90 minute(s)  -RG                User Key  (r) = Recorded By, (t) = Taken By, (c) = Cosigned By      Initials Name Provider Type    Neri Blank PTA Physical Therapist Assistant                    Therapy Charges for Today       Code Description Service Date Service Provider Modifiers Qty    37714616293 HC GAIT TRAINING EA 15 MIN 8/5/2025 Neri Cornelius PTA GP, CQ 1    71372432065 HC PT THERAPEUTIC ACT EA 15 MIN 8/5/2025 Neri Cornelius PTA GP, CQ 2    27855888830 HC PT THER PROC EA 15 MIN 8/5/2025 Neri Cornelius PTA GP, CQ 3    82809720581 HC GAIT TRAINING EA 15 MIN 8/6/2025 Neri Cornelius PTA GP, CQ 1    32553370563 HC PT THERAPEUTIC ACT EA 15 MIN 8/6/2025 Neri Cornelius PTA GP, CQ 2    09434769082 HC PT THER PROC EA 15 MIN 8/6/2025 Neri Cornelius PTA GP, CQ 3            PT G-Codes  AM-PAC 6 Clicks Score (PT): 17    PT Discharge Summary  Reason for Discharge: Discharge from facility  Outcomes Achieved: Patient able to partially acheive established goals  Discharge Destination: Home with outpatient services    Neri Cornelius PTA  8/6/2025

## 2025-08-06 NOTE — PROGRESS NOTES
"Section A. Administrative Information - Discharge      Has lack of transportation kept patient from medical appointments, meetings,  work, or from getting things needed for daily living?  C. No      Medication List to Subsequent Provider: Not applicable.  Patient was not  discharged to a subsequent provider.      01 - Home        Reconciled Medication List to Patient on Discharge:  Yes    Route of Transmission: D. Paper-based (e.g., fax, copies, printouts)    Section B.  Hearing, Speech, and Vision - Discharge  . Health Literacy - Frequency of requiring assistance reading instructions,  pamphlets, other written material from doctor or pharmacy:  0. Never    Section C.  Cognitive Patterns - Discharge  . Should Brief Interview for Mental Status (-) be conducted?  (1) Yes        Number of words repeated by patient after first attempt:  3. Three        A.  ?Please tell me what year it is right now.?    A. Able to report correct year:  3. Correct    B.  ?What month are we in right now??    B. Able to report correct month:  2. Accurate within 5 days    C.  ?What day of the week is today??    C. Able to report correct day of the week:  1. Correct            A.  Able to recall ?sock?:  2. Yes, no cue required    B.  Able to recall ?blue?:  2. Yes, no cue required    C.  Able to recall ?bed?:  2. Yes, no cue required        BIMS Score:  15    A. Is there evidence of an acute change in mental status from the patient's  baseline?  0. No    B. Inattention:  0. Behavior not present    C. Disorganized thinkin. Behavior not present    D. Altered level of consciousness:  0. Behavior not present    Section D. Mood - Discharge  \"Over the last 2 weeks, have you been bothered by any of the following  problems?\"    . Patient Mood Interview (PHQ-2 to 9) (from [employer]?)                            1. Symptom Presence       2. Symptom Frequency  A. Little interest or pleasure in doing things  0. No          " "           0.  Never or 1 day  B. Feeling down, depressed, or hopeless  0. No                     0. Never or 1  day            PHQ interview ended, as above answers do not meet criteria to continue    . Total Severity Score: 0    Interpretation: Minimal depression    How often do you feel lonely or isolated from those around you?  0. Never    Section J.  Health Conditions - Discharge  . Pain Effect on Sleep - ?Over the past 5 days, how much of the time has  pain made it hard for you to sleep at night??:  1. Rarely or not at all    . Pain Interference with Therapy Activities - ?Over the past 5 days, how  often have you limited your participation in rehabilitation therapy sessions due  to pain?\":  1. Rarely or not at all    . Pain Interference with Day-to-Day Activities - ?Over the past 5 days, how  often have you limited your day-to-day activities (excluding rehabilitation  therapy sessions) because of pain??:  1. Rarely or not at all    Has the patient had any falls since admission?  0. No    Section K.  Swallowing/Nutritional Status - Discharge  . Nutritional Approaches Received Last 7 Days:  Z. None    . Nutritional Approaches Received At Discharge:  Z. None    Section M. Skin Conditions - Discharge  Does this patient have one or more unhealed pressure ulcers/injuries?  1. Yes        A 1. Number of Stage 1 pressure injuries on discharge:  1          B 1. Number of Stage 2 pressure ulcers present on discharge: 0          C 1. Number of Stage 3 pressure ulcers present on discharge: 0          D 1. Number of Stage 4 pressure ulcers present on discharge: 0          E 1. Number of unstageable pressure ulcers/injuries due to non-removable  dressing/device present on discharge: 0          F 1. Number of unstageable pressure ulcers due to coverage of wound bed by  slough and/or eschar present on discharge: 0          G 1. Number of unstageable pressure injuries presenting as deep tissue " injury  present on discharge: 1    G 1. Number of these unstageable pressure injuries that were present upon  admission:  1    Section N. Medication - Discharge      Did the facility contact and complete physician (or physician-designee)  prescribed/recommended actions by midnight of the next calendar day each time  potential clinically significant medication issues were identified since the  admission?  9. Not applicable - There were no potential clinically significant medication  issues identified since admission or patient is not taking any medications.    Section O. Special Treatments, Procedures, and Programs - Discharge      O1. IV Access, O2. Peripheral    Vaccination Up to Date?  No, patient is not up to date    Signed by: Josette Mc, Supervisor

## 2025-08-06 NOTE — PROGRESS NOTES
Section GG:  Mobility - Discharge Performance                                            Coding  JK9840.A  Roll Left and Right           04. Supervision or touching assistance                                          - [place] provides VERBAL CUES or TOUCH                                          ING/STEADYING assistance as patient co                                          mpletes activity. Assistance may be pr                                          ovided throughout the activity or inte                                          rmittently.  XV1825.B  Sit to Lying                  04. Supervision or touching assistance                                          - [place] provides VERBAL CUES or TOUCH                                          ING/STEADYING assistance as patient co                                          mpletes activity. Assistance may be pr                                          ovided throughout the activity or inte                                          rmittently.  SP4798.C  Lying to Sitting on Side of Bed  04. Supervision or touching  assistance                                          - [place] provides VERBAL CUES or TOUCH                                          ING/STEADYING assistance as patient co                                          mpletes activity. Assistance may be pr                                          ovided throughout the activity or inte                                          rmittently.  IQ5315.D  Sit to Stand                  04. Supervision or touching assistance                                          - [place] provides VERBAL CUES or TOUCH                                          ING/STEADYING assistance as patient co                                          mpletes activity. Assistance may be pr                                          ovided throughout the activity or inte                                          rmittently.  [id number].E  Chair/Bed-to-Chair  Transfer   04. Supervision or touching assistance                                          - [place] provides VERBAL CUES or TOUCH                                          ING/STEADYING assistance as patient co                                          mpletes activity. Assistance may be pr                                          ovided throughout the activity or inte                                          rmittently.  [id number].F  Toilet Transfer               04. Supervision or touching assistance                                          - [place] provides VERBAL CUES or TOUCH                                          ING/STEADYING assistance as patient co                                          mpletes activity. Assistance may be pr                                          ovided throughout the activity or inte                                          rmittently.  [id number].G  Car Transfer                  04. Supervision or touching assistance                                          - [place] provides VERBAL CUES or TOUCH                                          ING/STEADYING assistance as patient co                                          mpletes activity. Assistance may be pr                                          ovided throughout the activity or inte                                          rmittently.  HP4794.I  Walk 10 Feet                  04. Supervision or touching assistance                                          - [place] provides VERBAL CUES or TOUCH                                          ING/STEADYING assistance as patient co                                          mpletes activity. Assistance may be pr                                          ovided throughout the activity or inte                                          rmittently.  PV5505.J  Walk 50 Feet with Two Turns   04. Supervision or touching assistance                                          - [place] provides VERBAL CUES or TOUCH                                           ING/STEADYING assistance as patient co                                          mpletes activity. Assistance may be pr                                          ovided throughout the activity or inte                                          rmittently.  LD0182.K  Walk 150 Feet                 88. Not attempted due to medical condi                                          tion or safety concerns  VU1432.L  Walking 10 Feet on Uneven Surface  88. Not attempted due to medical  condi                                          tion or safety concerns  [id number].M  1 Step (curb)                 04. Supervision or touching assistance                                          - [place] provides VERBAL CUES or TOUCH                                          ING/STEADYING assistance as patient co                                          mpletes activity. Assistance may be pr                                          ovided throughout the activity or inte                                          rmittently.  NQ4969.N  4 Steps                       88. Not attempted due to medical condi                                          tion or safety concerns  NC9869.O  12 Steps                      88. Not attempted due to medical condi                                          tion or safety concerns  MU8870.P  Picking Up Object             88. Not attempted due to medical condi                                          tion or safety concerns  [id number].Q1  Does the patient use a wheelchair and/or scooter?  1. Yes    KP0177.R  Wheel 50 Feet with Two Turns  06. Independent - Patient completes                                           e activity by him/herself with no assi                                          stance from a helper.  [id number].RR1  Type of Wheelchair/Scooter for 50 Feet with Two Turns  1. Manual    [id number].S  Wheel 150 Feet                06. Independent - Patient completes                             "               e activity by him/herself with no assi                                          stance from a helper.  GH1878.SS1  Type of Wheelchair/Scooter for 150 Feet  1. Manual        Section J.  Health Conditions - Discharge  . Pain Effect on Sleep - ?Over the past 5 days, how much of the time has  pain made it hard for you to sleep at night??:  1. Rarely or not at all    . Pain Interference with Therapy Activities - ?Over the past 5 days, how  often have you limited your participation in rehabilitation therapy sessions due  to pain?\":  1. Rarely or not at all    . Pain Interference with Day-to-Day Activities - ?Over the past 5 days, how  often have you limited your day-to-day activities (excluding rehabilitation  therapy sessions) because of pain??:  1. Rarely or not at all    Has the patient had any falls since admission?  0. No    Signed by: Neri Cornelius PTA    "

## 2025-08-06 NOTE — DISCHARGE SUMMARY
[facility] HOSPITALISTS DISCHARGE SUMMARY    Patient Identification:  Name:  Lashae Benitez  Age:  64 y.o.  Sex:  female  :  1960  MRN:  [id number]  Visit Number:  [id number]    Date of Admission: 2025  Date of Discharge:  2025     PCP: Kreis, Samuel Duane, MD    DISCHARGE DIAGNOSIS  Status post left hip fracture with open reduction internal fixation    Orthostatic hypotension  History of psoriasis with psoriatic arthritis  MS  Diabetes mellitus  Migraine type headaches  Paroxysmal atrial fibrillation  History of thyroid cancer/now hypothyroid  Possible rheumatoid arthritis and Sjogren's syndrome  Fibromyalgia  Degenerative disc disease  Acute blood loss anemia    CONSULTS       PROCEDURES PERFORMED      HOSPITAL COURSE  Patient is a 64 y.o. female presented to [facility] with a history of psoriasis, psoriatic arthritis, MS, diabetes mellitus, hypertension, paroxysmal atrial fibrillation, GERD, history of thyroid cancer which is now hypothyroidism, rheumatoid arthritis, Sjogren's syndrome, fibromyalgia, degenerative disc disease, migraine headaches.  Patient had originally had a fall at home and stated there was no loss of consciousness or fainting.  Patient has severe pain in the left elbow but also in the shoulder and elbow.  Patient was evaluated in the emergency department found to have a left intertrochanteric hip fracture.  Patient was admitted and did undergo open reduction internal fixation with intramedullary nail and long vera proximal locking with blade distal locking cortical screw placement on .  In the postoperative course did have acute blood loss anemia and did require 2 units of packed red blood cells.  Patient was stabilized afterwards and was and was able to have anticoagulation restarted for treatment of her atrial fibrillation.  Patient was thought to be a good candidate for inpatient physical rehab and we agreed to accept patient in  transfer.    Status post left hip fracture with ORIF--at the time of admission patient required maximum assistance for bed mobility; maximum assist for bed to chair chair to bed transfers.  Moderate assistance for sit to stand and stand to sit transfers.  Was unable to ambulate in did have a toe-touch weightbearing restriction on leg.  At the time of admission required moderate to maximum assistance for bathing; set up for upper body dressing; maximum assist to dependent for lower body dressing; set up for grooming; dependent to maximum assist for toileting.  At the time of discharge, requiring contact-guard assistance for bed to chair chair to bed transfers.  Contact-guard to minimum assistance for sit to stand and stand to sit transfers.  Ambulated 60 feet and 100 feet in the morning 50 feet times 2 in the afternoon with toe-touch weightbearing on left using front wheel walker and minimum assistance.  Requiring set up for upper body dressing; standby assist for lower body dressing; set up for grooming; supervision to contact-guard for toileting.  Patient is to follow-up with orthopedic surgery in the near future.  Patient is to continue toe-touch weightbearing until cleared by Ortho.  Staples have been removed.  Patient may restart Xeljanz which had been held for 14 days postop as well as after removal of staples.    Orthostatic hypotension--when patient came to our service patient did have significant orthostasis.  It was thought that this may have been secondary to recent acute blood loss anemia.  Patient was placed on fludrocortisone.  Also did require midodrine and we did have to hold patient's of metoprolol.  Patient eventually improved has had some difficulties with sinus tachycardia as result of holding metoprolol.  No atrial fibrillation during the admission.  In any event the orthostasis has improved and we have been able to discontinue midodrine and have stopped fludrocortisone as well.  I would  recommend follow-up with patient's family physician, Dr. Jamil For reevaluation and hopefully can be restarted on metoprolol in the near future.    Psoriatic arthritis/question of rheumatoid arthritis/Sjogren syndrome--patient did have methotrexate reinstituted when she arrived in rehab.  Have held Xeljanz during the admission as recommendations are holding for 14 days and after staples removed prior to restarting.  Patient has been stable throughout the admission    Acute blood loss anemia patient did require 2 units of packed red blood cells while in the acute care portion of the hospital.  Has not required further transfusion therapy here and has been grossly stable.  Hemoglobin at discharge is 8.6.  Patient did have macrocytic indices but vitamin B12 and folate levels are within normal limits    Diabetes mellitus has been well-controlled throughout the admission.  May restart Toujeo at 15 units daily.    Migraine type headaches may reinstitute Emgality at home.    Hypothyroidism patient did have Synthroid increased 150 mcg daily.  Would recommend follow-up TSH in approximately 4 weeks    Paroxysmal atrial fibrillation patient has been in a sinus rhythm although has had some sinus tachycardia.  Have continued patient on Tambocor during the admission.  Metoprolol was held secondary to orthostasis.  Have continued patient on Xarelto and she has tolerated well.      VITAL SIGNS:  Temp:  [97.6 °F (36.4 °C)-98.2 °F (36.8 °C)] 97.6 °F (36.4 °C)  Heart Rate:  [] 117  Resp:  [16-18] 18  BP: (123-154)/(77-92) 123/77  SpO2:  [99 %-100 %] 100 %  on   ;   Device (Oxygen Therapy): room air    Body mass index is 24.13 kg/m².  Wt Readings from Last 3 Encounters:   07/22/25 65.8 kg (145 lb)   07/17/25 65.8 kg (145 lb)   07/14/25 65.6 kg (144 lb 9.6 oz)       PHYSICAL EXAM:  Constitutional: No acute distress  HEENT: Normocephalic atraumatic  Neck:   Supple  Cardiovascular: Sinus tachycardia (rate of  105-110)  Pulmonary/Chest: Clear to auscultation  Abdominal:  .  Positive bowel sounds soft  Musculoskeletal: Left hip incision site is healed well staples are all removed at this point  Neurological: No focal deficit  Skin: No rash  Peripheral vascular: Has trace edema in the left lower extremity  Genitourinary::    DISCHARGE DISPOSITION   Stable    DISCHARGE MEDICATIONS:     Discharge Medications        New Medications        Instructions Start Date   gabapentin 400 MG capsule  Commonly known as: NEURONTIN  Replaces: gabapentin 600 MG tablet   400 mg, Oral, Every 8 Hours Scheduled      HYDROcodone-acetaminophen 7.5-325 MG per tablet  Commonly known as: NORCO   1 tablet, Oral, Every 8 Hours PRN      levothyroxine 150 MCG tablet  Commonly known as: SYNTHROID, LEVOTHROID  Replaces: levothyroxine sodium 125 MCG capsule capsule   150 mcg, Oral, Every Early Morning   Start Date: August 7, 2025     Polyvinyl Alcohol-Povidone PF 1.4-0.6 % ophthalmic solution  Commonly known as: ARTIFICIAL TEARS   2 drops, Both Eyes, As Needed             Changes to Medications        Instructions Start Date   omeprazole 40 MG capsule  Commonly known as: priLOSEC  What changed: See the new instructions.   40 mg, Oral, Daily PRN             Continue These Medications        Instructions Start Date   cyanocobalamin 1000 MCG/ML injection   1,000 mcg, Intramuscular, Weekly      DULoxetine 30 MG capsule  Commonly known as: CYMBALTA   30 mg, Oral, Every Morning      DULoxetine 60 MG capsule  Commonly known as: CYMBALTA   60 mg, Oral, Daily      Emgality 120 MG/ML auto-injector pen  Generic drug: galcanezumab-gnlm   120 mg, Subcutaneous, Every 30 Days      fexofenadine 180 MG tablet  Commonly known as: ALLEGRA   180 mg, Oral, Daily      flecainide 50 MG tablet  Commonly known as: TAMBOCOR   50 mg, Oral, 2 Times Daily      fluticasone-salmeterol 115-21 MCG/ACT inhaler  Commonly known as: ADVAIR HFA   2 puffs, Inhalation, 2 Times Daily - RT       folic acid 1 MG tablet  Commonly known as: FOLVITE   1 mg, Oral, Daily      furosemide 20 MG tablet  Commonly known as: LASIX   20 mg, Oral, Daily PRN      methotrexate 2.5 MG tablet   25 mg, Oral, Weekly      nitroglycerin 0.4 MG SL tablet  Commonly known as: NITROSTAT   1 under the tongue as needed for angina, may repeat q5mins for up three doses      nystatin 804837 UNIT/GM topical powder  Generic drug: nystatin   Apply 1 Application topically to the appropriate area as directed 4 (Four) Times a Day As Needed (irritation).      Toujeo SoloStar 300 UNIT/ML solution pen-injector injection  Generic drug: Insulin Glargine (1 Unit Dial)   Inject 15 Units under the skin into the appropriate area as directed Every Morning.      vitamin D 1.25 MG (96688 UT) capsule capsule  Commonly known as: ERGOCALCIFEROL   50,000 Units, Oral, Weekly      Xarelto 20 MG tablet  Generic drug: rivaroxaban   20 mg, Oral, Daily      Xeljanz XR 11 MG tablet sustained-release 24 hour  Generic drug: Tofacitinib Citrate ER   11 mg, Oral, Daily             Stop These Medications      amitriptyline 25 MG tablet  Commonly known as: ELAVIL     amLODIPine 5 MG tablet  Commonly known as: NORVASC     butalbital-acetaminophen-caffeine -40 MG capsule capsule  Commonly known as: ORBIVAN     celecoxib 200 MG capsule  Commonly known as: CeleBREX     cyclobenzaprine 10 MG tablet  Commonly known as: FLEXERIL     gabapentin 600 MG tablet  Commonly known as: NEURONTIN  Replaced by: gabapentin 400 MG capsule     levothyroxine sodium 125 MCG capsule capsule  Commonly known as: TIROSINT  Replaced by: levothyroxine 150 MCG tablet     losartan 50 MG tablet  Commonly known as: COZAAR     metoprolol tartrate 100 MG tablet  Commonly known as: LOPRESSOR     oxyCODONE-acetaminophen 7.5-325 MG per tablet  Commonly known as: PERCOCET     potassium chloride 10 MEQ CR tablet  Commonly known as: KLOR-CON M10     primidone 50 MG tablet  Commonly known as: MYSOLINE      traMADol 50 MG tablet  Commonly known as: ULTRAM               Your medication list        START taking these medications        Instructions Last Dose Given Next Dose Due   gabapentin 400 MG capsule  Commonly known as: NEURONTIN  Replaces: gabapentin 600 MG tablet      Take 1 capsule by mouth Every 8 (Eight) Hours for 14 days.       HYDROcodone-acetaminophen 7.5-325 MG per tablet  Commonly known as: NORCO      Take 1 tablet by mouth Every 8 (Eight) Hours As Needed for Moderate Pain for up to 4 days.       levothyroxine 150 MCG tablet  Commonly known as: SYNTHROID, LEVOTHROID  Start taking on: August 7, 2025  Replaces: levothyroxine sodium 125 MCG capsule capsule      Take 1 tablet by mouth Every Morning for 30 days.       Polyvinyl Alcohol-Povidone PF 1.4-0.6 % ophthalmic solution  Commonly known as: ARTIFICIAL TEARS      Administer 2 drops to both eyes As Needed (dry eyes) for up to 14 days.              CHANGE how you take these medications        Instructions Last Dose Given Next Dose Due   omeprazole 40 MG capsule  Commonly known as: priLOSEC  What changed: See the new instructions.      Take 1 capsule by mouth Daily As Needed (acid reflux) for up to 30 days.              CONTINUE taking these medications        Instructions Last Dose Given Next Dose Due   cyanocobalamin 1000 MCG/ML injection      Inject 1 mL into the appropriate muscle as directed by prescriber 1 (One) Time Per Week for 30 days.       DULoxetine 30 MG capsule  Commonly known as: CYMBALTA      Take 1 capsule by mouth Every Morning for 30 days.       DULoxetine 60 MG capsule  Commonly known as: CYMBALTA      Take 1 capsule by mouth Daily for 30 days.       Emgality 120 MG/ML auto-injector pen  Generic drug: galcanezumab-gnlm      Inject 1 mL under the skin into the appropriate area as directed Every 30 (Thirty) Days.       fexofenadine 180 MG tablet  Commonly known as: ALLEGRA      Take 1 tablet by mouth Daily for 30 days.       flecainide 50  MG tablet  Commonly known as: TAMBOCOR      TAKE 1 TABLET TWICE A DAY       fluticasone-salmeterol 115-21 MCG/ACT inhaler  Commonly known as: ADVAIR HFA      Inhale 2 puffs 2 (Two) Times a Day for 30 days.       folic acid 1 MG tablet  Commonly known as: FOLVITE      Take 1 tablet by mouth Daily for 30 days.       furosemide 20 MG tablet  Commonly known as: LASIX      Take 1 tablet by mouth Daily As Needed (swelling) for up to 30 days.       methotrexate 2.5 MG tablet      Take 10 tablets by mouth 1 (One) Time Per Week.       nitroglycerin 0.4 MG SL tablet  Commonly known as: NITROSTAT      1 under the tongue as needed for angina, may repeat q5mins for up three doses       nystatin 629570 UNIT/GM topical powder  Generic drug: nystatin      Apply 1 Application topically to the appropriate area as directed 4 (Four) Times a Day As Needed (irritation).       Toujeo SoloStar 300 UNIT/ML solution pen-injector injection  Generic drug: Insulin Glargine (1 Unit Dial)      Inject 15 Units under the skin into the appropriate area as directed Every Morning.       vitamin D 1.25 MG (14393 UT) capsule capsule  Commonly known as: ERGOCALCIFEROL      Take 1 capsule by mouth 1 (One) Time Per Week for 30 days.       Xarelto 20 MG tablet  Generic drug: rivaroxaban      TAKE 1 TABLET DAILY       Xeljanz XR 11 MG tablet sustained-release 24 hour  Generic drug: Tofacitinib Citrate ER      Take 1 tablet by mouth Daily.              STOP taking these medications      amitriptyline 25 MG tablet  Commonly known as: ELAVIL        amLODIPine 5 MG tablet  Commonly known as: NORVASC        butalbital-acetaminophen-caffeine -40 MG capsule capsule  Commonly known as: ORBIVAN        celecoxib 200 MG capsule  Commonly known as: CeleBREX        cyclobenzaprine 10 MG tablet  Commonly known as: FLEXERIL        gabapentin 600 MG tablet  Commonly known as: NEURONTIN  Replaced by: gabapentin 400 MG capsule        levothyroxine sodium 125 MCG capsule  capsule  Commonly known as: TIROSINT  Replaced by: levothyroxine 150 MCG tablet        losartan 50 MG tablet  Commonly known as: COZAAR        metoprolol tartrate 100 MG tablet  Commonly known as: LOPRESSOR        oxyCODONE-acetaminophen 7.5-325 MG per tablet  Commonly known as: PERCOCET        potassium chloride 10 MEQ CR tablet  Commonly known as: KLOR-CON M10        primidone 50 MG tablet  Commonly known as: MYSOLINE        traMADol 50 MG tablet  Commonly known as: ULTRAM                  Where to Get Your Medications        These medications were sent to [id number] - [place], [place] - [address] AT [facility] [address] - [phone] PH - [phone] FX  [address] [place] [address]      Phone: [phone]   cyanocobalamin 1000 MCG/ML injection  DULoxetine 30 MG capsule  DULoxetine 60 MG capsule  fexofenadine 180 MG tablet  fluticasone-salmeterol 115-21 MCG/ACT inhaler  folic acid 1 MG tablet  furosemide 20 MG tablet  gabapentin 400 MG capsule  HYDROcodone-acetaminophen 7.5-325 MG per tablet  levothyroxine 150 MCG tablet  omeprazole 40 MG capsule  Polyvinyl Alcohol-Povidone PF 1.4-0.6 % ophthalmic solution  vitamin D 1.25 MG (07476 UT) capsule capsule         Diet Instructions       Diet: Regular/House Diet; Regular (IDDSI 7); Thin (IDDSI 0)      Discharge Diet: Regular/House Diet    Texture: Regular (IDDSI 7)    Fluid Consistency: Thin (IDDSI 0)          Activity Instructions       Activity as Tolerated            Future Appointments   Date Time Provider Department Center   10/15/2025  9:00 AM [place] [facility]   10/15/2025  9:30 AM Tari Yancey APRN [facility]   11/14/2025  9:00 AM Gael Baum PA-C [facility]     Additional Instructions for the Follow-ups that You Need to Schedule       Ambulatory Referral to Physical Therapy   As directed      Specialty needed:  (Strengthening, endurance training, gait  training, transfer training, balance, therapeutic exercise, bed mobility, range of motion, step stairs 2 times a week for 4 weeks)   Follow-up needed: Yes        Discharge Follow-up with PCP   As directed       Currently Documented PCP:    Kreis, Samuel Duane, MD    PCP Phone Number:    [phone]     Follow Up Details: keep scheduled appt--recent hip fx//orthostatic hypotension        Discharge Follow-up with Specified Provider: Dr Lovelace--keep appt for f/u hip fx   As directed      To: Dr Lovelace--keep appt for f/u hip fx               Follow-up Information       Yolanda Yancey. Go on 8/15/2025.    Specialty: Nurse Practitioner  Why: at 10:00am for Urology follow up.  Contact information:  [address] [facility] [facility] [address]  [address]  [place] [address]  [phone]               Jennifer Agrawal MD. Go on 8/22/2025.    Specialty: Neurology  Why: at 9:25am for follow up.  Contact information:  [address]  [place] [address]  [phone]               Angelica Chan MD. Go on 8/25/2025.    Specialty: Nephrology  Why: at 11:00am for follow up.  Contact information:  [address] [facility] [address]  [phone]               Nany Wiggins. Go on 12/10/2025.    Specialty: Nurse Practitioner  Why: at 9:45am for Neurology follow up.  Contact information:  [address] [facility] [address]  [phone]               Kreis, Samuel Duane, MD. Go on 8/11/2025.    Specialty: Family Medicine  Why: at 10:30am for hospital follow up.  Contact information:  [address] [place] [address] [address] [place] [address]  [phone]               Mateo Lovelace MD. Go on 8/19/2025.    Specialty: Orthopedic Surgery  Why: at 2:45pm for surgery follow up.  You will see RUIZ Ibarra for this visit.  Contact information:  [address] [place] [address]  [phone]               [facility] [place] Follow up on 8/14/2025.    Specialties: Physical Therapy, Outpatient  Rehabilitation Clinic  Why: 8:45 am arrival for paperwork, 9:00 am PT evaluation  Contact information:  [address]  [facility] [address]  [phone]             Kreis, Samuel Duane, MD .    Specialty: Family Medicine  Why: keep scheduled appt--recent hip fx//orthostatic hypotension  Contact information:  [address]   [place] [address]  [phone]                              TEST  RESULTS PENDING AT DISCHARGE       CODE STATUS  Code Status and Medical Interventions: CPR (Attempt to Resuscitate); Full Support   Ordered at: 07/22/25 1438     Code Status (Patient has no pulse and is not breathing):    CPR (Attempt to Resuscitate)     Medical Interventions (Patient has pulse or is breathing):    Full Support       The 10-year ASCVD risk score (Natan DK, et al., 2019) is: 8.7%    Values used to calculate the score:      Age: 64 years      Sex: Female      Is Non- : No      Diabetic: Yes      Tobacco smoker: No      Systolic Blood Pressure: 123 mmHg      Is BP treated: Yes      HDL Cholesterol: 50 mg/dL      Total Cholesterol: 135 mg/dL     Jeuss Acevedo MD  [facility]  08/06/25  09:43 EDT    Please note that this discharge summary required less than 30 minutes to complete.

## 2025-08-06 NOTE — PROGRESS NOTES
"Section A. Administrative Information - Discharge        Medication List to Subsequent Provider: Not applicable.  Patient was not  discharged to a subsequent provider.      01 - Home        Reconciled Medication List to Patient on Discharge:  Yes    Route of Transmission: D. Paper-based (e.g., fax, copies, printouts)    Section B.  Hearing, Speech, and Vision - Discharge  . Health Literacy - Frequency of requiring assistance reading instructions,  pamphlets, other written material from doctor or pharmacy:  0. Never    Section C.  Cognitive Patterns - Discharge  . Should Brief Interview for Mental Status (-) be conducted?  (1) Yes        Number of words repeated by patient after first attempt:  3. Three        A.  ?Please tell me what year it is right now.?    A. Able to report correct year:  3. Correct    B.  ?What month are we in right now??    B. Able to report correct month:  2. Accurate within 5 days    C.  ?What day of the week is today??    C. Able to report correct day of the week:  1. Correct            A.  Able to recall ?sock?:  2. Yes, no cue required    B.  Able to recall ?blue?:  2. Yes, no cue required    C.  Able to recall ?bed?:  2. Yes, no cue required        BIMS Score:  15    A. Is there evidence of an acute change in mental status from the patient's  baseline?  0. No    B. Inattention:  0. Behavior not present    C. Disorganized thinkin. Behavior not present    D. Altered level of consciousness:  0. Behavior not present    Section D. Mood - Discharge  \"Over the last 2 weeks, have you been bothered by any of the following  problems?\"    . Patient Mood Interview (PHQ-2 to 9) (from [employer]?)                            1. Symptom Presence       2. Symptom Frequency  A. Little interest or pleasure in doing things  0. No                     0.  Never or 1 day  B. Feeling down, depressed, or hopeless  0. No                     0. Never or 1  day          . Total " "Severity Score: 0    Interpretation: Minimal depression    How often do you feel lonely or isolated from those around you?  0. Never    Section J.  Health Conditions - Discharge  . Pain Effect on Sleep - ?Over the past 5 days, how much of the time has  pain made it hard for you to sleep at night??:  1. Rarely or not at all    . Pain Interference with Therapy Activities - ?Over the past 5 days, how  often have you limited your participation in rehabilitation therapy sessions due  to pain?\":  1. Rarely or not at all    . Pain Interference with Day-to-Day Activities - ?Over the past 5 days, how  often have you limited your day-to-day activities (excluding rehabilitation  therapy sessions) because of pain??:  1. Rarely or not at all    Has the patient had any falls since admission?  0. No    Section M. Skin Conditions - Discharge  Does this patient have one or more unhealed pressure ulcers/injuries?  1. Yes        A 1. Number of Stage 1 pressure injuries on discharge:  1          B 1. Number of Stage 2 pressure ulcers present on discharge: 0          C 1. Number of Stage 3 pressure ulcers present on discharge: 0          D 1. Number of Stage 4 pressure ulcers present on discharge: 0          E 1. Number of unstageable pressure ulcers/injuries due to non-removable  dressing/device present on discharge: 0          F 1. Number of unstageable pressure ulcers due to coverage of wound bed by  slough and/or eschar present on discharge: 0          G 1. Number of unstageable pressure injuries presenting as deep tissue injury  present on discharge: 1    G 1. Number of these unstageable pressure injuries that were present upon  admission:  1    Signed by: Fely Garcia Nurse    "

## 2025-08-06 NOTE — SIGNIFICANT NOTE
08/06/25 1007   Plan   Plan Faxed MD orders for DME and discharge summary to [facility].  Faxed ambulatory referral for outpatient PT and discharge summary to [facility].   Final Discharge Disposition Code 01 - home or self-care

## 2025-08-06 NOTE — SIGNIFICANT NOTE
08/06/25 1150   Plan   Plan Spoke to spouse and pt about outpatient PT appointment at [facility] on 8-14-25 at 9:00 am; arrive at 8:45 am for paperwork.  RW and W/C delivered to rehab.  Pt is returning home with spouse providing assistance with needs.  Spouse will transport pt home today.   Patient/Family in Agreement with Plan yes   Final Discharge Disposition Code 01 - home or self-care

## 2025-08-06 NOTE — THERAPY DISCHARGE NOTE
Inpatient Rehabilitation - IRF Occupational Therapy Treatment Note/Discharge  GUANACO Portillo     Patient Name: Lashae Benitez  : 1960  MRN: [id number]  Today's Date: 2025               Admit Date: 2025       ICD-10-CM ICD-9-CM   1. Closed left hip fracture, sequela  S72.002S 905.3     Patient Active Problem List   Diagnosis    Hiatal hernia    Essential hypertension    Sjogren's syndrome    Multiple sclerosis    Psoriasis    Fibromyalgia    Osteoarthritis    Psoriatic arthritis    RA (rheumatoid arthritis)    Degenerative disc disease, lumbar    TMJ arthritis    Dupuytren's disease    H. pylori infection    Family history of coronary artery disease    Type 2 diabetes mellitus    Edema    Bilateral leg edema    Isolated corticotropin deficiency    Hyponatremia    Paroxysmal atrial fibrillation    Sepsis    Bacteremia, escherichia coli    Iron deficiency anemia    Malabsorption due to intolerance, not elsewhere classified    Chronic chest pain with high risk for CAD    Abnormal nuclear stress test    Abnormal computed tomography angiography (CTA)    Hip fracture    Closed intertrochanteric fracture of hip, left, initial encounter    Closed left hip fracture, sequela     Past Medical History:   Diagnosis Date    Acid reflux     Allergic     Anemia     Anxiety     Atrial fibrillation     Cancer     thyroid, skin    Cervical disc disorder     Chronic pain disorder     Claustrophobia     CTS (carpal tunnel syndrome)     Degenerative disc disease, lumbar     Depression     Dupuytren's disease     Essential hypertension     Family history of coronary artery disease     Fibromyalgia     Gallbladder abscess     H. pylori infection     Hiatal hernia     Hyperlipidemia     Hypothyroidism     Low back pain     Lumbosacral disc disease     Migraines     migraines    Multiple sclerosis     Osteoarthritis     Peripheral neuropathy     Psoriasis     Psoriatic arthritis     RA (rheumatoid arthritis)     Rheumatoid arthritis      Sinusitis     Sjogren's syndrome     Stomach ulcer     Thoracic disc disorder     TMJ arthritis     Type 2 diabetes mellitus     Urinary tract infection      Past Surgical History:   Procedure Laterality Date    BACK SURGERY      BREAST LUMPECTOMY Left 11/1993    CARDIAC CATHETERIZATION Left 09/21/2009    Normal     CARDIAC CATHETERIZATION N/A 6/24/2025    Procedure: Left Heart Cath;  Surgeon: Teodora Barron MD;  Location:  [facility];  Service: Cardiovascular;  Laterality: N/A;    CARPAL TUNNEL RELEASE  03/2012    CERVICAL POLYPECTOMY  12/2015    CHOLECYSTECTOMY  05/2007    COLONOSCOPY      ENDOSCOPY      EPIDURAL BLOCK      GASTRIC BYPASS  09/2007    JOINT REPLACEMENT      KNEE ARTHROPLASTY      LUMBAR DISC SURGERY      C5-6    NECK SURGERY      REPLACEMENT TOTAL KNEE Right 06/2016    SACRAL NERVE STIMULATOR PLACEMENT  09/2014    SACRAL NERVE STIMULATOR PLACEMENT  04/11/2024    @ [facility] in [place]    SACRAL NERVE STIMULATOR PLACEMENT Right 04/17/2024    THYROIDECTOMY  03/2016    TRIGGER POINT INJECTION         IRF OT ASSESSMENT FLOWSHEET (Last 12 Hours)       IRF OT Evaluation and Treatment       Row Name 08/06/25 0809          OT Time and Intention    Document Type discharge evaluation;daily treatment  -CJ     Mode of Treatment occupational therapy  -CJ     Symptoms Noted During/After Treatment none  -CJ       Row Name 08/06/25 0825          General Information    Patient/Family/Caregiver Comments/Observations agreeable to therapy; VSS  -CJ     Existing Precautions/Restrictions fall;weight bearing  TTWB LLE, ABD binder- bp  -CJ       Row Name 08/06/25 0825          Cognition/Psychosocial    Follows Commands (Cognition) WFL  -     Personal Safety Interventions gait belt;nonskid shoes/slippers when out of bed;supervised activity;fall prevention program maintained  -CJ       Row Name 08/06/25 0825          Range of Motion Comprehensive    General Range of Motion bilateral upper extremity ROM  [facility]  -       Row Name 08/06/25 0825          Strength Comprehensive (MMT)    Comment, General Manual Muscle Testing (MMT) Assessment BUE - 4 to 4+/5  -       Row Name 08/06/25 0825          Sensory    Additional Documentation --  BUE light touch - [facility]  -       Row Name 08/06/25 0825          Vision Assessment/Intervention    Visual Impairment/Limitations corrective lenses full-time  -[facility] Name 08/06/25 0825          Bathing    [place] Level (Bathing) standby assist;contact guard assist;verbal cues  -     Assistive Device (Bathing) long-handled sponge  issued LHS  -       Row Name 08/06/25 0825          Upper Body Dressing    [place] Level (Upper Body Dressing) set up assistance;bra/undergarment;pull over garment  -[facility] Name 08/06/25 0825          Lower Body Dressing    [place] Level (Lower Body Dressing) contact guard assist;verbal cues;pants/bottoms;socks;shoes/slippers  -     Assistive Device Use (Lower Body Dressing) reacher;sock-aid  AE issued  -[facility] Name 08/06/25 0825          Grooming    [place] Level (Grooming) set up  -[facility] Name 08/06/25 0825          Toileting    [place] Level (Toileting) supervision;contact guard assist  -     Assistive Device Use (Toileting) grab bar/safety frame;raised toilet seat  -[facility] Name 08/06/25 0825          Self-Feeding    [place] Level (Self-Feeding) modified independence  -[facility] Name 08/06/25 0825          Toilet Transfer    [place] Level (Toilet Transfer) standby assist;contact guard;verbal cues  -     Assistive Device (Toilet Transfer) grab bars/safety frame;raised toilet seat;wheelchair  -[facility] Name 08/06/25 0825          Positioning and Restraints    Post Treatment Position wheelchair  -     In Wheelchair sitting;call light within reach;encouraged to call for assist;notified nsg;with family/caregiver  both sessions; w/ family second session  -[facility] Name 08/06/25  0825          LB Dressing Goal 1 (OT-IRF)    Progress/Outcomes (LB Dressing Goal 1, OT-IRF) goal met  -       Row Name 08/06/25 0825          LB Dressing Goal 2 (OT-IRF)    Progress/Outcomes (LB Dressing Goal 2, OT-IRF) goal met  -       Row Name 08/06/25 0825          Toileting Goal 1 (OT-IRF)    Progress/Outcomes (Toileting Goal 1, OT-IRF) goal met  -       Row Name 08/06/25 0825          Toileting Goal 2 (OT-IRF)    Progress/Outcomes (Toileting Goal 2, OT-IRF) goal met  -       Row Name 08/06/25 0825          Discharge Summary (OT)    Discharge Summary Statement (OT) Education provided to pt and spouse re: ADL status, safety, AE, DME, supervision, home program, D/C concerns, and precautions incl. WB status. Verbalized understanding.  -               User Key  (r) = Recorded By, (t) = Taken By, (c) = Cosigned By      Initials Name Effective Dates    CJ Amber Badillo, OT 06/16/21 -                   Wound 07/19/25 0921 Left gluteal Other (Comments) (Active)   Wound Image   08/06/25 0233   Dressing Appearance open to air 08/06/25 0951   Base nonblanchable;red 08/06/25 0951   Periwound pink;redness 08/06/25 0951   Wound Length (cm) 1.4 cm 08/06/25 0230   Wound Width (cm) 0.6 cm 08/06/25 0230   Wound Surface Area (cm^2) 0.66 cm^2 08/06/25 0230   Drainage Amount none 08/06/25 0951   Care, Wound barrier applied;pressure removed 08/06/25 0951   Dressing Care open to air 08/06/25 0230       Wound 07/19/25 0938 Left anterior thigh Surgical Open Surgical Incision (Active)   Dressing Appearance open to air 08/06/25 0951   Closure Steri strips 08/06/25 0951   Base dry;pink;red 08/05/25 1935   Periwound intact 08/05/25 1935   Drainage Amount none 08/05/25 1935   Care, Wound other (see comments) 08/05/25 1935   Dressing Care open to air 08/05/25 1935       Wound 07/21/25 0930 Right coccyx Pressure Injury (Active)   Wound Image   08/06/25 0232   Dressing Appearance open to air 08/06/25 0951   Base pink;purple  08/06/25 0951   Periwound redness 08/06/25 0951   Wound Length (cm) 1.4 cm 08/06/25 0230   Wound Width (cm) 0.8 cm 08/06/25 0230   Wound Surface Area (cm^2) 0.88 cm^2 08/06/25 0230   Drainage Amount none 08/06/25 0951   Care, Wound barrier applied;pressure removed 08/06/25 0951   Dressing Care open to air 08/06/25 0230       Wound 07/28/25 0829 coccyx (Active)   Wound Image   08/06/25 0230   Dressing Appearance open to air 08/06/25 0951   Base red;nonblanchable 08/06/25 0951   Periwound pink 08/06/25 0951   Periwound Temperature warm 08/06/25 0951   Wound Length (cm) 1 cm 08/06/25 0230   Wound Width (cm) 1.1 cm 08/06/25 0230   Wound Surface Area (cm^2) 0.86 cm^2 08/06/25 0230   Drainage Amount none 08/06/25 0951   Care, Wound barrier applied;pressure removed 08/06/25 0951   Dressing Care open to air 08/06/25 0230       Occupational Therapy Education       Title: PT OT SLP Therapies (Done)       Topic: Occupational Therapy (Resolved)       Point: ADL training (Resolved)       Learning Progress Summary            Patient Acceptance, E,D,H, VU by  at 8/6/2025 1210    Acceptance, E,D, VU,NR by  at 8/5/2025 1222    Acceptance, E,D, VU,NR by  at 8/4/2025 1222   Significant Other Acceptance, E,D,H, VU by  at 8/6/2025 1210                      Point: Home exercise program (Resolved)       Learning Progress Summary            Patient Acceptance, E,D,H, VU by  at 8/6/2025 1210   Significant Other Acceptance, E,D,H, VU by  at 8/6/2025 1210                      Point: Precautions (Resolved)       Learning Progress Summary            Patient Acceptance, E,D,H, VU by  at 8/6/2025 1210    Acceptance, E,D, VU,NR by  at 8/5/2025 1222    Acceptance, E,D, VU,NR by  at 8/4/2025 1222   Significant Other Acceptance, E,D,H, VU by ANABEL at 8/6/2025 1210                                      User Key       Initials Effective Dates Name Provider Type Discipline    ANABEL 06/16/21 -  Amber Badillo, OT Occupational Therapist OT                     OT Recommendation and Plan  Anticipated Discharge Disposition (OT): home with 24/7 care  Planned Therapy Interventions (OT): activity tolerance training, adaptive equipment training, BADL retraining, occupation/activity based interventions, patient/caregiver education/training, ROM/therapeutic exercise, strengthening exercise (impaired ADL's, strength, fxl mobility, and activity tolerance)           OT IRF GOALS       Row Name 08/06/25 0825 07/30/25 1228 07/30/25 1215       LB Dressing Goal 1 (OT-IRF)    [place] (LB Dressing Goal 1, OT-IRF) -- minimum assist (75% or more patient effort);moderate assist (50-74% patient effort)  -CJ maximum assist (25-49% patient effort)  -CJ    Time Frame (LB Dressing Goal 1, OT-IRF) -- short-term goal (STG)  -CJ --    Progress/Outcomes (LB Dressing Goal 1, OT-IRF) goal met  -CJ -- goal met  -CJ       LB Dressing Goal 2 (OT-IRF)    Progress/Outcomes (LB Dressing Goal 2, OT-IRF) goal met  -CJ -- --       Toileting Goal 1 (OT-IRF)    [place] Level (Toileting Goal 1, OT-IRF) -- moderate assist (50-74% patient effort)  -CJ maximum assist (25-49% patient effort)  -CJ    Progress/Outcomes (Toileting Goal 1, OT-IRF) goal met  -CJ new goal  -CJ goal met  -CJ    Time Frame (Toileting Goal 1, OT-IRF) -- short-term goal (STG)  -CJ --       Toileting Goal 2 (OT-IRF)    Progress/Outcomes (Toileting Goal 2, OT-IRF) goal met  -CJ -- --              User Key  (r) = Recorded By, (t) = Taken By, (c) = Cosigned By      Initials Name Provider Type    CJ Amber Badillo OT Occupational Therapist                        Time Calculation:    Time Calculation- OT       Row Name 08/06/25 1212 08/06/25 1209          Time Calculation- OT    OT Start Time -- 0755  -     OT Stop Time -- 0835  -     OT Time Calculation (min) -- 40 min  -CJ     Total Timed Code Minutes- OT 15 minute(s)  -CJ 40 minute(s)  -CJ               User Key  (r) = Recorded By, (t) = Taken By, (c) = Cosigned  By      Initials Name Provider Type    CJ Amber Badillo OT Occupational Therapist                    Therapy Charges for Today       Code Description Service Date Service Provider Modifiers Qty    48731498009 HC OT SELF CARE/MGMT/TRAIN EA 15 MIN 8/5/2025 Amber Badillo OT GO 3    58764305074 HC OT THER PROC EA 15 MIN 8/5/2025 Amber Badillo OT GO 3    61445731105 HC OT SELF CARE/MGMT/TRAIN EA 15 MIN 8/6/2025 Amber Badillo OT GO 4                 OT Discharge Summary  Anticipated Discharge Disposition (OT): home with 24/7 care  Reason for Discharge: Discharge from facility  Discharge Destination: Home with assist    Amber Badillo OT  8/6/2025

## 2025-08-06 NOTE — SIGNIFICANT NOTE
08/06/25 1605   PT Discharge Summary   Reason for Discharge Discharge from facility   Outcomes Achieved Patient able to partially acheive established goals   Discharge Destination Home with outpatient services

## 2025-08-06 NOTE — SIGNIFICANT NOTE
08/06/25 1120   Plan   Plan Contacted [facility] [phone] per Ana Maria who confirmed receiving DME orders/discharge summary and says technician is on his way to deliver DME to rehab.

## 2025-08-06 NOTE — PROGRESS NOTES
Section GG. Self Care - Discharge Performance                                            Coding  FW1797.A  Eating                        06. Independent - Patient completes th                                          e activity by him/herself with no assi                                          stance from a helper.  UU4816.B  Oral Hygiene                  05. Setup or clean-up assistance - Hel                                          per SETS UP or CLEANS UP; patient comp                                          letes activity. [place] assists only pr                                          ior to or following the activity.  CG3745.C  Toileting Hygiene             04. Supervision or touching assistance                                          - [place] provides VERBAL CUES or TOUCH                                          ING/STEADYING assistance as patient co                                          mpletes activity. Assistance may be pr                                          ovided throughout the activity or inte                                          rmittently.  WO0744.E  Shower/Bathe Self             04. Supervision or touching assistance                                          - [place] provides VERBAL CUES or TOUCH                                          ING/STEADYING assistance as patient co                                          mpletes activity. Assistance may be pr                                          ovided throughout the activity or inte                                          rmittently.  DA9023.F  Upper Body Dressing           05. Setup or clean-up assistance - Hel                                          per SETS UP or CLEANS UP; patient comp                                          letes activity. [place] assists only pr                                          ior to or following the activity.  UU4346.G  Lower Body Dressing           04. Supervision or touching assistance                                           - [place] provides VERBAL CUES or TOUCH                                          ING/STEADYING assistance as patient co                                          mpletes activity. Assistance may be pr                                          ovided throughout the activity or inte                                          rmittently.  OR2404.H  Putting On/Taking Off Footwear  04. Supervision or touching assistance                                            - [place] provides VERBAL CUES or TOUCH                                          ING/STEADYING assistance as patient co                                          mpletes activity. Assistance may be pr                                          ovided throughout the activity or inte                                          rmittently.    Signed by: Ambre Badillo, Occupational Therapist

## 2025-08-08 NOTE — PROGRESS NOTES
Team Members Attending Conference    Name                                    Professional Designation    Dr. Jesus Acevedo                            Physician  Darlene Adam, MSW                    Andreia Dove, PT                         Physical Therapist  Amber Badillo, OT                       Occupational Therapist  Kelsie Garcia, RN                    Nurse      Patient Information    YOB: 1960      Gender:  Female    Primary Language: English    Preferred Language: English    Admission Date/Time  07/24/2025 01:14 PM    Rehab Diagnosis/Condition  Diagnosis/Conditions that caused the need for rehabilitation.      Left Intertrochanteric Hip Fracture S/P Repair    Impairment Group Code  Orthopedic Disorders Impairment Group Code:  08.11 Status Post Unilateral Hip Fracture    Medical Status    stable    Medical Prognosis        good--healing repaired hip fx    Rehabilitation Potential        Based on the patient's PLOF and current therapy levels the patient's  rehabilitation potential is good.    Rehabilitation Therapy Program  Expected Participation in Rehabilitation Program:  At least 3 hours per day at least 5 days per week    Anticipated Interventions                        Expected Intensity (hours/day)  Expected Frequency  (days/week)  Expected Duration (total # days/IRF stay)  Physical Therapy    1.5 hours per day   5 days per week     14 days  Occupational Therapy  1.5 hours per day   5 days per week     14 days        Other Disciplines Participating in Plan of Care:  Case Management, Nursing,  Recreational Therapist    Estimated Length of Stay  Estimated Length of Stay:  14 days    Estimated Discharge Date:   08/06/2025    Anticipated Discharge Destination  Anticipated Discharge Destination:  To community with assistance    Discharge Destination Information:  Patient will discharge home with spouse providing assistance if needed.    Problems/Goals  Mobility Discussion    PT  - Bed/Chair/Wheelchair (Mobility)  Current Status: max A  Long Term Goals  07/23/2025 04:15 PM - Active  Sup  PT - Walk (Mobility)  Current Status: unable  Long Term Goals  07/23/2025 04:15 PM - Active  amb 150' RW Sup  Self Care Discussion    OT - Dressing (Lower) (Self Care)  Current Status: Shanna  Long Term Goals  07/23/2025 12:26 PM - Active  CGA  Body Function Structure Discussion    NURS - Skin Integrity (Body Function Structure)  Current Status: Risk for further skin breakdown  Long Term Goals  07/22/2025 02:24 PM - Active  No worsening of skin breakdown/ No new skin breakdown  Safety Discussion    NURS - Potential for Injury (Safety)  Current Status: Risk for falls  Long Term Goals  07/22/2025 02:25 PM - Active  No falls this hospital stay    Physician Concurrence    I attended the weekly team conference documented above and agree with the  documented findings, discussion and decisions, and current plan of care.    Signed by: Jesus Acevedo Physician    Physician CoSigned By: Jesus Acevedo 08/08/2025 07:11:00

## 2025-08-11 NOTE — PROGRESS NOTES
Medical Information  Orthopedic Disorders Impairment Group Code:  08.11 Status Post Unilateral Hip Fracture    Comorbid Conditions:  Comorbidities  E11.40 - Type 2 diabetes mellitus with diabetic neuropathy, unspecified  E78.5 - Hyperlipidemia, unspecified  E89.0 - Postprocedural hypothyroidism  M35.00 - Sjogren syndrome, unspecified  I48.0 - Paroxysmal atrial fibrillation  G35      - Multiple sclerosis  I10      - Essential (primary) hypertension  J44.9 - Chronic obstructive pulmonary disease, unspecified  F32.A - Depression, unspecified  G89.29 - Other chronic pain  M06.9 - Rheumatoid arthritis, unspecified  F40.240 - Claustrophobia  L40.50 - Arthropathic psoriasis, unspecified  Z96.651 - Presence of right artificial knee joint  M79.7 - Fibromyalgia  Z79.890 - Hormone replacement therapy  Z85.850 - Personal history of malignant neoplasm of thyroid  Z98.84 - Bariatric surgery status  Z79.899 - Other long term (current) drug therapy  Z87.11 - Personal history of peptic ulcer disease      Discharge Information  41. Patient Discharge Against Medical Advice:  No (0)    42. Program Interruption(s):  No    44C. Patient Discharged Alive:  Yes (1)    44D. Discharge Destination/Living Setting:  Home (private home/apt., board/care, assisted living, group home, transitional  living, other residential care arrangements) (01)    45. Discharge to Living With:  Family/Relatives (2)    Signed by: Nurse Afshan

## 2025-08-11 NOTE — PROGRESS NOTES
Section N. Medication - Discharge                                  Is taking                 Indication noted  E. Anticoagulant          Yes                       Yes  H. Opioid                 Yes                       No  J. Hypoglycemic (including insulin)  Yes                       Yes        Did the facility contact and complete physician (or physician-designee)  prescribed/recommended actions by midnight of the next calendar day each time  potential clinically significant medication issues were identified since the  admission?  9. Not applicable - There were no potential clinically significant medication  issues identified since admission or patient is not taking any medications.    Signed by: Lilian Prado Nurse

## 2025-08-21 ENCOUNTER — TRANSCRIBE ORDERS (OUTPATIENT)
Dept: ADMINISTRATIVE | Facility: HOSPITAL | Age: 65
End: 2025-08-21
Payer: MEDICARE

## 2025-08-21 ENCOUNTER — LAB (OUTPATIENT)
Dept: LAB | Facility: HOSPITAL | Age: 65
End: 2025-08-21
Payer: MEDICARE

## 2025-08-21 DIAGNOSIS — E87.6 HYPOKALEMIA: Primary | ICD-10-CM

## 2025-08-26 ENCOUNTER — TRANSCRIBE ORDERS (OUTPATIENT)
Dept: ADMINISTRATIVE | Facility: HOSPITAL | Age: 65
End: 2025-08-26
Payer: MEDICARE

## 2025-08-26 DIAGNOSIS — E87.1 HYPOSMOLALITY SYNDROME: Primary | ICD-10-CM

## (undated) DEVICE — ST EXT IV SMRTSTE 2VLV FIX M LL 6ML 41

## (undated) DEVICE — PAD, DEFIB, ADULT, RADIOTRANS, ZOLL: Brand: MEDLINE

## (undated) DEVICE — TR BAND RADIAL ARTERY COMPRESSION DEVICE: Brand: TR BAND

## (undated) DEVICE — A2000 MULTI-USE SYRINGE KIT, P/N 701277-003KIT CONTENTS: 100ML CONTRAST RESERVOIR AND TUBING WITH CONTRAST SPIKE AND CLAMP: Brand: A2000 MULTI-USE SYRINGE KIT

## (undated) DEVICE — DRAPE, RADIAL, STERILE: Brand: MEDLINE

## (undated) DEVICE — ST INF PRI SMRTSTE 20DRP 2VLV 24ML 117

## (undated) DEVICE — DRSNG SURESITE WNDW 4X4.5

## (undated) DEVICE — ADULT DISPOSABLE SINGLE-PATIENT USE PULSE OXIMETER SENSOR: Brand: NONIN

## (undated) DEVICE — LN INJ CONTRST FLXCIL HP F/M LL 1200PSI10

## (undated) DEVICE — PK CATH CARD 70

## (undated) DEVICE — CATH F5 INF JL 3.5 100CM: Brand: INFINITI

## (undated) DEVICE — MODEL AT P65, P/N 701554-001KIT CONTENTS: HAND CONTROLLER, 3-WAY HIGH-PRESSURE STOPCOCK WITH ROTATING END AND PREMIUM HIGH-PRESSURE TUBING: Brand: ANGIOTOUCH® KIT

## (undated) DEVICE — RADIAL RUNWAY TOP PADS: Brand: RADIAL RUNWAY TOP PADS

## (undated) DEVICE — LN FLTR ORL/NASL MICROSTREAM NONINTUB A/LNG

## (undated) DEVICE — CATH F5 INF JR 4 100CM: Brand: INFINITI

## (undated) DEVICE — GLIDESHEATH SLENDER STAINLESS STEEL KIT: Brand: GLIDESHEATH SLENDER

## (undated) DEVICE — GW INQW FIX/CORE PTFE J/3MM .035 260CM